# Patient Record
Sex: MALE | Race: WHITE | NOT HISPANIC OR LATINO | Employment: OTHER | URBAN - METROPOLITAN AREA
[De-identification: names, ages, dates, MRNs, and addresses within clinical notes are randomized per-mention and may not be internally consistent; named-entity substitution may affect disease eponyms.]

---

## 2017-01-03 ENCOUNTER — OFFICE VISIT (OUTPATIENT)
Dept: ELECTROPHYSIOLOGY | Facility: CLINIC | Age: 82
End: 2017-01-03
Payer: MEDICARE

## 2017-01-03 ENCOUNTER — HOSPITAL ENCOUNTER (OUTPATIENT)
Dept: CARDIOLOGY | Facility: CLINIC | Age: 82
Discharge: HOME | End: 2017-01-03
Payer: MEDICARE

## 2017-01-03 VITALS
SYSTOLIC BLOOD PRESSURE: 134 MMHG | HEIGHT: 68 IN | BODY MASS INDEX: 27.33 KG/M2 | HEART RATE: 67 BPM | WEIGHT: 180.31 LBS | DIASTOLIC BLOOD PRESSURE: 60 MMHG

## 2017-01-03 DIAGNOSIS — I48.0 PAF (PAROXYSMAL ATRIAL FIBRILLATION): ICD-10-CM

## 2017-01-03 DIAGNOSIS — R00.1 BRADYCARDIA: ICD-10-CM

## 2017-01-03 DIAGNOSIS — I49.5 SSS (SICK SINUS SYNDROME): Primary | ICD-10-CM

## 2017-01-03 PROCEDURE — 99499 UNLISTED E&M SERVICE: CPT | Mod: S$GLB,,, | Performed by: INTERNAL MEDICINE

## 2017-01-03 PROCEDURE — 1160F RVW MEDS BY RX/DR IN RCRD: CPT | Mod: S$GLB,,, | Performed by: INTERNAL MEDICINE

## 2017-01-03 PROCEDURE — 1125F AMNT PAIN NOTED PAIN PRSNT: CPT | Mod: S$GLB,,, | Performed by: INTERNAL MEDICINE

## 2017-01-03 PROCEDURE — 93000 ELECTROCARDIOGRAM COMPLETE: CPT | Mod: S$GLB,,, | Performed by: INTERNAL MEDICINE

## 2017-01-03 PROCEDURE — 99214 OFFICE O/P EST MOD 30 MIN: CPT | Mod: S$GLB,,, | Performed by: INTERNAL MEDICINE

## 2017-01-03 PROCEDURE — 99999 PR PBB SHADOW E&M-EST. PATIENT-LVL III: CPT | Mod: PBBFAC,,, | Performed by: INTERNAL MEDICINE

## 2017-01-03 PROCEDURE — 1159F MED LIST DOCD IN RCRD: CPT | Mod: S$GLB,,, | Performed by: INTERNAL MEDICINE

## 2017-01-03 PROCEDURE — 1157F ADVNC CARE PLAN IN RCRD: CPT | Mod: S$GLB,,, | Performed by: INTERNAL MEDICINE

## 2017-01-03 NOTE — PROGRESS NOTES
Subjective:    Patient ID:  Zeyad Woodard is a 89 y.o. male who presents for evaluation of Atrial Fibrillation      Atrial Fibrillation   Symptoms are negative for chest pain, dizziness, palpitations, shortness of breath, syncope and weakness. Past medical history includes atrial fibrillation.      89 y.o. M  HTN  HFpEF  Permanent AF  Syncope  GI bleed and chronic anemia; therefore not on anticoag    Had syncope 1/13 while seated at [employer]. See [facility] for details, again reviewed today.  ETT was negative. ILR implanted at that time.  ILR showed AF with multiple episodes of profound bradycardia, to 31 bpm even, while awake.    I placed PPM 7/15 and extracted ILR. Since, feeling much better. Ambulation still limited by LE neuropathy, but there's been no LH/presync/sync.  Paced 73% of the time.    My interpretation of today's ECG is AF with demand V pacing      Review of Systems   Constitution: Positive for malaise/fatigue. Negative for weakness.   HENT: Negative.  Negative for ear pain and tinnitus.    Eyes: Negative for blurred vision.   Cardiovascular: Negative.  Negative for chest pain, dyspnea on exertion, near-syncope, palpitations and syncope.   Respiratory: Negative.  Negative for shortness of breath.    Endocrine: Negative.  Negative for polyuria.   Hematologic/Lymphatic: Does not bruise/bleed easily.   Skin: Negative.  Negative for rash.   Musculoskeletal: Negative.  Negative for joint pain and muscle weakness.   Gastrointestinal: Negative.  Negative for abdominal pain and change in bowel habit.   Genitourinary: Negative for frequency.   Neurological: Positive for excessive daytime sleepiness and focal weakness. Negative for dizziness.   Psychiatric/Behavioral: Negative.  Negative for depression. The patient is not nervous/anxious.    Allergic/Immunologic: Negative for environmental allergies.        Objective:    Physical Exam   Constitutional: He is oriented to person, place, and time. He appears  well-developed and well-nourished.   HENT:   Head: Normocephalic and atraumatic.   Eyes: Conjunctivae, EOM and lids are normal. No scleral icterus.   Neck: Normal range of motion. No JVD present. No tracheal deviation present. No thyromegaly present.   Cardiovascular: Normal rate, normal heart sounds and intact distal pulses.  An irregularly irregular rhythm present.  No extrasystoles are present. PMI is not displaced.  Exam reveals no gallop and no friction rub.    No murmur heard.  Pulses:       Radial pulses are 2+ on the right side, and 2+ on the left side.   Pulmonary/Chest: Effort normal and breath sounds normal. No accessory muscle usage. No tachypnea. No respiratory distress. He has no wheezes. He has no rales.   Abdominal: Soft. Bowel sounds are normal. He exhibits no distension. There is no hepatosplenomegaly. There is no tenderness.   Musculoskeletal: Normal range of motion. He exhibits no edema.   Neurological: He is alert and oriented to person, place, and time. He has normal reflexes. He exhibits normal muscle tone.   Skin: Skin is warm and dry. No rash noted.   1+ edema bilat LEs   Psychiatric: He has a normal mood and affect. His behavior is normal.   Nursing note and vitals reviewed.        Assessment:       HFpEF  HTN  Permanent AF     Plan:       No A/C due to hx GI bleed and ongoing anemia. Continue ASA.    Return in 1 year with echo, or earlier prn.

## 2017-01-03 NOTE — MR AVS SNAPSHOT
[facility]  [address]  [facility] [address]  Phone: [phone]  Fax: [phone]                  Zeyad Woodard   1/3/2017 11:40 AM   Office Visit    Description:  Male : 1927   Provider:  Kade Callejas MD   Department:  [facility]           Reason for Visit     Atrial Fibrillation           Diagnoses this Visit        Comments    SSS (sick sinus syndrome)    -  Primary     Bradycardia                To Do List           Future Appointments        Provider Department Dept Phone    2017 3:30 PM Aldo Duran MD [place] - Ophthalmology [phone]    3/2/2017 10:15 AM Blade Emmanuel DPM [place] - Podiatry [phone]    3/13/2017 8:00 AM HOME MONITOR DEVICE CHECK, [facility] [phone]    3/13/2017 10:30 AM Lynn Rosenberg MD [facility]-Physical Med & Rehab [phone]      Goals (5 Years of Data)     None      Follow-Up and Disposition     Return in about 1 year (around 1/3/2018).      [facility] On Call     [facility] On Call Nurse Care Line -  Assistance  Registered nurses in the [facility] On Call Center provide clinical advisement, health education, appointment booking, and other advisory services.  Call for this free service at [phone].             Medications           Message regarding Medications     Verify the changes and/or additions to your medication regime listed below are the same as discussed with your clinician today.  If any of these changes or additions are incorrect, please notify your healthcare provider.             Verify that the below list of medications is an accurate representation of the medications you are currently taking.  If none reported, the list may be blank. If incorrect, please contact your healthcare provider. Carry this list with you in case of emergency.           Current Medications     amlodipine (NORVASC) 10 MG tablet Take 1 tablet (10 mg total) by mouth once daily.     "ASCORBATE CALCIUM (VITAMIN C ORAL) Take 1 tablet by mouth once daily.     aspirin (ECOTRIN) 81 MG EC tablet Take 1 tablet (81 mg total) by mouth once daily.    atorvastatin (LIPITOR) 20 MG tablet Take 1 tablet (20 mg total) by mouth nightly.    clotrimazole-betamethasone (LOTRISONE) lotion Apply topically 2 (two) times daily.    cyanocobalamin (VITAMIN B-12) 1000 MCG tablet Take 100 mcg by mouth once daily.    ferrous sulfate 325 mg (65 mg iron) Tab tablet Take 325 mg by mouth once daily.    furosemide (LASIX) 20 MG tablet Take 1 tablet (20 mg total) by mouth once daily.    gabapentin (NEURONTIN) 300 MG capsule Take 1 capsule (300 mg total) by mouth every evening.    levothyroxine (SYNTHROID) 100 MCG tablet Take 1 tablet (100 mcg total) by mouth once daily.    MULTIVITS-MINERALS/FA/LYCOPENE (ONE-A-DAY MEN'S MULTIVITAMIN ORAL) Take 1 tablet by mouth once daily.    oxycodone-acetaminophen (PERCOCET) 7.5-325 mg per tablet Take 1 tablet by mouth every 6 (six) hours as needed for Pain.    POLYETHYLENE GLYCOL 3350 (MIRALAX ORAL) Take 1 packet by mouth once daily.    senna-docusate 8.6-50 mg (PERICOLACE) 8.6-50 mg per tablet Take 1 tablet by mouth 2 (two) times daily.    tamsulosin (FLOMAX) 0.4 mg Cp24 Take 1 capsule (0.4 mg total) by mouth once daily.    calcium 500 mg Tab Take 1 tablet by mouth Daily.           Clinical Reference Information           Vital Signs - Last Recorded  Most recent update: 1/3/2017 11:14 AM by Marina Lucio MA    BP Pulse Ht Wt BMI    134/60 67 5' 8" (1.727 m) 81.8 kg (180 lb 5.4 oz) 27.42 kg/m2      Blood Pressure          Most Recent Value    BP  134/60      Allergies as of 1/3/2017     Penicillins      Immunizations Administered on Date of Encounter - 1/3/2017     None      Orders Placed During Today's Visit     Future Labs/Procedures Expected by Expires    2D echo with color flow doppler  12/9/2017 1/3/2018      "

## 2017-01-18 ENCOUNTER — PROCEDURE VISIT (OUTPATIENT)
Dept: OPHTHALMOLOGY | Facility: CLINIC | Age: 82
End: 2017-01-18
Payer: MEDICARE

## 2017-01-18 VITALS — SYSTOLIC BLOOD PRESSURE: 153 MMHG | HEART RATE: 72 BPM | DIASTOLIC BLOOD PRESSURE: 70 MMHG

## 2017-01-18 DIAGNOSIS — H26.491 PCO (POSTERIOR CAPSULAR OPACIFICATION), RIGHT: Primary | ICD-10-CM

## 2017-01-18 DIAGNOSIS — H52.7 REFRACTIVE ERROR: ICD-10-CM

## 2017-01-18 DIAGNOSIS — Z96.1 PSEUDOPHAKIA: ICD-10-CM

## 2017-01-18 DIAGNOSIS — I10 ESSENTIAL HYPERTENSION: ICD-10-CM

## 2017-01-18 PROCEDURE — 99499 UNLISTED E&M SERVICE: CPT | Mod: S$GLB,,, | Performed by: OPHTHALMOLOGY

## 2017-01-18 PROCEDURE — 66821 AFTER CATARACT LASER SURGERY: CPT | Mod: RT,S$GLB,, | Performed by: OPHTHALMOLOGY

## 2017-01-18 PROCEDURE — 92014 COMPRE OPH EXAM EST PT 1/>: CPT | Mod: 57,S$GLB,, | Performed by: OPHTHALMOLOGY

## 2017-01-18 RX ORDER — GABAPENTIN 100 MG/1
100 CAPSULE ORAL 2 TIMES DAILY
Status: ON HOLD | COMMUNITY
End: 2017-12-11

## 2017-01-18 NOTE — MR AVS SNAPSHOT
[place] - Ophthalmology   [facility]  [place] [address]  Phone: [phone]  Fax: [phone]                  Zeyad Woodard   2017 3:30 PM   Procedure visit    Description:  Male : 1927   Provider:  Aldo Duran MD   Department:  [place] - Ophthalmology           Reason for Visit     PCO           Diagnoses this Visit        Comments    PCO (posterior capsular opacification), right    -  Primary     Essential hypertension         Refractive error         Pseudophakia                To Do List           Future Appointments        Provider Department Dept Phone    3/2/2017 10:15 AM Blade Emmanuel DPM [place] - Podiatry [phone]    3/13/2017 8:00 AM HOME MONITOR DEVICE CHECK, [facility] - Arrhythmia [phone]    3/13/2017 10:30 AM MD [facility]-Physical Med & Rehab [phone]      Goals (5 Years of Data)     None      Follow-Up and Disposition     Return in about 2 weeks (around 2017) for Post-op Right eye.      [facility] On Call     [facility] On Call Nurse Care Line - 24/ Assistance  Registered nurses in the [facility] On Call Center provide clinical advisement, health education, appointment booking, and other advisory services.  Call for this free service at [phone].             Medications           Message regarding Medications     Verify the changes and/or additions to your medication regime listed below are the same as discussed with your clinician today.  If any of these changes or additions are incorrect, please notify your healthcare provider.        STOP taking these medications     POLYETHYLENE GLYCOL 3350 (MIRALAX ORAL) Take 1 packet by mouth once daily.           Verify that the below list of medications is an accurate representation of the medications you are currently taking.  If none reported, the list may be blank. If incorrect, please contact your healthcare provider. Carry this list with you in case of  emergency.           Current Medications     amlodipine (NORVASC) 10 MG tablet Take 1 tablet (10 mg total) by mouth once daily.    ASCORBATE CALCIUM (VITAMIN C ORAL) Take 1 tablet by mouth once daily.     aspirin (ECOTRIN) 81 MG EC tablet Take 1 tablet (81 mg total) by mouth once daily.    atorvastatin (LIPITOR) 20 MG tablet Take 1 tablet (20 mg total) by mouth nightly.    calcium 500 mg Tab Take 1 tablet by mouth Daily.    cyanocobalamin (VITAMIN B-12) 1000 MCG tablet Take 100 mcg by mouth once daily.    ferrous sulfate 325 mg (65 mg iron) Tab tablet Take 325 mg by mouth once daily.    furosemide (LASIX) 20 MG tablet Take 1 tablet (20 mg total) by mouth once daily.    gabapentin (NEURONTIN) 100 MG capsule Take 100 mg by mouth 2 (two) times daily.    gabapentin (NEURONTIN) 300 MG capsule Take 1 capsule (300 mg total) by mouth every evening.    levothyroxine (SYNTHROID) 100 MCG tablet Take 1 tablet (100 mcg total) by mouth once daily.    MULTIVITS-MINERALS/FA/LYCOPENE (ONE-A-DAY MEN'S MULTIVITAMIN ORAL) Take 1 tablet by mouth once daily.    oxycodone-acetaminophen (PERCOCET) 7.5-325 mg per tablet Take 1 tablet by mouth every 6 (six) hours as needed for Pain.    senna-docusate 8.6-50 mg (PERICOLACE) 8.6-50 mg per tablet Take 1 tablet by mouth 2 (two) times daily.    tamsulosin (FLOMAX) 0.4 mg Cp24 Take 1 capsule (0.4 mg total) by mouth once daily.    clotrimazole-betamethasone (LOTRISONE) lotion Apply topically 2 (two) times daily.           Clinical Reference Information           Vital Signs - Last Recorded  Most recent update: 1/18/2017  4:33 PM by PARESH Banuelos    BP Pulse                (!) 153/70 (BP Location: Right arm, Patient Position: Sitting, BP Method: Automatic) 72          Blood Pressure          Most Recent Value    BP  (!)  153/70      Allergies as of 1/18/2017     Penicillins      Immunizations Administered on Date of Encounter - 1/18/2017     None

## 2017-01-19 ENCOUNTER — TELEPHONE (OUTPATIENT)
Dept: OPHTHALMOLOGY | Facility: CLINIC | Age: 82
End: 2017-01-19

## 2017-01-19 NOTE — TELEPHONE ENCOUNTER
----- Message from Cricket Forde sent at 1/19/2017 10:15 AM CST -----  Contact: Elenita (daughter)  We booked post op appt for 9:30 on 2/1 but daughter states she wont be able to get pt in until 10-10:30 am that day,please call back at [phone],thanks

## 2017-01-19 NOTE — PROGRESS NOTES
Subjective:       Patient ID: Zeyad Woodard is a 89 y.o. male.    Chief Complaint: PCO (Pt here for PCO OD eval)    HPI  Review of Systems    Objective:      Physical Exam    Assessment:       1. PCO (posterior capsular opacification), right    2. Essential hypertension    3. Refractive error    4. Pseudophakia        Plan:       Visually significant  PCO OD- Pt. Wants Laser.     HTN-No retinopathy OD.  RE        YAG CAP OD today. TE=   45.5 mj, Avg. 0.7mj, 65 pulses.  PF taper OD.  Control HTN.  RTC 2 wks.

## 2017-01-26 ENCOUNTER — OFFICE VISIT (OUTPATIENT)
Dept: INTERNAL MEDICINE | Facility: CLINIC | Age: 82
End: 2017-01-26
Payer: MEDICARE

## 2017-01-26 VITALS
TEMPERATURE: 99 F | HEART RATE: 84 BPM | BODY MASS INDEX: 28.57 KG/M2 | SYSTOLIC BLOOD PRESSURE: 152 MMHG | HEIGHT: 68 IN | WEIGHT: 188.5 LBS | DIASTOLIC BLOOD PRESSURE: 68 MMHG

## 2017-01-26 DIAGNOSIS — I50.9 ACUTE ON CHRONIC CONGESTIVE HEART FAILURE, UNSPECIFIED CONGESTIVE HEART FAILURE TYPE: Primary | ICD-10-CM

## 2017-01-26 DIAGNOSIS — I10 ESSENTIAL HYPERTENSION: ICD-10-CM

## 2017-01-26 PROCEDURE — 99214 OFFICE O/P EST MOD 30 MIN: CPT | Mod: S$GLB,,, | Performed by: FAMILY MEDICINE

## 2017-01-26 PROCEDURE — 99499 UNLISTED E&M SERVICE: CPT | Mod: S$GLB,,, | Performed by: FAMILY MEDICINE

## 2017-01-26 PROCEDURE — 1159F MED LIST DOCD IN RCRD: CPT | Mod: S$GLB,,, | Performed by: FAMILY MEDICINE

## 2017-01-26 PROCEDURE — 99999 PR PBB SHADOW E&M-EST. PATIENT-LVL III: CPT | Mod: PBBFAC,,, | Performed by: FAMILY MEDICINE

## 2017-01-26 PROCEDURE — 1160F RVW MEDS BY RX/DR IN RCRD: CPT | Mod: S$GLB,,, | Performed by: FAMILY MEDICINE

## 2017-01-26 PROCEDURE — 1157F ADVNC CARE PLAN IN RCRD: CPT | Mod: S$GLB,,, | Performed by: FAMILY MEDICINE

## 2017-01-26 RX ORDER — FUROSEMIDE 20 MG/1
20 TABLET ORAL 2 TIMES DAILY
Qty: 180 TABLET | Refills: 3
Start: 2017-01-26 | End: 2017-03-27

## 2017-01-26 NOTE — MR AVS SNAPSHOT
[facility] Internal Medicine   [place]  [place] [address]  Phone: [phone]  Fax: [phone]                  Zeyad Woodard   2017 6:00 PM   Office Visit    Description:  Male : 1927   Provider:  Spring Peacock MD   Department:  [facility] Internal Medicine           Reason for Visit     retaining fluid to bt legs           Diagnoses this Visit        Comments    Acute on chronic congestive heart failure, unspecified congestive heart failure type    -  Primary            To Do List           Future Appointments        Provider Department Dept Phone    2017 9:30 AM Aldo Duran MD [place] - Ophthalmology [phone]    3/2/2017 10:15 AM Blade Emmanuel DPM [facility] Podiatry [phone]    3/13/2017 8:00 AM HOME MONITOR DEVICE CHECK, [facility] - Arrhythmia [phone]    3/13/2017 10:30 AM Lynn Rosenberg MD [facility]-Physical Med & Rehab [phone]      Goals (5 Years of Data)     None      Follow-Up and Disposition     Return in about 4 days (around 2017), or if symptoms worsen or fail to improve.       These Medications        Disp Refills Start End    furosemide (LASIX) 20 MG tablet 180 tablet 3 2017     Take 1 tablet (20 mg total) by mouth 2 (two) times daily. - Oral    Pharmacy: [facility] [address] [place] [address] [address] Ph #: [phone]         [facility] On Call     [facility] On Call Nurse Care Line -  Assistance  Registered nurses in the [facility] On Call Center provide clinical advisement, health education, appointment booking, and other advisory services.  Call for this free service at [phone].             Medications           Message regarding Medications     Verify the changes and/or additions to your medication regime listed below are the same as discussed with your clinician today.  If any of these changes or additions are incorrect, please notify your healthcare provider.         CHANGE how you are taking these medications     Start Taking Instead of    furosemide (LASIX) 20 MG tablet furosemide (LASIX) 20 MG tablet    Dosage:  Take 1 tablet (20 mg total) by mouth 2 (two) times daily. Dosage:  Take 1 tablet (20 mg total) by mouth once daily.           Verify that the below list of medications is an accurate representation of the medications you are currently taking.  If none reported, the list may be blank. If incorrect, please contact your healthcare provider. Carry this list with you in case of emergency.           Current Medications     amlodipine (NORVASC) 10 MG tablet Take 1 tablet (10 mg total) by mouth once daily.    ASCORBATE CALCIUM (VITAMIN C ORAL) Take 1 tablet by mouth once daily.     aspirin (ECOTRIN) 81 MG EC tablet Take 1 tablet (81 mg total) by mouth once daily.    atorvastatin (LIPITOR) 20 MG tablet Take 1 tablet (20 mg total) by mouth nightly.    calcium 500 mg Tab Take 1 tablet by mouth Daily.    clotrimazole-betamethasone (LOTRISONE) lotion Apply topically 2 (two) times daily.    cyanocobalamin (VITAMIN B-12) 1000 MCG tablet Take 100 mcg by mouth once daily.    ferrous sulfate 325 mg (65 mg iron) Tab tablet Take 325 mg by mouth once daily.    furosemide (LASIX) 20 MG tablet Take 1 tablet (20 mg total) by mouth 2 (two) times daily.    gabapentin (NEURONTIN) 100 MG capsule Take 100 mg by mouth 2 (two) times daily.    levothyroxine (SYNTHROID) 100 MCG tablet Take 1 tablet (100 mcg total) by mouth once daily.    MULTIVITS-MINERALS/FA/LYCOPENE (ONE-A-DAY MEN'S MULTIVITAMIN ORAL) Take 1 tablet by mouth once daily.    senna-docusate 8.6-50 mg (PERICOLACE) 8.6-50 mg per tablet Take 1 tablet by mouth 2 (two) times daily.    tamsulosin (FLOMAX) 0.4 mg Cp24 Take 1 capsule (0.4 mg total) by mouth once daily.    oxycodone-acetaminophen (PERCOCET) 7.5-325 mg per tablet Take 1 tablet by mouth every 6 (six) hours as needed for Pain.           Clinical Reference Information          "  Vital Signs - Last Recorded  Most recent update: 1/26/2017  6:20 PM by Nadine Nash MA    BP Pulse Temp Ht Wt BMI    (!) 152/68 (BP Location: Right arm, Patient Position: Sitting, BP Method: Manual) 84 98.7 °F (37.1 °C) (Oral) 5' 8" (1.727 m) 85.5 kg (188 lb 7.9 oz) 28.66 kg/m2      Blood Pressure          Most Recent Value    BP  (!)  152/68      Allergies as of 1/26/2017     Penicillins      Immunizations Administered on Date of Encounter - 1/26/2017     None      Orders Placed During Today's Visit     Future Labs/Procedures Expected by Expires    Brain natriuretic peptide  1/26/2017 3/27/2018    CBC auto differential  1/26/2017 1/26/2018    Comprehensive metabolic panel  1/26/2017 1/26/2018    TSH  1/26/2017 1/26/2018      "

## 2017-01-27 ENCOUNTER — LAB VISIT (OUTPATIENT)
Dept: LAB | Facility: HOSPITAL | Age: 82
End: 2017-01-27
Attending: FAMILY MEDICINE
Payer: MEDICARE

## 2017-01-27 DIAGNOSIS — I50.9 ACUTE ON CHRONIC CONGESTIVE HEART FAILURE, UNSPECIFIED CONGESTIVE HEART FAILURE TYPE: ICD-10-CM

## 2017-01-27 LAB
ALBUMIN SERPL BCP-MCNC: 3.8 G/DL
ALP SERPL-CCNC: 113 U/L
ALT SERPL W/O P-5'-P-CCNC: 16 U/L
ANION GAP SERPL CALC-SCNC: 10 MMOL/L
AST SERPL-CCNC: 28 U/L
BASOPHILS # BLD AUTO: 0.02 K/UL
BASOPHILS NFR BLD: 0.2 %
BILIRUB SERPL-MCNC: 0.9 MG/DL
BNP SERPL-MCNC: 177 PG/ML
BUN SERPL-MCNC: 24 MG/DL
CALCIUM SERPL-MCNC: 9.3 MG/DL
CHLORIDE SERPL-SCNC: 102 MMOL/L
CO2 SERPL-SCNC: 25 MMOL/L
CREAT SERPL-MCNC: 1.6 MG/DL
DIFFERENTIAL METHOD: ABNORMAL
EOSINOPHIL # BLD AUTO: 0.2 K/UL
EOSINOPHIL NFR BLD: 2.5 %
ERYTHROCYTE [DISTWIDTH] IN BLOOD BY AUTOMATED COUNT: 14.5 %
EST. GFR  (AFRICAN AMERICAN): 43.5 ML/MIN/1.73 M^2
EST. GFR  (NON AFRICAN AMERICAN): 37.6 ML/MIN/1.73 M^2
GLUCOSE SERPL-MCNC: 130 MG/DL
HCT VFR BLD AUTO: 26.8 %
HGB BLD-MCNC: 8.8 G/DL
LYMPHOCYTES # BLD AUTO: 3.3 K/UL
LYMPHOCYTES NFR BLD: 41.2 %
MCH RBC QN AUTO: 30 PG
MCHC RBC AUTO-ENTMCNC: 32.8 %
MCV RBC AUTO: 92 FL
MONOCYTES # BLD AUTO: 0.9 K/UL
MONOCYTES NFR BLD: 11 %
NEUTROPHILS # BLD AUTO: 3.6 K/UL
NEUTROPHILS NFR BLD: 45 %
PLATELET # BLD AUTO: 112 K/UL
PMV BLD AUTO: 9.6 FL
POTASSIUM SERPL-SCNC: 4.6 MMOL/L
PROT SERPL-MCNC: 7 G/DL
RBC # BLD AUTO: 2.93 M/UL
SODIUM SERPL-SCNC: 137 MMOL/L
TSH SERPL DL<=0.005 MIU/L-ACNC: 1.78 UIU/ML
WBC # BLD AUTO: 8.09 K/UL

## 2017-01-27 PROCEDURE — 85025 COMPLETE CBC W/AUTO DIFF WBC: CPT

## 2017-01-27 PROCEDURE — 84443 ASSAY THYROID STIM HORMONE: CPT

## 2017-01-27 PROCEDURE — 80053 COMPREHEN METABOLIC PANEL: CPT

## 2017-01-27 PROCEDURE — 36415 COLL VENOUS BLD VENIPUNCTURE: CPT | Mod: PO

## 2017-01-27 PROCEDURE — 83880 ASSAY OF NATRIURETIC PEPTIDE: CPT

## 2017-01-27 NOTE — PROGRESS NOTES
"Subjective:      Patient ID: Zeyad Woodard is a 89 y.o. male.    Chief Complaint: retaining fluid to bt legs    HPI Comments: He is here with his daughter. Per his home scale in the last 20 days he has gained around 10 pounds. He has been taking all his medications. He denies any worsening sob or chest pain. He does drink coffee, milk and prune juice in the morning. He does get a small 6 oz amount of mya aid with lunch and dinner. And 4 glasses of water. No changes in the liquid consumption or changes in diet. He did eat a lot around around hermilo time. He denies any sinus pressure, runny nose, or cough. Daughter has noticed some increased sob.     Review of Systems   Constitutional: Negative for fever.   HENT: Negative.    Gastrointestinal: Negative for abdominal distention and abdominal pain.   Genitourinary: Negative for dysuria.     I personally reviewed Past Medical History, Past Surgical history,  Past Social History and Family History    Objective:     Visit Vitals    BP (!) 152/68 (BP Location: Right arm, Patient Position: Sitting, BP Method: Manual)    Pulse 84    Temp 98.7 °F (37.1 °C) (Oral)    Ht 5' 8" (1.727 m)    Wt 85.5 kg (188 lb 7.9 oz)    BMI 28.66 kg/m2       Physical Exam   Constitutional: He is oriented to person, place, and time. He appears well-developed and well-nourished. No distress.   HENT:   Head: Normocephalic and atraumatic.   Right Ear: Hearing, tympanic membrane, external ear and ear canal normal.   Left Ear: Hearing, tympanic membrane, external ear and ear canal normal.   Mouth/Throat: Oropharynx is clear and moist. No oropharyngeal exudate.   Eyes: Conjunctivae and EOM are normal. Pupils are equal, round, and reactive to light.   Neck: Normal range of motion. Neck supple. No thyromegaly present.   Cardiovascular: Normal rate, regular rhythm and intact distal pulses.    Murmur heard.  Pulmonary/Chest: Effort normal and breath sounds normal. No respiratory distress. He " has no wheezes. He has no rales. He exhibits no tenderness.   Abdominal: Soft. Bowel sounds are normal. He exhibits no distension and no mass. There is no tenderness. There is no rebound and no guarding.   Neurological: He is alert and oriented to person, place, and time. No cranial nerve deficit.   Skin: Skin is warm and dry. He is not diaphoretic.   BL LE pitting edema    Vitals reviewed.      Zeyad was seen today for retaining fluid to bt legs.    Diagnoses and all orders for this visit:    Acute on chronic congestive heart failure, unspecified congestive heart failure type  -concern for exacerbation with at least 8 pound weight gain in the last 3 weeks based on the clinic scale and about 10 pounds with patient scale  -last ECHO completed last month   -will increase lasix to 20mg  Bid, patient's daughter not amenable to larger increase of lasix, would prefer to discuss with PCP  -discussed elevated creatinine  -will schedule follow up with pcp in 5 days, discussed ER prompts  -     CBC auto differential; Future  -     Comprehensive metabolic panel; Future  -     TSH; Future  -     Brain natriuretic peptide; Future    Essential hypertension  -uncontrolled, patient to discuss with pcp     Other orders  -     furosemide (LASIX) 20 MG tablet; Take 1 tablet (20 mg total) by mouth 2 (two) times daily.

## 2017-02-01 ENCOUNTER — OFFICE VISIT (OUTPATIENT)
Dept: OPHTHALMOLOGY | Facility: CLINIC | Age: 82
End: 2017-02-01
Payer: MEDICARE

## 2017-02-01 DIAGNOSIS — H52.7 REFRACTIVE ERROR: ICD-10-CM

## 2017-02-01 DIAGNOSIS — Z98.890 POST-OPERATIVE STATE: Primary | ICD-10-CM

## 2017-02-01 DIAGNOSIS — Z96.1 PSEUDOPHAKIA: ICD-10-CM

## 2017-02-01 PROCEDURE — 99999 PR PBB SHADOW E&M-EST. PATIENT-LVL II: CPT | Mod: PBBFAC,,, | Performed by: OPHTHALMOLOGY

## 2017-02-01 PROCEDURE — 99024 POSTOP FOLLOW-UP VISIT: CPT | Mod: S$GLB,,, | Performed by: OPHTHALMOLOGY

## 2017-02-01 NOTE — PROGRESS NOTES
Subjective:       Patient ID: Zeyad Woodard is a 89 y.o. male.    Chief Complaint: Post-op Evaluation (Yag po od)    HPI  Review of Systems    Objective:      Physical Exam    Assessment:       1. Post-operative state    2. Refractive error    3. Pseudophakia        Plan:       S/p YAG CAP OD-Doing well.  RE-Pt does not want MRx.          RTC Dr Srinivasan in 6 mos.

## 2017-02-01 NOTE — MR AVS SNAPSHOT
[place] [facility]   [facility]  [place] [address]  Phone: [phone]  Fax: [phone]                  Zeyad Woodard   2017 9:30 AM   Office Visit    Description:  Male : 1927   Provider:  Aldo Duran MD   Department:  [place] [facility]           Reason for Visit     Post-op Evaluation           Diagnoses this Visit        Comments    Post-operative state    -  Primary     Refractive error         Pseudophakia                To Do List           Future Appointments        Provider Department Dept Phone    2/3/2017 9:20 AM Booker Ricci MD [facility] [phone]    3/2/2017 10:15 AM Blade Emmanuel DPM [facility] Podiatry [phone]    3/13/2017 8:00 AM HOME MONITOR DEVICE CHECK, [facility] - Arrhythmia [phone]    3/13/2017 10:30 AM Lynn Rosenberg MD [facility] Re[facility] [phone]      Goals (5 Years of Data)     None      Follow-Up and Disposition     Return in about 6 months (around 2017) for Dr Srinivasan, F/U S/P YAG CAP OD..      [facility]     [facility] -  Assistance  Registered nurses in the [facility] provide clinical advisement, health education, appointment booking, and other advisory services.  Call for this free service at [phone].             Medications           Message regarding Medications     Verify the changes and/or additions to your medication regime listed below are the same as discussed with your clinician today.  If any of these changes or additions are incorrect, please notify your healthcare provider.             Verify that the below list of medications is an accurate representation of the medications you are currently taking.  If none reported, the list may be blank. If incorrect, please contact your healthcare provider. Carry this list with you in case of emergency.           Current Medications     amlodipine  (NORVASC) 10 MG tablet Take 1 tablet (10 mg total) by mouth once daily.    ASCORBATE CALCIUM (VITAMIN C ORAL) Take 1 tablet by mouth once daily.     aspirin (ECOTRIN) 81 MG EC tablet Take 1 tablet (81 mg total) by mouth once daily.    atorvastatin (LIPITOR) 20 MG tablet Take 1 tablet (20 mg total) by mouth nightly.    calcium 500 mg Tab Take 1 tablet by mouth Daily.    clotrimazole-betamethasone (LOTRISONE) lotion Apply topically 2 (two) times daily.    cyanocobalamin (VITAMIN B-12) 1000 MCG tablet Take 100 mcg by mouth once daily.    ferrous sulfate 325 mg (65 mg iron) Tab tablet Take 325 mg by mouth once daily.    furosemide (LASIX) 20 MG tablet Take 1 tablet (20 mg total) by mouth 2 (two) times daily.    gabapentin (NEURONTIN) 100 MG capsule Take 100 mg by mouth 2 (two) times daily.    levothyroxine (SYNTHROID) 100 MCG tablet Take 1 tablet (100 mcg total) by mouth once daily.    MULTIVITS-MINERALS/FA/LYCOPENE (ONE-A-DAY MEN'S MULTIVITAMIN ORAL) Take 1 tablet by mouth once daily.    senna-docusate 8.6-50 mg (PERICOLACE) 8.6-50 mg per tablet Take 1 tablet by mouth 2 (two) times daily.    tamsulosin (FLOMAX) 0.4 mg Cp24 Take 1 capsule (0.4 mg total) by mouth once daily.    oxycodone-acetaminophen (PERCOCET) 7.5-325 mg per tablet Take 1 tablet by mouth every 6 (six) hours as needed for Pain.           Clinical Reference Information           Allergies as of 2/1/2017     Penicillins      Immunizations Administered on Date of Encounter - 2/1/2017     None

## 2017-02-02 ENCOUNTER — TELEPHONE (OUTPATIENT)
Dept: INTERNAL MEDICINE | Facility: CLINIC | Age: 82
End: 2017-02-02

## 2017-02-03 ENCOUNTER — OFFICE VISIT (OUTPATIENT)
Dept: FAMILY MEDICINE | Facility: CLINIC | Age: 82
End: 2017-02-03
Payer: MEDICARE

## 2017-02-03 VITALS
SYSTOLIC BLOOD PRESSURE: 126 MMHG | HEIGHT: 68 IN | BODY MASS INDEX: 28.13 KG/M2 | DIASTOLIC BLOOD PRESSURE: 64 MMHG | HEART RATE: 79 BPM | WEIGHT: 185.63 LBS | OXYGEN SATURATION: 95 %

## 2017-02-03 DIAGNOSIS — N18.3 CKD (CHRONIC KIDNEY DISEASE), STAGE 3 (MODERATE): ICD-10-CM

## 2017-02-03 DIAGNOSIS — R63.5 WEIGHT GAIN: ICD-10-CM

## 2017-02-03 DIAGNOSIS — I50.22 CHRONIC SYSTOLIC CONGESTIVE HEART FAILURE: ICD-10-CM

## 2017-02-03 DIAGNOSIS — M79.89 LEG SWELLING: ICD-10-CM

## 2017-02-03 DIAGNOSIS — I10 ESSENTIAL HYPERTENSION: Primary | ICD-10-CM

## 2017-02-03 PROCEDURE — 1126F AMNT PAIN NOTED NONE PRSNT: CPT | Mod: S$GLB,,, | Performed by: FAMILY MEDICINE

## 2017-02-03 PROCEDURE — 99214 OFFICE O/P EST MOD 30 MIN: CPT | Mod: S$GLB,,, | Performed by: FAMILY MEDICINE

## 2017-02-03 PROCEDURE — 1157F ADVNC CARE PLAN IN RCRD: CPT | Mod: S$GLB,,, | Performed by: FAMILY MEDICINE

## 2017-02-03 PROCEDURE — 99999 PR PBB SHADOW E&M-EST. PATIENT-LVL III: CPT | Mod: PBBFAC,,, | Performed by: FAMILY MEDICINE

## 2017-02-03 PROCEDURE — 99499 UNLISTED E&M SERVICE: CPT | Mod: S$GLB,,, | Performed by: FAMILY MEDICINE

## 2017-02-03 PROCEDURE — 1159F MED LIST DOCD IN RCRD: CPT | Mod: S$GLB,,, | Performed by: FAMILY MEDICINE

## 2017-02-03 PROCEDURE — 1160F RVW MEDS BY RX/DR IN RCRD: CPT | Mod: S$GLB,,, | Performed by: FAMILY MEDICINE

## 2017-02-03 RX ORDER — METOPROLOL SUCCINATE 25 MG/1
25 TABLET, EXTENDED RELEASE ORAL DAILY
Qty: 90 TABLET | Refills: 3 | Status: SHIPPED | OUTPATIENT
Start: 2017-02-03 | End: 2017-07-27 | Stop reason: SDUPTHER

## 2017-02-03 NOTE — PROGRESS NOTES
Subjective:       Patient ID: Zeyad Woodard is a 89 y.o. male.    Chief Complaint: Leg Swelling    HPI Comments: 89 years old male who came to the clinic with bilateral lower extremity swelling for the last week.  Patient Lasix dose was adjusted treatment with partial improvement of the symptoms.  He is currently taking amlodipine.  Last BNP was elevated.  Patient daughter reports 10 pounds weight gain.  No chest pain palpitations orthopnea or PND.    Review of Systems   Constitutional: Positive for unexpected weight change.   HENT: Negative.    Eyes: Negative.    Respiratory: Positive for shortness of breath. Negative for apnea, cough, choking, chest tightness, wheezing and stridor.    Cardiovascular: Negative.    Gastrointestinal: Negative.    Genitourinary: Negative.    Musculoskeletal: Negative.    Skin: Negative.    Neurological: Negative.    Psychiatric/Behavioral: Negative.        Objective:      Physical Exam   Constitutional: He is oriented to person, place, and time. He appears well-developed and well-nourished. No distress.   HENT:   Head: Normocephalic and atraumatic.   Right Ear: External ear normal.   Left Ear: External ear normal.   Nose: Nose normal.   Mouth/Throat: Oropharynx is clear and moist. No oropharyngeal exudate.   Eyes: Conjunctivae and EOM are normal. Pupils are equal, round, and reactive to light. Right eye exhibits no discharge. Left eye exhibits no discharge. No scleral icterus.   Neck: Normal range of motion. Neck supple. No JVD present. No tracheal deviation present. No thyromegaly present.   Cardiovascular: Normal rate, regular rhythm, normal heart sounds and intact distal pulses.  Exam reveals no gallop and no friction rub.    No murmur heard.  Pulmonary/Chest: Effort normal and breath sounds normal. No stridor. No respiratory distress. He has no wheezes. He has no rales. He exhibits no tenderness.   Abdominal: Soft. Bowel sounds are normal. He exhibits no distension and no  mass. There is no tenderness. There is no rebound and no guarding.   Musculoskeletal: Normal range of motion. He exhibits edema (2/4 lower extremities.). He exhibits no tenderness.   Lymphadenopathy:     He has no cervical adenopathy.   Neurological: He is alert and oriented to person, place, and time. He has normal reflexes. He displays normal reflexes. No cranial nerve deficit. He exhibits normal muscle tone. Coordination and gait abnormal.   Skin: Skin is warm and dry. No rash noted. He is not diaphoretic. No erythema. No pallor.   Psychiatric: He has a normal mood and affect. His behavior is normal. Judgment and thought content normal.   Nursing note and vitals reviewed.      Assessment:       1. Essential hypertension    2. Leg swelling    3. CKD (chronic kidney disease), stage 3 (moderate)    4. Chronic systolic congestive heart failure    5. Weight gain        Plan:     Zeyad was seen today for leg swelling.    Diagnoses and all orders for this visit:    Essential hypertension    Leg swelling    CKD (chronic kidney disease), stage 3 (moderate)    Chronic systolic congestive heart failure  -     metoprolol succinate (TOPROL-XL) 25 MG 24 hr tablet; Take 1 tablet (25 mg total) by mouth once daily.    Weight gain        Continue monitoring blood pressure at home, low sodium diet.

## 2017-02-03 NOTE — PATIENT INSTRUCTIONS
Chronic Kidney Disease (CKD)    The role of the kidneys is to remove waste products and extra water from the blood.  When the kidneys do not work as they should, waste products begin to build up in the blood. This is called chronic kidney disease (CKD). CKD means that you have kidney damage or a decrease in kidney function lasting at least 3 months. CKD allows extra water, waste, and toxins to build up in the body. This can eventually become life-threatening. You might need dialysis or a kidney transplant to stay alive. This most severe form is called end stage renal disease.  Diabetes is the leading causes of chronic renal failure. Other causes include high blood pressure, hardening of the arteries (atherosclerosis), lupus, inflammation of the blood vessels (vasculitis), and past viral or bacterial infections. Certain over-the-counter pain medicines can cause renal failure when taken often over a long period of time. These include aspirin, ibuprofen, and related anti-inflammatory medicines called NSAIDs (nonsteroidal anti-inflammatory drugs).  Home care  The following guidelines will help you care for yourself at home:  · If you have diabetes, talk with your healthcare provider about keeping your blood sugar under control. Ask if you need to make and changes to your diet, lifestyle, or medicines.  · If you have high blood pressure:  ¨ Take prescribed medicine to lower your blood pressure to the recommended goal of less than 130/80.  ¨ Start a regular exercise program that you enjoy. Check with your healthcare provider to be sure your planned exercise program is right for you.  ¨ Eat less salt (sodium). Your healthcare provider can tell you how much salt per day is safe for you.  · If you are overweight, talk with your healthcare provider about a weight loss plan.  · If you smoke, you must quit. Smoking makes kidney disease worse. Talk with your healthcare provider about ways to help you quit.  For more  information, visit the following links:  ¨ www.smokefree.gov/sites/default/files/pdf/clearing-the-air-accessible.pdf  ¨ www.smokefree.gov  ¨ www.cancer.org/healthy/stayawayfromtobacco/guidetoquittingsmoking/  · Most people with CKD need to follow a special diet.  Be sure you understand yours. In general, you will need to limit protein, salt, potassium, and phosphorus. You also need to limit how much fluid you drink.   · CKD is a risk factor for heart disease. Talk with your healthcare provider about any other risk factors you might have and what you can do to lessen them.  · Talk with your healthcare provider about any medicines you are taking to find out if they need to be reduced or stopped.  · Don't use the following over-the-counter medicines, or consult your healthcare provider before using:  ¨ Aspirin and NSAIDs such as ibuprofen or naproxen. Using acetaminophen for fever or pain is OK.  ¨ Laxatives and antacids containing magnesium or aluminum  ¨ Fleet or phospho soda enemas containing phosphorus  ¨ Certain stomach acid-blocking medicine such as cimetidine or ranitidine   ¨ Decongestants containing pseudoephedrine   ¨ Herbal supplements  Follow-up care  Follow up with your healthcare provider, or as advised. Contact one of the following for more information:  · American Association of Kidney Patients 130-764-4791 www.aakp.org  · National Kidney Foundation 371-579-4422 www.kidney.org  · American Kidney Fund 545-342-0537 www.kidneyfund.org  · National Kidney Disease Education Program 866-4KIDNEY www.nkdep.nih.gov  If an X-ray, ECG (cardiogram), or other diagnostic test was taken, you will be told of any new findings that may affect your care.  Call 911  Call 911 if you have any of the following:  · Severe weakness, dizziness, fainting, drowsiness, or confusion  · Chest pain or shortness of breath  · Heart beating fast, slow, or irregularly  When to seek medical advice  Call your healthcare provider right away  if any of these occur:  · Nausea or vomiting  · Fever of 100.4°F (38°C) or higher, or as directed by your healthcare provider  · Unexpected weight gain or swelling in the legs, ankles, or around the eyes  · Decrease or absent urine output  Date Last Reviewed: 9/1/2016  © 9164-4590 [employer]. [address] [place] [address], [place] [address]. All rights reserved. This information is not intended as a substitute for professional medical care. Always follow your healthcare professional's instructions.        Heart Failure: Warning Signs of a Flare-Up  You have a condition called heart failure. Once you have heart failure, flare-ups can happen. Below are signs that can mean your heart failure is getting worse. If you notice any of these warning signs, call your healthcare provider.  Swelling    · Your feet, ankles, or lower legs get puffier.  · You notice skin changes on your lower legs.  · Your shoes feel too tight.  · Your clothes are tighter in the waist.  · You have trouble getting rings on or off your fingers.  Shortness of breath  · You have to breathe harder even when youre doing your normal activities or when youre resting.  · You are short of breath walking up stairs or even short distances.  · You wake up at night short of breath or coughing.  · You need to use more pillows or sit up to sleep.  · You wake up tired or restless.  Other warning signs  · You feel weaker, dizzy, or more tired.  · You have chest pain or changes in your heartbeat.  · You have a cough that wont go away.  · You cant remember things or dont feel like eating.  Tracking your weight  Gaining weight is often the first warning sign that heart failure is getting worse. Gaining even a few pounds can be a sign that your body is retaining excess water and salt. Weighing yourself each day in the morning after you urinate and before you eat, is the best way to know if you're retaining water. Get a scale that is easy to read and make  sure you wear the same clothes and use the same scale every time you weigh. Your healthcare provider will show you how to track your weight. Call your doctor if you gain more than 2 pounds in 1 day, 5 pounds in 1 week, or whatever weight gain you were told to report by your doctor. This is often a sign of worsening heart failure and needs to be evaluated and treated before it compromises your breathing. Your doctor will tell you what to do next.    Date Last Reviewed: 3/15/2016  © 8180-3088 [employer] [address] [place] [address]. All rights reserved. This information is not intended as a substitute for professional medical care. Always follow your healthcare professional's instructions.

## 2017-02-03 NOTE — MR AVS SNAPSHOT
[facility]   [place]  [place] [address]  Phone: [phone]  Fax: [phone]                  Zeyad Woodard   2/3/2017 9:20 AM   Office Visit    Description:  Male : 1927   Provider:  Booker Ricci MD   Department:  [facility]           Reason for Visit     Leg Swelling           Diagnoses this Visit        Comments    Essential hypertension    -  Primary     Leg swelling         CKD (chronic kidney disease), stage 3 (moderate)         Chronic systolic congestive heart failure         Weight gain                To Do List           Future Appointments        Provider Department Dept Phone    3/2/2017 10:15 AM Blade Emmanuel, GE[facility] Podiatry [phone]    3/13/2017 8:00 AM HOME MONITOR DEVICE CHECK, [facility]o - Arrhythmia [phone]    3/13/2017 10:30 AM MD Bruno Gonzalez [facility] [phone]      Goals (5 Years of Data)     None      Follow-Up and Disposition     Return in about 4 months (around 6/3/2017).       These Medications        Disp Refills Start End    metoprolol succinate (TOPROL-XL) 25 MG 24 hr tablet 90 tablet 3 2/3/2017 2/3/2018    Take 1 tablet (25 mg total) by mouth once daily. - Oral    Pharmacy: [id number] - [place] - [address] [place] [address] #: [phone]         [facility] On [facility]     [facility] On Call Nurse Care Line -  Assistance  Registered nurses in the [facility] provide clinical advisement, health education, appointment booking, and other advisory services.  Call for this free service at [phone].             Medications           Message regarding Medications     Verify the changes and/or additions to your medication regime listed below are the same as discussed with your clinician today.  If any of these changes or additions are incorrect, please notify your healthcare provider.        START taking these NEW medications        Refills     metoprolol succinate (TOPROL-XL) 25 MG 24 hr tablet 3    Sig: Take 1 tablet (25 mg total) by mouth once daily.    Class: Normal    Route: Oral      STOP taking these medications     amlodipine (NORVASC) 10 MG tablet Take 1 tablet (10 mg total) by mouth once daily.           Verify that the below list of medications is an accurate representation of the medications you are currently taking.  If none reported, the list may be blank. If incorrect, please contact your healthcare provider. Carry this list with you in case of emergency.           Current Medications     ASCORBATE CALCIUM (VITAMIN C ORAL) Take 1 tablet by mouth once daily.     aspirin (ECOTRIN) 81 MG EC tablet Take 1 tablet (81 mg total) by mouth once daily.    atorvastatin (LIPITOR) 20 MG tablet Take 1 tablet (20 mg total) by mouth nightly.    calcium 500 mg Tab Take 1 tablet by mouth Daily.    clotrimazole-betamethasone (LOTRISONE) lotion Apply topically 2 (two) times daily.    cyanocobalamin (VITAMIN B-12) 1000 MCG tablet Take 100 mcg by mouth once daily.    ferrous sulfate 325 mg (65 mg iron) Tab tablet Take 325 mg by mouth once daily.    furosemide (LASIX) 20 MG tablet Take 1 tablet (20 mg total) by mouth 2 (two) times daily.    gabapentin (NEURONTIN) 100 MG capsule Take 100 mg by mouth 2 (two) times daily.    levothyroxine (SYNTHROID) 100 MCG tablet Take 1 tablet (100 mcg total) by mouth once daily.    MULTIVITS-MINERALS/FA/LYCOPENE (ONE-A-DAY MEN'S MULTIVITAMIN ORAL) Take 1 tablet by mouth once daily.    senna-docusate 8.6-50 mg (PERICOLACE) 8.6-50 mg per tablet Take 1 tablet by mouth 2 (two) times daily.    tamsulosin (FLOMAX) 0.4 mg Cp24 Take 1 capsule (0.4 mg total) by mouth once daily.    metoprolol succinate (TOPROL-XL) 25 MG 24 hr tablet Take 1 tablet (25 mg total) by mouth once daily.    oxycodone-acetaminophen (PERCOCET) 7.5-325 mg per tablet Take 1 tablet by mouth every 6 (six) hours as needed for Pain.           Clinical Reference  "Information           Your Vitals Were     BP Pulse Height Weight SpO2 BMI    126/64 (BP Location: Right arm, Patient Position: Sitting, BP Method: Manual) 79 5' 8" (1.727 m) 84.2 kg (185 lb 10 oz) 95% 28.22 kg/m2      Blood Pressure          Most Recent Value    BP  126/64      Allergies as of 2/3/2017     Penicillins      Immunizations Administered on Date of Encounter - 2/3/2017     None      Instructions      Chronic Kidney Disease (CKD)    The role of the kidneys is to remove waste products and extra water from the blood.  When the kidneys do not work as they should, waste products begin to build up in the blood. This is called chronic kidney disease (CKD). CKD means that you have kidney damage or a decrease in kidney function lasting at least 3 months. CKD allows extra water, waste, and toxins to build up in the body. This can eventually become life-threatening. You might need dialysis or a kidney transplant to stay alive. This most severe form is called end stage renal disease.  Diabetes is the leading causes of chronic renal failure. Other causes include high blood pressure, hardening of the arteries (atherosclerosis), lupus, inflammation of the blood vessels (vasculitis), and past viral or bacterial infections. Certain over-the-counter pain medicines can cause renal failure when taken often over a long period of time. These include aspirin, ibuprofen, and related anti-inflammatory medicines called NSAIDs (nonsteroidal anti-inflammatory drugs).  Home care  The following guidelines will help you care for yourself at home:  · If you have diabetes, talk with your healthcare provider about keeping your blood sugar under control. Ask if you need to make and changes to your diet, lifestyle, or medicines.  · If you have high blood pressure:  ¨ Take prescribed medicine to lower your blood pressure to the recommended goal of less than 130/80.  ¨ Start a regular exercise program that you enjoy. Check with your " healthcare provider to be sure your planned exercise program is right for you.  ¨ Eat less salt (sodium). Your healthcare provider can tell you how much salt per day is safe for you.  · If you are overweight, talk with your healthcare provider about a weight loss plan.  · If you smoke, you must quit. Smoking makes kidney disease worse. Talk with your healthcare provider about ways to help you quit.  For more information, visit the following links:  ¨ www.[place].gov/sites/default/files/pdf/clearing-the-air-accessible.pdf  ¨ www.smokefree.gov  ¨ www.cancer.org/healthy/stayawayfromtobacco/guidetoquittingsmoking/  · Most people with CKD need to follow a special diet.  Be sure you understand yours. In general, you will need to limit protein, salt, potassium, and phosphorus. You also need to limit how much fluid you drink.   · CKD is a risk factor for heart disease. Talk with your healthcare provider about any other risk factors you might have and what you can do to lessen them.  · Talk with your healthcare provider about any medicines you are taking to find out if they need to be reduced or stopped.  · Don't use the following over-the-counter medicines, or consult your healthcare provider before using:  ¨ Aspirin and NSAIDs such as ibuprofen or naproxen. Using acetaminophen for fever or pain is OK.  ¨ Laxatives and antacids containing magnesium or aluminum  ¨ Fleet or phospho soda enemas containing phosphorus  ¨ Certain stomach acid-blocking medicine such as cimetidine or ranitidine   ¨ Decongestants containing pseudoephedrine   ¨ Herbal supplements  Follow-up care  Follow up with your healthcare provider, or as advised. Contact one of the following for more information:  · American Association of Kidney Patients 634-650-7050 www.aakp.org  · National Kidney Foundation 182-827-0065 www.kidney.org  · American Kidney Fund 854-776-1987 www.kidneyfund.org  · National Kidney Disease Education Program 866-4KIDNEY  www.nkdep.nih.gov  If an X-ray, ECG (cardiogram), or other diagnostic test was taken, you will be told of any new findings that may affect your care.  Call 911  Call 911 if you have any of the following:  · Severe weakness, dizziness, fainting, drowsiness, or confusion  · Chest pain or shortness of breath  · Heart beating fast, slow, or irregularly  When to seek medical advice  Call your healthcare provider right away if any of these occur:  · Nausea or vomiting  · Fever of 100.4°F (38°C) or higher, or as directed by your healthcare provider  · Unexpected weight gain or swelling in the legs, ankles, or around the eyes  · Decrease or absent urine output  Date Last Reviewed: 9/1/2016 © 2000-2016 [employer]. [address] [place] [address], [place] [address]. All rights reserved. This information is not intended as a substitute for professional medical care. Always follow your healthcare professional's instructions.        Heart Failure: Warning Signs of a Flare-Up  You have a condition called heart failure. Once you have heart failure, flare-ups can happen. Below are signs that can mean your heart failure is getting worse. If you notice any of these warning signs, call your healthcare provider.  Swelling    · Your feet, ankles, or lower legs get puffier.  · You notice skin changes on your lower legs.  · Your shoes feel too tight.  · Your clothes are tighter in the waist.  · You have trouble getting rings on or off your fingers.  Shortness of breath  · You have to breathe harder even when youre doing your normal activities or when youre resting.  · You are short of breath walking up stairs or even short distances.  · You wake up at night short of breath or coughing.  · You need to use more pillows or sit up to sleep.  · You wake up tired or restless.  Other warning signs  · You feel weaker, dizzy, or more tired.  · You have chest pain or changes in your heartbeat.  · You have a cough that wont go away.  · You  cant remember things or dont feel like eating.  Tracking your weight  Gaining weight is often the first warning sign that heart failure is getting worse. Gaining even a few pounds can be a sign that your body is retaining excess water and salt. Weighing yourself each day in the morning after you urinate and before you eat, is the best way to know if you're retaining water. Get a scale that is easy to read and make sure you wear the same clothes and use the same scale every time you weigh. Your healthcare provider will show you how to track your weight. Call your doctor if you gain more than 2 pounds in 1 day, 5 pounds in 1 week, or whatever weight gain you were told to report by your doctor. This is often a sign of worsening heart failure and needs to be evaluated and treated before it compromises your breathing. Your doctor will tell you what to do next.    Date Last Reviewed: 3/15/2016  © 1265-6564 [employer]. [address] [place] [address], [place] [address]. All rights reserved. This information is not intended as a substitute for professional medical care. Always follow your healthcare professional's instructions.             Language Assistance Services     ATTENTION: Language assistance services are available, free of charge. Please call [phone].      ATENCIÓN: Si habla español, tiene a medrano disposición servicios gratuitos de asistencia lingüística. Llame al [phone].     PHILIPPE Ý: N?u b?n nói Ti?ng Vi?t, có các d?ch v? h? tr? ngôn ng? mi?n phí dành cho b?n. G?i s? [phone].         [facility] complies with applicable Federal civil rights laws and does not discriminate on the basis of race, color, national origin, age, disability, or sex.

## 2017-03-08 ENCOUNTER — LAB VISIT (OUTPATIENT)
Dept: LAB | Facility: HOSPITAL | Age: 82
End: 2017-03-08
Attending: INTERNAL MEDICINE
Payer: MEDICARE

## 2017-03-08 DIAGNOSIS — N18.30 ANEMIA OF CHRONIC RENAL FAILURE, STAGE 3 (MODERATE): ICD-10-CM

## 2017-03-08 DIAGNOSIS — D63.1 ANEMIA OF CHRONIC RENAL FAILURE, STAGE 3 (MODERATE): ICD-10-CM

## 2017-03-08 LAB
ALBUMIN SERPL BCP-MCNC: 3.9 G/DL
ALP SERPL-CCNC: 101 U/L
ALT SERPL W/O P-5'-P-CCNC: 20 U/L
ANION GAP SERPL CALC-SCNC: 10 MMOL/L
AST SERPL-CCNC: 30 U/L
BASOPHILS # BLD AUTO: 0.02 K/UL
BASOPHILS NFR BLD: 0.2 %
BILIRUB SERPL-MCNC: 0.8 MG/DL
BUN SERPL-MCNC: 22 MG/DL
CALCIUM SERPL-MCNC: 9.6 MG/DL
CHLORIDE SERPL-SCNC: 103 MMOL/L
CO2 SERPL-SCNC: 27 MMOL/L
CREAT SERPL-MCNC: 1.5 MG/DL
DIFFERENTIAL METHOD: ABNORMAL
EOSINOPHIL # BLD AUTO: 0.2 K/UL
EOSINOPHIL NFR BLD: 2 %
ERYTHROCYTE [DISTWIDTH] IN BLOOD BY AUTOMATED COUNT: 14.2 %
EST. GFR  (AFRICAN AMERICAN): 47 ML/MIN/1.73 M^2
EST. GFR  (NON AFRICAN AMERICAN): 40.7 ML/MIN/1.73 M^2
FERRITIN SERPL-MCNC: 109 NG/ML
GLUCOSE SERPL-MCNC: 103 MG/DL
HCT VFR BLD AUTO: 32.9 %
HGB BLD-MCNC: 11 G/DL
IRON SERPL-MCNC: 73 UG/DL
LYMPHOCYTES # BLD AUTO: 3.7 K/UL
LYMPHOCYTES NFR BLD: 45.5 %
MCH RBC QN AUTO: 30 PG
MCHC RBC AUTO-ENTMCNC: 33.4 %
MCV RBC AUTO: 90 FL
MONOCYTES # BLD AUTO: 1.1 K/UL
MONOCYTES NFR BLD: 13.8 %
NEUTROPHILS # BLD AUTO: 3.1 K/UL
NEUTROPHILS NFR BLD: 38.5 %
PLATELET # BLD AUTO: 108 K/UL
PMV BLD AUTO: 10.3 FL
POTASSIUM SERPL-SCNC: 4.6 MMOL/L
PROT SERPL-MCNC: 7.1 G/DL
RBC # BLD AUTO: 3.67 M/UL
SATURATED IRON: 24 %
SODIUM SERPL-SCNC: 140 MMOL/L
TOTAL IRON BINDING CAPACITY: 305 UG/DL
TRANSFERRIN SERPL-MCNC: 206 MG/DL
WBC # BLD AUTO: 8.09 K/UL

## 2017-03-08 PROCEDURE — 85025 COMPLETE CBC W/AUTO DIFF WBC: CPT

## 2017-03-08 PROCEDURE — 36415 COLL VENOUS BLD VENIPUNCTURE: CPT | Mod: PO

## 2017-03-08 PROCEDURE — 82728 ASSAY OF FERRITIN: CPT

## 2017-03-08 PROCEDURE — 83540 ASSAY OF IRON: CPT

## 2017-03-08 PROCEDURE — 80053 COMPREHEN METABOLIC PANEL: CPT

## 2017-03-10 ENCOUNTER — OFFICE VISIT (OUTPATIENT)
Dept: HEMATOLOGY/ONCOLOGY | Facility: CLINIC | Age: 82
End: 2017-03-10
Payer: MEDICARE

## 2017-03-10 VITALS
TEMPERATURE: 98 F | BODY MASS INDEX: 25.66 KG/M2 | HEIGHT: 68 IN | HEART RATE: 75 BPM | WEIGHT: 169.31 LBS | SYSTOLIC BLOOD PRESSURE: 142 MMHG | OXYGEN SATURATION: 97 % | DIASTOLIC BLOOD PRESSURE: 70 MMHG

## 2017-03-10 DIAGNOSIS — N18.30 CKD (CHRONIC KIDNEY DISEASE), STAGE III: ICD-10-CM

## 2017-03-10 DIAGNOSIS — I49.5 SSS (SICK SINUS SYNDROME): ICD-10-CM

## 2017-03-10 DIAGNOSIS — D69.6 THROMBOCYTOPENIA: ICD-10-CM

## 2017-03-10 DIAGNOSIS — D50.0 IRON DEFICIENCY ANEMIA DUE TO CHRONIC BLOOD LOSS: Primary | ICD-10-CM

## 2017-03-10 PROCEDURE — 99499 UNLISTED E&M SERVICE: CPT | Mod: S$GLB,,, | Performed by: INTERNAL MEDICINE

## 2017-03-10 PROCEDURE — 1157F ADVNC CARE PLAN IN RCRD: CPT | Mod: S$GLB,,, | Performed by: INTERNAL MEDICINE

## 2017-03-10 PROCEDURE — 99213 OFFICE O/P EST LOW 20 MIN: CPT | Mod: S$GLB,,, | Performed by: INTERNAL MEDICINE

## 2017-03-10 PROCEDURE — 99999 PR PBB SHADOW E&M-EST. PATIENT-LVL III: CPT | Mod: PBBFAC,,, | Performed by: INTERNAL MEDICINE

## 2017-03-10 PROCEDURE — 1160F RVW MEDS BY RX/DR IN RCRD: CPT | Mod: S$GLB,,, | Performed by: INTERNAL MEDICINE

## 2017-03-10 PROCEDURE — 1159F MED LIST DOCD IN RCRD: CPT | Mod: S$GLB,,, | Performed by: INTERNAL MEDICINE

## 2017-03-10 PROCEDURE — 1126F AMNT PAIN NOTED NONE PRSNT: CPT | Mod: S$GLB,,, | Performed by: INTERNAL MEDICINE

## 2017-03-10 NOTE — PROGRESS NOTES
Subjective:       Patient ID: Zeyad Woodard is a 89 y.o. male.    Chief Complaint: Anemia (lab results)    Anemia   There has been no abdominal pain, bruising/bleeding easily or palpitations.      Mr. Woodard is a pleasant elderly 89-year-old male in a wheelchair here for followup of anemia secondary to iron and B12 deficiency and chronic disease.  He was on Coumadin for chronic rate controlled atrial fibrillation, but was hospitalized in the past for active GI bleeding where a medium-sized angioectasia was found in the cecum and was coagulated with a bipolar probe successfully.      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      Etiology of his chronic anemia - secondary to chronic disease and CKD.    He is taking oral iron once daily.    Review of Systems   Constitutional: Positive for fatigue. Negative for diaphoresis and unexpected weight change.   Eyes: Negative for photophobia and pain.   Cardiovascular: Negative for chest pain, palpitations and leg swelling.   Gastrointestinal: Negative for abdominal pain, blood in stool and nausea.   Musculoskeletal: Positive for arthralgias and gait problem.   Hematological: Negative for adenopathy. Does not bruise/bleed easily.         Objective:      Physical Exam   Constitutional: He is oriented to person, place, and time. He appears well-developed and well-nourished. No distress.   Eyes: Conjunctivae and lids are normal. No scleral icterus.   Neck: Normal range of motion. Neck supple. No thyromegaly present.   Cardiovascular: Normal rate, regular rhythm and intact distal pulses.  Exam reveals no gallop.    Pulmonary/Chest: Effort normal and breath sounds normal. No respiratory distress. He has no rales.   Abdominal: Soft. Bowel sounds are normal. He exhibits no distension and no mass. There is no hepatosplenomegaly.   Lymphadenopathy:        Head (right  side): No submental adenopathy present.        Head (left side): No submental adenopathy present.     He has no cervical adenopathy.        Right: No supraclavicular adenopathy present.        Left: No supraclavicular adenopathy present.   Neurological: He is alert and oriented to person, place, and time.   Skin: Skin is warm. He is not diaphoretic. No cyanosis. Nails show no clubbing.       Assessment:       1. Iron deficiency anemia due to chronic blood loss    2. CKD (chronic kidney disease), stage III    3. SSS (sick sinus syndrome)    4. Thrombocytopenia        Plan:   Recent hemoglobin 11 g/dL.  He has chronic thrombocytopenia and will monitor that.    His anemia is secondary to chronic disease and chronic kidney disease.    GI bleeding has now resolved.    Iron levels good. Will decrease oral iron to every other day.    Given his age and comorbidities we decided not to pursue further evaluation with a bone marrow aspiration/biopsy at this time.    Repeat labs and f/u in 6 months.

## 2017-03-13 ENCOUNTER — CLINICAL SUPPORT (OUTPATIENT)
Dept: ELECTROPHYSIOLOGY | Facility: CLINIC | Age: 82
End: 2017-03-13
Payer: MEDICARE

## 2017-03-13 ENCOUNTER — OFFICE VISIT (OUTPATIENT)
Dept: PHYSICAL MEDICINE AND REHAB | Facility: CLINIC | Age: 82
End: 2017-03-13
Payer: MEDICARE

## 2017-03-13 VITALS
HEIGHT: 68 IN | BODY MASS INDEX: 25.61 KG/M2 | SYSTOLIC BLOOD PRESSURE: 150 MMHG | HEART RATE: 66 BPM | WEIGHT: 169 LBS | DIASTOLIC BLOOD PRESSURE: 66 MMHG

## 2017-03-13 DIAGNOSIS — I49.5 SSS (SICK SINUS SYNDROME): ICD-10-CM

## 2017-03-13 DIAGNOSIS — R26.89 BALANCE PROBLEM: ICD-10-CM

## 2017-03-13 DIAGNOSIS — G62.9 POLYNEUROPATHY: ICD-10-CM

## 2017-03-13 DIAGNOSIS — G89.29 CHRONIC LOW BACK PAIN WITH SCIATICA, SCIATICA LATERALITY UNSPECIFIED, UNSPECIFIED BACK PAIN LATERALITY: ICD-10-CM

## 2017-03-13 DIAGNOSIS — M54.16 LUMBAR RADICULOPATHY: Primary | ICD-10-CM

## 2017-03-13 DIAGNOSIS — Z95.0 CARDIAC PACEMAKER IN SITU: ICD-10-CM

## 2017-03-13 DIAGNOSIS — M54.40 CHRONIC LOW BACK PAIN WITH SCIATICA, SCIATICA LATERALITY UNSPECIFIED, UNSPECIFIED BACK PAIN LATERALITY: ICD-10-CM

## 2017-03-13 DIAGNOSIS — M54.16 RIGHT LUMBAR RADICULOPATHY: ICD-10-CM

## 2017-03-13 PROCEDURE — 99214 OFFICE O/P EST MOD 30 MIN: CPT | Mod: S$GLB,,, | Performed by: PHYSICAL MEDICINE & REHABILITATION

## 2017-03-13 PROCEDURE — 1157F ADVNC CARE PLAN IN RCRD: CPT | Mod: S$GLB,,, | Performed by: PHYSICAL MEDICINE & REHABILITATION

## 2017-03-13 PROCEDURE — 93294 REM INTERROG EVL PM/LDLS PM: CPT | Mod: S$GLB,,, | Performed by: INTERNAL MEDICINE

## 2017-03-13 PROCEDURE — 1159F MED LIST DOCD IN RCRD: CPT | Mod: S$GLB,,, | Performed by: PHYSICAL MEDICINE & REHABILITATION

## 2017-03-13 PROCEDURE — 99999 PR PBB SHADOW E&M-EST. PATIENT-LVL III: CPT | Mod: PBBFAC,,, | Performed by: PHYSICAL MEDICINE & REHABILITATION

## 2017-03-13 PROCEDURE — 93296 REM INTERROG EVL PM/IDS: CPT | Mod: S$GLB,,, | Performed by: INTERNAL MEDICINE

## 2017-03-13 PROCEDURE — 99499 UNLISTED E&M SERVICE: CPT | Mod: S$GLB,,, | Performed by: PHYSICAL MEDICINE & REHABILITATION

## 2017-03-13 PROCEDURE — 1125F AMNT PAIN NOTED PAIN PRSNT: CPT | Mod: S$GLB,,, | Performed by: PHYSICAL MEDICINE & REHABILITATION

## 2017-03-13 PROCEDURE — 1160F RVW MEDS BY RX/DR IN RCRD: CPT | Mod: S$GLB,,, | Performed by: PHYSICAL MEDICINE & REHABILITATION

## 2017-03-13 RX ORDER — OXYCODONE AND ACETAMINOPHEN 7.5; 325 MG/1; MG/1
1 TABLET ORAL EVERY 8 HOURS PRN
Qty: 90 TABLET | Refills: 0 | Status: SHIPPED | OUTPATIENT
Start: 2017-04-13 | End: 2017-03-27

## 2017-03-13 RX ORDER — OXYCODONE AND ACETAMINOPHEN 7.5; 325 MG/1; MG/1
1 TABLET ORAL EVERY 8 HOURS PRN
Qty: 90 TABLET | Refills: 0 | Status: SHIPPED | OUTPATIENT
Start: 2017-03-13 | End: 2017-03-27

## 2017-03-13 RX ORDER — GABAPENTIN 300 MG/1
300 CAPSULE ORAL NIGHTLY
Qty: 90 CAPSULE | Refills: 3 | Status: SHIPPED | OUTPATIENT
Start: 2017-03-13 | End: 2017-06-11

## 2017-03-13 RX ORDER — OXYCODONE AND ACETAMINOPHEN 7.5; 325 MG/1; MG/1
1 TABLET ORAL EVERY 8 HOURS PRN
Qty: 90 TABLET | Refills: 0 | Status: SHIPPED | OUTPATIENT
Start: 2017-05-13 | End: 2017-06-13 | Stop reason: SDUPTHER

## 2017-03-13 NOTE — PROGRESS NOTES
"   Subjective:       Patient ID: Zeyad Woodard is a 89 y.o. male.    Chief Complaint: Back Pain    Back Pain   Associated symptoms include leg pain. Pertinent negatives include no abdominal pain, chest pain, fever or headaches. Weakness: complains aboput right arm and leg weakness, states that he has weak pinch, and weaker right hand.   Leg Pain      Hip Pain      Mr. Woodard returns to clinic for chronic back and right leg pain.   He came today  In manual WC pushed by hos daughter,  LCV was 12/16/16.   Today, he complains about  back and Right hip and leg pain.    Current right hip/buttock pain is 0 worst pain is 6.    he states that he has not that much pain sitting, but standing and/or walking pain increases to 5-6.   He has no pain at night, sleeps well, and pain is not awaking him at night.   Pain is dull, ache, soreness, but less intense, , no leg weakness,  Walking makes pain in  back and leg worse, sitting improves pain,   he gets tired easily and has to lean onto something.   Can walk few steps in house, not quite a room length , using RW. No falls, no injury.  He  Currently takes Neurontin 100 mg, one in morning, one in afternoon, and three caps  at evening.  He states that he tolerates well Neurontin, and it does not make him sleepy nor drowsy.  He takes usually Neurontin, 1+1+ 3 tabs per day.  Takes "pain pill ", Oxycodone 7.5/325 mg ,3 x/ day,that helps.   His daughter is dispensing his medications, and supervises him.   He states that he tolerates well medications, and has no side effects. No falling down.  He does not want any LAURA at this time.   Here for follow up, re evaluation and medications re adjustment.    Review of Systems   Constitutional: Negative for activity change, appetite change, chills, diaphoresis, fatigue, fever and unexpected weight change.   HENT: Negative for trouble swallowing and voice change.    Eyes: Negative for pain and visual disturbance.   Respiratory: Negative for " chest tightness, shortness of breath and wheezing.    Cardiovascular: Negative for chest pain, palpitations and leg swelling.   Gastrointestinal: Negative for abdominal pain, constipation and diarrhea.   Genitourinary: Negative for difficulty urinating, frequency and urgency.   Musculoskeletal: Positive for back pain. Negative for arthralgias, joint swelling, myalgias, neck pain (denies neck pain) and neck stiffness. Gait problem: complains about poor balance.   Skin: Negative for rash and wound.   Neurological: Negative for dizziness, facial asymmetry, speech difficulty, light-headedness and headaches. Weakness: complains aboput right arm and leg weakness, states that he has weak pinch, and weaker right hand.   Hematological: Negative for adenopathy.   Psychiatric/Behavioral: Negative for agitation, behavioral problems, confusion, decreased concentration, dysphoric mood and sleep disturbance.           Objective:      Physical Exam    GENERAL: The patient is alert, oriented, pleasant.   MUSCULOSKELETAL:   Gait NT, came in  WC pushed by daughter.    Cervical spine full range of motion in all planes.   Lumbar spine, ROM NT- sitting in WC.   Straight leg raising Negative bilaterally.   Full range of motion in all joints x4 extremities.   Muscle strength 4/5 in right UE/LE, and 5/5 in Left UE/LE,   Right ankle PF 3+, DF2-, left ankle DF/PF 4+/5.  No  joint laxity throughout x4 extremities.   NEUROLOGIC: Cranial nerves II through XII intact.   Deep tendon reflexes is normal, +2 in the upper and decreased  In lower extremities bilaterally.   Muscle tone is normal.   Sensory is intact to light touch and pinprick throughout x4 extremities.     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   Moderate diffuse cerebral volume loss, similar to prior exam.     Xray of Lumbar spine:  Convex right curvature of the lumbar spine.    There is step wise grade 1 retrolisthesis of L2 on L3 through L4 on L5.   There is degenerative change at all levels allowing for degenerative change and spinal curvature lumbar vertebral body heights   and contours are within normal limits without evidence for acute fracture. Facet degenerative change at levels.   Atherosclerotic ectatic aorta. No definite spondylolysis with limitation of the lower lumbar pars secondary to positioning and overlapping   structures. A cluster of small rounded high density foci projects over the right upper abdomen concerning for gallstones within the gallbladder.   There is however interspersed additional small scattered calcifications within the upper abdomen   bilaterally, clinical correlation and further evaluation as warranted.     CT of lumbar spine showed:  T12/L1: Degenerative changes.   L1-2: Diffuse disk bulge asymmetric to the right.  L2-3: Diffuse disk bulge.  L3-4: There is posterior disk osteophyte complex, facet arthropathy, ligamentum flavum hypertrophy with mild central canal stenosis.   There is also moderate left neuroforaminal narrowing and mild right neuroforaminal narrowing.  L4-5: There is posterior disk osteophyte complex, bilateral facet arthropathy, ligamentum flavum hypertrophy with resultant mild central canal stenosis and moderate bilateral neuroforaminal narrowing, right greater than left.  L5-S1: Diffuse disk bulge asymmetric to the left.   There is ligamentum flavum hypertrophy with facet arthropathy and moderate right neuroforaminal narrowing.       Assessment:       1. Lumbar radiculopathy    2. Polyneuropathy    3. Chronic low back pain with sciatica, sciatica laterality unspecified, unspecified back pain laterality        Plan:       Lumbar radiculopathy  -     gabapentin (NEURONTIN) 300 MG capsule; Take 1 capsule (300 mg total) by mouth every evening.  Dispense: 90 capsule; Refill: 3    Polyneuropathy  -     oxycodone-acetaminophen (PERCOCET)  7.5-325 mg per tablet; Take 1 tablet by mouth every 8 (eight) hours as needed for Pain.  Dispense: 90 tablet; Refill: 0  -     oxycodone-acetaminophen (PERCOCET) 7.5-325 mg per tablet; Take 1 tablet by mouth every 8 (eight) hours as needed for Pain.  Dispense: 90 tablet; Refill: 0  -     oxycodone-acetaminophen (PERCOCET) 7.5-325 mg per tablet; Take 1 tablet by mouth every 8 (eight) hours as needed for Pain.  Dispense: 90 tablet; Refill: 0  -     gabapentin (NEURONTIN) 300 MG capsule; Take 1 capsule (300 mg total) by mouth every evening.  Dispense: 90 capsule; Refill: 3    Chronic low back pain with sciatica, sciatica laterality unspecified, unspecified back pain laterality  -     oxycodone-acetaminophen (PERCOCET) 7.5-325 mg per tablet; Take 1 tablet by mouth every 8 (eight) hours as needed for Pain.  Dispense: 90 tablet; Refill: 0  -     oxycodone-acetaminophen (PERCOCET) 7.5-325 mg per tablet; Take 1 tablet by mouth every 8 (eight) hours as needed for Pain.  Dispense: 90 tablet; Refill: 0  -     oxycodone-acetaminophen (PERCOCET) 7.5-325 mg per tablet; Take 1 tablet by mouth every 8 (eight) hours as needed for Pain.  Dispense: 90 tablet; Refill: 0  -     gabapentin (NEURONTIN) 300 MG capsule; Take 1 capsule (300 mg total) by mouth every evening.  Dispense: 90 capsule; Refill: 3    Right lumbar radiculopathy  -     oxycodone-acetaminophen (PERCOCET) 7.5-325 mg per tablet; Take 1 tablet by mouth every 8 (eight) hours as needed for Pain.  Dispense: 90 tablet; Refill: 0  -     oxycodone-acetaminophen (PERCOCET) 7.5-325 mg per tablet; Take 1 tablet by mouth every 8 (eight) hours as needed for Pain.  Dispense: 90 tablet; Refill: 0  -     oxycodone-acetaminophen (PERCOCET) 7.5-325 mg per tablet; Take 1 tablet by mouth every 8 (eight) hours as needed for Pain.  Dispense: 90 tablet; Refill: 0  -     gabapentin (NEURONTIN) 300 MG capsule; Take 1 capsule (300 mg total) by mouth every evening.  Dispense: 90 capsule; Refill:  3    Balance problem  -     oxycodone-acetaminophen (PERCOCET) 7.5-325 mg per tablet; Take 1 tablet by mouth every 8 (eight) hours as needed for Pain.  Dispense: 90 tablet; Refill: 0  -     oxycodone-acetaminophen (PERCOCET) 7.5-325 mg per tablet; Take 1 tablet by mouth every 8 (eight) hours as needed for Pain.  Dispense: 90 tablet; Refill: 0  -     oxycodone-acetaminophen (PERCOCET) 7.5-325 mg per tablet; Take 1 tablet by mouth every 8 (eight) hours as needed for Pain.  Dispense: 90 tablet; Refill: 0  -     gabapentin (NEURONTIN) 300 MG capsule; Take 1 capsule (300 mg total) by mouth every evening.  Dispense: 90 capsule; Refill: 3      Patient with chronic back pain, secondary to lumbar spondylosis, with mild spinal stenosis and possible Right L5 radiculopathy,        1. Chronic pain management:   For neuropathic pain,  will resume taking Neurontin, lower dose  100 mg in am+ 100 mg at pm + 300 mg at  Bedtime  Will increase Percocet to 7.5/ 325 mg , one PO TID.         Follow up in 3 months.     Total time spent face to face with patient was 15 minutes.   More than 50% of that time was spent in counseling on diagnosis , prognosis and treatment options.   I also caunsel patient  on common and most usual side effect of prescribed medications, given his age.  His daughter is dispensing his medications, and supervises him.   Risk and benefits of opiates, possible risk of developing opiate dependence and tolerance, need of strict compliance with prescribed medications.  I reviewed Primary care , and other specialty's notes to better coordinate patient's  care.   All questions were answered, and patient voiced understanding.

## 2017-03-13 NOTE — MR AVS SNAPSHOT
[facility]  [address]  [facility] [address]  Phone: [phone]                  Zeyad Woodard   3/13/2017 10:20 AM   Office Visit    Description:  Male : 1927   Provider:  Lynn Rosenberg MD   Department:  [facility]           Reason for Visit     Back Pain           Diagnoses this Visit        Comments    Lumbar radiculopathy    -  Primary     Polyneuropathy         Chronic low back pain with sciatica, sciatica laterality unspecified, unspecified back pain laterality         Right lumbar radiculopathy         Balance problem                To Do List           Future Appointments        Provider Department Dept Phone    3/16/2017 10:15 AM Blade Emmanuel DPM [facility] [phone]    2017 10:20 AM MD [facility] [phone]      Goals (5 Years of Data)     None      Follow-Up and Disposition     Return in about 3 months (around 2017).       These Medications        Disp Refills Start End    oxycodone-acetaminophen (PERCOCET) 7.5-325 mg per tablet 90 tablet 0 3/13/2017 2017    Take 1 tablet by mouth every 8 (eight) hours as needed for Pain. - Oral    [facility] [place] [facility] [address] Ph #: [phone]       oxycodone-acetaminophen (PERCOCET) 7.5-325 mg per tablet 90 tablet 0 2017    Take 1 tablet by mouth every 8 (eight) hours as needed for Pain. - Oral    [facility] [place] [facility] [address] Ph #: [phone]       oxycodone-acetaminophen (PERCOCET) 7.5-325 mg per tablet 90 tablet 0 2017    Take 1 tablet by mouth every 8 (eight) hours as needed for Pain. - Oral    [facility] [address] Ph #: [phone]       gabapentin (NEURONTIN) 300 MG capsule 90 capsule 3 3/13/2017 2017    Take 1 capsule  (300 mg total) by mouth every evening. - Oral    Pharmacy: [facility] - [place] [address] [address]  #: [phone]         [facility]     [facility] On Call Nurse Care Line - 24/7 Assistance  Registered nurses in the [facility] provide clinical advisement, health education, appointment booking, and other advisory services.  Call for this free service at [phone].             Medications           Message regarding Medications     Verify the changes and/or additions to your medication regime listed below are the same as discussed with your clinician today.  If any of these changes or additions are incorrect, please notify your healthcare provider.        START taking these NEW medications        Refills    oxycodone-acetaminophen (PERCOCET) 7.5-325 mg per tablet 0    Starting on: 4/13/2017    Sig: Take 1 tablet by mouth every 8 (eight) hours as needed for Pain.    Class: Print    Route: Oral    oxycodone-acetaminophen (PERCOCET) 7.5-325 mg per tablet 0    Starting on: 5/13/2017    Sig: Take 1 tablet by mouth every 8 (eight) hours as needed for Pain.    Class: Print    Route: Oral    gabapentin (NEURONTIN) 300 MG capsule 3    Sig: Take 1 capsule (300 mg total) by mouth every evening.    Class: Normal    Route: Oral      CHANGE how you are taking these medications     Start Taking Instead of    oxycodone-acetaminophen (PERCOCET) 7.5-325 mg per tablet oxycodone-acetaminophen (PERCOCET) 7.5-325 mg per tablet    Dosage:  Take 1 tablet by mouth every 8 (eight) hours as needed for Pain. Dosage:  Take 1 tablet by mouth every 6 (six) hours as needed for Pain.    Reason for Change:  Reorder            Verify that the below list of medications is an accurate representation of the medications you are currently taking.  If none reported, the list may be blank. If incorrect, please contact your healthcare provider. Carry this list with you in case of emergency.          "  Current Medications     ASCORBATE CALCIUM (VITAMIN C ORAL) Take 1 tablet by mouth once daily.     aspirin (ECOTRIN) 81 MG EC tablet Take 1 tablet (81 mg total) by mouth once daily.    atorvastatin (LIPITOR) 20 MG tablet Take 1 tablet (20 mg total) by mouth nightly.    calcium 500 mg Tab Take 1 tablet by mouth Daily.    clotrimazole-betamethasone (LOTRISONE) lotion Apply topically 2 (two) times daily.    cyanocobalamin (VITAMIN B-12) 1000 MCG tablet Take 100 mcg by mouth once daily.    ferrous sulfate 325 mg (65 mg iron) Tab tablet Take 325 mg by mouth once daily.    furosemide (LASIX) 20 MG tablet Take 1 tablet (20 mg total) by mouth 2 (two) times daily.    gabapentin (NEURONTIN) 100 MG capsule Take 100 mg by mouth 2 (two) times daily.    gabapentin (NEURONTIN) 300 MG capsule Take 1 capsule (300 mg total) by mouth every evening.    levothyroxine (SYNTHROID) 100 MCG tablet Take 1 tablet (100 mcg total) by mouth once daily.    metoprolol succinate (TOPROL-XL) 25 MG 24 hr tablet Take 1 tablet (25 mg total) by mouth once daily.    MULTIVITS-MINERALS/FA/LYCOPENE (ONE-A-DAY MEN'S MULTIVITAMIN ORAL) Take 1 tablet by mouth once daily.    oxycodone-acetaminophen (PERCOCET) 7.5-325 mg per tablet Take 1 tablet by mouth every 8 (eight) hours as needed for Pain.    oxycodone-acetaminophen (PERCOCET) 7.5-325 mg per tablet Starting on Apr 13, 2017. Take 1 tablet by mouth every 8 (eight) hours as needed for Pain.    oxycodone-acetaminophen (PERCOCET) 7.5-325 mg per tablet Starting on May 13, 2017. Take 1 tablet by mouth every 8 (eight) hours as needed for Pain.    senna-docusate 8.6-50 mg (PERICOLACE) 8.6-50 mg per tablet Take 1 tablet by mouth 2 (two) times daily.    tamsulosin (FLOMAX) 0.4 mg Cp24 Take 1 capsule (0.4 mg total) by mouth once daily.           Clinical Reference Information           Your Vitals Were     BP Pulse Height Weight BMI    150/66 66 5' 8" (1.727 m) 76.6 kg (168 lb 15.7 oz) 25.69 kg/m2      Blood " Pressure          Most Recent Value    BP  (!)  150/66      Allergies as of 3/13/2017     Penicillins      Immunizations Administered on Date of Encounter - 3/13/2017     None      Language Assistance Services     ATTENTION: Language assistance services are available, free of charge. Please call [phone].      ATENCIÓN: Si habla jose g, tiene a medrano disposición servicios gratuitos de asistencia lingüística. Llame al [phone].     CHÚ Ý: N?u b?n nói Ti?ng Vi?t, có các d?ch v? h? tr? ngôn ng? mi?n phí dành cho b?n. G?i s? [phone].         [facility] complies with applicable Federal civil rights laws and does not discriminate on the basis of race, color, national origin, age, disability, or sex.

## 2017-03-16 ENCOUNTER — OFFICE VISIT (OUTPATIENT)
Dept: PODIATRY | Facility: CLINIC | Age: 82
End: 2017-03-16
Payer: MEDICARE

## 2017-03-16 VITALS
SYSTOLIC BLOOD PRESSURE: 123 MMHG | HEART RATE: 72 BPM | HEIGHT: 68 IN | DIASTOLIC BLOOD PRESSURE: 68 MMHG | BODY MASS INDEX: 25.46 KG/M2 | WEIGHT: 168 LBS

## 2017-03-16 DIAGNOSIS — G62.9 PERIPHERAL POLYNEUROPATHY: Primary | ICD-10-CM

## 2017-03-16 DIAGNOSIS — B35.1 ONYCHOMYCOSIS: ICD-10-CM

## 2017-03-16 PROCEDURE — 11721 DEBRIDE NAIL 6 OR MORE: CPT | Mod: Q9,S$GLB,, | Performed by: PODIATRIST

## 2017-03-16 PROCEDURE — 99499 UNLISTED E&M SERVICE: CPT | Mod: S$GLB,,, | Performed by: PODIATRIST

## 2017-03-16 PROCEDURE — 99999 PR PBB SHADOW E&M-EST. PATIENT-LVL III: CPT | Mod: PBBFAC,,, | Performed by: PODIATRIST

## 2017-03-16 NOTE — MR AVS SNAPSHOT
[facility]   [address] [place] [address]  Phone: [phone]                  Zeyad Woodard   3/16/2017 10:15 AM   Office Visit    Description:  Male : 1927   Provider:  Blade Emmanuel DPM   Department:  [facility]           Reason for Visit     Follow-up                To Do List           Future Appointments        Provider Department Dept Phone    2017 10:20 AM Lynn Roesnberg MD [facility] [phone]    2017 10:15 AM Blade Emmanuel DPM [facility] [phone]    2017 8:00 AM HOME MONITOR DEVICE CHECK, [facility] [phone]    2017 8:00 AM HOME MONITOR DEVICE CHECK, [facility] [phone]      Goals (5 Years of Data)     None      Follow-Up and Disposition     Return in about 3 months (around 2017).      [facility]     [facility] On [facility] Nurse Care Line -  Assistance  Registered nurses in the [facility] provide clinical advisement, health education, appointment booking, and other advisory services.  Call for this free service at [phone].             Medications           Message regarding Medications     Verify the changes and/or additions to your medication regime listed below are the same as discussed with your clinician today.  If any of these changes or additions are incorrect, please notify your healthcare provider.             Verify that the below list of medications is an accurate representation of the medications you are currently taking.  If none reported, the list may be blank. If incorrect, please contact your healthcare provider. Carry this list with you in case of emergency.           Current Medications     ASCORBATE CALCIUM (VITAMIN C ORAL) Take 1 tablet by mouth once daily.     atorvastatin (LIPITOR) 20 MG tablet Take 1 tablet (20 mg total) by mouth nightly.    calcium 500 mg Tab Take 1 tablet by mouth Daily.     "clotrimazole-betamethasone (LOTRISONE) lotion Apply topically 2 (two) times daily.    cyanocobalamin (VITAMIN B-12) 1000 MCG tablet Take 100 mcg by mouth once daily.    ferrous sulfate 325 mg (65 mg iron) Tab tablet Take 325 mg by mouth once daily.    furosemide (LASIX) 20 MG tablet Take 1 tablet (20 mg total) by mouth 2 (two) times daily.    gabapentin (NEURONTIN) 100 MG capsule Take 100 mg by mouth 2 (two) times daily.    gabapentin (NEURONTIN) 300 MG capsule Take 1 capsule (300 mg total) by mouth every evening.    levothyroxine (SYNTHROID) 100 MCG tablet Take 1 tablet (100 mcg total) by mouth once daily.    metoprolol succinate (TOPROL-XL) 25 MG 24 hr tablet Take 1 tablet (25 mg total) by mouth once daily.    MULTIVITS-MINERALS/FA/LYCOPENE (ONE-A-DAY MEN'S MULTIVITAMIN ORAL) Take 1 tablet by mouth once daily.    oxycodone-acetaminophen (PERCOCET) 7.5-325 mg per tablet Take 1 tablet by mouth every 8 (eight) hours as needed for Pain.    oxycodone-acetaminophen (PERCOCET) 7.5-325 mg per tablet Starting on Apr 13, 2017. Take 1 tablet by mouth every 8 (eight) hours as needed for Pain.    oxycodone-acetaminophen (PERCOCET) 7.5-325 mg per tablet Starting on May 13, 2017. Take 1 tablet by mouth every 8 (eight) hours as needed for Pain.    senna-docusate 8.6-50 mg (PERICOLACE) 8.6-50 mg per tablet Take 1 tablet by mouth 2 (two) times daily.    tamsulosin (FLOMAX) 0.4 mg Cp24 Take 1 capsule (0.4 mg total) by mouth once daily.    aspirin (ECOTRIN) 81 MG EC tablet Take 1 tablet (81 mg total) by mouth once daily.           Clinical Reference Information           Your Vitals Were     BP Pulse Height Weight BMI    123/68 72 5' 8" (1.727 m) 76.2 kg (168 lb) 25.54 kg/m2      Blood Pressure          Most Recent Value    BP  123/68      Allergies as of 3/16/2017     Penicillins      Immunizations Administered on Date of Encounter - 3/16/2017     None      Language Assistance Services     ATTENTION: Language assistance services are " available, free of charge. Please call [phone].      ATENCIÓN: Si habla lidiaañol, tiene a medrano disposición servicios gratuitos de asistencia lingüística. Llame al [phone].     CHÚ Ý: N?u b?n nói Ti?ng Vi?t, có các d?ch v? h? tr? ngôn ng? mi?n phí dành cho b?n. G?i s? [phone].         [facility] complies with applicable Federal civil rights laws and does not discriminate on the basis of race, color, national origin, age, disability, or sex.

## 2017-03-16 NOTE — PROGRESS NOTES
"Subjective:      Patient ID: Zeyad Woodard is a 89 y.o. male.    Chief Complaint: Follow-up (3 month)    Zeyad is a 89 y.o. male who presents to the clinic for evaluation and treatment of high risk feet. Zeyad has a past medical history of Anemia; Anticoagulant long-term use; Atrial fibrillation; BPH (benign prostatic hypertrophy); Cancer; CHF (congestive heart failure); Coronary artery disease; Encounter for blood transfusion; GI bleed; Hypertension; Hypothyroidism; Polyneuropathy; Posture imbalance; Right posterior capsular opacification (1/18/2017); and SSS (sick sinus syndrome) (2015). The patient's chief complaint is long, thick toenails. This patient has documented high risk feet requiring routine maintenance secondary to peripheral neuropathy. No new complaints.    PCP: Booker Ricci MD    Date Last Seen by PCP: 2/3/17    Current shoe gear:  Casual shoes    Last encounter in this department: Visit date not found    Hemoglobin A1C   Date Value Ref Range Status   12/10/2012 6.0 4.0 - 6.2 % Final   03/02/2011 5.3 4.0 - 6.2 % Final     Vitals:    03/16/17 1032   BP: 123/68   Pulse: 72   Weight: 76.2 kg (168 lb)   Height: 5' 8" (1.727 m)   PainSc: 0-No pain      Past Medical History:   Diagnosis Date    Anemia     Anticoagulant long-term use     Atrial fibrillation     BPH (benign prostatic hypertrophy)     Cancer     CHF (congestive heart failure)     Coronary artery disease     Encounter for blood transfusion     GI bleed     cecum angiectasia s/p cautery    Hypertension     Hypothyroidism     Polyneuropathy     Posture imbalance     due to neuropathy    Right posterior capsular opacification 1/18/2017    SSS (sick sinus syndrome) 2015    s/p pacemaker       Past Surgical History:   Procedure Laterality Date    CARDIAC SURGERY      CATARACT EXTRACTION W/  INTRAOCULAR LENS IMPLANT Right 05/17/2010        CATARACT EXTRACTION W/  INTRAOCULAR LENS IMPLANT Left 02/16/2004    "     cataract surgery      COLONOSCOPY  04    COLONOSCOPY N/A 2/15/2016    Procedure: COLONOSCOPY;  Surgeon: Stanton Kirk MD;  Location: [facility] ([address] [place] [address]);  Service: Endoscopy;  Laterality: N/A;    EYE SURGERY      HERNIA REPAIR      inguinal hernia repair      Open heart surgery for pericardiectomy      for calcific constrictive pericarditis    UMBILICAL HERNIA REPAIR      Yag      Left Eye       Family History   Problem Relation Age of Onset    Stroke Mother          Cancer Father      lung;     Diabetes Sister     Coronary artery disease Sister     Peripheral vascular disease Sister     Amblyopia Neg Hx     Blindness Neg Hx     Cataracts Neg Hx     Glaucoma Neg Hx     Hypertension Neg Hx     Macular degeneration Neg Hx     Retinal detachment Neg Hx     Strabismus Neg Hx     Thyroid disease Neg Hx        Social History     Social History    Marital status:      Spouse name: N/A    Number of children: N/A    Years of education: N/A     Social History Main Topics    Smoking status: Passive Smoke Exposure - Never Smoker    Smokeless tobacco: Never Used    Alcohol use No    Drug use: No    Sexual activity: Not Asked     Other Topics Concern    None     Social History Narrative       Current Outpatient Prescriptions   Medication Sig Dispense Refill    ASCORBATE CALCIUM (VITAMIN C ORAL) Take 1 tablet by mouth once daily.       atorvastatin (LIPITOR) 20 MG tablet Take 1 tablet (20 mg total) by mouth nightly. 90 tablet 3    calcium 500 mg Tab Take 1 tablet by mouth Daily.      clotrimazole-betamethasone (LOTRISONE) lotion Apply topically 2 (two) times daily. (Patient taking differently: Apply topically as needed. ) 30 mL 0    cyanocobalamin (VITAMIN B-12) 1000 MCG tablet Take 100 mcg by mouth once daily.      ferrous sulfate 325 mg (65 mg iron) Tab tablet Take 325 mg by mouth once daily.      furosemide (LASIX) 20 MG tablet Take 1 tablet (20  mg total) by mouth 2 (two) times daily. 180 tablet 3    gabapentin (NEURONTIN) 100 MG capsule Take 100 mg by mouth 2 (two) times daily.      gabapentin (NEURONTIN) 300 MG capsule Take 1 capsule (300 mg total) by mouth every evening. 90 capsule 3    levothyroxine (SYNTHROID) 100 MCG tablet Take 1 tablet (100 mcg total) by mouth once daily. 90 tablet 3    metoprolol succinate (TOPROL-XL) 25 MG 24 hr tablet Take 1 tablet (25 mg total) by mouth once daily. 90 tablet 3    MULTIVITS-MINERALS/FA/LYCOPENE (ONE-A-DAY MEN'S MULTIVITAMIN ORAL) Take 1 tablet by mouth once daily.      oxycodone-acetaminophen (PERCOCET) 7.5-325 mg per tablet Take 1 tablet by mouth every 8 (eight) hours as needed for Pain. 90 tablet 0    [START ON 4/13/2017] oxycodone-acetaminophen (PERCOCET) 7.5-325 mg per tablet Take 1 tablet by mouth every 8 (eight) hours as needed for Pain. 90 tablet 0    [START ON 5/13/2017] oxycodone-acetaminophen (PERCOCET) 7.5-325 mg per tablet Take 1 tablet by mouth every 8 (eight) hours as needed for Pain. 90 tablet 0    senna-docusate 8.6-50 mg (PERICOLACE) 8.6-50 mg per tablet Take 1 tablet by mouth 2 (two) times daily.      tamsulosin (FLOMAX) 0.4 mg Cp24 Take 1 capsule (0.4 mg total) by mouth once daily. 90 capsule 3    aspirin (ECOTRIN) 81 MG EC tablet Take 1 tablet (81 mg total) by mouth once daily.  0     No current facility-administered medications for this visit.        Review of patient's allergies indicates:   Allergen Reactions    Penicillins Hives and Other (See Comments)     Fever         Review of Systems   Constitution: Negative for chills, fever, weakness and malaise/fatigue.   Cardiovascular: Positive for leg swelling. Negative for chest pain and claudication.   Respiratory: Negative for cough and shortness of breath.    Skin: Positive for color change, dry skin and nail changes. Negative for itching and rash.   Musculoskeletal: Positive for back pain and joint pain. Negative for muscle  cramps and muscle weakness.   Gastrointestinal: Negative for nausea and vomiting.   Neurological: Positive for numbness.   Psychiatric/Behavioral: Negative for altered mental status.           Objective:      Physical Exam   Constitutional: He is oriented to person, place, and time. No distress.   Cardiovascular: Intact distal pulses.    Pulses:       Dorsalis pedis pulses are 1+ on the right side, and 1+ on the left side.        Posterior tibial pulses are 0 on the right side, and 0 on the left side.   CFT< 3 secs all toes bilateral foot, skin temp warm bilateral foot, nol hair growth bilateral lower extremity, moderate pitting lower extremity edema bilateral with hemosiderin staining of legs and varicosities.       Musculoskeletal:        Right foot: There is deformity.        Left foot: There is deformity.   Cavus foot structure with hallux valgus and semi-reducible digital contractures toes 2-5 bilateral foot. No pain with ROM or MMT bilateral foot.   Feet:   Right Foot:   Protective Sensation: 10 sites tested. 4 sites sensed.   Skin Integrity: Positive for dry skin. Negative for ulcer, blister, erythema, warmth or callus.   Left Foot:   Protective Sensation: 10 sites tested. 6 sites sensed.   Skin Integrity: Positive for dry skin. Negative for ulcer, blister, skin breakdown, erythema, warmth or callus.   Neurological: He is alert and oriented to person, place, and time. He has normal strength. A sensory deficit is present.   Decreased vibratory left and absent right foot.   Skin: Skin is warm, dry and intact. No ecchymosis, no lesion, no petechiae and no rash noted. He is not diaphoretic. No cyanosis or erythema. No pallor. Nails show no clubbing.   Nails 1-5 bilateral are thickened, dystrophic brittle with yellow discoloration and debris. No open lesions or macerations bilateral lower extremity.    Skin is thin and atrophied bilateral lower extremity.               Assessment:       Encounter Diagnoses   Name  Primary?    Peripheral polyneuropathy Yes    Onychomycosis          Plan:       Zeyad was seen today for follow-up.    Diagnoses and all orders for this visit:    Peripheral polyneuropathy    Onychomycosis    I counseled the patient on his conditions, their implications and medical management.    Shoe inspection. Patient instructed on proper foot hygeine. We discussed wearing proper shoe gear, daily foot inspections, never walking without protective shoe gear, never putting sharp instruments to feet, routine podiatric nail visits every 2-3 months.      With patient's permission, nails were aggressively reduced and debrided x 10 to their soft tissue attachment mechanically and with electric , removing all offending nail and debris. Patient relates relief following the procedure. He will continue to monitor the areas daily, inspect his feet, wear protective shoe gear when ambulatory, moisturizer to maintain skin integrity and follow in this office in approximately 2-3 months, sooner p.r.n.

## 2017-03-20 ENCOUNTER — HOSPITAL ENCOUNTER (EMERGENCY)
Facility: HOSPITAL | Age: 82
Discharge: HOME | End: 2017-03-20
Attending: EMERGENCY MEDICINE
Payer: MEDICARE

## 2017-03-20 ENCOUNTER — TELEPHONE (OUTPATIENT)
Dept: FAMILY MEDICINE | Facility: CLINIC | Age: 82
End: 2017-03-20

## 2017-03-20 VITALS
HEART RATE: 82 BPM | WEIGHT: 169 LBS | SYSTOLIC BLOOD PRESSURE: 195 MMHG | RESPIRATION RATE: 19 BRPM | BODY MASS INDEX: 25.61 KG/M2 | DIASTOLIC BLOOD PRESSURE: 86 MMHG | HEIGHT: 68 IN | TEMPERATURE: 99 F | OXYGEN SATURATION: 97 %

## 2017-03-20 DIAGNOSIS — E86.0 DEHYDRATION: ICD-10-CM

## 2017-03-20 DIAGNOSIS — D64.9 ANEMIA, UNSPECIFIED TYPE: ICD-10-CM

## 2017-03-20 DIAGNOSIS — R53.83 FATIGUE, UNSPECIFIED TYPE: Primary | ICD-10-CM

## 2017-03-20 LAB
ACANTHOCYTES BLD QL SMEAR: PRESENT
ALBUMIN SERPL BCP-MCNC: 4 G/DL
ALP SERPL-CCNC: 102 U/L
ALT SERPL W/O P-5'-P-CCNC: 19 U/L
AMPHET+METHAMPHET UR QL: NEGATIVE
ANION GAP SERPL CALC-SCNC: 10 MMOL/L
ANISOCYTOSIS BLD QL SMEAR: SLIGHT
AST SERPL-CCNC: 31 U/L
BARBITURATES UR QL SCN>200 NG/ML: NEGATIVE
BASOPHILS # BLD AUTO: 0.01 K/UL
BASOPHILS NFR BLD: 0.1 %
BENZODIAZ UR QL SCN>200 NG/ML: NEGATIVE
BILIRUB SERPL-MCNC: 0.7 MG/DL
BILIRUB UR QL STRIP: NEGATIVE
BUN SERPL-MCNC: 28 MG/DL
BURR CELLS BLD QL SMEAR: ABNORMAL
BZE UR QL SCN: NEGATIVE
CALCIUM SERPL-MCNC: 9.2 MG/DL
CANNABINOIDS UR QL SCN: NEGATIVE
CHLORIDE SERPL-SCNC: 103 MMOL/L
CLARITY UR: CLEAR
CO2 SERPL-SCNC: 26 MMOL/L
COLOR UR: ABNORMAL
CREAT SERPL-MCNC: 1.6 MG/DL
CREAT UR-MCNC: 27.8 MG/DL
DACRYOCYTES BLD QL SMEAR: ABNORMAL
DIFFERENTIAL METHOD: ABNORMAL
EOSINOPHIL # BLD AUTO: 0.2 K/UL
EOSINOPHIL NFR BLD: 1.8 %
ERYTHROCYTE [DISTWIDTH] IN BLOOD BY AUTOMATED COUNT: 14 %
EST. GFR  (AFRICAN AMERICAN): 44 ML/MIN/1.73 M^2
EST. GFR  (NON AFRICAN AMERICAN): 38 ML/MIN/1.73 M^2
ETHANOL SERPL-MCNC: <10 MG/DL
GLUCOSE SERPL-MCNC: 117 MG/DL
GLUCOSE UR QL STRIP: NEGATIVE
HCT VFR BLD AUTO: 32.5 %
HGB BLD-MCNC: 10.8 G/DL
HGB UR QL STRIP: ABNORMAL
HYPOCHROMIA BLD QL SMEAR: ABNORMAL
KETONES UR QL STRIP: NEGATIVE
LEUKOCYTE ESTERASE UR QL STRIP: NEGATIVE
LYMPHOCYTES # BLD AUTO: 4.6 K/UL
LYMPHOCYTES NFR BLD: 54.9 %
MAGNESIUM SERPL-MCNC: 2.2 MG/DL
MCH RBC QN AUTO: 29.5 PG
MCHC RBC AUTO-ENTMCNC: 33.2 %
MCV RBC AUTO: 89 FL
METHADONE UR QL SCN>300 NG/ML: NEGATIVE
MONOCYTES # BLD AUTO: 0.7 K/UL
MONOCYTES NFR BLD: 7.9 %
NEUTROPHILS # BLD AUTO: 2.9 K/UL
NEUTROPHILS NFR BLD: 35.3 %
NITRITE UR QL STRIP: NEGATIVE
OPIATES UR QL SCN: NEGATIVE
OVALOCYTES BLD QL SMEAR: ABNORMAL
PCP UR QL SCN>25 NG/ML: NEGATIVE
PH UR STRIP: 6 [PH] (ref 5–8)
PLATELET # BLD AUTO: 102 K/UL
PLATELET BLD QL SMEAR: ABNORMAL
PMV BLD AUTO: 10.1 FL
POCT GLUCOSE: 117 MG/DL (ref 70–110)
POIKILOCYTOSIS BLD QL SMEAR: SLIGHT
POTASSIUM SERPL-SCNC: 5.1 MMOL/L
PROT SERPL-MCNC: 6.9 G/DL
PROT UR QL STRIP: NEGATIVE
RBC # BLD AUTO: 3.66 M/UL
SODIUM SERPL-SCNC: 139 MMOL/L
SP GR UR STRIP: <=1.005 (ref 1–1.03)
TOXICOLOGY INFORMATION: NORMAL
TROPONIN I SERPL DL<=0.01 NG/ML-MCNC: 0.02 NG/ML
URN SPEC COLLECT METH UR: ABNORMAL
UROBILINOGEN UR STRIP-ACNC: NEGATIVE EU/DL
WBC # BLD AUTO: 8.32 K/UL

## 2017-03-20 PROCEDURE — 82962 GLUCOSE BLOOD TEST: CPT

## 2017-03-20 PROCEDURE — 99284 EMERGENCY DEPT VISIT MOD MDM: CPT | Mod: 25

## 2017-03-20 PROCEDURE — 80320 DRUG SCREEN QUANTALCOHOLS: CPT

## 2017-03-20 PROCEDURE — 25000003 PHARM REV CODE 250: Performed by: EMERGENCY MEDICINE

## 2017-03-20 PROCEDURE — 82570 ASSAY OF URINE CREATININE: CPT

## 2017-03-20 PROCEDURE — 80053 COMPREHEN METABOLIC PANEL: CPT

## 2017-03-20 PROCEDURE — 96360 HYDRATION IV INFUSION INIT: CPT

## 2017-03-20 PROCEDURE — 83735 ASSAY OF MAGNESIUM: CPT

## 2017-03-20 PROCEDURE — 93005 ELECTROCARDIOGRAM TRACING: CPT

## 2017-03-20 PROCEDURE — 85025 COMPLETE CBC W/AUTO DIFF WBC: CPT

## 2017-03-20 PROCEDURE — 81003 URINALYSIS AUTO W/O SCOPE: CPT

## 2017-03-20 PROCEDURE — 84484 ASSAY OF TROPONIN QUANT: CPT

## 2017-03-20 RX ADMIN — SODIUM CHLORIDE 500 ML: 0.9 INJECTION, SOLUTION INTRAVENOUS at 02:03

## 2017-03-20 NOTE — ED TRIAGE NOTES
Pt presents to ED today c/o generalized fatigue that is gradually increasing. Family reports pt was hypotensive yesterday and reports unsteady gait.

## 2017-03-20 NOTE — TELEPHONE ENCOUNTER
----- Message from Fernanda Quiles sent at 3/20/2017  9:27 AM CDT -----  Contact: daughter/Elenita/[phone]  His blood pressure is low and he has been very tired; please advise,

## 2017-03-20 NOTE — ED NOTES
APPEARANCE: Alert, oriented and in no acute distress.  CARDIAC: Normal rate and rhythm, no murmur heard. Pacemaker to left upper chest wall  PERIPHERAL VASCULAR: peripheral pulses present. Normal cap refill. No edema. Warm to touch.    RESPIRATORY:Normal rate and effort, breath sounds expiratory wheezing noted bilaterally throughout chest. Respirations are equal and unlabored no obvious signs of distress.  GASTRO: soft, bowel sounds normal, no tenderness, no abdominal distention.  MUSC: Full ROM. No bony tenderness or soft tissue tenderness. No obvious deformity.  SKIN: Skin is cool dry, normal skin turgor, mucous membranes moist. Generalized fatigue   NEURO: 5/5 strength major flexors/extensors bilaterally. Sensory intact to light touch bilaterally. [place] coma scale: eyes open spontaneously-4, oriented & converses-5, obeys commands-6. No neurological abnormalities.   MENTAL STATUS: awake, alert and aware of environment.  EYE: PERRL, both eyes: pupils brisk and reactive to light. Normal size.  ENT: EARS: no obvious drainage. NOSE: no active bleeding.

## 2017-03-20 NOTE — ED PROVIDER NOTES
Encounter Date: 3/20/2017       History     Chief Complaint   Patient presents with    Fatigue      x2 days; family memeber also states when checking bp at home it was low this morning; uses walker at home to assist with ambulation; also c/o right hip pain which patient states he has neuropathy; denies weakness or dizziness; patient is [place]     Review of patient's allergies indicates:   Allergen Reactions    Penicillins Hives and Other (See Comments)     Fever     HPI Comments: Family reports increase of LE swelling, so increased lasix, LE swelling resolved but patient appears to have decreased energy over last several days.  No B/B stool.  O/w no complaints. One low BP reading of 90/60 reported but resolved.      Patient is a 89 y.o. male presenting with the following complaint: general illness. The history is provided by the patient and a relative.   General Illness    The current episode started several days ago. The problem occurs occasionally. Progression since onset: waxing/waning. The symptoms are aggravated by activity. Associated symptoms include diarrhea (recent). Pertinent negatives include no fever, no abdominal pain, no nausea, no vomiting, no headaches, no neck pain, no cough, no shortness of breath, no URI and no rash.     Past Medical History:   Diagnosis Date    Anemia     Anticoagulant long-term use     Atrial fibrillation     BPH (benign prostatic hypertrophy)     Cancer     CHF (congestive heart failure)     Coronary artery disease     Encounter for blood transfusion     GI bleed     cecum angiectasia s/p cautery    Hypertension     Hypothyroidism     Polyneuropathy     Posture imbalance     due to neuropathy    Right posterior capsular opacification 1/18/2017    SSS (sick sinus syndrome) 2015    s/p pacemaker     Past Surgical History:   Procedure Laterality Date    CARDIAC SURGERY      CATARACT EXTRACTION W/  INTRAOCULAR LENS IMPLANT Right 05/17/2010        CATARACT  EXTRACTION W/  INTRAOCULAR LENS IMPLANT Left 2004        cataract surgery      COLONOSCOPY  04    COLONOSCOPY N/A 2/15/2016    Procedure: COLONOSCOPY;  Surgeon: Stanton Kirk MD;  Location: [facility] ([address] [place] [address]);  Service: Endoscopy;  Laterality: N/A;    EYE SURGERY      HERNIA REPAIR      inguinal hernia repair      Open heart surgery for pericardiectomy      for calcific constrictive pericarditis    UMBILICAL HERNIA REPAIR      Yag      Left Eye     Family History   Problem Relation Age of Onset    Stroke Mother          Cancer Father      lung;     Diabetes Sister     Coronary artery disease Sister     Peripheral vascular disease Sister     Amblyopia Neg Hx     Blindness Neg Hx     Cataracts Neg Hx     Glaucoma Neg Hx     Hypertension Neg Hx     Macular degeneration Neg Hx     Retinal detachment Neg Hx     Strabismus Neg Hx     Thyroid disease Neg Hx      Social History   Substance Use Topics    Smoking status: Passive Smoke Exposure - Never Smoker    Smokeless tobacco: Never Used    Alcohol use No     Review of Systems   Constitutional: Positive for fatigue. Negative for fever and unexpected weight change.   Respiratory: Negative for cough and shortness of breath.    Cardiovascular: Negative for chest pain.   Gastrointestinal: Positive for diarrhea (recent). Negative for abdominal pain, blood in stool, nausea and vomiting.   Endocrine: Negative for polydipsia, polyphagia and polyuria.   Genitourinary: Negative for difficulty urinating.   Musculoskeletal: Negative for neck pain.   Skin: Negative for rash.   Neurological: Positive for tremors (chronically) and numbness (BLE chronically). Negative for syncope, facial asymmetry, speech difficulty, weakness and headaches.   Psychiatric/Behavioral: Negative for behavioral problems.   All other systems reviewed and are negative.      Physical Exam   Initial Vitals   BP Pulse Resp Temp SpO2   17 1131  03/20/17 1131 03/20/17 1131 03/20/17 1131 03/20/17 1131   160/77 69 18 98.6 °F (37 °C) 97 %     Physical Exam    Nursing note and vitals reviewed.  Constitutional: He appears well-developed and well-nourished.   HENT:   Head: Normocephalic and atraumatic.   Mouth/Throat: Oropharynx is clear and moist.   Eyes: EOM are normal. Pupils are equal, round, and reactive to light.   Neck: Normal range of motion. Neck supple.   Cardiovascular: Normal rate and intact distal pulses.   No murmur heard.  Pulmonary/Chest: Breath sounds normal. No respiratory distress.   Abdominal: Soft. He exhibits no distension. There is no tenderness.   Musculoskeletal: Normal range of motion. He exhibits no edema or tenderness.   Neurological: He is alert and oriented to person, place, and time. He has normal strength.   Skin: Skin is warm and dry. No rash noted.   Psychiatric: He has a normal mood and affect. Thought content normal.         ED Course   Procedures  Labs Reviewed   CBC W/ AUTO DIFFERENTIAL - Abnormal; Notable for the following:        Result Value    RBC 3.66 (*)     Hemoglobin 10.8 (*)     Hematocrit 32.5 (*)     Platelets 102 (*)     Gran% 35.3 (*)     Lymph% 54.9 (*)     Platelet Estimate Decreased (*)     All other components within normal limits   COMPREHENSIVE METABOLIC PANEL - Abnormal; Notable for the following:     Glucose 117 (*)     BUN, Bld 28 (*)     Creatinine 1.6 (*)     eGFR if  44 (*)     eGFR if non  38 (*)     All other components within normal limits   URINALYSIS - Abnormal; Notable for the following:     Specific Gravity, UA <=1.005 (*)     Occult Blood UA Trace (*)     All other components within normal limits   POCT GLUCOSE - Abnormal; Notable for the following:     POCT Glucose 117 (*)     All other components within normal limits   ALCOHOL,MEDICAL (ETHANOL)   DRUG SCREEN PANEL, URINE EMERGENCY   TROPONIN I   MAGNESIUM   POCT GLUCOSE MONITORING CONTINUOUS     EKG Readings:  (Independently Interpreted)   Heart Rate: 63.       X-Rays:   Independently Interpreted Readings:   Chest X-Ray: No acute process, no acute changes from previous     Medical Decision Making:   Initial Assessment:   No complaint, VSS, no resp distress  Differential Diagnosis:   MI, CHF, valvular dz, CVA, infectious, GIB, dehydration, deconditioning, medication  Clinical Tests:   Lab Tests: Reviewed  Radiological Study: Reviewed  Medical Tests: Reviewed  ED Management:  Extended period in department without hypotension or complaint.  Ambulated with wheelchair without difficulty.  [facility] consulted, will arrange for  follow up for patient.  Family updated.  Hold lasix, increase PO intake discussed with family.                   ED Course     Clinical Impression:   The primary encounter diagnosis was Fatigue, unspecified type. Diagnoses of Anemia, unspecified type and Dehydration were also pertinent to this visit.    Disposition:   Disposition: Discharged  Condition: Stable       Guy J. Lefort, MD  03/20/17 6738

## 2017-03-20 NOTE — DISCHARGE INSTRUCTIONS
Anemia  Anemia is a condition that occurs when your body does not have enough healthy red blood cells (RBCs). Your RBCs are the parts of your blood that carry oxygen throughout your body. A protein called hemoglobin allows your RBCs to absorb and release oxygen. Without enough RBCs or hemoglobin, your body doesn't get enough oxygen. Symptoms of anemia may then occur.    Symptoms of anemia  Some people with anemia have no symptoms. But most people have symptoms that range from mild to severe. These can include:  · Tiredness (fatigue)  · Weakness  · Pale skin  · Shortness of breath  · Dizziness or fainting  · Rapid heartbeat  · Trouble doing normal amounts of activity  · Jaundice (yellowing of your eyes, skin, or mouth; dark urine)  Causes of anemia  Anemia can occur when your body:  · Loses too much blood  · Does not make enough RBCs  · Destroys your RBCs at a faster rate than it can replace them  · Does not make a normal amount of hemoglobin in your RBCs  These problems can occur for many reasons, including:  · A condition that you are born with (congenital or inherited). This includes sickle cell disease or thalassemia.  · Heavy bleeding for any reason, including injury, surgery, childbirth, or even heavy menstrual periods.  · Being low in certain nutrients, such as iron, folate, or vitamin B12. This may be due to poor diet. Also, a condition like celiac disease or Crohn's disease can cause poor absorption of these nutrients  · Certain chronic conditions like diabetes, arthritis, or kidney disease.  · Certain chronic infections like tuberculosis or HIV.  · Exposure to certain medications, such as those used for chemotherapy.  There are different types of anemia. Your doctor can tell you more about the type of anemia you have and what may have caused it.  Diagnosing anemia  To diagnose anemia, your doctor gives you blood tests. These can include:  · Complete blood cell count (CBC). This test measures the amounts  of the different types of blood cells.  · Blood smear. This test checks the size and shape of your blood cells. To perform the test, your doctor views a drop of your blood under a microscope. Your doctor uses a stain to make the blood cells easier to see.  · Iron studies. These tests measure the amount of iron in your blood. Your body needs iron to make hemoglobin in your RBCs.  · Vitamin B12 and folate studies. These tests check for some of the components that help give RBCs a normal size and shape.  · Reticulocyte count. This test measures the amount of new RBCs that your bone marrow makes.  · Hemoglobin electrophoresis. This test checks for problems with your hemoglobin in RBCs.  Treating anemia  Treatment for anemia is based on the type of anemia, its cause, and the severity of your symptoms. Treatments may include:  · Diet changes. This involves increasing the amount of certain nutrients in your diet, such as iron, vitamin B12, or folate. Your doctor may also prescribe nutrient supplements.  · Medications. Certain medications treat the cause of your anemia. Others help build new RBCs or relieve symptoms. If a medication is the cause of your anemia, you may need to stop or change it.  · Blood transfusions. Replacing some of your blood can increase the number of healthy RBCs in your body.  · Surgery. In some cases, your doctor can do surgery to treat the underlying cause of anemia. If you need surgery, your doctor will explain the procedure and outline the risks and benefits for you.  Long-term concerns  If you have a certain type of anemia, you can expect a full recovery after treatment. If you have other types of anemia (especially a type you're born with), you will need to manage it for life. Your doctor can tell you more.  Date Last Reviewed: 4/27/2015  © 0985-4230 [employer]. [address] [place] [address], [place] [address]. All rights reserved. This information is not intended as a substitute for  professional medical care. Always follow your healthcare professional's instructions.          Dehydration    The human body is comprised largely of water. If you lose more fluids than you take in, you can become dehydrated. This means there are not enough fluids in your body for it to function right. Mild dehydration can cause weakness, confusion, or muscle cramps. In extreme cases, it can lead to brain damage and even death. That's why prompt treatment is crucial.  Risk factors  Anyone can become dehydrated. But infants, children, and older adults are at greatest risk. You are most likely to lose fluids with severe vomiting, diarrhea, or a fever. Exercising or working hard--especially in hot weather--can also cause excess fluid loss.  What to do  Drinking liquids is the best way to prevent dehydration. Water is best, but juice or frozen pops can also help. Your doctor may suggest electrolyte solutions for sick infants and young children.  When to go to the emergency room (ER)  Go to an ER right away for these symptoms:  Adults  · Very dark urine and little urine output  · Dizziness, weakness, confusion, fainting  Children  · Sunken eyes  · Little or no urine output (for infants, no wet diaper in 8 hours)  · Very dark urine  · Skin that doesn't bounce back quickly when pinched  · Crying without tears  What to expect in the ER  Your blood pressure, temperature, and heart rate will be checked. You may have blood or urine tests. The main treatment for dehydration is fluids. You may be given these to drink. Or, you may receive them through a vein in your arm. You also may be treated for diarrhea, vomiting, or a high fever.   Date Last Reviewed: 7/18/2015 © 2000-2016 [employer]. [address] [place] [address], [place] [address]. All rights reserved. This information is not intended as a substitute for professional medical care. Always follow your healthcare professional's instructions.

## 2017-03-20 NOTE — ED NOTES
Dr Lefort at bedside, spoke with patient and family regarding test results and follow up. Small amount of lemonade and cake provided. He did consume food without difficulty.

## 2017-03-20 NOTE — ED NOTES
Attempted to ambulate pt with assistance, pt reports weakness upon standing, very unsteady and unable to take step forward.   Dr. Lefort notified.

## 2017-03-20 NOTE — TELEPHONE ENCOUNTER
Spoke to daughter, his bp is running low, he is feeling very tired, daughter informed to bring him to er

## 2017-03-20 NOTE — ED AVS SNAPSHOT
[facility] [address] [place] [address]               Zeyad Woodard   3/20/2017 11:33 AM   ED    Description:  Male : 1927   Department:  [facility]           Your Care was Coordinated By:     Provider Role From To    Guy J. Lefort, MD Attending Provider 17 [id number] --      Reason for Visit     Fatigue           Diagnoses this Visit        Comments    Fatigue, unspecified type    -  Primary     Anemia, unspecified type         Dehydration           ED Disposition     None           To Do List           Follow-up Information     Follow up with Booker Ricci MD. Call in 1 day.    Specialty:  Family Medicine    Why:  If symptoms worsen or any other concerns    Contact information:     [address]  [place] [address]  [phone]        Referral Details     Referred By    Referred To    Power Hendricks DO   [address] [facility] [address]   Phone:  [phone]   Fax:  [phone]    Diagnoses:  Fatigue, unspecified type   Order:  Ambulatory Referral To Outpatient Case Management   Reason:  Specialty Services Required    [facility] Management   [address] [facility] [address]             Power Hendricks DO   [address] [facility] [address]   Phone:  [phone]   Fax:  [phone]    Diagnoses:  Fatigue, unspecified type   Order:  Ambulatory Referral To Home Health   Reason:  Specialty Services Required    [facility]   [address] [place] [address]   Phone:  [phone]   Fax:  [phone]       Comment:  General Outpatient  [facility]            HOME HEALTH ORDERS    FACE TO FACE ENCOUNTER        Patient Name: Zeyad Woodard    YOB: 1927        PCP: Booker Ricci MD     PCP Address:  [facility] [place] [address]    PCP Phone Number: [phone]    PCP Fax: [phone]        Encounter Date: 3/20/17        Admit to Home  Health        Diagnoses:     Fatigue, unspecified type  (primary encounter diagnosis)     SNOMED CT(R): FATIGUE            Allergies:Review of patient's allergies indicates:     -- Penicillins -- Hives and Other (See Comments)      --  Fever        Medications: Review current medications with patient and family and provide education.          No current facility-administered medications for this encounter.     Current Outpatient Prescriptions:    ASCORBATE CALCIUM (VITAMIN C ORAL), Take 1 tablet by mouth once daily. , Disp: , Rfl:     aspirin (ECOTRIN) 81 MG EC tablet, Take 1 tablet (81 mg total) by mouth once daily., Disp: , Rfl: 0    atorvastatin (LIPITOR) 20 MG tablet, Take 1 tablet (20 mg total) by mouth nightly., Disp: 90 tablet, Rfl: 3    calcium 500 mg Tab, Take 1 tablet by mouth Daily., Disp: , Rfl:     clotrimazole-betamethasone (LOTRISONE) lotion, Apply topically 2 (two) times daily. (Patient taking differently: Apply topically as needed. ), Disp: 30 mL, Rfl: 0    cyanocobalamin (VITAMIN B-12) 1000 MCG tablet, Take 100 mcg by mouth once daily., Disp: , Rfl:     ferrous sulfate 325 mg (65 mg iron) Tab tablet, Take 325 mg by mouth once daily., Disp: , Rfl:     furosemide (LASIX) 20 MG tablet, Take 1 tablet (20 mg total) by mouth 2 (two) times daily. (Patient taking differently: Take 20 mg by mouth 2 (two) times daily. States takes one every other day), Disp: 180 tablet, Rfl: 3    gabapentin (NEURONTIN) 100 MG capsule, Take 100 mg by mouth 2 (two) times daily., Disp: , Rfl:     gabapentin (NEURONTIN) 300 MG capsule, Take 1 capsule (300 mg total) by mouth every evening., Disp: 90 capsule, Rfl: 3    levothyroxine (SYNTHROID) 100 MCG tablet, Take 1 tablet (100 mcg total) by mouth once daily., Disp: 90 tablet, Rfl: 3    metoprolol succinate (TOPROL-XL) 25 MG 24 hr tablet, Take 1 tablet (25 mg total) by mouth once daily., Disp: 90 tablet, Rfl: 3    MULTIVITS-MINERALS/FA/LYCOPENE (ONE-A-DAY MEN'S MULTIVITAMIN  ORAL), Take 1 tablet by mouth once daily., Disp: , Rfl:     oxycodone-acetaminophen (PERCOCET) 7.5-325 mg per tablet, Take 1 tablet by mouth every 8 (eight) hours as needed for Pain., Disp: 90 tablet, Rfl: 0    [START ON 4/13/2017] oxycodone-acetaminophen (PERCOCET) 7.5-325 mg per tablet, Take 1 tablet by mouth every 8 (eight) hours as needed for Pain., Disp: 90 tablet, Rfl: 0    [START ON 5/13/2017] oxycodone-acetaminophen (PERCOCET) 7.5-325 mg per tablet, Take 1 tablet by mouth every 8 (eight) hours as needed for Pain., Disp: 90 tablet, Rfl: 0    senna-docusate 8.6-50 mg (PERICOLACE) 8.6-50 mg per tablet, Take 1 tablet by mouth 2 (two) times daily., Disp: , Rfl:     tamsulosin (FLOMAX) 0.4 mg Cp24, Take 1 capsule (0.4 mg total) by mouth once daily., Disp: 90 capsule, Rfl: 3            Current Discharge Medication List        CONTINUE these medications which have NOT CHANGED        ASCORBATE CALCIUM (VITAMIN C ORAL)    Take 1 tablet by mouth once daily.         aspirin (ECOTRIN) 81 MG EC tablet    Take 1 tablet (81 mg total) by mouth once daily.    Refills: 0        atorvastatin (LIPITOR) 20 MG tablet    Take 1 tablet (20 mg total) by mouth nightly.    Qty: 90 tablet Refills: 3        calcium 500 mg Tab    Take 1 tablet by mouth Daily.        clotrimazole-betamethasone (LOTRISONE) lotion    Apply topically 2 (two) times daily.    Qty: 30 mL Refills: 0    Associated Diagnoses:Angular cheilosis        cyanocobalamin (VITAMIN B-12) 1000 MCG tablet    Take 100 mcg by mouth once daily.        ferrous sulfate 325 mg (65 mg iron) Tab tablet    Take 325 mg by mouth once daily.        furosemide (LASIX) 20 MG tablet    Take 1 tablet (20 mg total) by mouth 2 (two) times daily.    Qty: 180 tablet Refills: 3        !! gabapentin (NEURONTIN) 100 MG capsule    Take 100 mg by mouth 2 (two) times daily.        !! gabapentin (NEURONTIN) 300 MG capsule    Take 1 capsule (300 mg total) by mouth every evening.    Qty: 90 capsule  Refills: 3    Associated Diagnoses:Lumbar radiculopathy; Polyneuropathy; Chronic low back pain with sciatica, sciatica laterality unspecified, unspecified back pain laterality; Right lumbar radiculopathy; Balance problem        levothyroxine (SYNTHROID) 100 MCG tablet    Take 1 tablet (100 mcg total) by mouth once daily.    Qty: 90 tablet Refills: 3        metoprolol succinate (TOPROL-XL) 25 MG 24 hr tablet    Take 1 tablet (25 mg total) by mouth once daily.    Qty: 90 tablet Refills: 3    Associated Diagnoses:Chronic systolic congestive heart failure        MULTIVITS-MINERALS/FA/LYCOPENE (ONE-A-DAY MEN'S MULTIVITAMIN ORAL)    Take 1 tablet by mouth once daily.        !! oxycodone-acetaminophen (PERCOCET) 7.5-325 mg per tablet    Take 1 tablet by mouth every 8 (eight) hours as needed for Pain.    Qty: 90 tablet Refills: 0    Associated Diagnoses:Right lumbar radiculopathy; Chronic low back pain with sciatica, sciatica laterality unspecified, unspecified back pain laterality; Balance problem; Polyneuropathy        !! oxycodone-acetaminophen (PERCOCET) 7.5-325 mg per tablet    Take 1 tablet by mouth every 8 (eight) hours as needed for Pain.    Qty: 90 tablet Refills: 0    Associated Diagnoses:Right lumbar radiculopathy; Chronic low back pain with sciatica, sciatica laterality unspecified, unspecified back pain laterality; Balance problem; Polyneuropathy        !! oxycodone-acetaminophen (PERCOCET) 7.5-325 mg per tablet    Take 1 tablet by mouth every 8 (eight) hours as needed for Pain.    Qty: 90 tablet Refills: 0    Associated Diagnoses:Right lumbar radiculopathy; Chronic low back pain with sciatica, sciatica laterality unspecified, unspecified back pain laterality; Balance problem; Polyneuropathy        senna-docusate 8.6-50 mg (PERICOLACE) 8.6-50 mg per tablet    Take 1 tablet by mouth 2 (two) times daily.        tamsulosin (FLOMAX) 0.4 mg Cp24    Take 1 capsule (0.4 mg total) by mouth once daily.    Qty: 90  capsule Refills: 3    Associated Diagnoses:Benign non-nodular prostatic hyperplasia without lower urinary tract symptoms        !! - Potential duplicate medications found. Please discuss with provider.                        Follow Up Appointments:    6/13/2017  10:20 AM   MD VIJAYA Gonzalez    6/16/2017  10:15 AM   Blade Emmanuel DPM    [facility] POD       [place]    6/19/2017  8:00 AM    HOME MONITOR DEVICE CHECK* [facility]A [facility]   [facility]    6/20/2017  8:00 AM    HOME MONITOR DEVICE CHECK* [facility]   [facility]runo Carrromeo            I have seen and examined this patient within the last 30 days. My clinical findings that support the need for the home health skilled services and home bound status are the following:    Weakness/numbness causing balance and gait disturbance due to Weakness/Debility making it taxing to leave home.        Nursing:     SN to complete comprehensive assessment including routine vital signs. Instruct on disease process and s/s of complications to report to MD. Review/verify medication list sent home with the patient at time of discharge  and instruct patient/caregiver as needed. Frequency may be adjusted depending on start of care date. Notify MD if SBP > 160 or < 90; DBP > 90 or < 50; HR > 120 or < 50; Temp > 101; O2 < 88%        Diet:  regular diet        Activities: ambulate in house with assistance        I certify that this patient is confined to his home and needs intermittent skilled nursing care.      [facility]     [facility] On Call Nurse Care Line - 24/7 Assistance  Registered nurses in the [facility] provide clinical advisement, health education, appointment booking, and other advisory services.  Call for this free service at [phone].             Medications           Message regarding Medications     Verify the changes and/or additions to your medication regime listed below are the same as discussed with your clinician today.  If any  of these changes or additions are incorrect, please notify your healthcare provider.        These medications were administered today        Dose Freq    sodium chloride 0.9% bolus 500 mL 500 mL Once    Sig: Inject 500 mLs into the vein once.    Class: Normal    Route: Intravenous           Verify that the below list of medications is an accurate representation of the medications you are currently taking.  If none reported, the list may be blank. If incorrect, please contact your healthcare provider. Carry this list with you in case of emergency.           Current Medications     ASCORBATE CALCIUM (VITAMIN C ORAL) Take 1 tablet by mouth once daily.     aspirin (ECOTRIN) 81 MG EC tablet Take 1 tablet (81 mg total) by mouth once daily.    atorvastatin (LIPITOR) 20 MG tablet Take 1 tablet (20 mg total) by mouth nightly.    calcium 500 mg Tab Take 1 tablet by mouth Daily.    clotrimazole-betamethasone (LOTRISONE) lotion Apply topically 2 (two) times daily.    cyanocobalamin (VITAMIN B-12) 1000 MCG tablet Take 100 mcg by mouth once daily.    ferrous sulfate 325 mg (65 mg iron) Tab tablet Take 325 mg by mouth once daily.    furosemide (LASIX) 20 MG tablet Take 1 tablet (20 mg total) by mouth 2 (two) times daily.    gabapentin (NEURONTIN) 100 MG capsule Take 100 mg by mouth 2 (two) times daily.    gabapentin (NEURONTIN) 300 MG capsule Take 1 capsule (300 mg total) by mouth every evening.    levothyroxine (SYNTHROID) 100 MCG tablet Take 1 tablet (100 mcg total) by mouth once daily.    metoprolol succinate (TOPROL-XL) 25 MG 24 hr tablet Take 1 tablet (25 mg total) by mouth once daily.    MULTIVITS-MINERALS/FA/LYCOPENE (ONE-A-DAY MEN'S MULTIVITAMIN ORAL) Take 1 tablet by mouth once daily.    oxycodone-acetaminophen (PERCOCET) 7.5-325 mg per tablet Take 1 tablet by mouth every 8 (eight) hours as needed for Pain.    oxycodone-acetaminophen (PERCOCET) 7.5-325 mg per tablet Starting on Apr 13, 2017. Take 1 tablet by mouth every 8  "(eight) hours as needed for Pain.    oxycodone-acetaminophen (PERCOCET) 7.5-325 mg per tablet Starting on May 13, 2017. Take 1 tablet by mouth every 8 (eight) hours as needed for Pain.    senna-docusate 8.6-50 mg (PERICOLACE) 8.6-50 mg per tablet Take 1 tablet by mouth 2 (two) times daily.    tamsulosin (FLOMAX) 0.4 mg Cp24 Take 1 capsule (0.4 mg total) by mouth once daily.           Clinical Reference Information           Your Vitals Were     BP Pulse Temp Resp Height Weight    195/86 (BP Location: Left arm, Patient Position: Lying, BP Method: Automatic) 82 98.6 °F (37 °C) (Oral) 19 5' 8" (1.727 m) 76.7 kg (169 lb)    SpO2 BMI             97% 25.7 kg/m2         Allergies as of 3/20/2017        Reactions    Penicillins Hives, Other (See Comments)    Fever      Immunizations Administered on Date of Encounter - 3/20/2017     None      ED Micro, Lab, POCT     Start Ordered       Status Ordering Provider    03/20/17 1420 03/20/17 1420  POCT glucose  Once      Final result     03/20/17 1321 03/20/17 1321  CBC auto differential  STAT      Final result     03/20/17 1321 03/20/17 1321  Comprehensive metabolic panel  STAT      Final result     03/20/17 1321 03/20/17 1321  POCT glucose  Once      Acknowledged     03/20/17 1321 03/20/17 1321  Urinalysis - Clean Catch  STAT      Final result     03/20/17 1321 03/20/17 1321  Ethanol  Once      Final result     03/20/17 1321 03/20/17 1321  Drug screen panel, emergency  STAT      Final result     03/20/17 1321 03/20/17 1321  Troponin I  STAT      Final result     03/20/17 1321 03/20/17 1321  Magnesium  Once      Final result       ED Imaging Orders     Start Ordered       Status Ordering Provider    03/20/17 1723 03/20/17 1722  X-Ray Chest 1 View  1 time imaging      In process         Discharge Instructions         Anemia  Anemia is a condition that occurs when your body does not have enough healthy red blood cells (RBCs). Your RBCs are the parts of your blood that carry oxygen " throughout your body. A protein called hemoglobin allows your RBCs to absorb and release oxygen. Without enough RBCs or hemoglobin, your body doesn't get enough oxygen. Symptoms of anemia may then occur.    Symptoms of anemia  Some people with anemia have no symptoms. But most people have symptoms that range from mild to severe. These can include:  · Tiredness (fatigue)  · Weakness  · Pale skin  · Shortness of breath  · Dizziness or fainting  · Rapid heartbeat  · Trouble doing normal amounts of activity  · Jaundice (yellowing of your eyes, skin, or mouth; dark urine)  Causes of anemia  Anemia can occur when your body:  · Loses too much blood  · Does not make enough RBCs  · Destroys your RBCs at a faster rate than it can replace them  · Does not make a normal amount of hemoglobin in your RBCs  These problems can occur for many reasons, including:  · A condition that you are born with (congenital or inherited). This includes sickle cell disease or thalassemia.  · Heavy bleeding for any reason, including injury, surgery, childbirth, or even heavy menstrual periods.  · Being low in certain nutrients, such as iron, folate, or vitamin B12. This may be due to poor diet. Also, a condition like celiac disease or Crohn's disease can cause poor absorption of these nutrients  · Certain chronic conditions like diabetes, arthritis, or kidney disease.  · Certain chronic infections like tuberculosis or HIV.  · Exposure to certain medications, such as those used for chemotherapy.  There are different types of anemia. Your doctor can tell you more about the type of anemia you have and what may have caused it.  Diagnosing anemia  To diagnose anemia, your doctor gives you blood tests. These can include:  · Complete blood cell count (CBC). This test measures the amounts of the different types of blood cells.  · Blood smear. This test checks the size and shape of your blood cells. To perform the test, your doctor views a drop of your  blood under a microscope. Your doctor uses a stain to make the blood cells easier to see.  · Iron studies. These tests measure the amount of iron in your blood. Your body needs iron to make hemoglobin in your RBCs.  · Vitamin B12 and folate studies. These tests check for some of the components that help give RBCs a normal size and shape.  · Reticulocyte count. This test measures the amount of new RBCs that your bone marrow makes.  · Hemoglobin electrophoresis. This test checks for problems with your hemoglobin in RBCs.  Treating anemia  Treatment for anemia is based on the type of anemia, its cause, and the severity of your symptoms. Treatments may include:  · Diet changes. This involves increasing the amount of certain nutrients in your diet, such as iron, vitamin B12, or folate. Your doctor may also prescribe nutrient supplements.  · Medications. Certain medications treat the cause of your anemia. Others help build new RBCs or relieve symptoms. If a medication is the cause of your anemia, you may need to stop or change it.  · Blood transfusions. Replacing some of your blood can increase the number of healthy RBCs in your body.  · Surgery. In some cases, your doctor can do surgery to treat the underlying cause of anemia. If you need surgery, your doctor will explain the procedure and outline the risks and benefits for you.  Long-term concerns  If you have a certain type of anemia, you can expect a full recovery after treatment. If you have other types of anemia (especially a type you're born with), you will need to manage it for life. Your doctor can tell you more.  Date Last Reviewed: 4/27/2015  © 7092-9669 [employer] [address] [place] [address], [place] [address]. All rights reserved. This information is not intended as a substitute for professional medical care. Always follow your healthcare professional's instructions.          Dehydration    The human body is comprised largely of water. If you lose  more fluids than you take in, you can become dehydrated. This means there are not enough fluids in your body for it to function right. Mild dehydration can cause weakness, confusion, or muscle cramps. In extreme cases, it can lead to brain damage and even death. That's why prompt treatment is crucial.  Risk factors  Anyone can become dehydrated. But infants, children, and older adults are at greatest risk. You are most likely to lose fluids with severe vomiting, diarrhea, or a fever. Exercising or working hard--especially in hot weather--can also cause excess fluid loss.  What to do  Drinking liquids is the best way to prevent dehydration. Water is best, but juice or frozen pops can also help. Your doctor may suggest electrolyte solutions for sick infants and young children.  When to go to the emergency room (ER)  Go to an ER right away for these symptoms:  Adults  · Very dark urine and little urine output  · Dizziness, weakness, confusion, fainting  Children  · Sunken eyes  · Little or no urine output (for infants, no wet diaper in 8 hours)  · Very dark urine  · Skin that doesn't bounce back quickly when pinched  · Crying without tears  What to expect in the ER  Your blood pressure, temperature, and heart rate will be checked. You may have blood or urine tests. The main treatment for dehydration is fluids. You may be given these to drink. Or, you may receive them through a vein in your arm. You also may be treated for diarrhea, vomiting, or a high fever.   Date Last Reviewed: 7/18/2015  © 7158-9576 [employer]. [address] [place] [address], [place] [address]. All rights reserved. This information is not intended as a substitute for professional medical care. Always follow your healthcare professional's instructions.          Discharge References/Attachments     FATIGUE, MANAGING (ENGLISH)    WEAKNESS (UNCERTAIN CAUSE) (ENGLISH)      Your Scheduled Appointments     Jun 13, 2017 10:20 AM CDT   Established  Patient Visit with MD Bruno Gonzalez-Physical Med & Rehab ([place] )    [address]  [facility] [address] [phone]            Jun 16, 2017 10:15 AM CDT   Established Patient Visit with GEORGE Mitchell - Podiatry ([place])    [address]  [place] [address] [phone]            Jun 19, 2017  8:00 AM CDT   Remote Inter[facility] DEVICE [facility]   Bruno Carrromeo - Arrhythmia ([place] )    [address]  [facility] [address] [phone]            Jun 20, 2017  8:00 AM CDT   Remote Interrogation with HOME MONITOR DEVICE CHECK, [facility]   Bruno Carrromeo - Arrhythmia ([place] )    [address]  [facility] [address] [phone]              Smoking Cessation     If you would like to quit smoking:   You may be eligible for free services if you are a [place] resident and started smoking cigarettes before September 1, 1988.  Call the Smoking Cessation Trust (SCT) toll free at (967) 084-9872 or (136) 195-4213.   Call 1-800-QUIT-NOW if you do not meet the above criteria.             [facility] complies with applicable Federal civil rights laws and does not discriminate on the basis of race, color, national origin, age, disability, or sex.        Language Assistance Services     ATTENTION: Language assistance services are available, free of charge. Please call [phone].      ATENCIÓN: Si habla español, tiene a medrano disposición servicios gratuitos de asistencia lingüística. Llame al [phone].     CHÚ Ý: N?u b?n nói Ti?ng Vi?t, có các d?ch v? h? tr? ngôn ng? mi?n phí dành cho b?n. G?i s? [phone].

## 2017-03-21 ENCOUNTER — OUTPATIENT CASE MANAGEMENT (OUTPATIENT)
Dept: ADMINISTRATIVE | Facility: OTHER | Age: 82
End: 2017-03-21

## 2017-03-21 NOTE — PROGRESS NOTES
Talked to daughter Elenita Woodard, who is a co-primary caregiver with sister Asia Woodard, to perform initial assessment for OPCM.  Patient is a 89 y/o male with dx of HTN, Chronic A-Fib, CHF and past med hx of frequent falls.  Patient went to ED on 3/20/2017 for dx of fatigue.  PHQ-2 unable to perform since patient's daughter Elenita did perform initial assessment.  Patient currently owns a cane, walker, transport chair and rollator.  Elenita stated that [facility] will start to visit patient-RN and PT twice a week.  Called Sully with [facility] to verify Home Health orders and Sully stated that she did receive them.  Medication reconciliation performed and is taking all medications as prescribed. I will Elenita educational materials regarding fall prevention, hypertension and heart failure.  I will mail my contact information should any new problems/concerns arise in the future.  I will mail literature about [facility].  Will admit to OPCM.  Will continue to follow.  BEBA Hernandez, OPCM-RN

## 2017-03-21 NOTE — PATIENT INSTRUCTIONS
Taking Your Blood Pressure  Blood pressure is the force of blood against the artery wall as it moves from the heart through the blood vessels. You can take your own blood pressure reading using a digital monitor. Take readings as often as your healthcare provider instructs. Take each reading at the same time of day.  Step 1. Relax    · Take your blood pressure at the same time every day, such as in the morning or evening, or at the time your healthcare provider recommends.  · Wait at least a half-hour after smoking, eating, drinking caffeinated beverages, or exercising.  · Sit comfortably at a table with both feet on the floor. Do not cross your legs or feet. Place the monitor near you.  · Rest for a few minutes before you begin.  Step 2. Wrap the cuff    · Place your arm on the table, palm up. Your arm should be at the level of your heart. Wrap the cuff around your upper arm, just above your elbow. Its best done on bare skin, not over clothing. Most cuffs will indicate where the brachial artery (the blood vessel in the middle of the arm at the inner side of the elbow) should line up with the cuff. Look in your monitor's instruction booklet for an illustration. You can also bring your cuff to your healthcare provider and have them show you how to correctly place the cuff.  · Make sure your cuff fits. If it doesnt wrap around your upper arm, order a larger cuff.  Step 3. Inflate the cuff    · Pump the cuff until the scale reads 160. If you have a self-inflating cuff, push the button that starts the pump.  · The cuff will tighten, then loosen.  · The numbers will change. When they stop changing, your blood pressure reading will appear.  · Take 2 or 3 readings one minute apart.  Step 4. Write down the results of each reading    · Write down your blood pressure numbers for each reading. Note the date and time. Keep your results in one place, such as a notebook. Even if your monitor has a built-in memory, keep a hard  copy of the readings.  · Remove the cuff from your arm. Turn off the machine.  · Share your blood pressure records with your healthcare providers at each visit.  Date Last Reviewed: 4/27/2016 © 2000-2016 [employer]. [address] [place] [address], [place] [address]. All rights reserved. This information is not intended as a substitute for professional medical care. Always follow your healthcare professional's instructions.        What is Heart Failure?  The heart is a muscle that pumps oxygen-rich blood to all parts of the body. When you have heart failure, the heart is not able to pump as well as it should. Blood and fluid may back up into the lungs, and some parts of the body dont get enough oxygen-rich blood to work normally. These problems lead to the symptoms you feel.  When you have heart failure  With heart failure, not enough oxygen-rich blood leaves the heart with each beat. There are 2 types of heart failure. Both affect the ventricles ability to pump blood. You may have 1 or both types.       Systolic heart failure. The heart muscle becomes weak and enlarged. It cant pump enough oxygen-rich blood forward to the rest of the body when the ventricles contract. The measurement of how much blood your heart pushes out when it beats is called ejection fraction. In systolic heart failure, the ejection fraction is lower than normal. This can cause blood to back up into the lungs and cause shortness of breath and eventually ankle swelling (edema).  This is also called heart failure with reduced ejection fraction, or  HFrEF.    Diastolic heart failure. The heart muscle becomes stiff. It doesnt relax normally between contractions, which keeps the ventricles from filling with blood. Ejection fraction is often in the normal range. This can still lead to the backup of blood into the body and affect the organs such as the liver. This is also called heart failure with preserved ejection fraction or  HFpEF.     Recognizing heart failure symptoms  When you have heart failure, you need to pay close attention to your body and how you feel, every single day. That way, if a problem occurs, you can get help before it becomes too severe. You'll need to watch for changes in your symptoms. As long as symptoms stay about the same from one day to the next, your heart failure is stable. But if symptoms start to get worse, it's time to take action.  Signs and symptoms of worsening heart failure include:  · Rapid weight gain  · Shortness of breath  · Swelling (edema)  · Fatigue  How heart failure affects your body  When the heart doesn't pump enough blood, hormones (body chemicals) are sent to increase the amount of work the heart does. Some hormones make the heart grow larger. Others tell the heart to pump faster. As a result, the heart may pump more blood at first, but it can't keep up with the ongoing demands. So, the heart muscle becomes even more weak. Over time, even less blood is pumped through the heart. This leads to problems throughout the body as organs began to feel the effects of a long-term lack of oxygen. Eventually, if untreated, this can cause problems with your lungs, liver, kidneys, and loss of elasticity in the skin, and skin changes in the lower legs. A weak heart itself can eventually cause a severe decline in health and possible death if left untreated.  What is ejection fraction?  Ejection fraction (EF) measures how much blood the heart pumps out (ejects). This is measured to help diagnose heart failure. A healthy heart pumps at least half of the blood from the ventricles with each beat. This means a normal ejection fraction is around 50% to 70%. Your doctor will calculate ejection fraction from an echocardiogram.     My ejection fraction     Date: ______________________  Ejection fraction: _____________  Test used: __________________  Date Last Reviewed: 3/15/2016  © 3246-3021 [employer]. [address] [place] [address]. All rights reserved. This information is not intended as a substitute for professional medical care. Always follow your healthcare professional's instructions.        Heart Failure: Tracking Your Weight  You have a condition called heart failure. When you have heart failure, a sudden weight gain or a steady rise in weight is a warning sign that your body is retaining too much water and salt. This could mean your heart failure is getting worse. If left untreated, it can cause problems for your lungs and result in shortness of breath. Weighing yourself each day is the best way to know if youre retaining water. If your weight goes up quickly, call your doctor. You will be given instructions on how to get rid of the excess water. You will likely need medicines and to avoid salt. This will help your heart work better.  Call your doctor if you gain more than 2 pounds in 1 day, more than 5 pounds in 1 week, or whatever weight gain you were told to report by your doctor. This is often a sign of worsening heart failure and needs to be evaluated and treated. Your doctor will tell you what to do next.   Tips for weighing yourself    · Weigh yourself at the same time each morning, wearing the same clothes. Weigh yourself after urinating and before eating.  · Use the same scale each day. Make sure the numbers are easy to read. Put the scale on a flat, hard surface -- not on a rug or carpet.  · Do not stop weighing yourself. If you forget one day, weigh again the next morning.  How to use your weight chart  · Keep your weight chart near the scale. Write your weight on the chart as soon as you get off the scale.  · Fill in the month and the start date on the chart. Then write down your weight each day. Your chart will look like this:    · If you miss a day, leave the space blank. Weigh yourself the next day and write your weight in the next space.  · Take your weight chart with you  "when you go to see your doctor.  Date Last Reviewed: 3/20/2016  © 0117-8661 [employer]. [address] [place] [address], [place] [address]. All rights reserved. This information is not intended as a substitute for professional medical care. Always follow your healthcare professional's instructions.        Discharge Instructions for High Blood Pressure (Hypertension)  You have been diagnosed with high blood pressure (also called hypertension). This means the force of blood against your artery walls is too strong. It also means your heart is working hard to move blood. High blood pressure usually has no symptoms, but over time, it can damage your heart, blood vessels, eyes, kidneys, and other organs. With help from your doctor, you can manage your blood pressure and protect your health.  Taking medicine  · Learn to take your own blood pressure. Keep a record of your results. Ask your doctor which readings mean that you need medical attention.  · Take your blood pressure medicine exactly as directed. Dont skip doses. Missing doses can cause your blood pressure to get out of control.  · If you do miss a dose (or doses) check with your healthcare provider about what to do.  · Avoid medicine that contain heart stimulants, including over-the-counter drugs. Check for warnings about high blood pressure on the label. Ask the pharmacist before purchasing something you haven't used before  · Check with your doctor or pharmacist before taking a decongestant. Some decongestants can worsen high blood pressure.  Lifestyle changes  · Maintain a healthy weight. Get help to lose any extra pounds.  · Cut back on salt.  ¨ Limit canned, dried, packaged, and fast foods.  ¨ Dont add salt to your food at the table.  ¨ Season foods with herbs instead of salt when you cook.  ¨ Request no added salt when you go to a restaurant.  ¨ The American Heart Associations (AHA) "ideal" sodium intake recommendation is 1,500 milligrams per day. "  However, since American's eat so much salt, the AHA says a positive change can occur by cutting back to even 2,400 milligrams of sodium a day.   · Follow the DASH (Dietary Approaches to Stop Hypertension) eating plan. This plan recommends vegetables, fruits, whole gains, and other heart healthy foods.  · Begin an exercise program. Ask your doctor how to get started. The American Heart Association recommends aerobic exercise 3 to 4 times a week for an average of 40 minutes at a time, with your doctor's approval. Simple activities like walking or gardening can help.  · Break the smoking habit. Enroll in a stop-smoking program to improve your chances of success. Ask your healthcare provider about programs and medicines to help you stop smoking.  · Limit drinks that contain caffeine (coffee, black or green tea, cola) to 2 per day.  · Never take stimulants such as amphetamines or cocaine; these drugs can be deadly for someone with high blood pressure.  · Control your stress. Learn stress-management techniques.  · Limit alcohol to no more than 1 drink a day for women and 2 drinks a day for men.  Follow-up care  Make a follow-up appointment as directed by our staff.     When to seek medical care  Call your doctor immediately or seek emergency care if you have any of the following:  · Chest pain or shortness of breath (call 911)  · Moderate to severe headache  · Weakness in the muscles of your face, arms, or legs  · Trouble speaking  · Extreme drowsiness  · Confusion  · Fainting or dizziness  · Pulsating or rushing sound in your ears  · Unexplained nosebleed  · Weakness, tingling, or numbness of your face, arms, or legs  · Change in vision  · Blood pressure measured at home that is greater than 180/110   Date Last Reviewed: 4/27/2016  © 0924-9178 [employer]. [address] [place] [address], [place] [address]. All rights reserved. This information is not intended as a substitute for professional medical care. Always  follow your healthcare professional's instructions.        Fall Prevention  Falls often occur due to slipping, tripping or losing your balance. Millions of people fall every year and injure themselves. Here are ways to reduce your risk of falling again.  · Think about your fall, was there anything that caused your fall that can be fixed, removed, or replaced?  · Make your home safe by keeping walkways clear of objects you may trip over.  · Use non-slip pads under rugs. Do not use area rugs or small throw rugs.  · Use non-slip mats in bathtubs and showers.  · Install handrails and lights on staircases.  · Do not walk in poorly lit areas.  · Do not stand on chairs or wobbly ladders.  · Use caution when reaching overhead or looking upward. This position can cause a loss of balance.  · Be sure your shoes fit properly, have non-slip bottoms and are in good condition.   · Wear shoes both inside and out. Avoid going barefoot or wearing slippers.  · Be cautious when going up and down stairs, curbs, and when walking on uneven sidewalks.  · If your balance is poor, consider using a cane or walker.  · If your fall was related to alcohol use, stop or limit alcohol intake.   · If your fall was related to use of sleeping medicines, talk to your doctor about this. You may need to reduce your dosage at bedtime if you awaken during the night to go to the bathroom.    · To reduce the need for nighttime bathroom trips:  ¨ Avoid drinking fluids for several hours before going to bed  ¨ Empty your bladder before going to bed  ¨ Men can keep a urinal at the bedside  · Stay as active as you can. Balance, flexibility, strength, and endurance all come from exercise. They all play a role in preventing falls. Ask your healthcare provider which types of activity are right for you.  · Get your vision checked on a regular basis.  · If you have pets, know where they are before you stand up or walk so you don't trip over them.  · Use night  lights.  Date Last Reviewed: 11/5/2015  © 9577-5482 [employer]. [address] [place] [address]. All rights reserved. This information is not intended as a substitute for professional medical care. Always follow your healthcare professional's instructions.        Preventing Falls in the Home  As you get older, falls are more likely for many reasons. One reason is because your reaction time slows. Your muscles and joints may also get stiffer, making them less flexible and therefore more prone to injury. Illness, medicines, and vision changes can also affect your balance. This increases your risk of falling. A fall could leave you unable to live on your own. To make your home safer, follow these tips:   Floors  Make floors safer by doing the following:   · Put nonskid pads under area rugs.  · Remove throw rugs.  · Replace worn floor coverings.  · Tack carpets firmly to each step on carpeted stairs. Put nonskid strips on the edges of uncarpeted stairs.  · Keep floors and stairs free of clutter and cords.  · Arrange furniture so there are clear pathways.  · Clean up any spills right away.  Bathrooms  Make bathrooms safer by doing the following:   ·   Install grab bars in the tub or shower.  · Apply nonskid strips or put a nonskid rubber mat in the tub or shower.  · Sit on a bath chair to bathe.  · Use bathmats with nonskid backing.  Lighting  Tips to light your way:   · Keep a flashlight in each room.  · Put a nightlight along the pathway between the bedroom and the bathroom.  Date Last Reviewed: 8/1/2016  © 2131-1007 [employer]. [address] [place] [address]. All rights reserved. This information is not intended as a substitute for professional medical care. Always follow your healthcare professional's instructions.        Preventing Falls: Make Your Health a Priority  Having a health problem can make you more likely to fall. Taking certain kinds of medicines may also  increase your risk of falls. So, improving your health can help you avoid a fall. Work with your healthcare provider to manage health problems and to review your medicines. If you have your health under control, your risk of falling is lessened.    How chronic conditions increase your fall risk  Health problems like diabetes, high or low blood pressure, and arthritis are called chronic health conditions. They can be managed, but they don't go away. Chronic health problems put you at greater risk of a fall. This is because they can affect many parts of your body. They may cause problems with movement, balance, or vision. And certain medicines you take for them may have side effects, such as dizziness or drowsiness. These side effects also increase your risk.  Work with your healthcare provider  Your healthcare provider can work with you to help prevent a fall. See your healthcare provider for a medical exam each year. Go more often if you have symptoms, such as leg numbness or dizziness, that could raise your risk of falling. If you have a history of falls, let your provider know. Bring a list of your medicines to review with your healthcare provider. Discuss your nutrition and exercise routine. And ask whether you need any tests to assess your risk of falling.    Review your medicines  Medicines (even ones you buy over the counter) can cause side effects that lead to a fall. Common medicines that cause these kinds of side effects are blood pressure, heart, or pain medicines, medicines for sleep, and antidepressants. Store pain medicine in a secure area, such as an upper cabinet in your kitchen or bathroom. Don't mix different pills in the same bottle. Always store and travel with medicines in their original containers with clear labels. This will reduce the chance of taking the wrong medicine or too much of a medicine. Taking too much of a medicine or taking the wrong medicine may cause side effects that can  increase your risk of falling.  Also, the way your body reacts to medicines can change as you age. So, certain medicines that were fine in the past may cause side effects now. Your healthcare provider (such as your doctor or pharmacist) can help review your medicines and make changes if needed.  Get your eyes and ears checked  Problems with vision or hearing can lead to falls:  · Get your eyes checked at least once a year. Take time to adjust to new glasses.  · Get your hearing checked at least every other year.  · Have your doctor check your inner ear for problems that may affect your balance.  Get the right nutrition  If you don't get enough to eat or drink, you can become dizzy and fall:  · Your sense of thirst decreases with age. Drink water throughout the day.  · Eat breakfast. Plan regular meals.  · Ask your healthcare provider whether you need supplements. These can help strengthen your bones and muscles to help prevent falls. They can also help prevent fractures if you do fall.  Stay as active as you can  Staying active is one of the best things you can do to prevent falls. Keep in mind that doing too little can be as risky as doing too much. That's because not being active can make you weaker and more likely to fall. Balance, flexibility, strength, and endurance all come from exercise. They all play a role in preventing falls. Ask your healthcare provider which types of activity are right for you.  When to call your healthcare provider  Be sure to call your healthcare provider if you fall. Also call if you have any of these signs or symptoms (someone else may need to point them out to you):  · Feeling lightheaded or dizzy more than once a day  · Losing your balance often or feeling unsteady on your feet  · Feeling numbness in your legs or feet, or noticing a change in the way you walk  · Having a steady decline in your memory or mental sharpness   Date Last Reviewed: 6/13/2015  © 2460-0680 [employer]. [address] [place] [address], [place] [address]. All rights reserved. This information is not intended as a substitute for professional medical care. Always follow your healthcare professional's instructions.

## 2017-03-21 NOTE — PROGRESS NOTES
Please note the following patient has been assigned to Anabell Hernandez RN,  with [facility] for screening.    Please contact \[facility]\"" at ext [phone] with questions.    Thank you    Hetal Beaulieu, SSC

## 2017-03-27 ENCOUNTER — OFFICE VISIT (OUTPATIENT)
Dept: FAMILY MEDICINE | Facility: CLINIC | Age: 82
End: 2017-03-27
Payer: MEDICARE

## 2017-03-27 VITALS
HEART RATE: 78 BPM | HEIGHT: 68 IN | WEIGHT: 169.75 LBS | DIASTOLIC BLOOD PRESSURE: 78 MMHG | SYSTOLIC BLOOD PRESSURE: 142 MMHG | OXYGEN SATURATION: 96 % | BODY MASS INDEX: 25.73 KG/M2

## 2017-03-27 DIAGNOSIS — D64.9 ANEMIA, UNSPECIFIED TYPE: ICD-10-CM

## 2017-03-27 DIAGNOSIS — N18.3 CKD (CHRONIC KIDNEY DISEASE), STAGE 3 (MODERATE): ICD-10-CM

## 2017-03-27 DIAGNOSIS — I10 ESSENTIAL HYPERTENSION: Primary | ICD-10-CM

## 2017-03-27 DIAGNOSIS — R26.9 ABNORMALITY OF GAIT: ICD-10-CM

## 2017-03-27 PROCEDURE — 99999 PR PBB SHADOW E&M-EST. PATIENT-LVL III: CPT | Mod: PBBFAC,,, | Performed by: FAMILY MEDICINE

## 2017-03-27 PROCEDURE — 1157F ADVNC CARE PLAN IN RCRD: CPT | Mod: S$GLB,,, | Performed by: FAMILY MEDICINE

## 2017-03-27 PROCEDURE — 99214 OFFICE O/P EST MOD 30 MIN: CPT | Mod: S$GLB,,, | Performed by: FAMILY MEDICINE

## 2017-03-27 PROCEDURE — 99499 UNLISTED E&M SERVICE: CPT | Mod: S$GLB,,, | Performed by: FAMILY MEDICINE

## 2017-03-27 PROCEDURE — 1160F RVW MEDS BY RX/DR IN RCRD: CPT | Mod: S$GLB,,, | Performed by: FAMILY MEDICINE

## 2017-03-27 PROCEDURE — 1159F MED LIST DOCD IN RCRD: CPT | Mod: S$GLB,,, | Performed by: FAMILY MEDICINE

## 2017-03-27 PROCEDURE — 1126F AMNT PAIN NOTED NONE PRSNT: CPT | Mod: S$GLB,,, | Performed by: FAMILY MEDICINE

## 2017-03-27 NOTE — PROGRESS NOTES
Subjective:       Patient ID: Zeyad Woodard is a 89 y.o. male.    Chief Complaint: Follow-up (ED); Fatigue; and Hypertension    HPI Comments: 89 years old male who came to the clinic after recent hospitalization secondary to mild dehydration.  Blood pressure at home less than 150 over 90.  No chest pain palpitations orthopnea or PND.  No leg swelling even  without taking the Lasix.  Patient with chronic anemia probably secondary to gastrointestinal bleeding versus chronic kidney disease.  Patient taking iron at least every other day.  Patient with decreased kidney function but stable in comparison with previous reports.    Fatigue   Associated symptoms include fatigue and weakness. Pertinent negatives include no chest pain.   Hypertension   Pertinent negatives include no chest pain or palpitations.     Review of Systems   Constitutional: Positive for fatigue.   HENT: Positive for hearing loss.    Eyes: Negative.    Respiratory: Negative.    Cardiovascular: Negative.  Negative for chest pain, palpitations and leg swelling.   Gastrointestinal: Negative.    Genitourinary: Negative.    Musculoskeletal: Positive for gait problem.   Skin: Negative.    Neurological: Positive for weakness.   Psychiatric/Behavioral: Positive for decreased concentration.       Objective:      Physical Exam   Constitutional: He is oriented to person, place, and time. He appears well-developed and well-nourished. No distress.   HENT:   Head: Normocephalic and atraumatic.   Right Ear: External ear normal.   Left Ear: External ear normal.   Nose: Nose normal.   Mouth/Throat: Oropharynx is clear and moist. No oropharyngeal exudate.   Eyes: Conjunctivae and EOM are normal. Pupils are equal, round, and reactive to light. Right eye exhibits no discharge. Left eye exhibits no discharge. No scleral icterus.   Neck: Normal range of motion. Neck supple. No JVD present. No tracheal deviation present. No thyromegaly present.   Cardiovascular: Normal  rate, regular rhythm, normal heart sounds and intact distal pulses.  Exam reveals no gallop and no friction rub.    No murmur heard.  Pulmonary/Chest: Effort normal and breath sounds normal. No stridor. No respiratory distress. He has no wheezes. He has no rales. He exhibits no tenderness.   Abdominal: Soft. Bowel sounds are normal. He exhibits no distension and no mass. There is no tenderness. There is no rebound and no guarding.   Musculoskeletal: Normal range of motion. He exhibits no edema or tenderness.   Lymphadenopathy:     He has no cervical adenopathy.   Neurological: He is alert and oriented to person, place, and time. He has normal reflexes. He displays normal reflexes. No cranial nerve deficit. He exhibits normal muscle tone. Coordination and gait abnormal.   Skin: Skin is warm and dry. No rash noted. He is not diaphoretic. No erythema. No pallor.   Psychiatric: His behavior is normal. Judgment and thought content normal. His mood appears not anxious. His affect is not angry, not blunt and not labile. He exhibits a depressed mood.   Nursing note and vitals reviewed.      Assessment:       1. Essential hypertension    2. CKD (chronic kidney disease), stage 3 (moderate)    3. Anemia, unspecified type    4. Abnormality of gait        Plan:         Zeyad was seen today for follow-up, fatigue and hypertension.    Diagnoses and all orders for this visit:    Essential hypertension  -     Comprehensive metabolic panel; Future  -     CBC auto differential; Future    CKD (chronic kidney disease), stage 3 (moderate)  -     Comprehensive metabolic panel; Future    Anemia, unspecified type  -     CBC auto differential; Future    Abnormality of gait     Fall precautions.  Continue monitoring weight at home.  Discontinue Lasix for now.  Continue monitoring blood pressure at home, low sodium diet.

## 2017-03-27 NOTE — MR AVS SNAPSHOT
[facility]   [address]  [place] [address]  Phone: [phone]  Fax: [phone]                  Zeyad Woodard   3/27/2017 3:40 PM   Office Visit    Description:  Male : 1927   Provider:  Booker Ricci MD   Department:  [facility]           Reason for Visit     Follow-up     Fatigue     Hypertension           Diagnoses this Visit        Comments    Essential hypertension    -  Primary     CKD (chronic kidney disease), stage 3 (moderate)         Anemia, unspecified type         Abnormality of gait                To Do List           Future Appointments        Provider Department Dept Phone    2017 10:20 AM Lynn Rosenberg MD [facility] [phone]    2017 10:15 AM Blade Emmanuel DPM [facility] Podiat[facility] [phone]    2017 8:00 AM HOME MONITOR DEVICE CHECK, [facility] [phone]    2017 8:00 AM HOME MONITOR DEVICE CHECK, [facility] [phone]    2017 8:00 AM LAB, [facility] [phone]      Goals (5 Years of Data)     Patient/Caregiver will check and record blood pressure daily. If > 140/90 for 3 days, patient/caregiver will know to notify Physician, prior to discharge from OPCM.     Notes - Note created  3/21/2017  1:19 PM by Anabell Hernandez RN    Overall Time to Completion  2 weeks from 2017    Short Term Goals  Patient/caregiver will measure and record the blood pressure 1 times per day for 2 weeks.  Interventions   · Collaborate with Physician as appropriate to meet patient needs.  · Empower patient/caregiver to discuss treatment plan with Physician/care team.  · Recognize and provide educational material (ABDIAZIZ).  · Assess for availability of working blood pressure cuff in home setting.   Status  · Partially met          Patient/caregiver will have Safety Plan in place prior to discharge from OPCM.     Notes - Note created   3/21/2017  1:17 PM by Anabell Hernandez RN    Overall Time to Completion  2 weeks from 03/21/2017    Short Term Goals  Patient/caregiver will verbalize importance of having a safe home environment by keeping medications stored in a safe place, keeping room free of clutter and making sure rooms are well lit within 2 weeks.  Interventions   · Recognize and provide educational material (ABDIAZIZ).  · Complete medication reconciliation.  · Encourage Medication Compliance.  · Provide contact information for [facility] contact information.   Status  · Partially met            Follow-Up and Disposition     Return in about 3 months (around 6/27/2017).      [facility]     [facility] On Call Nurse Care Line - 24/7 Assistance  Registered nurses in the [facility] On Call Center provide clinical advisement, health education, appointment booking, and other advisory services.  Call for this free service at [phone].             Medications           Message regarding Medications     Verify the changes and/or additions to your medication regime listed below are the same as discussed with your clinician today.  If any of these changes or additions are incorrect, please notify your healthcare provider.        STOP taking these medications     furosemide (LASIX) 20 MG tablet Take 1 tablet (20 mg total) by mouth 2 (two) times daily.           Verify that the below list of medications is an accurate representation of the medications you are currently taking.  If none reported, the list may be blank. If incorrect, please contact your healthcare provider. Carry this list with you in case of emergency.           Current Medications     ASCORBATE CALCIUM (VITAMIN C ORAL) Take 1 tablet by mouth once daily.     aspirin (ECOTRIN) 81 MG EC tablet Take 1 tablet (81 mg total) by mouth once daily.    atorvastatin (LIPITOR) 20 MG tablet Take 1 tablet (20 mg total) by mouth nightly.    calcium 500 mg Tab Take 1 tablet by mouth Daily.     "clotrimazole-betamethasone (LOTRISONE) lotion Apply topically 2 (two) times daily.    cyanocobalamin (VITAMIN B-12) 1000 MCG tablet Take 100 mcg by mouth once daily.    ferrous sulfate 325 mg (65 mg iron) Tab tablet Take 325 mg by mouth once daily.    gabapentin (NEURONTIN) 100 MG capsule Take 100 mg by mouth 2 (two) times daily.    gabapentin (NEURONTIN) 300 MG capsule Take 1 capsule (300 mg total) by mouth every evening.    levothyroxine (SYNTHROID) 100 MCG tablet Take 1 tablet (100 mcg total) by mouth once daily.    metoprolol succinate (TOPROL-XL) 25 MG 24 hr tablet Take 1 tablet (25 mg total) by mouth once daily.    MULTIVITS-MINERALS/FA/LYCOPENE (ONE-A-DAY MEN'S MULTIVITAMIN ORAL) Take 1 tablet by mouth once daily.    oxycodone-acetaminophen (PERCOCET) 7.5-325 mg per tablet Starting on May 13, 2017. Take 1 tablet by mouth every 8 (eight) hours as needed for Pain.    senna-docusate 8.6-50 mg (PERICOLACE) 8.6-50 mg per tablet Take 1 tablet by mouth 2 (two) times daily.    tamsulosin (FLOMAX) 0.4 mg Cp24 Take 1 capsule (0.4 mg total) by mouth once daily.           Clinical Reference Information           Your Vitals Were     BP Pulse Height Weight SpO2 BMI    142/78 (BP Location: Left arm, Patient Position: Sitting, BP Method: Manual) 78 5' 8" (1.727 m) 77 kg (169 lb 12.1 oz) 96% 25.81 kg/m2      Blood Pressure          Most Recent Value    BP  (!)  142/78      Allergies as of 3/27/2017     Penicillins      Immunizations Administered on Date of Encounter - 3/27/2017     None      Orders Placed During Today's Visit     Future Labs/Procedures Expected by Expires    CBC auto differential  3/27/2017 9/28/2018    Comprehensive metabolic panel  3/27/2017 9/28/2018      Language Assistance Services     ATTENTION: Language assistance services are available, free of charge. Please call [phone].      ATENCIÓN: Si habla español, tiene a medrano disposición servicios gratuitos de asistencia lingüística. Llame al " [phone].     PHILIPPE Ý: N?u b?n nói Ti?ng Vi?t, có các d?ch v? h? tr? ngôn ng? mi?n phí dành cho b?n. G?i s? [phone].         [facility] complies with applicable Federal civil rights laws and does not discriminate on the basis of race, color, national origin, age, disability, or sex.

## 2017-03-28 ENCOUNTER — OUTPATIENT CASE MANAGEMENT (OUTPATIENT)
Dept: ADMINISTRATIVE | Facility: OTHER | Age: 82
End: 2017-03-28

## 2017-03-28 NOTE — PROGRESS NOTES
Talked to daughter Heather Caldwell to update plan of care for OPCM.  Heather reports that patient did attend his MD appointment with Dr. Bo yesterday.  Heather reports that his B/P and weights are being recorded daily per daily weight log and B/P log.  Heather reports that within the past 3 days patient's weight has ranged between 169-170.8lbs.  Also Heather reports that patient's B/P has ranged within the past 3 days between 122/57mmHg-142/78mmHg within the past 3 days.  Encouraged Heather to call MD if patient gains more than 2 lbs in one day or 5 lbs within a week to call MD and verbalized understanding.  Heather states that patient is adhering to a Low Sodium diet at present.  Heather stated that patient did receive all educational materials and has not had a chance to review at this time.  Heather stated that [facility] is visiting patient-RN three times a week.  Heather reports that patient has not had any falls since last conversation.  Encouraged patient/caregivers to call this RN if having any questions/concerns.  Will continue to follow.   BEBA Hernandez, OPCM-RN

## 2017-03-29 ENCOUNTER — OUTPATIENT CASE MANAGEMENT (OUTPATIENT)
Dept: ADMINISTRATIVE | Facility: OTHER | Age: 82
End: 2017-03-29

## 2017-03-29 NOTE — PROGRESS NOTES
An OPCM welcome Packet and consent form was created and mailed to the patient on 3/29/2017        Thank you,  Bre Lerma, SSC

## 2017-03-29 NOTE — LETTER
March 29, 2017    Zeyad Woodard  [address] [facility] [address]             Outpatient Case Management  [address]  [facility] [address] Dear Zeyad Woodard:    We understand that receiving many services from different doctors and healthcare providers is overwhelming. There are appointments to make, transportation to arrange, dietary instructions to understand, and new medications to obtain.    This is where [facility] can help.     You are eligible to receive Outpatient Case Management services when you have healthcare needs that require the coordination of many providers, treatments, and services. You also qualify if you need assistance with a new treatment plan.     There is no charge for this support. You may have been referred to this program from your doctor(s), hospital staff member(s), or insurance company but you always have a choice to participate or not participate. To participate, you must give us your permission to be enrolled.     When you are enrolled in the [facility] program, the  who is assigned to you is    Anabell Hernandez RN   [phone]    Depending on your needs and wishes, your  may speak with you by phone, visit you at your place of living (for example your home, skilled nursing facility, or rehabilitation facility), or meet you at your doctors office.     Your  will tell you why you have been selected to participate in the program and will complete an assessment of your needs. Then a personalized plan of care will be developed with you and or your caregiver.             Here are examples of the services your [facility]  provides.     Coordinate communication among multiple providers.   Arrange for transportation, doctors visits, durable medical equipment, home care services, and special clinics.    Provide coaching on how to manage your health condition.     Answer questions about your health condition.   Help you understand your doctors treatment plan.    Provide additional instruction about your health condition, treatments, and medications.    Help you obtain information about your insurance coverage.    Advocate for your individual needs.    Request a Licensed Clinical  (LCSW) to visit you if you need their services. LCSWs help with long term planning (discussing placement options, advanced planning directives), financial planning, and assistance (for example rent, utilities, medication funding).     Your  will coordinate their activities with other outpatient services you are receiving. All services provided by [facility]  are coordinated with and communicated to your primary care physician.    Our goal is to help you manage your health condition(s) safely within your living environment, whether that is your home or a medical facility. We want to help you function at the healthiest and highest level possible.     Sincerely,      Mehdi Barrios MD  Medical Director    Enclosures:    Frequently Asked Questions  Patient Rights and Responsibilities   Reporting a Grievance or Complaint  Consent/Release of Information  Stamped Addressed Envelope                  Frequently Asked Questions about [facility]    What is [facility]?  Outpatient Case management is not Home healthcare services. [facility]  do not provide hands-on care. [facility]  will work with your doctor to arrange for home health services, if needed. Home health services have a limited duration and there are some restrictions as to who can get these services. There is no prescribed limit to the amount of time you receive [facility] services. [facility]  are not agents of your insurance company. However, [facility]  can help you obtain information from your insurance company.     Who are the [facility] ?  [facility]  are Registered Nurses and Social Workers. It is important to remember that you and your  are a team that works together with your primary care physician to create your individualized plan of care. The ultimate goal of your care plan is to help you implement your doctors treatment plan and to help you function at the highest level of health possible.     What are my rights as a patient?  It is important for you to know and understand your rights and responsibilities while receiving services from the [facility] program. We have enclosed a complete description of your rights and responsibilities. You can help to make your care more effective when you understand your right and responsibilities.     What is needed to be enrolled in the program?  You are only enrolled in the [facility] Program when you give us your consent to participate. You will find enclosed a consent form. You are receiving this letter because you or your caregivers have given us a verbal consent to enroll you in [facility]s [facility] Management Program. We ask that you sign and return the enclosed written consent in the stamped self-addressed envelope.                           Patient Rights and Responsibilities    We consider you a partner in your care. When you are well informed, participate in treatment decisions and communicate openly with your doctor and other healthcare professionals, you help make your care more effective.     While you are in the Outpatient Case Management Program, your rights include the following:     You have a right to be provided services in a non-discriminatory manner in accordance with the provisions of Title VI of the Civil Rights Act of 1964, Section 504 of the Rehabilitation Act of  1973, the Age Discrimination Act of 1975, the Americans with Disabilities Act as well as any other applicable Federal and State laws and regulations.     You have the right to a reasonable, timely response to your request or need for care, as well as the right to considerate and respectful care including an environment that preserves dignity and contributes to a positive self-image. You are responsible for being considerate and respectful of our staff.     You have a right to information regarding patient rights, advocacy services and complaint mechanisms, and the right to prompt resolution of any complaint. You or a designee has the right to participate in the resolution of ethical issues surrounding your care. You have a right to file a complaint if you feel that your rights have been infringed, without fear or penalty from [facility] or the federal government. You may file a complaint with the Director of Outpatient Case Management by calling [phone]. At any time, you may lodge a grievance with the [facility] and [facility] by calling [phone], or the Joint Commission on Accreditation of Healthcare Organizations at [phone].     You, or someone acting on your behalf, have the right to understandable information on your health status, treatment and progress in order to make decisions. You have the right to know the nature, risks and alternatives to treatment. You have the right to be informed, when appropriate, regarding the outcome of the care that has been provided. You have the right to refuse treatment to the extent permitted by law, and the right to be informed of the alternatives and consequences of refusing treatment.     You, in collaboration with your physician, have the right to make decisions regarding care and the right to participate in the development and implementation of the plan of care and effective pain management. You have the right to know the name and  professional status of those responsible for the delivery of your care and treatment.       You have a right, within legal guidelines, to have a guardian, next-of-kin or legal designee exercises your patient rights when you are unable to do so. You have the right for your wishes regarding end-of-life decisions to be addressed by the healthcare team through advance directives. You have the right to personal privacy and confidentiality and to expect confidentiality of all records and communications pertaining to your care.      You have the right to receive communications about your health information confidentially. You have the right to request restrictions on the uses and disclosures of your health information. You have the right to inspect, copy, request amendments and receive an accounting of to whom we have disclosed your health information.     You have the right to be provided with interpretation services if you do not speak English; to alternative communication techniques if you are hearing or vision impaired; and to have any other resources needed on your behalf to ensure effective communication. These services are provided free of charge.     You have a right to personal safety (free from mental, physical, sexual and verbal abuse, neglect and exploitation). You have the right to access protective and advocacy services.     Advance Directives  A Patient Advocate is available to meet with patients to answer questions regarding advance directives.    Living Will  A document that outlines what medical treatment the patient does or does not want in the event the patient becomes unable to make those decisions at the appropriate time.    Durable Medical Power of   A document by which the patient designates an individual to be responsible for making medical decisions in the event the patient becomes unable to do so.    HIPAA Notice of Privacy Practices  Your medical information is governed by federal  privacy laws. HIPAA protects private medical information and how that information is disclosed. If you have a question regarding the HIPAA Notice of Privacy Practices, or if you believe your privacy rights have been violated, you may call our designated hotline at [phone].            Quality Improvement  Because we consistently strive to improve the care and service provided to our patients, we welcome your feedback. Your comments are an important part of our quality improvement process, as we like to know what we are doing right and which areas are in need of improvement. Our policy is to listen, be responsive and provide you with an appropriate and timely follow-up to your questions or concerns. Our goal is active patient and family involvement in all aspects of the care process.                                                                                  Reporting a Grievance or Complaint    During your time with the [facility] team you may have a grievance or complaint with our services. Your Patients Bill of Rights gives patients, families, and caregivers the right to express concerns and grievances and the right to expect a reasonable and timely response.     Your presentation of your concerns is not viewed negatively. It is an opportunity for us to improve the quality of our care and services we provide to you.     You may report your concerns directly to your , or you can phone in a complaint to:     Director of Outpatient Case Management  [phone]    You may also send a complaint letter to:    Director of Outpatient Case Management Services  [address] [place] [id number]    Tell us the details of your complaint and provide us with a contact phone number so we can contact you to obtain additional information. We will return a call to you within two business days of our receipt of your complaint, and to request additional information as  needed. If you choose to mail a letter, your complaint may take a few days longer to reach us.     All grievances will be addressed as quickly as possible. A grievance or complaint that involves situations or practices which place patients in immediate danger will be addressed as an urgent matter. We will work to resolve all other complaints within seven days of receipt. By that time, you will receive a phone call with either the resolution of your complaint, or a plan for corrective action. A formal written response will be sent to you within 30 days of receipt of your grievance.     If a resolution cannot be completed within 30 days, a letter will be sent to you or your family member with an estimated time for the final response.    Additionally, all patients have the right to file complaints with external agencies, without exception. Complaints/grievances can be addressed to the following agencies:            Patient Safety or Quality of Care Concerns  Office of Quality Monitoring   [facility] [place] [address]  [phone] Toll Free    HIPPA Privacy/Security Concerns  Office for Civil Rights Region IV  [place] Department of Health & Human Services  [address] [place] [address], [address]  [place] [address] [phone] Phone  [phone] TDD  [phone] Toll Free    Medicare/Medicaid Billing Concerns  [place] for Medicare & Medicaid Services  Region [address]  [address] [place] [address], [address]  [place] [address] [phone] Phone  [phone] Toll Free    General Concerns  [place] [facility] ([facility])  ([phone]) [phone] Toll Free Complaint Hotline                                                              Consent Form/Release of Information    By signing--     (1) I agree I have read the Outpatient Case Management information provided to me;     (2) I agree to voluntarily participate in the Outpatient Case Management program;     (3) I understand I  must consent to participation in the Outpatient Case Management program during my first interview with my ;    (4) I consent to the discussion and release of my personal health information to my healthcare team (including my personal physician, my medical home care team, any specialty physician(s), and my [facility] team);     (5) I agree my consent is valid for the length of time I am receiving Outpatient Case Management;    (6) I agree to referrals to community resources which my Case Management team recommends for me. I agree to the release of my personal information and personal health information as necessary to referral sources.    ___________________________________________________________________  Patients Printed Name     ___________________________________________________________________  Patients Signature       Date    If patient is in being cared for, please complete this section:     ___________________________________________________________________  Printed Name of Person Caring For Patient   Relationship To Patient    ___________________________________________________________________   Signature of Person Caring For Patient     Date    PLEASE SIGN AND RETURN IN THE ENCLOSED PRE-ADDRESSED ENVELOPE.

## 2017-04-04 ENCOUNTER — OUTPATIENT CASE MANAGEMENT (OUTPATIENT)
Dept: ADMINISTRATIVE | Facility: OTHER | Age: 82
End: 2017-04-04

## 2017-04-04 NOTE — PROGRESS NOTES
Attempted to update plan of care with domi Kwong; left voice message to return call.  BEBA Hernandez, OPCM-RN

## 2017-04-10 ENCOUNTER — OUTPATIENT CASE MANAGEMENT (OUTPATIENT)
Dept: ADMINISTRATIVE | Facility: OTHER | Age: 82
End: 2017-04-10

## 2017-04-10 NOTE — PROGRESS NOTES
Talked to patient to update plan of care for OPCM.  Patient stated that he is continuing to weight daily and his reported weight is ranging between 165-167lbs within the past week.  Patient stated that he continuing to follow a Low Sodium diet.  Patient denies any falls since last conversation.  Patient denies having any SOB or cough since last conversation.  Patient stated that [facility] continues to visit with RN three times a week.  Encouraged patient to call this RN if having any questions/concerns.  Will continue to monitor.  ERIC Jeffers-RN

## 2017-04-24 ENCOUNTER — OUTPATIENT CASE MANAGEMENT (OUTPATIENT)
Dept: ADMINISTRATIVE | Facility: OTHER | Age: 82
End: 2017-04-24

## 2017-04-24 NOTE — PROGRESS NOTES
Talked to patient to update plan of care for OPCM.  Patient stated that [facility] is visiting patient:  RN twice a week.  Patient stated that he has not fallen since last conversation.  Patient denies any CP, SOB or cough at this time.  Patient stated that he is following a Low Sodium diet.  Patient stated that he is taking all medications as prescribed.  Patient stated that he is continuing to weight daily and his weight has been running between 165-167lbs within the past week.  Patient stated that he is using walker to assist with ambulation.  Encouraged patient to call this RN if having any questions/concerns. Will continue to follow.  BEBA Hernandez, OPCM-RN

## 2017-05-08 ENCOUNTER — OUTPATIENT CASE MANAGEMENT (OUTPATIENT)
Dept: ADMINISTRATIVE | Facility: OTHER | Age: 82
End: 2017-05-08

## 2017-05-08 NOTE — PROGRESS NOTES
Talked to patient to update plan of care for OPCM.  Patient stated that he has not had any falls since last conversation.  Patient denies having any SOB or cough at this time. Patient stated that he is continuing to weight daily and his weight has been fluctuating between 165-166 lbs.  Patient stated that [facility] has stopped coming at this time.  Reminded patient of upcoming appointment and verbalized understanding:  Dr. Izaguirre on 5/11 at 4pm.  Patient stated that he is maintaining a low sodium diet.  Encouraged patient to call this RN if having any questions/concerns.  Will continue to follow.  BEBA Hernandez, OPCM-RN

## 2017-05-11 ENCOUNTER — OFFICE VISIT (OUTPATIENT)
Dept: FAMILY MEDICINE | Facility: CLINIC | Age: 82
End: 2017-05-11
Payer: MEDICARE

## 2017-05-11 VITALS
WEIGHT: 171.94 LBS | DIASTOLIC BLOOD PRESSURE: 74 MMHG | HEART RATE: 64 BPM | OXYGEN SATURATION: 97 % | SYSTOLIC BLOOD PRESSURE: 126 MMHG | HEIGHT: 68 IN | BODY MASS INDEX: 26.06 KG/M2

## 2017-05-11 DIAGNOSIS — D53.9 NUTRITIONAL ANEMIA: ICD-10-CM

## 2017-05-11 DIAGNOSIS — G93.0 ARACHNOID CYST: ICD-10-CM

## 2017-05-11 DIAGNOSIS — R41.3 MEMORY LOSS: ICD-10-CM

## 2017-05-11 DIAGNOSIS — I10 ESSENTIAL HYPERTENSION: ICD-10-CM

## 2017-05-11 DIAGNOSIS — R41.89 COGNITIVE DEFICITS: Primary | ICD-10-CM

## 2017-05-11 DIAGNOSIS — Z95.0 PACEMAKER: ICD-10-CM

## 2017-05-11 PROCEDURE — 1160F RVW MEDS BY RX/DR IN RCRD: CPT | Mod: S$GLB,,, | Performed by: FAMILY MEDICINE

## 2017-05-11 PROCEDURE — 99499 UNLISTED E&M SERVICE: CPT | Mod: S$PBB,,, | Performed by: FAMILY MEDICINE

## 2017-05-11 PROCEDURE — 99999 PR PBB SHADOW E&M-EST. PATIENT-LVL IV: CPT | Mod: PBBFAC,,, | Performed by: FAMILY MEDICINE

## 2017-05-11 PROCEDURE — 1159F MED LIST DOCD IN RCRD: CPT | Mod: S$GLB,,, | Performed by: FAMILY MEDICINE

## 2017-05-11 PROCEDURE — 99214 OFFICE O/P EST MOD 30 MIN: CPT | Mod: S$GLB,,, | Performed by: FAMILY MEDICINE

## 2017-05-11 PROCEDURE — 1125F AMNT PAIN NOTED PAIN PRSNT: CPT | Mod: S$GLB,,, | Performed by: FAMILY MEDICINE

## 2017-05-11 NOTE — MR AVS SNAPSHOT
[facility] [place] [address]  Phone: [phone]  Fax: [phone]                  Zeyad Woodard   2017 4:00 PM   Office Visit    Description:  Male : 1927   Provider:  Booker Ricci MD   Department:  [facility]           Reason for Visit     Memory Loss     Back Pain     Hip Pain           Diagnoses this Visit        Comments    Cognitive deficits    -  Primary     Essential hypertension         Memory loss         Arachnoid cyst         White matter changes         Pacemaker         Nutritional anemia                To Do List           Future Appointments        Provider Department Dept Phone    2017 4:00 PM LAB, [facility] [phone]    2017 8:00 AM [facility] CT1 LIMIT [address] L[address] [facility] [phone]    2017 2:20 PM Chele Juárez MD [facility] - Neurology [phone]    2017 10:20 AM Lynn Rosenberg MD [facility] [phone]    2017 10:15 AM Blade Emmanuel DPM [facility] [phone]      Goals (5 Years of Data)              5/8/17    4/24/17    4/10/17    COMPLETED: Patient/Caregiver will check and record blood pressure daily. If > 140/90 for 3 days, patient/caregiver will know to notify Physician, prior to discharge from [facility].       On track    Notes - Note edited  2017 10:11 AM by Anabell Hernandez RN    Overall Time to Completion  2 weeks from 2017    Short Term Goals  Patient/caregiver will measure and record the blood pressure 1 times per day for 2 weeks.  Interventions   · Collaborate with Physician as appropriate to meet patient needs.  · Empower patient/caregiver to discuss treatment plan with Physician/care team.  · Recognize and provide educational material (ABDIAZIZ).  · Assess for availability of working blood pressure cuff in home setting.   Status  · Met          Patient/caregiver will have Safety Plan in  place prior to discharge from O[facility].   On track  On track  On track    Notes - Note created  3/21/2017  1:17 PM by Anabell Hernandez RN    Overall Time to Completion  2 weeks from 03/21/2017    Short Term Goals  Patient/caregiver will verbalize importance of having a safe home environment by keeping medications stored in a safe place, keeping room free of clutter and making sure rooms are well lit within 2 weeks.  Interventions   · Recognize and provide educational material (ABDIAZIZ).  · Complete medication reconciliation.  · Encourage Medication Compliance.  · Provide contact information for [facility] contact information.   Status  · Partially met            Follow-Up and Disposition     Return in about 6 months (around 11/11/2017), or if symptoms worsen or fail to improve.    Follow-up and Disposition History      [facility]     [facility] On Call Nurse Care Line - 24/7 Assistance  Unless otherwise directed by your provider, please contact [facility], our nurse care line that is available for 24/7 assistance.     Registered nurses in the [facility] On Call Center provide: appointment scheduling, clinical advisement, health education, and other advisory services.  Call: [phone] (toll free)               Medications           Message regarding Medications     Verify the changes and/or additions to your medication regime listed below are the same as discussed with your clinician today.  If any of these changes or additions are incorrect, please notify your healthcare provider.             Verify that the below list of medications is an accurate representation of the medications you are currently taking.  If none reported, the list may be blank. If incorrect, please contact your healthcare provider. Carry this list with you in case of emergency.           Current Medications     ASCORBATE CALCIUM (VITAMIN C ORAL) Take 1 tablet by mouth once daily.     aspirin (ECOTRIN) 81 MG EC tablet Take 1 tablet (81 mg  "total) by mouth once daily.    atorvastatin (LIPITOR) 20 MG tablet Take 1 tablet (20 mg total) by mouth nightly.    calcium 500 mg Tab Take 1 tablet by mouth Daily.    clotrimazole-betamethasone (LOTRISONE) lotion Apply topically 2 (two) times daily.    cyanocobalamin (VITAMIN B-12) 1000 MCG tablet Take 100 mcg by mouth once daily.    ferrous sulfate 325 mg (65 mg iron) Tab tablet Take 325 mg by mouth once daily.    gabapentin (NEURONTIN) 100 MG capsule Take 100 mg by mouth 2 (two) times daily.    gabapentin (NEURONTIN) 300 MG capsule Take 1 capsule (300 mg total) by mouth every evening.    levothyroxine (SYNTHROID) 100 MCG tablet Take 1 tablet (100 mcg total) by mouth once daily.    metoprolol succinate (TOPROL-XL) 25 MG 24 hr tablet Take 1 tablet (25 mg total) by mouth once daily.    MULTIVITS-MINERALS/FA/LYCOPENE (ONE-A-DAY MEN'S MULTIVITAMIN ORAL) Take 1 tablet by mouth once daily.    oxycodone-acetaminophen (PERCOCET) 7.5-325 mg per tablet Starting on May 13, 2017. Take 1 tablet by mouth every 8 (eight) hours as needed for Pain.    senna-docusate 8.6-50 mg (PERICOLACE) 8.6-50 mg per tablet Take 1 tablet by mouth 2 (two) times daily.    tamsulosin (FLOMAX) 0.4 mg Cp24 Take 1 capsule (0.4 mg total) by mouth once daily.           Clinical Reference Information           Your Vitals Were     BP Pulse Height Weight SpO2 BMI    126/74 (BP Location: Left arm, Patient Position: Sitting, BP Method: Manual) 64 5' 8" (1.727 m) 78 kg (171 lb 15.3 oz) 97% 26.15 kg/m2      Blood Pressure          Most Recent Value    BP  126/74      Allergies as of 5/11/2017     Penicillins      Immunizations Administered on Date of Encounter - 5/11/2017     None      Orders Placed During Today's Visit      Normal Orders This Visit    Ambulatory referral to Neurology     Future Labs/Procedures Expected by Expires    CBC auto differential  5/11/2017 5/11/2018    Comprehensive metabolic panel  5/11/2017 8/9/2017    CT Head W Wo Contrast  " 5/11/2017 5/11/2018    Folate  5/11/2017 8/9/2017    RPR  5/11/2017 8/9/2017    TSH  5/11/2017 8/9/2017    Urinalysis  5/11/2017 8/9/2017    Vitamin B12  5/11/2017 8/9/2017      Language Assistance Services     ATTENTION: Language assistance services are available, free of charge. Please call [phone].      ATENCIÓN: Si habla español, tiene a medrano disposición servicios gratuitos de asistencia lingüística. Llame al [phone].     CHÚ Ý: N?u b?n nói Ti?ng Vi?t, có các d?ch v? h? tr? ngôn ng? mi?n phí dành cho b?n. G?i s? [phone].         [facility] complies with applicable Federal civil rights laws and does not discriminate on the basis of race, color, national origin, age, disability, or sex.

## 2017-05-12 ENCOUNTER — LAB VISIT (OUTPATIENT)
Dept: LAB | Facility: HOSPITAL | Age: 82
End: 2017-05-12
Attending: FAMILY MEDICINE
Payer: MEDICARE

## 2017-05-12 DIAGNOSIS — R41.3 MEMORY LOSS: ICD-10-CM

## 2017-05-12 DIAGNOSIS — I10 ESSENTIAL HYPERTENSION: ICD-10-CM

## 2017-05-12 DIAGNOSIS — D53.9 NUTRITIONAL ANEMIA: ICD-10-CM

## 2017-05-12 DIAGNOSIS — R41.89 COGNITIVE DEFICITS: ICD-10-CM

## 2017-05-12 LAB
ALBUMIN SERPL BCP-MCNC: 3.7 G/DL
ALP SERPL-CCNC: 92 U/L
ALT SERPL W/O P-5'-P-CCNC: 21 U/L
ANION GAP SERPL CALC-SCNC: 8 MMOL/L
AST SERPL-CCNC: 34 U/L
BASOPHILS # BLD AUTO: 0.02 K/UL
BASOPHILS NFR BLD: 0.3 %
BILIRUB SERPL-MCNC: 0.7 MG/DL
BUN SERPL-MCNC: 37 MG/DL
CALCIUM SERPL-MCNC: 9.7 MG/DL
CHLORIDE SERPL-SCNC: 101 MMOL/L
CO2 SERPL-SCNC: 28 MMOL/L
CREAT SERPL-MCNC: 1.4 MG/DL
DIFFERENTIAL METHOD: ABNORMAL
EOSINOPHIL # BLD AUTO: 0.2 K/UL
EOSINOPHIL NFR BLD: 2.4 %
ERYTHROCYTE [DISTWIDTH] IN BLOOD BY AUTOMATED COUNT: 14.1 %
EST. GFR  (AFRICAN AMERICAN): 51.1 ML/MIN/1.73 M^2
EST. GFR  (NON AFRICAN AMERICAN): 44.2 ML/MIN/1.73 M^2
FOLATE SERPL-MCNC: 17.7 NG/ML
GLUCOSE SERPL-MCNC: 113 MG/DL
HCT VFR BLD AUTO: 30.3 %
HGB BLD-MCNC: 10.5 G/DL
LYMPHOCYTES # BLD AUTO: 4.3 K/UL
LYMPHOCYTES NFR BLD: 53.7 %
MCH RBC QN AUTO: 30.2 PG
MCHC RBC AUTO-ENTMCNC: 34.7 %
MCV RBC AUTO: 87 FL
MONOCYTES # BLD AUTO: 0.7 K/UL
MONOCYTES NFR BLD: 8.8 %
NEUTROPHILS # BLD AUTO: 2.8 K/UL
NEUTROPHILS NFR BLD: 34.7 %
PLATELET # BLD AUTO: 110 K/UL
PMV BLD AUTO: 10.1 FL
POTASSIUM SERPL-SCNC: 4.5 MMOL/L
PROT SERPL-MCNC: 6.9 G/DL
RBC # BLD AUTO: 3.48 M/UL
SODIUM SERPL-SCNC: 137 MMOL/L
TSH SERPL DL<=0.005 MIU/L-ACNC: 1.22 UIU/ML
VIT B12 SERPL-MCNC: 1340 PG/ML
WBC # BLD AUTO: 7.95 K/UL

## 2017-05-12 PROCEDURE — 82607 VITAMIN B-12: CPT

## 2017-05-12 PROCEDURE — 86592 SYPHILIS TEST NON-TREP QUAL: CPT

## 2017-05-12 PROCEDURE — 82746 ASSAY OF FOLIC ACID SERUM: CPT

## 2017-05-12 PROCEDURE — 85025 COMPLETE CBC W/AUTO DIFF WBC: CPT

## 2017-05-12 PROCEDURE — 36415 COLL VENOUS BLD VENIPUNCTURE: CPT | Mod: PO

## 2017-05-12 PROCEDURE — 84443 ASSAY THYROID STIM HORMONE: CPT

## 2017-05-12 PROCEDURE — 80053 COMPREHEN METABOLIC PANEL: CPT

## 2017-05-12 NOTE — PROGRESS NOTES
Subjective:       Patient ID: Zeyad Woodard is a 89 y.o. male.    Chief Complaint: Memory Loss; Back Pain; and Hip Pain    HPI Comments: 89 years old male who came to the clinic with worsening of his memory for several weeks.  No focal weakness fever or chills.  Patient with anemia but stable in comparison with previous reports.  Patient last MRI 3 years ago with evidence of brain cyst  and white matter changes.    Back Pain   Pertinent negatives include no fever.   Hip Pain        Review of Systems   Constitutional: Negative.  Negative for chills and fever.   HENT: Negative.    Eyes: Negative.    Respiratory: Negative.    Cardiovascular: Negative.    Gastrointestinal: Negative.    Genitourinary: Negative.    Musculoskeletal: Positive for back pain and gait problem.   Skin: Negative.    Psychiatric/Behavioral: Positive for confusion and decreased concentration. The patient is nervous/anxious.        Objective:      Physical Exam   Constitutional: He is oriented to person, place, and time. He has a sickly appearance. No distress.   HENT:   Head: Normocephalic and atraumatic.   Right Ear: External ear normal.   Left Ear: External ear normal.   Nose: Nose normal.   Mouth/Throat: Oropharynx is clear and moist. No oropharyngeal exudate.   Eyes: Conjunctivae and EOM are normal. Pupils are equal, round, and reactive to light. Right eye exhibits no discharge. Left eye exhibits no discharge. No scleral icterus.   Neck: Normal range of motion. Neck supple. No JVD present. No tracheal deviation present. No thyromegaly present.   Cardiovascular: Normal rate, regular rhythm, normal heart sounds and intact distal pulses.  Exam reveals no gallop and no friction rub.    No murmur heard.  Pulmonary/Chest: Effort normal and breath sounds normal. No stridor. No respiratory distress. He has no wheezes. He has no rales. He exhibits no tenderness.   Abdominal: Soft. Bowel sounds are normal. He exhibits no distension and no mass.  There is no tenderness. There is no rebound and no guarding.   Musculoskeletal: Normal range of motion. He exhibits no edema or tenderness.   Lymphadenopathy:     He has no cervical adenopathy.   Neurological: He is alert and oriented to person, place, and time. He has normal reflexes. He displays normal reflexes. No cranial nerve deficit. He exhibits normal muscle tone. Coordination and gait abnormal.   Skin: Skin is warm and dry. No rash noted. He is not diaphoretic. No erythema. No pallor.   Psychiatric: His behavior is normal. Judgment and thought content normal. His mood appears not anxious. His affect is not angry, not blunt, not labile and not inappropriate. Cognition and memory are impaired. He exhibits a depressed mood. He exhibits abnormal recent memory. He exhibits normal remote memory.   Nursing note and vitals reviewed.      Assessment:       1. Cognitive deficits    2. Essential hypertension    3. Memory loss    4. Arachnoid cyst    5. White matter changes    6. Pacemaker    7. Nutritional anemia        Plan:         Zeyad was seen today for memory loss, back pain and hip pain.    Diagnoses and all orders for this visit:    Cognitive deficits  -     CT Head W Wo Contrast; Future  -     Comprehensive metabolic panel; Future  -     TSH; Future  -     CBC auto differential; Future  -     Urinalysis; Future  -     RPR; Future  -     Ambulatory referral to Neurology    Essential hypertension  -     Comprehensive metabolic panel; Future  -     TSH; Future  -     CBC auto differential; Future  -     Urinalysis; Future    Memory loss  -     CT Head W Wo Contrast; Future  -     RPR; Future  -     Ambulatory referral to Neurology    Arachnoid cyst  -     CT Head W Wo Contrast; Future  -     Ambulatory referral to Neurology    White matter changes  -     CT Head W Wo Contrast; Future  -     Ambulatory referral to Neurology    Pacemaker  -     CT Head W Wo Contrast; Future    Nutritional anemia  -     CBC auto  differential; Future  -     Vitamin B12; Future  -     Folate; Future

## 2017-05-13 LAB — RPR SER QL: NORMAL

## 2017-05-23 ENCOUNTER — OUTPATIENT CASE MANAGEMENT (OUTPATIENT)
Dept: ADMINISTRATIVE | Facility: OTHER | Age: 82
End: 2017-05-23

## 2017-05-23 NOTE — PROGRESS NOTES
Attempted to update plan of care for OPCM; left voice message to return call.  BEBA Hernandez, OPCM-RN

## 2017-05-30 ENCOUNTER — OUTPATIENT CASE MANAGEMENT (OUTPATIENT)
Dept: ADMINISTRATIVE | Facility: OTHER | Age: 82
End: 2017-05-30

## 2017-05-30 NOTE — PROGRESS NOTES
Talked to daughter Ghazala to update plan of care.  Ghazala reports that patient has not had any falls, SOB or cough since last conversation.  Reminded Ghazala of upcoming appointments for her father:  6/1 at 0800 CT of Head and on 6/5 at 220p with Dr. Juárez (neuro) and verbalized understanding.  Ghazala stated that patient remains on a low sodium diet.  Ghazala stated that patient continues to weight daily and today weight was 165.0 lbs.  Encouraged Ghazala to call this RN if having any questions/concerns.  Will continue to follow.  BEBA Hernandez OPCM-RN

## 2017-06-01 ENCOUNTER — HOSPITAL ENCOUNTER (OUTPATIENT)
Dept: RADIOLOGY | Facility: HOSPITAL | Age: 82
Discharge: HOME | End: 2017-06-01
Attending: FAMILY MEDICINE
Payer: MEDICARE

## 2017-06-01 DIAGNOSIS — R41.3 MEMORY LOSS: ICD-10-CM

## 2017-06-01 DIAGNOSIS — G93.0 ARACHNOID CYST: ICD-10-CM

## 2017-06-01 DIAGNOSIS — R41.89 COGNITIVE DEFICITS: ICD-10-CM

## 2017-06-01 DIAGNOSIS — Z95.0 PACEMAKER: ICD-10-CM

## 2017-06-01 PROCEDURE — 25500020 PHARM REV CODE 255: Performed by: FAMILY MEDICINE

## 2017-06-01 PROCEDURE — 70470 CT HEAD/BRAIN W/O & W/DYE: CPT | Mod: 26,,, | Performed by: RADIOLOGY

## 2017-06-01 PROCEDURE — 70470 CT HEAD/BRAIN W/O & W/DYE: CPT | Mod: TC

## 2017-06-01 RX ADMIN — IOHEXOL 75 ML: 350 INJECTION, SOLUTION INTRAVENOUS at 08:06

## 2017-06-05 ENCOUNTER — OFFICE VISIT (OUTPATIENT)
Dept: NEUROLOGY | Facility: CLINIC | Age: 82
End: 2017-06-05
Payer: MEDICARE

## 2017-06-05 VITALS
HEIGHT: 68 IN | DIASTOLIC BLOOD PRESSURE: 58 MMHG | WEIGHT: 171.94 LBS | HEART RATE: 61 BPM | SYSTOLIC BLOOD PRESSURE: 148 MMHG | BODY MASS INDEX: 26.06 KG/M2

## 2017-06-05 DIAGNOSIS — F03.90 DEMENTIA WITHOUT BEHAVIORAL DISTURBANCE, UNSPECIFIED DEMENTIA TYPE: ICD-10-CM

## 2017-06-05 PROCEDURE — 1159F MED LIST DOCD IN RCRD: CPT | Mod: S$GLB,,, | Performed by: PSYCHIATRY & NEUROLOGY

## 2017-06-05 PROCEDURE — 1125F AMNT PAIN NOTED PAIN PRSNT: CPT | Mod: S$GLB,,, | Performed by: PSYCHIATRY & NEUROLOGY

## 2017-06-05 PROCEDURE — 99214 OFFICE O/P EST MOD 30 MIN: CPT | Mod: S$GLB,,, | Performed by: PSYCHIATRY & NEUROLOGY

## 2017-06-05 PROCEDURE — 99499 UNLISTED E&M SERVICE: CPT | Mod: S$PBB,,, | Performed by: PSYCHIATRY & NEUROLOGY

## 2017-06-05 PROCEDURE — 99999 PR PBB SHADOW E&M-EST. PATIENT-LVL III: CPT | Mod: PBBFAC,,, | Performed by: PSYCHIATRY & NEUROLOGY

## 2017-06-05 RX ORDER — DONEPEZIL HYDROCHLORIDE 5 MG/1
5 TABLET, FILM COATED ORAL NIGHTLY
Qty: 30 TABLET | Refills: 11 | Status: SHIPPED | OUTPATIENT
Start: 2017-06-05 | End: 2017-06-05 | Stop reason: SDUPTHER

## 2017-06-05 RX ORDER — DONEPEZIL HYDROCHLORIDE 5 MG/1
5 TABLET, FILM COATED ORAL NIGHTLY
Qty: 30 TABLET | Refills: 11 | Status: SHIPPED | OUTPATIENT
Start: 2017-06-05 | End: 2017-11-02 | Stop reason: SDUPTHER

## 2017-06-05 RX ORDER — FUROSEMIDE 20 MG/1
20 TABLET ORAL 2 TIMES DAILY
Status: ON HOLD | COMMUNITY
End: 2017-12-30

## 2017-06-05 NOTE — ASSESSMENT & PLAN NOTE
-- MRI brain personally reviewed, gross diffuse atrophy and chronic ischemic change with stable arachnoid cyst  -- B12, TSH, RPR reviewed and WNL  -- referral to case management  -- start aricept 5 mg nightly

## 2017-06-05 NOTE — PROGRESS NOTES
[facility] NEUROLOGY  [facility] [place]    Date: June 5, 2017   Patient Name: Zeyad Woodard   MRN: [id number]   PCP: Booker Ricci  Referring Provider: Booker Ricci.*    Assessment:   Zeyad Woodard is a 89 y.o. male presenting with a likely multifactorial dementia.  Have personally reviewed dementia screening labs as listed below as well as patient's MRI brain.  We'll initiate the patient on Aricept 5 mg nightly and provide a referral to case management for ongoing assistance of the patient's management at home.  MOCA screening was completed today with the patient scoring 19/15.     Plan:     Problem List Items Addressed This Visit        Neuro    Dementia without behavioral disturbance    Current Assessment & Plan     -- MRI brain personally reviewed, gross diffuse atrophy and chronic ischemic change with stable arachnoid cyst  -- B12, TSH, RPR reviewed and WNL  -- referral to case management  -- start aricept 5 mg nightly             Other Visit Diagnoses    None.         Chele Juárez MD  [facility]   Department of Neurology    Patient note was created using Dragon Dictation.  Any errors in syntax or even information may not have been identified and edited on initial review prior to signing this note.  Subjective:   Patient seen in consultation at the request of Dr. Ricci for the evaluation of memory changes. A copy of this note will be sent to the referring physician.      HPI:   Mr. Zeyad Woodard is a 89 y.o. male who presents with a chief complaint of memory changes. The patient presents with his daughters who contribute to the history. They report that their father has begun to struggle with his short term memory in recent years. He typically remembers remote events without difficulty though struggles to remember new information even minutes after he learns it. Recently, he has begun asking questions regarding the [employer] union, which he has  "not worked for in decades. He is fixated on the mail and is "easy prey" for [employer] mail, often sending large sums of money to charities (many of which are questionable) and supplement orders. He lives a few doors down from his daughter who preps his meals, sets up his pill box, and manages his finances.  He states that he sleeps well and denies any hallucinations or behavioral outburst.  His daughters do admit that they do not believe that he could live independently without the.  She currently does not receive any services and home however as one of his daughters lives in [place] and is not involved in his day-to-day care, his other daughter admits that is increasing care burden is becoming difficult for her to manage.  She states that for example, he often believes unsupervised and she is worried about him falling.  She puts his medication out for him but he does not always take it reliably.  She states that he does not usually cold as she leaves meals for him he can easily heat in a microwave or toaster oven.    PAST MEDICAL HISTORY:  Past Medical History:   Diagnosis Date    Anemia     Anticoagulant long-term use     Atrial fibrillation     BPH (benign prostatic hypertrophy)     Cancer     CHF (congestive heart failure)     Coronary artery disease     Encounter for blood transfusion     GI bleed     cecum angiectasia s/p cautery    Hypertension     Hypothyroidism     Polyneuropathy     Posture imbalance     due to neuropathy    Right posterior capsular opacification 1/18/2017    SSS (sick sinus syndrome) 2015    s/p pacemaker       PAST SURGICAL HISTORY:  Past Surgical History:   Procedure Laterality Date    CARDIAC SURGERY      CATARACT EXTRACTION W/  INTRAOCULAR LENS IMPLANT Right 05/17/2010        CATARACT EXTRACTION W/  INTRAOCULAR LENS IMPLANT Left 02/16/2004        cataract surgery      COLONOSCOPY  6/30/04    COLONOSCOPY N/A 2/15/2016    Procedure: COLONOSCOPY;  Surgeon: " Stanton Kirk MD;  Location: [facility] ([address] [place] [address]);  Service: Endoscopy;  Laterality: N/A;    EYE SURGERY      HERNIA REPAIR      inguinal hernia repair      Open heart surgery for pericardiectomy      for calcific constrictive pericarditis    UMBILICAL HERNIA REPAIR      Yag      Left Eye       CURRENT MEDS:  Current Outpatient Prescriptions   Medication Sig Dispense Refill    ASCORBATE CALCIUM (VITAMIN C ORAL) Take 1 tablet by mouth once daily.       atorvastatin (LIPITOR) 20 MG tablet Take 1 tablet (20 mg total) by mouth nightly. 90 tablet 3    calcium 500 mg Tab Take 1 tablet by mouth Daily.      clotrimazole-betamethasone (LOTRISONE) lotion Apply topically 2 (two) times daily. (Patient taking differently: Apply topically as needed. ) 30 mL 0    cyanocobalamin (VITAMIN B-12) 1000 MCG tablet Take 100 mcg by mouth once daily.      ferrous sulfate 325 mg (65 mg iron) Tab tablet Take 325 mg by mouth once daily.      furosemide (LASIX) 20 MG tablet Take 20 mg by mouth 2 (two) times daily.      gabapentin (NEURONTIN) 100 MG capsule Take 100 mg by mouth 2 (two) times daily.      gabapentin (NEURONTIN) 300 MG capsule Take 1 capsule (300 mg total) by mouth every evening. 90 capsule 3    levothyroxine (SYNTHROID) 100 MCG tablet Take 1 tablet (100 mcg total) by mouth once daily. 90 tablet 3    metoprolol succinate (TOPROL-XL) 25 MG 24 hr tablet Take 1 tablet (25 mg total) by mouth once daily. 90 tablet 3    MULTIVITS-MINERALS/FA/LYCOPENE (ONE-A-DAY MEN'S MULTIVITAMIN ORAL) Take 1 tablet by mouth once daily.      oxycodone-acetaminophen (PERCOCET) 7.5-325 mg per tablet Take 1 tablet by mouth every 8 (eight) hours as needed for Pain. 90 tablet 0    senna-docusate 8.6-50 mg (PERICOLACE) 8.6-50 mg per tablet Take 1 tablet by mouth 2 (two) times daily.      tamsulosin (FLOMAX) 0.4 mg Cp24 Take 1 capsule (0.4 mg total) by mouth once daily. 90 capsule 3    aspirin (ECOTRIN) 81 MG EC tablet Take 1 tablet  "(81 mg total) by mouth once daily.  0     No current facility-administered medications for this visit.        ALLERGIES:  Review of patient's allergies indicates:   Allergen Reactions    Penicillins Hives and Other (See Comments)     Fever       FAMILY HISTORY:  Family History   Problem Relation Age of Onset    Stroke Mother          Cancer Father      lung;     Diabetes Sister     Coronary artery disease Sister     Peripheral vascular disease Sister     Amblyopia Neg Hx     Blindness Neg Hx     Cataracts Neg Hx     Glaucoma Neg Hx     Hypertension Neg Hx     Macular degeneration Neg Hx     Retinal detachment Neg Hx     Strabismus Neg Hx     Thyroid disease Neg Hx      SOCIAL HISTORY:  Social History   Substance Use Topics    Smoking status: Passive Smoke Exposure - Never Smoker    Smokeless tobacco: Never Used    Alcohol use No     Review of Systems:  12 review of systems is negative except for the symptoms mentioned in HPI.      Objective:     Vitals:    17 1431   BP: (!) 148/58   Pulse: 61   Weight: 78 kg (171 lb 15.3 oz)   Height: 5' 8" (1.727 m)     General: NAD, well nourished   Eyes: no tearing, discharge, no erythema   ENT: moist mucous membranes of the oral cavity, nares patent    Neck: Supple, full range of motion  Cardiovascular: Warm and well perfused, pulses equal and symmetrical  Lungs: Normal work of breathing, normal chest wall excursions  Skin: No rash, lesions, or breakdown on exposed skin  Psychiatry: Mood and affect are appropriate   Abdomen: soft, non tender, non distended  Extremeties: No cyanosis, clubbing or edema.    Neurological   MENTAL STATUS: Alert and oriented to person, place, and time. Attention and concentration within normal limits. Speech without dysarthria, able to name and repeat without difficulty. Recent and remote memory fair  CRANIAL NERVES: Visual fields intact. PERRL. EOMI. Facial sensation intact. Face symmetrical. Hearing grossly " intact. Full shoulder shrug bilaterally. Tongue protrudes midline   SENSORY: Sensation is intact to light touch throughout  MOTOR: Normal bulk and tone.  5/5 deltoid, biceps, triceps, interosseous, hand  bilaterally. 4/5 iliopsoas, knee extension/flexion, foot dorsi/plantarflexion bilaterally.    REFLEXES: Symmetric and 1+ throughout.  CEREBELLAR/COORDINATION/GAIT: Patient wheelchair bound at baseline.     MOCA Results 6/5/17:   Visuospatial/Executive: 4/5  Naming: 3/3  Attention: 3/6  Language: 2/3  Abstraction: 2/2  Delayed Recall: 1/5  Orientation: 4/6  Total:  19/30

## 2017-06-05 NOTE — LETTER
June 6, 2017      Booker Ricci MD  [address]  [place] [address]           [facility] Neurology  [address]  [place] [address]  Phone: [phone]  Fax: [phone]          Patient: Zeyad Woodard   MR Number: [id number]   YOB: 1927   Date of Visit: 6/5/2017       Dear Dr. Booker Ricci:    Thank you for referring Zeyad Woodard to me for evaluation. Attached you will find relevant portions of my assessment and plan of care.    If you have questions, please do not hesitate to call me. I look forward to following Zeyad Woodard along with you.    Sincerely,    Chele Juárez MD    Enclosure  CC:  No Recipients    If you would like to receive this communication electronically, please contact [email or web address] or [phone] to request more information on [facility] Link access.    For providers and/or their staff who would like to refer a patient to [facility], please contact us through our one-stop-shop provider referral line, [facility], at [phone].    If you feel you have received this communication in error or would no longer like to receive these types of communications, please e-mail [email or web address]

## 2017-06-06 ENCOUNTER — OUTPATIENT CASE MANAGEMENT (OUTPATIENT)
Dept: ADMINISTRATIVE | Facility: OTHER | Age: 82
End: 2017-06-06

## 2017-06-06 NOTE — PATIENT INSTRUCTIONS
For Caregivers: Future Planning for People with Dementia  The time will come when your loved one can no longer make sound decisions. So its best to plan now for the future. Talk with your loved one about legal and financial matters. You should also discuss the types of care he or she wants. Settling these issues now can help reassure both of you.    Discuss legal and financial issues  Legal and financial planning is always a good idea. But its even more vital when a loved one has dementia. How will finances be handled? Who will pay bills? Talking about these matters can be both emotional and complex. So you may wish to seek advice from professionals. These include financial planners, insurance agents, estate-planning attorneys, and social workers.  Durable power of   This document transfers financial and legal power from your loved one to you or to some other person who can make decisions in your loved ones best interest.  Advance directives and living stern  These documents spell out the kinds of medical treatment your loved one wants -- or doesnt want -- in the future. Keep them with your loved ones medical records.  Long-term care expenses  Medicare, Medicaid, and private insurers all limit the types of care they will cover. Talk with a  about how long-term care expenses can be handled.  Joint accounts  Talk with your loved one about becoming a cosigner on his or her financial accounts. This helps make sure bills are paid and allows you to keep track of spending.  Prepare for role changes  As your loved ones needs change, so will your role as caregiver. At first, you may only need to help with minor tasks. Later, your loved one may require constant supervision. Planning for these changes now can make it easier to cope. Start by talking with family and friends. What tasks can they help with? Who will care for your loved one if you become ill? You should also learn about options for  professional care. That way, its easier to move to the next step when the current situation stops working.  Consider caregiving options  Talk with a , doctor, or local support agency about options for care. These may include one or more of the following:  · Adult  provides supervised care during the day.  · Home health aides can be hired part-time, or as live-in caregivers.  · Assisted living facilities can provide a home and support for people who need moderate amounts of help.  · Nursing homes provide constant care.  Date Last Reviewed: 7/1/2016  © 2772-5324 [employer]. [address] [place] [address]. All rights reserved. This information is not intended as a substitute for professional medical care. Always follow your healthcare professional's instructions.

## 2017-06-06 NOTE — PROGRESS NOTES
"Received re-referral from Dr. Juárez to OPCM/High Risk on 6/5/2017.  Talked to daughter Asia to update plan of care for OPCM.  Asia stated that patient did fall again on 6/5/2017 without injury in bathroom. Asia reported that patient fell on his buttocks when he fell.   According to Asia, patient did attend appointment with Dr. Juárez on 6/5/2017.  Asia stated that tonight patient will start taking Aricept 5mg po q evening.  PHQ-2 performed and score of 0 obtained.  Medication reconciliation performed with Asia and is taking all medications as prescribed.  Reminded Asia of upcoming appointments:  6/13 at 1020 with Dr. Rosenberg and on 6/16 at 1015 with Dr. Emmanuel and verbalized understanding.  Asia stated that patient "has been very tired."  Asia is requesting for patient to have [facility] to visit patient (RN) and for a PT evaluation to increase patient's stamina.  I will in-basket Dr. Juárez about patient's need for Home Health.  Asia stated that patient is currently on the waiting list for [facility] for patient to get community resources that would be available.  Daily weights are performed and Asia stated that within the past week patient's weight ranges between 169-171lbs.  Asia stated that patient does get SOB at times and has no cough.  Encouraged Asia/patient to call this RN if having any questions/concerns.  Will re-admit to OPCM.  Will continue to follow.  BEBA Hernandez, OPCM-RN  "

## 2017-06-06 NOTE — PROGRESS NOTES
For your Information:    Please note the following patient has been assigned to Anabell Hernandez RN with Outpatient Complex Care Mgmt for screening.    Reason:  High Risk    Dementia without behavioral disturbance, unspecified dementia type [F03.90]    Please contact [facility] at ext [phone] with questions.    Thank you    Dasia Lincoln, SSC

## 2017-06-07 ENCOUNTER — OUTPATIENT CASE MANAGEMENT (OUTPATIENT)
Dept: ADMINISTRATIVE | Facility: OTHER | Age: 82
End: 2017-06-07

## 2017-06-07 NOTE — LETTER
June 7, 2017    Zeyad Woodard  [address] [facility] [address]             Outpatient Case Management  [address]  [facility] [address] Dear Zeyad Woodard:    We understand that receiving many services from different doctors and healthcare providers is overwhelming. There are appointments to make, transportation to arrange, dietary instructions to understand, and new medications to obtain.    This is where [facility] can help.     You are eligible to receive Outpatient Case Management services when you have healthcare needs that require the coordination of many providers, treatments, and services. You also qualify if you need assistance with a new treatment plan.     There is no charge for this support. You may have been referred to this program from your doctor(s), hospital staff member(s), or insurance company but you always have a choice to participate or not participate. To participate, you must give us your permission to be enrolled.     When you are enrolled in the [facility] program, the  who is assigned to you is                                                   Anabell Hernandez RN                                                    [phone]      Depending on your needs and wishes, your  may speak with you by phone, visit you at your place of living (for example your home, skilled nursing facility, or rehabilitation facility), or meet you at your doctors office.     Your  will tell you why you have been selected to participate in the program and will complete an assessment of your needs. Then a personalized plan of care will be developed with you and or your caregiver.             Here are examples of the services your [facility]  provides.     Coordinate communication among multiple providers.   Arrange for transportation, doctors visits, durable medical equipment, home care  services, and special clinics.    Provide coaching on how to manage your health condition.    Answer questions about your health condition.   Help you understand your doctors treatment plan.    Provide additional instruction about your health condition, treatments, and medications.    Help you obtain information about your insurance coverage.    Advocate for your individual needs.    Request a Licensed Clinical  (LCSW) to visit you if you need their services. LCSWs help with long term planning (discussing placement options, advanced planning directives), financial planning, and assistance (for example rent, utilities, medication funding).     Your  will coordinate their activities with other outpatient services you are receiving. All services provided by [facility]  are coordinated with and communicated to your primary care physician.    Our goal is to help you manage your health condition(s) safely within your living environment, whether that is your home or a medical facility. We want to help you function at the healthiest and highest level possible.     Sincerely,      Mehdi Barrios MD  Medical Director    Enclosures:    Frequently Asked Questions  Patient Rights and Responsibilities   Reporting a Grievance or Complaint  Consent/Release of Information  Stamped Addressed Envelope                  Frequently Asked Questions about [facility]    What is [facility]?  Outpatient Case management is not Home healthcare services. [facility]  do not provide hands-on care. [facility]  will work with your doctor to arrange for home health services, if needed. Home health services have a limited duration and there are some restrictions as to who can get these services. There is no prescribed limit to the amount of time you receive [facility] services. [facility]  are not agents of your insurance company. However, [facility]  can help you obtain information from your insurance company.     Who are the [facility] ?  [facility]  are Registered Nurses and Social Workers. It is important to remember that you and your  are a team that works together with your primary care physician to create your individualized plan of care. The ultimate goal of your care plan is to help you implement your doctors treatment plan and to help you function at the highest level of health possible.     What are my rights as a patient?  It is important for you to know and understand your rights and responsibilities while receiving services from the [facility] program. We have enclosed a complete description of your rights and responsibilities. You can help to make your care more effective when you understand your right and responsibilities.     What is needed to be enrolled in the program?  You are only enrolled in the [facility] Program when you give us your consent to participate. You will find enclosed a consent form. You are receiving this letter because you or your caregivers have given us a verbal consent to enroll you in [facility] Program. We ask that you sign and return the enclosed written consent in the stamped self-addressed envelope.                           Patient Rights and Responsibilities    We consider you a partner in your care. When you are well informed, participate in treatment decisions and communicate openly with your doctor and other healthcare professionals, you help make your care more effective.     While you are in the Outpatient Case Management Program, your rights include the following:     You have a right to be provided services in a non-discriminatory manner in accordance with the provisions of  Title VI of the Civil Rights Act of 1964, Section 504 of the Rehabilitation Act of 1973, the Age Discrimination Act of 1975, the Americans with Disabilities Act as well as any other applicable Federal and State laws and regulations.     You have the right to a reasonable, timely response to your request or need for care, as well as the right to considerate and respectful care including an environment that preserves dignity and contributes to a positive self-image. You are responsible for being considerate and respectful of our staff.     You have a right to information regarding patient rights, advocacy services and complaint mechanisms, and the right to prompt resolution of any complaint. You or a designee has the right to participate in the resolution of ethical issues surrounding your care. You have a right to file a complaint if you feel that your rights have been infringed, without fear or penalty from [facility] or the federal government. You may file a complaint with the Director of Outpatient Case Management by calling [phone]. At any time, you may lodge a grievance with the [facility] and [facility]als in [place] by calling [phone], or the Joint Commission on Accreditation of Healthcare Organizations at ([phone].     You, or someone acting on your behalf, have the right to understandable information on your health status, treatment and progress in order to make decisions. You have the right to know the nature, risks and alternatives to treatment. You have the right to be informed, when appropriate, regarding the outcome of the care that has been provided. You have the right to refuse treatment to the extent permitted by law, and the right to be informed of the alternatives and consequences of refusing treatment.     You, in collaboration with your physician, have the right to make decisions regarding care and the right to participate in the development and implementation of the plan of  care and effective pain management. You have the right to know the name and professional status of those responsible for the delivery of your care and treatment.       You have a right, within legal guidelines, to have a guardian, next-of-kin or legal designee exercises your patient rights when you are unable to do so. You have the right for your wishes regarding end-of-life decisions to be addressed by the healthcare team through advance directives. You have the right to personal privacy and confidentiality and to expect confidentiality of all records and communications pertaining to your care.      You have the right to receive communications about your health information confidentially. You have the right to request restrictions on the uses and disclosures of your health information. You have the right to inspect, copy, request amendments and receive an accounting of to whom we have disclosed your health information.     You have the right to be provided with interpretation services if you do not speak English; to alternative communication techniques if you are hearing or vision impaired; and to have any other resources needed on your behalf to ensure effective communication. These services are provided free of charge.     You have a right to personal safety (free from mental, physical, sexual and verbal abuse, neglect and exploitation). You have the right to access protective and advocacy services.     Advance Directives  A Patient Advocate is available to meet with patients to answer questions regarding advance directives.    Living Will  A document that outlines what medical treatment the patient does or does not want in the event the patient becomes unable to make those decisions at the appropriate time.    Durable Medical Power of   A document by which the patient designates an individual to be responsible for making medical decisions in the event the patient becomes unable to do so.    HIPAA  Notice of Privacy Practices  Your medical information is governed by federal privacy laws. HIPAA protects private medical information and how that information is disclosed. If you have a question regarding the HIPAA Notice of Privacy Practices, or if you believe your privacy rights have been violated, you may call our designated hotline at [phone].            Quality Improvement  Because we consistently strive to improve the care and service provided to our patients, we welcome your feedback. Your comments are an important part of our quality improvement process, as we like to know what we are doing right and which areas are in need of improvement. Our policy is to listen, be responsive and provide you with an appropriate and timely follow-up to your questions or concerns. Our goal is active patient and family involvement in all aspects of the care process.                                                                                  Reporting a Grievance or Complaint    During your time with the [facility] team you may have a grievance or complaint with our services. Your Patients Bill of Rights gives patients, families, and caregivers the right to express concerns and grievances and the right to expect a reasonable and timely response.     Your presentation of your concerns is not viewed negatively. It is an opportunity for us to improve the quality of our care and services we provide to you.     You may report your concerns directly to your , or you can phone in a complaint to:     Director of Outpatient Case Management  [phone]    You may also send a complaint letter to:    Director of Outpatient Case Management Services  [address] [place] [address]    Tell us the details of your complaint and provide us with a contact phone number so we can contact you to obtain additional information. We will return a call to you within two business days of  our receipt of your complaint, and to request additional information as needed. If you choose to mail a letter, your complaint may take a few days longer to reach us.     All grievances will be addressed as quickly as possible. A grievance or complaint that involves situations or practices which place patients in immediate danger will be addressed as an urgent matter. We will work to resolve all other complaints within seven days of receipt. By that time, you will receive a phone call with either the resolution of your complaint, or a plan for corrective action. A formal written response will be sent to you within 30 days of receipt of your grievance.     If a resolution cannot be completed within 30 days, a letter will be sent to you or your family member with an estimated time for the final response.    Additionally, all patients have the right to file complaints with external agencies, without exception. Complaints/grievances can be addressed to the following agencies:            Patient Safety or Quality of Care Concerns  Office of Quality Monitoring   [place] [address]  [phone] Toll Free    HIPPA Privacy/Security Concerns  Office for Civil Rights Region IV  [place] Department of Health & Human Services  [address], [address]  [place] [address] [phone] Phone  [phone] TDD  [phone] Toll Free    Medicare/Medicaid Billing Concerns  Center for Medicare & Medicaid Services  Region [address]  [address] [place] [address], [address]  [place] [address] [phone] Phone  [phone] Toll Free    General Concerns  [place] [facility] ([facility])  [phone] Toll Free Complaint Hotline                                                                Consent Form/Release of Information    By signing--     (1) I agree I have read the Outpatient Case Management information provided to me;     (2) I agree to voluntarily  participate in the Outpatient Case Management program;     (3) I understand I must consent to participation in the Outpatient Case Management program during my first interview with my ;    (4) I consent to the discussion and release of my personal health information to my healthcare team (including my personal physician, my medical home care team, any specialty physician(s), and my [facility] team);     (5) I agree my consent is valid for the length of time I am receiving Outpatient Case Management;    (6) I agree to referrals to community resources which my Case Management team recommends for me. I agree to the release of my personal information and personal health information as necessary to referral sources.    ___________________________________________________________________  Patients Printed Name     ___________________________________________________________________  Patients Signature       Date    If patient is in being cared for, please complete this section:     ___________________________________________________________________  Printed Name of Person Caring For Patient   Relationship To Patient    ___________________________________________________________________   Signature of Person Caring For Patient     Date    PLEASE SIGN AND RETURN IN THE ENCLOSED PRE-ADDRESSED ENVELOPE.

## 2017-06-07 NOTE — PROGRESS NOTES
An OPCM welcome Packet and consent form was created and mailed to the patient on 6-7-17        Thank you,      Dasia Lincoln, SSC

## 2017-06-13 ENCOUNTER — OFFICE VISIT (OUTPATIENT)
Dept: PHYSICAL MEDICINE AND REHAB | Facility: CLINIC | Age: 82
End: 2017-06-13
Payer: MEDICARE

## 2017-06-13 ENCOUNTER — OUTPATIENT CASE MANAGEMENT (OUTPATIENT)
Dept: ADMINISTRATIVE | Facility: OTHER | Age: 82
End: 2017-06-13

## 2017-06-13 VITALS
BODY MASS INDEX: 25.62 KG/M2 | DIASTOLIC BLOOD PRESSURE: 79 MMHG | HEIGHT: 68 IN | SYSTOLIC BLOOD PRESSURE: 184 MMHG | WEIGHT: 169.06 LBS | HEART RATE: 67 BPM

## 2017-06-13 DIAGNOSIS — M54.16 RIGHT LUMBAR RADICULOPATHY: ICD-10-CM

## 2017-06-13 DIAGNOSIS — G89.29 CHRONIC LOW BACK PAIN WITH SCIATICA, SCIATICA LATERALITY UNSPECIFIED, UNSPECIFIED BACK PAIN LATERALITY: ICD-10-CM

## 2017-06-13 DIAGNOSIS — R26.89 BALANCE PROBLEM: ICD-10-CM

## 2017-06-13 DIAGNOSIS — M54.40 CHRONIC LOW BACK PAIN WITH SCIATICA, SCIATICA LATERALITY UNSPECIFIED, UNSPECIFIED BACK PAIN LATERALITY: ICD-10-CM

## 2017-06-13 DIAGNOSIS — R29.6 FREQUENT FALLS: ICD-10-CM

## 2017-06-13 DIAGNOSIS — G62.9 POLYNEUROPATHY: Primary | ICD-10-CM

## 2017-06-13 PROCEDURE — 1125F AMNT PAIN NOTED PAIN PRSNT: CPT | Mod: S$GLB,,, | Performed by: PHYSICAL MEDICINE & REHABILITATION

## 2017-06-13 PROCEDURE — 99499 UNLISTED E&M SERVICE: CPT | Mod: S$PBB,,, | Performed by: PHYSICAL MEDICINE & REHABILITATION

## 2017-06-13 PROCEDURE — 1159F MED LIST DOCD IN RCRD: CPT | Mod: S$GLB,,, | Performed by: PHYSICAL MEDICINE & REHABILITATION

## 2017-06-13 PROCEDURE — 99214 OFFICE O/P EST MOD 30 MIN: CPT | Mod: S$GLB,,, | Performed by: PHYSICAL MEDICINE & REHABILITATION

## 2017-06-13 PROCEDURE — 99999 PR PBB SHADOW E&M-EST. PATIENT-LVL III: CPT | Mod: PBBFAC,,, | Performed by: PHYSICAL MEDICINE & REHABILITATION

## 2017-06-13 RX ORDER — OXYCODONE AND ACETAMINOPHEN 7.5; 325 MG/1; MG/1
1 TABLET ORAL EVERY 8 HOURS PRN
Qty: 90 TABLET | Refills: 0 | Status: SHIPPED | OUTPATIENT
Start: 2017-06-13 | End: 2017-09-19 | Stop reason: SDUPTHER

## 2017-06-13 RX ORDER — OXYCODONE AND ACETAMINOPHEN 7.5; 325 MG/1; MG/1
1 TABLET ORAL EVERY 8 HOURS PRN
Qty: 90 TABLET | Refills: 0 | Status: SHIPPED | OUTPATIENT
Start: 2017-08-13 | End: 2017-07-12 | Stop reason: SDUPTHER

## 2017-06-13 RX ORDER — OXYCODONE AND ACETAMINOPHEN 7.5; 325 MG/1; MG/1
1 TABLET ORAL EVERY 8 HOURS PRN
Qty: 90 TABLET | Refills: 0 | Status: SHIPPED | OUTPATIENT
Start: 2017-07-13 | End: 2017-07-12 | Stop reason: SDUPTHER

## 2017-06-13 NOTE — PROGRESS NOTES
"   Subjective:       Patient ID: Zeyad Woodard is a 89 y.o. male.    Chief Complaint: Back Pain and Fall    Back Pain   Associated symptoms include leg pain. Pertinent negatives include no abdominal pain, chest pain, fever or headaches.   Leg Pain      Hip Pain      Fall   Pertinent negatives include no abdominal pain, fever or headaches.   Mr. Woodard returns to clinic for chronic back and right leg pain.   He come in manual WC pushed by his daughter ( 2 daughters are coming today with him).   LCV was 03/13/17.   Today, he complains about  back and Right hip and leg pain.    Current right hip/buttock pain is 1/10, worst pain is 6/10.   He states that he has not that much pain sitting, but standing and/or walking pain increases to 5-6/10.   He has no pain at night, sleeps well, and pain is not awaking him at night.   Pain is dull, ache, soreness, but less intense, , no leg weakness,  Walking makes pain in  back and leg worse, sitting improves pain,   he gets tired easily and has to lean onto something.   Can walk few steps in house, not quite a room length , using RW. No falls, no injury.  He  Currently takes Neurontin 100 mg, one in morning, one in afternoon, and three caps at evening.  He states that he tolerates well Neurontin, and it does not make him sleepy nor drowsy.  He takes usually Neurontin, 1+1+ 3 tabs per day.  Takes "pain pill ", Oxycodone 7.5/325 mg ,3 x/ day, that helps.   His daughter is dispensing his medications, and supervises him.   He states that he tolerates well medications, and has no side effects. He fell again, in bathroom, and did not have a major injury.   He is dragging his feet, using RW inside house, daughter moved all small rugs that he could possible tripped..  He does not want any LAURA at this time.   Here for follow up, re evaluation and medications re adjustment.    Past Medical History:   Diagnosis Date    Anemia     Anticoagulant long-term use     Atrial fibrillation  "    BPH (benign prostatic hypertrophy)     Cancer     CHF (congestive heart failure)     Coronary artery disease     Encounter for blood transfusion     GI bleed     cecum angiectasia s/p cautery    Hypertension     Hypothyroidism     Polyneuropathy     Posture imbalance     due to neuropathy    Right posterior capsular opacification 2017    SSS (sick sinus syndrome) 2015    s/p pacemaker       Past Surgical History:   Procedure Laterality Date    CARDIAC SURGERY      CATARACT EXTRACTION W/  INTRAOCULAR LENS IMPLANT Right 2010        CATARACT EXTRACTION W/  INTRAOCULAR LENS IMPLANT Left 2004        cataract surgery      COLONOSCOPY  04    COLONOSCOPY N/A 2/15/2016    Procedure: COLONOSCOPY;  Surgeon: Stanton Kirk MD;  Location: [facility] ([address] [place] [address]);  Service: Endoscopy;  Laterality: N/A;    EYE SURGERY      HERNIA REPAIR      inguinal hernia repair      Open heart surgery for pericardiectomy      for calcific constrictive pericarditis    UMBILICAL HERNIA REPAIR      Yag      Left Eye       Family History   Problem Relation Age of Onset    Stroke Mother          Cancer Father      lung;     Diabetes Sister     Coronary artery disease Sister     Peripheral vascular disease Sister     Amblyopia Neg Hx     Blindness Neg Hx     Cataracts Neg Hx     Glaucoma Neg Hx     Hypertension Neg Hx     Macular degeneration Neg Hx     Retinal detachment Neg Hx     Strabismus Neg Hx     Thyroid disease Neg Hx        Social History     Social History    Marital status:      Spouse name: N/A    Number of children: N/A    Years of education: N/A     Social History Main Topics    Smoking status: Passive Smoke Exposure - Never Smoker    Smokeless tobacco: Never Used    Alcohol use No    Drug use: No    Sexual activity: Not Asked     Other Topics Concern    None     Social History Narrative    None       Current Outpatient  Prescriptions   Medication Sig Dispense Refill    ASCORBATE CALCIUM (VITAMIN C ORAL) Take 1 tablet by mouth once daily.       atorvastatin (LIPITOR) 20 MG tablet Take 1 tablet (20 mg total) by mouth nightly. 90 tablet 3    calcium 500 mg Tab Take 1 tablet by mouth Daily.      clotrimazole-betamethasone (LOTRISONE) lotion Apply topically 2 (two) times daily. (Patient taking differently: Apply topically as needed. ) 30 mL 0    cyanocobalamin (VITAMIN B-12) 1000 MCG tablet Take 100 mcg by mouth once daily.      donepezil (ARICEPT) 5 MG tablet Take 1 tablet (5 mg total) by mouth every evening. 30 tablet 11    ferrous sulfate 325 mg (65 mg iron) Tab tablet Take 325 mg by mouth once daily.      furosemide (LASIX) 20 MG tablet Take 20 mg by mouth 2 (two) times daily.      gabapentin (NEURONTIN) 100 MG capsule Take 100 mg by mouth 2 (two) times daily.      levothyroxine (SYNTHROID) 100 MCG tablet Take 1 tablet (100 mcg total) by mouth once daily. 90 tablet 3    metoprolol succinate (TOPROL-XL) 25 MG 24 hr tablet Take 1 tablet (25 mg total) by mouth once daily. 90 tablet 3    MULTIVITS-MINERALS/FA/LYCOPENE (ONE-A-DAY MEN'S MULTIVITAMIN ORAL) Take 1 tablet by mouth once daily.      senna-docusate 8.6-50 mg (PERICOLACE) 8.6-50 mg per tablet Take 1 tablet by mouth 2 (two) times daily.      tamsulosin (FLOMAX) 0.4 mg Cp24 Take 1 capsule (0.4 mg total) by mouth once daily. 90 capsule 3    aspirin (ECOTRIN) 81 MG EC tablet Take 1 tablet (81 mg total) by mouth once daily.  0    oxycodone-acetaminophen (PERCOCET) 7.5-325 mg per tablet Take 1 tablet by mouth every 8 (eight) hours as needed for Pain. 90 tablet 0    [START ON 7/13/2017] oxycodone-acetaminophen (PERCOCET) 7.5-325 mg per tablet Take 1 tablet by mouth every 8 (eight) hours as needed for Pain. 90 tablet 0    [START ON 8/13/2017] oxycodone-acetaminophen (PERCOCET) 7.5-325 mg per tablet Take 1 tablet by mouth every 8 (eight) hours as needed for Pain. 90  tablet 0     No current facility-administered medications for this visit.        Review of patient's allergies indicates:   Allergen Reactions    Penicillins Hives and Other (See Comments)     Fever         Review of Systems   Constitutional: Negative for activity change, appetite change, chills, diaphoresis, fatigue, fever and unexpected weight change.   HENT: Negative for trouble swallowing and voice change.    Eyes: Negative for pain and visual disturbance.   Respiratory: Negative for chest tightness, shortness of breath and wheezing.    Cardiovascular: Negative for chest pain, palpitations and leg swelling.   Gastrointestinal: Negative for abdominal pain, constipation and diarrhea.   Genitourinary: Negative for difficulty urinating, frequency and urgency.   Musculoskeletal: Positive for back pain. Negative for arthralgias, joint swelling, myalgias, neck pain (denies neck pain) and neck stiffness. Gait problem: complains about poor balance.   Skin: Negative for rash and wound.   Neurological: Negative for dizziness, facial asymmetry, speech difficulty, light-headedness and headaches.   Hematological: Negative for adenopathy.   Psychiatric/Behavioral: Negative for agitation, behavioral problems, confusion, decreased concentration, dysphoric mood and sleep disturbance.       Objective:      Physical Exam    GENERAL: The patient is alert, oriented, pleasant.   MUSCULOSKELETAL:   Gait NT, came in  WC pushed by daughter.    Cervical spine full range of motion in all planes.   Lumbar spine, ROM NT- sitting in WC.   Straight leg raising Negative bilaterally.   Full range of motion in all joints x4 extremities.   Muscle strength 4/5 in right UE/LE, and 5/5 in Left UE/LE,   Right ankle PF 3+, DF2-, left ankle DF/PF 4+/5.  No  joint laxity throughout x4 extremities.   NEUROLOGIC: Cranial nerves II through XII intact.   Deep tendon reflexes is normal, +2 in the upper and decreased  In lower extremities bilaterally.    Muscle tone is normal.   Sensory is intact to light touch and pinprick throughout x4 extremities.     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   Moderate diffuse cerebral volume loss, similar to prior exam.     Xray of Lumbar spine:  Convex right curvature of the lumbar spine.   There is step wise grade 1 retrolisthesis of L2 on L3 through L4 on L5.   There is degenerative change at all levels allowing for degenerative change and spinal curvature lumbar vertebral body heights   and contours are within normal limits without evidence for acute fracture. Facet degenerative change at levels.   Atherosclerotic ectatic aorta. No definite spondylolysis with limitation of the lower lumbar pars secondary to positioning and overlapping   structures. A cluster of small rounded high density foci projects over the right upper abdomen concerning for gallstones within the gallbladder.   There is however interspersed additional small scattered calcifications within the upper abdomen   bilaterally, clinical correlation and further evaluation as warranted.     CT of lumbar spine showed:  T12/L1: Degenerative changes.   L1-2: Diffuse disk bulge asymmetric to the right.  L2-3: Diffuse disk bulge.  L3-4: There is posterior disk osteophyte complex, facet arthropathy, ligamentum flavum hypertrophy with mild central canal stenosis.   There is also moderate left neuroforaminal narrowing and mild right neuroforaminal narrowing.  L4-5: There is posterior disk osteophyte complex, bilateral facet arthropathy, ligamentum flavum hypertrophy with resultant mild central canal stenosis and moderate bilateral neuroforaminal narrowing, right greater than left.  L5-S1: Diffuse disk bulge asymmetric to the left.   There is ligamentum flavum hypertrophy with facet arthropathy and moderate right neuroforaminal narrowing.       Assessment:        1. Polyneuropathy    2. Frequent falls    3. Chronic low back pain with sciatica, sciatica laterality unspecified, unspecified back pain laterality    4. Balance problem    5. Right lumbar radiculopathy        Plan:       Polyneuropathy  -     oxycodone-acetaminophen (PERCOCET) 7.5-325 mg per tablet; Take 1 tablet by mouth every 8 (eight) hours as needed for Pain.  Dispense: 90 tablet; Refill: 0  -     oxycodone-acetaminophen (PERCOCET) 7.5-325 mg per tablet; Take 1 tablet by mouth every 8 (eight) hours as needed for Pain.  Dispense: 90 tablet; Refill: 0  -     oxycodone-acetaminophen (PERCOCET) 7.5-325 mg per tablet; Take 1 tablet by mouth every 8 (eight) hours as needed for Pain.  Dispense: 90 tablet; Refill: 0    Frequent falls  -     oxycodone-acetaminophen (PERCOCET) 7.5-325 mg per tablet; Take 1 tablet by mouth every 8 (eight) hours as needed for Pain.  Dispense: 90 tablet; Refill: 0  -     oxycodone-acetaminophen (PERCOCET) 7.5-325 mg per tablet; Take 1 tablet by mouth every 8 (eight) hours as needed for Pain.  Dispense: 90 tablet; Refill: 0  -     oxycodone-acetaminophen (PERCOCET) 7.5-325 mg per tablet; Take 1 tablet by mouth every 8 (eight) hours as needed for Pain.  Dispense: 90 tablet; Refill: 0    Chronic low back pain with sciatica, sciatica laterality unspecified, unspecified back pain laterality  -     oxycodone-acetaminophen (PERCOCET) 7.5-325 mg per tablet; Take 1 tablet by mouth every 8 (eight) hours as needed for Pain.  Dispense: 90 tablet; Refill: 0  -     oxycodone-acetaminophen (PERCOCET) 7.5-325 mg per tablet; Take 1 tablet by mouth every 8 (eight) hours as needed for Pain.  Dispense: 90 tablet; Refill: 0  -     oxycodone-acetaminophen (PERCOCET) 7.5-325 mg per tablet; Take 1 tablet by mouth every 8 (eight) hours as needed for Pain.  Dispense: 90 tablet; Refill: 0    Balance problem  -     oxycodone-acetaminophen (PERCOCET) 7.5-325 mg per tablet; Take 1 tablet by mouth every 8 (eight) hours  as needed for Pain.  Dispense: 90 tablet; Refill: 0  -     oxycodone-acetaminophen (PERCOCET) 7.5-325 mg per tablet; Take 1 tablet by mouth every 8 (eight) hours as needed for Pain.  Dispense: 90 tablet; Refill: 0  -     oxycodone-acetaminophen (PERCOCET) 7.5-325 mg per tablet; Take 1 tablet by mouth every 8 (eight) hours as needed for Pain.  Dispense: 90 tablet; Refill: 0    Right lumbar radiculopathy  -     oxycodone-acetaminophen (PERCOCET) 7.5-325 mg per tablet; Take 1 tablet by mouth every 8 (eight) hours as needed for Pain.  Dispense: 90 tablet; Refill: 0  -     oxycodone-acetaminophen (PERCOCET) 7.5-325 mg per tablet; Take 1 tablet by mouth every 8 (eight) hours as needed for Pain.  Dispense: 90 tablet; Refill: 0  -     oxycodone-acetaminophen (PERCOCET) 7.5-325 mg per tablet; Take 1 tablet by mouth every 8 (eight) hours as needed for Pain.  Dispense: 90 tablet; Refill: 0      Patient with chronic back pain, secondary to lumbar spondylosis, with mild spinal stenosis and possible Right L5 radiculopathy,      1. Chronic pain management:   For neuropathic pain,  will resume taking Neurontin, lower dose  100 mg in am+ 100 mg at pm + 300 mg at  Bedtime  Will increase Percocet to 7.5/ 325 mg , one PO TID.      Follow up in 3 months.     Total time spent face to face with patient was 25 minutes.   More than 50% of that time was spent in counseling on diagnosis , prognosis and treatment options.   I also caunsel patient  on common and most usual side effect of prescribed medications, given his age.  His daughter is dispensing his medications, and supervises him.   Risk and benefits of opiates, possible risk of developing opiate dependence and tolerance, need of strict compliance with prescribed medications.  I reviewed Primary care , and other specialty's notes to better coordinate patient's  care.   All questions were answered, and patient voiced understanding.

## 2017-06-16 ENCOUNTER — OFFICE VISIT (OUTPATIENT)
Dept: PODIATRY | Facility: CLINIC | Age: 82
End: 2017-06-16
Payer: MEDICARE

## 2017-06-16 VITALS
HEART RATE: 64 BPM | SYSTOLIC BLOOD PRESSURE: 139 MMHG | BODY MASS INDEX: 25.61 KG/M2 | HEIGHT: 68 IN | WEIGHT: 169 LBS | DIASTOLIC BLOOD PRESSURE: 62 MMHG

## 2017-06-16 DIAGNOSIS — G62.9 PERIPHERAL POLYNEUROPATHY: Primary | ICD-10-CM

## 2017-06-16 DIAGNOSIS — I73.9 PVD (PERIPHERAL VASCULAR DISEASE): ICD-10-CM

## 2017-06-16 DIAGNOSIS — B35.1 ONYCHOMYCOSIS: ICD-10-CM

## 2017-06-16 PROCEDURE — 11721 DEBRIDE NAIL 6 OR MORE: CPT | Mod: Q9,S$GLB,, | Performed by: PODIATRIST

## 2017-06-16 PROCEDURE — 99999 PR PBB SHADOW E&M-EST. PATIENT-LVL III: CPT | Mod: PBBFAC,,, | Performed by: PODIATRIST

## 2017-06-16 PROCEDURE — 99499 UNLISTED E&M SERVICE: CPT | Mod: S$GLB,,, | Performed by: PODIATRIST

## 2017-06-16 PROCEDURE — 99499 UNLISTED E&M SERVICE: CPT | Mod: S$PBB,,, | Performed by: PODIATRIST

## 2017-06-19 ENCOUNTER — CLINICAL SUPPORT (OUTPATIENT)
Dept: ELECTROPHYSIOLOGY | Facility: CLINIC | Age: 82
End: 2017-06-19
Payer: MEDICARE

## 2017-06-19 DIAGNOSIS — I49.5 SSS (SICK SINUS SYNDROME): ICD-10-CM

## 2017-06-19 DIAGNOSIS — Z95.0 CARDIAC PACEMAKER IN SITU: ICD-10-CM

## 2017-06-19 PROCEDURE — 93296 REM INTERROG EVL PM/IDS: CPT | Mod: S$GLB,,, | Performed by: INTERNAL MEDICINE

## 2017-06-19 PROCEDURE — 93294 REM INTERROG EVL PM/LDLS PM: CPT | Mod: S$GLB,,, | Performed by: INTERNAL MEDICINE

## 2017-06-19 NOTE — PROGRESS NOTES
"Subjective:      Patient ID: Zeyad Woodard is a 89 y.o. male.    Chief Complaint: Nail Care    Zeyad is a 89 y.o. male who presents to the clinic for evaluation and treatment of high risk feet. Zeyad has a past medical history of Anemia; Anticoagulant long-term use; Atrial fibrillation; BPH (benign prostatic hypertrophy); Cancer; CHF (congestive heart failure); Coronary artery disease; Encounter for blood transfusion; GI bleed; Hypertension; Hypothyroidism; Polyneuropathy; Posture imbalance; Right posterior capsular opacification (1/18/2017); and SSS (sick sinus syndrome) (2015). The patient's chief complaint is long, thick toenails. This patient has documented high risk feet requiring routine maintenance secondary to peripheral neuropathy. No new complaints.    PCP: Booker Ricci MD    Date Last Seen by PCP: 5/11/17    Current shoe gear:  Casual shoes    Last encounter in this department: Visit date not found    Hemoglobin A1C   Date Value Ref Range Status   12/10/2012 6.0 4.0 - 6.2 % Final   03/02/2011 5.3 4.0 - 6.2 % Final     Vitals:    06/16/17 1056   BP: 139/62   Pulse: 64   Weight: 76.7 kg (169 lb)   Height: 5' 8" (1.727 m)   PainSc: 0-No pain      Past Medical History:   Diagnosis Date    Anemia     Anticoagulant long-term use     Atrial fibrillation     BPH (benign prostatic hypertrophy)     Cancer     CHF (congestive heart failure)     Coronary artery disease     Encounter for blood transfusion     GI bleed     cecum angiectasia s/p cautery    Hypertension     Hypothyroidism     Polyneuropathy     Posture imbalance     due to neuropathy    Right posterior capsular opacification 1/18/2017    SSS (sick sinus syndrome) 2015    s/p pacemaker       Past Surgical History:   Procedure Laterality Date    CARDIAC SURGERY      CATARACT EXTRACTION W/  INTRAOCULAR LENS IMPLANT Right 05/17/2010        CATARACT EXTRACTION W/  INTRAOCULAR LENS IMPLANT Left 02/16/2004     "    cataract surgery      COLONOSCOPY  04    COLONOSCOPY N/A 2/15/2016    Procedure: COLONOSCOPY;  Surgeon: Stanton Kirk MD;  Location: [facility] ([address] [place] [address]);  Service: Endoscopy;  Laterality: N/A;    EYE SURGERY      HERNIA REPAIR      inguinal hernia repair      Open heart surgery for pericardiectomy      for calcific constrictive pericarditis    UMBILICAL HERNIA REPAIR      Yag      Left Eye       Family History   Problem Relation Age of Onset    Stroke Mother          Cancer Father      lung;     Diabetes Sister     Coronary artery disease Sister     Peripheral vascular disease Sister     Amblyopia Neg Hx     Blindness Neg Hx     Cataracts Neg Hx     Glaucoma Neg Hx     Hypertension Neg Hx     Macular degeneration Neg Hx     Retinal detachment Neg Hx     Strabismus Neg Hx     Thyroid disease Neg Hx        Social History     Social History    Marital status:      Spouse name: N/A    Number of children: N/A    Years of education: N/A     Social History Main Topics    Smoking status: Passive Smoke Exposure - Never Smoker    Smokeless tobacco: Never Used    Alcohol use No    Drug use: No    Sexual activity: Not Asked     Other Topics Concern    None     Social History Narrative    None       Current Outpatient Prescriptions   Medication Sig Dispense Refill    ASCORBATE CALCIUM (VITAMIN C ORAL) Take 1 tablet by mouth once daily.       atorvastatin (LIPITOR) 20 MG tablet Take 1 tablet (20 mg total) by mouth nightly. 90 tablet 3    calcium 500 mg Tab Take 1 tablet by mouth Daily.      clotrimazole-betamethasone (LOTRISONE) lotion Apply topically 2 (two) times daily. (Patient taking differently: Apply topically as needed. ) 30 mL 0    cyanocobalamin (VITAMIN B-12) 1000 MCG tablet Take 100 mcg by mouth once daily.      donepezil (ARICEPT) 5 MG tablet Take 1 tablet (5 mg total) by mouth every evening. 30 tablet 11    ferrous sulfate 325 mg (65 mg iron)  Tab tablet Take 325 mg by mouth once daily.      furosemide (LASIX) 20 MG tablet Take 20 mg by mouth 2 (two) times daily.      gabapentin (NEURONTIN) 100 MG capsule Take 100 mg by mouth 2 (two) times daily.      levothyroxine (SYNTHROID) 100 MCG tablet Take 1 tablet (100 mcg total) by mouth once daily. 90 tablet 3    metoprolol succinate (TOPROL-XL) 25 MG 24 hr tablet Take 1 tablet (25 mg total) by mouth once daily. 90 tablet 3    MULTIVITS-MINERALS/FA/LYCOPENE (ONE-A-DAY MEN'S MULTIVITAMIN ORAL) Take 1 tablet by mouth once daily.      oxycodone-acetaminophen (PERCOCET) 7.5-325 mg per tablet Take 1 tablet by mouth every 8 (eight) hours as needed for Pain. 90 tablet 0    [START ON 7/13/2017] oxycodone-acetaminophen (PERCOCET) 7.5-325 mg per tablet Take 1 tablet by mouth every 8 (eight) hours as needed for Pain. 90 tablet 0    [START ON 8/13/2017] oxycodone-acetaminophen (PERCOCET) 7.5-325 mg per tablet Take 1 tablet by mouth every 8 (eight) hours as needed for Pain. 90 tablet 0    senna-docusate 8.6-50 mg (PERICOLACE) 8.6-50 mg per tablet Take 1 tablet by mouth 2 (two) times daily.      tamsulosin (FLOMAX) 0.4 mg Cp24 Take 1 capsule (0.4 mg total) by mouth once daily. 90 capsule 3    aspirin (ECOTRIN) 81 MG EC tablet Take 1 tablet (81 mg total) by mouth once daily.  0     No current facility-administered medications for this visit.        Review of patient's allergies indicates:   Allergen Reactions    Penicillins Hives and Other (See Comments)     Fever         Review of Systems   Constitution: Negative for chills, fever, weakness and malaise/fatigue.   Cardiovascular: Positive for leg swelling. Negative for chest pain and claudication.   Respiratory: Negative for cough and shortness of breath.    Skin: Positive for color change, dry skin and nail changes. Negative for itching and rash.   Musculoskeletal: Positive for back pain and joint pain. Negative for muscle cramps and muscle weakness.    Gastrointestinal: Negative for nausea and vomiting.   Neurological: Positive for numbness.   Psychiatric/Behavioral: Negative for altered mental status.           Objective:      Physical Exam   Constitutional: He is oriented to person, place, and time. No distress.   Cardiovascular: Intact distal pulses.    Pulses:       Dorsalis pedis pulses are 1+ on the right side, and 1+ on the left side.        Posterior tibial pulses are 0 on the right side, and 0 on the left side.   CFT< 3 secs all toes bilateral foot, skin temp warm bilateral foot, nol hair growth bilateral lower extremity, moderate pitting lower extremity edema bilateral with hemosiderin staining of legs and varicosities.       Musculoskeletal:        Right foot: There is deformity.        Left foot: There is deformity.   Cavus foot structure with hallux valgus and semi-reducible digital contractures toes 2-5 bilateral foot. No pain with ROM or MMT bilateral foot.   Feet:   Right Foot:   Protective Sensation: 10 sites tested. 4 sites sensed.   Skin Integrity: Positive for dry skin. Negative for ulcer, blister, erythema, warmth or callus.   Left Foot:   Protective Sensation: 10 sites tested. 6 sites sensed.   Skin Integrity: Positive for dry skin. Negative for ulcer, blister, skin breakdown, erythema, warmth or callus.   Neurological: He is alert and oriented to person, place, and time. He has normal strength. A sensory deficit is present.   Decreased vibratory left and absent right foot.   Skin: Skin is warm, dry and intact. No ecchymosis, no lesion, no petechiae and no rash noted. He is not diaphoretic. No cyanosis or erythema. No pallor. Nails show no clubbing.   Nails 1-5 bilateral are thickened, dystrophic brittle with yellow discoloration and debris. No open lesions or macerations bilateral lower extremity.    Skin is thin and atrophied bilateral lower extremity.               Assessment:       Encounter Diagnoses   Name Primary?    Peripheral  polyneuropathy Yes    PVD (peripheral vascular disease)     Onychomycosis          Plan:       Zeyad was seen today for nail care.    Diagnoses and all orders for this visit:    Peripheral polyneuropathy    PVD (peripheral vascular disease)    Onychomycosis      I counseled the patient on his conditions, their implications and medical management.    Shoe inspection. Patient instructed on proper foot hygeine. We discussed wearing proper shoe gear, daily foot inspections, never walking without protective shoe gear, never putting sharp instruments to feet, routine podiatric nail visits every 2-3 months.      With patient's permission, nails were aggressively reduced and debrided x 10 to their soft tissue attachment mechanically and with electric , removing all offending nail and debris. Patient relates relief following the procedure. He will continue to monitor the areas daily, inspect his feet, wear protective shoe gear when ambulatory, moisturizer to maintain skin integrity and follow in this office in approximately 2-3 months, sooner p.r.n.

## 2017-06-21 ENCOUNTER — OUTPATIENT CASE MANAGEMENT (OUTPATIENT)
Dept: ADMINISTRATIVE | Facility: OTHER | Age: 82
End: 2017-06-21

## 2017-06-21 NOTE — PROGRESS NOTES
"Talked to Asia to update plan of care for OPCM.  According to Asia, patient is "very weak and gets tired easily."  Asia stated that her father has started Aricept 5mg po qd on 6/13 and has now decreased gabapentin to 300mg po q pm for approximately one week and patient is still is tired during the day.  Asia stated that "he has decreased stamina."  I will in-basket message Dr. Izaguirre for daughter's request for a home health-PT eval for patient to increase stamina.  According to Asia, patient had interest in the past to perform exercises and now has no interest in exercising.  Asia reports that "all that my dad wants to do is to handle his mail."  According to Asia, patient has not had any falls or cough since last conversation. Asia stated that patient only has SOB on exertion at times.   Patient stated that patient is adhering to a low sodium diet at this time.  Asia stated that [facility] will start to deliver Meals on Wheels starting on 6/26/2017.  Asia asked about sitter information.  Educated Asia on using a Senior Resource Guide and stated that "I do have one with me and will review with my sister."  Asia stated that she is recording patient's B/P daily.  Patient's B/P today was 126/66mmHg.  Asia stated that they are weighing patient daily and today's weight was 169.4 lbs.  Reminded Asia of upcoming patient appointments:  7/24 at 0800 for fasting lab and on 7/24 at 1000 with Dr. Izaguirre and verbalized understanding.  Encouraged Asia/patient to call this RN if having any questions/concerns.  Will continue to follow.  BEBA Hernandez, OPCM-RN  "

## 2017-06-28 ENCOUNTER — OUTPATIENT CASE MANAGEMENT (OUTPATIENT)
Dept: ADMINISTRATIVE | Facility: OTHER | Age: 82
End: 2017-06-28

## 2017-06-28 ENCOUNTER — TELEPHONE (OUTPATIENT)
Dept: FAMILY MEDICINE | Facility: CLINIC | Age: 82
End: 2017-06-28

## 2017-06-28 DIAGNOSIS — F03.90 DEMENTIA WITHOUT BEHAVIORAL DISTURBANCE, UNSPECIFIED DEMENTIA TYPE: ICD-10-CM

## 2017-06-28 DIAGNOSIS — R26.9 ABNORMALITY OF GAIT: Primary | ICD-10-CM

## 2017-06-28 NOTE — TELEPHONE ENCOUNTER
----- Message from Maylin Braun LPN sent at 6/28/2017 11:29 AM CDT -----      ----- Message -----  From: Anabell Hernandez RN  Sent: 6/28/2017  11:00 AM  To: Ke[facility] Staff    Hi. This is Anabell Hernandez RN. I am with the Outpatient case management team with [facility]. I received a referral on the above patient. I spoke with patient's daughter today on the telephone.    Ghazala is requesting for her father to have [facility]-PT home evaluation for patient to increase patient's stamina.  If you are in agreement, please call [facility] at [phone] and ask to speak to Fatoumata.  Thanks.        Please notify patient of your recommendations.     Thank you,  Anabell Hernandez R.N.  Outpatient Complex Case Manager

## 2017-06-28 NOTE — PROGRESS NOTES
"Talked to daughter Ghazala to update plan of care for OPCM.  Ghazala reports that [facility] is not visiting patient at this time.  This RN called [facility] and spoke to Fatoumata Gibson.  Fatoumata stated that [facility] did not receive order from Dr. Izaguirre in regards to patient having HH and a PT evaluation.  I will in-basket message Dr. Izaguirre about a PT home evaluation per daughter's request.  Ghazala stated that family had a surprise 89th birthday party for patient last weekend and that "he has been in a good mood."  Ghazala reports that patient remains on Aricept 5mg po q pm and that Ghazala reports that "he is not as rigid as he use to be" and does find some improvement with her father.  Daily weights are being recorded and Ghazala reports that within the past week, patient's weight has ranged from 167-169 lbs.  Ghazala reports that patient has not had any falls, SOB or cough since last conversation.  Encouraged Ghazala to call this RN if having any questions/concerns.  Will continue to follow.  BEBA Hernandez, OPCM-RN  "

## 2017-07-05 ENCOUNTER — OUTPATIENT CASE MANAGEMENT (OUTPATIENT)
Dept: ADMINISTRATIVE | Facility: OTHER | Age: 82
End: 2017-07-05

## 2017-07-05 NOTE — PROGRESS NOTES
Attempted to update plan of care for OPCM; left voice message to return call.  EBBA Hernandez, OPCM-RN

## 2017-07-12 ENCOUNTER — OFFICE VISIT (OUTPATIENT)
Dept: FAMILY MEDICINE | Facility: CLINIC | Age: 82
End: 2017-07-12
Payer: MEDICARE

## 2017-07-12 VITALS
HEIGHT: 68 IN | DIASTOLIC BLOOD PRESSURE: 68 MMHG | SYSTOLIC BLOOD PRESSURE: 120 MMHG | BODY MASS INDEX: 25.73 KG/M2 | HEART RATE: 64 BPM | WEIGHT: 169.75 LBS

## 2017-07-12 DIAGNOSIS — I10 ESSENTIAL HYPERTENSION: ICD-10-CM

## 2017-07-12 DIAGNOSIS — N39.0 URINARY TRACT INFECTION WITHOUT HEMATURIA, SITE UNSPECIFIED: ICD-10-CM

## 2017-07-12 DIAGNOSIS — R32 URINARY INCONTINENCE, UNSPECIFIED TYPE: ICD-10-CM

## 2017-07-12 DIAGNOSIS — F03.90 DEMENTIA WITHOUT BEHAVIORAL DISTURBANCE, UNSPECIFIED DEMENTIA TYPE: Primary | ICD-10-CM

## 2017-07-12 DIAGNOSIS — R35.0 URINATION FREQUENCY: ICD-10-CM

## 2017-07-12 LAB
BACTERIA #/AREA URNS AUTO: NORMAL /HPF
BILIRUB UR QL STRIP: NEGATIVE
CLARITY UR: CLEAR
COLOR UR: YELLOW
GLUCOSE UR QL STRIP: NEGATIVE
HGB UR QL STRIP: ABNORMAL
HYALINE CASTS UR QL AUTO: 1 /LPF
KETONES UR QL STRIP: NEGATIVE
LEUKOCYTE ESTERASE UR QL STRIP: NEGATIVE
MICROSCOPIC COMMENT: NORMAL
NITRITE UR QL STRIP: NEGATIVE
PH UR STRIP: 6 [PH] (ref 5–8)
PROT UR QL STRIP: ABNORMAL
RBC #/AREA URNS AUTO: 1 /HPF (ref 0–4)
SP GR UR STRIP: 1.01 (ref 1–1.03)
URN SPEC COLLECT METH UR: ABNORMAL
UROBILINOGEN UR STRIP-ACNC: NEGATIVE EU/DL
WBC #/AREA URNS AUTO: 0 /HPF (ref 0–5)

## 2017-07-12 PROCEDURE — 99999 PR PBB SHADOW E&M-EST. PATIENT-LVL III: CPT | Mod: PBBFAC,,, | Performed by: FAMILY MEDICINE

## 2017-07-12 PROCEDURE — 81000 URINALYSIS NONAUTO W/SCOPE: CPT | Mod: PO

## 2017-07-12 PROCEDURE — 99499 UNLISTED E&M SERVICE: CPT | Mod: S$GLB,,, | Performed by: FAMILY MEDICINE

## 2017-07-12 PROCEDURE — 1159F MED LIST DOCD IN RCRD: CPT | Mod: S$GLB,,, | Performed by: FAMILY MEDICINE

## 2017-07-12 PROCEDURE — 1125F AMNT PAIN NOTED PAIN PRSNT: CPT | Mod: S$GLB,,, | Performed by: FAMILY MEDICINE

## 2017-07-12 PROCEDURE — 87086 URINE CULTURE/COLONY COUNT: CPT

## 2017-07-12 PROCEDURE — 99214 OFFICE O/P EST MOD 30 MIN: CPT | Mod: S$GLB,,, | Performed by: FAMILY MEDICINE

## 2017-07-12 RX ORDER — CIPROFLOXACIN 250 MG/1
250 TABLET, FILM COATED ORAL 2 TIMES DAILY
Qty: 20 TABLET | Refills: 0 | Status: SHIPPED | OUTPATIENT
Start: 2017-07-12 | End: 2017-07-22

## 2017-07-12 NOTE — PROGRESS NOTES
Subjective:       Patient ID: Zeyad Woodard is a 89 y.o. male.    Chief Complaint: Urinary Frequency    89 years old male who came to the clinic with urinary incontinence for the last couple of months.  Patient with urinary frequency and urgency.  No fever ,chills blood in the urine or flank pain.  Patient currently on Flomax.  Patient with no recent follow-up with urology.  Patient with supervision of all his activities of daily living by his daughter.  Dementia currently stable.      Urinary Frequency    Associated symptoms include frequency and urgency. Pertinent negatives include no chills or hematuria.     Review of Systems   Constitutional: Negative.  Negative for chills and fever.   HENT: Negative.    Eyes: Negative.    Respiratory: Negative.    Cardiovascular: Negative.    Gastrointestinal: Negative.    Genitourinary: Positive for frequency and urgency. Negative for dysuria, hematuria and testicular pain.   Musculoskeletal: Negative.    Skin: Negative.    Neurological: Negative.    Psychiatric/Behavioral: Negative.        Objective:      Physical Exam   Constitutional: He is oriented to person, place, and time. He appears well-developed and well-nourished. No distress.   HENT:   Head: Normocephalic and atraumatic.   Right Ear: External ear normal.   Left Ear: External ear normal.   Nose: Nose normal.   Mouth/Throat: Oropharynx is clear and moist. No oropharyngeal exudate.   Eyes: Conjunctivae and EOM are normal. Pupils are equal, round, and reactive to light. Right eye exhibits no discharge. Left eye exhibits no discharge. No scleral icterus.   Neck: Normal range of motion. Neck supple. No JVD present. No tracheal deviation present. No thyromegaly present.   Cardiovascular: Normal rate, regular rhythm, normal heart sounds and intact distal pulses.  Exam reveals no gallop and no friction rub.    No murmur heard.  Pulmonary/Chest: Effort normal and breath sounds normal. No stridor. No respiratory distress.  He has no wheezes. He has no rales. He exhibits no tenderness.   Abdominal: Soft. Bowel sounds are normal. He exhibits no distension and no mass. There is no tenderness. There is no rebound and no guarding.   Musculoskeletal: Normal range of motion. He exhibits no edema or tenderness.   Lymphadenopathy:     He has no cervical adenopathy.   Neurological: He is alert and oriented to person, place, and time. He has normal reflexes. He displays normal reflexes. No cranial nerve deficit. He exhibits normal muscle tone. Coordination and gait abnormal.   Skin: Skin is warm and dry. No rash noted. He is not diaphoretic. No erythema. No pallor.   Psychiatric: His behavior is normal. Judgment and thought content normal. His mood appears not anxious. His affect is not angry, not blunt, not labile and not inappropriate. Cognition and memory are impaired. He exhibits a depressed mood. He exhibits abnormal recent memory. He exhibits normal remote memory.   Nursing note and vitals reviewed.      Assessment:       1. Dementia without behavioral disturbance, unspecified dementia type    2. Essential hypertension    3. Urinary incontinence, unspecified type    4. Urinary tract infection without hematuria, site unspecified    5. Urination frequency        Plan:         Zeyad was seen today for urinary frequency.    Diagnoses and all orders for this visit:    Dementia without behavioral disturbance, unspecified dementia type    Essential hypertension  -     Urinalysis    Urinary incontinence, unspecified type  -     Urinalysis  -     Urine culture  -     Ambulatory referral to Urology    Urinary tract infection without hematuria, site unspecified  -     Urinalysis  -     Urine culture    Urination frequency  -     Urinalysis  -     Urine culture  -     Ambulatory referral to Urology    Other orders  -     ciprofloxacin HCl (CIPRO) 250 MG tablet; Take 1 tablet (250 mg total) by mouth 2 (two) times daily.    Continue monitoring blood  pressure at home, low sodium diet.  Patient only use furosemide when necessary.

## 2017-07-13 ENCOUNTER — OFFICE VISIT (OUTPATIENT)
Dept: UROLOGY | Facility: CLINIC | Age: 82
End: 2017-07-13
Payer: MEDICARE

## 2017-07-13 VITALS
TEMPERATURE: 98 F | DIASTOLIC BLOOD PRESSURE: 61 MMHG | SYSTOLIC BLOOD PRESSURE: 111 MMHG | WEIGHT: 169 LBS | HEART RATE: 60 BPM | HEIGHT: 68 IN | BODY MASS INDEX: 25.61 KG/M2

## 2017-07-13 DIAGNOSIS — F01.50 VASCULAR DEMENTIA WITHOUT BEHAVIORAL DISTURBANCE: ICD-10-CM

## 2017-07-13 DIAGNOSIS — N13.8 BPH WITH URINARY OBSTRUCTION: Primary | ICD-10-CM

## 2017-07-13 DIAGNOSIS — N40.1 BPH WITH URINARY OBSTRUCTION: Primary | ICD-10-CM

## 2017-07-13 DIAGNOSIS — N32.81 OAB (OVERACTIVE BLADDER): ICD-10-CM

## 2017-07-13 DIAGNOSIS — G62.9 POLYNEUROPATHY: ICD-10-CM

## 2017-07-13 LAB — BACTERIA UR CULT: NO GROWTH

## 2017-07-13 PROCEDURE — 99215 OFFICE O/P EST HI 40 MIN: CPT | Mod: 25,S$GLB,, | Performed by: UROLOGY

## 2017-07-13 PROCEDURE — 1126F AMNT PAIN NOTED NONE PRSNT: CPT | Mod: S$GLB,,, | Performed by: UROLOGY

## 2017-07-13 PROCEDURE — 99999 PR PBB SHADOW E&M-EST. PATIENT-LVL III: CPT | Mod: PBBFAC,,, | Performed by: UROLOGY

## 2017-07-13 PROCEDURE — 1159F MED LIST DOCD IN RCRD: CPT | Mod: S$GLB,,, | Performed by: UROLOGY

## 2017-07-13 PROCEDURE — 99499 UNLISTED E&M SERVICE: CPT | Mod: S$GLB,,, | Performed by: UROLOGY

## 2017-07-13 PROCEDURE — 51798 US URINE CAPACITY MEASURE: CPT | Mod: S$GLB,,, | Performed by: UROLOGY

## 2017-07-13 RX ORDER — FINASTERIDE 5 MG/1
5 TABLET, FILM COATED ORAL DAILY
Qty: 30 TABLET | Refills: 12 | Status: SHIPPED | OUTPATIENT
Start: 2017-07-13 | End: 2017-07-27 | Stop reason: SDUPTHER

## 2017-07-13 NOTE — LETTER
July 13, 2017      Booker Ricci MD  [address]  [place] [address]           [place] [facility]  [address]  [place] [address]  Phone: [phone]          Patient: Zeyad Woodard   MR Number: [id number]   YOB: 1927   Date of Visit: 7/13/2017       Dear Dr. Booker Ricci:    Thank you for referring Zeyad Woodard to me for evaluation. Attached you will find relevant portions of my assessment and plan of care.    If you have questions, please do not hesitate to call me. I look forward to following Zeyad Woodard along with you.    Sincerely,    Luis Crawford MD    Enclosure  CC:  No Recipients    If you would like to receive this communication electronically, please contact [email or web address] or [phone] to request more information on [employer] access.    For providers and/or their staff who would like to refer a patient to [facility], please contact us through our one-stop-shop provider referral line, [facility], at [phone].    If you feel you have received this communication in error or would no longer like to receive these types of communications, please e-mail [email or web address]

## 2017-07-13 NOTE — PROGRESS NOTES
HPI:  Zeyad Woodard is a 89 y.o. year old male that  presents with No chief complaint on file.  .  Patient referred by his PCP for urinary frequency.  Patient is new to me however is been seen by [facility] urology in August of last year.  That note is reviewed which shows nocturia ×5 with the patient on Flomax for years.  Patient did not want to start Proscar and did not want cystoscopy for further evaluation    The patient now comes in follow-up and continues with nocturia ×4-5 with urgency and urge incontinence.  He has no dysuria fevers or chills.  The patient states that he has a varying 2 strong strength of stream.  The daughters do note that he has what appears to be significant hesitancy.  Patient does have a new diagnosis of dementia however is able to give a history.  History is also obtained from 2 daughters    The patient just voided prior to coming to the office.  Latter scan postvoid residual the office today is okay at 71 cc.    She has never had urological surgery and there is no family history of prostate cancer    Further chart review shows no from his PCP from yesterday showing a negative microscopic urinalysis and urine culture which is pending.  Hypertension is also noted.  Serum PSA in 2010 was 2.5 and GFR was 44 and stable in May of this year.  In September of last year urine culture was negative.  In July of last year abdominal ultrasound showed chronic medical renal disease with no obstruction and his kidneys.  Bladder scan showed diverticuli consistent with chronic obstruction.  This image is reviewed by me and I agree with these findings.    Past Medical History:   Diagnosis Date    Anemia     Anticoagulant long-term use     Atrial fibrillation     BPH (benign prostatic hypertrophy)     Cancer     CHF (congestive heart failure)     Coronary artery disease     Encounter for blood transfusion     GI bleed     cecum angiectasia s/p cautery    Hypertension     Hypothyroidism      Polyneuropathy     Posture imbalance     due to neuropathy    Right posterior capsular opacification 2017    SSS (sick sinus syndrome) 2015    s/p pacemaker     Social History     Social History    Marital status:      Spouse name: N/A    Number of children: N/A    Years of education: N/A     Occupational History    Not on file.     Social History Main Topics    Smoking status: Passive Smoke Exposure - Never Smoker    Smokeless tobacco: Never Used    Alcohol use No    Drug use: No    Sexual activity: No     Other Topics Concern    Not on file     Social History Narrative    No narrative on file     Past Surgical History:   Procedure Laterality Date    CARDIAC SURGERY      CATARACT EXTRACTION W/  INTRAOCULAR LENS IMPLANT Right 2010        CATARACT EXTRACTION W/  INTRAOCULAR LENS IMPLANT Left 2004        cataract surgery      COLONOSCOPY  04    COLONOSCOPY N/A 2/15/2016    Procedure: COLONOSCOPY;  Surgeon: Stanton Kirk MD;  Location: [facility] ([address] [place] [address]);  Service: Endoscopy;  Laterality: N/A;    EYE SURGERY      HERNIA REPAIR      inguinal hernia repair      Open heart surgery for pericardiectomy      for calcific constrictive pericarditis    UMBILICAL HERNIA REPAIR      Yag      Left Eye     Family History   Problem Relation Age of Onset    Stroke Mother          Cancer Father      lung;     Diabetes Sister     Coronary artery disease Sister     Peripheral vascular disease Sister     Amblyopia Neg Hx     Blindness Neg Hx     Cataracts Neg Hx     Glaucoma Neg Hx     Hypertension Neg Hx     Macular degeneration Neg Hx     Retinal detachment Neg Hx     Strabismus Neg Hx     Thyroid disease Neg Hx     Prostate cancer Neg Hx     Kidney disease Neg Hx            Review of Systems  The patient has no chest pains.  The patient has no shortness of breath  Patient wears    no    glasses.  All other review of systems  "are negative.      Physical Exam:  /61   Pulse 60   Temp 98.3 °F (36.8 °C)   Ht 5' 8" (1.727 m)   Wt 76.7 kg (169 lb)   BMI 25.70 kg/m²   General appearance: alert, cooperative, no distress.  Patient is in a wheelchair  Constitutional:Oriented to person, place, and time.appears well-developed and well-nourished.  No apparent signs of dementia on first meeting.    HEENT: Normocephalic, atraumatic, neck symmetrical, no nasal discharge   Eyes: conjunctivae/corneas clear, PERRL, EOM's intact  Lungs: clear to auscultation bilaterally, no dullness to percussion bilaterally  Heart: regular rate and rhythm without rub; no displacement of the PMI   Abdomen: soft, non-tender; bowel sounds normoactive; no organomegaly  :Penis/perineum without lesions, scrotum without rash/cysts, epididymis nontender bilaterally, urethral meatus in normal location normal size, no penile plaques palpated, prostate:   Smooth enlarged and benign feeling                    seminal vesicles not palpated.  No rectal masses, sphincter tone normal.  Testes equal in size without masses  Extremities: extremities symmetric; no clubbing, cyanosis, or edema  Integument: Skin color, texture, turgor normal; no rashes; hair distrubution normal  Neurologic: Alert and oriented X 3, normal strength, normal coordination and gait  Psychiatric: no pressured speech; normal affect; no evidence of impaired cognition     LABS:    Complete Blood Count  Lab Results   Component Value Date    RBC 3.48 (L) 05/12/2017    HGB 10.5 (L) 05/12/2017    HCT 30.3 (L) 05/12/2017    MCV 87 05/12/2017    MCH 30.2 05/12/2017    MCHC 34.7 05/12/2017    RDW 14.1 05/12/2017     (L) 05/12/2017    MPV 10.1 05/12/2017    GRAN 2.8 05/12/2017    GRAN 34.7 (L) 05/12/2017    LYMPH 4.3 05/12/2017    LYMPH 53.7 (H) 05/12/2017    MONO 0.7 05/12/2017    MONO 8.8 05/12/2017    EOS 0.2 05/12/2017    BASO 0.02 05/12/2017    EOSINOPHIL 2.4 05/12/2017    BASOPHIL 0.3 05/12/2017    " DIFFMETHOD Automated 05/12/2017       Comprehensive Metabolic Panel  Lab Results   Component Value Date     (H) 05/12/2017    BUN 37 (H) 05/12/2017    CREATININE 1.4 05/12/2017     05/12/2017    K 4.5 05/12/2017     05/12/2017    PROT 6.9 05/12/2017    ALBUMIN 3.7 05/12/2017    BILITOT 0.7 05/12/2017    AST 34 05/12/2017    ALKPHOS 92 05/12/2017    CO2 28 05/12/2017    ALT 21 05/12/2017    ANIONGAP 8 05/12/2017    EGFRNONAA 44.2 (A) 05/12/2017    ESTGFRAFRICA 51.1 (A) 05/12/2017       PSA  Lab Results   Component Value Date    PSA 2.5 07/08/2010         Assessment:    ICD-10-CM ICD-9-CM    1. BPH with urinary obstruction N40.1 600.01     N13.8 599.69    2. OAB (overactive bladder) N32.81 596.51    3. Vascular dementia without behavioral disturbance F01.50 290.40    4. Polyneuropathy G62.9 356.9      The primary encounter diagnosis was BPH with urinary obstruction. Diagnoses of OAB (overactive bladder), Vascular dementia without behavioral disturbance, and Polyneuropathy were also pertinent to this visit.      Plan:1 and 2.  BPH and overactive bladder.  Plan.  I discussed at length in detail with the patient and his 2 daughters at the patient's current clinical situation can often be difficult to manage in terms of achieving full continence.  I recommend that he continue his tamsulosin.  We will add finasteride but I discussed that can take up to 6 months for full prostate shrinkage.  I further discussed the element of overactive bladder which probably present but that we should avoid anticholinergics due to his symptoms and the failure of urinary retention.  Patient daughters voice understanding.  I further discussed the possibility of proceeding with cystoscopy and possibly some other intervention for his prostate.  Given his advanced age and other medical problems, they do not want to proceed which I think reasonable.  Follow-up 3 months    #3.  Dementia.  Plan.  I discussed that this can  contribute to his voiding symptoms    #4.  Polyneuropathy.  Plan.  Low bladder ultrasound postvoid residual in the office today rules out the presence of a neurogenic bladder due to this or these entities.  No orders of the defined types were placed in this encounter.          Luis Crawford MD    PLEASE NOTE:  Please be advised that portions of this note were dictated using voice recognition software and may contain dictation related errors in spelling/grammar/appropriate pronouns/syntax or other errors that might have not been found and or corrected on text review.

## 2017-07-14 ENCOUNTER — OUTPATIENT CASE MANAGEMENT (OUTPATIENT)
Dept: ADMINISTRATIVE | Facility: OTHER | Age: 82
End: 2017-07-14

## 2017-07-14 NOTE — PROGRESS NOTES
Talked to daughter Asia to update plan of care for OPCM.  According to Asia, patient was seen by Dr. Crawford on 7/13/2017 and was started on Proscar 5mg po qd.  Asia stated that patient is taking all medications as prescribed.  Asia stated that [facility] is visiting patient:  RN once a week and OT once a week.  Asia stated that patient has started to get meals on wheels effective 7/5/2017.  Asia reports that patient has not had any falls, SOB or cough since last conversation.  Asia reports that patient is monitoring his daily weights. Asia reports that patient's weight remains at 169 lbs for the last week.  Encouraged Asia/patient to call this RN if having any questions/concerns.  Will change to episodic.  Will continue to follow.  BEBA Hernandez, OPCM-RN

## 2017-07-24 ENCOUNTER — LAB VISIT (OUTPATIENT)
Dept: LAB | Facility: HOSPITAL | Age: 82
End: 2017-07-24
Attending: FAMILY MEDICINE
Payer: MEDICARE

## 2017-07-24 DIAGNOSIS — I13.10 MALIGNANT HYPERTENSIVE HEART AND RENAL DISEASE, WITH RENAL FAILURE: Primary | ICD-10-CM

## 2017-07-24 LAB
ALBUMIN SERPL BCP-MCNC: 3.9 G/DL
ALP SERPL-CCNC: 89 U/L
ALT SERPL W/O P-5'-P-CCNC: 18 U/L
ANION GAP SERPL CALC-SCNC: 10 MMOL/L
ANISOCYTOSIS BLD QL SMEAR: SLIGHT
AST SERPL-CCNC: 29 U/L
BASOPHILS # BLD AUTO: 0.03 K/UL
BASOPHILS NFR BLD: 0.3 %
BILIRUB SERPL-MCNC: 0.6 MG/DL
BUN SERPL-MCNC: 25 MG/DL
CALCIUM SERPL-MCNC: 9.4 MG/DL
CHLORIDE SERPL-SCNC: 103 MMOL/L
CO2 SERPL-SCNC: 25 MMOL/L
CREAT SERPL-MCNC: 1.6 MG/DL
DIFFERENTIAL METHOD: ABNORMAL
EOSINOPHIL # BLD AUTO: 0.2 K/UL
EOSINOPHIL NFR BLD: 1.7 %
ERYTHROCYTE [DISTWIDTH] IN BLOOD BY AUTOMATED COUNT: 14.1 %
EST. GFR  (AFRICAN AMERICAN): 43.2 ML/MIN/1.73 M^2
EST. GFR  (NON AFRICAN AMERICAN): 37.4 ML/MIN/1.73 M^2
GLUCOSE SERPL-MCNC: 114 MG/DL
HCT VFR BLD AUTO: 32.3 %
HGB BLD-MCNC: 10.5 G/DL
HYPOCHROMIA BLD QL SMEAR: ABNORMAL
LYMPHOCYTES # BLD AUTO: 4.9 K/UL
LYMPHOCYTES NFR BLD: 49.8 %
MCH RBC QN AUTO: 29.8 PG
MCHC RBC AUTO-ENTMCNC: 32.5 G/DL
MCV RBC AUTO: 92 FL
MONOCYTES # BLD AUTO: 1 K/UL
MONOCYTES NFR BLD: 10.5 %
NEUTROPHILS # BLD AUTO: 3.7 K/UL
NEUTROPHILS NFR BLD: 37.7 %
OVALOCYTES BLD QL SMEAR: ABNORMAL
PLATELET # BLD AUTO: 103 K/UL
PLATELET BLD QL SMEAR: ABNORMAL
PMV BLD AUTO: 10 FL
POIKILOCYTOSIS BLD QL SMEAR: SLIGHT
POTASSIUM SERPL-SCNC: 4.7 MMOL/L
PROT SERPL-MCNC: 7 G/DL
RBC # BLD AUTO: 3.52 M/UL
SODIUM SERPL-SCNC: 138 MMOL/L
WBC # BLD AUTO: 9.88 K/UL

## 2017-07-24 PROCEDURE — 85025 COMPLETE CBC W/AUTO DIFF WBC: CPT

## 2017-07-24 PROCEDURE — 80053 COMPREHEN METABOLIC PANEL: CPT

## 2017-07-27 ENCOUNTER — OFFICE VISIT (OUTPATIENT)
Dept: FAMILY MEDICINE | Facility: CLINIC | Age: 82
End: 2017-07-27
Payer: MEDICARE

## 2017-07-27 VITALS
HEART RATE: 60 BPM | BODY MASS INDEX: 25.73 KG/M2 | WEIGHT: 169.75 LBS | HEIGHT: 68 IN | SYSTOLIC BLOOD PRESSURE: 110 MMHG | DIASTOLIC BLOOD PRESSURE: 60 MMHG

## 2017-07-27 DIAGNOSIS — H61.21 IMPACTED CERUMEN OF RIGHT EAR: ICD-10-CM

## 2017-07-27 DIAGNOSIS — F03.90 DEMENTIA WITHOUT BEHAVIORAL DISTURBANCE, UNSPECIFIED DEMENTIA TYPE: ICD-10-CM

## 2017-07-27 DIAGNOSIS — I10 ESSENTIAL HYPERTENSION: Primary | ICD-10-CM

## 2017-07-27 DIAGNOSIS — I50.22 CHRONIC SYSTOLIC CONGESTIVE HEART FAILURE: ICD-10-CM

## 2017-07-27 PROCEDURE — 99214 OFFICE O/P EST MOD 30 MIN: CPT | Mod: S$GLB,,, | Performed by: FAMILY MEDICINE

## 2017-07-27 PROCEDURE — 1125F AMNT PAIN NOTED PAIN PRSNT: CPT | Mod: S$GLB,,, | Performed by: FAMILY MEDICINE

## 2017-07-27 PROCEDURE — 99499 UNLISTED E&M SERVICE: CPT | Mod: S$GLB,,, | Performed by: FAMILY MEDICINE

## 2017-07-27 PROCEDURE — 1159F MED LIST DOCD IN RCRD: CPT | Mod: S$GLB,,, | Performed by: FAMILY MEDICINE

## 2017-07-27 PROCEDURE — 99999 PR PBB SHADOW E&M-EST. PATIENT-LVL III: CPT | Mod: PBBFAC,,, | Performed by: FAMILY MEDICINE

## 2017-07-27 RX ORDER — METOPROLOL SUCCINATE 25 MG/1
25 TABLET, EXTENDED RELEASE ORAL DAILY
Qty: 90 TABLET | Refills: 3 | Status: ON HOLD | OUTPATIENT
Start: 2017-07-27 | End: 2017-11-15 | Stop reason: HOSPADM

## 2017-07-27 RX ORDER — FINASTERIDE 5 MG/1
5 TABLET, FILM COATED ORAL DAILY
Qty: 90 TABLET | Refills: 3 | Status: ON HOLD | OUTPATIENT
Start: 2017-07-27 | End: 2018-04-19 | Stop reason: HOSPADM

## 2017-07-27 NOTE — PROGRESS NOTES
Subjective:       Patient ID: Zeyad Woodard is a 89 y.o. male.    Chief Complaint: Follow-up and Hypertension    HPI  Review of Systems    Objective:      Physical Exam    Assessment:       1. Essential hypertension    2. Dementia without behavioral disturbance, unspecified dementia type    3. Chronic systolic congestive heart failure    4. Impacted cerumen of right ear        Plan:       ***

## 2017-07-27 NOTE — PROGRESS NOTES
Subjective:       Patient ID: Zeyad Woodard is a 89 y.o. male.    Chief Complaint: Follow-up and Hypertension    89 years old male came to the clinic for blood pressure check.  Blood pressure today stable.  No chest pain palpitations orthopnea or PND.  Patient with decreased kidney function but stable in comparison with previous reports.  Patient with dementia but he is able to go his activities of daily living.  Patient daughter is living near his home.      Hypertension   Pertinent negatives include no chest pain or palpitations.     Review of Systems   Constitutional: Negative.    HENT: Negative.    Eyes: Negative.    Respiratory: Negative.    Cardiovascular: Negative.  Negative for chest pain, palpitations and leg swelling.   Gastrointestinal: Negative.    Genitourinary: Negative.    Musculoskeletal: Positive for gait problem.   Skin: Negative.    Psychiatric/Behavioral: Negative.        Objective:      Physical Exam   Constitutional: He is oriented to person, place, and time. He appears well-developed and well-nourished. No distress.   HENT:   Head: Normocephalic and atraumatic.   Right Ear: External ear normal.   Left Ear: External ear normal.   Ears:    Nose: Nose normal.   Mouth/Throat: Oropharynx is clear and moist. No oropharyngeal exudate.   Eyes: Conjunctivae and EOM are normal. Pupils are equal, round, and reactive to light. Right eye exhibits no discharge. Left eye exhibits no discharge. No scleral icterus.   Neck: Normal range of motion. Neck supple. No JVD present. No tracheal deviation present. No thyromegaly present.   Cardiovascular: Normal rate, regular rhythm, normal heart sounds and intact distal pulses.  Exam reveals no gallop and no friction rub.    No murmur heard.  Pulmonary/Chest: Effort normal and breath sounds normal. No stridor. No respiratory distress. He has no wheezes. He has no rales. He exhibits no tenderness.   Abdominal: Soft. Bowel sounds are normal. He exhibits no  distension and no mass. There is no tenderness. There is no rebound and no guarding.   Musculoskeletal: Normal range of motion. He exhibits no edema or tenderness.   Lymphadenopathy:     He has no cervical adenopathy.   Neurological: He is alert and oriented to person, place, and time. He has normal reflexes. He displays normal reflexes. No cranial nerve deficit. He exhibits normal muscle tone. Coordination and gait abnormal.   Skin: Skin is warm and dry. No rash noted. He is not diaphoretic. No erythema. No pallor.   Psychiatric: He has a normal mood and affect. His behavior is normal. Judgment and thought content normal. His mood appears not anxious. His affect is not angry, not blunt, not labile and not inappropriate. Cognition and memory are impaired. He does not exhibit a depressed mood. He exhibits abnormal recent memory. He exhibits normal remote memory.   Nursing note and vitals reviewed.      Assessment:       1. Essential hypertension    2. Dementia without behavioral disturbance, unspecified dementia type    3. Chronic systolic congestive heart failure    4. Impacted cerumen of right ear        Plan:         Zeyad was seen today for follow-up and hypertension.    Diagnoses and all orders for this visit:    Essential hypertension  -     metoprolol succinate (TOPROL-XL) 25 MG 24 hr tablet; Take 1 tablet (25 mg total) by mouth once daily.  -     Comprehensive metabolic panel; Future  -     Lipid panel; Future  -     CBC auto differential; Future    Dementia without behavioral disturbance, unspecified dementia type    Chronic systolic congestive heart failure  -     metoprolol succinate (TOPROL-XL) 25 MG 24 hr tablet; Take 1 tablet (25 mg total) by mouth once daily.    Impacted cerumen of right ear  -     Ear wax removal    Other orders  -     finasteride (PROSCAR) 5 mg tablet; Take 1 tablet (5 mg total) by mouth once daily.    Continue monitoring blood pressure at home, low sodium diet.  Patient was  oriented to wait at least 6 months for Proscar .

## 2017-07-27 NOTE — PATIENT INSTRUCTIONS
For Caregivers: Safety Tips for Dementia Patients  The symptoms of dementia can sometimes lead to unsafe situations. Areas of special concern include driving and wandering away from home. You may also need to make changes in your loved ones living space. These can help protect your loved one even when you arent around.     Prevent driving by keeping your loved ones car keys in a secure place.   Discourage driving  Driving is often not safe for a person with dementia. It may even be illegal. Ask the healthcare provider whether its safe for your loved one to drive. If safety is in question, use these tips to prevent him or her from getting behind the wheel:  · Limit access to the car. Keep the keys with you or lock them away.  · Ask an authority figure, such as a healthcare provider or , to tell your loved one not to drive.  · Ask your healthcare provider about contacting the Department of Motor Vehicles.  Watch for wandering  People with dementia may wander away from the house and get lost. To keep your loved one safer, try these tips:  · Have your loved one wear an ID bracelet at all times. You can also enroll your loved one in the [employer]s Safe Return program.  · Install door chimes so you know when exterior doors are being opened.  · Ask neighbors to call you if they see your loved one out alone.  · Go with your loved one if he or she insists on leaving the house. Dont argue or yell. Instead, use distraction or gentle hints to get him or her to return home.  · People with dementia often have a reversed sleep-wake cycle, meaning that they can be up all night and wander away when you least expect it.    Make living spaces safe  Keep your loved one safe by simplifying his or her living space. This means reducing clutter and removing hazards. You may also want to get advice from an occupational therapist (home safety expert). Keep in mind that some changes may not be needed  right away. Focus on major safety concerns first.  In living spaces  Suggested safety steps include:  · Install smoke alarms and nightlights.  · Display emergency numbers and the home address near all phones.  · Install an answering machine so important messages arent missed.  · Reduce tripping hazards. Move electrical and phone cords out of the way. Place colored tape on the edges of steps.  In the bathroom  Suggested safety steps include:  · Store hair dryers, razors, and curling irons in a secure area.  · Remove poisons, such as  and nail polish remover.  · Keep medicines in a secure area, not in the medicine cabinet.  · Remove inside door locks so your loved one doesnt get locked inside.  In the kitchen  Suggested safety steps include:  · Unplug toasters and other appliances when not in use.  · Limit access to alcoholic beverages. These can make symptoms much worse.  · Remove or cover knobs on stoves and other appliances.  · Check food for spoilage. Your loved one may not know when food has gone bad.  Other areas of the home  Suggested safety steps include:  · Lock up hazardous substances, such as bleach, pesticides, and paint thinners.  · Keep pool or hot tub areas closed off.  · Set the hot water heater below 120°F (48.8°C).  · Keep a spare key outside the house in case your loved one locks you out.  Prevent fraud  People with dementia may be easy prey for dishonest salespeople or money scams. Try placing a No Solicitations sign on your loved ones front door. Add his or her phone number to the [employer]s Do Not Call list ([phone]). You should also limit access to credit cards and cash.  Can my loved one live alone?  Early on, people with dementia can often handle daily tasks with little or no help. At some point, though, it will no longer be safe for them to be on their own. The timing for this is different for each person. But problems such as forgetting to eat, bathe,  or take medicines can all be signs that more supervision is needed. If you have concerns, be sure to talk with your loved ones healthcare provider.      Date Last Reviewed: 10/2/2015  © 2898-6612 [employer]. [address] [place] [address], [place] [address]. All rights reserved. This information is not intended as a substitute for professional medical care. Always follow your healthcare professional's instructions.

## 2017-07-28 ENCOUNTER — OUTPATIENT CASE MANAGEMENT (OUTPATIENT)
Dept: ADMINISTRATIVE | Facility: OTHER | Age: 82
End: 2017-07-28

## 2017-07-28 NOTE — PROGRESS NOTES
Talked to daughter Asia to update plan of care for OPCM.  Asia stated that patient did attend his MD visit with Dr. Izaguirre yesterday.  Asia stated that he is taking all medications as prescribed.  Asia stated that patient has not had any falls, SOB or cough since last conversation.  Asia stated that [employer] is delivering meals to patient everyday.  Asia stated that patient is continuing to weigh daily and his weight within the past week ranged from 169-170 lbs.  Asia reports that patient's mood has been getting better.  Asia stated that he is occassionaly having some urinary accidents and is taking his Flomax and Proscar as directed.  Encouraged Asia to call this RN if having any questions/concerns.  Will continue to follow.  BEBA Hernandez, OPCM-RN

## 2017-08-07 DIAGNOSIS — N40.0 BENIGN NON-NODULAR PROSTATIC HYPERPLASIA WITHOUT LOWER URINARY TRACT SYMPTOMS: ICD-10-CM

## 2017-08-07 RX ORDER — TAMSULOSIN HYDROCHLORIDE 0.4 MG/1
0.4 CAPSULE ORAL DAILY
Qty: 90 CAPSULE | Refills: 3 | Status: ON HOLD | OUTPATIENT
Start: 2017-08-07 | End: 2018-04-19 | Stop reason: HOSPADM

## 2017-08-07 RX ORDER — LEVOTHYROXINE SODIUM 100 UG/1
100 TABLET ORAL DAILY
Qty: 90 TABLET | Refills: 3 | Status: ON HOLD | OUTPATIENT
Start: 2017-08-07 | End: 2018-04-19 | Stop reason: HOSPADM

## 2017-08-11 ENCOUNTER — OUTPATIENT CASE MANAGEMENT (OUTPATIENT)
Dept: ADMINISTRATIVE | Facility: OTHER | Age: 82
End: 2017-08-11

## 2017-08-11 NOTE — PROGRESS NOTES
Talked to patient to update plan of care for OPCM.  Patient stated that he has not had any falls, SOB or cough since last conversation.  Patient stated that he is continuing to receive Meals on Wheels.  Patient stated that he is feeling well at this time.  Patient stated that he is taking all medications as prescribed.  Encouraged patient to call this RN if having any questions/concerns.  Since all needs are met at this time, patient will be dis-enrolled at this time.  Case closed.  BEBA Hernandez, OPCM-RN

## 2017-09-19 ENCOUNTER — OFFICE VISIT (OUTPATIENT)
Dept: PHYSICAL MEDICINE AND REHAB | Facility: CLINIC | Age: 82
End: 2017-09-19
Payer: MEDICARE

## 2017-09-19 VITALS
HEART RATE: 64 BPM | DIASTOLIC BLOOD PRESSURE: 70 MMHG | BODY MASS INDEX: 26.28 KG/M2 | HEIGHT: 68 IN | WEIGHT: 173.38 LBS | SYSTOLIC BLOOD PRESSURE: 187 MMHG

## 2017-09-19 DIAGNOSIS — M54.42 CHRONIC BILATERAL LOW BACK PAIN WITH BILATERAL SCIATICA: Primary | ICD-10-CM

## 2017-09-19 DIAGNOSIS — G62.9 POLYNEUROPATHY: ICD-10-CM

## 2017-09-19 DIAGNOSIS — R29.6 FREQUENT FALLS: ICD-10-CM

## 2017-09-19 DIAGNOSIS — M54.16 RIGHT LUMBAR RADICULOPATHY: ICD-10-CM

## 2017-09-19 DIAGNOSIS — G89.29 CHRONIC LOW BACK PAIN WITH SCIATICA, SCIATICA LATERALITY UNSPECIFIED, UNSPECIFIED BACK PAIN LATERALITY: ICD-10-CM

## 2017-09-19 DIAGNOSIS — M54.41 CHRONIC BILATERAL LOW BACK PAIN WITH BILATERAL SCIATICA: Primary | ICD-10-CM

## 2017-09-19 DIAGNOSIS — G89.29 CHRONIC BILATERAL LOW BACK PAIN WITH BILATERAL SCIATICA: Primary | ICD-10-CM

## 2017-09-19 DIAGNOSIS — M54.40 CHRONIC LOW BACK PAIN WITH SCIATICA, SCIATICA LATERALITY UNSPECIFIED, UNSPECIFIED BACK PAIN LATERALITY: ICD-10-CM

## 2017-09-19 DIAGNOSIS — M54.16 LUMBAR RADICULOPATHY: ICD-10-CM

## 2017-09-19 DIAGNOSIS — Z79.891 USE OF OPIATES FOR THERAPEUTIC PURPOSES: ICD-10-CM

## 2017-09-19 DIAGNOSIS — R26.89 BALANCE PROBLEM: ICD-10-CM

## 2017-09-19 PROCEDURE — 1159F MED LIST DOCD IN RCRD: CPT | Mod: S$GLB,,, | Performed by: PHYSICAL MEDICINE & REHABILITATION

## 2017-09-19 PROCEDURE — 1125F AMNT PAIN NOTED PAIN PRSNT: CPT | Mod: S$GLB,,, | Performed by: PHYSICAL MEDICINE & REHABILITATION

## 2017-09-19 PROCEDURE — 99214 OFFICE O/P EST MOD 30 MIN: CPT | Mod: S$GLB,,, | Performed by: PHYSICAL MEDICINE & REHABILITATION

## 2017-09-19 PROCEDURE — 3008F BODY MASS INDEX DOCD: CPT | Mod: S$GLB,,, | Performed by: PHYSICAL MEDICINE & REHABILITATION

## 2017-09-19 PROCEDURE — 99999 PR PBB SHADOW E&M-EST. PATIENT-LVL III: CPT | Mod: PBBFAC,,, | Performed by: PHYSICAL MEDICINE & REHABILITATION

## 2017-09-19 PROCEDURE — 99499 UNLISTED E&M SERVICE: CPT | Mod: S$GLB,,, | Performed by: PHYSICAL MEDICINE & REHABILITATION

## 2017-09-19 RX ORDER — OXYCODONE AND ACETAMINOPHEN 7.5; 325 MG/1; MG/1
1 TABLET ORAL EVERY 8 HOURS PRN
Qty: 90 TABLET | Refills: 0 | Status: ON HOLD | OUTPATIENT
Start: 2017-10-19 | End: 2017-11-15

## 2017-09-19 RX ORDER — OXYCODONE AND ACETAMINOPHEN 7.5; 325 MG/1; MG/1
1 TABLET ORAL EVERY 8 HOURS PRN
Qty: 90 TABLET | Refills: 0 | Status: SHIPPED | OUTPATIENT
Start: 2017-09-19 | End: 2017-10-19

## 2017-09-19 NOTE — PROGRESS NOTES
"   Subjective:       Patient ID: Zeyad Woodard is a 89 y.o. male.    Chief Complaint: Hip Pain (bilateral)    Back Pain   Associated symptoms include leg pain. Pertinent negatives include no abdominal pain, chest pain, fever or headaches.   Fall   Pertinent negatives include no abdominal pain, fever or headaches.   Leg Pain      Hip Pain      Mr. Woodard returns to clinic for chronic back and right leg pain.   He come in manual WC pushed by his daughter.  LCV was 06/13/17.   Today, he complains about worsening of  back and Right hip and leg pain.    Current right hip/buttock pain is 8/10, worst pain is 8-9/10.   He states that he has not that much pain sitting, but standing and/or walking pain increases to 8-9/10.   He has no pain at night, sleeps well, and pain is not awaking him at night.   Pain is dull, ache, soreness, but less intense, , no leg weakness,  Walking makes pain in  back and leg worse, sitting improves pain,   he gets tired easily and has to lean onto something.   Can walk few steps in house, not quite a room length , using RW.   No new  falls, no injury.  He  Currently takes Neurontin 100-200 mg, only at evening, secondary to increased sleepiness.   Takes "pain pill ", Oxycodone 7.5/325 mg ,3 x/ day, that helps.   His daughter is dispensing his medications, and supervises him.   He states that he tolerates well medications, and has no side effects.   He is dragging his feet, using RW inside house, daughter moved all small rugs that he could possible tripped..  He wants again to get LAURA.  Here for follow up, re evaluation and chronic pain management with opiates.    Past Medical History:   Diagnosis Date    Anemia     Anticoagulant long-term use     Atrial fibrillation     BPH (benign prostatic hypertrophy)     Cancer     CHF (congestive heart failure)     Coronary artery disease     Encounter for blood transfusion     GI bleed     cecum angiectasia s/p cautery    Hypertension     " Hypothyroidism     Polyneuropathy     Posture imbalance     due to neuropathy    Right posterior capsular opacification 2017    SSS (sick sinus syndrome) 2015    s/p pacemaker       Past Surgical History:   Procedure Laterality Date    CARDIAC SURGERY      CATARACT EXTRACTION W/  INTRAOCULAR LENS IMPLANT Right 2010        CATARACT EXTRACTION W/  INTRAOCULAR LENS IMPLANT Left 2004        cataract surgery      COLONOSCOPY  04    COLONOSCOPY N/A 2/15/2016    Procedure: COLONOSCOPY;  Surgeon: Stanton Kirk MD;  Location: [facility] ([address] [place] [address]);  Service: Endoscopy;  Laterality: N/A;    EYE SURGERY      HERNIA REPAIR      inguinal hernia repair      Open heart surgery for pericardiectomy      for calcific constrictive pericarditis    UMBILICAL HERNIA REPAIR      Yag      Left Eye       Family History   Problem Relation Age of Onset    Stroke Mother          Cancer Father      lung;     Diabetes Sister     Coronary artery disease Sister     Peripheral vascular disease Sister     Amblyopia Neg Hx     Blindness Neg Hx     Cataracts Neg Hx     Glaucoma Neg Hx     Hypertension Neg Hx     Macular degeneration Neg Hx     Retinal detachment Neg Hx     Strabismus Neg Hx     Thyroid disease Neg Hx     Prostate cancer Neg Hx     Kidney disease Neg Hx        Social History     Social History    Marital status:      Spouse name: N/A    Number of children: N/A    Years of education: N/A     Social History Main Topics    Smoking status: Passive Smoke Exposure - Never Smoker    Smokeless tobacco: Never Used    Alcohol use No    Drug use: No    Sexual activity: No     Other Topics Concern    None     Social History Narrative    None       Current Outpatient Prescriptions   Medication Sig Dispense Refill    ASCORBATE CALCIUM (VITAMIN C ORAL) Take 1 tablet by mouth once daily.       aspirin (ECOTRIN) 81 MG EC tablet Take 1 tablet (81  mg total) by mouth once daily.  0    atorvastatin (LIPITOR) 20 MG tablet Take 1 tablet (20 mg total) by mouth nightly. 90 tablet 3    calcium 500 mg Tab Take 1 tablet by mouth Daily.      clotrimazole-betamethasone (LOTRISONE) lotion Apply topically 2 (two) times daily. (Patient taking differently: Apply topically as needed. ) 30 mL 0    cyanocobalamin (VITAMIN B-12) 1000 MCG tablet Take 100 mcg by mouth once daily.      donepezil (ARICEPT) 5 MG tablet Take 1 tablet (5 mg total) by mouth every evening. 30 tablet 11    ferrous sulfate 325 mg (65 mg iron) Tab tablet Take 325 mg by mouth once daily.      finasteride (PROSCAR) 5 mg tablet Take 1 tablet (5 mg total) by mouth once daily. 90 tablet 3    furosemide (LASIX) 20 MG tablet Take 20 mg by mouth 2 (two) times daily.      gabapentin (NEURONTIN) 100 MG capsule Take 100 mg by mouth 2 (two) times daily.      levothyroxine (SYNTHROID) 100 MCG tablet Take 1 tablet (100 mcg total) by mouth once daily. 90 tablet 3    metoprolol succinate (TOPROL-XL) 25 MG 24 hr tablet Take 1 tablet (25 mg total) by mouth once daily. 90 tablet 3    MULTIVITS-MINERALS/FA/LYCOPENE (ONE-A-DAY MEN'S MULTIVITAMIN ORAL) Take 1 tablet by mouth once daily.      oxycodone-acetaminophen (PERCOCET) 7.5-325 mg per tablet Take 1 tablet by mouth every 8 (eight) hours as needed for Pain. 90 tablet 0    [START ON 10/19/2017] oxycodone-acetaminophen (PERCOCET) 7.5-325 mg per tablet Take 1 tablet by mouth every 8 (eight) hours as needed for Pain. 90 tablet 0    senna-docusate 8.6-50 mg (PERICOLACE) 8.6-50 mg per tablet Take 1 tablet by mouth 2 (two) times daily.      tamsulosin (FLOMAX) 0.4 mg Cp24 Take 1 capsule (0.4 mg total) by mouth once daily. 90 capsule 3     No current facility-administered medications for this visit.        Review of patient's allergies indicates:   Allergen Reactions    Penicillins Hives and Other (See Comments)     Fever         Review of Systems   Constitutional:  Negative for activity change, appetite change, chills, diaphoresis, fatigue, fever and unexpected weight change.   HENT: Negative for trouble swallowing and voice change.    Eyes: Negative for pain and visual disturbance.   Respiratory: Negative for chest tightness, shortness of breath and wheezing.    Cardiovascular: Negative for chest pain, palpitations and leg swelling.   Gastrointestinal: Negative for abdominal pain, constipation and diarrhea.   Genitourinary: Negative for difficulty urinating, frequency and urgency.   Musculoskeletal: Positive for back pain. Negative for arthralgias, joint swelling, myalgias, neck pain (denies neck pain) and neck stiffness. Gait problem: complains about poor balance.   Skin: Negative for rash and wound.   Neurological: Negative for dizziness, facial asymmetry, speech difficulty, light-headedness and headaches.   Hematological: Negative for adenopathy.   Psychiatric/Behavioral: Negative for agitation, behavioral problems, confusion, decreased concentration, dysphoric mood and sleep disturbance.       Objective:      Physical Exam    GENERAL: The patient is alert, oriented, pleasant.   MUSCULOSKELETAL:   Gait NT, came in  WC pushed by daughter.    Cervical spine full range of motion in all planes.   Lumbar spine, ROM NT- sitting in WC.   Straight leg raising Negative bilaterally.   Full range of motion in all joints x4 extremities.   Muscle strength 4/5 in right UE/LE, and 5/5 in Left UE/LE,   Right ankle PF 3+, DF2-, left ankle DF/PF 4+/5.  No  joint laxity throughout x4 extremities.   NEUROLOGIC: Cranial nerves II through XII intact.   Deep tendon reflexes is normal, +2 in the upper and decreased  In lower extremities bilaterally.   Muscle tone is normal.   Sensory is intact to light touch and pinprick throughout x4 extremities.     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   Moderate diffuse cerebral volume loss, similar to prior exam.     Xray of Lumbar spine:  Convex right curvature of the lumbar spine.   There is step wise grade 1 retrolisthesis of L2 on L3 through L4 on L5.   There is degenerative change at all levels allowing for degenerative change and spinal curvature lumbar vertebral body heights   and contours are within normal limits without evidence for acute fracture. Facet degenerative change at levels.   Atherosclerotic ectatic aorta. No definite spondylolysis with limitation of the lower lumbar pars secondary to positioning and overlapping   structures. A cluster of small rounded high density foci projects over the right upper abdomen concerning for gallstones within the gallbladder.   There is however interspersed additional small scattered calcifications within the upper abdomen   bilaterally, clinical correlation and further evaluation as warranted.     CT of lumbar spine showed:  T12/L1: Degenerative changes.   L1-2: Diffuse disk bulge asymmetric to the right.  L2-3: Diffuse disk bulge.  L3-4: There is posterior disk osteophyte complex, facet arthropathy, ligamentum flavum hypertrophy with mild central canal stenosis.   There is also moderate left neuroforaminal narrowing and mild right neuroforaminal narrowing.  L4-5: There is posterior disk osteophyte complex, bilateral facet arthropathy, ligamentum flavum hypertrophy with resultant mild central canal stenosis and moderate bilateral neuroforaminal narrowing, right greater than left.  L5-S1: Diffuse disk bulge asymmetric to the left.   There is ligamentum flavum hypertrophy with facet arthropathy and moderate right neuroforaminal narrowing.       Assessment:       1. Chronic bilateral low back pain with bilateral sciatica    2. Lumbar radiculopathy    3. Frequent falls    4. Use of opiates for therapeutic purposes    5. Right lumbar radiculopathy    6. Chronic low back pain with sciatica, sciatica  laterality unspecified, unspecified back pain laterality    7. Balance problem    8. Polyneuropathy        Plan:       Chronic bilateral low back pain with bilateral sciatica  -     oxycodone-acetaminophen (PERCOCET) 7.5-325 mg per tablet; Take 1 tablet by mouth every 8 (eight) hours as needed for Pain.  Dispense: 90 tablet; Refill: 0  -     oxycodone-acetaminophen (PERCOCET) 7.5-325 mg per tablet; Take 1 tablet by mouth every 8 (eight) hours as needed for Pain.  Dispense: 90 tablet; Refill: 0  -     Procedure Order to [facility]; Future    Lumbar radiculopathy  -     oxycodone-acetaminophen (PERCOCET) 7.5-325 mg per tablet; Take 1 tablet by mouth every 8 (eight) hours as needed for Pain.  Dispense: 90 tablet; Refill: 0  -     oxycodone-acetaminophen (PERCOCET) 7.5-325 mg per tablet; Take 1 tablet by mouth every 8 (eight) hours as needed for Pain.  Dispense: 90 tablet; Refill: 0  -     Procedure Order to [facility]; Future    Frequent falls  -     oxycodone-acetaminophen (PERCOCET) 7.5-325 mg per tablet; Take 1 tablet by mouth every 8 (eight) hours as needed for Pain.  Dispense: 90 tablet; Refill: 0  -     oxycodone-acetaminophen (PERCOCET) 7.5-325 mg per tablet; Take 1 tablet by mouth every 8 (eight) hours as needed for Pain.  Dispense: 90 tablet; Refill: 0  -     Procedure Order to [facility]; Future    Use of opiates for therapeutic purposes  -     oxycodone-acetaminophen (PERCOCET) 7.5-325 mg per tablet; Take 1 tablet by mouth every 8 (eight) hours as needed for Pain.  Dispense: 90 tablet; Refill: 0  -     oxycodone-acetaminophen (PERCOCET) 7.5-325 mg per tablet; Take 1 tablet by mouth every 8 (eight) hours as needed for Pain.  Dispense: 90 tablet; Refill: 0  -     Procedure Order to [facility]; Future    Right lumbar radiculopathy  -     oxycodone-acetaminophen (PERCOCET) 7.5-325 mg per tablet; Take 1 tablet by mouth every 8 (eight) hours as needed for Pain.   Dispense: 90 tablet; Refill: 0  -     oxycodone-acetaminophen (PERCOCET) 7.5-325 mg per tablet; Take 1 tablet by mouth every 8 (eight) hours as needed for Pain.  Dispense: 90 tablet; Refill: 0  -     Procedure Order to [facility]; Future    Chronic low back pain with sciatica, sciatica laterality unspecified, unspecified back pain laterality  -     oxycodone-acetaminophen (PERCOCET) 7.5-325 mg per tablet; Take 1 tablet by mouth every 8 (eight) hours as needed for Pain.  Dispense: 90 tablet; Refill: 0  -     oxycodone-acetaminophen (PERCOCET) 7.5-325 mg per tablet; Take 1 tablet by mouth every 8 (eight) hours as needed for Pain.  Dispense: 90 tablet; Refill: 0  -     Procedure Order to [facility]; Future    Balance problem  -     oxycodone-acetaminophen (PERCOCET) 7.5-325 mg per tablet; Take 1 tablet by mouth every 8 (eight) hours as needed for Pain.  Dispense: 90 tablet; Refill: 0  -     oxycodone-acetaminophen (PERCOCET) 7.5-325 mg per tablet; Take 1 tablet by mouth every 8 (eight) hours as needed for Pain.  Dispense: 90 tablet; Refill: 0  -     Procedure Order to [facility]; Future    Polyneuropathy  -     oxycodone-acetaminophen (PERCOCET) 7.5-325 mg per tablet; Take 1 tablet by mouth every 8 (eight) hours as needed for Pain.  Dispense: 90 tablet; Refill: 0  -     oxycodone-acetaminophen (PERCOCET) 7.5-325 mg per tablet; Take 1 tablet by mouth every 8 (eight) hours as needed for Pain.  Dispense: 90 tablet; Refill: 0  -     Procedure Order to [facility]; Future      Patient with chronic back pain, secondary to lumbar spondylosis, with mild spinal stenosis and possible Right L5 radiculopathy,      1. Chronic pain management:   For neuropathic pain,  will resume taking Neurontin, lower dose  100 mg in am+ 100 mg at pm + 300 mg at  Bedtime  Will increase Percocet to 7.5/ 325 mg , one PO TID.      2. Refer to [facility], for Rt L5-S1 TF LAURA.     Follow up in 2  months.     Total time spent face to face with patient was 25 minutes.   More than 50% of that time was spent in counseling on diagnosis , prognosis and treatment options.   I also caunsel patient  on common and most usual side effect of prescribed medications, given his age.  His daughter is dispensing his medications, and supervises him.   Risk and benefits of opiates, possible risk of developing opiate dependence and tolerance, need of strict compliance with prescribed medications.  I reviewed Primary care , and other specialty's notes to better coordinate patient's  care.   All questions were answered, and patient voiced understanding.

## 2017-09-26 ENCOUNTER — TELEPHONE (OUTPATIENT)
Dept: PAIN MEDICINE | Facility: CLINIC | Age: 82
End: 2017-09-26

## 2017-09-26 DIAGNOSIS — M54.16 LUMBAR RADICULOPATHY: Primary | ICD-10-CM

## 2017-09-26 NOTE — TELEPHONE ENCOUNTER
Spoke with patient's daughter (Elenita) regarding scheduling procedure. Procedure instructions for a  Rt L5-S1 TF LAURA on 10/3/17 given. Patient daughter was informed that she will get a call the day before the procedure with a time of arrival. Patient's daughter verbalized understanding.

## 2017-09-29 ENCOUNTER — TELEPHONE (OUTPATIENT)
Dept: PAIN MEDICINE | Facility: HOSPITAL | Age: 82
End: 2017-09-29

## 2017-10-02 NOTE — DISCHARGE INSTRUCTIONS
Recovery After Procedural Sedation (Adult)  You have been given medicine by vein to make you sleep during your surgery. This may have included both a pain medicine and sleeping medicine. Most of the effects have worn off. But you may still have some drowsiness for the next 6 to 8 hours.  Home care  Follow these guidelines when you get home:  · For the next 8 hours, you should be watched by a responsible adult. This person should make sure your condition is not getting worse.  · Don't drink any alcohol for the next 24 hours.  · Don't drive, operate dangerous machinery, or make important business or personal decisions during the next 24 hours.  Note: Your healthcare provider may tell you not to take any medicine by mouth for pain or sleep in the next 4 hours. These medicines may react with the medicines you were given in the hospital. This could cause a much stronger response than usual.  Follow-up care  Follow up with your healthcare provider if you are not alert and back to your usual level of activity within 12 hours.  When to seek medical advice  Call your healthcare provider right away if any of these occur:  · Drowsiness gets worse  · Weakness or dizziness gets worse  · Repeated vomiting  · You can't be awakened   Date Last Reviewed: 10/18/2016  © 4575-6561 [employer]. [address] [place] [address], [place] [address]. All rights reserved. This information is not intended as a substitute for professional medical care. Always follow your healthcare professional's instructions.      Home Care Instructions Pain Management:    1.  DIET:    You may resume your normal diet today.    2.  BATHING:    You may shower with luke warm water.    3.  DRESSING:    You may remove your bandage today.    4.  ACTIVITY LEVEL:      You may resume your normal activities 24 hours after your procedure.    5.  MEDICATIONS:    You may resume your normal medications today.    6.  SPECIAL INSTRUCTIONS:    No heat to the injection  site for 24 hours including bath or shower, heating pad, moist heat or hot tubs.    Use an ice pack to the injection site for any pain or discomfort.  Apply ice packs for 20 minute intervals as needed.    If you have received any sedatives by mouth today, you can not drive for 12 hours.    If you have received sedation through an IV, you can not drive for 24 hours.    PLEASE CALL YOUR DOCTOR FOR THE FOLLOWIN.  Redness or swelling around the injection site.  2.  Fever of 101 degrees.  3.  Drainage (pus) from the injection site.  4.  For any continuous bleeding (some dried blood over the incision is normal.)    FOR EMERGENCIES:    If any unusual problems or difficulties occur during clinic hours, call [phone] or dial [phone].    Follow up with with your physician in 2-3 weeks.

## 2017-10-03 ENCOUNTER — LAB VISIT (OUTPATIENT)
Dept: LAB | Facility: HOSPITAL | Age: 82
End: 2017-10-03
Attending: INTERNAL MEDICINE
Payer: MEDICARE

## 2017-10-03 ENCOUNTER — HOSPITAL ENCOUNTER (OUTPATIENT)
Facility: HOSPITAL | Age: 82
Discharge: HOME | End: 2017-10-03
Attending: ANESTHESIOLOGY | Admitting: ANESTHESIOLOGY
Payer: MEDICARE

## 2017-10-03 ENCOUNTER — SURGERY (OUTPATIENT)
Age: 82
End: 2017-10-03

## 2017-10-03 ENCOUNTER — TELEPHONE (OUTPATIENT)
Dept: PAIN MEDICINE | Facility: CLINIC | Age: 82
End: 2017-10-03

## 2017-10-03 VITALS
SYSTOLIC BLOOD PRESSURE: 170 MMHG | DIASTOLIC BLOOD PRESSURE: 70 MMHG | HEIGHT: 68 IN | WEIGHT: 174 LBS | BODY MASS INDEX: 26.37 KG/M2 | TEMPERATURE: 99 F | OXYGEN SATURATION: 97 % | RESPIRATION RATE: 15 BRPM | HEART RATE: 61 BPM

## 2017-10-03 DIAGNOSIS — M54.17 LUMBOSACRAL RADICULOPATHY: Primary | ICD-10-CM

## 2017-10-03 DIAGNOSIS — M51.36 DDD (DEGENERATIVE DISC DISEASE), LUMBAR: ICD-10-CM

## 2017-10-03 DIAGNOSIS — G89.29 CHRONIC PAIN: ICD-10-CM

## 2017-10-03 DIAGNOSIS — D50.0 IRON DEFICIENCY ANEMIA DUE TO CHRONIC BLOOD LOSS: ICD-10-CM

## 2017-10-03 LAB
ALBUMIN SERPL BCP-MCNC: 3.8 G/DL
ALP SERPL-CCNC: 92 U/L
ALT SERPL W/O P-5'-P-CCNC: 19 U/L
ANION GAP SERPL CALC-SCNC: 11 MMOL/L
ANISOCYTOSIS BLD QL SMEAR: SLIGHT
AST SERPL-CCNC: 29 U/L
BASOPHILS # BLD AUTO: ABNORMAL K/UL
BASOPHILS NFR BLD: 0 %
BILIRUB SERPL-MCNC: 0.8 MG/DL
BUN SERPL-MCNC: 23 MG/DL
BURR CELLS BLD QL SMEAR: ABNORMAL
CALCIUM SERPL-MCNC: 9.2 MG/DL
CHLORIDE SERPL-SCNC: 102 MMOL/L
CO2 SERPL-SCNC: 26 MMOL/L
CREAT SERPL-MCNC: 1.5 MG/DL
DACRYOCYTES BLD QL SMEAR: ABNORMAL
DIFFERENTIAL METHOD: ABNORMAL
EOSINOPHIL # BLD AUTO: ABNORMAL K/UL
EOSINOPHIL NFR BLD: 2 %
ERYTHROCYTE [DISTWIDTH] IN BLOOD BY AUTOMATED COUNT: 14.2 %
EST. GFR  (AFRICAN AMERICAN): 46.7 ML/MIN/1.73 M^2
EST. GFR  (NON AFRICAN AMERICAN): 40.4 ML/MIN/1.73 M^2
FERRITIN SERPL-MCNC: 144 NG/ML
GLUCOSE SERPL-MCNC: 99 MG/DL
HCT VFR BLD AUTO: 31.4 %
HGB BLD-MCNC: 10.5 G/DL
IRON SERPL-MCNC: 55 UG/DL
LYMPHOCYTES # BLD AUTO: ABNORMAL K/UL
LYMPHOCYTES NFR BLD: 48 %
MCH RBC QN AUTO: 30.8 PG
MCHC RBC AUTO-ENTMCNC: 33.4 G/DL
MCV RBC AUTO: 92 FL
MONOCYTES # BLD AUTO: ABNORMAL K/UL
MONOCYTES NFR BLD: 5 %
NEUTROPHILS NFR BLD: 43 %
NEUTS BAND NFR BLD MANUAL: 2 %
OVALOCYTES BLD QL SMEAR: ABNORMAL
PLATELET # BLD AUTO: 113 K/UL
PMV BLD AUTO: 9.8 FL
POIKILOCYTOSIS BLD QL SMEAR: SLIGHT
POLYCHROMASIA BLD QL SMEAR: ABNORMAL
POTASSIUM SERPL-SCNC: 4.5 MMOL/L
PROT SERPL-MCNC: 7 G/DL
RBC # BLD AUTO: 3.41 M/UL
SATURATED IRON: 17 %
SODIUM SERPL-SCNC: 139 MMOL/L
TOTAL IRON BINDING CAPACITY: 321 UG/DL
TRANSFERRIN SERPL-MCNC: 217 MG/DL
WBC # BLD AUTO: 10.45 K/UL

## 2017-10-03 PROCEDURE — 64483 NJX AA&/STRD TFRM EPI L/S 1: CPT | Mod: RT,,, | Performed by: ANESTHESIOLOGY

## 2017-10-03 PROCEDURE — 63600175 PHARM REV CODE 636 W HCPCS: Performed by: ANESTHESIOLOGY

## 2017-10-03 PROCEDURE — 64483 NJX AA&/STRD TFRM EPI L/S 1: CPT | Performed by: ANESTHESIOLOGY

## 2017-10-03 PROCEDURE — 85027 COMPLETE CBC AUTOMATED: CPT

## 2017-10-03 PROCEDURE — 82728 ASSAY OF FERRITIN: CPT

## 2017-10-03 PROCEDURE — 25000003 PHARM REV CODE 250: Performed by: ANESTHESIOLOGY

## 2017-10-03 PROCEDURE — 85060 BLOOD SMEAR INTERPRETATION: CPT | Mod: ,,, | Performed by: PATHOLOGY

## 2017-10-03 PROCEDURE — 80053 COMPREHEN METABOLIC PANEL: CPT

## 2017-10-03 PROCEDURE — 25500020 PHARM REV CODE 255: Performed by: ANESTHESIOLOGY

## 2017-10-03 PROCEDURE — 99152 MOD SED SAME PHYS/QHP 5/>YRS: CPT | Mod: ,,, | Performed by: ANESTHESIOLOGY

## 2017-10-03 PROCEDURE — 36415 COLL VENOUS BLD VENIPUNCTURE: CPT | Mod: PO

## 2017-10-03 PROCEDURE — 83540 ASSAY OF IRON: CPT

## 2017-10-03 PROCEDURE — 85007 BL SMEAR W/DIFF WBC COUNT: CPT

## 2017-10-03 RX ORDER — FENTANYL CITRATE 50 UG/ML
INJECTION, SOLUTION INTRAMUSCULAR; INTRAVENOUS
Status: DISCONTINUED | OUTPATIENT
Start: 2017-10-03 | End: 2017-10-03 | Stop reason: HOSPADM

## 2017-10-03 RX ORDER — LIDOCAINE HYDROCHLORIDE 5 MG/ML
INJECTION, SOLUTION INFILTRATION; PERINEURAL
Status: DISCONTINUED | OUTPATIENT
Start: 2017-10-03 | End: 2017-10-03 | Stop reason: HOSPADM

## 2017-10-03 RX ORDER — LIDOCAINE HYDROCHLORIDE 10 MG/ML
INJECTION INFILTRATION; PERINEURAL
Status: DISCONTINUED | OUTPATIENT
Start: 2017-10-03 | End: 2017-10-03 | Stop reason: HOSPADM

## 2017-10-03 RX ORDER — TRIAMCINOLONE ACETONIDE 40 MG/ML
INJECTION, SUSPENSION INTRA-ARTICULAR; INTRAMUSCULAR
Status: DISCONTINUED | OUTPATIENT
Start: 2017-10-03 | End: 2017-10-03 | Stop reason: HOSPADM

## 2017-10-03 RX ORDER — MIDAZOLAM HYDROCHLORIDE 1 MG/ML
INJECTION, SOLUTION INTRAMUSCULAR; INTRAVENOUS
Status: DISCONTINUED | OUTPATIENT
Start: 2017-10-03 | End: 2017-10-03 | Stop reason: HOSPADM

## 2017-10-03 RX ORDER — SODIUM CHLORIDE 9 MG/ML
500 INJECTION, SOLUTION INTRAVENOUS CONTINUOUS
Status: DISCONTINUED | OUTPATIENT
Start: 2017-10-03 | End: 2017-10-03 | Stop reason: HOSPADM

## 2017-10-03 RX ADMIN — TRIAMCINOLONE ACETONIDE 40 MG: 40 INJECTION, SUSPENSION INTRA-ARTICULAR; INTRAMUSCULAR at 12:10

## 2017-10-03 RX ADMIN — IOHEXOL 5 ML: 300 INJECTION, SOLUTION INTRAVENOUS at 12:10

## 2017-10-03 RX ADMIN — MIDAZOLAM 1 MG: 1 INJECTION INTRAMUSCULAR; INTRAVENOUS at 12:10

## 2017-10-03 RX ADMIN — LIDOCAINE HYDROCHLORIDE 5 ML: 10 INJECTION, SOLUTION INFILTRATION; PERINEURAL at 12:10

## 2017-10-03 RX ADMIN — LIDOCAINE HYDROCHLORIDE 5 ML: 5 INJECTION, SOLUTION INFILTRATION; PERINEURAL at 12:10

## 2017-10-03 RX ADMIN — FENTANYL CITRATE 25 MCG: 50 INJECTION, SOLUTION INTRAMUSCULAR; INTRAVENOUS at 12:10

## 2017-10-03 RX ADMIN — SODIUM CHLORIDE 500 ML: 900 INJECTION, SOLUTION INTRAVENOUS at 12:10

## 2017-10-03 NOTE — H&P (VIEW-ONLY)
"   Subjective:       Patient ID: Zeyad oWodard is a 89 y.o. male.    Chief Complaint: Hip Pain (bilateral)    Back Pain   Associated symptoms include leg pain. Pertinent negatives include no abdominal pain, chest pain, fever or headaches.   Fall   Pertinent negatives include no abdominal pain, fever or headaches.   Leg Pain      Hip Pain      Mr. Woodard returns to clinic for chronic back and right leg pain.   He come in manual WC pushed by his daughter.  LCV was 06/13/17.   Today, he complains about worsening of  back and Right hip and leg pain.    Current right hip/buttock pain is 8/10, worst pain is 8-9/10.   He states that he has not that much pain sitting, but standing and/or walking pain increases to 8-9/10.   He has no pain at night, sleeps well, and pain is not awaking him at night.   Pain is dull, ache, soreness, but less intense, , no leg weakness,  Walking makes pain in  back and leg worse, sitting improves pain,   he gets tired easily and has to lean onto something.   Can walk few steps in house, not quite a room length , using RW.   No new  falls, no injury.  He  Currently takes Neurontin 100-200 mg, only at evening, secondary to increased sleepiness.   Takes "pain pill ", Oxycodone 7.5/325 mg ,3 x/ day, that helps.   His daughter is dispensing his medications, and supervises him.   He states that he tolerates well medications, and has no side effects.   He is dragging his feet, using RW inside house, daughter moved all small rugs that he could possible tripped..  He wants again to get LAURA.  Here for follow up, re evaluation and chronic pain management with opiates.    Past Medical History:   Diagnosis Date    Anemia     Anticoagulant long-term use     Atrial fibrillation     BPH (benign prostatic hypertrophy)     Cancer     CHF (congestive heart failure)     Coronary artery disease     Encounter for blood transfusion     GI bleed     cecum angiectasia s/p cautery    Hypertension     " Hypothyroidism     Polyneuropathy     Posture imbalance     due to neuropathy    Right posterior capsular opacification 2017    SSS (sick sinus syndrome) 2015    s/p pacemaker       Past Surgical History:   Procedure Laterality Date    CARDIAC SURGERY      CATARACT EXTRACTION W/  INTRAOCULAR LENS IMPLANT Right 2010        CATARACT EXTRACTION W/  INTRAOCULAR LENS IMPLANT Left 2004        cataract surgery      COLONOSCOPY  04    COLONOSCOPY N/A 2/15/2016    Procedure: COLONOSCOPY;  Surgeon: Stanton Kirk MD;  Location: [facility] ([address] [place] [address]);  Service: Endoscopy;  Laterality: N/A;    EYE SURGERY      HERNIA REPAIR      inguinal hernia repair      Open heart surgery for pericardiectomy      for calcific constrictive pericarditis    UMBILICAL HERNIA REPAIR      Yag      Left Eye       Family History   Problem Relation Age of Onset    Stroke Mother          Cancer Father      lung;     Diabetes Sister     Coronary artery disease Sister     Peripheral vascular disease Sister     Amblyopia Neg Hx     Blindness Neg Hx     Cataracts Neg Hx     Glaucoma Neg Hx     Hypertension Neg Hx     Macular degeneration Neg Hx     Retinal detachment Neg Hx     Strabismus Neg Hx     Thyroid disease Neg Hx     Prostate cancer Neg Hx     Kidney disease Neg Hx        Social History     Social History    Marital status:      Spouse name: N/A    Number of children: N/A    Years of education: N/A     Social History Main Topics    Smoking status: Passive Smoke Exposure - Never Smoker    Smokeless tobacco: Never Used    Alcohol use No    Drug use: No    Sexual activity: No     Other Topics Concern    None     Social History Narrative    None       Current Outpatient Prescriptions   Medication Sig Dispense Refill    ASCORBATE CALCIUM (VITAMIN C ORAL) Take 1 tablet by mouth once daily.       aspirin (ECOTRIN) 81 MG EC tablet Take 1 tablet (81  mg total) by mouth once daily.  0    atorvastatin (LIPITOR) 20 MG tablet Take 1 tablet (20 mg total) by mouth nightly. 90 tablet 3    calcium 500 mg Tab Take 1 tablet by mouth Daily.      clotrimazole-betamethasone (LOTRISONE) lotion Apply topically 2 (two) times daily. (Patient taking differently: Apply topically as needed. ) 30 mL 0    cyanocobalamin (VITAMIN B-12) 1000 MCG tablet Take 100 mcg by mouth once daily.      donepezil (ARICEPT) 5 MG tablet Take 1 tablet (5 mg total) by mouth every evening. 30 tablet 11    ferrous sulfate 325 mg (65 mg iron) Tab tablet Take 325 mg by mouth once daily.      finasteride (PROSCAR) 5 mg tablet Take 1 tablet (5 mg total) by mouth once daily. 90 tablet 3    furosemide (LASIX) 20 MG tablet Take 20 mg by mouth 2 (two) times daily.      gabapentin (NEURONTIN) 100 MG capsule Take 100 mg by mouth 2 (two) times daily.      levothyroxine (SYNTHROID) 100 MCG tablet Take 1 tablet (100 mcg total) by mouth once daily. 90 tablet 3    metoprolol succinate (TOPROL-XL) 25 MG 24 hr tablet Take 1 tablet (25 mg total) by mouth once daily. 90 tablet 3    MULTIVITS-MINERALS/FA/LYCOPENE (ONE-A-DAY MEN'S MULTIVITAMIN ORAL) Take 1 tablet by mouth once daily.      oxycodone-acetaminophen (PERCOCET) 7.5-325 mg per tablet Take 1 tablet by mouth every 8 (eight) hours as needed for Pain. 90 tablet 0    [START ON 10/19/2017] oxycodone-acetaminophen (PERCOCET) 7.5-325 mg per tablet Take 1 tablet by mouth every 8 (eight) hours as needed for Pain. 90 tablet 0    senna-docusate 8.6-50 mg (PERICOLACE) 8.6-50 mg per tablet Take 1 tablet by mouth 2 (two) times daily.      tamsulosin (FLOMAX) 0.4 mg Cp24 Take 1 capsule (0.4 mg total) by mouth once daily. 90 capsule 3     No current facility-administered medications for this visit.        Review of patient's allergies indicates:   Allergen Reactions    Penicillins Hives and Other (See Comments)     Fever         Review of Systems   Constitutional:  Negative for activity change, appetite change, chills, diaphoresis, fatigue, fever and unexpected weight change.   HENT: Negative for trouble swallowing and voice change.    Eyes: Negative for pain and visual disturbance.   Respiratory: Negative for chest tightness, shortness of breath and wheezing.    Cardiovascular: Negative for chest pain, palpitations and leg swelling.   Gastrointestinal: Negative for abdominal pain, constipation and diarrhea.   Genitourinary: Negative for difficulty urinating, frequency and urgency.   Musculoskeletal: Positive for back pain. Negative for arthralgias, joint swelling, myalgias, neck pain (denies neck pain) and neck stiffness. Gait problem: complains about poor balance.   Skin: Negative for rash and wound.   Neurological: Negative for dizziness, facial asymmetry, speech difficulty, light-headedness and headaches.   Hematological: Negative for adenopathy.   Psychiatric/Behavioral: Negative for agitation, behavioral problems, confusion, decreased concentration, dysphoric mood and sleep disturbance.       Objective:      Physical Exam    GENERAL: The patient is alert, oriented, pleasant.   MUSCULOSKELETAL:   Gait NT, came in  WC pushed by daughter.    Cervical spine full range of motion in all planes.   Lumbar spine, ROM NT- sitting in WC.   Straight leg raising Negative bilaterally.   Full range of motion in all joints x4 extremities.   Muscle strength 4/5 in right UE/LE, and 5/5 in Left UE/LE,   Right ankle PF 3+, DF2-, left ankle DF/PF 4+/5.  No  joint laxity throughout x4 extremities.   NEUROLOGIC: Cranial nerves II through XII intact.   Deep tendon reflexes is normal, +2 in the upper and decreased  In lower extremities bilaterally.   Muscle tone is normal.   Sensory is intact to light touch and pinprick throughout x4 extremities.     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   Moderate diffuse cerebral volume loss, similar to prior exam.     Xray of Lumbar spine:  Convex right curvature of the lumbar spine.   There is step wise grade 1 retrolisthesis of L2 on L3 through L4 on L5.   There is degenerative change at all levels allowing for degenerative change and spinal curvature lumbar vertebral body heights   and contours are within normal limits without evidence for acute fracture. Facet degenerative change at levels.   Atherosclerotic ectatic aorta. No definite spondylolysis with limitation of the lower lumbar pars secondary to positioning and overlapping   structures. A cluster of small rounded high density foci projects over the right upper abdomen concerning for gallstones within the gallbladder.   There is however interspersed additional small scattered calcifications within the upper abdomen   bilaterally, clinical correlation and further evaluation as warranted.     CT of lumbar spine showed:  T12/L1: Degenerative changes.   L1-2: Diffuse disk bulge asymmetric to the right.  L2-3: Diffuse disk bulge.  L3-4: There is posterior disk osteophyte complex, facet arthropathy, ligamentum flavum hypertrophy with mild central canal stenosis.   There is also moderate left neuroforaminal narrowing and mild right neuroforaminal narrowing.  L4-5: There is posterior disk osteophyte complex, bilateral facet arthropathy, ligamentum flavum hypertrophy with resultant mild central canal stenosis and moderate bilateral neuroforaminal narrowing, right greater than left.  L5-S1: Diffuse disk bulge asymmetric to the left.   There is ligamentum flavum hypertrophy with facet arthropathy and moderate right neuroforaminal narrowing.       Assessment:       1. Chronic bilateral low back pain with bilateral sciatica    2. Lumbar radiculopathy    3. Frequent falls    4. Use of opiates for therapeutic purposes    5. Right lumbar radiculopathy    6. Chronic low back pain with sciatica, sciatica  laterality unspecified, unspecified back pain laterality    7. Balance problem    8. Polyneuropathy        Plan:       Chronic bilateral low back pain with bilateral sciatica  -     oxycodone-acetaminophen (PERCOCET) 7.5-325 mg per tablet; Take 1 tablet by mouth every 8 (eight) hours as needed for Pain.  Dispense: 90 tablet; Refill: 0  -     oxycodone-acetaminophen (PERCOCET) 7.5-325 mg per tablet; Take 1 tablet by mouth every 8 (eight) hours as needed for Pain.  Dispense: 90 tablet; Refill: 0  -     Procedure Order to [facility]; Future    Lumbar radiculopathy  -     oxycodone-acetaminophen (PERCOCET) 7.5-325 mg per tablet; Take 1 tablet by mouth every 8 (eight) hours as needed for Pain.  Dispense: 90 tablet; Refill: 0  -     oxycodone-acetaminophen (PERCOCET) 7.5-325 mg per tablet; Take 1 tablet by mouth every 8 (eight) hours as needed for Pain.  Dispense: 90 tablet; Refill: 0  -     Procedure Order to [facility]; Future    Frequent falls  -     oxycodone-acetaminophen (PERCOCET) 7.5-325 mg per tablet; Take 1 tablet by mouth every 8 (eight) hours as needed for Pain.  Dispense: 90 tablet; Refill: 0  -     oxycodone-acetaminophen (PERCOCET) 7.5-325 mg per tablet; Take 1 tablet by mouth every 8 (eight) hours as needed for Pain.  Dispense: 90 tablet; Refill: 0  -     Procedure Order to [facility]; Future    Use of opiates for therapeutic purposes  -     oxycodone-acetaminophen (PERCOCET) 7.5-325 mg per tablet; Take 1 tablet by mouth every 8 (eight) hours as needed for Pain.  Dispense: 90 tablet; Refill: 0  -     oxycodone-acetaminophen (PERCOCET) 7.5-325 mg per tablet; Take 1 tablet by mouth every 8 (eight) hours as needed for Pain.  Dispense: 90 tablet; Refill: 0  -     Procedure Order to [facility]; Future    Right lumbar radiculopathy  -     oxycodone-acetaminophen (PERCOCET) 7.5-325 mg per tablet; Take 1 tablet by mouth every 8 (eight) hours as needed for Pain.   Dispense: 90 tablet; Refill: 0  -     oxycodone-acetaminophen (PERCOCET) 7.5-325 mg per tablet; Take 1 tablet by mouth every 8 (eight) hours as needed for Pain.  Dispense: 90 tablet; Refill: 0  -     Procedure Order to [facility]; Future    Chronic low back pain with sciatica, sciatica laterality unspecified, unspecified back pain laterality  -     oxycodone-acetaminophen (PERCOCET) 7.5-325 mg per tablet; Take 1 tablet by mouth every 8 (eight) hours as needed for Pain.  Dispense: 90 tablet; Refill: 0  -     oxycodone-acetaminophen (PERCOCET) 7.5-325 mg per tablet; Take 1 tablet by mouth every 8 (eight) hours as needed for Pain.  Dispense: 90 tablet; Refill: 0  -     Procedure Order to [facility]; Future    Balance problem  -     oxycodone-acetaminophen (PERCOCET) 7.5-325 mg per tablet; Take 1 tablet by mouth every 8 (eight) hours as needed for Pain.  Dispense: 90 tablet; Refill: 0  -     oxycodone-acetaminophen (PERCOCET) 7.5-325 mg per tablet; Take 1 tablet by mouth every 8 (eight) hours as needed for Pain.  Dispense: 90 tablet; Refill: 0  -     Procedure Order to [facility]; Future    Polyneuropathy  -     oxycodone-acetaminophen (PERCOCET) 7.5-325 mg per tablet; Take 1 tablet by mouth every 8 (eight) hours as needed for Pain.  Dispense: 90 tablet; Refill: 0  -     oxycodone-acetaminophen (PERCOCET) 7.5-325 mg per tablet; Take 1 tablet by mouth every 8 (eight) hours as needed for Pain.  Dispense: 90 tablet; Refill: 0  -     Procedure Order to [facility]; Future      Patient with chronic back pain, secondary to lumbar spondylosis, with mild spinal stenosis and possible Right L5 radiculopathy,      1. Chronic pain management:   For neuropathic pain,  will resume taking Neurontin, lower dose  100 mg in am+ 100 mg at pm + 300 mg at  Bedtime  Will increase Percocet to 7.5/ 325 mg , one PO TID.      2. Refer to [facility], for Rt L5-S1 TF LAURA.     Follow up in 2  months.     Total time spent face to face with patient was 25 minutes.   More than 50% of that time was spent in counseling on diagnosis , prognosis and treatment options.   I also caunsel patient  on common and most usual side effect of prescribed medications, given his age.  His daughter is dispensing his medications, and supervises him.   Risk and benefits of opiates, possible risk of developing opiate dependence and tolerance, need of strict compliance with prescribed medications.  I reviewed Primary care , and other specialty's notes to better coordinate patient's  care.   All questions were answered, and patient voiced understanding.

## 2017-10-03 NOTE — TELEPHONE ENCOUNTER
Spoke with patient's daughter regarding f/u appt. Patient's daughter verbalized and confirmed f/u appt date on 10/24/17 at 945 AM.

## 2017-10-03 NOTE — OP NOTE
Patient Name: Zeyad Woodard  MRN: [id number]    INFORMED CONSENT: The procedure, risks, benefits and options were discussed with patient. There are no contraindications to the procedure. The patient expressed understanding and agreed to proceed. The personnel performing the procedure was discussed. I verify that I personally obtained Zeyad's consent prior to the start of the procedure and the signed consent can be found on the patient's chart.    Procedure Date: 10/03/2017    Anesthesia: Topical    Pre Procedure diagnosis: Lumbar radiculopathy [M54.16]  1. Lumbosacral radiculopathy    2. DDD (degenerative disc disease), lumbar    3. Chronic pain      Post-Procedure diagnosis: SAME    Sedation: Yes - Fentanyl 25 mcg and Midazolam 1 mg    PROCEDURE:Right L5-S1   TRANSFORAMINAL EPIDURAL STEROID INJECTION        DESCRIPTION OF PROCEDURE: The patient was brought to the procedure room. After performing time out  IV access was obtained prior to the procedure. The patient was positioned prone on the fluoroscopy table. Continuous hemodynamic monitoring was initiated including blood pressure, EKG, and pulse oximetry. . The skin was prepped with chlorhexidine three times and draped in a sterile fashion. Skin anesthesia was achieved using 3 mL of lidocaine 1% over the respective injection site.     An oblique fluoroscopic view was obtained, with the superior articular process of the inferior vertebral body aligned with the pedicle. The tip of a 22-gauge 3.5-inch Quincke-type spinal needle was advanced toward the 6 oclock position of the pedicle under intermittent fluoroscopic guidance. Confirmation of proper needle position was made with AP, oblique, and lateral fluoroscopic views. Negative aspiration for blood or CSF was confirmed. 2 mL of Omnipaque 300 was injected. Live fluoroscopic imaging revealed a clear outline of the spinal nerve with proximal spread of agent through the neural foramen into the anterior epidural  space. A total combination of 3 mL of Lidocaine 0.5% and 40 mg kenalog was injected at each level. Contrast spread was noted from L4 to L5 level. There was no pain on injection. The needle was removed and bleeding was nil.  A sterile dressing was applied. Zeyad was taken back to the recovery room for further observation.     Blood Loss: Nill  Specimen: None        Discharge Diagnosis: Lumbar radiculopathy [M54.16]  Condition on Discharge: Stable.  Diet on Discharge: Same as before.  Activity: as per instruction sheet.  Discharge to: Home with a responsible adult.  Follow up: as per Discharge instructions        Ruslan Pierre MD

## 2017-10-04 LAB — PATH REV BLD -IMP: NORMAL

## 2017-10-05 ENCOUNTER — OFFICE VISIT (OUTPATIENT)
Dept: HEMATOLOGY/ONCOLOGY | Facility: CLINIC | Age: 82
End: 2017-10-05
Payer: MEDICARE

## 2017-10-05 ENCOUNTER — LAB VISIT (OUTPATIENT)
Dept: LAB | Facility: HOSPITAL | Age: 82
End: 2017-10-05
Attending: INTERNAL MEDICINE
Payer: MEDICARE

## 2017-10-05 VITALS
OXYGEN SATURATION: 98 % | HEART RATE: 73 BPM | WEIGHT: 163 LBS | SYSTOLIC BLOOD PRESSURE: 130 MMHG | HEIGHT: 68 IN | BODY MASS INDEX: 24.71 KG/M2 | DIASTOLIC BLOOD PRESSURE: 72 MMHG

## 2017-10-05 DIAGNOSIS — G31.84 MCI (MILD COGNITIVE IMPAIRMENT): ICD-10-CM

## 2017-10-05 DIAGNOSIS — D69.6 THROMBOCYTOPENIA: ICD-10-CM

## 2017-10-05 DIAGNOSIS — R79.9 ABNORMAL BLOOD SMEAR: ICD-10-CM

## 2017-10-05 DIAGNOSIS — D50.0 IRON DEFICIENCY ANEMIA DUE TO CHRONIC BLOOD LOSS: Primary | ICD-10-CM

## 2017-10-05 DIAGNOSIS — I48.20 CHRONIC ATRIAL FIBRILLATION: ICD-10-CM

## 2017-10-05 PROCEDURE — 88189 FLOWCYTOMETRY/READ 16 & >: CPT | Mod: ,,, | Performed by: PATHOLOGY

## 2017-10-05 PROCEDURE — 99499 UNLISTED E&M SERVICE: CPT | Mod: S$GLB,,, | Performed by: INTERNAL MEDICINE

## 2017-10-05 PROCEDURE — 99999 PR PBB SHADOW E&M-EST. PATIENT-LVL III: CPT | Mod: PBBFAC,,, | Performed by: INTERNAL MEDICINE

## 2017-10-05 PROCEDURE — 88184 FLOWCYTOMETRY/ TC 1 MARKER: CPT | Performed by: PATHOLOGY

## 2017-10-05 PROCEDURE — 88185 FLOWCYTOMETRY/TC ADD-ON: CPT | Mod: 59 | Performed by: PATHOLOGY

## 2017-10-05 PROCEDURE — 99214 OFFICE O/P EST MOD 30 MIN: CPT | Mod: S$GLB,,, | Performed by: INTERNAL MEDICINE

## 2017-10-05 NOTE — PROGRESS NOTES
Subjective:       Patient ID: Zeyad Woodard is a 89 y.o. male.    Chief Complaint: Anemia    Anemia   There has been no abdominal pain, bruising/bleeding easily or palpitations.      Mr. Woodard is a pleasant elderly 89-year-old male in a wheelchair here for followup of anemia secondary to iron and B12 deficiency and chronic disease.  He was on Coumadin for chronic rate controlled atrial fibrillation, but was hospitalized in the past for active GI bleeding where a medium-sized angioectasia was found in the cecum and was coagulated with a bipolar probe successfully.      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      Etiology of his chronic anemia - secondary to chronic disease and CKD.    He is taking oral iron QOD.    Here for follow-up.  He is in a wheelchair.  Accompanied by daughter.    Review of Systems   Constitutional: Positive for fatigue. Negative for diaphoresis and unexpected weight change.   Eyes: Negative for photophobia and pain.   Cardiovascular: Negative for chest pain, palpitations and leg swelling.   Gastrointestinal: Negative for abdominal pain, blood in stool and nausea.   Musculoskeletal: Positive for arthralgias and gait problem.   Hematological: Negative for adenopathy. Does not bruise/bleed easily.         Objective:      Physical Exam   Constitutional: He is oriented to person, place, and time. He appears well-developed and well-nourished. No distress.   Eyes: Conjunctivae and lids are normal. No scleral icterus.   Neck: Normal range of motion. Neck supple. No thyromegaly present.   Cardiovascular: Normal rate, regular rhythm and intact distal pulses.  Exam reveals no gallop.    Pulmonary/Chest: Effort normal and breath sounds normal. No respiratory distress. He has no rales.   Abdominal: Soft. Bowel sounds are normal. He exhibits no distension and no mass. There is no  hepatosplenomegaly.   Lymphadenopathy:        Head (right side): No submental adenopathy present.        Head (left side): No submental adenopathy present.     He has no cervical adenopathy.        Right: No supraclavicular adenopathy present.        Left: No supraclavicular adenopathy present.   Neurological: He is alert and oriented to person, place, and time.   Skin: Skin is warm. He is not diaphoretic. No cyanosis. Nails show no clubbing.       Assessment:       1. Iron deficiency anemia due to chronic blood loss    2. Thrombocytopenia    3. Chronic atrial fibrillation    4. MCI (mild cognitive impairment)    5. Abnormal blood smear        Plan:   Hemoglobin and thrombocytopenia are stable.    Iron levels stable.  I asked him to continue oral iron every other day.    Abnormal white blood cells were noted on the peripheral smear and there is a concern for a low-grade leukemic process.  Discussed this aspect with the patient and her daughter.  They're interested in pursuing a leukemia/lymphoma screen blood testing for further evaluation.  Will order the test today.    Otherwise will see him back for follow-up in 6 months.    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   CD10 and CD5 are negative, as is .  The cells appear small in size by forward scatter characteristics. The differential diagnosis includes:   marginal zone lymphoma, and lymphoplasmacytic lymphoma versus other. Correlation with morphologic findings and with any available genetic or molecular data.    Reviewed results with patient's daughter over the phone.  No further workup needed.  Will monitor.

## 2017-10-06 LAB
FLOW CYTOMETRY ANTIBODIES ANALYZED - BLOOD: NORMAL
FLOW CYTOMETRY COMMENT - BLOOD: NORMAL
FLOW CYTOMETRY INTERPRETATION - BLOOD: NORMAL

## 2017-10-10 ENCOUNTER — TELEPHONE (OUTPATIENT)
Dept: HEMATOLOGY/ONCOLOGY | Facility: CLINIC | Age: 82
End: 2017-10-10

## 2017-10-23 NOTE — PROGRESS NOTES
Chronic patient Established Note (Follow up visit)      SUBJECTIVE:    Zeyad Woodard presents to the clinic for a follow-up appointment for back pain. He is S/P Right L5-S1 TRANSFORAMINAL EPIDURAL STEROID INJECTION on 10/3/17 with 40-60% % relief. Pt present with daughter, both state injection helped, but only for a couple of weeks, pt is also c/o radicular pain on the left. Discussed that we will s/f Bilateral L5-S1 TFESI and progress with home exercise plan.  Previous imaging was reviewed and discussed with the patient today.  Since the last visit, Zeyad Woodard states the pain has been persistant. Current pain intensity is 7/10.    Pain Disability Index Review:  Last 3 PDI Scores 10/24/2017   Pain Disability Index (PDI) 42       Pain Medications:    - Opioids: Percocet (Oxycodone/Acetaminophen)  - Adjuvant Medications: Neurontin (Gabapentin)  - Anti-Coagulants: Aspirin  - Others: see medication list     Opioid Contract: no     report:  Reviewed and consistent with medication use as prescribed.    Pain Procedures: 10/3/17 Right L5-S1 TRANSFORAMINAL EPIDURAL STEROID INJECTION    Physical Therapy/Home Exercise: no    Imagin16 X-Ray Lumbar Spine Ap And Lateral       Narrative     Exam: [id number]  20:18:23 IMG66 ([facility]) : XR LUMBAR SPINE AP AND LATERAL    Technique:    Frontal and lateral radiographs of the lumbar spine.    Comparison:    14    Findings:      There is levoconvex scoliosis of the thoracolumbar spine.  The degree of alignment is unchanged compared to the previous examination.  There are 5 lumbar-type vertebral bodies.  There is diffuse osteopenia making evaluation difficult.  Extensive multilevel degenerative changes evidenced by joint space narrowing at the L4-L5 and L5-S1 levels.  No definitive fracture is identified.  The sacroiliac joints are unremarkable.  There are extensive vascular calcifications.   Impression        Stable multilevel degenerative disease of the  lumbar spine.    No acute fracture.              Electronically signed by: SONIA DYE MD  Date: 09/30/16  Time: 21:28     Encounter     View Encounter          Signed by     Signed Credentials Date/Time  Phone Pager   SONIA DYE MD 9/30/2016 21:28 [phone]    Exam Details     Performed Procedure Technologist Supporting Staff Performing Physician   X-Ray Lumbar Spine Ap And Lateral Higinio Valverde, RT       Appointment Date/Status Modality Department    9/30/2016     Completed [facility] PORTXR1 [facility] XRAY IP       Begin Exam End Exam  End Exam Questionnaires   9/30/2016  8:18 PM 9/30/2016  8:47 PM  IMAGING END ALL         Allergies:   Review of patient's allergies indicates:   Allergen Reactions    Penicillins Hives and Other (See Comments)     Fever       Current Medications:   Current Outpatient Prescriptions   Medication Sig Dispense Refill    ASCORBATE CALCIUM (VITAMIN C ORAL) Take 1 tablet by mouth once daily.       calcium 500 mg Tab Take 1 tablet by mouth Daily.      clotrimazole-betamethasone (LOTRISONE) lotion Apply topically 2 (two) times daily. (Patient taking differently: Apply topically as needed. ) 30 mL 0    cyanocobalamin (VITAMIN B-12) 1000 MCG tablet Take 100 mcg by mouth once daily.      donepezil (ARICEPT) 5 MG tablet Take 1 tablet (5 mg total) by mouth every evening. 30 tablet 11    ferrous sulfate 325 mg (65 mg iron) Tab tablet Take 325 mg by mouth once daily.      finasteride (PROSCAR) 5 mg tablet Take 1 tablet (5 mg total) by mouth once daily. 90 tablet 3    furosemide (LASIX) 20 MG tablet Take 20 mg by mouth 2 (two) times daily.      gabapentin (NEURONTIN) 100 MG capsule Take 100 mg by mouth 2 (two) times daily.      levothyroxine (SYNTHROID) 100 MCG tablet Take 1 tablet (100 mcg total) by mouth once daily. 90 tablet 3    metoprolol succinate (TOPROL-XL) 25 MG 24 hr tablet Take 1 tablet (25 mg total) by mouth once daily. 90 tablet 3     MULTIVITS-MINERALS/FA/LYCOPENE (ONE-A-DAY MEN'S MULTIVITAMIN ORAL) Take 1 tablet by mouth once daily.      oxycodone-acetaminophen (PERCOCET) 7.5-325 mg per tablet Take 1 tablet by mouth every 8 (eight) hours as needed for Pain. 90 tablet 0    senna-docusate 8.6-50 mg (PERICOLACE) 8.6-50 mg per tablet Take 1 tablet by mouth 2 (two) times daily.      tamsulosin (FLOMAX) 0.4 mg Cp24 Take 1 capsule (0.4 mg total) by mouth once daily. 90 capsule 3    aspirin (ECOTRIN) 81 MG EC tablet Take 1 tablet (81 mg total) by mouth once daily.  0    atorvastatin (LIPITOR) 20 MG tablet Take 1 tablet (20 mg total) by mouth nightly. 90 tablet 3     No current facility-administered medications for this visit.        REVIEW OF SYSTEMS:    GENERAL:  No weight loss, malaise or fevers.  HEENT:  Negative for frequent or significant headaches.  NECK:  Negative for lumps, goiter, pain and significant neck swelling.  RESPIRATORY:  Negative for cough, wheezing or shortness of breath.  CARDIOVASCULAR:  Negative for chest pain, leg swelling or palpitations.  GI:  Negative for abdominal discomfort, blood in stools or black stools or change in bowel habits.  MUSCULOSKELETAL:  See HPI.  SKIN:  Negative for lesions, rash, and itching.  PSYCH:  + for sleep disturbance, mood disorder and recent psychosocial stressors.  HEMATOLOGY/LYMPHOLOGY:  Negative for prolonged bleeding, bruising easily or swollen nodes.  NEURO:   No history of headaches, syncope, paralysis, seizures or tremors.  All other reviewed and negative other than HPI.    Past Medical History:  Past Medical History:   Diagnosis Date    Anemia     Anticoagulant long-term use     Atrial fibrillation     BPH (benign prostatic hypertrophy)     Cancer     CHF (congestive heart failure)     Coronary artery disease     Encounter for blood transfusion     GI bleed     cecum angiectasia s/p cautery    Hypertension     Hypothyroidism     Polyneuropathy     Posture imbalance     due  to neuropathy    Right posterior capsular opacification 2017    SSS (sick sinus syndrome) 2015    s/p pacemaker       Past Surgical History:  Past Surgical History:   Procedure Laterality Date    CARDIAC SURGERY      CATARACT EXTRACTION W/  INTRAOCULAR LENS IMPLANT Right 2010        CATARACT EXTRACTION W/  INTRAOCULAR LENS IMPLANT Left 2004        cataract surgery      COLONOSCOPY  04    COLONOSCOPY N/A 2/15/2016    Procedure: COLONOSCOPY;  Surgeon: Stanton Kirk MD;  Location: [facility] ([address] [place] [address]);  Service: Endoscopy;  Laterality: N/A;    EYE SURGERY      HERNIA REPAIR      inguinal hernia repair      Open heart surgery for pericardiectomy      for calcific constrictive pericarditis    UMBILICAL HERNIA REPAIR      Yag      Left Eye       Family History:  Family History   Problem Relation Age of Onset    Stroke Mother          Cancer Father      lung;     Diabetes Sister     Coronary artery disease Sister     Peripheral vascular disease Sister     Amblyopia Neg Hx     Blindness Neg Hx     Cataracts Neg Hx     Glaucoma Neg Hx     Hypertension Neg Hx     Macular degeneration Neg Hx     Retinal detachment Neg Hx     Strabismus Neg Hx     Thyroid disease Neg Hx     Prostate cancer Neg Hx     Kidney disease Neg Hx        Social History:  Social History     Social History    Marital status:      Spouse name: N/A    Number of children: N/A    Years of education: N/A     Social History Main Topics    Smoking status: Passive Smoke Exposure - Never Smoker    Smokeless tobacco: Never Used    Alcohol use No    Drug use: No    Sexual activity: No     Other Topics Concern    None     Social History Narrative    None       OBJECTIVE:    BP (!) 165/70   Pulse 73   Wt 78.1 kg (172 lb 2.9 oz)   BMI 26.18 kg/m²     PHYSICAL EXAMINATION:    General appearance: Well appearing, in no acute distress, alert and oriented x3.  Psych:   Mood and affect appropriate.  Skin: Skin color, texture, turgor normal, no rashes or lesions, in both upper and lower body.  Head/face:  Atraumatic, normocephalic. No palpable lymph nodes  Neck: No pain to palpation over the cervical paraspinous muscles. Spurling Negative. No pain with neck flexion, extension, or lateral flexion. .  Cor: RRR  Pulm: CTA  GI: Abdomen soft and non-tender.  Back: Straight leg raising in the sitting and supine positions is negative to radicular pain. + pain to palpation over the spine or costovertebral angles. Positive FABERE, Yeoman's and Galensen test on the both side. + Facet Loading Bilaterally.  Normal range of motion without pain reproduction.  Extremities: Peripheral joint ROM is full and pain free without obvious instability or laxity in all four extremities. No deformities, edema, or skin discoloration. Good capillary refill.  Musculoskeletal: Shoulder, hip, sacroiliac and knee provocative maneuvers are negative. Bilateral upper and lower extremity strength is normal and symmetric.  No atrophy or tone abnormalities are noted.  Neuro: Bilateral upper and lower extremity coordination and muscle stretch reflexes are physiologic and symmetric.  Plantar response are downgoing. No loss of sensation is noted.  Gait: Normal.    ASSESSMENT: 89 y.o. year old male with low back and bilateral leg  pain, consistent with       He is S/P Right L5-S1 TRANSFORAMINAL EPIDURAL STEROID INJECTION on 10/3/17 with 40-60% % relief. Pt present with daughter, both state injection helped, but only for a couple of weeks, pt is also c/o radicular pain on the left.      Diagnosis:    1. Lumbosacral radiculopathy     2. DDD (degenerative disc disease), lumbar     3. Chronic pain syndrome           PLAN:     - I have stressed the importance of physical activity and a home exercise plan to help with pain and improve health.  - Patient can continue with medications for now since they are providing benefits,  using them appropriately, and without side effects.  - Previous imaging was reviewed and discussed with the patient today.   - Schedule for a Bilateral  Transforaminal epidural steroid injection at L5-S1 to help with his pain and progress with a home exercise plan.  - I have explained the risks, benefits, and alternatives of the procedure in detail. The patient voices understanding and all questions have been answered. The patient agrees to proceed as planned. Written Consent obtained.   - Counseled patient regarding the importance of activity modification, constant sleeping habits and physical therapy.  -RTC 2-3 weeks following procedure.  -The above plan and management options were discussed at length with patient. Patient is in agreement with the above and verbalized understanding. Dr. Pierre   was consulted on this patient  and agrees with this plan.     REAGAN Hackett    10/24/2017

## 2017-10-24 ENCOUNTER — OFFICE VISIT (OUTPATIENT)
Dept: PAIN MEDICINE | Facility: CLINIC | Age: 82
End: 2017-10-24
Payer: MEDICARE

## 2017-10-24 VITALS
SYSTOLIC BLOOD PRESSURE: 165 MMHG | WEIGHT: 172.19 LBS | HEART RATE: 73 BPM | DIASTOLIC BLOOD PRESSURE: 70 MMHG | BODY MASS INDEX: 26.18 KG/M2

## 2017-10-24 DIAGNOSIS — G89.4 CHRONIC PAIN SYNDROME: ICD-10-CM

## 2017-10-24 DIAGNOSIS — M51.36 DDD (DEGENERATIVE DISC DISEASE), LUMBAR: ICD-10-CM

## 2017-10-24 DIAGNOSIS — M54.17 LUMBOSACRAL RADICULOPATHY: Primary | ICD-10-CM

## 2017-10-24 PROCEDURE — 99214 OFFICE O/P EST MOD 30 MIN: CPT | Mod: S$GLB,,, | Performed by: NURSE PRACTITIONER

## 2017-10-24 PROCEDURE — 99999 PR PBB SHADOW E&M-EST. PATIENT-LVL III: CPT | Mod: PBBFAC,,, | Performed by: NURSE PRACTITIONER

## 2017-10-24 PROCEDURE — 99499 UNLISTED E&M SERVICE: CPT | Mod: S$GLB,,, | Performed by: NURSE PRACTITIONER

## 2017-10-31 ENCOUNTER — OFFICE VISIT (OUTPATIENT)
Dept: PODIATRY | Facility: CLINIC | Age: 82
End: 2017-10-31
Payer: MEDICARE

## 2017-10-31 VITALS
HEIGHT: 68 IN | SYSTOLIC BLOOD PRESSURE: 139 MMHG | DIASTOLIC BLOOD PRESSURE: 62 MMHG | HEART RATE: 78 BPM | WEIGHT: 172 LBS | BODY MASS INDEX: 26.07 KG/M2

## 2017-10-31 DIAGNOSIS — I73.9 PVD (PERIPHERAL VASCULAR DISEASE): ICD-10-CM

## 2017-10-31 DIAGNOSIS — B35.1 ONYCHOMYCOSIS: ICD-10-CM

## 2017-10-31 DIAGNOSIS — G62.9 PERIPHERAL POLYNEUROPATHY: Primary | ICD-10-CM

## 2017-10-31 PROCEDURE — 11721 DEBRIDE NAIL 6 OR MORE: CPT | Mod: Q9,S$GLB,, | Performed by: PODIATRIST

## 2017-10-31 PROCEDURE — 99499 UNLISTED E&M SERVICE: CPT | Mod: S$GLB,,, | Performed by: PODIATRIST

## 2017-10-31 PROCEDURE — 99999 PR PBB SHADOW E&M-EST. PATIENT-LVL III: CPT | Mod: PBBFAC,,, | Performed by: PODIATRIST

## 2017-10-31 NOTE — PROCEDURES
Routine Foot Care  Date/Time: 10/31/2017 10:45 AM  Performed by: PREETHI PAN  Authorized by: PREETHI PAN     Consent Done?:  Yes (Verbal)  Hyperkeratotic Skin Lesions?: No      Nail Care Type:  Debride  Location(s): All  (Left 1st Toe, Left 3rd Toe, Left 2nd Toe, Left 4th Toe, Left 5th Toe, Right 1st Toe, Right 2nd Toe, Right 3rd Toe, Right 4th Toe and Right 5th Toe)  Patient tolerance:  Patient tolerated the procedure well with no immediate complications

## 2017-10-31 NOTE — PROGRESS NOTES
"Subjective:      Patient ID: Zeyad Woodard is a 89 y.o. male.    Chief Complaint: Nail Care    Zeyad is a 89 y.o. male who presents to the clinic for evaluation and treatment of high risk feet. Zeyad has a past medical history of Anemia; Anticoagulant long-term use; Atrial fibrillation; BPH (benign prostatic hypertrophy); Cancer; CHF (congestive heart failure); Coronary artery disease; Encounter for blood transfusion; GI bleed; Hypertension; Hypothyroidism; Polyneuropathy; Posture imbalance; Right posterior capsular opacification (1/18/2017); and SSS (sick sinus syndrome) (2015). The patient's chief complaint is long, thick toenails. This patient has documented high risk feet requiring routine maintenance secondary to peripheral neuropathy. No new complaints.    PCP: Booker Ricci MD    Date Last Seen by PCP: 7/27/17    Current shoe gear:  Casual shoes    Last encounter in this department: Visit date not found    Hemoglobin A1C   Date Value Ref Range Status   12/10/2012 6.0 4.0 - 6.2 % Final   03/02/2011 5.3 4.0 - 6.2 % Final     Vitals:    10/31/17 1026   BP: 139/62   Pulse: 78   Weight: 78 kg (172 lb)   Height: 5' 8" (1.727 m)   PainSc: 0-No pain      Past Medical History:   Diagnosis Date    Anemia     Anticoagulant long-term use     Atrial fibrillation     BPH (benign prostatic hypertrophy)     Cancer     CHF (congestive heart failure)     Coronary artery disease     Encounter for blood transfusion     GI bleed     cecum angiectasia s/p cautery    Hypertension     Hypothyroidism     Polyneuropathy     Posture imbalance     due to neuropathy    Right posterior capsular opacification 1/18/2017    SSS (sick sinus syndrome) 2015    s/p pacemaker       Past Surgical History:   Procedure Laterality Date    CARDIAC SURGERY      CATARACT EXTRACTION W/  INTRAOCULAR LENS IMPLANT Right 05/17/2010        CATARACT EXTRACTION W/  INTRAOCULAR LENS IMPLANT Left 02/16/2004        " cataract surgery      COLONOSCOPY  04    COLONOSCOPY N/A 2/15/2016    Procedure: COLONOSCOPY;  Surgeon: Stanton Kirk MD;  Location: [facility] ([address] [place] [address]);  Service: Endoscopy;  Laterality: N/A;    EYE SURGERY      HERNIA REPAIR      inguinal hernia repair      Open heart surgery for pericardiectomy      for calcific constrictive pericarditis    UMBILICAL HERNIA REPAIR      Yag      Left Eye       Family History   Problem Relation Age of Onset    Stroke Mother          Cancer Father      lung;     Diabetes Sister     Coronary artery disease Sister     Peripheral vascular disease Sister     Amblyopia Neg Hx     Blindness Neg Hx     Cataracts Neg Hx     Glaucoma Neg Hx     Hypertension Neg Hx     Macular degeneration Neg Hx     Retinal detachment Neg Hx     Strabismus Neg Hx     Thyroid disease Neg Hx     Prostate cancer Neg Hx     Kidney disease Neg Hx        Social History     Social History    Marital status:      Spouse name: N/A    Number of children: N/A    Years of education: N/A     Social History Main Topics    Smoking status: Passive Smoke Exposure - Never Smoker    Smokeless tobacco: Never Used    Alcohol use No    Drug use: No    Sexual activity: No     Other Topics Concern    None     Social History Narrative    None       Current Outpatient Prescriptions   Medication Sig Dispense Refill    ASCORBATE CALCIUM (VITAMIN C ORAL) Take 1 tablet by mouth once daily.       calcium 500 mg Tab Take 1 tablet by mouth Daily.      clotrimazole-betamethasone (LOTRISONE) lotion Apply topically 2 (two) times daily. (Patient taking differently: Apply topically as needed. ) 30 mL 0    cyanocobalamin (VITAMIN B-12) 1000 MCG tablet Take 100 mcg by mouth once daily.      donepezil (ARICEPT) 5 MG tablet Take 1 tablet (5 mg total) by mouth every evening. 30 tablet 11    ferrous sulfate 325 mg (65 mg iron) Tab tablet Take 325 mg by mouth once daily.       finasteride (PROSCAR) 5 mg tablet Take 1 tablet (5 mg total) by mouth once daily. 90 tablet 3    furosemide (LASIX) 20 MG tablet Take 20 mg by mouth 2 (two) times daily.      gabapentin (NEURONTIN) 100 MG capsule Take 100 mg by mouth 2 (two) times daily.      levothyroxine (SYNTHROID) 100 MCG tablet Take 1 tablet (100 mcg total) by mouth once daily. 90 tablet 3    metoprolol succinate (TOPROL-XL) 25 MG 24 hr tablet Take 1 tablet (25 mg total) by mouth once daily. 90 tablet 3    MULTIVITS-MINERALS/FA/LYCOPENE (ONE-A-DAY MEN'S MULTIVITAMIN ORAL) Take 1 tablet by mouth once daily.      oxycodone-acetaminophen (PERCOCET) 7.5-325 mg per tablet Take 1 tablet by mouth every 8 (eight) hours as needed for Pain. 90 tablet 0    senna-docusate 8.6-50 mg (PERICOLACE) 8.6-50 mg per tablet Take 1 tablet by mouth 2 (two) times daily.      tamsulosin (FLOMAX) 0.4 mg Cp24 Take 1 capsule (0.4 mg total) by mouth once daily. 90 capsule 3    aspirin (ECOTRIN) 81 MG EC tablet Take 1 tablet (81 mg total) by mouth once daily.  0    atorvastatin (LIPITOR) 20 MG tablet Take 1 tablet (20 mg total) by mouth nightly. 90 tablet 3     No current facility-administered medications for this visit.        Review of patient's allergies indicates:   Allergen Reactions    Penicillins Hives and Other (See Comments)     Fever         Review of Systems   Constitution: Negative for chills, fever, weakness and malaise/fatigue.   Cardiovascular: Positive for leg swelling. Negative for chest pain and claudication.   Respiratory: Negative for cough and shortness of breath.    Skin: Positive for color change, dry skin and nail changes. Negative for itching and rash.   Musculoskeletal: Positive for back pain and joint pain. Negative for muscle cramps and muscle weakness.   Gastrointestinal: Negative for nausea and vomiting.   Neurological: Positive for numbness.   Psychiatric/Behavioral: Negative for altered mental status.           Objective:       Physical Exam   Constitutional: He is oriented to person, place, and time. No distress.   Cardiovascular: Intact distal pulses.    Pulses:       Dorsalis pedis pulses are 1+ on the right side, and 1+ on the left side.        Posterior tibial pulses are 0 on the right side, and 0 on the left side.   CFT< 3 secs all toes bilateral foot, skin temp warm bilateral foot, nol hair growth bilateral lower extremity, moderate pitting lower extremity edema bilateral with hemosiderin staining of legs and varicosities.       Musculoskeletal:        Right foot: There is deformity.        Left foot: There is deformity.   Cavus foot structure with hallux valgus and semi-reducible digital contractures toes 2-5 bilateral foot. No pain with ROM or MMT bilateral foot.   Feet:   Right Foot:   Protective Sensation: 10 sites tested. 4 sites sensed.   Skin Integrity: Positive for dry skin. Negative for ulcer, blister, erythema, warmth or callus.   Left Foot:   Protective Sensation: 10 sites tested. 6 sites sensed.   Skin Integrity: Positive for dry skin. Negative for ulcer, blister, skin breakdown, erythema, warmth or callus.   Neurological: He is alert and oriented to person, place, and time. He has normal strength. A sensory deficit is present.   Decreased vibratory left and absent right foot.   Skin: Skin is warm, dry and intact. Capillary refill takes 2 to 3 seconds. No ecchymosis, no lesion, no petechiae and no rash noted. He is not diaphoretic. No cyanosis or erythema. No pallor. Nails show no clubbing.   Nails 1-5 bilateral are elongated 3-4 mm, thickened 2-3 mm, dystrophic brittle with yellow discoloration and debris. No open lesions or macerations bilateral lower extremity.    Skin is thin and atrophied bilateral lower extremity.               Assessment:       Encounter Diagnoses   Name Primary?    Peripheral polyneuropathy Yes    PVD (peripheral vascular disease)     Onychomycosis          Plan:       Zeyad was seen today  for nail care.    Diagnoses and all orders for this visit:    Peripheral polyneuropathy  -     Foot Care    PVD (peripheral vascular disease)  -     Foot Care    Onychomycosis  -     Foot Care      I counseled the patient on his conditions, their implications and medical management.    Shoe inspection. Patient instructed on proper foot hygeine. We discussed wearing proper shoe gear, daily foot inspections, never walking without protective shoe gear, never putting sharp instruments to feet, routine podiatric nail visits every 2-3 months.      Routine foot care per attached note.

## 2017-11-02 ENCOUNTER — OFFICE VISIT (OUTPATIENT)
Dept: NEUROLOGY | Facility: CLINIC | Age: 82
End: 2017-11-02
Payer: MEDICARE

## 2017-11-02 DIAGNOSIS — R29.6 FREQUENT FALLS: ICD-10-CM

## 2017-11-02 DIAGNOSIS — G62.9 POLYNEUROPATHY: ICD-10-CM

## 2017-11-02 DIAGNOSIS — N18.30 CKD (CHRONIC KIDNEY DISEASE), STAGE III: ICD-10-CM

## 2017-11-02 DIAGNOSIS — F03.90 DEMENTIA WITHOUT BEHAVIORAL DISTURBANCE, UNSPECIFIED DEMENTIA TYPE: ICD-10-CM

## 2017-11-02 PROCEDURE — 99499 UNLISTED E&M SERVICE: CPT | Mod: S$GLB,,, | Performed by: PSYCHIATRY & NEUROLOGY

## 2017-11-02 PROCEDURE — 99999 PR PBB SHADOW E&M-EST. PATIENT-LVL II: CPT | Mod: PBBFAC,,, | Performed by: PSYCHIATRY & NEUROLOGY

## 2017-11-02 PROCEDURE — 99214 OFFICE O/P EST MOD 30 MIN: CPT | Mod: S$GLB,,, | Performed by: PSYCHIATRY & NEUROLOGY

## 2017-11-02 RX ORDER — DONEPEZIL HYDROCHLORIDE 10 MG/1
10 TABLET, FILM COATED ORAL NIGHTLY
Qty: 90 TABLET | Refills: 3 | Status: ON HOLD | OUTPATIENT
Start: 2017-11-02 | End: 2018-04-19 | Stop reason: HOSPADM

## 2017-11-02 NOTE — PATIENT INSTRUCTIONS
Fall Prevention  Falls often occur due to slipping, tripping or losing your balance. Millions of people fall every year and injure themselves. Here are ways to reduce your risk of falling again.  · Think about your fall, was there anything that caused your fall that can be fixed, removed, or replaced?  · Make your home safe by keeping walkways clear of objects you may trip over.  · Use non-slip pads under rugs. Do not use area rugs or small throw rugs.  · Use non-slip mats in bathtubs and showers.  · Install handrails and lights on staircases.  · Do not walk in poorly lit areas.  · Do not stand on chairs or wobbly ladders.  · Use caution when reaching overhead or looking upward. This position can cause a loss of balance.  · Be sure your shoes fit properly, have non-slip bottoms and are in good condition.   · Wear shoes both inside and out. Avoid going barefoot or wearing slippers.  · Be cautious when going up and down stairs, curbs, and when walking on uneven sidewalks.  · If your balance is poor, consider using a cane or walker.  · If your fall was related to alcohol use, stop or limit alcohol intake.   · If your fall was related to use of sleeping medicines, talk to your doctor about this. You may need to reduce your dosage at bedtime if you awaken during the night to go to the bathroom.    · To reduce the need for nighttime bathroom trips:  ¨ Avoid drinking fluids for several hours before going to bed  ¨ Empty your bladder before going to bed  ¨ Men can keep a urinal at the bedside  · Stay as active as you can. Balance, flexibility, strength, and endurance all come from exercise. They all play a role in preventing falls. Ask your healthcare provider which types of activity are right for you.  · Get your vision checked on a regular basis.  · If you have pets, know where they are before you stand up or walk so you don't trip over them.  · Use night lights.  Date Last Reviewed: 11/5/2015  © 5717-1929 [employer]. [address] [place] [address], [place] [address]. All rights reserved. This information is not intended as a substitute for professional medical care. Always follow your healthcare professional's instructions.

## 2017-11-03 ENCOUNTER — TELEPHONE (OUTPATIENT)
Dept: PAIN MEDICINE | Facility: CLINIC | Age: 82
End: 2017-11-03

## 2017-11-07 ENCOUNTER — OFFICE VISIT (OUTPATIENT)
Dept: FAMILY MEDICINE | Facility: CLINIC | Age: 82
End: 2017-11-07
Payer: MEDICARE

## 2017-11-07 ENCOUNTER — SURGERY (OUTPATIENT)
Age: 82
End: 2017-11-07

## 2017-11-07 VITALS
SYSTOLIC BLOOD PRESSURE: 126 MMHG | DIASTOLIC BLOOD PRESSURE: 80 MMHG | BODY MASS INDEX: 26.36 KG/M2 | WEIGHT: 173.94 LBS | OXYGEN SATURATION: 96 % | HEIGHT: 68 IN | HEART RATE: 76 BPM

## 2017-11-07 DIAGNOSIS — L03.317 CELLULITIS AND ABSCESS OF BUTTOCK: ICD-10-CM

## 2017-11-07 DIAGNOSIS — L02.31 CELLULITIS AND ABSCESS OF BUTTOCK: ICD-10-CM

## 2017-11-07 DIAGNOSIS — L02.91 ABSCESS: Primary | ICD-10-CM

## 2017-11-07 PROBLEM — E78.5 HLD (HYPERLIPIDEMIA): Status: ACTIVE | Noted: 2017-11-07

## 2017-11-07 PROCEDURE — 99499 UNLISTED E&M SERVICE: CPT | Mod: S$GLB,,, | Performed by: FAMILY MEDICINE

## 2017-11-07 PROCEDURE — 99999 PR PBB SHADOW E&M-EST. PATIENT-LVL III: CPT | Mod: PBBFAC,,, | Performed by: FAMILY MEDICINE

## 2017-11-07 PROCEDURE — 99213 OFFICE O/P EST LOW 20 MIN: CPT | Mod: S$GLB,,, | Performed by: FAMILY MEDICINE

## 2017-11-07 NOTE — PROGRESS NOTES
Subjective:       Patient ID: Zeyad Woodard is a 89 y.o. male.    Chief Complaint: Fever and Rash (right buttock)    Patient is a 89 year old male who was supposed to have a pain management injection performed today when he was noted to have a fever of 100.7. He was sent to physicians office for an urgent care appointment. In the office, patient complains of buttock pain. + fevers. Declines fevers. Hospitalized in march.       Fever    This is a new problem. The current episode started yesterday. The problem occurs daily. The maximum temperature noted was 100 to 100.9 F. Associated symptoms include a rash. Pertinent negatives include no chest pain, congestion, coughing, diarrhea, nausea or vomiting. He has tried nothing for the symptoms. The treatment provided no relief.   Rash   Associated symptoms include a fever. Pertinent negatives include no congestion, cough, diarrhea or vomiting.     Review of Systems   Constitutional: Positive for fever.   HENT: Negative for congestion.    Respiratory: Negative for cough.    Cardiovascular: Negative for chest pain.   Gastrointestinal: Negative for diarrhea, nausea and vomiting.   Skin: Positive for rash.       Objective:     Vitals:    11/07/17 1036   BP: 126/80   Pulse: 76        Physical Exam   Constitutional:   Frail appearing. Patient is in a wheelchair   HENT:   Head: Normocephalic and atraumatic.   Cardiovascular: Normal rate and regular rhythm.    Pulmonary/Chest: Effort normal and breath sounds normal.   Musculoskeletal: He exhibits edema.   3+ pitting edema in left leg   Neurological: He is alert.   Skin: Rash noted. There is erythema.   Draining abscess noted with surrounding erythema on right buttock.    Psychiatric: He has a normal mood and affect. His behavior is normal.       Assessment:       1. Abscess    2. Cellulitis and abscess of buttock        Plan:       Cellulitis and abscess of buttock  Patient noted to have draining abscess and surrounding  cellulitis  Patient has multiple co-morbidities and issues with bladder incontinence  Patient sent to ED for likely need of IV abx vs I and D vs other  Unable to examine rectum  Expressed plan to patient and daughter.     25 minutes spent with patient, of which >50% was spent on counseling and coordination of care.

## 2017-11-08 PROBLEM — R53.1 WEAKNESS: Status: ACTIVE | Noted: 2017-11-08

## 2017-11-10 ENCOUNTER — TELEPHONE (OUTPATIENT)
Dept: ADMINISTRATIVE | Facility: CLINIC | Age: 82
End: 2017-11-10

## 2017-11-10 ENCOUNTER — HOSPITAL ENCOUNTER (INPATIENT)
Facility: HOSPITAL | Age: 82
LOS: 5 days | Discharge: NURSING, REHAB OR LONG-TERM CARE | DRG: 571 | End: 2017-11-15
Attending: EMERGENCY MEDICINE | Admitting: INTERNAL MEDICINE
Payer: MEDICARE

## 2017-11-10 DIAGNOSIS — F03.90 DEMENTIA WITHOUT BEHAVIORAL DISTURBANCE, UNSPECIFIED DEMENTIA TYPE: ICD-10-CM

## 2017-11-10 DIAGNOSIS — M25.551 RIGHT HIP PAIN: ICD-10-CM

## 2017-11-10 DIAGNOSIS — L02.31 CELLULITIS AND ABSCESS OF BUTTOCK: ICD-10-CM

## 2017-11-10 DIAGNOSIS — N40.0 BENIGN PROSTATIC HYPERPLASIA WITHOUT LOWER URINARY TRACT SYMPTOMS: ICD-10-CM

## 2017-11-10 DIAGNOSIS — R78.81 MRSA BACTEREMIA: ICD-10-CM

## 2017-11-10 DIAGNOSIS — I48.20 CHRONIC ATRIAL FIBRILLATION: ICD-10-CM

## 2017-11-10 DIAGNOSIS — L03.317 CELLULITIS AND ABSCESS OF BUTTOCK: ICD-10-CM

## 2017-11-10 DIAGNOSIS — I15.0 RENOVASCULAR HYPERTENSION: ICD-10-CM

## 2017-11-10 DIAGNOSIS — L03.317 CELLULITIS OF BUTTOCK: ICD-10-CM

## 2017-11-10 DIAGNOSIS — B95.62 MRSA BACTEREMIA: ICD-10-CM

## 2017-11-10 DIAGNOSIS — I10 ESSENTIAL HYPERTENSION: ICD-10-CM

## 2017-11-10 DIAGNOSIS — I50.32 CHRONIC DIASTOLIC CONGESTIVE HEART FAILURE: ICD-10-CM

## 2017-11-10 DIAGNOSIS — R78.81 BACTEREMIA: Primary | ICD-10-CM

## 2017-11-10 DIAGNOSIS — G89.29 CHRONIC LOW BACK PAIN WITH SCIATICA, SCIATICA LATERALITY UNSPECIFIED, UNSPECIFIED BACK PAIN LATERALITY: ICD-10-CM

## 2017-11-10 DIAGNOSIS — E03.9 HYPOTHYROIDISM, UNSPECIFIED TYPE: ICD-10-CM

## 2017-11-10 DIAGNOSIS — M54.40 CHRONIC LOW BACK PAIN WITH SCIATICA, SCIATICA LATERALITY UNSPECIFIED, UNSPECIFIED BACK PAIN LATERALITY: ICD-10-CM

## 2017-11-10 LAB
ALBUMIN SERPL BCP-MCNC: 3.7 G/DL
ALP SERPL-CCNC: 135 U/L
ALT SERPL W/O P-5'-P-CCNC: 54 U/L
ANION GAP SERPL CALC-SCNC: 7 MMOL/L
ANISOCYTOSIS BLD QL SMEAR: SLIGHT
AST SERPL-CCNC: 59 U/L
BASOPHILS # BLD AUTO: 0.02 K/UL
BASOPHILS NFR BLD: 0.1 %
BILIRUB SERPL-MCNC: 0.8 MG/DL
BUN SERPL-MCNC: 23 MG/DL
CALCIUM SERPL-MCNC: 9.3 MG/DL
CHLORIDE SERPL-SCNC: 98 MMOL/L
CO2 SERPL-SCNC: 28 MMOL/L
CREAT SERPL-MCNC: 1.3 MG/DL
DIFFERENTIAL METHOD: ABNORMAL
EOSINOPHIL # BLD AUTO: 0.2 K/UL
EOSINOPHIL NFR BLD: 1.2 %
ERYTHROCYTE [DISTWIDTH] IN BLOOD BY AUTOMATED COUNT: 13.7 %
EST. GFR  (AFRICAN AMERICAN): 56 ML/MIN/1.73 M^2
EST. GFR  (NON AFRICAN AMERICAN): 48 ML/MIN/1.73 M^2
ESTIMATED AVG GLUCOSE: 114 MG/DL
GLUCOSE SERPL-MCNC: 110 MG/DL
HBA1C MFR BLD HPLC: 5.6 %
HCT VFR BLD AUTO: 30.8 %
HGB BLD-MCNC: 10.4 G/DL
HYPOCHROMIA BLD QL SMEAR: ABNORMAL
LYMPHOCYTES # BLD AUTO: 6.5 K/UL
LYMPHOCYTES NFR BLD: 47.9 %
MCH RBC QN AUTO: 30.8 PG
MCHC RBC AUTO-ENTMCNC: 33.8 G/DL
MCV RBC AUTO: 91 FL
MONOCYTES # BLD AUTO: 0.7 K/UL
MONOCYTES NFR BLD: 5.2 %
NEUTROPHILS # BLD AUTO: 6.2 K/UL
NEUTROPHILS NFR BLD: 45.6 %
OVALOCYTES BLD QL SMEAR: ABNORMAL
PLATELET # BLD AUTO: 136 K/UL
PLATELET BLD QL SMEAR: ABNORMAL
PMV BLD AUTO: 9.1 FL
POIKILOCYTOSIS BLD QL SMEAR: SLIGHT
POTASSIUM SERPL-SCNC: 4.9 MMOL/L
PROT SERPL-MCNC: 7.4 G/DL
RBC # BLD AUTO: 3.38 M/UL
SODIUM SERPL-SCNC: 133 MMOL/L
WBC # BLD AUTO: 13.66 K/UL

## 2017-11-10 PROCEDURE — 25000003 PHARM REV CODE 250: Performed by: EMERGENCY MEDICINE

## 2017-11-10 PROCEDURE — 63600175 PHARM REV CODE 636 W HCPCS: Performed by: EMERGENCY MEDICINE

## 2017-11-10 PROCEDURE — 83036 HEMOGLOBIN GLYCOSYLATED A1C: CPT

## 2017-11-10 PROCEDURE — 80053 COMPREHEN METABOLIC PANEL: CPT

## 2017-11-10 PROCEDURE — 85025 COMPLETE CBC W/AUTO DIFF WBC: CPT

## 2017-11-10 PROCEDURE — 96365 THER/PROPH/DIAG IV INF INIT: CPT

## 2017-11-10 PROCEDURE — 96366 THER/PROPH/DIAG IV INF ADDON: CPT

## 2017-11-10 PROCEDURE — 11000001 HC ACUTE MED/SURG PRIVATE ROOM

## 2017-11-10 PROCEDURE — 63600175 PHARM REV CODE 636 W HCPCS: Performed by: STUDENT IN AN ORGANIZED HEALTH CARE EDUCATION/TRAINING PROGRAM

## 2017-11-10 PROCEDURE — 99284 EMERGENCY DEPT VISIT MOD MDM: CPT

## 2017-11-10 PROCEDURE — 36415 COLL VENOUS BLD VENIPUNCTURE: CPT

## 2017-11-10 PROCEDURE — 25000003 PHARM REV CODE 250: Performed by: STUDENT IN AN ORGANIZED HEALTH CARE EDUCATION/TRAINING PROGRAM

## 2017-11-10 PROCEDURE — 87040 BLOOD CULTURE FOR BACTERIA: CPT

## 2017-11-10 RX ORDER — FINASTERIDE 5 MG/1
5 TABLET, FILM COATED ORAL DAILY
Status: DISCONTINUED | OUTPATIENT
Start: 2017-11-11 | End: 2017-11-15 | Stop reason: HOSPADM

## 2017-11-10 RX ORDER — OXYCODONE AND ACETAMINOPHEN 7.5; 325 MG/1; MG/1
1 TABLET ORAL EVERY 8 HOURS PRN
Status: DISCONTINUED | OUTPATIENT
Start: 2017-11-10 | End: 2017-11-15 | Stop reason: HOSPADM

## 2017-11-10 RX ORDER — GLUCAGON 1 MG
1 KIT INJECTION
Status: DISCONTINUED | OUTPATIENT
Start: 2017-11-10 | End: 2017-11-15 | Stop reason: HOSPADM

## 2017-11-10 RX ORDER — TAMSULOSIN HYDROCHLORIDE 0.4 MG/1
0.4 CAPSULE ORAL DAILY
Status: DISCONTINUED | OUTPATIENT
Start: 2017-11-11 | End: 2017-11-15 | Stop reason: HOSPADM

## 2017-11-10 RX ORDER — L. ACIDOPHILUS/L.BULGARICUS 100MM CELL
1 GRANULES IN PACKET (EA) ORAL 2 TIMES DAILY
Status: DISCONTINUED | OUTPATIENT
Start: 2017-11-10 | End: 2017-11-15 | Stop reason: HOSPADM

## 2017-11-10 RX ORDER — IBUPROFEN 200 MG
16 TABLET ORAL
Status: DISCONTINUED | OUTPATIENT
Start: 2017-11-10 | End: 2017-11-15 | Stop reason: HOSPADM

## 2017-11-10 RX ORDER — FERROUS SULFATE 325(65) MG
325 TABLET, DELAYED RELEASE (ENTERIC COATED) ORAL EVERY OTHER DAY
Status: DISCONTINUED | OUTPATIENT
Start: 2017-11-11 | End: 2017-11-15 | Stop reason: HOSPADM

## 2017-11-10 RX ORDER — METOPROLOL SUCCINATE 25 MG/1
25 TABLET, EXTENDED RELEASE ORAL DAILY
Status: DISCONTINUED | OUTPATIENT
Start: 2017-11-11 | End: 2017-11-12

## 2017-11-10 RX ORDER — GABAPENTIN 300 MG/1
300 CAPSULE ORAL NIGHTLY
Status: DISCONTINUED | OUTPATIENT
Start: 2017-11-10 | End: 2017-11-15 | Stop reason: HOSPADM

## 2017-11-10 RX ORDER — HYDRALAZINE HYDROCHLORIDE 20 MG/ML
20 INJECTION INTRAMUSCULAR; INTRAVENOUS ONCE
Status: COMPLETED | OUTPATIENT
Start: 2017-11-11 | End: 2017-11-11

## 2017-11-10 RX ORDER — DOCUSATE SODIUM 100 MG/1
100 CAPSULE, LIQUID FILLED ORAL 2 TIMES DAILY
Status: DISCONTINUED | OUTPATIENT
Start: 2017-11-10 | End: 2017-11-15 | Stop reason: HOSPADM

## 2017-11-10 RX ORDER — IBUPROFEN 200 MG
24 TABLET ORAL
Status: DISCONTINUED | OUTPATIENT
Start: 2017-11-10 | End: 2017-11-15 | Stop reason: HOSPADM

## 2017-11-10 RX ORDER — PNV NO.95/FERROUS FUM/FOLIC AC 28MG-0.8MG
100 TABLET ORAL DAILY
Status: DISCONTINUED | OUTPATIENT
Start: 2017-11-11 | End: 2017-11-15 | Stop reason: HOSPADM

## 2017-11-10 RX ORDER — DONEPEZIL HYDROCHLORIDE 5 MG/1
10 TABLET, FILM COATED ORAL NIGHTLY
Status: DISCONTINUED | OUTPATIENT
Start: 2017-11-10 | End: 2017-11-15 | Stop reason: HOSPADM

## 2017-11-10 RX ORDER — ASPIRIN 81 MG/1
81 TABLET ORAL DAILY
Status: DISCONTINUED | OUTPATIENT
Start: 2017-11-11 | End: 2017-11-15 | Stop reason: HOSPADM

## 2017-11-10 RX ORDER — SODIUM CHLORIDE 0.9 % (FLUSH) 0.9 %
5 SYRINGE (ML) INJECTION
Status: DISCONTINUED | OUTPATIENT
Start: 2017-11-10 | End: 2017-11-15 | Stop reason: HOSPADM

## 2017-11-10 RX ORDER — HEPARIN SODIUM 5000 [USP'U]/ML
5000 INJECTION, SOLUTION INTRAVENOUS; SUBCUTANEOUS EVERY 8 HOURS
Status: DISCONTINUED | OUTPATIENT
Start: 2017-11-10 | End: 2017-11-15 | Stop reason: HOSPADM

## 2017-11-10 RX ORDER — HYDRALAZINE HYDROCHLORIDE 10 MG/1
10 TABLET, FILM COATED ORAL EVERY 8 HOURS
Status: COMPLETED | OUTPATIENT
Start: 2017-11-10 | End: 2017-11-11

## 2017-11-10 RX ORDER — GABAPENTIN 100 MG/1
100 CAPSULE ORAL DAILY
Status: DISCONTINUED | OUTPATIENT
Start: 2017-11-11 | End: 2017-11-15 | Stop reason: HOSPADM

## 2017-11-10 RX ORDER — ATORVASTATIN CALCIUM 20 MG/1
20 TABLET, FILM COATED ORAL NIGHTLY
Status: DISCONTINUED | OUTPATIENT
Start: 2017-11-10 | End: 2017-11-15 | Stop reason: HOSPADM

## 2017-11-10 RX ORDER — ONDANSETRON 2 MG/ML
4 INJECTION INTRAMUSCULAR; INTRAVENOUS EVERY 12 HOURS PRN
Status: DISCONTINUED | OUTPATIENT
Start: 2017-11-10 | End: 2017-11-15 | Stop reason: HOSPADM

## 2017-11-10 RX ORDER — SENNOSIDES 8.6 MG/1
8.6 TABLET ORAL DAILY PRN
Status: DISCONTINUED | OUTPATIENT
Start: 2017-11-10 | End: 2017-11-11

## 2017-11-10 RX ADMIN — ATORVASTATIN CALCIUM 20 MG: 20 TABLET, FILM COATED ORAL at 10:11

## 2017-11-10 RX ADMIN — DOCUSATE SODIUM 100 MG: 100 CAPSULE, LIQUID FILLED ORAL at 11:11

## 2017-11-10 RX ADMIN — HEPARIN SODIUM 5000 UNITS: 5000 INJECTION, SOLUTION INTRAVENOUS; SUBCUTANEOUS at 10:11

## 2017-11-10 RX ADMIN — GABAPENTIN 300 MG: 300 CAPSULE ORAL at 10:11

## 2017-11-10 RX ADMIN — DONEPEZIL HYDROCHLORIDE 10 MG: 5 TABLET, FILM COATED ORAL at 10:11

## 2017-11-10 RX ADMIN — HYDRALAZINE HYDROCHLORIDE 10 MG: 10 TABLET ORAL at 10:11

## 2017-11-10 RX ADMIN — LACTOBACILLUS ACIDOPHILUS / LACTOBACILLUS BULGARICUS 1 EACH: 100 MILLION CFU STRENGTH GRANULES at 10:11

## 2017-11-10 RX ADMIN — VANCOMYCIN HYDROCHLORIDE 1500 MG: 100 INJECTION, POWDER, LYOPHILIZED, FOR SOLUTION INTRAVENOUS at 05:11

## 2017-11-10 NOTE — ED PROVIDER NOTES
Encounter Date: 11/10/2017       History     Chief Complaint   Patient presents with    Abnormal Lab     pt here for abnormal labs, called by faraz Girard grade temp this am, not feeling well. pt has cellulitis to sacral area.    Weakness     89-year-old gentleman with multiple medical comorbidities presents for readmission due to positive blood cultures.  Patient was discharged 2 days ago for having hospital admission for sacral decub it is ulcer and cellulitis.  Patient reports compliance with antibiotic therapy at home denies any sick symptoms or other concerns.          Review of patient's allergies indicates:   Allergen Reactions    Penicillins Hives and Other (See Comments)     Fever     Past Medical History:   Diagnosis Date    Anemia     Anticoagulant long-term use     Atrial fibrillation     BPH (benign prostatic hypertrophy)     Cancer     CHF (congestive heart failure)     Coronary artery disease     Encounter for blood transfusion     GI bleed     cecum angiectasia s/p cautery    Hypertension     Hypothyroidism     Polyneuropathy     Posture imbalance     due to neuropathy    Right posterior capsular opacification 1/18/2017    SSS (sick sinus syndrome) 2015    s/p pacemaker     Past Surgical History:   Procedure Laterality Date    CARDIAC SURGERY      CATARACT EXTRACTION W/  INTRAOCULAR LENS IMPLANT Right 05/17/2010        CATARACT EXTRACTION W/  INTRAOCULAR LENS IMPLANT Left 02/16/2004        cataract surgery      COLONOSCOPY  6/30/04    COLONOSCOPY N/A 2/15/2016    Procedure: COLONOSCOPY;  Surgeon: Stanton Kirk MD;  Location: [facility] ([address] [place] [address]);  Service: Endoscopy;  Laterality: N/A;    EYE SURGERY      HERNIA REPAIR      inguinal hernia repair      Open heart surgery for pericardiectomy      for calcific constrictive pericarditis    UMBILICAL HERNIA REPAIR      Yag      Left Eye     Family History   Problem Relation Age of Onset    Stroke Mother           Cancer Father      lung;     Diabetes Sister     Coronary artery disease Sister     Peripheral vascular disease Sister     Amblyopia Neg Hx     Blindness Neg Hx     Cataracts Neg Hx     Glaucoma Neg Hx     Hypertension Neg Hx     Macular degeneration Neg Hx     Retinal detachment Neg Hx     Strabismus Neg Hx     Thyroid disease Neg Hx     Prostate cancer Neg Hx     Kidney disease Neg Hx      Social History   Substance Use Topics    Smoking status: Passive Smoke Exposure - Never Smoker    Smokeless tobacco: Never Used    Alcohol use No     Review of Systems   Constitutional: Negative for fever.   HENT: Negative for sore throat.    Respiratory: Negative for shortness of breath.    Cardiovascular: Negative for chest pain.   Gastrointestinal: Negative for nausea.   Genitourinary: Negative for dysuria.   Musculoskeletal: Negative for back pain.   Skin: Positive for color change and wound. Negative for rash.   Neurological: Negative for weakness.   Hematological: Does not bruise/bleed easily.   All other systems reviewed and are negative.      Physical Exam     Initial Vitals [11/10/17 1535]   BP Pulse Resp Temp SpO2   (!) 179/76 61 20 98 °F (36.7 °C) 98 %      MAP       110.33         Physical Exam    Vitals reviewed.  Constitutional: Vital signs are normal. He appears well-developed and well-nourished.   HENT:   Head: Normocephalic and atraumatic.   Mouth/Throat: Oropharynx is clear and moist.   Eyes: Conjunctivae and EOM are normal. Pupils are equal, round, and reactive to light.   Neck: Trachea normal and normal range of motion. Neck supple.   Cardiovascular: Normal rate, normal heart sounds and normal pulses. An irregularly irregular rhythm present.   Pulmonary/Chest: Breath sounds normal. No respiratory distress.   Abdominal: Soft. Normal appearance and bowel sounds are normal. There is no tenderness.   Musculoskeletal: Normal range of motion.   Neurological: He is alert and  oriented to person, place, and time.   Skin: Skin is warm and dry.         ED Course   Procedures  Labs Reviewed - No data to display          Medical Decision Making:   History:   I obtained history from: someone other than patient.  Old Medical Records: I decided to obtain old medical records.  Initial Assessment:   Patient is well-appearing without hemodynamic instability.  We'll repeat labs, cultures, start IV antibiotics and readmitted to the hospital  Clinical Tests:   Lab Tests: Ordered                   ED Course      Clinical Impression:   The primary encounter diagnosis was MRSA bacteremia. Diagnoses of Bacteremia and Cellulitis of buttock were also pertinent to this visit.    Disposition:   Disposition: Admitted  Condition: Johnathon Shah MD  11/10/17 6553

## 2017-11-10 NOTE — ED NOTES
Pt sitting up in his wheelchair, [address] [address] [address], family at bedside. Pts family stated that he was sent here for further eval and treat of positive blood cultures from his previous hospital stay. Family reports that pt was discharged on Wed following treatment of an infection requiring IV antibiotics for a sacral wound. Pt stated that Dr. Green called to report that he was to be admitted for treatment of positive cultures but was unsure of the type of infection.

## 2017-11-11 LAB
ALBUMIN SERPL BCP-MCNC: 3.2 G/DL
ALP SERPL-CCNC: 115 U/L
ALT SERPL W/O P-5'-P-CCNC: 43 U/L
ANION GAP SERPL CALC-SCNC: 5 MMOL/L
AORTIC VALVE REGURGITATION: NORMAL
AORTIC VALVE STENOSIS: NORMAL
AST SERPL-CCNC: 44 U/L
BASOPHILS # BLD AUTO: 0.03 K/UL
BASOPHILS NFR BLD: 0.3 %
BILIRUB SERPL-MCNC: 0.8 MG/DL
BUN SERPL-MCNC: 20 MG/DL
CALCIUM SERPL-MCNC: 9 MG/DL
CHLORIDE SERPL-SCNC: 102 MMOL/L
CO2 SERPL-SCNC: 29 MMOL/L
CREAT SERPL-MCNC: 1.1 MG/DL
DIASTOLIC DYSFUNCTION: NO
DIFFERENTIAL METHOD: ABNORMAL
EOSINOPHIL # BLD AUTO: 0.2 K/UL
EOSINOPHIL NFR BLD: 1.9 %
ERYTHROCYTE [DISTWIDTH] IN BLOOD BY AUTOMATED COUNT: 13.8 %
EST. GFR  (AFRICAN AMERICAN): >60 ML/MIN/1.73 M^2
EST. GFR  (NON AFRICAN AMERICAN): 59 ML/MIN/1.73 M^2
ESTIMATED PA SYSTOLIC PRESSURE: 25.09
GLUCOSE SERPL-MCNC: 103 MG/DL
HCT VFR BLD AUTO: 29.3 %
HGB BLD-MCNC: 10 G/DL
LACTATE SERPL-SCNC: 1.2 MMOL/L
LYMPHOCYTES # BLD AUTO: 6.9 K/UL
LYMPHOCYTES NFR BLD: 57.4 %
MCH RBC QN AUTO: 30.8 PG
MCHC RBC AUTO-ENTMCNC: 34.1 G/DL
MCV RBC AUTO: 90 FL
MONOCYTES # BLD AUTO: 0.8 K/UL
MONOCYTES NFR BLD: 6.5 %
NEUTROPHILS # BLD AUTO: 4 K/UL
NEUTROPHILS NFR BLD: 33.9 %
PLATELET # BLD AUTO: 131 K/UL
PMV BLD AUTO: 9.7 FL
POTASSIUM SERPL-SCNC: 4.5 MMOL/L
PROT SERPL-MCNC: 6.3 G/DL
RBC # BLD AUTO: 3.25 M/UL
RETIRED EF AND QEF - SEE NOTES: 65 (ref 55–65)
SODIUM SERPL-SCNC: 136 MMOL/L
WBC # BLD AUTO: 11.93 K/UL

## 2017-11-11 PROCEDURE — 11000001 HC ACUTE MED/SURG PRIVATE ROOM

## 2017-11-11 PROCEDURE — 63600175 PHARM REV CODE 636 W HCPCS: Performed by: FAMILY MEDICINE

## 2017-11-11 PROCEDURE — 25000003 PHARM REV CODE 250: Performed by: STUDENT IN AN ORGANIZED HEALTH CARE EDUCATION/TRAINING PROGRAM

## 2017-11-11 PROCEDURE — 97116 GAIT TRAINING THERAPY: CPT

## 2017-11-11 PROCEDURE — 97161 PT EVAL LOW COMPLEX 20 MIN: CPT

## 2017-11-11 PROCEDURE — 80053 COMPREHEN METABOLIC PANEL: CPT

## 2017-11-11 PROCEDURE — 63600175 PHARM REV CODE 636 W HCPCS: Performed by: INTERNAL MEDICINE

## 2017-11-11 PROCEDURE — 94761 N-INVAS EAR/PLS OXIMETRY MLT: CPT

## 2017-11-11 PROCEDURE — 63600175 PHARM REV CODE 636 W HCPCS: Performed by: STUDENT IN AN ORGANIZED HEALTH CARE EDUCATION/TRAINING PROGRAM

## 2017-11-11 PROCEDURE — 93306 TTE W/DOPPLER COMPLETE: CPT

## 2017-11-11 PROCEDURE — 83605 ASSAY OF LACTIC ACID: CPT

## 2017-11-11 PROCEDURE — G8978 MOBILITY CURRENT STATUS: HCPCS | Mod: CK

## 2017-11-11 PROCEDURE — 36415 COLL VENOUS BLD VENIPUNCTURE: CPT

## 2017-11-11 PROCEDURE — 85025 COMPLETE CBC W/AUTO DIFF WBC: CPT

## 2017-11-11 PROCEDURE — 25000003 PHARM REV CODE 250: Performed by: INTERNAL MEDICINE

## 2017-11-11 PROCEDURE — G8979 MOBILITY GOAL STATUS: HCPCS | Mod: CJ

## 2017-11-11 PROCEDURE — 86580 TB INTRADERMAL TEST: CPT | Performed by: INTERNAL MEDICINE

## 2017-11-11 RX ORDER — HYDRALAZINE HYDROCHLORIDE 20 MG/ML
10 INJECTION INTRAMUSCULAR; INTRAVENOUS EVERY 6 HOURS PRN
Status: DISCONTINUED | OUTPATIENT
Start: 2017-11-11 | End: 2017-11-15 | Stop reason: HOSPADM

## 2017-11-11 RX ORDER — LEVOTHYROXINE SODIUM 100 UG/1
100 TABLET ORAL
Status: DISCONTINUED | OUTPATIENT
Start: 2017-11-12 | End: 2017-11-15 | Stop reason: HOSPADM

## 2017-11-11 RX ORDER — OXYCODONE AND ACETAMINOPHEN 7.5; 325 MG/1; MG/1
1 TABLET ORAL EVERY 8 HOURS PRN
Status: DISCONTINUED | OUTPATIENT
Start: 2017-11-11 | End: 2017-11-15 | Stop reason: HOSPADM

## 2017-11-11 RX ORDER — SENNOSIDES 8.6 MG/1
8.6 TABLET ORAL DAILY PRN
Status: DISCONTINUED | OUTPATIENT
Start: 2017-11-11 | End: 2017-11-15 | Stop reason: HOSPADM

## 2017-11-11 RX ADMIN — OXYCODONE HYDROCHLORIDE AND ACETAMINOPHEN 1 TABLET: 7.5; 325 TABLET ORAL at 09:11

## 2017-11-11 RX ADMIN — VITAM B12 100 MCG: 100 TAB at 08:11

## 2017-11-11 RX ADMIN — DONEPEZIL HYDROCHLORIDE 10 MG: 5 TABLET, FILM COATED ORAL at 09:11

## 2017-11-11 RX ADMIN — LACTOBACILLUS ACIDOPHILUS / LACTOBACILLUS BULGARICUS 1 EACH: 100 MILLION CFU STRENGTH GRANULES at 09:11

## 2017-11-11 RX ADMIN — DOCUSATE SODIUM 100 MG: 100 CAPSULE, LIQUID FILLED ORAL at 08:11

## 2017-11-11 RX ADMIN — VANCOMYCIN HYDROCHLORIDE 1250 MG: 5 INJECTION, POWDER, LYOPHILIZED, FOR SOLUTION INTRAVENOUS at 05:11

## 2017-11-11 RX ADMIN — FERROUS SULFATE TAB EC 325 MG (65 MG FE EQUIVALENT) 325 MG: 325 (65 FE) TABLET DELAYED RESPONSE at 07:11

## 2017-11-11 RX ADMIN — GABAPENTIN 100 MG: 100 CAPSULE ORAL at 08:11

## 2017-11-11 RX ADMIN — LACTOBACILLUS ACIDOPHILUS / LACTOBACILLUS BULGARICUS 1 EACH: 100 MILLION CFU STRENGTH GRANULES at 08:11

## 2017-11-11 RX ADMIN — HEPARIN SODIUM 5000 UNITS: 5000 INJECTION, SOLUTION INTRAVENOUS; SUBCUTANEOUS at 06:11

## 2017-11-11 RX ADMIN — GABAPENTIN 300 MG: 300 CAPSULE ORAL at 09:11

## 2017-11-11 RX ADMIN — FINASTERIDE 5 MG: 5 TABLET, FILM COATED ORAL at 08:11

## 2017-11-11 RX ADMIN — HEPARIN SODIUM 5000 UNITS: 5000 INJECTION, SOLUTION INTRAVENOUS; SUBCUTANEOUS at 03:11

## 2017-11-11 RX ADMIN — ATORVASTATIN CALCIUM 20 MG: 20 TABLET, FILM COATED ORAL at 09:11

## 2017-11-11 RX ADMIN — HYDRALAZINE HYDROCHLORIDE 10 MG: 10 TABLET ORAL at 06:11

## 2017-11-11 RX ADMIN — HEPARIN SODIUM 5000 UNITS: 5000 INJECTION, SOLUTION INTRAVENOUS; SUBCUTANEOUS at 09:11

## 2017-11-11 RX ADMIN — METOPROLOL SUCCINATE 25 MG: 25 TABLET, EXTENDED RELEASE ORAL at 08:11

## 2017-11-11 RX ADMIN — HYDRALAZINE HYDROCHLORIDE 20 MG: 20 INJECTION INTRAMUSCULAR; INTRAVENOUS at 12:11

## 2017-11-11 RX ADMIN — DOCUSATE SODIUM 100 MG: 100 CAPSULE, LIQUID FILLED ORAL at 09:11

## 2017-11-11 RX ADMIN — TAMSULOSIN HYDROCHLORIDE 0.4 MG: 0.4 CAPSULE ORAL at 09:11

## 2017-11-11 RX ADMIN — TUBERCULIN PURIFIED PROTEIN DERIVATIVE 5 UNITS: 5 INJECTION INTRADERMAL at 09:11

## 2017-11-11 RX ADMIN — ASPIRIN 81 MG: 81 TABLET, COATED ORAL at 08:11

## 2017-11-11 NOTE — PLAN OF CARE
Recked BP= 200/88 no acute distress noted. N c/o pain. Dr. Gabriel notified. Hydralazine 20 mg IV once ordered. Will administer and continue to monitor

## 2017-11-11 NOTE — CONSULTS
[facility]  Infectious Disease  Consult Note    Patient Name: Zeyad Woodard  MRN: [id number]  Admission Date: 11/10/2017  Hospital Length of Stay: 1 days  Attending Physician: Jared Xiong MD  Primary Care Provider: Booker Ricci MD     Isolation Status: No active isolations    Patient information was obtained from patient, past medical records and ER records.      Inpatient consult to Infectious Diseases  Consult performed by: ALVINO LEY  Consult ordered by: DIONICIO CRUMP  Reason for consult: MRSA Bacteremia        Assessment/Plan:     MRSA bacteremia    - pt with recent admission for cellulitis, discharged on a course of clindamycin  - blood cultures from that admission are now 4/4 positive for MRSA  - on exam area appears indurated with small amount of drainage  - TTE pending  - slight leucocytosis on admission, WBC has since trended down to normal limits  - repeat cultures NGTD so far  - recommend ultrasound of buttock wound, pt will likely need further I&D for better drainage  - continue vancomycin and please obtain trough 30 minutes prior to 4th dose. Goal trough is 15-20.   - will follow up TTE, however given pt's cardiac hx would push for JEVON if this is negative.  - with pt's cardiac history and placement of a pacemaker concern is for implant infection. Will monitor pt but pacemaker and leads may need to be removed in the future.    - will continue to follow.             Thank you for your consult. I will follow-up with patient. Please contact us if you have any additional questions.    Alvino Ley MD, PhD  Infectious Disease, PGY-2  [facility]    Subjective:     Principal Problem: <principal problem not specified>    HPI: Pt is a 89 y/o male with a pmhx of A fib, Sick Sinus Syndrome with implanted pacemaker, pericardiectomy for calcific constrictive pericarditis, CHF, BPH, HTN, Hypothyroidism, GI bleed, and dementia admitted for MRSA bactermia. Pt  recently admitted on 11/07/2017 for cellulitis on the right buttock. Most history is obtained from chart review secondary to pt's dementia. According to H&P from 11/07/2017 pt ahd what appeared to be boil on on his buttock. Pt was also having low grade temperatures so pt was sent for further work up which also revealed a lucocytosis. Blood cultures were drawn and pt was started on clindamycin IV. Pt did well and transition to PO clindamycin and ppt was discharged back to home on 11/08/2017 with course of unknown duration. Shortly after discharge pt's blood cultures became positive for MRSA in 4/4 bottles.Pt's family was contacted and pt was brought back to the ED for escalation of antibiotic therapy and further evaluation.     Pt unsure of when his symptoms started but complains of pain located in his right buttock. Denies any fever or chills, chest pain or SOB, nausea or vomiting. Denies urinary complaints, does endorse wartery stools but denies that they are malodorous, states one stool daily at this time. Pt lives at home alone but is visited daily by his daughters who help him manage his medications. Denies any foreign travel, no recent sick contacts. No contact with animals.      Positve Culture Data    11/07/2017 BCx, LAC             MRSA  11/07/2017 BCx, LW  MRSA    Cultures This Admission  11/10/2017 BCx, LAC             NGTD  11/10/2017 BCx, LW  NGTD    Antibiotics this admission    Vancomycin 1.5 gr once    Past Medical History:   Diagnosis Date    Anemia     Atrial fibrillation     BPH (benign prostatic hypertrophy)     Cancer     CHF (congestive heart failure)     Coronary artery disease     Encounter for blood transfusion     GI bleed     cecum angiectasia s/p cautery    Hypertension     Hypothyroidism     Polyneuropathy     Posture imbalance     due to neuropathy    Right posterior capsular opacification 1/18/2017    SSS (sick sinus syndrome) 2015    s/p pacemaker       Past Surgical  History:   Procedure Laterality Date    CARDIAC SURGERY      CATARACT EXTRACTION W/  INTRAOCULAR LENS IMPLANT Right 05/17/2010        CATARACT EXTRACTION W/  INTRAOCULAR LENS IMPLANT Left 02/16/2004        cataract surgery      COLONOSCOPY  6/30/04    COLONOSCOPY N/A 2/15/2016    Procedure: COLONOSCOPY;  Surgeon: Stanton Kirk MD;  Location: [facility] ([address] [place] [address]);  Service: Endoscopy;  Laterality: N/A;    EYE SURGERY      HERNIA REPAIR      inguinal hernia repair      Open heart surgery for pericardiectomy      for calcific constrictive pericarditis    UMBILICAL HERNIA REPAIR      Yag      Left Eye       Review of patient's allergies indicates:   Allergen Reactions    Penicillins Hives and Other (See Comments)     Fever       Medications:  Prescriptions Prior to Admission   Medication Sig    ASCORBATE CALCIUM (VITAMIN C ORAL) Take 1 tablet by mouth once daily.     aspirin (ECOTRIN) 81 MG EC tablet Take 1 tablet (81 mg total) by mouth once daily.    atorvastatin (LIPITOR) 20 MG tablet Take 1 tablet (20 mg total) by mouth nightly.    calcium 500 mg Tab Take 1 tablet by mouth Daily.    clindamycin (CLEOCIN) 300 MG capsule Take 1 capsule (300 mg total) by mouth every 6 (six) hours.    clotrimazole-betamethasone (LOTRISONE) lotion Apply topically 2 (two) times daily. (Patient taking differently: Apply topically as needed. )    cyanocobalamin (VITAMIN B-12) 1000 MCG tablet Take 100 mcg by mouth once daily.    donepezil (ARICEPT) 10 MG tablet Take 1 tablet (10 mg total) by mouth every evening.    ferrous sulfate 325 mg (65 mg iron) Tab tablet Take 325 mg by mouth once daily.    finasteride (PROSCAR) 5 mg tablet Take 1 tablet (5 mg total) by mouth once daily.    furosemide (LASIX) 20 MG tablet Take 20 mg by mouth 2 (two) times daily.    gabapentin (NEURONTIN) 100 MG capsule Take 100 mg by mouth 2 (two) times daily.    lactobacillus acidophilus & bulgar (LACTINEX) 100 million cell  packet Take 1 packet (1 each total) by mouth 2 (two) times daily.    levothyroxine (SYNTHROID) 100 MCG tablet Take 1 tablet (100 mcg total) by mouth once daily.    metoprolol succinate (TOPROL-XL) 25 MG 24 hr tablet Take 1 tablet (25 mg total) by mouth once daily.    MULTIVITS-MINERALS/FA/LYCOPENE (ONE-A-DAY MEN'S MULTIVITAMIN ORAL) Take 1 tablet by mouth once daily.    oxycodone-acetaminophen (PERCOCET) 7.5-325 mg per tablet Take 1 tablet by mouth every 8 (eight) hours as needed for Pain.    senna-docusate 8.6-50 mg (PERICOLACE) 8.6-50 mg per tablet Take 1 tablet by mouth 2 (two) times daily.    tamsulosin (FLOMAX) 0.4 mg Cp24 Take 1 capsule (0.4 mg total) by mouth once daily.     Antibiotics     None        Antifungals     None        Antivirals     None           Immunization History   Administered Date(s) Administered    Influenza - High Dose 10/03/2012, 09/26/2013, 10/28/2014, 10/27/2015, 09/30/2016    Pneumococcal Conjugate - 13 Valent 09/30/2016    Pneumococcal Polysaccharide - 23 Valent 09/19/2014       Family History     Problem Relation (Age of Onset)    Cancer Father    Coronary artery disease Sister    Diabetes Sister    Peripheral vascular disease Sister    Stroke Mother        Social History     Social History    Marital status:      Spouse name: N/A    Number of children: N/A    Years of education: N/A     Social History Main Topics    Smoking status: Passive Smoke Exposure - Never Smoker    Smokeless tobacco: Never Used    Alcohol use No    Drug use: No    Sexual activity: No     Other Topics Concern    None     Social History Narrative    None     Review of Systems   All other systems reviewed and are negative.    Objective:     Vital Signs (Most Recent):  Temp: 98.9 °F (37.2 °C) (11/11/17 0737)  Pulse: 71 (11/11/17 0737)  Resp: 18 (11/11/17 0737)  BP: (!) 155/73 (11/11/17 0737)  SpO2: 98 % (11/11/17 0816) Vital Signs (24h Range):  Temp:  [98 °F (36.7 °C)-98.9 °F (37.2 °C)]  98.9 °F (37.2 °C)  Pulse:  [60-73] 71  Resp:  [17-20] 18  SpO2:  [97 %-98 %] 98 %  BP: (149-209)/() 155/73     Weight: 78 kg (171 lb 15.3 oz)  Body mass index is 26.15 kg/m².    Estimated Creatinine Clearance: 43.2 mL/min (based on SCr of 1.1 mg/dL).    Physical Exam   Constitutional: He appears well-developed and well-nourished. No distress.   HENT:   Head: Normocephalic.   Mouth/Throat: Oropharynx is clear and moist.   Edentulous, with upper dentures. Dark colored lesion noted to right lateral tongue.    Eyes: EOM are normal. Pupils are equal, round, and reactive to light. No scleral icterus.   Neck: Normal range of motion. No JVD present.   Cardiovascular: Normal rate and regular rhythm.    Murmur heard.  III/VI systolic ejection murmur best heard at LUSB   Pulmonary/Chest: Effort normal. No respiratory distress. He has rales.   Faint crackles heard at this bases   Abdominal: Soft. There is tenderness.   Pt reports diffuse mild tenderness in all quadrants   Musculoskeletal: Normal range of motion. He exhibits no edema.   Lymphadenopathy:     He has no cervical adenopathy.   Neurological: He is alert.   Pt confused to year and president, but is otherwise alert and oriented to situation.    Skin: Skin is warm and dry. Capillary refill takes less than 2 seconds.   4cm area of redness and induration in gluteal cleft on the right buttock. 10 mm open wound present with drainage. Site appears fluctuant.    Psychiatric: He has a normal mood and affect. His behavior is normal.       Significant Labs:   CBC:   Recent Labs  Lab 11/10/17  1625 11/11/17  0543   WBC 13.66* 11.93   HGB 10.4* 10.0*   HCT 30.8* 29.3*   * 131*     CMP:   Recent Labs  Lab 11/10/17  1625 11/11/17  0543   * 136   K 4.9 4.5   CL 98 102   CO2 28 29    103   BUN 23 20   CREATININE 1.3 1.1   CALCIUM 9.3 9.0   PROT 7.4 6.3   ALBUMIN 3.7 3.2*   BILITOT 0.8 0.8   ALKPHOS 135 115   AST 59* 44*   ALT 54* 43   ANIONGAP 7* 5*    EGFRNONAA 48* 59*     Microbiology Results (last 7 days)     Procedure Component Value Units Date/Time    Blood culture #1 **CANNOT BE ORDERED STAT** [[id number]] Collected:  11/10/17 1610    Order Status:  Completed Specimen:  Blood from Peripheral, Wrist, Left Updated:  11/11/17 0315     Blood Culture, Routine No Growth to date    Blood culture #2 **CANNOT BE ORDERED STAT** [[id number]] Collected:  11/10/17 1625    Order Status:  Completed Specimen:  Blood from Peripheral, Antecubital, Left Updated:  11/11/17 0315     Blood Culture, Routine No Growth to date          Significant Imaging: I have reviewed all pertinent imaging results/findings within the past 24 hours.

## 2017-11-11 NOTE — MEDICAL/APP STUDENT
Subjective:  The patient reports some pain on his backside at the area of the cellulitis. He is able to tolerate a regular cardiac diet with no N/V and reports no changes in appetite. He denies any bladder or bowel habit changes, and denies diarrhea. He currently only ambulates to the bathroom with assistance. He denies fever, chills, SOB, chest pain, dizziness and lightheadedness.    Objective:  Vitals:   Temp: 97.5 Max: 98.9  Pulse: 60-73  RR: 17-20  Bp: 144-209/  SpO2: 97-98% on RA    Physical Exam:   General: A&O x3, NAD, sitting comfortably in bed  Neuro: 4/5 strength in LE bilaterlly, 5/5 strength in UE bilaterally, no focal deficits, sensation equal and symmetric  Head: atraumatic, normocephalic  Eyes: pupils equal and reactive, EOMI  Throat: nonerythematous, no edema  Neck: supple, trachea midline, no LAD  Cardiac: RRR, no murmurs, clicks, rubs, or gallops  Lung: CTAB, no wheezes or crackles  Abd: soft, nondistended, nontender to palpation, normoactive bowel sounds in 4 quadrants  Skin: some erythema surrounding immediate area around sacral wound with central necrosis and purulent drainage on backside, wound open and draining;  Elsewhere: warm, dry, intact, no rashes, no lesions  Ext: No C/C/E, warm, well-perfused  Pulses: 2+ radial and dorsalis pedis pulses bilaterally    Medications:  Aspirin 81mg PO QD  Atorvastatin 20mg PO QPM  B12 100mcg PO QD  Docusate 100mg PO BID  Donepezil 10mg PO QPM  Iron 325mg PO every other day  Finasteride 5mg PO QD  Gabapetin 100mg PO QD  Gabapentin 300mg PO QPM  Probiotic 1 packet PO BID  Metoprolol 35mg PO QD  Tamsulosin 0.4mg PO QD  Vancomycin 1.25g IV QD    Labs:   WBC: 11.93  RBC: 3.25  Hgb: 10.0  Hct: 29.3  MCV: 90  MCH: 30.8  MCHC: 34.1  RDW: 13.8  Plt: 131  MPV: 9.7  Gran%: 33.9  Gran#: 4.0  Lymph%: 57.4  Lymph#: 6.9  Mono%: 6.5  Mono#: 0.8  Eos%: 1.9  Eos#: 1.9  Baso%: 0.3  Baso#: 0.03    Na: 136  K: 4.5  Cl: 102  Co@: 29  Anion Gap: 5  BUN: 20  Crt: 1.1  GFR:  59  Glucose: 103  Ca: 9.0  Alk Phos: 115  Total Protein: 6.3  Albumin: 3.2  Total Bili: 0.8  AST: 44  ALT: 43     Lactate, vein: 1.2    Imagin/11/ ECHO:    Normal left ventricular systolic function (EF 60-65%).    No wall motion abnormalities.    Biatrial enlargement.      Trivial to mild aortic stenosis, BELKIS = 1.95 cm2, peak velocity = 2.08 m/s, mean gradient = 9 mmHg.      Normal right ventricular systolic function .      Mild aortic regurgitation.    No evidence of endocarditis.    Atrial fibrillation.     Micro:    Blood Cx x4 bottles:   MRSA    Clinda, tetracycline, Bactrim, Vanc sensitive    Oxacillin, Penicillin, erythromycin resistant  11/10 Blood Cx L antecubital:   No growth to date--pending  11/10 Blood Cx L wrist:   No growth to date--pending    Assessment:  Mr. Zeyad Woodard is a 89 yo male with a history of HTN, BPH, chronic Afib, chronic diastolic HF, anemia, SSS s/p PPM,CKD III, hypothyroidism, dementia, neuropathy, and R buttock cellulitis, who presented back at [facility] following discharge on  due to MRSA bacteremia found on culture.    Plan:  1. Staph bacteremia 2/2 to cellulitis   2/2 to cellulitis--recently discharged on clinda, positive blood cx with MRSA, pt called to return to the hospital for further evaluation   Erythema regressed from previous stay, area of central necrosis, purulent drainage noted   WBC 11.93 today, down from 13.66 yesterday    positive blood cx x4 bottles show MRSA, 11/10 blood cx no growth to date, still pending   Vancomycin 1.5g IV x1 in ED, will continue to treat with Vancomycin 1.25g IV QD   Echo (JEVON) scheduled to evaluate for endocarditis, evaluation of pacemaker   U/S of sacral area cellulitis   ID & wound care consulted  2. HTN   Hypertensive overnight to 209/98   Given hydralazine, repeat vitals with lower BP--SBP 140s-150s, DBP 60s-70s   Will continue metoprolol   Will continue to monitor  3. Chronic diastolic HF    euvolemic on exam, minimal  pedal edema   ECHO 12/2016 with LVEF 45%   Will evaluate function on JEVON   Will continue to monitor  4. BPH   No acute issues   Home meds finasteride and tamsulosin continued    Will continue to monitor  5. CKD   No acute issues   Crt 1.1, BUN 20, GFR 59   Will monitor renally dosed vanc levels with troughs   Will continue to monitor  6. Hypothyroidism   No acute issues   TSH 0.600 during recent admission   Will continue home dose of synthroid  7. Neuropathy   Mild distal extremity pain noted by pt   Home dose gabapentin continued  8. Iron deficiency anemia   Stable   H/H 10.0/29.3 today   Continued iron 325mg PO QD  9. Dementia   Stable, able to converse with staff   Continued home aricept QPM  10. HLD   No acute issues   Continued home atrovastatin and daily aspirin

## 2017-11-11 NOTE — H&P
"[facility] Internal Medicine History and Physical - Resident Note    Admitting Team: [facility] Internal Medicine Team A  Attending Physician: Dr. Osborne  Resident: Dr. Smith  Interns: Dr. Smith and Dr Nancy Isaacs    Date of Admit: 11/10/2017    Chief Complaint   + MRSA bacteremia on blood cultures from recent admission.  Subjective:      History of Present Illness:  Zeyad Woodard is a 89 y.o. male who  has a past medical history of Anemia; Anticoagulant long-term use; Atrial fibrillation; BPH (benign prostatic hypertrophy); Cancer; CHF (congestive heart failure); Coronary artery disease; Encounter for blood transfusion; GI bleed; Hypertension; Hypothyroidism; Polyneuropathy; Posture imbalance; Right posterior capsular opacification (1/18/2017); and SSS (sick sinus syndrome) (2015).. The patient presented to [facility] on 11/10/2017 with a primary complaint of Abnormal Lab (pt here for abnormal labs, called by Dr Green, low grade temp this am, not feeling well. pt has cellulitis to sacral area.) and Weakness       Patient discharge recently for cellulitis on clindamycin, however patient's blood cultures returned MRSA so called and had patient return to hospital for IV antibiotics and evaluation by infectious disease. The patient states he feels about the same as when he was discharged, reports compliance with antibiotics and regular medications. Denies fevers, chest pain, SOB, N/V/D/C. States had home health change his wound dressing yesterday at home.     Discussed with patient (two daughters) and family that if his heart were to stop or he were to cease breathing that they do not wish to have chest compressions or intubation and state that they have signed  "DNR" papers in the past. Patient and family in agreement on this matter.     Past Medical History:  Past Medical History:   Diagnosis Date    Anemia     Anticoagulant long-term use     Atrial fibrillation     BPH (benign prostatic hypertrophy)  "    Cancer     CHF (congestive heart failure)     Coronary artery disease     Encounter for blood transfusion     GI bleed     cecum angiectasia s/p cautery    Hypertension     Hypothyroidism     Polyneuropathy     Posture imbalance     due to neuropathy    Right posterior capsular opacification 1/18/2017    SSS (sick sinus syndrome) 2015    s/p pacemaker       Past Surgical History:  Past Surgical History:   Procedure Laterality Date    CARDIAC SURGERY      CATARACT EXTRACTION W/  INTRAOCULAR LENS IMPLANT Right 05/17/2010        CATARACT EXTRACTION W/  INTRAOCULAR LENS IMPLANT Left 02/16/2004        cataract surgery      COLONOSCOPY  6/30/04    COLONOSCOPY N/A 2/15/2016    Procedure: COLONOSCOPY;  Surgeon: Stanton Kirk MD;  Location: [facility] ([address] [place] [address]);  Service: Endoscopy;  Laterality: N/A;    EYE SURGERY      HERNIA REPAIR      inguinal hernia repair      Open heart surgery for pericardiectomy      for calcific constrictive pericarditis    UMBILICAL HERNIA REPAIR      Yag      Left Eye       Allergies:  Review of patient's allergies indicates:   Allergen Reactions    Penicillins Hives and Other (See Comments)     Fever       Home Medications:  Prior to Admission medications    Medication Sig Start Date End Date Taking? Authorizing Provider   ASCORBATE CALCIUM (VITAMIN C ORAL) Take 1 tablet by mouth once daily.  7/3/12   Historical Provider, MD   aspirin (ECOTRIN) 81 MG EC tablet Take 1 tablet (81 mg total) by mouth once daily. 2/15/16 11/7/17  Evelina Castillo MD   atorvastatin (LIPITOR) 20 MG tablet Take 1 tablet (20 mg total) by mouth nightly. 10/2/16 11/7/17  Fay Rubi DO   calcium 500 mg Tab Take 1 tablet by mouth Daily. 7/3/12   Historical Provider, MD   clindamycin (CLEOCIN) 300 MG capsule Take 1 capsule (300 mg total) by mouth every 6 (six) hours. 11/8/17 11/15/17  Melissa Gonzales MD   clotrimazole-betamethasone (LOTRISONE) lotion Apply topically 2 (two) times  daily.  Patient taking differently: Apply topically as needed.  5/28/15   Booker Ricci MD   cyanocobalamin (VITAMIN B-12) 1000 MCG tablet Take 100 mcg by mouth once daily.    Historical Provider, MD   donepezil (ARICEPT) 10 MG tablet Take 1 tablet (10 mg total) by mouth every evening. 17  Chele Juárez MD   ferrous sulfate 325 mg (65 mg iron) Tab tablet Take 325 mg by mouth once daily.    Historical Provider, MD   finasteride (PROSCAR) 5 mg tablet Take 1 tablet (5 mg total) by mouth once daily. 17  Booker Ricci MD   furosemide (LASIX) 20 MG tablet Take 20 mg by mouth 2 (two) times daily.    Historical Provider, MD   gabapentin (NEURONTIN) 100 MG capsule Take 100 mg by mouth 2 (two) times daily.    Historical Provider, MD   lactobacillus acidophilus & bulgar (LACTINEX) 100 million cell packet Take 1 packet (1 each total) by mouth 2 (two) times daily. 11/8/17 11/15/17  Melissa Gonzales MD   levothyroxine (SYNTHROID) 100 MCG tablet Take 1 tablet (100 mcg total) by mouth once daily. 17   Booker Ricci MD   metoprolol succinate (TOPROL-XL) 25 MG 24 hr tablet Take 1 tablet (25 mg total) by mouth once daily. 17  Booker Ricci MD   MULTIVITS-MINERALS/FA/LYCOPENE (ONE-A-DAY MEN'S MULTIVITAMIN ORAL) Take 1 tablet by mouth once daily.    Historical Provider, MD   oxycodone-acetaminophen (PERCOCET) 7.5-325 mg per tablet Take 1 tablet by mouth every 8 (eight) hours as needed for Pain. 10/19/17 11/18/17  Lynn Rosenberg MD   senna-docusate 8.6-50 mg (PERICOLACE) 8.6-50 mg per tablet Take 1 tablet by mouth 2 (two) times daily. 16   Radha Luna, CINDY   tamsulosin (FLOMAX) 0.4 mg Cp24 Take 1 capsule (0.4 mg total) by mouth once daily. 17   Booker Ricci MD       Family History:  Family History   Problem Relation Age of Onset    Stroke Mother          Cancer Father      lung;     Diabetes Sister      "Coronary artery disease Sister     Peripheral vascular disease Sister     Amblyopia Neg Hx     Blindness Neg Hx     Cataracts Neg Hx     Glaucoma Neg Hx     Hypertension Neg Hx     Macular degeneration Neg Hx     Retinal detachment Neg Hx     Strabismus Neg Hx     Thyroid disease Neg Hx     Prostate cancer Neg Hx     Kidney disease Neg Hx        Social History:  Social History   Substance Use Topics    Smoking status: Passive Smoke Exposure - Never Smoker    Smokeless tobacco: Never Used    Alcohol use No       Review of Systems:  Pertinent positives and negatives listed in HPI. All other systems are reviewed and are negative.    Health Maintaince :   Primary Care Physician: Booker Ricci  Immunizations:   TDap is not up to date.  Influenza is not up to date.  Pneumovax is up to date.  Cancer Screening:  Colonoscopy: is up to date. 2/15/16     Objective:   Last 24 Hour Vital Signs:  BP  Min: 149/66  Max: 179/76  Temp  Av °F (36.7 °C)  Min: 98 °F (36.7 °C)  Max: 98 °F (36.7 °C)  Pulse  Av.5  Min: 60  Max: 61  Resp  Av  Min: 18  Max: 20  SpO2  Av %  Min: 98 %  Max: 98 %  Height  Av' 8" (172.7 cm)  Min: 5' 8" (172.7 cm)  Max: 5' 8" (172.7 cm)  Weight  Av.5 kg (173 lb)  Min: 78.5 kg (173 lb)  Max: 78.5 kg (173 lb)  Body mass index is 26.3 kg/m².  No intake/output data recorded.    Physical Examination:  General: Alert and awake in no apparent distress  Head:  Normocephalic and atraumatic  Eyes:  PERRL; EOMi with anicteric sclera and clear conjunctivae  Mouth:  Oropharynx clear and without exudate; moist mucous membranes  Cardio:  Regular rate and rhythm with normal S1 and S2; no murmurs or rubs  Resp:  CTAB; respirations unlabored; no wheezes, crackles or rhonchi  Abdom: Soft, NTND with normoactive bowel sounds  Extrem: Warm and well-perfused with no clubbing, cyanosis or edema  Skin:  Sacral wound with central necrosis blanching erythema, surrounding erythema is " within and regressed from previous delineation with surgical marker. Small amount of purulent drainage noted   Pulses: 2+ and symmetric distally  Neuro:  AAOx3; cooperative and pleasant with no focal deficits    Laboratory:  Most Recent Data:  CBC: Lab Results   Component Value Date    WBC 13.66 (H) 11/10/2017    HGB 10.4 (L) 11/10/2017    HCT 30.8 (L) 11/10/2017     (L) 11/10/2017    MCV 91 11/10/2017    RDW 13.7 11/10/2017     BMP: Lab Results   Component Value Date     (L) 11/10/2017    K 4.9 11/10/2017    CL 98 11/10/2017    CO2 28 11/10/2017    BUN 23 11/10/2017    CREATININE 1.3 11/10/2017     11/10/2017    CALCIUM 9.3 11/10/2017    MG 2.0 11/07/2017    PHOS 3.1 11/07/2017     LFTs: Lab Results   Component Value Date    PROT 7.4 11/10/2017    ALBUMIN 3.7 11/10/2017    BILITOT 0.8 11/10/2017    AST 59 (H) 11/10/2017    ALKPHOS 135 11/10/2017    ALT 54 (H) 11/10/2017     Coags:   Lab Results   Component Value Date    INR 1.1 11/07/2017     FLP: Lab Results   Component Value Date    CHOL 118 (L) 08/18/2015    HDL 38 (L) 08/18/2015    LDLCALC 67.2 08/18/2015    TRIG 64 08/18/2015    CHOLHDL 32.2 08/18/2015     DM: Lab Results   Component Value Date    HGBA1C 5.7 (H) 11/07/2017    HGBA1C 6.0 12/10/2012    HGBA1C 5.3 03/02/2011    LDLCALC 67.2 08/18/2015    CREATININE 1.3 11/10/2017     Thyroid: Lab Results   Component Value Date    TSH 0.600 11/07/2017    FREET4 1.10 07/03/2016    G1HPSLO 6.6 12/10/2012     Anemia: Lab Results   Component Value Date    IRON 55 10/03/2017    TIBC 321 10/03/2017    FERRITIN 144 10/03/2017    WTNDANZK18 1340 (H) 05/12/2017    FOLATE 17.7 05/12/2017     Cardiac: Lab Results   Component Value Date    TROPONINI 0.030 (H) 11/07/2017     (H) 11/07/2017     Urinalysis: Lab Results   Component Value Date    LABURIN No growth 07/12/2017    COLORU LT. RED 11/07/2017    SPECGRAV 1.010 11/07/2017    NITRITE Negative 11/07/2017    KETONESU Negative 11/07/2017     UROBILINOGEN Negative 11/07/2017    WBCUA 0 11/07/2017       Trended Cardiac Data:    Recent Labs  Lab 11/07/17  1245   TROPONINI 0.030*   *       Microbiology Data:  MRSA + blood culture    Other Results:  EKG (my interpretation): none    Radiology:  Imaging Results    None       Assessment:     Zeyad Woodard is a 89 y.o. male with:  Patient Active Problem List    Diagnosis Date Noted    Bacteremia 11/10/2017    MRSA bacteremia 11/10/2017    Weakness 11/08/2017    Cellulitis of buttock 11/07/2017    HLD (hyperlipidemia) 11/07/2017    Abnormal blood smear 10/05/2017    Chronic pain 10/03/2017    Chronic bilateral low back pain with bilateral sciatica 09/19/2017    Use of opiates for therapeutic purposes 09/19/2017    Dementia without behavioral disturbance 06/05/2017    Post-operative state 02/01/2017    Pseudophakia 01/18/2017    Essential hypertension 01/18/2017    Right posterior capsular opacification 01/18/2017    Refractive error 01/18/2017    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    Hypothyroidism due to acquired atrophy of thyroid 10/02/2016    Right hip pain 10/01/2016    Contusion of right hip 10/01/2016    Elevated troponin 09/30/2016    Anemia of chronic renal failure, stage 3 (moderate) 09/01/2016    Chronic constipation 07/06/2016    Thrombocytopenia 07/03/2016    Venous insufficiency of both lower extremities 10/26/2015    Frequent falls 08/28/2015    Opioid dependence with intoxication with complication 08/28/2015    SSS (sick sinus syndrome) 06/12/2015    Bradycardia 06/12/2015    Chronic back pain 09/12/2014    Lumbar radiculopathy 09/12/2014    CKD (chronic kidney disease), stage III 10/16/2013    Iron deficiency anemia due to chronic blood loss 04/12/2013    Hypothyroidism     Polyneuropathy 11/12/2012    Chronic diastolic congestive heart failure 08/23/2012    Renovascular hypertension     Chronic atrial fibrillation     Benign prostatic hyperplasia          Plan:     MRSA bacteremia 2/2 cellulitis  - Patient discharge recently for cellulitis on clindamycin, however patient's blood cultures returned MRSA so called and had patient return to hospital for further evaluation.   - Sacral wound with central necrosis blanching erythema, surrounding erythema is within and regressed from previous delineation with surgical marker. Small amount of purulent drainage noted.   - WBC 13.6, essentially unchanged from discharge, slight decrease from previous admission.   - repeat blood cultures, TTE, consult Id and wound care, lactate   - Vancomycin     Chronic heart failure with combined systolic/diastolic dysfunction and mitral regurgitation  - EF 45%, last echo Dec 2016, Euvolemic on exam with mild pedal edema   - lasix 20 mg BID, unsure if patient is on ACE/ARB or not, will defer for now.     Iron deficiency anemia due to blood loss  - Stable, Continue iron 325 mg daily, B12 100 mcg daily, probiotic daily    Dementia  - stable, conversant and interactive; continue home aricept 10 mg nightly     HTN  - stable, continue home toprol XR 25 mg daily     HLD  - Continue home atorvastatin 20mg QHS  - Daily ASA 81 mg     Neuropathy   - Continue home gabapentin 100 mg BID    BPH  - Asymptomatic, continue finasteride 5mg daily, flomax 0.4 mg daily    Hypothyroidism   - recent TSH wnl, continue synthroid 100mcg daily     HCM  - ordered flu and tdap for administration prior to discharge  Code Status: DNR   Diet: Cardiac  Allergies: Penicillin  DVT ppx: SQH   Dispo: IV ABX and infectious disease workup  Est ella of stay: 2 days     Evonne Smith  [facility] Internal Medicine HO-I  [facility] Internal Medicine Service    [facility] Medicine Hospitalist Pager numbers:   [facility] Hospitalist Medicine Team A (Kinsey/Yumiko): [phone]  [facility] Hospitalist Medicine Team B (Tyrese/Bibi):  [phone]

## 2017-11-11 NOTE — PLAN OF CARE
Problem: Physical Therapy Goal  Goal: Physical Therapy Goal  Goals to be met by: 17     Patient will increase functional independence with mobility by performin. Sit to stand transfer with Stand-by Assistance  2. Bed to chair transfer with Stand-by Assistance using Rolling Walker  3. Gait  x 50 feet with Stand-by Assistance using Rolling Walker.   4. Stand for 5 minutes with Stand-by Assistance using Rolling Walker  5. Lower extremity exercise program x 15 reps per handout, with supervision    Outcome: Ongoing (interventions implemented as appropriate)  PT evaluation completed and pt will benefit from skilled PT services to address pt's functional limitations. Note to follow.

## 2017-11-11 NOTE — PLAN OF CARE
Patient arrived in room 462 via stretcher and accompanied by daughters. Patient is AAO x 2. disoriented to time and situation. Information given by daughters. No acute distress noted. Tele monitor placed. Fall and contact precautions initiated and maintained. Room near nursing station. Bed alarm set, bed in low and locked position, side rails up x 2, call light within reach. Continue to monitor

## 2017-11-11 NOTE — PROGRESS NOTES
"[facility] Internal Medicine Resident HO-1 Progress Note    Subjective:      Zeyad Woodard is a 89 y.o. male who is being followed by the [facility] Internal Medicine service at [facility] for MRSA bacteremia 2/2 cellulitis .     This morning reports pain on his backside. Denies feeling feverish, no chills, no chest pain, shortness of breath, abdominal pain, nausea, vomiting. No diarrhea. Urinating normally. Appetite is fine.        Objective:   Last 24 Hour Vital Signs:  BP  Min: 149/66  Max: 209/98  Temp  Av.4 °F (36.9 °C)  Min: 98 °F (36.7 °C)  Max: 98.6 °F (37 °C)  Pulse  Av.5  Min: 60  Max: 73  Resp  Av.6  Min: 17  Max: 20  SpO2  Av.8 %  Min: 97 %  Max: 98 %  Height  Av' 8" (172.7 cm)  Min: 5' 8" (172.7 cm)  Max: 5' 8" (172.7 cm)  Weight  Av.2 kg (172 lb 7.7 oz)  Min: 78 kg (171 lb 15.3 oz)  Max: 78.5 kg (173 lb)  I/O last 3 completed shifts:  In: -   Out: 250 [Urine:250]    Physical Examination:  General:          Alert and awake in no apparent distress  Head:               Normocephalic and atraumatic  Eyes:               PERRL; EOMi with anicteric sclera and clear conjunctivae  Mouth:             Oropharynx clear and without exudate; moist mucous membranes  Cardio:             Regular rate and rhythm with normal S1 and S2; no murmurs or rubs  Resp:               CTAB; respirations unlabored; no wheezes, crackles or rhonchi  Abdom:            Soft, NTND with normoactive bowel sounds  Extrem:            Warm and well-perfused with no clubbing, cyanosis or edema  Skin:                Sacral wound with central necrosis blanching erythema, surrounding erythema is within and regressed from previous delineation with surgical marker. Small amount of purulent drainage noted   Pulses:            2+ and symmetric distally  Neuro:             AAOx3; cooperative and pleasant with no focal deficits      Laboratory:  Laboratory Data Reviewed: yes  Pertinent Findings:  Trended Lab " Data:    Recent Labs  Lab 11/08/17  0714 11/10/17  1625 11/11/17  0543   WBC 12.36 13.66* 11.93   HGB 9.6* 10.4* 10.0*   HCT 28.9* 30.8* 29.3*   * 136* 131*   MCV 91 91 90   RDW 13.8 13.7 13.8    133* 136   K 4.5 4.9 4.5    98 102   CO2 28 28 29   BUN 24* 23 20    110 103   CALCIUM 8.8 9.3 9.0   PROT 6.2 7.4 6.3   ALBUMIN 3.0* 3.7 3.2*   BILITOT 1.2* 0.8 0.8   AST 47* 59* 44*   ALKPHOS 103 135 115   ALT 36 54* 43         Microbiology Data Reviewed: yes  Pertinent Findings:  Repeat Blood Cx pending    Other Results:  EKG (my interpretation): No new EKG.    Radiology Data Reviewed: yes  Pertinent Findings:  No new studies    Current Medications:     Infusions:        Scheduled:   aspirin  81 mg Oral Daily    atorvastatin  20 mg Oral Nightly    cyanocobalamin  100 mcg Oral Daily    docusate sodium  100 mg Oral BID    donepezil  10 mg Oral QHS    ferrous sulfate  325 mg Oral Daily    finasteride  5 mg Oral Daily    gabapentin  100 mg Oral Daily    gabapentin  300 mg Oral QHS    heparin (porcine)  5,000 Units Subcutaneous Q8H    lactobacillus acidophilus & bulgar  1 packet Oral BID    metoprolol succinate  25 mg Oral Daily    tamsulosin  0.4 mg Oral Daily        PRN:  dextrose 50%, dextrose 50%, glucagon (human recombinant), glucose, glucose, influenza, ondansetron, oxyCODONE-acetaminophen, senna, sodium chloride 0.9%, DIPH,PERTUS (ADACEL),TETANUS PF VAC (ADULT)    Antibiotics and Day Number of Therapy:  S/p vancomycin 1500mg x1 in ED    Lines and Day Number of Therapy:  PIV L forearm    Assessment:     Zeyad Woodard is a 89 y.o.male with  Patient Active Problem List    Diagnosis Date Noted    Bacteremia 11/10/2017    MRSA bacteremia 11/10/2017    Weakness 11/08/2017    Cellulitis of buttock 11/07/2017    HLD (hyperlipidemia) 11/07/2017    Abnormal blood smear 10/05/2017    Chronic pain 10/03/2017    Chronic bilateral low back pain with bilateral sciatica 09/19/2017     Use of opiates for therapeutic purposes 09/19/2017    Dementia without behavioral disturbance 06/05/2017    Post-operative state 02/01/2017    Pseudophakia 01/18/2017    Essential hypertension 01/18/2017    Right posterior capsular opacification 01/18/2017    Refractive error 01/18/2017    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    Hypothyroidism due to acquired atrophy of thyroid 10/02/2016    Right hip pain 10/01/2016    Contusion of right hip 10/01/2016    Elevated troponin 09/30/2016    Anemia of chronic renal failure, stage 3 (moderate) 09/01/2016    Chronic constipation 07/06/2016    Thrombocytopenia 07/03/2016    Venous insufficiency of both lower extremities 10/26/2015    Frequent falls 08/28/2015    Opioid dependence with intoxication with complication 08/28/2015    SSS (sick sinus syndrome) 06/12/2015    Bradycardia 06/12/2015    Chronic back pain 09/12/2014    Lumbar radiculopathy 09/12/2014    CKD (chronic kidney disease), stage III 10/16/2013    Iron deficiency anemia due to chronic blood loss 04/12/2013    Hypothyroidism     Polyneuropathy 11/12/2012    Chronic diastolic congestive heart failure 08/23/2012    Renovascular hypertension     Chronic atrial fibrillation     Benign prostatic hyperplasia         Plan:     MRSA bacteremia 2/2 cellulitis  - Patient discharge recently for cellulitis on clindamycin, however patient's blood cultures returned MRSA so called and had patient return to hospital for further evaluation.   - Sacral wound with central necrosis blanching erythema, surrounding erythema is within and regressed from previous delineation with surgical marker. Small amount of purulent drainage noted.   - WBC 13.6 on admission, 11.9 today, slight decrease from previous admission.   - repeat blood cultures, TTE, consult ID and wound care  - Vancomycin 1500mg x1 in Ed, now 1250mg IV daily     Chronic heart failure with combined systolic/diastolic dysfunction and mitral  regurgitation  - EF 45%, last echo Dec 2016, Euvolemic on exam with mild pedal edema   - lasix 20 mg BID, unsure if patient is on ACE/ARB or not, will defer for now.   -follow up TTE     Iron deficiency anemia due to blood loss  - Stable, Continue iron 325 mg daily, B12 100 mcg daily, probiotic daily     Dementia  - stable, conversant and interactive; continue home aricept 10 mg nightly      HTN  - stable, continue home toprol XR 25 mg daily      HLD  - Continue home atorvastatin 20mg QHS  - Daily ASA 81 mg      Neuropathy   - Continue home gabapentin 100 mg BID     BPH  - Asymptomatic, continue finasteride 5mg daily, flomax 0.4 mg daily     Hypothyroidism   - recent TSH wnl, continue synthroid 100mcg daily      HCM  - ordered flu and tdap for administration prior to discharge  Code Status: DNR   Diet: Cardiac  Allergies: Penicillin  DVT ppx: SQH   Dispo: IV ABX and infectious disease workup  Est ella of stay: 2 days       Theresa Herndon  [facility] Internal Medicine HO-1  [facility] Service Team B    [facility] Hospitalist Pager numbers:   [facility] Hospitalist Medicine Team A (Kinsey/Yumiko): [phone]  [facility] Hospitalist Medicine Team B (Tyrese/Bibi):  [phone]

## 2017-11-11 NOTE — HPI
Pt is a 89 y/o male with a pmhx of A fib, Sick Sinus Syndrome with implanted pacemaker, pericardiectomy for calcific constrictive pericarditis, CHF, BPH, HTN, Hypothyroidism, GI bleed, and dementia admitted for MRSA bactermia. Pt recently admitted on 11/07/2017 for cellulitis on the right buttock. Most history is obtained from chart review secondary to pt's dementia. According to H&P from 11/07/2017 pt ahd what appeared to be boil on on his buttock. Pt was also having low grade temperatures so pt was sent for further work up which also revealed a lucocytosis. Blood cultures were drawn and pt was started on clindamycin IV. Pt did well and transition to PO clindamycin and ppt was discharged back to home on 11/08/2017 with course of unknown duration. Shortly after discharge pt's blood cultures became positive for MRSA in 4/4 bottles.Pt's family was contacted and pt was brought back to the ED for escalation of antibiotic therapy and further evaluation.     Pt unsure of when his symptoms started but complains of pain located in his right buttock. Denies any fever or chills, chest pain or SOB, nausea or vomiting. Denies urinary complaints, does endorse wartery stools but denies that they are malodorous, states one stool daily at this time. Pt lives at home alone but is visited daily by his daughters who help him manage his medications. Denies any foreign travel, no recent sick contacts. No contact with animals.      Positve Culture Data    11/07/2017 BCx, LAC             MRSA  11/07/2017 BCx, LW  MRSA    Cultures This Admission  11/10/2017 BCx, LAC             NGTD  11/10/2017 BCx, LW  NGTD

## 2017-11-11 NOTE — SUBJECTIVE & OBJECTIVE
Past Medical History:   Diagnosis Date    Anemia     Atrial fibrillation     BPH (benign prostatic hypertrophy)     Cancer     CHF (congestive heart failure)     Coronary artery disease     Encounter for blood transfusion     GI bleed     cecum angiectasia s/p cautery    Hypertension     Hypothyroidism     Polyneuropathy     Posture imbalance     due to neuropathy    Right posterior capsular opacification 1/18/2017    SSS (sick sinus syndrome) 2015    s/p pacemaker       Past Surgical History:   Procedure Laterality Date    CARDIAC SURGERY      CATARACT EXTRACTION W/  INTRAOCULAR LENS IMPLANT Right 05/17/2010        CATARACT EXTRACTION W/  INTRAOCULAR LENS IMPLANT Left 02/16/2004        cataract surgery      COLONOSCOPY  6/30/04    COLONOSCOPY N/A 2/15/2016    Procedure: COLONOSCOPY;  Surgeon: Stanton Kirk MD;  Location: [address]);  Service: Endoscopy;  Laterality: N/A;    EYE SURGERY      HERNIA REPAIR      inguinal hernia repair      Open heart surgery for pericardiectomy      for calcific constrictive pericarditis    UMBILICAL HERNIA REPAIR      Yag      Left Eye       Review of patient's allergies indicates:   Allergen Reactions    Penicillins Hives and Other (See Comments)     Fever       Medications:  Prescriptions Prior to Admission   Medication Sig    ASCORBATE CALCIUM (VITAMIN C ORAL) Take 1 tablet by mouth once daily.     aspirin (ECOTRIN) 81 MG EC tablet Take 1 tablet (81 mg total) by mouth once daily.    atorvastatin (LIPITOR) 20 MG tablet Take 1 tablet (20 mg total) by mouth nightly.    calcium 500 mg Tab Take 1 tablet by mouth Daily.    clindamycin (CLEOCIN) 300 MG capsule Take 1 capsule (300 mg total) by mouth every 6 (six) hours.    clotrimazole-betamethasone (LOTRISONE) lotion Apply topically 2 (two) times daily. (Patient taking differently: Apply topically as needed. )    cyanocobalamin (VITAMIN B-12) 1000 MCG tablet Take 100 mcg by  mouth once daily.    donepezil (ARICEPT) 10 MG tablet Take 1 tablet (10 mg total) by mouth every evening.    ferrous sulfate 325 mg (65 mg iron) Tab tablet Take 325 mg by mouth once daily.    finasteride (PROSCAR) 5 mg tablet Take 1 tablet (5 mg total) by mouth once daily.    furosemide (LASIX) 20 MG tablet Take 20 mg by mouth 2 (two) times daily.    gabapentin (NEURONTIN) 100 MG capsule Take 100 mg by mouth 2 (two) times daily.    lactobacillus acidophilus & bulgar (LACTINEX) 100 million cell packet Take 1 packet (1 each total) by mouth 2 (two) times daily.    levothyroxine (SYNTHROID) 100 MCG tablet Take 1 tablet (100 mcg total) by mouth once daily.    metoprolol succinate (TOPROL-XL) 25 MG 24 hr tablet Take 1 tablet (25 mg total) by mouth once daily.    MULTIVITS-MINERALS/FA/LYCOPENE (ONE-A-DAY MEN'S MULTIVITAMIN ORAL) Take 1 tablet by mouth once daily.    oxycodone-acetaminophen (PERCOCET) 7.5-325 mg per tablet Take 1 tablet by mouth every 8 (eight) hours as needed for Pain.    senna-docusate 8.6-50 mg (PERICOLACE) 8.6-50 mg per tablet Take 1 tablet by mouth 2 (two) times daily.    tamsulosin (FLOMAX) 0.4 mg Cp24 Take 1 capsule (0.4 mg total) by mouth once daily.     Antibiotics     None        Antifungals     None        Antivirals     None           Immunization History   Administered Date(s) Administered    Influenza - High Dose 10/03/2012, 09/26/2013, 10/28/2014, 10/27/2015, 09/30/2016    Pneumococcal Conjugate - 13 Valent 09/30/2016    Pneumococcal Polysaccharide - 23 Valent 09/19/2014       Family History     Problem Relation (Age of Onset)    Cancer Father    Coronary artery disease Sister    Diabetes Sister    Peripheral vascular disease Sister    Stroke Mother        Social History     Social History    Marital status:      Spouse name: N/A    Number of children: N/A    Years of education: N/A     Social History Main Topics    Smoking status: Passive Smoke Exposure - Never  Smoker    Smokeless tobacco: Never Used    Alcohol use No    Drug use: No    Sexual activity: No     Other Topics Concern    None     Social History Narrative    None     Review of Systems   All other systems reviewed and are negative.    Objective:     Vital Signs (Most Recent):  Temp: 98.9 °F (37.2 °C) (11/11/17 0737)  Pulse: 71 (11/11/17 0737)  Resp: 18 (11/11/17 0737)  BP: (!) 155/73 (11/11/17 0737)  SpO2: 98 % (11/11/17 0816) Vital Signs (24h Range):  Temp:  [98 °F (36.7 °C)-98.9 °F (37.2 °C)] 98.9 °F (37.2 °C)  Pulse:  [60-73] 71  Resp:  [17-20] 18  SpO2:  [97 %-98 %] 98 %  BP: (149-209)/() 155/73     Weight: 78 kg (171 lb 15.3 oz)  Body mass index is 26.15 kg/m².    Estimated Creatinine Clearance: 43.2 mL/min (based on SCr of 1.1 mg/dL).    Physical Exam   Constitutional: He appears well-developed and well-nourished. No distress.   HENT:   Head: Normocephalic.   Mouth/Throat: Oropharynx is clear and moist.   Edentulous, with upper dentures. Dark colored lesion noted to right lateral tongue.    Eyes: EOM are normal. Pupils are equal, round, and reactive to light. No scleral icterus.   Neck: Normal range of motion. No JVD present.   Cardiovascular: Normal rate and regular rhythm.    Murmur heard.  III/VI systolic ejection murmur best heard at LUSB   Pulmonary/Chest: Effort normal. No respiratory distress. He has rales.   Faint crackles heard at this bases   Abdominal: Soft. There is tenderness.   Pt reports diffuse mild tenderness in all quadrants   Musculoskeletal: Normal range of motion. He exhibits no edema.   Lymphadenopathy:     He has no cervical adenopathy.   Neurological: He is alert.   Pt confused to year and president, but is otherwise alert and oriented to situation.    Skin: Skin is warm and dry. Capillary refill takes less than 2 seconds.   4cm area of redness and induration in gluteal cleft on the right buttock. 10 mm open wound present with drainage. Site appears fluctuant.     Psychiatric: He has a normal mood and affect. His behavior is normal.       Significant Labs:   CBC:   Recent Labs  Lab 11/10/17  1625 11/11/17  0543   WBC 13.66* 11.93   HGB 10.4* 10.0*   HCT 30.8* 29.3*   * 131*     CMP:   Recent Labs  Lab 11/10/17  1625 11/11/17  0543   * 136   K 4.9 4.5   CL 98 102   CO2 28 29    103   BUN 23 20   CREATININE 1.3 1.1   CALCIUM 9.3 9.0   PROT 7.4 6.3   ALBUMIN 3.7 3.2*   BILITOT 0.8 0.8   ALKPHOS 135 115   AST 59* 44*   ALT 54* 43   ANIONGAP 7* 5*   EGFRNONAA 48* 59*     Microbiology Results (last 7 days)     Procedure Component Value Units Date/Time    Blood culture #1 **CANNOT BE ORDERED STAT** [[id number]] Collected:  11/10/17 1610    Order Status:  Completed Specimen:  Blood from Peripheral, Wrist, Left Updated:  11/11/17 0315     Blood Culture, Routine No Growth to date    Blood culture #2 **CANNOT BE ORDERED STAT** [[id number]] Collected:  11/10/17 1625    Order Status:  Completed Specimen:  Blood from Peripheral, Antecubital, Left Updated:  11/11/17 0315     Blood Culture, Routine No Growth to date          Significant Imaging: I have reviewed all pertinent imaging results/findings within the past 24 hours.

## 2017-11-11 NOTE — PLAN OF CARE
On contact isolation . Family at bedside. Patient and family contact isolation teaching done .Verbalize understanding for f use of PPE

## 2017-11-11 NOTE — ASSESSMENT & PLAN NOTE
- pt with recent admission for cellulitis, discharged on a course of clindamycin  - blood cultures from that admission are now 4/4 positive for MRSA  - on exam area appears indurated with small amount of drainage  - TTE pending  - slight leucocytosis on admission, WBC has since trended down to normal limits  - repeat cultures NGTD so far  - recommend ultrasound of buttock wound, pt will likely need further I&D for better drainage  - continue vancomycin and please obtain trough 30 minutes prior to 4th dose. Goal trough is 15-20.   - will follow up TTE, however given pt's cardiac hx would push for JEVON if this is negative.  - with pt's cardiac history and placement of a pacemaker concern is for implant infection. Will monitor pt but pacemaker and leads may need to be removed in the future.    - will continue to follow.

## 2017-11-11 NOTE — PLAN OF CARE
Continued on telemetry and fall risk monitoring. No true red alarm ectopy. Bed alarm on . Contact precautions maintained. Incontinent of B&B . Right buttock dressing changed after returned from US.  Marked Reddened area remains same. Small skin abrasion in middle of red area present.No drainage or odor. Mepilex applied.Able to turn when directed.

## 2017-11-11 NOTE — PLAN OF CARE
Seen by Dr Xiong, aware of hypertension.Consult for dr Gonzalez called and left message on  His voice [email or web address].

## 2017-11-11 NOTE — PLAN OF CARE
BP= 198/102 manual. No acute distress noted. Denied chest pain, headache, N/V. Dr. Smith notified. Awaiting for new orders. Continue to monitor

## 2017-11-11 NOTE — PLAN OF CARE
Problem: Patient Care Overview  Goal: Plan of Care Review  Outcome: Ongoing (interventions implemented as appropriate)  Patient is oriented to self and place. NAD. Sinus rhythm and paced rhythm on tele. No true red alarms noted. Nurse reviewed the plan of care and educated patient and family contact precautions. Family verbalized understanding the plan of care. Continue to monitor

## 2017-11-11 NOTE — PT/OT/SLP EVAL
Physical Therapy  Evaluation and Treatment    Zeyad Woodard   MRN: [id number]   Admitting Diagnosis: The primary encounter diagnosis was MRSA bacteremia. Diagnoses of Bacteremia, Cellulitis of buttock, and Chronic diastolic congestive heart failure were also pertinent to this visit.    PT Received On: 11/11/17  PT Start Time: 0958   (also from [id number] for subjective information)  PT Stop Time: 1021    PT Total Time (min): 30 min       Billable Minutes:  Evaluation 20 and Gait Training 10    Diagnosis:   The primary encounter diagnosis was MRSA bacteremia. Diagnoses of Bacteremia, Cellulitis of buttock, and Chronic diastolic congestive heart failure were also pertinent to this visit.    Past Medical History:   Diagnosis Date    Anemia     Atrial fibrillation     BPH (benign prostatic hypertrophy)     Cancer     CHF (congestive heart failure)     Coronary artery disease     Encounter for blood transfusion     GI bleed     cecum angiectasia s/p cautery    Hypertension     Hypothyroidism     Polyneuropathy     Posture imbalance     due to neuropathy    Right posterior capsular opacification 1/18/2017    SSS (sick sinus syndrome) 2015    s/p pacemaker      Past Surgical History:   Procedure Laterality Date    CARDIAC SURGERY      CATARACT EXTRACTION W/  INTRAOCULAR LENS IMPLANT Right 05/17/2010        CATARACT EXTRACTION W/  INTRAOCULAR LENS IMPLANT Left 02/16/2004        cataract surgery      COLONOSCOPY  6/30/04    COLONOSCOPY N/A 2/15/2016    Procedure: COLONOSCOPY;  Surgeon: Stanton Kirk MD;  Location: [address]);  Service: Endoscopy;  Laterality: N/A;    EYE SURGERY      HERNIA REPAIR      inguinal hernia repair      Open heart surgery for pericardiectomy      for calcific constrictive pericarditis    UMBILICAL HERNIA REPAIR      Yag      Left Eye       Referring physician: Dr. Smith  Date referred to PT: 11/10/17    General Precautions: Standard, fall,  contact  Orthopedic Precautions: N/A   Braces: N/A       Do you have any cultural, spiritual, Sikhism conflicts, given your current situation?: none reported to PT    Patient History:  Living Environment Comment: Pt reports living alone in [facility] with threshold entrance and WIS with shower shair. Pt has raised toilet seat with grab bar. Pt ambulates with RW within the home and with rollator or transport chair outside the home. Has lift chair at home. Pt's daughter lives down the street and check on him daily after work, sets up and cooks meals. Pt requires caregiver assist for bathing. Has B AFOs he wears when he goes outside the house.  Equipment Currently Used at Home: grab bar, orthotic device, raised toilet, rollator, shower chair, walker, rolling (transport chair)  DME owned (not currently used): single point cane and quad cane    Previous Level of Function:  Ambulation Skills: needs device  Transfer Skills: needs device  ADL Skills: needs device    Subjective:  Communicated with RN Opal prior to session.  Pt is pleasant and agreeable to participate in therapy session. 1st session limited by pt wanting to have his breakfast.  Chief Complaint: pain in sacrum  Patient goals: to remain independent    Pain/Comfort  Pain Rating 1: 6/10  Location 1: sacral spine  Pain Addressed 1: Reposition, Distraction, Nurse notified  Pain Rating Post-Intervention 1: 5/10      Objective:   Patient found with: bed alarm     Cognitive Exam:  Oriented to: Person, Place, Time and Situation    Follows Commands/attention: Follows two-step commands  Communication: clear/fluent  Safety awareness/insight to disability: impaired    Physical Exam:  Postural examination/scapula alignment: Rounded shoulder, Head forward and Posterior pelvic tilt    Skin integrity: cellulitis of sacrum and BUE bruising  Edema: Mild     Sensation:   Neuropathy in B feet    Lower Extremity Range of Motion:  Right Lower Extremity: no active ankle DF, PROM to  neutral  Left Lower Extremity: no active ankle DF, PROM to neutral    Lower Extremity Strength:  Right Lower Extremity: ankle DF 0/5, knee and hip grossly 4-/5  Left Lower Extremity: ankle DF 0/5, knee and hip grossly 4-/5     Fine motor coordination:  Intact    Gross motor coordination: impaired gait mechanics    Functional Mobility:  Bed Mobility:  Rolling/Turning to Left: Stand by assistance, With side rail  Scooting/Bridging: Stand by Assistance  Supine to Sit: Stand by Assistance, With side rail (HOB elevated)  Sit to Supine: Stand by Assistance    Transfers:  Sit <> Stand Assistance: Minimum Assistance  Sit <> Stand Assistive Device: Rolling Walker    Gait:   Gait Distance: ~30 feet, 5 feet forward/backward, and 5 steps left with RW and CGA/min A. Pt initially required min A to prevent posterior leaning and to prevent posterior LOB during stepping backwards. Pt with B foot drop and slides feet forward to take step, not fully clearing feet off the floor.  Assistance 1: Contact Guard Assistance, Minimum assistance  Gait Assistive Device: Rolling walker  Gait Deviation(s): decreased roberto, increased time in double stance, decreased velocity of limb motion, decreased step length, decreased toe-to-floor clearance    Balance:   Static Sit: SBA with min cues to ensure he does not lean posteriorly  Dynamic Sit: FAIR+: Maintains balance through MINIMAL excursions of active trunk motion  Static Stand: POOR+: Needs MINIMAL assist to maintain  Dynamic stand: POOR: N/A    Therapeutic Activities and Exercises:  Pt instructed in 3 bouts of gait. Pt required min A to stand and upon standing 2/2 pt leaning back, max cues to maintain center of gravity over base of support. Pt able to improve upright posture without posterior leaning during 3rd bout of standing/gait. Pt required CGA with forward ambulation and min A when taking backwards steps.   Pt instructed in BLE seated exercises x 10 reps: LAQs and hip flexion.    AM-PAC  6 CLICK MOBILITY  How much help from another person does this patient currently need?   1 = Unable, Total/Dependent Assistance  2 = A lot, Maximum/Moderate Assistance  3 = A little, Minimum/Contact Guard/Supervision  4 = None, Modified [place]/Independent    Turning over in bed (including adjusting bedclothes, sheets and blankets)?: 3  Sitting down on and standing up from a chair with arms (e.g., wheelchair, bedside commode, etc.): 3  Moving from lying on back to sitting on the side of the bed?: 3  Moving to and from a bed to a chair (including a wheelchair)?: 3  Need to walk in hospital room?: 3  Climbing 3-5 steps with a railing?: 1  Total Score: 16     AM-PAC Raw Score CMS G-Code Modifier Level of Impairment Assistance   6 % Total / Unable   7 - 9 CM 80 - 100% Maximal Assist   10 - 14 CL 60 - 80% Moderate Assist   15 - 19 CK 40 - 60% Moderate Assist   20 - 22 CJ 20 - 40% Minimal Assist   23 CI 1-20% SBA / CGA   24 CH 0% Independent/ Mod I     Patient left HOB elevated with call button in reach, bed alarm on, RN notified and pt's daughter present.    Assessment:   Zeyad Woodard is a 89 y.o. male with a medical diagnosis of MRSA bacteremia and presents with impaired balance with posterior leaning in sitting and standing, impaired gait mechanics, and weakness with B foot drop. Pt lives alone and pt's daughter lives very close, checks on pt daily, but works. Recommending 24/7 care for pt for safety 2/2 pt with increased risk of falling. Recommending SNF placement.     Rehab identified problem list/impairments: Rehab identified problem list/impairments: weakness, impaired endurance, impaired self care skills, impaired functional mobilty, gait instability, impaired balance, decreased coordination, decreased lower extremity function, decreased ROM, impaired skin    Rehab potential is good.    Activity tolerance: Fair    Discharge recommendations: Discharge Facility/Level Of Care Needs: nursing facility,  skilled (recommending  care for pt's safety)     Barriers to discharge: Barriers to Discharge: Decreased caregiver support    Equipment recommendations: Equipment Needed After Discharge: none     GOALS:    Physical Therapy Goals        Problem: Physical Therapy Goal    Goal Priority Disciplines Outcome Goal Variances Interventions   Physical Therapy Goal     PT/OT, PT Ongoing (interventions implemented as appropriate)     Description:  Goals to be met by: 17     Patient will increase functional independence with mobility by performin. Sit to stand transfer with Stand-by Assistance  2. Bed to chair transfer with Stand-by Assistance using Rolling Walker  3. Gait  x 50 feet with Stand-by Assistance using Rolling Walker.   4. Stand for 5 minutes with Stand-by Assistance using Rolling Walker  5. Lower extremity exercise program x 15 reps per handout, with supervision                      PLAN:    Patient to be seen 6 x/week to address the above listed problems via gait training, therapeutic activities, therapeutic exercises  Plan of Care expires: 17  Plan of Care reviewed with: patient, daughter    Functional Assessment Tool Used: AMPAC  Score: 16  Functional Limitation: Mobility: Walking and moving around  Mobility: Walking and Moving Around Current Status (): CK  Mobility: Walking and Moving Around Goal Status (): YESSICA Nash, PT  2017

## 2017-11-12 ENCOUNTER — ANESTHESIA EVENT (OUTPATIENT)
Dept: SURGERY | Facility: HOSPITAL | Age: 82
DRG: 571 | End: 2017-11-12
Payer: MEDICARE

## 2017-11-12 PROBLEM — I34.0 MITRAL REGURGITATION: Status: ACTIVE | Noted: 2017-11-12

## 2017-11-12 PROBLEM — I31.8 PERICARDIAL CALCIFICATION: Status: ACTIVE | Noted: 2017-11-12

## 2017-11-12 LAB
ALBUMIN SERPL BCP-MCNC: 3.2 G/DL
ALP SERPL-CCNC: 119 U/L
ALT SERPL W/O P-5'-P-CCNC: 42 U/L
ANION GAP SERPL CALC-SCNC: 7 MMOL/L
AST SERPL-CCNC: 44 U/L
BASOPHILS # BLD AUTO: ABNORMAL K/UL
BASOPHILS NFR BLD: 0 %
BILIRUB SERPL-MCNC: 0.8 MG/DL
BUN SERPL-MCNC: 23 MG/DL
CALCIUM SERPL-MCNC: 9 MG/DL
CHLORIDE SERPL-SCNC: 101 MMOL/L
CO2 SERPL-SCNC: 27 MMOL/L
CREAT SERPL-MCNC: 1.2 MG/DL
DIFFERENTIAL METHOD: ABNORMAL
EOSINOPHIL # BLD AUTO: ABNORMAL K/UL
EOSINOPHIL NFR BLD: 1 %
ERYTHROCYTE [DISTWIDTH] IN BLOOD BY AUTOMATED COUNT: 13.9 %
EST. GFR  (AFRICAN AMERICAN): >60 ML/MIN/1.73 M^2
EST. GFR  (NON AFRICAN AMERICAN): 53 ML/MIN/1.73 M^2
GLUCOSE SERPL-MCNC: 103 MG/DL
HCT VFR BLD AUTO: 32 %
HGB BLD-MCNC: 10.6 G/DL
LYMPHOCYTES # BLD AUTO: ABNORMAL K/UL
LYMPHOCYTES NFR BLD: 63 %
MCH RBC QN AUTO: 30 PG
MCHC RBC AUTO-ENTMCNC: 33.1 G/DL
MCV RBC AUTO: 91 FL
MONOCYTES # BLD AUTO: ABNORMAL K/UL
MONOCYTES NFR BLD: 3 %
NEUTROPHILS NFR BLD: 32 %
NEUTS BAND NFR BLD MANUAL: 1 %
PLATELET # BLD AUTO: 141 K/UL
PMV BLD AUTO: 9.7 FL
POTASSIUM SERPL-SCNC: 4.2 MMOL/L
PROT SERPL-MCNC: 6.6 G/DL
RBC # BLD AUTO: 3.53 M/UL
SODIUM SERPL-SCNC: 135 MMOL/L
WBC # BLD AUTO: 13.44 K/UL

## 2017-11-12 PROCEDURE — 25000003 PHARM REV CODE 250: Performed by: STUDENT IN AN ORGANIZED HEALTH CARE EDUCATION/TRAINING PROGRAM

## 2017-11-12 PROCEDURE — 97535 SELF CARE MNGMENT TRAINING: CPT

## 2017-11-12 PROCEDURE — 11000001 HC ACUTE MED/SURG PRIVATE ROOM

## 2017-11-12 PROCEDURE — 80053 COMPREHEN METABOLIC PANEL: CPT

## 2017-11-12 PROCEDURE — 85027 COMPLETE CBC AUTOMATED: CPT

## 2017-11-12 PROCEDURE — 63600175 PHARM REV CODE 636 W HCPCS: Performed by: FAMILY MEDICINE

## 2017-11-12 PROCEDURE — 63600175 PHARM REV CODE 636 W HCPCS: Performed by: INTERNAL MEDICINE

## 2017-11-12 PROCEDURE — 87040 BLOOD CULTURE FOR BACTERIA: CPT | Mod: 59

## 2017-11-12 PROCEDURE — 97165 OT EVAL LOW COMPLEX 30 MIN: CPT

## 2017-11-12 PROCEDURE — 94761 N-INVAS EAR/PLS OXIMETRY MLT: CPT

## 2017-11-12 PROCEDURE — 85007 BL SMEAR W/DIFF WBC COUNT: CPT

## 2017-11-12 PROCEDURE — 93005 ELECTROCARDIOGRAM TRACING: CPT

## 2017-11-12 PROCEDURE — 36415 COLL VENOUS BLD VENIPUNCTURE: CPT

## 2017-11-12 PROCEDURE — 63600175 PHARM REV CODE 636 W HCPCS: Performed by: STUDENT IN AN ORGANIZED HEALTH CARE EDUCATION/TRAINING PROGRAM

## 2017-11-12 PROCEDURE — 93010 ELECTROCARDIOGRAM REPORT: CPT | Mod: ,,, | Performed by: INTERNAL MEDICINE

## 2017-11-12 PROCEDURE — 25000003 PHARM REV CODE 250: Performed by: INTERNAL MEDICINE

## 2017-11-12 RX ORDER — MUPIROCIN 20 MG/G
OINTMENT TOPICAL
Status: DISCONTINUED | OUTPATIENT
Start: 2017-11-12 | End: 2017-11-13

## 2017-11-12 RX ORDER — CLINDAMYCIN PHOSPHATE 900 MG/50ML
900 INJECTION, SOLUTION INTRAVENOUS
Status: COMPLETED | OUTPATIENT
Start: 2017-11-12 | End: 2017-11-13

## 2017-11-12 RX ORDER — CARVEDILOL 6.25 MG/1
6.25 TABLET ORAL 2 TIMES DAILY
Status: DISCONTINUED | OUTPATIENT
Start: 2017-11-12 | End: 2017-11-15 | Stop reason: HOSPADM

## 2017-11-12 RX ADMIN — HEPARIN SODIUM 5000 UNITS: 5000 INJECTION, SOLUTION INTRAVENOUS; SUBCUTANEOUS at 06:11

## 2017-11-12 RX ADMIN — GABAPENTIN 100 MG: 100 CAPSULE ORAL at 09:11

## 2017-11-12 RX ADMIN — HYDRALAZINE HYDROCHLORIDE 10 MG: 20 INJECTION INTRAMUSCULAR; INTRAVENOUS at 12:11

## 2017-11-12 RX ADMIN — OXYCODONE HYDROCHLORIDE AND ACETAMINOPHEN 1 TABLET: 7.5; 325 TABLET ORAL at 06:11

## 2017-11-12 RX ADMIN — FINASTERIDE 5 MG: 5 TABLET, FILM COATED ORAL at 09:11

## 2017-11-12 RX ADMIN — LACTOBACILLUS ACIDOPHILUS / LACTOBACILLUS BULGARICUS 1 EACH: 100 MILLION CFU STRENGTH GRANULES at 10:11

## 2017-11-12 RX ADMIN — VANCOMYCIN HYDROCHLORIDE 1250 MG: 5 INJECTION, POWDER, LYOPHILIZED, FOR SOLUTION INTRAVENOUS at 05:11

## 2017-11-12 RX ADMIN — DOCUSATE SODIUM 100 MG: 100 CAPSULE, LIQUID FILLED ORAL at 10:11

## 2017-11-12 RX ADMIN — LACTOBACILLUS ACIDOPHILUS / LACTOBACILLUS BULGARICUS 1 EACH: 100 MILLION CFU STRENGTH GRANULES at 09:11

## 2017-11-12 RX ADMIN — CARVEDILOL 6.25 MG: 6.25 TABLET, FILM COATED ORAL at 09:11

## 2017-11-12 RX ADMIN — HEPARIN SODIUM 5000 UNITS: 5000 INJECTION, SOLUTION INTRAVENOUS; SUBCUTANEOUS at 10:11

## 2017-11-12 RX ADMIN — TAMSULOSIN HYDROCHLORIDE 0.4 MG: 0.4 CAPSULE ORAL at 09:11

## 2017-11-12 RX ADMIN — HEPARIN SODIUM 5000 UNITS: 5000 INJECTION, SOLUTION INTRAVENOUS; SUBCUTANEOUS at 01:11

## 2017-11-12 RX ADMIN — ASPIRIN 81 MG: 81 TABLET, COATED ORAL at 09:11

## 2017-11-12 RX ADMIN — CARVEDILOL 6.25 MG: 6.25 TABLET, FILM COATED ORAL at 10:11

## 2017-11-12 RX ADMIN — LEVOTHYROXINE SODIUM 100 MCG: 100 TABLET ORAL at 06:11

## 2017-11-12 RX ADMIN — DONEPEZIL HYDROCHLORIDE 10 MG: 5 TABLET, FILM COATED ORAL at 10:11

## 2017-11-12 RX ADMIN — ATORVASTATIN CALCIUM 20 MG: 20 TABLET, FILM COATED ORAL at 10:11

## 2017-11-12 RX ADMIN — GABAPENTIN 300 MG: 300 CAPSULE ORAL at 10:11

## 2017-11-12 RX ADMIN — OXYCODONE HYDROCHLORIDE AND ACETAMINOPHEN 1 TABLET: 7.5; 325 TABLET ORAL at 10:11

## 2017-11-12 RX ADMIN — DOCUSATE SODIUM 100 MG: 100 CAPSULE, LIQUID FILLED ORAL at 09:11

## 2017-11-12 RX ADMIN — VITAM B12 100 MCG: 100 TAB at 09:11

## 2017-11-12 NOTE — MEDICAL/APP STUDENT
Subjective:  Mr. Woodard reports moderate pain rated 6/10 in the sacral wound area overnight. He denies any other pain or discomfort. He is tolerating a regular cardiac diet with no N/V. He denies any changes in his bladder and bowel habits, and denies diarrhea. He denies fever, chills, SOB, chest pain, dizziness, and lightheadedness.     Objective:  Vitals:   Temp: 96.5      Max: 98.8  Pulse: 60-73  RR: 16-19  Bp: 144-198/66-86  SpO2: 95-98% on RA     Physical Exam:   General: A&O x3, NAD, lying comfortably in bed  Neuro: 4/5 strength in LE bilaterally (baseline), 5/5 strength in UE bilaterally, no focal deficits, sensation equal and symmetric  Head: atraumatic, normocephalic  Eyes: pupils equal and reactive, EOMI  Throat: nonerythematous, no edema  Neck: supple, trachea midline, no LAD  Cardiac: RRR, no murmurs, clicks, rubs, or gallops; no chest pain on palpation, no pain over PPM pouch  Lung: CTAB, no wheezes or crackles  Abd: soft, nondistended, nontender to palpation, normoactive bowel sounds in 4 quadrants  Skin: minimal erythema in immediate area around sacral wound with central necrosis and purulent drainage on backside--bandage in place;  Elsewhere: warm, dry, intact, no rashes, no lesions  Ext: No C/C/E, warm, well-perfused  Pulses: 2+ radial and dorsalis pedis pulses bilaterally     Medications:  Aspirin 81mg PO QD  Atorvastatin 20mg PO QPM  Coreg 6.25mg PO BID   B12 100mcg PO QD  Docusate 100mg PO BID  Donepezil 10mg PO QPM  Iron 325mg PO every other day  Finasteride 5mg PO QD  Gabapentin 100mg PO QD  Gabapentin 300mg PO QPM  Probiotic 1 packet PO BID  Synthroid 100mcg PO QD  Tamsulosin 0.4mg PO QD  Vancomycin 1.25g IV QD     Labs:   WBC: 13.44  RBC: 3.53  Hgb: 10.6  Hct: 32.0  MCV: 91  MCH: 30.0  MCHC: 33.1  RDW: 13.9  Plt: 141  MPV: 9.7  Gran%: 32.0  Lymph%: 63.0  Mono%: 3.0  Eos%: 1.0  Baso%: 0.0  Bands 1.0       Na: 135  K: 4.2  Cl: 101  Co@: 27  Anion Gap: 7  BUN: 23  Crt: 1.2  GFR: 53  Glucose:  103  Ca: 9.0  Alk Phos: 119  Total Protein: 6.6  Albumin: 3.2  Total Bili: 0.8  AST: 44  ALT: 42    Micro:   11/7 Blood Cx x4 bottles:              MRSA                          Clinda, tetracycline, Bactrim, Vanc sensitive                          Oxacillin, Penicillin, erythromycin resistant  11/10 Blood Cx L antecubital:              No growth to date--pending  11/10 Blood Cx L wrist:              No growth to date--pending     Assessment:  Mr. Zeyad Woodard is a 89 yo male with a history of HTN, BPH, chronic Afib, chronic diastolic HF, anemia, SSS s/p PPM,CKD III, hypothyroidism, dementia, neuropathy, and R buttock cellulitis, who presented back at [facility] on 11/10 following recent discharge on 11/8 due to MRSA bacteremia found on culture.     Plan:  1. Staph bacteremia 2/2 to cellulitis              2/2 to cellulitis--recently discharged on clinda, positive blood cx with MRSA, pt called to return to the hospital for further evaluation              Erythema regressed from previous stay, area of central necrosis, purulent drainage noted              WBC 13.44              11/7 positive blood cx x4 bottles show MRSA, 11/10 blood cx no growth to date, still pending               U/S of sacral area cellulitis- 1.8x1.8.0.4cm hypoechoic collection in subQ soft tissue near midline posterior to sacrum    ID & wound care consulted   Echo (JEVON) scheduled to evaluate for endocarditis, evaluation of pacemaker for bacterial colonization    Will continue to treat with Vancomycin 1.25g IV QD  2. HTN              Hypertensive overnight to 200/88                          Switched metoprolol to coreg              Will continue to monitor  3. Chronic diastolic HF               euvolemic on exam, minimal pedal edema              ECHO 11/2017 with LVEF 60-65%, mild trivial aortic valve regurg, trivial aortic valve stenosis, no diastolic dysfunction              Will evaluate function on JEVON              Will continue to monitor  4.  BPH              No acute issues, able to urinate without difficulty into urinal              Home meds finasteride and tamsulosin continued               Will continue to monitor  5. CKD              No acute issues              Crt 1.2, BUN 23, GFR 53              Will monitor renally dosed vanc levels with troughs              Will continue to monitor  6. Hypothyroidism              No acute issues              TSH 0.600 during recent admission              Will continue home dose of synthroid 100mcg  7. Neuropathy              Mild distal extremity pain noted by pt              Home dose gabapentin continued  8. Iron deficiency anemia              Stable              H/H 10.6/32.0 today              Continued iron 325mg PO QD  9. Dementia              Stable, able to converse during exam               Continued home aricept QPM  10. HLD              No acute issues              Continued home atrovastatin and daily aspirin

## 2017-11-12 NOTE — ASSESSMENT & PLAN NOTE
- pt with recent admission for cellulitis, discharged on a course of clindamycin  - blood cultures from that admission are now 4/4 positive for MRSA  - on exam area appears indurated with small amount of drainage  - TTE is pending  - slight leucocytosis on admission, WBC had since trended down but is up to 13.44 this morning.   - repeat cultures NGTD so far  -  ultrasound of buttock wound - Ultrasound of the soft tissues of the sacral region  Findings: There is a 1.8 x 1.8 x 0.4 cm hypoechoic collection noted within the subcutaneous soft tissues near the midline posterior to the sacrum.   -pt will likely need further I&D for better drainage. Surgery consult ordered.   - continue vancomycin and please obtain trough 30 minutes prior to 4th dose. Goal trough is 15-20.   -TTE shows no evidence of endocarditis  - Will follow up JEVON  - with pt's cardiac history and placement of a pacemaker concern is for implant infection. Will monitor pt but pacemaker and leads may need to be removed in the future.    -Recommend U/S of Pacemaker to assess for fluid collections.   - will continue to follow.

## 2017-11-12 NOTE — CONSULTS
[facility] Initial Consultation Note    Reason for Consultation:  Performance of JEVON as recommended by ID service int he setting of MRSA bacteremia.    HPI:  89 y.o. man admitted with sacral wound and MRSA bacteremia.  He has a history of sternotomy for pericardiectomy when he was in his 60s about 25 years ago for idiopathic constrictive pericarditis.  Never had a CABG.  He is s/p single lead pacemaker placement in July 2015 due to sick sinus syndrome discovered by implantable loop recorder.  He also has chronic atrial fibrillation.  Echo on 11/11/17 showed good LV systolic function and no obvious endocarditis along with minimal aortic stenosis.  We are asked to perform JEVON to assist in treatment of sacral cellulitis and bacteremia by ruling out endocarditis.    Past Medical History:       Past Medical History:   Diagnosis Date    Anemia      Anticoagulant long-term use      Atrial fibrillation      BPH (benign prostatic hypertrophy)      Cancer      CHF (congestive heart failure)      Coronary artery disease      Encounter for blood transfusion      GI bleed       cecum angiectasia s/p cautery    Hypertension      Hypothyroidism      Polyneuropathy      Posture imbalance       due to neuropathy    Right posterior capsular opacification 1/18/2017    SSS (sick sinus syndrome) 2015     s/p pacemaker         Past Surgical History:        Past Surgical History:   Procedure Laterality Date    CARDIAC SURGERY        CATARACT EXTRACTION W/  INTRAOCULAR LENS IMPLANT Right 05/17/2010         CATARACT EXTRACTION W/  INTRAOCULAR LENS IMPLANT Left 02/16/2004         cataract surgery        COLONOSCOPY   6/30/04    COLONOSCOPY N/A 2/15/2016     Procedure: COLONOSCOPY;  Surgeon: Stanton Kirk MD;  Location: [facility] ([address] [place] [address]);  Service: Endoscopy;  Laterality: N/A;    EYE SURGERY        HERNIA REPAIR        inguinal hernia repair        Open heart surgery for pericardiectomy          for calcific constrictive pericarditis    UMBILICAL HERNIA REPAIR        Yag         Left Eye         Allergies:        Review of patient's allergies indicates:   Allergen Reactions    Penicillins Hives and Other (See Comments)       Fever     Family History:         Family History   Problem Relation Age of Onset    Stroke Mother             Cancer Father         lung;     Diabetes Sister      Coronary artery disease Sister      Peripheral vascular disease Sister      Amblyopia Neg Hx      Blindness Neg Hx      Cataracts Neg Hx      Glaucoma Neg Hx      Hypertension Neg Hx      Macular degeneration Neg Hx      Retinal detachment Neg Hx      Strabismus Neg Hx      Thyroid disease Neg Hx      Prostate cancer Neg Hx      Kidney disease Neg Hx           Social History:       Social History   Substance Use Topics    Smoking status: Passive Smoke Exposure - Never Smoker    Smokeless tobacco: Never Used    Alcohol use No         Review of Systems:  Pertinent positives and negatives listed in HPI. All other systems are reviewed and are negative.      Current Hospital Medications  Prior to Admission medications    Medication Sig Start Date End Date Taking? Authorizing Provider   ASCORBATE CALCIUM (VITAMIN C ORAL) Take 1 tablet by mouth once daily.  7/3/12   Historical Provider, MD   aspirin (ECOTRIN) 81 MG EC tablet Take 1 tablet (81 mg total) by mouth once daily. 2/15/16 11/7/17  Evelina Castillo MD   atorvastatin (LIPITOR) 20 MG tablet Take 1 tablet (20 mg total) by mouth nightly. 10/2/16 11/7/17  Fay Rubi DO   calcium 500 mg Tab Take 1 tablet by mouth Daily. 7/3/12   Historical Provider, MD   clindamycin (CLEOCIN) 300 MG capsule Take 1 capsule (300 mg total) by mouth every 6 (six) hours. 11/8/17 11/15/17  Melissa Gonzales MD   clotrimazole-betamethasone (LOTRISONE) lotion Apply topically 2 (two) times daily.  Patient taking differently: Apply topically as needed.  5/28/15    Booker Ricci MD   cyanocobalamin (VITAMIN B-12) 1000 MCG tablet Take 100 mcg by mouth once daily.    Historical Provider, MD   donepezil (ARICEPT) 10 MG tablet Take 1 tablet (10 mg total) by mouth every evening. 11/2/17 11/2/18  Chele Juárez MD   ferrous sulfate 325 mg (65 mg iron) Tab tablet Take 325 mg by mouth once daily.    Historical Provider, MD   finasteride (PROSCAR) 5 mg tablet Take 1 tablet (5 mg total) by mouth once daily. 7/27/17 7/27/18  Booker Ricci MD   furosemide (LASIX) 20 MG tablet Take 20 mg by mouth 2 (two) times daily.    Historical Provider, MD   gabapentin (NEURONTIN) 100 MG capsule Take 100 mg by mouth 2 (two) times daily.    Historical Provider, MD   lactobacillus acidophilus & bulgar (LACTINEX) 100 million cell packet Take 1 packet (1 each total) by mouth 2 (two) times daily. 11/8/17 11/15/17  Melissa Gonzales MD   levothyroxine (SYNTHROID) 100 MCG tablet Take 1 tablet (100 mcg total) by mouth once daily. 8/7/17   Booker Ricci MD   metoprolol succinate (TOPROL-XL) 25 MG 24 hr tablet Take 1 tablet (25 mg total) by mouth once daily. 7/27/17 7/27/18  Booker Ricci MD   MULTIVITS-MINERALS/FA/LYCOPENE (ONE-A-DAY MEN'S MULTIVITAMIN ORAL) Take 1 tablet by mouth once daily.    Historical Provider, MD   oxycodone-acetaminophen (PERCOCET) 7.5-325 mg per tablet Take 1 tablet by mouth every 8 (eight) hours as needed for Pain. 10/19/17 11/18/17  Lynn Rosenberg MD   senna-docusate 8.6-50 mg (PERICOLACE) 8.6-50 mg per tablet Take 1 tablet by mouth 2 (two) times daily. 7/26/16   Radha Luna, CINDY   tamsulosin (FLOMAX) 0.4 mg Cp24 Take 1 capsule (0.4 mg total) by mouth once daily. 8/7/17   Booker Ricci MD       Scheduled Meds:   aspirin  81 mg Oral Daily    atorvastatin  20 mg Oral Nightly    carvedilol  6.25 mg Oral BID    cyanocobalamin  100 mcg Oral Daily    docusate sodium  100 mg Oral BID    donepezil  10 mg Oral QHS    ferrous  "sulfate  325 mg Oral Daily    finasteride  5 mg Oral Daily    gabapentin  100 mg Oral Daily    gabapentin  300 mg Oral QHS    heparin (porcine)  5,000 Units Subcutaneous Q8H    lactobacillus acidophilus & bulgar  1 packet Oral BID    levothyroxine  100 mcg Oral Before breakfast    tamsulosin  0.4 mg Oral Daily    vancomycin (VANCOCIN) IVPB  1,250 mg Intravenous Q24H     Continuous Infusions:   PRN: dextrose 50%, dextrose 50%, glucagon (human recombinant), glucose, glucose, hydrALAZINE, influenza, ondansetron, oxyCODONE-acetaminophen, oxyCODONE-acetaminophen, senna, sodium chloride 0.9%, DIPH,PERTUS (ADACEL),TETANUS PF VAC (ADULT)    VITAL SIGNS  BP (!) 179/79 (BP Location: Right arm, Patient Position: Lying)   Pulse 62   Temp 96.5 °F (35.8 °C) (Oral)   Resp 19   Ht 5' 8" (1.727 m)   Wt 74.7 kg (164 lb 10.9 oz)   SpO2 95%   BMI 25.04 kg/m²     Intake/Output Summary (Last 24 hours) at 11/12/17 0933  Last data filed at 11/12/17 0800   Gross per 24 hour   Intake              740 ml   Output             1480 ml   Net             -740 ml       OBJECTIVE    Physical Exam  Temp:  [96.5 °F (35.8 °C)-98.8 °F (37.1 °C)] 96.5 °F (35.8 °C)  Pulse:  [60-73] 62  Resp:  [16-19] 19  SpO2:  [95 %-98 %] 95 %  BP: (144-198)/(66-86) 179/79    Gen: Patient awake and alert, in NAD  Eyes: Pupils equal and round.  Sclerae anicteric, noninjected conjunctivae.  HENT: NC/AT, nasal septum midline, MMM, OP clear and without exudates.  CV: IRRegular rhythm.  Normal S1, S2.  Soft systolic murmur at 2nd ICS.   Chest:  Symmetric chest wall expansion.  Good air movement.  No wheezes or crackles.  Abd:  Soft, Non-tender.  Non distended.  Normoactive bowel sounds, no rebound  Ext: +2 radial pulses, no C/C.  Trace LE edema.  Warm to touch      Lab Results    Recent Labs  Lab 11/12/17  0523   *   K 4.2      CO2 27   BUN 23   CREATININE 1.2     No results for input(s): CPK, CPKMB, TROPONINI, MB in the last 24 hours.    Recent " Labs  Lab 11/12/17  0523   WBC 13.44*   RBC 3.53*   HGB 10.6*   HCT 32.0*   *   MCV 91   MCH 30.0   MCHC 33.1     No results for input(s): INR, APTT in the last 24 hours.    Invalid input(s): PT    CXR 9/13/12:  Pericardial calcification (documented since at least 2005).        EKG 11/7/17 :  Demand ventricular pacemaker;  T wave inversion compatible with 'memory' T waves.       ASSESSMENT/PLAN    1.  MRSA Bacteremia:  Will schedule for JEVON attempt tomorrow.  Please keep NPO after midnight.    2.  Atrial fibrillation:  Chronic, no longer on anticoagulation due to history of GI bleeding.     3.  S/p pacemaker:  Single chamber device placed in July 2015 with appropriate function based upon 11/7/17 EKG.  T waves on EKG may reflect 'memory T wave' phenomenon.    4.  H/o constrictive pericarditis:    Patient with idiopathic constrictive pericarditis about 30 years ago, s/p partial pericardiectomy  by Dr. Alton Ochsner about 25 years ago.  Never had CABG.  Chest x-rays show pericardial calcifications which are residual from partial pericardiectomy.  Echo does not show evidence for hemodynamic pericardial constriction but there is excessive calcification of the aortic valve, aortic annulus, interventricular septum and parts of the mitral apparatus.       Saman Boston MD  [facility] Attending

## 2017-11-12 NOTE — CONSULTS
Today`s Date: 11/12/2017     Admit Date: 11/10/2017    Admitting Physician: Jared Xiong MD    Patient`s Name: Zeyad Woodard , 89 y.o. male    Reason for consultation  Buttock Abscess  Patient Active Problem List:     Renovascular hypertension     Chronic atrial fibrillation     Benign prostatic hyperplasia     Chronic diastolic congestive heart failure     Polyneuropathy     Hypothyroidism     Iron deficiency anemia due to chronic blood loss     CKD (chronic kidney disease), stage III     Chronic back pain     Lumbar radiculopathy     SSS (sick sinus syndrome)     Bradycardia     Frequent falls     Opioid dependence with intoxication with complication     Venous insufficiency of both lower extremities     Thrombocytopenia     Chronic constipation     Anemia of chronic renal failure, stage 3 (moderate)     Elevated troponin     Right hip pain     Contusion of right hip     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     Hypothyroidism due to acquired atrophy of thyroid     Pseudophakia     Essential hypertension     Right posterior capsular opacification     Refractive error     Post-operative state     Dementia without behavioral disturbance     Chronic bilateral low back pain with bilateral sciatica     Use of opiates for therapeutic purposes     Chronic pain     Abnormal blood smear     Cellulitis of buttock     HLD (hyperlipidemia)     Weakness     Bacteremia     MRSA bacteremia     Cardiac pacemaker in situ     Pericardial calcification     Mitral regurgitation      Past Medical History:  No date: Anemia  No date: Atrial fibrillation  No date: BPH (benign prostatic hypertrophy)  No date: Cancer  7/2/2015: Cardiac pacemaker in situ  No date: CHF (congestive heart failure)  No date: Coronary artery disease  No date: Encounter for blood transfusion  No date: GI bleed      Comment: cecum angiectasia s/p cautery  No date: Hypertension  No date: Hypothyroidism  No date: Polyneuropathy  No date: Posture imbalance      Comment: due  to neuropathy  1/18/2017: Right posterior capsular opacification  2015: SSS (sick sinus syndrome)      Comment: s/p pacemaker    Past Surgical History:  No date: CARDIAC SURGERY  05/17/2010: CATARACT EXTRACTION W/  INTRAOCULAR LENS IMPLA* Right      Comment:   02/16/2004: CATARACT EXTRACTION W/  INTRAOCULAR LENS IMPLA* Left      Comment:   No date: cataract surgery  6/30/04: COLONOSCOPY  2/15/2016: COLONOSCOPY N/A      Comment: Procedure: COLONOSCOPY;  Surgeon: Stanton Kirk MD;  Location: [facility] ([address] [place] [address]);                 Service: Endoscopy;  Laterality: N/A;  No date: EYE SURGERY  No date: HERNIA REPAIR  No date: inguinal hernia repair  No date: Open heart surgery for pericardiectomy      Comment: for calcific constrictive pericarditis  No date: UMBILICAL HERNIA REPAIR  No date: Yag      Comment: Left Eye    Prior to Admission medications :  Medication ASCORBATE CALCIUM (VITAMIN C ORAL), Sig Take 1 tablet by mouth once daily. , Start Date 7/3/12, End Date , Taking? , Authorizing Provider Historical Provider, MD    Medication aspirin (ECOTRIN) 81 MG EC tablet, Sig Take 1 tablet (81 mg total) by mouth once daily., Start Date 2/15/16, End Date 11/7/17, Taking? , Authorizing Provider Evelina Castillo MD    Medication atorvastatin (LIPITOR) 20 MG tablet, Sig Take 1 tablet (20 mg total) by mouth nightly., Start Date 10/2/16, End Date 11/7/17, Taking? , Authorizing Provider Fay Rubi DO    Medication calcium 500 mg Tab, Sig Take 1 tablet by mouth Daily., Start Date 7/3/12, End Date , Taking? , Authorizing Provider Historical Provider, MD    Medication clindamycin (CLEOCIN) 300 MG capsule, Sig Take 1 capsule (300 mg total) by mouth every 6 (six) hours., Start Date 11/8/17, End Date 11/15/17, Taking? , Authorizing Provider Melissa Gonzales MD    Medication clotrimazole-betamethasone (LOTRISONE) lotion, Sig Apply topically 2 (two) times daily.Patient taking differently: Apply topically  as needed. , Start Date 5/28/15, End Date , Taking? , Authorizing Provider Booker Ricci MD    Medication cyanocobalamin (VITAMIN B-12) 1000 MCG tablet, Sig Take 100 mcg by mouth once daily., Start Date , End Date , Taking? , Authorizing Provider Historical MD [place]    Medication donepezil (ARICEPT) 10 MG tablet, Sig Take 1 tablet (10 mg total) by mouth every evening., Start Date 11/2/17, End Date 11/2/18, Taking? , Authorizing Provider Chele Juárez MD    Medication ferrous sulfate 325 mg (65 mg iron) Tab tablet, Sig Take 325 mg by mouth once daily., Start Date , End Date , Taking? , Authorizing Provider Historical Provider, MD    Medication finasteride (PROSCAR) 5 mg tablet, Sig Take 1 tablet (5 mg total) by mouth once daily., Start Date 7/27/17, End Date 7/27/18, Taking? , Authorizing Provider Booker Ricci MD    Medication furosemide (LASIX) 20 MG tablet, Sig Take 20 mg by mouth 2 (two) times daily., Start Date , End Date , Taking? , Authorizing Provider Historical Provider, MD    Medication gabapentin (NEURONTIN) 100 MG capsule, Sig Take 100 mg by mouth 2 (two) times daily., Start Date , End Date , Taking? , Authorizing Provider Historical Provider, MD    Medication lactobacillus acidophilus & bulgar (LACTINEX) 100 million cell packet, Sig Take 1 packet (1 each total) by mouth 2 (two) times daily., Start Date 11/8/17, End Date 11/15/17, Taking? , Authorizing Provider Melissa Gonzales MD    Medication levothyroxine (SYNTHROID) 100 MCG tablet, Sig Take 1 tablet (100 mcg total) by mouth once daily., Start Date 8/7/17, End Date , Taking? , Authorizing Provider Booker Ricci MD    Medication metoprolol succinate (TOPROL-XL) 25 MG 24 hr tablet, Sig Take 1 tablet (25 mg total) by mouth once daily., Start Date 7/27/17, End Date 7/27/18, Taking? , Authorizing Provider Booker Ricci MD    Medication MULTIVITS-MINERALS/FA/LYCOPENE (ONE-A-DAY MEN'S MULTIVITAMIN ORAL), Sig Take 1  tablet by mouth once daily., Start Date , End Date , Taking? , Authorizing Provider Historical Provider, MD    Medication oxycodone-acetaminophen (PERCOCET) 7.5-325 mg per tablet, Sig Take 1 tablet by mouth every 8 (eight) hours as needed for Pain., Start Date 10/19/17, End Date 11/18/17, Taking? , Authorizing Provider Lynn Rosenberg MD    Medication senna-docusate 8.6-50 mg (PERICOLACE) 8.6-50 mg per tablet, Sig Take 1 tablet by mouth 2 (two) times daily., Start Date 7/26/16, End Date , Taking? , Authorizing Provider CINDY Reeves    Medication tamsulosin (FLOMAX) 0.4 mg Cp24, Sig Take 1 capsule (0.4 mg total) by mouth once daily., Start Date 8/7/17, End Date , Taking? , Authorizing Provider Booker Ricci MD      No current facility-administered medications on file prior to encounter.   Current Outpatient Prescriptions on File Prior to Encounter:  ASCORBATE CALCIUM (VITAMIN C ORAL), Take 1 tablet by mouth once daily. , Disp: , Rfl:   aspirin (ECOTRIN) 81 MG EC tablet, Take 1 tablet (81 mg total) by mouth once daily., Disp: , Rfl: 0  atorvastatin (LIPITOR) 20 MG tablet, Take 1 tablet (20 mg total) by mouth nightly., Disp: 90 tablet, Rfl: 3  calcium 500 mg Tab, Take 1 tablet by mouth Daily., Disp: , Rfl:   clindamycin (CLEOCIN) 300 MG capsule, Take 1 capsule (300 mg total) by mouth every 6 (six) hours., Disp: 28 capsule, Rfl: 0  clotrimazole-betamethasone (LOTRISONE) lotion, Apply topically 2 (two) times daily. (Patient taking differently: Apply topically as needed. ), Disp: 30 mL, Rfl: 0  cyanocobalamin (VITAMIN B-12) 1000 MCG tablet, Take 100 mcg by mouth once daily., Disp: , Rfl:   donepezil (ARICEPT) 10 MG tablet, Take 1 tablet (10 mg total) by mouth every evening., Disp: 90 tablet, Rfl: 3  ferrous sulfate 325 mg (65 mg iron) Tab tablet, Take 325 mg by mouth once daily., Disp: , Rfl:   finasteride (PROSCAR) 5 mg tablet, Take 1 tablet (5 mg total) by mouth once daily., Disp: 90 tablet,  Rfl: 3  furosemide (LASIX) 20 MG tablet, Take 20 mg by mouth 2 (two) times daily., Disp: , Rfl:   gabapentin (NEURONTIN) 100 MG capsule, Take 100 mg by mouth 2 (two) times daily., Disp: , Rfl:   lactobacillus acidophilus & bulgar (LACTINEX) 100 million cell packet, Take 1 packet (1 each total) by mouth 2 (two) times daily., Disp: 14 packet, Rfl: 0  levothyroxine (SYNTHROID) 100 MCG tablet, Take 1 tablet (100 mcg total) by mouth once daily., Disp: 90 tablet, Rfl: 3  metoprolol succinate (TOPROL-XL) 25 MG 24 hr tablet, Take 1 tablet (25 mg total) by mouth once daily., Disp: 90 tablet, Rfl: 3  MULTIVITS-MINERALS/FA/LYCOPENE (ONE-A-DAY MEN'S MULTIVITAMIN ORAL), Take 1 tablet by mouth once daily., Disp: , Rfl:   oxycodone-acetaminophen (PERCOCET) 7.5-325 mg per tablet, Take 1 tablet by mouth every 8 (eight) hours as needed for Pain., Disp: 90 tablet, Rfl: 0  senna-docusate 8.6-50 mg (PERICOLACE) 8.6-50 mg per tablet, Take 1 tablet by mouth 2 (two) times daily., Disp: , Rfl:   tamsulosin (FLOMAX) 0.4 mg Cp24, Take 1 capsule (0.4 mg total) by mouth once daily., Disp: 90 capsule, Rfl: 3         Review of patient's allergies indicates:   -- Penicillins -- Hives and Other (See Comments)    --  Fever    Social History:   reports that he is a non-smoker but has been exposed to tobacco smoke. He has never used smokeless tobacco. He reports that he does not drink alcohol or use drugs.     Review of patient's family history indicates:    Stroke                         Mother                      Comment:     Cancer                         Father                      Comment: lung;     Diabetes                       Sister                    Coronary artery disease        Sister                    Peripheral vascular disease    Sister                    Amblyopia                      Neg Hx                    Blindness                      Neg Hx                    Cataracts                      Neg Hx                     Glaucoma                       Neg Hx                    Hypertension                   Neg Hx                    Macular degeneration           Neg Hx                    Retinal detachment             Neg Hx                    Strabismus                     Neg Hx                    Thyroid disease                Neg Hx                    Prostate cancer                Neg Hx                    Kidney disease                 Neg Hx                      PHYSICAL EXAMINATION  Temp:  [96.5 °F (35.8 °C)-99.3 °F (37.4 °C)] 99.3 °F (37.4 °C)  Pulse:  [60-74] 74  Resp:  [16-19] 16  SpO2:  [95 %-98 %] 95 %  BP: (133-198)/(61-86) 133/61    General Condition:   alert x 3 , c/o pain right buttock     Head & Neck  Anemia: None  Jaundice: None  Neck vein: Not distended  Carotid Bruits: none  Lymph nodes: none palpable  Thyroid: normal    Chest: normal    Heart: normal    Rt. Breast: not examined  Lt. Breast: not examined  Axillary lymph nodes: none    Abdomen: Soft,  None tender with no palpable mass or organ  Hernia: none    Rectal: Defered    Extremities: normal    Vascular: normal    Specific focus Examination    Imp: infected right buttock Abscess    Plan: I&D in am

## 2017-11-12 NOTE — PROGRESS NOTES
[facility] Resident HO-1 Progress Note    Subjective:      Zeyad Woodard is a 89 y.o. male who is being followed by the [facility] service at [facility] for MRSA bacteremia with buttock cellulitis and abscess.     Blood pressures remained elevated overnight to 198/86. Denies headache, shortness of breath, chest pain, n/v/abd pain. Reports mild pain on his buttocks and his eyes are watering, concerned that he has a fever.      Objective:   Last 24 Hour Vital Signs:  BP  Min: 144/66  Max: 198/86  Temp  Av.9 °F (36.6 °C)  Min: 96.5 °F (35.8 °C)  Max: 98.8 °F (37.1 °C)  Pulse  Av.1  Min: 60  Max: 73  Resp  Av.2  Min: 16  Max: 19  SpO2  Av.3 %  Min: 95 %  Max: 98 %  Weight  Av.7 kg (164 lb 10.9 oz)  Min: 74.7 kg (164 lb 10.9 oz)  Max: 74.7 kg (164 lb 10.9 oz)  I/O last 3 completed shifts:  In: 920 [P.O.:670; IV Piggyback:250]  Out: 1530 [Urine:1529; Emesis/NG output:1]    Physical Examination:  General:          Alert and awake in no apparent distress  Head:               Normocephalic and atraumatic  Eyes:               PERRL; EOMi with anicteric sclera and mild injection of L conjunctiva, small amount of clear, watery discharge from bilateral eyes  Mouth:             Oropharynx clear and without exudate; moist mucous membranes  Cardio:             Regular rate and rhythm with normal S1 and S2; no murmurs or rubs  Resp:               CTAB; respirations unlabored; no wheezes, crackles or rhonchi  Abdom:            Soft, NTND with normoactive bowel sounds  Extrem:            Warm and well-perfused with no clubbing, cyanosis or edema  Skin:                Sacral wound with central necrosis blanching erythema, surrounding erythema is within and regressed from previous delineation with surgical marker. Small amount of purulent drainage noted on bandage   Pulses:            2+ and symmetric distally  Neuro:             AAOx3; cooperative and pleasant with  no focal deficits        Laboratory:  Laboratory Data Reviewed: yes  Pertinent Findings:  Trended Lab Data:    Recent Labs  Lab 11/10/17  1625 11/11/17  0543 11/12/17  0523   WBC 13.66* 11.93 13.44*   HGB 10.4* 10.0* 10.6*   HCT 30.8* 29.3* 32.0*   * 131* 141*   MCV 91 90 91   RDW 13.7 13.8 13.9   * 136 135*   K 4.9 4.5 4.2   CL 98 102 101   CO2 28 29 27   BUN 23 20 23    103 103   CALCIUM 9.3 9.0 9.0   PROT 7.4 6.3 6.6   ALBUMIN 3.7 3.2* 3.2*   BILITOT 0.8 0.8 0.8   AST 59* 44* 44*   ALKPHOS 135 115 119   ALT 54* 43 42         Microbiology Data Reviewed: yes  Pertinent Findings:  11/7 Blood Cx MRSA  11/10 Blood Cx NGTD  11/12 Blood Cx pending    Other Results:  EKG (my interpretation): No new EKG    Radiology Data Reviewed: yes  Pertinent Findings:  11/11 TTE:    CONCLUSIONS     1 - Normal left ventricular systolic function (EF 60-65%).     2 - No wall motion abnormalities.     3 - Biatrial enlargement.     4 - Trivial to mild aortic stenosis, BELKIS = 1.95 cm2, peak velocity = 2.08 m/s, mean gradient = 9 mmHg.     5 - Normal right ventricular systolic function .     6 - Mild aortic regurgitation.     7 - No evidence of endocarditis.     8 - Atrial fibrillation.     11/11 US soft tissue sacrum:  Ultrasound of the soft tissues of the sacral region    Findings: There is a 1.8 x 1.8 x 0.4 cm hypoechoic collection noted within the subcutaneous soft tissues near the midline posterior to the sacrum.    Current Medications:     Infusions:        Scheduled:   aspirin  81 mg Oral Daily    atorvastatin  20 mg Oral Nightly    carvedilol  6.25 mg Oral BID    cyanocobalamin  100 mcg Oral Daily    docusate sodium  100 mg Oral BID    donepezil  10 mg Oral QHS    ferrous sulfate  325 mg Oral Daily    finasteride  5 mg Oral Daily    gabapentin  100 mg Oral Daily    gabapentin  300 mg Oral QHS    heparin (porcine)  5,000 Units Subcutaneous Q8H    lactobacillus acidophilus & bulgar  1 packet Oral BID     levothyroxine  100 mcg Oral Before breakfast    tamsulosin  0.4 mg Oral Daily    vancomycin (VANCOCIN) IVPB  1,250 mg Intravenous Q24H        PRN:  dextrose 50%, dextrose 50%, glucagon (human recombinant), glucose, glucose, hydrALAZINE, influenza, ondansetron, oxyCODONE-acetaminophen, oxyCODONE-acetaminophen, senna, sodium chloride 0.9%, DIPH,PERTUS (ADACEL),TETANUS PF VAC (ADULT)    Antibiotics and Day Number of Therapy:  Vancomycin, today is day 3    Lines and Day Number of Therapy:  PIV    Assessment:     Zeyad Woodard is a 89 y.o.male with  Patient Active Problem List    Diagnosis Date Noted    Bacteremia 11/10/2017    MRSA bacteremia 11/10/2017    Weakness 11/08/2017    Cellulitis of buttock 11/07/2017    HLD (hyperlipidemia) 11/07/2017    Abnormal blood smear 10/05/2017    Chronic pain 10/03/2017    Chronic bilateral low back pain with bilateral sciatica 09/19/2017    Use of opiates for therapeutic purposes 09/19/2017    Dementia without behavioral disturbance 06/05/2017    Post-operative state 02/01/2017    Pseudophakia 01/18/2017    Essential hypertension 01/18/2017    Right posterior capsular opacification 01/18/2017    Refractive error 01/18/2017    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    Hypothyroidism due to acquired atrophy of thyroid 10/02/2016    Right hip pain 10/01/2016    Contusion of right hip 10/01/2016    Elevated troponin 09/30/2016    Anemia of chronic renal failure, stage 3 (moderate) 09/01/2016    Chronic constipation 07/06/2016    Thrombocytopenia 07/03/2016    Venous insufficiency of both lower extremities 10/26/2015    Frequent falls 08/28/2015    Opioid dependence with intoxication with complication 08/28/2015    SSS (sick sinus syndrome) 06/12/2015    Bradycardia 06/12/2015    Chronic back pain 09/12/2014    Lumbar radiculopathy 09/12/2014    CKD (chronic kidney disease), stage III 10/16/2013    Iron deficiency anemia due to chronic blood loss  04/12/2013    Hypothyroidism     Polyneuropathy 11/12/2012    Chronic diastolic congestive heart failure 08/23/2012    Renovascular hypertension     Chronic atrial fibrillation     Benign prostatic hyperplasia         Plan:     MRSA bacteremia 2/2 cellulitis  - Patient discharge recently for cellulitis on clindamycin, however patient's blood cultures returned MRSA so called and had patient return to hospital for further evaluation.   - Sacral wound with central necrosis blanching erythema, surrounding erythema is within and regressed from previous delineation with surgical marker. Small amount of purulent drainage noted.   - WBC 13.6 on admission, slight decrease from previous admission.   - Blood Cx Q other day, repeat so far negative  - TTE with no evidence of endocarditis       - Vancomycin 1500mg x1 in Ed, now 1250mg IV daily  - ID consult: Vanc and JEVON, may need pacemaker lead removal in future; appreciate recs       Chronic heart failure with combined systolic/diastolic dysfunction and mitral regurgitation  - EF 45%, last echo Dec 2016, Euvolemic on exam with mild pedal edema   - TTE 11/11 with normal LVEF 60-65%, atrial fibrillation, no signs of endocarditis   - lasix 20 mg BID   - follow up JEVON once completed    HTN  - BP high since admission. Switch torpol to coreg, 6.25mg BID  - Continue to monitor     Iron deficiency anemia due to blood loss  - Stable, Continue iron 325 mg daily, B12 100 mcg daily, probiotic daily     Dementia  - stable, conversant and interactive; continue home aricept 10 mg nightly      HLD  - Continue home atorvastatin 20mg QHS  - Daily ASA 81 mg      Neuropathy   - Continue home gabapentin 100 mg BID     BPH  - Asymptomatic, continue finasteride 5mg daily, flomax 0.4 mg daily     Hypothyroidism   - recent TSH wnl, continue synthroid 100mcg daily      HCM  - ordered flu and tdap for administration prior to discharge  Code Status: DNR   Diet: Cardiac  Allergies: Penicillin  DVT  ppx: SQH   Dispo: IV ABX and infectious disease workup  Est ella of stay: 2 days       Theresa Herndon  [facility] HO-1  [facility] Service Team B    [facility]ist Pager numbers:   [facility] Team A (Kinsey/Yumiko): [phone]  [facility] Team B (Tyrese/Bibi):  [phone]

## 2017-11-12 NOTE — PLAN OF CARE
Problem: Patient Care Overview  Goal: Plan of Care Review  Outcome: Ongoing (interventions implemented as appropriate)  No acute distress. No fever during the shift. No true red alarms on tele. PPD skin test placed on left forearm @ 2137 11/11/2017. Contact precautions maintained. Patient is free from falls. Room near nursing station, bed alarm set, SCD, nonskid socks applied. Dressing changed, side rails up x2, call light within reach. Continue to monitor

## 2017-11-12 NOTE — SUBJECTIVE & OBJECTIVE
Interval History: No acute events overnight. Patient reporting some pain in his buttocks around his abscess but otherwise denies having any complaints. Denies Fever, Chills, HA, vision changes, CP, SOB, Abdominal pain, Nausea, vomiting, changes in bowel habits, dysuria, or other musculoskeletal pain.     Review of Systems   As as above.   Objective:     Vital Signs (Most Recent):  Temp: 99.3 °F (37.4 °C) (11/12/17 1206)  Pulse: 74 (11/12/17 1206)  Resp: 16 (11/12/17 1206)  BP: 133/61 (11/12/17 1206)  SpO2: 95 % (11/12/17 1236) Vital Signs (24h Range):  Temp:  [96.5 °F (35.8 °C)-99.3 °F (37.4 °C)] 99.3 °F (37.4 °C)  Pulse:  [60-74] 74  Resp:  [16-19] 16  SpO2:  [95 %-98 %] 95 %  BP: (133-198)/(61-86) 133/61     Weight: 74.7 kg (164 lb 10.9 oz)  Body mass index is 25.04 kg/m².    Estimated Creatinine Clearance: 39.6 mL/min (based on SCr of 1.2 mg/dL).    Physical Exam   Constitutional: He is oriented to person, place, and time. He appears well-developed and well-nourished. No distress.   Sitting up, eating breakfast.   HENT:   Head: Normocephalic and atraumatic.   Eyes: Conjunctivae and EOM are normal. Pupils are equal, round, and reactive to light. Right eye exhibits no discharge. Left eye exhibits no discharge.   Neck: No tracheal deviation present.   Cardiovascular:   Systolic murmur appreciated in right upper sternal border. Otherwise RRR.    Pulmonary/Chest: Effort normal and breath sounds normal. No stridor.   Abdominal: Soft. Bowel sounds are normal. He exhibits no distension. There is no tenderness. There is no guarding.   Musculoskeletal:   Sacral wound bandage in place. Surrounding are erythematous, Abscess incision with small amount of dried purulence at opening. Unable to express additional purulence with palpation/ pressure. Mildly painful to palpation.    Neurological: He is alert and oriented to person, place, and time.   Skin: He is not diaphoretic.       Significant Labs:   Blood Culture:   Recent  Labs  Lab 11/07/17  1237 11/07/17  1243 11/10/17  1610 11/10/17  1625   LABBLOO Gram stain kristie bottle: Gram positive cocci in clusters resembling Staph   Results called to and read back by:Trav Hauser RN 11/08/2017  15:34  METHICILLIN RESISTANT STAPHYLOCOCCUS AUREUSID consult required at [facility] and [facility] locations. Gram stain kristie bottle: Gram positive cocci in clusters resembling Staph   Results called to and read back by:Trav Garcia RN 11/08/2017  08:20  STAPHYLOCOCCUS AUREUSID consult required at [facility] and [facility].For susceptibility see order # [id number] No Growth to date  No Growth to date No Growth to date  No Growth to date     BMP:   Recent Labs  Lab 11/12/17 0523      *   K 4.2      CO2 27   BUN 23   CREATININE 1.2   CALCIUM 9.0     CBC:   Recent Labs  Lab 11/10/17  1625 11/11/17  0543 11/12/17 0523   WBC 13.66* 11.93 13.44*   HGB 10.4* 10.0* 10.6*   HCT 30.8* 29.3* 32.0*   * 131* 141*     CMP:   Recent Labs  Lab 11/10/17  1625 11/11/17  0543 11/12/17 0523   * 136 135*   K 4.9 4.5 4.2   CL 98 102 101   CO2 28 29 27    103 103   BUN 23 20 23   CREATININE 1.3 1.1 1.2   CALCIUM 9.3 9.0 9.0   PROT 7.4 6.3 6.6   ALBUMIN 3.7 3.2* 3.2*   BILITOT 0.8 0.8 0.8   ALKPHOS 135 115 119   AST 59* 44* 44*   ALT 54* 43 42   ANIONGAP 7* 5* 7*   EGFRNONAA 48* 59* 53*     Microbiology Results (last 7 days)     Procedure Component Value Units Date/Time    Blood culture [[id number]] Collected:  11/12/17 0523    Order Status:  Sent Specimen:  Blood Updated:  11/12/17 0937    Blood culture [[id number]] Collected:  11/12/17 0523    Order Status:  Sent Specimen:  Blood Updated:  11/12/17 0937    Blood culture #1 **CANNOT BE ORDERED STAT** [[id number]] Collected:  11/10/17 1610    Order Status:  Completed Specimen:  Blood from Peripheral, Wrist, Left Updated:  11/11/17 2022     Blood Culture, Routine No Growth to date     Blood Culture, Routine No  Growth to date    Blood culture #2 **CANNOT BE ORDERED STAT** [[id number]] Collected:  11/10/17 1625    Order Status:  Completed Specimen:  Blood from Peripheral, Antecubital, Left Updated:  11/11/17 2022     Blood Culture, Routine No Growth to date     Blood Culture, Routine No Growth to date        Urine Culture:   Recent Labs  Lab 07/12/17  1634   LABURIN No growth     Urine Studies:   Recent Labs  Lab 11/07/17  1245   COLORU LT. RED   APPEARANCEUA Clear   PHUR 7.0   SPECGRAV 1.010   PROTEINUA 2+*   GLUCUA Negative   KETONESU Negative   BILIRUBINUA Negative   OCCULTUA Trace*   NITRITE Negative   UROBILINOGEN Negative   LEUKOCYTESUR Negative   RBCUA 2   WBCUA 0   BACTERIA None   HYALINECASTS 0     Wound Culture: No results for input(s): LABAERO in the last 4320 hours.  Recent Lab Results       11/12/17  0523      Albumin 3.2(L)     Alkaline Phosphatase 119     ALT 42     Anion Gap 7(L)     AST 44(H)     BANDS 1.0     Baso # CANCELED  Comment:  Result canceled by the ancillary     Basophil% 0.0     Total Bilirubin 0.8  Comment:  For infants and newborns, interpretation of results should be based  on gestational age, weight and in agreement with clinical  observations.  Premature Infant recommended reference ranges:  Up to 24 hours.............<8.0 mg/dL  Up to 48 hours............<12.0 mg/dL  3-5 days..................<15.0 mg/dL  6-29 days.................<15.0 mg/dL       BUN, Bld 23     Calcium 9.0     Chloride 101     CO2 27     Creatinine 1.2     Differential Method Manual     eGFR if  >60     eGFR if non  53  Comment:  Calculation used to obtain the estimated glomerular filtration  rate (eGFR) is the CKD-EPI equation.   (A)     Eos # CANCELED  Comment:  Result canceled by the ancillary     Eosinophil% 1.0     Glucose 103     Gran% 32.0(L)     Hematocrit 32.0(L)     Hemoglobin 10.6(L)     Lymph # CANCELED  Comment:  Result canceled by the ancillary     Lymph% 63.0(H)     MCH 30.0      MCHC 33.1     MCV 91     Mono # CANCELED  Comment:  Result canceled by the ancillary     Mono% 3.0(L)     MPV 9.7     Platelets 141(L)     Potassium 4.2     Total Protein 6.6     RBC 3.53(L)     RDW 13.9     Sodium 135(L)     WBC 13.44(H)           Significant Imaging: U/S of sacrum: Ultrasound of the soft tissues of the sacral region     Findings: There is a 1.8 x 1.8 x 0.4 cm hypoechoic collection noted within the subcutaneous soft tissues near the midline posterior to the sacrum.

## 2017-11-12 NOTE — PLAN OF CARE
Continued on contact isolation .Family and patient isolation precautions and use of PPE teaching done. Verbalizes understanding .Right buttock mepilex in place dry and intact . Marked red area smaller in size.

## 2017-11-12 NOTE — PLAN OF CARE
Seen by Dr Rahman ,surgical consent signed. Up in chair . Contact isolation maintained.Continued on telemetry and fall risk monitoring No true red alarm ectopy.

## 2017-11-12 NOTE — PROGRESS NOTES
[facility]  Infectious Disease  Progress Note    Patient Name: Zeyad Woodard  MRN: [id number]  Admission Date: 11/10/2017  Length of Stay: 2 days  Attending Physician: Jared Xiong MD  Primary Care Provider: Booker Ricci MD    Isolation Status: No active isolations  Assessment/Plan:      * MRSA bacteremia    - pt with recent admission for cellulitis, discharged on a course of clindamycin  - blood cultures from that admission are now 4/4 positive for MRSA  - on exam area appears indurated with small amount of drainage  - TTE is pending  - slight leucocytosis on admission, WBC had since trended down but is up to 13.44 this morning.   - repeat cultures NGTD so far  -  ultrasound of buttock wound - Ultrasound of the soft tissues of the sacral region  Findings: There is a 1.8 x 1.8 x 0.4 cm hypoechoic collection noted within the subcutaneous soft tissues near the midline posterior to the sacrum.   -pt will likely need further I&D for better drainage. Surgery consult ordered.   - continue vancomycin and please obtain trough 30 minutes prior to 4th dose. Goal trough is 15-20.   -TTE shows no evidence of endocarditis  - Will follow up JEVON  - with pt's cardiac history and placement of a pacemaker concern is for implant infection. Will monitor pt but pacemaker and leads may need to be removed in the future.    -Recommend U/S of Pacemaker to assess for fluid collections.   - will continue to follow.             Anticipated Disposition: as per primary team    Thank you for your consult. I will follow-up with patient. Please contact us if you have any additional questions.    Lj Barnes MD  Infectious Disease  [facility]    Subjective:     Principal Problem:MRSA bacteremia    HPI: Pt is a 89 y/o male with a pmhx of A fib, Sick Sinus Syndrome with implanted pacemaker, pericardiectomy for calcific constrictive pericarditis, CHF, BPH, HTN, Hypothyroidism, GI bleed,  and dementia admitted for MRSA bactermia. Pt recently admitted on 11/07/2017 for cellulitis on the right buttock. Most history is obtained from chart review secondary to pt's dementia. According to H&P from 11/07/2017 pt ahd what appeared to be boil on on his buttock. Pt was also having low grade temperatures so pt was sent for further work up which also revealed a lucocytosis. Blood cultures were drawn and pt was started on clindamycin IV. Pt did well and transition to PO clindamycin and ppt was discharged back to home on 11/08/2017 with course of unknown duration. Shortly after discharge pt's blood cultures became positive for MRSA in 4/4 bottles.Pt's family was contacted and pt was brought back to the ED for escalation of antibiotic therapy and further evaluation.     Pt unsure of when his symptoms started but complains of pain located in his right buttock. Denies any fever or chills, chest pain or SOB, nausea or vomiting. Denies urinary complaints, does endorse wartery stools but denies that they are malodorous, states one stool daily at this time. Pt lives at home alone but is visited daily by his daughters who help him manage his medications. Denies any foreign travel, no recent sick contacts. No contact with animals.      Positve Culture Data    11/07/2017 BCx, LAC             MRSA  11/07/2017 BCx, LW  MRSA    Cultures This Admission  11/10/2017 BCx, LAC             NGTD  11/10/2017 BCx, LW  NGTD    Antibiotics this admission    Vancomycin 1.5 gr once  Interval History: No acute events overnight. Patient reporting some pain in his buttocks around his abscess but otherwise denies having any complaints. Denies Fever, Chills, HA, vision changes, CP, SOB, Abdominal pain, Nausea, vomiting, changes in bowel habits, dysuria, or other musculoskeletal pain.     Review of Systems   As as above.   Objective:     Vital Signs (Most Recent):  Temp: 99.3 °F (37.4 °C) (11/12/17 1206)  Pulse: 74 (11/12/17 1206)  Resp: 16  (11/12/17 1206)  BP: 133/61 (11/12/17 1206)  SpO2: 95 % (11/12/17 1236) Vital Signs (24h Range):  Temp:  [96.5 °F (35.8 °C)-99.3 °F (37.4 °C)] 99.3 °F (37.4 °C)  Pulse:  [60-74] 74  Resp:  [16-19] 16  SpO2:  [95 %-98 %] 95 %  BP: (133-198)/(61-86) 133/61     Weight: 74.7 kg (164 lb 10.9 oz)  Body mass index is 25.04 kg/m².    Estimated Creatinine Clearance: 39.6 mL/min (based on SCr of 1.2 mg/dL).    Physical Exam   Constitutional: He is oriented to person, place, and time. He appears well-developed and well-nourished. No distress.   Sitting up, eating breakfast.   HENT:   Head: Normocephalic and atraumatic.   Eyes: Conjunctivae and EOM are normal. Pupils are equal, round, and reactive to light. Right eye exhibits no discharge. Left eye exhibits no discharge.   Neck: No tracheal deviation present.   Cardiovascular:   Systolic murmur appreciated in right upper sternal border. Otherwise RRR.    Pulmonary/Chest: Effort normal and breath sounds normal. No stridor.   Abdominal: Soft. Bowel sounds are normal. He exhibits no distension. There is no tenderness. There is no guarding.   Musculoskeletal:   Sacral wound bandage in place. Surrounding are erythematous, Abscess incision with small amount of dried purulence at opening. Unable to express additional purulence with palpation/ pressure. Mildly painful to palpation.    Neurological: He is alert and oriented to person, place, and time.   Skin: He is not diaphoretic.       Significant Labs:   Blood Culture:   Recent Labs  Lab 11/07/17  1237 11/07/17  1243 11/10/17  1610 11/10/17  1625   LABBLOO Gram stain kristie bottle: Gram positive cocci in clusters resembling Staph   Results called to and read back by:Trav Hauser RN 11/08/2017  15:34  METHICILLIN RESISTANT STAPHYLOCOCCUS AUREUSID consult required at [facility] and [facility]. Gram stain kristie bottle: Gram positive cocci in clusters resembling Staph   Results called to and read back by:Trav Garcia RN  11/08/2017  08:20  STAPHYLOCOCCUS AUREUSID consult required at [facility] and [facility] locations.For susceptibility see order # [id number] No Growth to date  No Growth to date No Growth to date  No Growth to date     BMP:   Recent Labs  Lab 11/12/17 0523      *   K 4.2      CO2 27   BUN 23   CREATININE 1.2   CALCIUM 9.0     CBC:   Recent Labs  Lab 11/10/17  1625 11/11/17  0543 11/12/17  0523   WBC 13.66* 11.93 13.44*   HGB 10.4* 10.0* 10.6*   HCT 30.8* 29.3* 32.0*   * 131* 141*     CMP:   Recent Labs  Lab 11/10/17  1625 11/11/17  0543 11/12/17  0523   * 136 135*   K 4.9 4.5 4.2   CL 98 102 101   CO2 28 29 27    103 103   BUN 23 20 23   CREATININE 1.3 1.1 1.2   CALCIUM 9.3 9.0 9.0   PROT 7.4 6.3 6.6   ALBUMIN 3.7 3.2* 3.2*   BILITOT 0.8 0.8 0.8   ALKPHOS 135 115 119   AST 59* 44* 44*   ALT 54* 43 42   ANIONGAP 7* 5* 7*   EGFRNONAA 48* 59* 53*     Microbiology Results (last 7 days)     Procedure Component Value Units Date/Time    Blood culture [[id number]] Collected:  11/12/17 0523    Order Status:  Sent Specimen:  Blood Updated:  11/12/17 0937    Blood culture [[id number]] Collected:  11/12/17 0523    Order Status:  Sent Specimen:  Blood Updated:  11/12/17 0937    Blood culture #1 **CANNOT BE ORDERED STAT** [[id number]] Collected:  11/10/17 1610    Order Status:  Completed Specimen:  Blood from Peripheral, Wrist, Left Updated:  11/11/17 2022     Blood Culture, Routine No Growth to date     Blood Culture, Routine No Growth to date    Blood culture #2 **CANNOT BE ORDERED STAT** [[id number]] Collected:  11/10/17 1625    Order Status:  Completed Specimen:  Blood from Peripheral, Antecubital, Left Updated:  11/11/17 2022     Blood Culture, Routine No Growth to date     Blood Culture, Routine No Growth to date        Urine Culture:   Recent Labs  Lab 07/12/17  1634   LABURIN No growth     Urine Studies:   Recent Labs  Lab 11/07/17  1245   COLORU LT. RED   APPEARANCEUA Clear    PHUR 7.0   SPECGRAV 1.010   PROTEINUA 2+*   GLUCUA Negative   KETONESU Negative   BILIRUBINUA Negative   OCCULTUA Trace*   NITRITE Negative   UROBILINOGEN Negative   LEUKOCYTESUR Negative   RBCUA 2   WBCUA 0   BACTERIA None   HYALINECASTS 0     Wound Culture: No results for input(s): LABAERO in the last 4320 hours.  Recent Lab Results       11/12/17  0523      Albumin 3.2(L)     Alkaline Phosphatase 119     ALT 42     Anion Gap 7(L)     AST 44(H)     BANDS 1.0     Baso # CANCELED  Comment:  Result canceled by the ancillary     Basophil% 0.0     Total Bilirubin 0.8  Comment:  For infants and newborns, interpretation of results should be based  on gestational age, weight and in agreement with clinical  observations.  Premature Infant recommended reference ranges:  Up to 24 hours.............<8.0 mg/dL  Up to 48 hours............<12.0 mg/dL  3-5 days..................<15.0 mg/dL  6-29 days.................<15.0 mg/dL       BUN, Bld 23     Calcium 9.0     Chloride 101     CO2 27     Creatinine 1.2     Differential Method Manual     eGFR if  >60     eGFR if non  53  Comment:  Calculation used to obtain the estimated glomerular filtration  rate (eGFR) is the CKD-EPI equation.   (A)     Eos # CANCELED  Comment:  Result canceled by the ancillary     Eosinophil% 1.0     Glucose 103     Gran% 32.0(L)     Hematocrit 32.0(L)     Hemoglobin 10.6(L)     Lymph # CANCELED  Comment:  Result canceled by the ancillary     Lymph% 63.0(H)     MCH 30.0     MCHC 33.1     MCV 91     Mono # CANCELED  Comment:  Result canceled by the ancillary     Mono% 3.0(L)     MPV 9.7     Platelets 141(L)     Potassium 4.2     Total Protein 6.6     RBC 3.53(L)     RDW 13.9     Sodium 135(L)     WBC 13.44(H)           Significant Imaging: U/S of sacrum: Ultrasound of the soft tissues of the sacral region     Findings: There is a 1.8 x 1.8 x 0.4 cm hypoechoic collection noted within the subcutaneous soft tissues near the  midline posterior to the sacrum.

## 2017-11-12 NOTE — PLAN OF CARE
Problem: Occupational Therapy Goal  Goal: Occupational Therapy Goal  Goals to be met by: 12/11     Patient will increase functional independence with ADLs by performing:    UE Dressing with Modified [address].  LE Dressing with Modified [address].  Grooming while standing with Modified [address].  Toileting from toilet with Modified [address] for hygiene and clothing management.   Toilet transfer to toilet with Modified [address].    Outcome: Ongoing (interventions implemented as appropriate)  OT eval performed, report to follow    Pt will benefit from SNF at discharge

## 2017-11-12 NOTE — PT/OT/SLP EVAL
Occupational Therapy  Evaluation    Zeyad Woodard   MRN: [id number]   Admitting Diagnosis: MRSA bacteremia    OT Date of Treatment: 11/12/17   OT Start Time: 1122  OT Stop Time: 1153  OT Total Time (min): 31 min    Billable Minutes:  Evaluation 116  Self Care/Home Management 15    Diagnosis: MRSA bacteremia     Past Medical History:   Diagnosis Date    Anemia     Atrial fibrillation     BPH (benign prostatic hypertrophy)     Cancer     Cardiac pacemaker in situ 7/2/2015    CHF (congestive heart failure)     Coronary artery disease     Encounter for blood transfusion     GI bleed     cecum angiectasia s/p cautery    Hypertension     Hypothyroidism     Polyneuropathy     Posture imbalance     due to neuropathy    Right posterior capsular opacification 1/18/2017    SSS (sick sinus syndrome) 2015    s/p pacemaker      Past Surgical History:   Procedure Laterality Date    CARDIAC SURGERY      CATARACT EXTRACTION W/  INTRAOCULAR LENS IMPLANT Right 05/17/2010        CATARACT EXTRACTION W/  INTRAOCULAR LENS IMPLANT Left 02/16/2004        cataract surgery      COLONOSCOPY  6/30/04    COLONOSCOPY N/A 2/15/2016    Procedure: COLONOSCOPY;  Surgeon: Stanton Kirk MD;  Location: [address];  Service: Endoscopy;  Laterality: N/A;    EYE SURGERY      HERNIA REPAIR      inguinal hernia repair      Open heart surgery for pericardiectomy      for calcific constrictive pericarditis    UMBILICAL HERNIA REPAIR      Yag      Left Eye         General Precautions: Standard, fall, contact  Orthopedic Precautions:    Braces:            Patient History:  Living Environment  Living Environment Comment: Lives home alone SSH, THE, WIS w/chair.  Has raised toielt and grab bar. Ambulates w/RW in house, rollator/transport chair community, Lift chair at home. Sage AFOs for community.  Dtr lives down the street, checks on daily, cooks, assists w/bath.  Equipment Currently Used at Home: grab bar,  raised toilet, walker, rolling, wheelchair, shower chair, rollator    Prior level of function:            Dominant hand: right    Subjective:  Communicated with nurse prior to session.  I'd like to sit up in the chair  Chief Complaint: hip pain  Patient/Family stated goals: get stronger    Pain/Comfort  Pain Rating 1: 6/10  Location - Side 1: Right  Location - Orientation 1: generalized  Location 1: hip  Pain Addressed 1: Reposition, Distraction, Nurse notified  Pain Rating Post-Intervention 1: 4/10    Objective:       Cognitive Exam:  Oriented to: AO4  Follows Commands/attention: Follows two-step commands  Communication: clear/fluent  Memory:  No Deficits noted  Safety awareness/insight to disability: intact  Coping skills/emotional control: Appropriate to situation    Visual/perceptual:  Intact    Physical Exam:  Postural examination/scapula alignment: Rounded shoulder and Head forward  Skin integrity: Bruising of extremities, Thin and R sacral wound  Edema: None noted     Sensation:   Impaired Sage fet    Upper Extremity Range of Motion:  Right Upper Extremity: WFL  Left Upper Extremity: WFL    Upper Extremity Strength:  Right Upper Extremity: WFL  Left Upper Extremity: WFL   Strength: WFL    Fine motor coordination:   Intact    Gross motor coordination: impaired 2/2 bilateral foot drop    Functional Mobility:  Bed Mobility:  Rolling/Turning to Left: Supervision  Scooting/Bridging: Supervision  Supine to Sit: Stand by Assistance    Transfers:  Sit <> Stand Assistance: Contact Guard Assistance  Sit <> Stand Assistive Device: Rolling Walker  Bed <> Chair Technique: Stand Pivot  Bed <> Chair Transfer Assistance: Contact Guard Assistance, Stand By Assistance  Bed <> Chair Assistive Device: Rolling Walker  Toilet Transfer Technique: Stand Pivot  Toilet Transfer Assistance: Contact Guard Assistance, Minimum Assistance  Toilet Transfer Assistive Device: Rolling Walker    Functional Ambulation: CGA in room  "w/RW    Activities of Daily Living:  Feeding Level of Assistance: Set-up Assistance  Feeding adaptive equipment:      UE adaptive equipment:      LE adaptive equipment:         Toileting Where Assessed: Toilet  Toileting Level of Assistance: Minimum assistance        Bathing adaptive equipment:     Balance:   Static Sit: GOOD+: Takes MAXIMAL challenges from all directions.    Dynamic Sit: GOOD+: Maintains balance through MAXIMAL excursions of active trunk motion  Static Stand: FAIR: Maintains without assist but unable to take challenges  Dynamic stand: FAIR: Needs CONTACT GUARD during gait    Therapeutic Activities and Exercises:  Educated on role of OT/POC, general safety. Educated on and performed BUE/LE AROM ex    AM-PAC 6 CLICK ADL  How much help from another person does this patient currently need?  1 = Unable, Total/Dependent Assistance  2 = A lot, Maximum/Moderate Assistance  3 = A little, Minimum/Contact Guard/Supervision  4 = None, Modified [place]/Independent    Putting on and taking off regular lower body clothing? : 2  Bathing (including washing, rinsing, drying)?: 3  Toileting, which includes using toilet, bedpan, or urinal? : 3  Putting on and taking off regular upper body clothing?: 3  Taking care of personal grooming such as brushing teeth?: 3  Eating meals?: 3  Total Score: 17    AM-PAC Raw Score CMS "G-Code Modifier Level of Impairment Assistance   6 % Total / Unable   7 - 9 CM 80 - 100% Maximal Assist   10-14 CL 60 - 80% Moderate Assist   15 - 19 CK 40 - 60% Moderate Assist   20 - 22 CJ 20 - 40% Minimal Assist   23 CI 1-20% SBA / CGA   24 CH 0% Independent/ Mod I       Patient left up in chair with all lines intact, call button in reach and nurse notified    Assessment:  Zeyad Woodard is a 89 y.o. male with a medical diagnosis of MRSA bacteremia and presents with decreased strength, balance, sensation AROM limiting indep all needs.  Pt will benefit from skilled OT " services.    Rehab identified problem list/impairments: Rehab identified problem list/impairments: weakness, impaired endurance, gait instability, impaired functional mobilty, pain, impaired sensation, impaired self care skills, impaired balance, decreased lower extremity function, impaired skin, decreased ROM    Rehab potential is good.    Activity tolerance: Good    Discharge recommendations: Discharge Facility/Level Of Care Needs: nursing facility, skilled     Barriers to discharge: Barriers to Discharge: Decreased caregiver support    Equipment recommendations: none     GOALS:    Occupational Therapy Goals        Problem: Occupational Therapy Goal    Goal Priority Disciplines Outcome Interventions   Occupational Therapy Goal     OT, PT/OT Ongoing (interventions implemented as appropriate)    Description:  Goals to be met by: 12/11     Patient will increase functional independence with ADLs by performing:    UE Dressing with Modified [place].  LE Dressing with Modified [place].  Grooming while standing with Modified [place].  Toileting from toilet with Modified [place] for hygiene and clothing management.   Toilet transfer to toilet with Modified [place].                      PLAN:  Patient to be seen 5 x/week to address the above listed problems via self-care/home management, therapeutic activities, therapeutic exercises  Plan of Care expires: 12/12/17  Plan of Care reviewed with: patient         Evaristo Russell OT  11/12/2017

## 2017-11-13 ENCOUNTER — ANESTHESIA (OUTPATIENT)
Dept: SURGERY | Facility: HOSPITAL | Age: 82
DRG: 571 | End: 2017-11-13
Payer: MEDICARE

## 2017-11-13 LAB
ALBUMIN SERPL BCP-MCNC: 3 G/DL
ALP SERPL-CCNC: 103 U/L
ALT SERPL W/O P-5'-P-CCNC: 37 U/L
ANION GAP SERPL CALC-SCNC: 5 MMOL/L
ANISOCYTOSIS BLD QL SMEAR: SLIGHT
AST SERPL-CCNC: 39 U/L
BASOPHILS # BLD AUTO: 0.04 K/UL
BASOPHILS NFR BLD: 0.3 %
BILIRUB SERPL-MCNC: 0.6 MG/DL
BUN SERPL-MCNC: 28 MG/DL
CALCIUM SERPL-MCNC: 8.5 MG/DL
CHLORIDE SERPL-SCNC: 99 MMOL/L
CO2 SERPL-SCNC: 28 MMOL/L
CREAT SERPL-MCNC: 1.4 MG/DL
DIFFERENTIAL METHOD: ABNORMAL
EOSINOPHIL # BLD AUTO: 0.3 K/UL
EOSINOPHIL NFR BLD: 2.1 %
ERYTHROCYTE [DISTWIDTH] IN BLOOD BY AUTOMATED COUNT: 14.1 %
EST. GFR  (AFRICAN AMERICAN): 51 ML/MIN/1.73 M^2
EST. GFR  (NON AFRICAN AMERICAN): 44 ML/MIN/1.73 M^2
GLUCOSE SERPL-MCNC: 101 MG/DL
GRAM STN SPEC: NORMAL
GRAM STN SPEC: NORMAL
HCT VFR BLD AUTO: 28.6 %
HGB BLD-MCNC: 9.5 G/DL
HYPOCHROMIA BLD QL SMEAR: ABNORMAL
LYMPHOCYTES # BLD AUTO: 8.6 K/UL
LYMPHOCYTES NFR BLD: 63.4 %
MCH RBC QN AUTO: 30.4 PG
MCHC RBC AUTO-ENTMCNC: 33.2 G/DL
MCV RBC AUTO: 92 FL
MONOCYTES # BLD AUTO: 0.9 K/UL
MONOCYTES NFR BLD: 6.9 %
NEUTROPHILS # BLD AUTO: 3.7 K/UL
NEUTROPHILS NFR BLD: 27.3 %
OVALOCYTES BLD QL SMEAR: ABNORMAL
PLATELET # BLD AUTO: 142 K/UL
PLATELET BLD QL SMEAR: ABNORMAL
PMV BLD AUTO: 9.5 FL
POIKILOCYTOSIS BLD QL SMEAR: SLIGHT
POTASSIUM SERPL-SCNC: 4.4 MMOL/L
PROT SERPL-MCNC: 6 G/DL
RBC # BLD AUTO: 3.12 M/UL
SODIUM SERPL-SCNC: 132 MMOL/L
VANCOMYCIN TROUGH SERPL-MCNC: 15.6 UG/ML
WBC # BLD AUTO: 13.5 K/UL

## 2017-11-13 PROCEDURE — 80202 ASSAY OF VANCOMYCIN: CPT

## 2017-11-13 PROCEDURE — 25000003 PHARM REV CODE 250: Performed by: STUDENT IN AN ORGANIZED HEALTH CARE EDUCATION/TRAINING PROGRAM

## 2017-11-13 PROCEDURE — 37000008 HC ANESTHESIA 1ST 15 MINUTES: Performed by: SURGERY

## 2017-11-13 PROCEDURE — 37000009 HC ANESTHESIA EA ADD 15 MINS: Performed by: SURGERY

## 2017-11-13 PROCEDURE — 85027 COMPLETE CBC AUTOMATED: CPT

## 2017-11-13 PROCEDURE — 63600175 PHARM REV CODE 636 W HCPCS: Performed by: NURSE ANESTHETIST, CERTIFIED REGISTERED

## 2017-11-13 PROCEDURE — 97530 THERAPEUTIC ACTIVITIES: CPT

## 2017-11-13 PROCEDURE — 88305 TISSUE EXAM BY PATHOLOGIST: CPT | Performed by: PATHOLOGY

## 2017-11-13 PROCEDURE — 87077 CULTURE AEROBIC IDENTIFY: CPT

## 2017-11-13 PROCEDURE — 63600175 PHARM REV CODE 636 W HCPCS: Performed by: FAMILY MEDICINE

## 2017-11-13 PROCEDURE — 87075 CULTR BACTERIA EXCEPT BLOOD: CPT

## 2017-11-13 PROCEDURE — 0JB90ZZ EXCISION OF BUTTOCK SUBCUTANEOUS TISSUE AND FASCIA, OPEN APPROACH: ICD-10-PCS | Performed by: SURGERY

## 2017-11-13 PROCEDURE — 63600175 PHARM REV CODE 636 W HCPCS: Performed by: STUDENT IN AN ORGANIZED HEALTH CARE EDUCATION/TRAINING PROGRAM

## 2017-11-13 PROCEDURE — 97110 THERAPEUTIC EXERCISES: CPT

## 2017-11-13 PROCEDURE — 25000003 PHARM REV CODE 250: Performed by: SURGERY

## 2017-11-13 PROCEDURE — 36000704 HC OR TIME LEV I 1ST 15 MIN: Performed by: SURGERY

## 2017-11-13 PROCEDURE — 88305 TISSUE EXAM BY PATHOLOGIST: CPT | Mod: 26,,, | Performed by: PATHOLOGY

## 2017-11-13 PROCEDURE — 85007 BL SMEAR W/DIFF WBC COUNT: CPT

## 2017-11-13 PROCEDURE — 80053 COMPREHEN METABOLIC PANEL: CPT

## 2017-11-13 PROCEDURE — 36000705 HC OR TIME LEV I EA ADD 15 MIN: Performed by: SURGERY

## 2017-11-13 PROCEDURE — 25000003 PHARM REV CODE 250: Performed by: NURSE ANESTHETIST, CERTIFIED REGISTERED

## 2017-11-13 PROCEDURE — 63600175 PHARM REV CODE 636 W HCPCS: Performed by: INTERNAL MEDICINE

## 2017-11-13 PROCEDURE — S0077 INJECTION, CLINDAMYCIN PHOSP: HCPCS | Performed by: SURGERY

## 2017-11-13 PROCEDURE — 25000003 PHARM REV CODE 250: Performed by: INTERNAL MEDICINE

## 2017-11-13 PROCEDURE — 94761 N-INVAS EAR/PLS OXIMETRY MLT: CPT

## 2017-11-13 PROCEDURE — 36415 COLL VENOUS BLD VENIPUNCTURE: CPT

## 2017-11-13 PROCEDURE — 87070 CULTURE OTHR SPECIMN AEROBIC: CPT

## 2017-11-13 PROCEDURE — 87186 SC STD MICRODIL/AGAR DIL: CPT

## 2017-11-13 PROCEDURE — 11000001 HC ACUTE MED/SURG PRIVATE ROOM

## 2017-11-13 PROCEDURE — 87205 SMEAR GRAM STAIN: CPT

## 2017-11-13 PROCEDURE — 71000033 HC RECOVERY, INTIAL HOUR: Performed by: SURGERY

## 2017-11-13 PROCEDURE — 97116 GAIT TRAINING THERAPY: CPT

## 2017-11-13 RX ORDER — PROPOFOL 10 MG/ML
VIAL (ML) INTRAVENOUS CONTINUOUS PRN
Status: DISCONTINUED | OUTPATIENT
Start: 2017-11-13 | End: 2017-11-13

## 2017-11-13 RX ORDER — SODIUM CHLORIDE 9 MG/ML
INJECTION, SOLUTION INTRAVENOUS CONTINUOUS PRN
Status: DISCONTINUED | OUTPATIENT
Start: 2017-11-13 | End: 2017-11-13

## 2017-11-13 RX ORDER — PROPOFOL 10 MG/ML
VIAL (ML) INTRAVENOUS
Status: DISCONTINUED | OUTPATIENT
Start: 2017-11-13 | End: 2017-11-13

## 2017-11-13 RX ORDER — LIDOCAINE HYDROCHLORIDE 10 MG/ML
INJECTION, SOLUTION EPIDURAL; INFILTRATION; INTRACAUDAL; PERINEURAL
Status: DISCONTINUED | OUTPATIENT
Start: 2017-11-13 | End: 2017-11-13 | Stop reason: HOSPADM

## 2017-11-13 RX ORDER — PHENYLEPHRINE HYDROCHLORIDE 10 MG/ML
INJECTION INTRAVENOUS
Status: DISCONTINUED | OUTPATIENT
Start: 2017-11-13 | End: 2017-11-13

## 2017-11-13 RX ORDER — BACITRACIN 50000 [IU]/1
INJECTION, POWDER, FOR SOLUTION INTRAMUSCULAR
Status: DISCONTINUED | OUTPATIENT
Start: 2017-11-13 | End: 2017-11-13 | Stop reason: HOSPADM

## 2017-11-13 RX ORDER — CAPSAICIN 0.75 MG/G
CREAM TOPICAL 3 TIMES DAILY PRN
Status: DISCONTINUED | OUTPATIENT
Start: 2017-11-13 | End: 2017-11-13

## 2017-11-13 RX ADMIN — GABAPENTIN 100 MG: 100 CAPSULE ORAL at 10:11

## 2017-11-13 RX ADMIN — DOCUSATE SODIUM 100 MG: 100 CAPSULE, LIQUID FILLED ORAL at 10:11

## 2017-11-13 RX ADMIN — DONEPEZIL HYDROCHLORIDE 10 MG: 5 TABLET, FILM COATED ORAL at 09:11

## 2017-11-13 RX ADMIN — CARVEDILOL 6.25 MG: 6.25 TABLET, FILM COATED ORAL at 10:11

## 2017-11-13 RX ADMIN — HEPARIN SODIUM 5000 UNITS: 5000 INJECTION, SOLUTION INTRAVENOUS; SUBCUTANEOUS at 09:11

## 2017-11-13 RX ADMIN — EPHEDRINE SULFATE 20 MG: 50 INJECTION, SOLUTION INTRAMUSCULAR; INTRAVENOUS; SUBCUTANEOUS at 09:11

## 2017-11-13 RX ADMIN — EPHEDRINE SULFATE 10 MG: 50 INJECTION, SOLUTION INTRAMUSCULAR; INTRAVENOUS; SUBCUTANEOUS at 08:11

## 2017-11-13 RX ADMIN — DOCUSATE SODIUM 100 MG: 100 CAPSULE, LIQUID FILLED ORAL at 09:11

## 2017-11-13 RX ADMIN — FERROUS SULFATE TAB EC 325 MG (65 MG FE EQUIVALENT) 325 MG: 325 (65 FE) TABLET DELAYED RESPONSE at 10:11

## 2017-11-13 RX ADMIN — PHENYLEPHRINE HYDROCHLORIDE 100 MCG: 10 INJECTION INTRAVENOUS at 09:11

## 2017-11-13 RX ADMIN — ASPIRIN 81 MG: 81 TABLET, COATED ORAL at 10:11

## 2017-11-13 RX ADMIN — SODIUM CHLORIDE: 0.9 INJECTION, SOLUTION INTRAVENOUS at 08:11

## 2017-11-13 RX ADMIN — ATORVASTATIN CALCIUM 20 MG: 20 TABLET, FILM COATED ORAL at 09:11

## 2017-11-13 RX ADMIN — TAMSULOSIN HYDROCHLORIDE 0.4 MG: 0.4 CAPSULE ORAL at 10:11

## 2017-11-13 RX ADMIN — LEVOTHYROXINE SODIUM 100 MCG: 100 TABLET ORAL at 06:11

## 2017-11-13 RX ADMIN — LACTOBACILLUS ACIDOPHILUS / LACTOBACILLUS BULGARICUS 1 EACH: 100 MILLION CFU STRENGTH GRANULES at 09:11

## 2017-11-13 RX ADMIN — VITAM B12 100 MCG: 100 TAB at 10:11

## 2017-11-13 RX ADMIN — OXYCODONE HYDROCHLORIDE AND ACETAMINOPHEN 1 TABLET: 7.5; 325 TABLET ORAL at 01:11

## 2017-11-13 RX ADMIN — HEPARIN SODIUM 5000 UNITS: 5000 INJECTION, SOLUTION INTRAVENOUS; SUBCUTANEOUS at 01:11

## 2017-11-13 RX ADMIN — PROPOFOL 75 MCG/KG/MIN: 10 INJECTION, EMULSION INTRAVENOUS at 08:11

## 2017-11-13 RX ADMIN — GABAPENTIN 300 MG: 300 CAPSULE ORAL at 09:11

## 2017-11-13 RX ADMIN — PHENYLEPHRINE HYDROCHLORIDE 100 MCG: 10 INJECTION INTRAVENOUS at 08:11

## 2017-11-13 RX ADMIN — HYDRALAZINE HYDROCHLORIDE 10 MG: 20 INJECTION INTRAMUSCULAR; INTRAVENOUS at 06:11

## 2017-11-13 RX ADMIN — FINASTERIDE 5 MG: 5 TABLET, FILM COATED ORAL at 10:11

## 2017-11-13 RX ADMIN — OXYCODONE HYDROCHLORIDE AND ACETAMINOPHEN 1 TABLET: 7.5; 325 TABLET ORAL at 10:11

## 2017-11-13 RX ADMIN — PROPOFOL 10 MG: 10 INJECTION, EMULSION INTRAVENOUS at 08:11

## 2017-11-13 RX ADMIN — HEPARIN SODIUM 5000 UNITS: 5000 INJECTION, SOLUTION INTRAVENOUS; SUBCUTANEOUS at 06:11

## 2017-11-13 RX ADMIN — CARVEDILOL 6.25 MG: 6.25 TABLET, FILM COATED ORAL at 09:11

## 2017-11-13 RX ADMIN — LACTOBACILLUS ACIDOPHILUS / LACTOBACILLUS BULGARICUS 1 EACH: 100 MILLION CFU STRENGTH GRANULES at 10:11

## 2017-11-13 RX ADMIN — VANCOMYCIN HYDROCHLORIDE 1250 MG: 5 INJECTION, POWDER, LYOPHILIZED, FOR SOLUTION INTRAVENOUS at 05:11

## 2017-11-13 RX ADMIN — CLINDAMYCIN PHOSPHATE 900 MG: 18 INJECTION, SOLUTION INTRAVENOUS at 08:11

## 2017-11-13 RX ADMIN — EPHEDRINE SULFATE 10 MG: 50 INJECTION, SOLUTION INTRAMUSCULAR; INTRAVENOUS; SUBCUTANEOUS at 09:11

## 2017-11-13 NOTE — MEDICAL/APP STUDENT
Subjective:  Mr. Woodard reports increased pain to 8/10 this morning near the wound. He is ambulating minimal with assistance. He is urinating into a urinal with no complaints of retention, hematuria or dysuria. He is tolerating a cardiac diet with no N/V or diarrhea. He is NPO this morning in preparation for a JEVON. He denies chest pain, SOB, dizziness, lightheadedness, numbness, tingling, and palpitations.     Objective:  Vitals:   Temp: 96.5      Max: 99.3  Pulse: 60-74  RR: 16-19  Bp: 125-196/58-79  SpO2: 95-97% on RA     Physical Exam:   General: A&O x3, NAD, lying comfortably in bed  Neuro: 4/5 strength in LE bilaterally (baseline), 5/5 strength in UE bilaterally, no focal deficits, sensation equal and symmetric  Head: atraumatic, normocephalic  Eyes: pupils equal and reactive, EOMI  Throat: nonerythematous, no edema  Neck: supple, trachea midline, no LAD  Cardiac: RRR, no murmurs, clicks, rubs, or gallops; no chest pain on palpation, minimal discomfort with palpation over PPM pouch  Lung: CTAB, no wheezes or crackles  Abd: soft, nondistended, nontender to palpation, normoactive bowel sounds in 4 quadrants  Skin: minimal erythema in a 2.5cm ring around sacral wound with induration, open superficially, no drainage noted;  Elsewhere: warm, dry, intact, no rashes, no lesions  Ext: No C/C/E, warm, well-perfused  Pulses: 2+ radial and dorsalis pedis pulses bilaterally     Medications:  Aspirin 81mg PO QD  Atorvastatin 20mg PO QPM  Coreg 6.25mg PO BID   B12 100mcg PO QD  Docusate 100mg PO BID  Donepezil 10mg PO QPM  Iron 325mg PO every other day  Finasteride 5mg PO QD  Gabapentin 100mg PO QD  Gabapentin 300mg PO QPM  Heparin 5000Units SubQ Q8HS  Probiotic 1 packet PO BID  Synthroid 100mcg PO QD  Tamsulosin 0.4mg PO QD  Vancomycin 1.25g IV QD     Labs:   WBC: 13.50  RBC: 3.12  Hgb: 9.5  Hct: 28.6  MCV: 92  MCH: 30.4  MCHC: 33.2  RDW: 14.1  Plt: 142  MPV: 9.5  Gran%: 27.3  Gran#: 3.7  Lymph%: 63.4  Lymph#: 8.6  Mono%:  6.9  Mono#: 0.9  Eos%: 2.1  Eos#: 0.3  Baso%: 0.3  Baso#: 0.3      Na: 132  K: 4.4  Cl: 99  Co2: 28  Anion Gap: 5  BUN: 28  Crt: 1.4  GFR: 44  Glucose: 101  Ca: 8.5  Alk Phos: 103  Total Protein: 6.0  Albumin: 3.0  Total Bili: 0.6  AST: 39  ALT: 37     Micro:   11/7 Blood Cx x4 bottles:              MRSA                          [place], tetracycline, Bactrim, Vanc sensitive                          Oxacillin, Penicillin, erythromycin resistant  11/10 Blood Cx L antecubital:              No growth to date--final  11/10 Blood Cx L wrist:              No growth to date--final  11/14 Blood Cx x4 bottle:   No growth to date--pending       Assessment:  Mr. Zeyad Woodard is a 89 yo male with a history of HTN, BPH, chronic Afib, chronic diastolic HF, anemia, SSS s/p PPM,CKD III, hypothyroidism, dementia, neuropathy, and R buttock cellulitis, who presented back at [facility] on 11/10 following recent discharge on 11/8 due to MRSA bacteremia found on culture.     Plan:  1. Staph bacteremia 2/2 to cellulitis              2/2 to cellulitis              Wound with erythema and induration 2.5x2.5 in sacral area               WBC 13.50              11/7 positive blood cx x4 bottles show MRSA, 11/10 blood cx no growth to date-final, 11/12 pending              ID & wound care consulted--ID recommends continue Vanc, JEVON, incision & drainage of abscess              Echo (JEVON) scheduled to evaluate for endocarditis, evaluation of pacemaker for bacterial colonization               Will continue to treat with Vancomycin 1.25g IV QD  2. HTN              Hypertensive overnight to 196/77                          Will continue Coreg 6.25mg BID              Will continue to monitor  3. Chronic diastolic HF               euvolemic on exam, no pedal edema              ECHO 11/2017 with LVEF 60-65%, mild trivial aortic valve regurg, trivial aortic valve stenosis, no diastolic dysfunction              Will evaluate function on JEVON              Will  continue to monitor  4. BPH              No acute issues, able to urinate without difficulty into urinal              Home meds finasteride and tamsulosin continued               Will continue to monitor  5. CKD              No acute issues              Crt 1.4, BUN 28, GFR 44              Will monitor renally dosed vanc levels with troughs              Will continue to monitor  6. Hypothyroidism              No acute issues              TSH 0.600 during recent admission              Will continue home dose of synthroid 100mcg  7. Neuropathy              No acute issues              Home dose gabapentin continued  8. Iron deficiency anemia              Stable              H/H 9.5/28.6 today              Continued iron 325mg PO QD   Will continue to monitor  9. Dementia              Stable, able to converse during exam               Continued home aricept QPM  10. HLD              No acute issues              Continued home atrovastatin and daily aspirin

## 2017-11-13 NOTE — PLAN OF CARE
Problem: Patient Care Overview  Goal: Plan of Care Review  Outcome: Ongoing (interventions implemented as appropriate)   11/13/17 8856   Coping/Psychosocial   Plan Of Care Reviewed With patient;daughter   Patient awake, alert and oriented. Pt went for and I&D to the right buttocks today, tolerated well, covered with a bandage, unable to visualize. Patient worked with Pt and Ot today. Patient on IV antibiotics for cellulitis, swallows fine. Pt able to turn self when asked to do so. Patient has nonskid socks on and a fall risk bans as well as a allergy band.

## 2017-11-13 NOTE — ANESTHESIA PREPROCEDURE EVALUATION
Zeyad Woodard is a 89 y.o. male w/ PMhx of HTN, Afib (on warfarin), s/p pacemaker placement (2/2 sick sinus syndrome), CHF (DD), trivial AS, CKD III, CAD, Hypothyroidism who presents for I&D.     Patient Active Problem List   Diagnosis    Renovascular hypertension    Chronic atrial fibrillation    Benign prostatic hyperplasia    Chronic diastolic congestive heart failure    Polyneuropathy    Hypothyroidism    Iron deficiency anemia due to chronic blood loss    CKD (chronic kidney disease), stage III    Chronic back pain    Lumbar radiculopathy    SSS (sick sinus syndrome)    Bradycardia    Frequent falls    Opioid dependence with intoxication with complication    Venous insufficiency of both lower extremities    Thrombocytopenia    Chronic constipation    Anemia of chronic renal failure, stage 3 (moderate)    Elevated troponin    Right hip pain    Contusion of right hip    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    Hypothyroidism due to acquired atrophy of thyroid    Pseudophakia    Essential hypertension    Right posterior capsular opacification    Refractive error    Post-operative state    Dementia without behavioral disturbance    Chronic bilateral low back pain with bilateral sciatica    Use of opiates for therapeutic purposes    Chronic pain    Abnormal blood smear    Cellulitis of buttock    HLD (hyperlipidemia)    Weakness    Bacteremia    MRSA bacteremia    Cardiac pacemaker in situ    Pericardial calcification    Mitral regurgitation       Allergies   Allergen Reactions    Penicillins Hives and Other (See Comments)     Fever        Current Facility-Administered Medications on File Prior to Visit   Medication Dose Route Frequency Provider Last Rate Last Dose    aspirin EC tablet 81 mg  81 mg Oral Daily Alejandro Smith MD   81 mg at 11/12/17 0957    atorvastatin tablet 20 mg  20 mg Oral Nightly Alejandro Smith MD   20 mg at 11/12/17 2200    carvedilol tablet  6.25 mg  6.25 mg Oral BID Melissa Gonzales MD   6.25 mg at 11/12/17 2200    clindamycin 900 MG/50 ML D5W 900 mg/50 mL IVPB 900 mg  900 mg Intravenous On Call Procedure Elsa Rahman MD        cyanocobalamin tablet 100 mcg  100 mcg Oral Daily Alejandro Smith MD   100 mcg at 11/12/17 0957    dextrose 50% injection 12.5 g  12.5 g Intravenous PRN Alejandro Smith MD        dextrose 50% injection 25 g  25 g Intravenous PRN Alejandro Smith MD        diphenhydrAMINE-zinc acetate 2-0.1% cream   Topical TID PRN Jared Xiong MD        docusate sodium capsule 100 mg  100 mg Oral BID Alejandro Smith MD   100 mg at 11/12/17 2200    donepezil tablet 10 mg  10 mg Oral QHS Alejandro Smith MD   10 mg at 11/12/17 2200    ferrous sulfate EC tablet 325 mg  325 mg Oral Daily Alejandro Smith MD   325 mg at 11/11/17 0746    finasteride tablet 5 mg  5 mg Oral Daily Alejandro Smith MD   5 mg at 11/12/17 0957    gabapentin capsule 100 mg  100 mg Oral Daily Alejandro Smith MD   100 mg at 11/12/17 0957    gabapentin capsule 300 mg  300 mg Oral QHS Alejandro Smith MD   300 mg at 11/12/17 2200    glucagon (human recombinant) injection 1 mg  1 mg Intramuscular PRN Alejandro Smith MD        glucose chewable tablet 16 g  16 g Oral PRN Alejandro Smith MD        glucose chewable tablet 24 g  24 g Oral PRN Alejandro Smith MD        heparin (porcine) injection 5,000 Units  5,000 Units Subcutaneous Q8H Alejandro Smith MD   5,000 Units at 11/13/17 0601    hydrALAZINE injection 10 mg  10 mg Intravenous Q6H PRN Rodrigo Gabriel MD   10 mg at 11/13/17 0600    influenza (FLUZONE HIGH-DOSE) vaccine 0.5 mL  0.5 mL Intramuscular Prior to discharge Evonne Smith MD        lactobacillus acidophilus & bulgar 100 million cell packet 1 each  1 packet Oral BID Alejandro Smith MD   1 each at 11/12/17 2200    levothyroxine  tablet 100 mcg  100 mcg Oral Before breakfast Melissa Gonzales MD   100 mcg at 11/13/17 0600    mupirocin 2 % ointment   Nasal On Call Procedure Elsa Rahman MD        ondansetron injection 4 mg  4 mg Intravenous Q12H PRN Alejandro Smith MD        oxyCODONE-acetaminophen 7.5-325 mg per tablet 1 tablet  1 tablet Oral Q8H PRN Alejandro Smith MD   1 tablet at 11/12/17 2223    oxyCODONE-acetaminophen 7.5-325 mg per tablet 1 tablet  1 tablet Oral Q8H PRN Melissa Gonzales MD        senna tablet 8.6 mg  8.6 mg Oral Daily PRN Melissa Gonzales MD        sodium chloride 0.9% flush 5 mL  5 mL Intravenous PRN Alejandro Smith MD        tamsulosin 24 hr capsule 0.4 mg  0.4 mg Oral Daily Alejandro Smith MD   0.4 mg at 11/12/17 0957    Tdap vaccine injection 0.5 mL  0.5 mL Intramuscular Prior to discharge Evonne Smith MD        vancomycin (VANCOCIN) 1,250 mg in dextrose 5 % 250 mL IVPB  1,250 mg Intravenous Q24H Melissa Gonzales .7 mL/hr at 11/12/17 1701 1,250 mg at 11/12/17 1701     Current Outpatient Prescriptions on File Prior to Visit   Medication Sig Dispense Refill    ASCORBATE CALCIUM (VITAMIN C ORAL) Take 1 tablet by mouth once daily.       aspirin (ECOTRIN) 81 MG EC tablet Take 1 tablet (81 mg total) by mouth once daily.  0    atorvastatin (LIPITOR) 20 MG tablet Take 1 tablet (20 mg total) by mouth nightly. 90 tablet 3    calcium 500 mg Tab Take 1 tablet by mouth Daily.      clindamycin (CLEOCIN) 300 MG capsule Take 1 capsule (300 mg total) by mouth every 6 (six) hours. 28 capsule 0    clotrimazole-betamethasone (LOTRISONE) lotion Apply topically 2 (two) times daily. (Patient taking differently: Apply topically as needed. ) 30 mL 0    cyanocobalamin (VITAMIN B-12) 1000 MCG tablet Take 100 mcg by mouth once daily.      donepezil (ARICEPT) 10 MG tablet Take 1 tablet (10 mg total) by mouth every evening. 90 tablet 3    ferrous sulfate 325 mg (65 mg iron) Tab tablet  Take 325 mg by mouth once daily.      finasteride (PROSCAR) 5 mg tablet Take 1 tablet (5 mg total) by mouth once daily. 90 tablet 3    furosemide (LASIX) 20 MG tablet Take 20 mg by mouth 2 (two) times daily.      gabapentin (NEURONTIN) 100 MG capsule Take 100 mg by mouth 2 (two) times daily.      lactobacillus acidophilus & bulgar (LACTINEX) 100 million cell packet Take 1 packet (1 each total) by mouth 2 (two) times daily. 14 packet 0    levothyroxine (SYNTHROID) 100 MCG tablet Take 1 tablet (100 mcg total) by mouth once daily. 90 tablet 3    metoprolol succinate (TOPROL-XL) 25 MG 24 hr tablet Take 1 tablet (25 mg total) by mouth once daily. 90 tablet 3    MULTIVITS-MINERALS/FA/LYCOPENE (ONE-A-DAY MEN'S MULTIVITAMIN ORAL) Take 1 tablet by mouth once daily.      oxycodone-acetaminophen (PERCOCET) 7.5-325 mg per tablet Take 1 tablet by mouth every 8 (eight) hours as needed for Pain. 90 tablet 0    senna-docusate 8.6-50 mg (PERICOLACE) 8.6-50 mg per tablet Take 1 tablet by mouth 2 (two) times daily.      tamsulosin (FLOMAX) 0.4 mg Cp24 Take 1 capsule (0.4 mg total) by mouth once daily. 90 capsule 3       Past Surgical History:   Procedure Laterality Date    CARDIAC SURGERY      CATARACT EXTRACTION W/  INTRAOCULAR LENS IMPLANT Right 05/17/2010        CATARACT EXTRACTION W/  INTRAOCULAR LENS IMPLANT Left 02/16/2004        cataract surgery      COLONOSCOPY  6/30/04    COLONOSCOPY N/A 2/15/2016    Procedure: COLONOSCOPY;  Surgeon: Stanton Kirk MD;  Location: [address]);  Service: Endoscopy;  Laterality: N/A;    EYE SURGERY      HERNIA REPAIR      inguinal hernia repair      Open heart surgery for pericardiectomy      for calcific constrictive pericarditis    UMBILICAL HERNIA REPAIR      Yag      Left Eye       Pacemaker programming  PRE-TEST DATA   DEVICES:  PACEMAKER  [employer] ADSR01 PACE ADAPTA ADSR01 S/N:[id number]  Kade Callejas  7/2/2015 - current      LEAD  [employer] 5076-58 LEAD CAPSURE FIX NOVUS MRI 58CM S/N:[id number]  Andrea Callejas  7/2/2015 - current     The following physician is ANDREA Melendrez  [facility] Home monitoring  TEST DESCRIPTION   Follow-Up Analysis:  Chamber type: Single  Mode: VVIR  Lower limit rate is 60 bpm  Max sensor rate is 120 bpm  Battery voltage: 2.78 V  Estimated longevity: 10 years  Cell impedance: 136 Ohms    Nonsustained VT: No  LEADS:  RV Lead  R-wave: 5.6 - 8 mV  Impedance: 463 Ohms  Paced: 73%    THRESHOLDS:  RV Lead 0.5 V at 0.4 ms     The patient had 0 treated episodes.    Wound Comments:  Chronic incision.  Reprogramming Comments:  No changes.  General Comments:  Patient in for routine device check. Device and lead funtions WNL. F/U in 3 months    Interrogation performed by Gabriella Gilbert RN.   This document has been electronically   SIGNED BY: Andrea Callejas MD On: 12/07/2015 14:20    Anesthesia Evaluation    I have reviewed the Patient Summary Reports.    I have reviewed the Nursing Notes.   I have reviewed the Medications.     Review of Systems  Anesthesia Hx:  No problems with previous Anesthesia Daughter denies any h/o anesthesia complications History of prior surgery of interest to airway management or planning: Denies Family Hx of Anesthesia complications.   Denies Personal Hx of Anesthesia complications.   Social:  Smoker, No Alcohol Use    Hematology/Oncology:     Oncology Normal    -- Anemia: Hematology Comments: +thrombocytopenia    EENT/Dental:EENT/Dental Normal   Cardiovascular:   Pacemaker Hypertension Valvular problems/Murmurs (mild AS per echo), AS, AI CAD  Dysrhythmias atrial fibrillation CHF   History of contrictive pericarditis    TTE 11/11/17  CONCLUSIONS     1 - Normal left ventricular systolic function (EF 60-65%).     2 - No wall motion abnormalities.     3 - Biatrial enlargement.     4 - Trivial to mild aortic stenosis, BELKIS = 1.95 cm2, peak velocity = 2.08 m/s, mean gradient = 9 mmHg.     5 -  Normal right ventricular systolic function .     6 - Mild aortic regurgitation.     7 - No evidence of endocarditis.     8 - Atrial fibrillation.     ECG 11/7/17  Demand pacemaker;  interpretation is based on intrinsic rhythm  Atrial fibrillation with premature ventricular or aberrantly conducted  Complexes Right bundle branch block  T wave abnormality, consider inferolateral ischemia Abnormal ECG When compared with ECG of 20-MAR-2017 11:42, Atrial fibrillation has replaced Electronic ventricular pacemaker   Pulmonary:  Pulmonary Normal    Renal/:   Chronic Renal Disease, CRI    Hepatic/GI:  Hepatic/GI Normal    Musculoskeletal:  Musculoskeletal Normal    Neurological:   Neuromuscular Disease,    Endocrine:   Hypothyroidism    Dermatological:   mrsa bacteremia/cellulitis   Psych:   Psychiatric History (dementia)          Physical Exam  General:  Well nourished    Airway/Jaw/Neck:  Airway Findings: Mouth Opening: Normal Tongue: Normal  General Airway Assessment: Adult  Mallampati: III  Improves to II with phonation.  TM Distance: Normal, at least 6 cm  Jaw/Neck Findings:  Neck ROM: Normal ROM  Neck Findings:     Eyes/Ears/Nose:  Eyes/Ears/Nose Findings:    Dental:  Dental Findings: Edentulous, Upper Dentures   Chest/Lungs:  Chest/Lungs Findings: Clear to auscultation, Normal Respiratory Rate     Heart/Vascular:  Heart Findings: Rate: Normal  Rhythm: Regular Rhythm  Sounds: Normal  Heart Murmur  Systolic  Systolic Heart Murmur Description: R Upper Sternal Border, Ejection Type  Systolic Heart Murmur Grade: Grade II  +JVD      Mental Status:  Mental Status Findings: (mild dementia)  Cooperative, Alert and Oriented       Lab Results   Component Value Date    WBC 13.50 (H) 11/13/2017    HGB 9.5 (L) 11/13/2017    HCT 28.6 (L) 11/13/2017     (L) 11/13/2017    CHOL 118 (L) 08/18/2015    TRIG 64 08/18/2015    HDL 38 (L) 08/18/2015    ALT 42 11/12/2017    AST 44 (H) 11/12/2017     (L) 11/12/2017    K 4.2  11/12/2017     11/12/2017    CREATININE 1.2 11/12/2017    BUN 23 11/12/2017    CO2 27 11/12/2017    TSH 0.600 11/07/2017    PSA 2.5 07/08/2010    INR 1.1 11/07/2017    HGBA1C 5.6 11/10/2017     Wt Readings from Last 3 Encounters:   11/12/17 74.7 kg (164 lb 10.9 oz)   11/08/17 78.4 kg (172 lb 14.2 oz)   11/07/17 78.9 kg (173 lb 15.1 oz)     Temp Readings from Last 3 Encounters:   11/13/17 36.4 °C (97.6 °F) (Oral)   11/08/17 37.3 °C (99.2 °F) (Oral)   11/07/17 (!) 38.2 °C (100.7 °F) (Oral)     BP Readings from Last 3 Encounters:   11/13/17 (!) 184/70   11/08/17 (!) 174/73   11/07/17 126/80     Pulse Readings from Last 3 Encounters:   11/13/17 62   11/08/17 71   11/07/17 76         Anesthesia Plan  Type of Anesthesia, risks & benefits discussed:  Anesthesia Type:  general, MAC  Patient's Preference:   Intra-op Monitoring Plan: standard ASA monitors  Intra-op Monitoring Plan Comments:   Post Op Pain Control Plan: multimodal analgesia  Post Op Pain Control Plan Comments:   Induction:   IV  Beta Blocker:  Patient is on a Beta-Blocker and has received one dose within the past 24 hours (No further documentation required).       Informed Consent: Patient representative understands risks and agrees with Anesthesia plan.  Questions answered. Anesthesia consent signed with patient representative.  ASA Score: 4     Day of Surgery Review of History & Physical: I have interviewed and examined the patient. I have reviewed the patient's H&P dated:  There are no significant changes.  H&P update referred to the provider.     Anesthesia Plan Notes: Phone consent obtained from daughter Jaciel [phone] for anesthesia for both I&D and JEVON        Ready For Surgery From Anesthesia Perspective.

## 2017-11-13 NOTE — ANESTHESIA POSTPROCEDURE EVALUATION
"Anesthesia Post Evaluation    Patient: Zeyad Woodard    Procedure(s) Performed: Procedure(s) (LRB):  INCISION AND DRAINAGE (I&D) (Right)    Final Anesthesia Type: MAC  Patient location during evaluation: PACU  Patient participation: Yes- Able to Participate  Level of consciousness: awake and alert, oriented and awake  Post-procedure vital signs: reviewed and stable  Pain management: adequate  Airway patency: patent  PONV status at discharge: No PONV  Anesthetic complications: no      Cardiovascular status: blood pressure returned to baseline  Respiratory status: unassisted and room air  Hydration status: euvolemic  Follow-up not needed.        Visit Vitals  BP (!) 128/59   Pulse 72   Temp 36.5 °C (97.7 °F) (Skin)   Resp 14   Ht 5' 8" (1.727 m)   Wt 74.7 kg (164 lb 10.9 oz)   SpO2 96%   BMI 25.04 kg/m²       Pain/Jayden Score: Pain Assessment Performed: Yes (11/13/2017  9:25 AM)  Presence of Pain: denies (11/13/2017  9:25 AM)  Pain Rating Prior to Med Admin: 6 (11/12/2017 10:23 PM)  Jayden Score: 9 (11/13/2017  9:25 AM)      "

## 2017-11-13 NOTE — OP NOTE
Operative Note       Surgery Date: 11/13/2017     Surgeon(s) and Role:     * Elsa Rahman MD - Primary    Pre-op Diagnosis:  Bacteremia [R78.81]  Cellulitis of buttock [L03.317]  Renovascular hypertension [I15.0]  Chronic atrial fibrillation [I48.2]  Chronic diastolic congestive heart failure [I50.32]  MRSA bacteremia [R78.81]    Post-op Diagnosis: Post-Op Diagnosis Codes:     * Bacteremia [R78.81]     * Cellulitis of buttock [L03.317]     * Renovascular hypertension [I15.0]     * Chronic atrial fibrillation [I48.2]     * Chronic diastolic congestive heart failure [I50.32]     * MRSA bacteremia [R78.81]    Procedure(s) (LRB):  INCISION AND DRAINAGE (I&D) (Right)  wttth excision of cyst 3 x 4 cm  Anesthesia: Local MAC    Procedure in Detail/Findings:  dictated    Estimated Blood Loss:30 cc        Specimens     Start     Ordered    11/13/17 0905  Specimen to Pathology - Surgery  Once     Infected cyst   11/13/17 0906        Implants: * No implants in log *           Disposition: PACU - hemodynamically stable.           Condition: Good    Attestation:  I performed the procedure.           Discharge Note    Admit Date: 11/10/2017    Attending Physician: Jared Xiong MD     Discharge Physician: Jared Xiong MD    Final Diagnosis: Post-Op Diagnosis Codes:     * Bacteremia [R78.81]     * Cellulitis of buttock [L03.317]     * Renovascular hypertension [I15.0]     * Chronic atrial fibrillation [I48.2]     * Chronic diastolic congestive heart failure [I50.32]     * MRSA bacteremia [R78.81]    Disposition: Still a Patient    Patient Instructions:   Current Discharge Medication List      CONTINUE these medications which have NOT CHANGED    Details   ASCORBATE CALCIUM (VITAMIN C ORAL) Take 1 tablet by mouth once daily.       aspirin (ECOTRIN) 81 MG EC tablet Take 1 tablet (81 mg total) by mouth once daily.  Refills: 0      atorvastatin (LIPITOR) 20 MG tablet Take 1 tablet (20 mg total) by mouth nightly.  Qty: 90  tablet, Refills: 3      calcium 500 mg Tab Take 1 tablet by mouth Daily.      clindamycin (CLEOCIN) 300 MG capsule Take 1 capsule (300 mg total) by mouth every 6 (six) hours.  Qty: 28 capsule, Refills: 0      clotrimazole-betamethasone (LOTRISONE) lotion Apply topically 2 (two) times daily.  Qty: 30 mL, Refills: 0    Associated Diagnoses: Angular cheilosis      cyanocobalamin (VITAMIN B-12) 1000 MCG tablet Take 100 mcg by mouth once daily.      donepezil (ARICEPT) 10 MG tablet Take 1 tablet (10 mg total) by mouth every evening.  Qty: 90 tablet, Refills: 3      ferrous sulfate 325 mg (65 mg iron) Tab tablet Take 325 mg by mouth once daily.      finasteride (PROSCAR) 5 mg tablet Take 1 tablet (5 mg total) by mouth once daily.  Qty: 90 tablet, Refills: 3      furosemide (LASIX) 20 MG tablet Take 20 mg by mouth 2 (two) times daily.      gabapentin (NEURONTIN) 100 MG capsule Take 100 mg by mouth 2 (two) times daily.      lactobacillus acidophilus & bulgar (LACTINEX) 100 million cell packet Take 1 packet (1 each total) by mouth 2 (two) times daily.  Qty: 14 packet, Refills: 0      levothyroxine (SYNTHROID) 100 MCG tablet Take 1 tablet (100 mcg total) by mouth once daily.  Qty: 90 tablet, Refills: 3      metoprolol succinate (TOPROL-XL) 25 MG 24 hr tablet Take 1 tablet (25 mg total) by mouth once daily.  Qty: 90 tablet, Refills: 3    Associated Diagnoses: Chronic systolic congestive heart failure; Essential hypertension      MULTIVITS-MINERALS/FA/LYCOPENE (ONE-A-DAY MEN'S MULTIVITAMIN ORAL) Take 1 tablet by mouth once daily.      oxycodone-acetaminophen (PERCOCET) 7.5-325 mg per tablet Take 1 tablet by mouth every 8 (eight) hours as needed for Pain.  Qty: 90 tablet, Refills: 0    Associated Diagnoses: Right lumbar radiculopathy; Chronic low back pain with sciatica, sciatica laterality unspecified, unspecified back pain laterality; Balance problem; Polyneuropathy; Frequent falls; Lumbar radiculopathy; Chronic bilateral low  back pain with bilateral sciatica; Use of opiates for therapeutic purposes      senna-docusate 8.6-50 mg (PERICOLACE) 8.6-50 mg per tablet Take 1 tablet by mouth 2 (two) times daily.      tamsulosin (FLOMAX) 0.4 mg Cp24 Take 1 capsule (0.4 mg total) by mouth once daily.  Qty: 90 capsule, Refills: 3    Associated Diagnoses: Benign non-nodular prostatic hyperplasia without lower urinary tract symptoms             Patient tolerated procedure well and was sent to recovery room in stable condition.

## 2017-11-13 NOTE — PROGRESS NOTES
[facility] Resident HO-1 Progress Note    Subjective:      Zeyad Woodard is a 89 y.o. male who is being followed by the [facility] service at [facility] for MRSA bacteremia.     Patient has been NPO since midnight for possibel JEVON today and I&D today. He reports some pain on his backside this morning. No shortness of breath, chest pain, nausea, vomiting, diarrhea, constipation. Voiding and stooling normally. No new complaints.     Objective:   Last 24 Hour Vital Signs:  BP  Min: 125/58  Max: 196/77  Temp  Av.8 °F (36.6 °C)  Min: 96.5 °F (35.8 °C)  Max: 99.3 °F (37.4 °C)  Pulse  Av.4  Min: 60  Max: 74  Resp  Av.3  Min: 16  Max: 19  SpO2  Av.2 %  Min: 95 %  Max: 97 %  I/O last 3 completed shifts:  In: 995 [P.O.:745; IV Piggyback:250]  Out: 1782 [Urine:1781; Emesis/NG output:1]    Physical Examination:  General:          Alert and awake in no apparent distress  Head:               Normocephalic and atraumatic  Eyes:               PERRL; EOMi with anicteric sclera and mild injection of conjunctiva, small amount of clear, watery discharge from bilateral eyes  Mouth:             Oropharynx clear and without exudate; moist mucous membranes  Cardio:             Regular rate and rhythm with normal S1 and S2; no murmurs or rubs  Resp:               CTAB; respirations unlabored; no wheezes, crackles or rhonchi  Abdom:            Soft, NTND with normoactive bowel sounds  Extrem:            Warm and well-perfused with no clubbing, cyanosis or edema  Skin:                Sacral wound with central necrosis blanching erythema, surrounding erythema is within and regressed from previous delineation with surgical marker. Small amount of purulent drainage noted on bandage   Pulses:            2+ and symmetric distally  Neuro:             AAOx3; cooperative and pleasant with no focal deficits       Laboratory:  Laboratory Data Reviewed: yes  Pertinent Findings:  Trended  Lab Data:    Recent Labs  Lab 11/11/17  0543 11/12/17  0523 11/13/17  0442   WBC 11.93 13.44* 13.50*   HGB 10.0* 10.6* 9.5*   HCT 29.3* 32.0* 28.6*   * 141* 142*   MCV 90 91 92   RDW 13.8 13.9 14.1    135* 132*   K 4.5 4.2 4.4    101 99   CO2 29 27 28   BUN 20 23 28*    103 101   CALCIUM 9.0 9.0 8.5*   PROT 6.3 6.6 6.0   ALBUMIN 3.2* 3.2* 3.0*   BILITOT 0.8 0.8 0.6   AST 44* 44* 39   ALKPHOS 115 119 103   ALT 43 42 37         Microbiology Data Reviewed: yes  Pertinent Findings:  Blood Cx NGTD    Other Results:  EKG (my interpretation): No new EKG.    Radiology Data Reviewed: yes  Pertinent Findings:  No new studies    Current Medications:     Infusions:        Scheduled:   aspirin  81 mg Oral Daily    atorvastatin  20 mg Oral Nightly    carvedilol  6.25 mg Oral BID    cyanocobalamin  100 mcg Oral Daily    docusate sodium  100 mg Oral BID    donepezil  10 mg Oral QHS    ferrous sulfate  325 mg Oral Daily    finasteride  5 mg Oral Daily    gabapentin  100 mg Oral Daily    gabapentin  300 mg Oral QHS    heparin (porcine)  5,000 Units Subcutaneous Q8H    lactobacillus acidophilus & bulgar  1 packet Oral BID    levothyroxine  100 mcg Oral Before breakfast    tamsulosin  0.4 mg Oral Daily    vancomycin (VANCOCIN) IVPB  1,250 mg Intravenous Q24H        PRN:  clindamycin (CLEOCIN) IVPB, dextrose 50%, dextrose 50%, diphenhydrAMINE-zinc acetate 2-0.1%, glucagon (human recombinant), glucose, glucose, hydrALAZINE, influenza, mupirocin, ondansetron, oxyCODONE-acetaminophen, oxyCODONE-acetaminophen, senna, sodium chloride 0.9%, DIPH,PERTUS (ADACEL),TETANUS PF VAC (ADULT)    Antibiotics and Day Number of Therapy:  Vancomycin, Day 4    Lines and Day Number of Therapy:  PIV    Assessment:     Zeyad Woodard is a 89 y.o.male with  Patient Active Problem List    Diagnosis Date Noted    Pericardial calcification 11/12/2017    Mitral regurgitation 11/12/2017    Bacteremia 11/10/2017     MRSA bacteremia 11/10/2017    Weakness 11/08/2017    Cellulitis of buttock 11/07/2017    HLD (hyperlipidemia) 11/07/2017    Abnormal blood smear 10/05/2017    Chronic pain 10/03/2017    Chronic bilateral low back pain with bilateral sciatica 09/19/2017    Use of opiates for therapeutic purposes 09/19/2017    Dementia without behavioral disturbance 06/05/2017    Post-operative state 02/01/2017    Pseudophakia 01/18/2017    Essential hypertension 01/18/2017    Right posterior capsular opacification 01/18/2017    Refractive error 01/18/2017    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    Hypothyroidism due to acquired atrophy of thyroid 10/02/2016    Right hip pain 10/01/2016    Contusion of right hip 10/01/2016    Elevated troponin 09/30/2016    Anemia of chronic renal failure, stage 3 (moderate) 09/01/2016    Chronic constipation 07/06/2016    Thrombocytopenia 07/03/2016    Venous insufficiency of both lower extremities 10/26/2015    Frequent falls 08/28/2015    Opioid dependence with intoxication with complication 08/28/2015    Cardiac pacemaker in situ 07/02/2015    SSS (sick sinus syndrome) 06/12/2015    Bradycardia 06/12/2015    Chronic back pain 09/12/2014    Lumbar radiculopathy 09/12/2014    CKD (chronic kidney disease), stage III 10/16/2013    Iron deficiency anemia due to chronic blood loss 04/12/2013    Hypothyroidism     Polyneuropathy 11/12/2012    Chronic diastolic congestive heart failure 08/23/2012    Renovascular hypertension     Chronic atrial fibrillation     Benign prostatic hyperplasia         Plan:     MRSA bacteremia 2/2 cellulitis  - Patient discharge recently for cellulitis on clindamycin, however patient's blood cultures returned MRSA so called and had patient return to hospital for further evaluation.   - Sacral wound with central necrosis blanching erythema, surrounding erythema is within and regressed from previous delineation with surgical marker. Small amount  of purulent drainage noted.   - WBC 13.6 on admission, slight decrease from previous admission.   - Blood Cx Q other day, repeat so far negative  - TTE with no evidence of endocarditis       - Vancomycin 1500mg x1 in Ed, now 1250mg IV daily  - ID consult, appreciate recs: Vanc and JEVON, may need pacemaker lead removal in future;   - Cardiology consult, appreciate recs: JEVON will be for tomorrow  - Surgery consult, appreciate recs: I&D for today        Chronic heart failure with combined systolic/diastolic dysfunction and mitral regurgitation  - EF 45%, last echo Dec 2016, Euvolemic on exam with mild pedal edema   - TTE 11/11 with normal LVEF 60-65%, atrial fibrillation, no signs of endocarditis   - lasix 20 mg BID   - follow up JEVON once completed     HTN  - BP high since admission. Switch toprol to coreg, 6.25mg BID  - Continue to monitor     Iron deficiency anemia due to blood loss  - Stable, Continue iron 325 mg daily, B12 100 mcg daily, probiotic daily     Dementia  - stable, conversant and interactive; continue home aricept 10 mg nightly      HLD  - Continue home atorvastatin 20mg QHS  - Daily ASA 81 mg      Neuropathy   - Continue home gabapentin 100 mg BID     BPH  - Asymptomatic, continue finasteride 5mg daily, flomax 0.4 mg daily     Hypothyroidism   - recent TSH wnl, continue synthroid 100mcg daily      HCM  - ordered flu and tdap for administration prior to discharge  Code Status: DNR   Diet: Cardiac  Allergies: Penicillin  DVT ppx: SQH   Dispo: IV ABX and infectious disease workup      Theresa Herndon  [facility] HO-1  [facility] Service Team B    [facility] Medicine Hospitalist Pager numbers:   [facility] Hospitalist Medicine Team A (Kinsey/Yumiko): [phone]  [facility] Hospitalist Medicine Team B (Tyrese/Bibi):  [phone]

## 2017-11-13 NOTE — PLAN OF CARE
Problem: Occupational Therapy Goal  Goal: Occupational Therapy Goal  Goals to be met by: 12/11     Patient will increase functional independence with ADLs by performing:    UE Dressing with Modified [place].  LE Dressing with Modified [place].  Grooming while standing with Modified [place].  Toileting from toilet with Modified [place] for hygiene and clothing management.   Toilet transfer to toilet with Modified [place].     Outcome: Ongoing (interventions implemented as appropriate)  Patient with increased fatigue and pain s/p I&D of R buttock abscess. Will benefit from skilled OT to address functional deficits.

## 2017-11-13 NOTE — PROGRESS NOTES
[facility]  Infectious Disease  Progress Note    Patient Name: Zeyad Woodard  MRN: [id number]  Admission Date: 11/10/2017  Length of Stay: 3 days  Attending Physician: Jared Xiong MD  Primary Care Provider: Booker Ricci MD    Isolation Status: No active isolations  Assessment/Plan:      * MRSA bacteremia    - pt with recent admission for cellulitis, discharged on a course of clindamycin  - blood cultures from that admission are now 4/4 positive for MRSA  - on exam area appears indurated with small amount of drainage  - TTE is pending  - slight leucocytosis on admission, WBC had since trended down but is up to 13.44 this morning.   - repeat cultures NGTD so far   -pt s/p I&D, will follow up surgical cultures   - continue vancomycin and please obtain trough 30 minutes prior to 4th dose. Goal trough is 15-20.   - should have first trough drawn before his dose of vancomycin today  -TTE shows no evidence of endocarditis  - JEVON planned for tomorrow  - with pt's cardiac history and placement of a pacemaker concern is for implant infection. Will monitor pt but pacemaker and leads may need to be removed in the future.    -Recommend U/S of Pacemaker to assess for fluid collections.   - will continue to follow.             Anticipated Disposition: As per primary    Thank you for your consult. I will follow-up with patient. Please contact us if you have any additional questions.    Alvino Mejía MD, PhD  Infectious Disease, PGY-4  [facility]    Subjective:     Principal Problem:MRSA bacteremia    HPI: Pt is a 89 y/o male with a pmhx of A fib, Sick Sinus Syndrome with implanted pacemaker, pericardiectomy for calcific constrictive pericarditis, CHF, BPH, HTN, Hypothyroidism, GI bleed, and dementia admitted for MRSA bactermia. Pt recently admitted on 11/07/2017 for cellulitis on the right buttock. Most history is obtained from chart review secondary to pt's dementia. According to  H&P from 11/07/2017 pt ahd what appeared to be boil on on his buttock. Pt was also having low grade temperatures so pt was sent for further work up which also revealed a lucocytosis. Blood cultures were drawn and pt was started on clindamycin IV. Pt did well and transition to PO clindamycin and ppt was discharged back to home on 11/08/2017 with course of unknown duration. Shortly after discharge pt's blood cultures became positive for MRSA in 4/4 bottles.Pt's family was contacted and pt was brought back to the ED for escalation of antibiotic therapy and further evaluation.     Pt unsure of when his symptoms started but complains of pain located in his right buttock. Denies any fever or chills, chest pain or SOB, nausea or vomiting. Denies urinary complaints, does endorse wartery stools but denies that they are malodorous, states one stool daily at this time. Pt lives at home alone but is visited daily by his daughters who help him manage his medications. Denies any foreign travel, no recent sick contacts. No contact with animals.      Positve Culture Data    11/07/2017 BCx, LAC             MRSA  11/07/2017 BCx, LW  MRSA    Cultures This Admission  11/10/2017 BCx, LAC             NGTD  11/10/2017 BCx, LW  NGTD    Antibiotics this admission    Vancomycin 1.5 gr once  Interval History: Pt just s/p I&D of abscess, pt complaining of pain to the area, denies fever or chills, nausea or vomiting, chest pain or SOB    Review of Systems   Constitutional: Negative for chills and fever.   Respiratory: Negative for chest tightness and shortness of breath.    Cardiovascular: Negative for chest pain and palpitations.   Gastrointestinal: Negative for constipation and diarrhea.     Objective:     Vital Signs (Most Recent):  Temp: 97 °F (36.1 °C) (11/13/17 1217)  Pulse: 74 (11/13/17 1217)  Resp: 19 (11/13/17 1217)  BP: 137/67 (11/13/17 1217)  SpO2: 96 % (11/13/17 1208) Vital Signs (24h Range):  Temp:  [97 °F (36.1 °C)-98.7 °F (37.1  °C)] 97 °F (36.1 °C)  Pulse:  [60-74] 74  Resp:  [10-19] 19  SpO2:  [96 %-97 %] 96 %  BP: (122-196)/(55-77) 137/67     Weight: 74.7 kg (164 lb 10.9 oz)  Body mass index is 25.04 kg/m².    Estimated Creatinine Clearance: 33.9 mL/min (based on SCr of 1.4 mg/dL).    Physical Exam   Constitutional: He appears well-developed and well-nourished. No distress.   HENT:   Head: Normocephalic.   Mouth/Throat: Oropharynx is clear and moist.   Edentulous, with upper dentures. Dark colored lesion noted to right lateral tongue.    Eyes: EOM are normal. Pupils are equal, round, and reactive to light. No scleral icterus.   Neck: Normal range of motion. No JVD present.   Cardiovascular: Normal rate and regular rhythm.    Murmur heard.  III/VI systolic ejection murmur best heard at LUSB   Pulmonary/Chest: Effort normal. No respiratory distress. He has rales.   Faint crackles heard at this bases   Abdominal: Soft.   Musculoskeletal: Normal range of motion. He exhibits no edema.   Lymphadenopathy:     He has no cervical adenopathy.   Neurological: He is alert.   Pt confused to year and president, but is otherwise alert and oriented to situation.    Skin: Skin is warm and dry. Capillary refill takes less than 2 seconds.   Surgical dressing in place on right buttock.    Psychiatric: He has a normal mood and affect. His behavior is normal.       Significant Labs:   CBC:   Recent Labs  Lab 11/12/17 0523 11/13/17 0442   WBC 13.44* 13.50*   HGB 10.6* 9.5*   HCT 32.0* 28.6*   * 142*     CMP:   Recent Labs  Lab 11/12/17 0523 11/13/17  0442   * 132*   K 4.2 4.4    99   CO2 27 28    101   BUN 23 28*   CREATININE 1.2 1.4   CALCIUM 9.0 8.5*   PROT 6.6 6.0   ALBUMIN 3.2* 3.0*   BILITOT 0.8 0.6   ALKPHOS 119 103   AST 44* 39   ALT 42 37   ANIONGAP 7* 5*   EGFRNONAA 53* 44*     Microbiology Results (last 7 days)     Procedure Component Value Units Date/Time    Blood culture [[id number]] Collected:  11/12/17 0523    Order  Status:  Completed Specimen:  Blood Updated:  11/13/17 1022     Blood Culture, Routine No Growth to date     Blood Culture, Routine No Growth to date    Blood culture [[id number]] Collected:  11/12/17 0523    Order Status:  Completed Specimen:  Blood Updated:  11/13/17 1022     Blood Culture, Routine No Growth to date     Blood Culture, Routine No Growth to date    Culture, Anaerobe [[id number]] Collected:  11/13/17 0906    Order Status:  Sent Specimen:  Abscess from Buttocks, Right Updated:  11/13/17 0906    Gram stain [[id number]] Collected:  11/13/17 0906    Order Status:  Sent Specimen:  Abscess from Buttocks, Right Updated:  11/13/17 0906    Aerobic culture [[id number]] Collected:  11/13/17 0906    Order Status:  Sent Specimen:  Abscess from Buttocks, Right Updated:  11/13/17 0906    Blood culture #1 **CANNOT BE ORDERED STAT** [[id number]] Collected:  11/10/17 1610    Order Status:  Completed Specimen:  Blood from Peripheral, Wrist, Left Updated:  11/12/17 2022     Blood Culture, Routine No Growth to date     Blood Culture, Routine No Growth to date     Blood Culture, Routine No Growth to date    Blood culture #2 **CANNOT BE ORDERED STAT** [[id number]] Collected:  11/10/17 1625    Order Status:  Completed Specimen:  Blood from Peripheral, Antecubital, Left Updated:  11/12/17 2022     Blood Culture, Routine No Growth to date     Blood Culture, Routine No Growth to date     Blood Culture, Routine No Growth to date          Significant Imaging: I have reviewed all pertinent imaging results/findings within the past 24 hours.

## 2017-11-13 NOTE — TRANSFER OF CARE
"Anesthesia Transfer of Care Note    Patient: Zeyad Woodard    Procedure(s) Performed: Procedure(s) (LRB):  INCISION AND DRAINAGE (I&D) (Right)    Anesthesia Type: MAC    Transport from OR: Transported from OR on 6-10 L/min O2 by face mask with adequate spontaneous ventilation    Post pain: adequate analgesia    Post assessment: no apparent anesthetic complications and tolerated procedure well    Post vital signs: stable    Level of consciousness: awake    Nausea/Vomiting: no nausea/vomiting    Complications: none    Transfer of care protocol was followed      Last vitals:   Visit Vitals  BP (!) 184/70 (BP Location: Left arm, Patient Position: Lying)   Pulse 62   Temp 36.4 °C (97.6 °F) (Oral)   Resp 17   Ht 5' 8" (1.727 m)   Wt 74.7 kg (164 lb 10.9 oz)   SpO2 96%   BMI 25.04 kg/m²     "

## 2017-11-13 NOTE — PLAN OF CARE
Discussed SNF preferences and gave list to daughter Asia [phone].  Patient went to OR this am for I&D, going tomorrow for JEVON.  Per ID will need 4-6 weeks IV abx.  PT/OT recommending SNF.    Daughter looking over SNF list and will give me preferences. She stated patient has been to [facility] before and would like [facility] as first choice. She will call me tomorrow with additional choices.    Will sent to [facility].     11/13/17 1527   Discharge Reassessment   Assessment Type Discharge Planning Reassessment   Do you have any problems affording any of your prescribed medications? No   Discharge Plan A Skilled Nursing Facility        11/13/17 1527   Discharge Reassessment   Assessment Type Discharge Planning Reassessment   Do you have any problems affording any of your prescribed medications? No   Discharge Plan A Skilled Nursing Facility     Felicia Prince, RN, CCM, CMSRN  RN Transition Navigator  [phone]

## 2017-11-13 NOTE — PT/OT/SLP PROGRESS
Occupational Therapy  Visit Attempt    Zeyad Woodard  MRN: [id number]    Patient not seen today secondary to SAMMY for I & D and JEVON. Will follow-up later, as time permits.    MADDY Mendoza  11/13/2017

## 2017-11-13 NOTE — PLAN OF CARE
Problem: Patient Care Overview  Goal: Plan of Care Review  Outcome: Ongoing (interventions implemented as appropriate)  Plan of care reviewed with patient. Patient verbalized  understanding. Fall precautions maintained. Bed in lowest position, locked, call light within reach and bed alarm is on. Side rails up x's 2 with slip resistant socks on. Nurse instructed patient to notify staff for any assistance and the patient verbalized complete understanding. Patient on telemetry throughout shift with no ectopy noted. Will continue to monitor.

## 2017-11-13 NOTE — OP NOTE
DATE OF PROCEDURE:  11/13/2017.  PREOPERATIVE DIAGNOSIS:   Infected cyst, left buttock with abscess.    POSTOPERATIVE DIAGNOSIS:  Infected cyst, left buttock with abscess.    OPERATION:  Excision and debridement and I and D of right buttock abscess, size   3 x 4 cm.    SURGEON:  Elsa Rahman M.D.    ANESTHESIA:  Xylocaine 1% with IV sedation.    PROCEDURE:  After satisfactory IV sedation, the patient in semi-prone position,   the area was prepped and draped in normal sterile manner using ChloraPrep.  The   area was then infiltrated using 1% Xylocaine solution.  An elliptical incision   was made completely excising the infected cyst in the deep subcutaneous tissues,   subcuticular clamped and bovied, further dissected down.  The wound was   cauterized using electrocautery.  It was thoroughly irrigated with antibiotic   solution.  Wound was then approximated using interrupted 3-0 Vicryl for   subcutaneous tissue.  The skin was closed using interrupted 4-0 nylon suture.    Iodoform packing was then placed in the lateral part of the incision.  Sterile   gauze dressing was applied.  The instrument count, sponge count, and needle   count was correct.  The patient tolerated it well.  Estimated blood loss was 30   mL.  Specimen removed was infected cyst.  He was sent to Recovery Room in stable   condition.      MS/IN  dd: 11/13/2017 09:34:39 (CST)  td: 11/13/2017 11:19:45 (CST)  Doc ID   #[id number]  Job ID #[id number]    CC:

## 2017-11-13 NOTE — SUBJECTIVE & OBJECTIVE
Interval History: Pt just s/p I&D of abscess, pt complaining of pain to the area, denies fever or chills, nausea or vomiting, chest pain or SOB    Review of Systems   Constitutional: Negative for chills and fever.   Respiratory: Negative for chest tightness and shortness of breath.    Cardiovascular: Negative for chest pain and palpitations.   Gastrointestinal: Negative for constipation and diarrhea.     Objective:     Vital Signs (Most Recent):  Temp: 97 °F (36.1 °C) (11/13/17 1217)  Pulse: 74 (11/13/17 1217)  Resp: 19 (11/13/17 1217)  BP: 137/67 (11/13/17 1217)  SpO2: 96 % (11/13/17 1208) Vital Signs (24h Range):  Temp:  [97 °F (36.1 °C)-98.7 °F (37.1 °C)] 97 °F (36.1 °C)  Pulse:  [60-74] 74  Resp:  [10-19] 19  SpO2:  [96 %-97 %] 96 %  BP: (122-196)/(55-77) 137/67     Weight: 74.7 kg (164 lb 10.9 oz)  Body mass index is 25.04 kg/m².    Estimated Creatinine Clearance: 33.9 mL/min (based on SCr of 1.4 mg/dL).    Physical Exam   Constitutional: He appears well-developed and well-nourished. No distress.   HENT:   Head: Normocephalic.   Mouth/Throat: Oropharynx is clear and moist.   Edentulous, with upper dentures. Dark colored lesion noted to right lateral tongue.    Eyes: EOM are normal. Pupils are equal, round, and reactive to light. No scleral icterus.   Neck: Normal range of motion. No JVD present.   Cardiovascular: Normal rate and regular rhythm.    Murmur heard.  III/VI systolic ejection murmur best heard at LUSB   Pulmonary/Chest: Effort normal. No respiratory distress. He has rales.   Faint crackles heard at this bases   Abdominal: Soft.   Musculoskeletal: Normal range of motion. He exhibits no edema.   Lymphadenopathy:     He has no cervical adenopathy.   Neurological: He is alert.   Pt confused to year and president, but is otherwise alert and oriented to situation.    Skin: Skin is warm and dry. Capillary refill takes less than 2 seconds.   Surgical dressing in place on right buttock.    Psychiatric: He has  a normal mood and affect. His behavior is normal.       Significant Labs:   CBC:   Recent Labs  Lab 11/12/17 0523 11/13/17 0442   WBC 13.44* 13.50*   HGB 10.6* 9.5*   HCT 32.0* 28.6*   * 142*     CMP:   Recent Labs  Lab 11/12/17 0523 11/13/17 0442   * 132*   K 4.2 4.4    99   CO2 27 28    101   BUN 23 28*   CREATININE 1.2 1.4   CALCIUM 9.0 8.5*   PROT 6.6 6.0   ALBUMIN 3.2* 3.0*   BILITOT 0.8 0.6   ALKPHOS 119 103   AST 44* 39   ALT 42 37   ANIONGAP 7* 5*   EGFRNONAA 53* 44*     Microbiology Results (last 7 days)     Procedure Component Value Units Date/Time    Blood culture [[id number]] Collected:  11/12/17 0523    Order Status:  Completed Specimen:  Blood Updated:  11/13/17 1022     Blood Culture, Routine No Growth to date     Blood Culture, Routine No Growth to date    Blood culture [[id number]] Collected:  11/12/17 0523    Order Status:  Completed Specimen:  Blood Updated:  11/13/17 1022     Blood Culture, Routine No Growth to date     Blood Culture, Routine No Growth to date    Culture, Anaerobe [[id number]] Collected:  11/13/17 0906    Order Status:  Sent Specimen:  Abscess from Buttocks, Right Updated:  11/13/17 0906    Gram stain [[id number]] Collected:  11/13/17 0906    Order Status:  Sent Specimen:  Abscess from Buttocks, Right Updated:  11/13/17 0906    Aerobic culture [[id number]] Collected:  11/13/17 0906    Order Status:  Sent Specimen:  Abscess from Buttocks, Right Updated:  11/13/17 0906    Blood culture #1 **CANNOT BE ORDERED STAT** [[id number]] Collected:  11/10/17 1610    Order Status:  Completed Specimen:  Blood from Peripheral, Wrist, Left Updated:  11/12/17 2022     Blood Culture, Routine No Growth to date     Blood Culture, Routine No Growth to date     Blood Culture, Routine No Growth to date    Blood culture #2 **CANNOT BE ORDERED STAT** [[id number]] Collected:  11/10/17 1625    Order Status:  Completed Specimen:  Blood from Peripheral, Antecubital, Left Updated:   11/12/17 2022     Blood Culture, Routine No Growth to date     Blood Culture, Routine No Growth to date     Blood Culture, Routine No Growth to date          Significant Imaging: I have reviewed all pertinent imaging results/findings within the past 24 hours.

## 2017-11-13 NOTE — PLAN OF CARE
Problem: Physical Therapy Goal  Goal: Physical Therapy Goal  Goals to be met by: 17     Patient will increase functional independence with mobility by performin. Sit to stand transfer with Stand-by Assistance  2. Bed to chair transfer with Stand-by Assistance using Rolling Walker  3. Gait  x 50 feet with Stand-by Assistance using Rolling Walker.   4. Stand for 5 minutes with Stand-by Assistance using Rolling Walker  5. Lower extremity exercise program x 15 reps per handout, with supervision     Outcome: Ongoing (interventions implemented as appropriate)  Goals ongoing

## 2017-11-13 NOTE — ASSESSMENT & PLAN NOTE
- pt with recent admission for cellulitis, discharged on a course of clindamycin  - blood cultures from that admission are now 4/4 positive for MRSA  - on exam area appears indurated with small amount of drainage  - TTE is pending  - slight leucocytosis on admission, WBC had since trended down but is up to 13.44 this morning.   - repeat cultures NGTD so far   -pt s/p I&D, will follow up surgical cultures   - continue vancomycin and please obtain trough 30 minutes prior to 4th dose. Goal trough is 15-20.   - should have first trough drawn before his dose of vancomycin today  -TTE shows no evidence of endocarditis  - JEVON planned for tomorrow  - with pt's cardiac history and placement of a pacemaker concern is for implant infection. Will monitor pt but pacemaker and leads may need to be removed in the future.    -Recommend U/S of Pacemaker to assess for fluid collections.   - will continue to follow.

## 2017-11-13 NOTE — PLAN OF CARE
Pt was last admitted to [facility] 11/7-11/8/17 for cellulitis to R-buttock. Pt called by Dr. Green to return to ER due to abnormal labs, admitted with cellulitis to sacral area    Pt is current with [facility]:  for wound care/PT/OT. Pt lives alone, supportive daughter lives across street and checks on him daily. Pt has rollator, shower chair, WC, Grab bars, raised toilet, RW, lift chair       11/13/17 1000   Discharge Assessment   Assessment Type Discharge Planning Assessment   Confirmed/corrected address and phone number on facesheet? No   Assessment information obtained from? Medical Record   Expected Length of Stay (days) 3   Communicated expected length of stay with patient/caregiver yes   Prior to hospitilization cognitive status: Alert/Oriented   Prior to hospitalization functional status: Assistive Equipment   Current cognitive status: Alert/Oriented   Current Functional Status: Assistive Equipment;Needs Assistance   Facility Arrived From: (home)   Lives With alone   Able to Return to Prior Arrangements unable to determine at this time (comments)   Is patient able to care for self after discharge? Unable to determine at this time (comments)   Who are your caregiver(s) and their phone number(s)? Asia monteiro [phone]   Patient's perception of discharge disposition home health   Readmission Within The Last 30 Days previous discharge plan unsuccessful   If yes, most recent facility name: [facility]   Patient currently being followed by outpatient case management? Unable to determine (comments)   Patient currently receives any other outside agency services? No   Equipment Currently Used at Home grab bar;raised toilet;walker, rolling;wheelchair;shower chair;rollator  (lift chair)   Do you have any problems affording any of your prescribed medications? No   Is the patient taking medications as prescribed? yes   Does the patient have transportation home? Yes   Dialysis Name and Scheduled days  N/A   Does the patient receive services at the [facility]? No   Discharge Plan A Home   Discharge Plan B Home with family   Patient/Family In Agreement With Plan yes     Micheline Soler RN Transitional Navigator  [phone]

## 2017-11-13 NOTE — PT/OT/SLP PROGRESS
"Occupational Therapy  Treatment    Zeyad Woodard   MRN: [id number]   Admitting Diagnosis: MRSA bacteremia    OT Date of Treatment: 11/13/17   OT Start Time: 1450  OT Stop Time: 1509  OT Total Time (min): 19 min    Billable Minutes:  Therapeutic Activity 15    General Precautions: Standard, contact, fall  Orthopedic Precautions: N/A  Braces: N/A    Subjective:  Communicated with nurseGeni prior to session.    Pain/Comfort  Pain Rating 1: 8/10  Location - Side 1: Right  Location - Orientation 1: midline  Location 1:  (buttock)  Pain Addressed 1: Reposition, Cessation of Activity, Nurse notified  Pain Rating Post-Intervention 1: 6/10    Objective:  Patient found with: bed alarm, SCD, peripheral IV     Functional Mobility:  Bed Mobility:  Rolling/Turning to Left: Stand by assistance, With side rail    Transfers: N/A     Functional Ambulation: N/A    Activities of Daily Living:  N/A    Balance:   N/A    Therapeutic Activities and Exercises:  Patient completed BUE AROM therex, 1 x 10 reps: bicep curls, sh flexion, punches  Reported increased pain in buttocks s/p I & D this AM  Patient placed in L sidelying with pillow wedge behind back for support, and pillow between knees for hip alignment. Reported relief in this position.    AM-PAC 6 CLICK ADL   How much help from another person does this patient currently need?   1 = Unable, Total/Dependent Assistance  2 = A lot, Maximum/Moderate Assistance  3 = A little, Minimum/Contact Guard/Supervision  4 = None, Modified [place]/Independent    Putting on and taking off regular lower body clothing? : 2  Bathing (including washing, rinsing, drying)?: 3  Toileting, which includes using toilet, bedpan, or urinal? : 3  Putting on and taking off regular upper body clothing?: 3  Taking care of personal grooming such as brushing teeth?: 3  Eating meals?: 4  Total Score: 18     AM-PAC Raw Score CMS "G-Code Modifier Level of Impairment Assistance   6 % Total / Unable   7 - " 8 CM 80 - 100% Maximal Assist   9-13 CL 60 - 80% Moderate Assist   14 - 19 CK 40 - 60% Moderate Assist   20 - 22 CJ 20 - 40% Minimal Assist   23 CI 1-20% SBA / CGA   24 CH 0% Independent/ Mod I       Patient left left sidelying with all lines intact, call button in reach, bed alarm on, RN notified and daughter present    ASSESSMENT: Patient with increased fatigue and pain s/p I&D of R buttock abscess. Will benefit from skilled OT to address functional deficits.   Zeyad Woodard is a 89 y.o. male with a medical diagnosis of MRSA bacteremia    Rehab identified problem list/impairments: Rehab identified problem list/impairments: weakness, impaired endurance, impaired self care skills, impaired functional mobilty, gait instability, impaired balance, pain, impaired skin, decreased ROM, impaired coordination    Rehab potential is good.    Activity tolerance: Fair    Discharge recommendations: Discharge Facility/Level Of Care Needs: nursing facility, skilled     Barriers to discharge: Barriers to Discharge: Decreased caregiver support    Equipment recommendations:  (Defer to SNF)     GOALS:    Occupational Therapy Goals        Problem: Occupational Therapy Goal    Goal Priority Disciplines Outcome Interventions   Occupational Therapy Goal     OT, PT/OT Ongoing (interventions implemented as appropriate)    Description:  Goals to be met by: 12/11     Patient will increase functional independence with ADLs by performing:    UE Dressing with Modified [place].  LE Dressing with Modified [place].  Grooming while standing with Modified [place].  Toileting from toilet with Modified [place] for hygiene and clothing management.   Toilet transfer to toilet with Modified [place].                      Plan:  Patient to be seen 5 x/week to address the above listed problems via self-care/home management, therapeutic activities, therapeutic exercises  Plan of Care expires: 12/12/17  Plan of Care reviewed with:  patient, daughter         Oralia Sotomayor, CANALES/L  11/13/2017

## 2017-11-13 NOTE — PT/OT/SLP PROGRESS
Physical Therapy  Treatment    Zeyad Woodard   MRN: [id number]   Admitting Diagnosis: MRSA bacteremia    PT Received On: 11/13/17  PT Start Time: 1356     PT Stop Time: 1421    PT Total Time (min): 25 min       Billable Minutes:  Gait [id number] and Therapeutic Exercise 10    Treatment Type: Treatment  PT/PTA: PTA     PTA Visit Number: 1       General Precautions: Standard, contact, fall  Orthopedic Precautions: N/A   Braces: N/A    Do you have any cultural, spiritual, Anglican conflicts, given your current situation?: none reported to PT    Subjective:  Communicated with nsg prior to session.      Pain/Comfort  Pain Rating 1:  (no c/o's)    Objective:   Patient found with: bed alarm, SCD    Functional Mobility:  Bed Mobility:   Supine to Sit: Contact Guard Assistance, Minimum Assistance  Sit to Supine: Contact Guard Assistance    Transfers:  Sit <> Stand Assistance: Minimum Assistance (X 2 trials)  Sit <> Stand Assistive Device: Rolling Walker    Gait:   Gait Distance: ~25' X 2 and 1 seated rest between trials,some shuffling due to B foot drop  Assistance 1: Contact Guard Assistance, Minimum assistance  Gait Assistive Device: Rolling walker  Gait Deviation(s): decreased roberto, decreased velocity of limb motion, decreased step length, decreased stride length, decreased toe-to-floor clearance    Stairs:      Balance:   Static Sit: FAIR+: Able to take MINIMAL challenges from all directions  Dynamic Sit: FAIR+: Maintains balance through MINIMAL excursions of active trunk motion  Static Stand: POOR+: Needs MINIMAL assist to maintain  Dynamic stand: FAIR: Needs CONTACT GUARD during gait     Therapeutic Activities and Exercises: le seated ex's X 10-12 reps inc: ap.laq,hip flex,abd/add,placed clean pads on pt's bed       AM-PAC 6 CLICK MOBILITY  How much help from another person does this patient currently need?   1 = Unable, Total/Dependent Assistance  2 = A lot, Maximum/Moderate Assistance  3 = A little,  Minimum/Contact Guard/Supervision  4 = None, Modified [place]/Independent    Turning over in bed (including adjusting bedclothes, sheets and blankets)?: 3  Sitting down on and standing up from a chair with arms (e.g., wheelchair, bedside commode, etc.): 3  Moving from lying on back to sitting on the side of the bed?: 3  Moving to and from a bed to a chair (including a wheelchair)?: 3  Need to walk in hospital room?: 3  Climbing 3-5 steps with a railing?: 1  Total Score: 16    AM-PAC Raw Score CMS G-Code Modifier Level of Impairment Assistance   6 % Total / Unable   7 - 9 CM 80 - 100% Maximal Assist   10 - 14 CL 60 - 80% Moderate Assist   15 - 19 CK 40 - 60% Moderate Assist   20 - 22 CJ 20 - 40% Minimal Assist   23 CI 1-20% SBA / CGA   24 CH 0% Independent/ Mod I     Patient left supine with all lines intact, call button in reach and bed alarm on.    Assessment: good participation,pt requires assistance for safety with gait,will benefit from SNF to increase functional independence  Zeyad Woodard is a 89 y.o. male with a medical diagnosis of MRSA bacteremia and presents with .    Rehab identified problem list/impairments: Rehab identified problem list/impairments: weakness, impaired endurance, impaired functional mobilty, gait instability, impaired balance, decreased lower extremity function, decreased ROM, impaired coordination, impaired skin    Rehab potential is good.    Activity tolerance: Fair    Discharge recommendations: Discharge Facility/Level Of Care Needs: nursing facility, skilled     Barriers to discharge: Barriers to Discharge: Decreased caregiver support    Equipment recommendations: Equipment Needed After Discharge:  (defer to SNF)     GOALS: see general POC   Physical Therapy Goals        Problem: Physical Therapy Goal    Goal Priority Disciplines Outcome Goal Variances Interventions   Physical Therapy Goal     PT/OT, PT Ongoing (interventions implemented as appropriate)      Description:  Goals to be met by: 17     Patient will increase functional independence with mobility by performin. Sit to stand transfer with Stand-by Assistance  2. Bed to chair transfer with Stand-by Assistance using Rolling Walker  3. Gait  x 50 feet with Stand-by Assistance using Rolling Walker.   4. Stand for 5 minutes with Stand-by Assistance using Rolling Walker  5. Lower extremity exercise program x 15 reps per handout, with supervision                      PLAN:    Patient to be seen 6 x/week  to address the above listed problems via gait training, therapeutic activities, therapeutic exercises  Plan of Care expires: 17  Plan of Care reviewed with: patient         Tashi Small, PTA  2017

## 2017-11-14 LAB
ALBUMIN SERPL BCP-MCNC: 2.9 G/DL
ALP SERPL-CCNC: 99 U/L
ALT SERPL W/O P-5'-P-CCNC: 36 U/L
ANION GAP SERPL CALC-SCNC: 5 MMOL/L
ANISOCYTOSIS BLD QL SMEAR: SLIGHT
AORTIC VALVE REGURGITATION: NORMAL
AST SERPL-CCNC: 36 U/L
BASOPHILS # BLD AUTO: 0.02 K/UL
BASOPHILS # BLD AUTO: 0.03 K/UL
BASOPHILS NFR BLD: 0.2 %
BASOPHILS NFR BLD: 0.3 %
BILIRUB SERPL-MCNC: 0.6 MG/DL
BUN SERPL-MCNC: 25 MG/DL
CALCIUM SERPL-MCNC: 8.6 MG/DL
CHLORIDE SERPL-SCNC: 100 MMOL/L
CO2 SERPL-SCNC: 27 MMOL/L
CREAT SERPL-MCNC: 1.3 MG/DL
DIFFERENTIAL METHOD: ABNORMAL
DIFFERENTIAL METHOD: ABNORMAL
EOSINOPHIL # BLD AUTO: 0.3 K/UL
EOSINOPHIL # BLD AUTO: 0.3 K/UL
EOSINOPHIL NFR BLD: 2.2 %
EOSINOPHIL NFR BLD: 2.3 %
ERYTHROCYTE [DISTWIDTH] IN BLOOD BY AUTOMATED COUNT: 13.9 %
ERYTHROCYTE [DISTWIDTH] IN BLOOD BY AUTOMATED COUNT: 14 %
EST. GFR  (AFRICAN AMERICAN): 56 ML/MIN/1.73 M^2
EST. GFR  (NON AFRICAN AMERICAN): 48 ML/MIN/1.73 M^2
GLUCOSE SERPL-MCNC: 107 MG/DL
HCT VFR BLD AUTO: 28 %
HCT VFR BLD AUTO: 28.6 %
HGB BLD-MCNC: 9.4 G/DL
HGB BLD-MCNC: 9.7 G/DL
LYMPHOCYTES # BLD AUTO: 6.5 K/UL
LYMPHOCYTES # BLD AUTO: 6.7 K/UL
LYMPHOCYTES NFR BLD: 55.3 %
LYMPHOCYTES NFR BLD: 55.9 %
MCH RBC QN AUTO: 30.5 PG
MCH RBC QN AUTO: 30.8 PG
MCHC RBC AUTO-ENTMCNC: 33.6 G/DL
MCHC RBC AUTO-ENTMCNC: 33.9 G/DL
MCV RBC AUTO: 91 FL
MCV RBC AUTO: 91 FL
MITRAL VALVE REGURGITATION: NORMAL
MONOCYTES # BLD AUTO: 0.6 K/UL
MONOCYTES # BLD AUTO: 0.9 K/UL
MONOCYTES NFR BLD: 5.3 %
MONOCYTES NFR BLD: 7.4 %
NEUTROPHILS # BLD AUTO: 4.1 K/UL
NEUTROPHILS # BLD AUTO: 4.3 K/UL
NEUTROPHILS NFR BLD: 34.8 %
NEUTROPHILS NFR BLD: 36.1 %
PLATELET # BLD AUTO: 129 K/UL
PLATELET # BLD AUTO: 137 K/UL
PLATELET BLD QL SMEAR: ABNORMAL
PMV BLD AUTO: 8.8 FL
PMV BLD AUTO: 9.4 FL
POIKILOCYTOSIS BLD QL SMEAR: SLIGHT
POTASSIUM SERPL-SCNC: 4.4 MMOL/L
PROT SERPL-MCNC: 5.9 G/DL
RBC # BLD AUTO: 3.08 M/UL
RBC # BLD AUTO: 3.15 M/UL
SODIUM SERPL-SCNC: 132 MMOL/L
TRICUSPID VALVE REGURGITATION: NORMAL
WBC # BLD AUTO: 11.69 K/UL
WBC # BLD AUTO: 11.94 K/UL

## 2017-11-14 PROCEDURE — 97535 SELF CARE MNGMENT TRAINING: CPT

## 2017-11-14 PROCEDURE — 93312 ECHO TRANSESOPHAGEAL: CPT

## 2017-11-14 PROCEDURE — 63600175 PHARM REV CODE 636 W HCPCS: Performed by: INTERNAL MEDICINE

## 2017-11-14 PROCEDURE — 63600175 PHARM REV CODE 636 W HCPCS: Performed by: STUDENT IN AN ORGANIZED HEALTH CARE EDUCATION/TRAINING PROGRAM

## 2017-11-14 PROCEDURE — 80053 COMPREHEN METABOLIC PANEL: CPT

## 2017-11-14 PROCEDURE — A4216 STERILE WATER/SALINE, 10 ML: HCPCS | Performed by: INTERNAL MEDICINE

## 2017-11-14 PROCEDURE — 02HV33Z INSERTION OF INFUSION DEVICE INTO SUPERIOR VENA CAVA, PERCUTANEOUS APPROACH: ICD-10-PCS | Performed by: INTERNAL MEDICINE

## 2017-11-14 PROCEDURE — 97530 THERAPEUTIC ACTIVITIES: CPT

## 2017-11-14 PROCEDURE — 93325 DOPPLER ECHO COLOR FLOW MAPG: CPT

## 2017-11-14 PROCEDURE — 71000039 HC RECOVERY, EACH ADD'L HOUR: Performed by: INTERNAL MEDICINE

## 2017-11-14 PROCEDURE — A4216 STERILE WATER/SALINE, 10 ML: HCPCS | Performed by: ANESTHESIOLOGY

## 2017-11-14 PROCEDURE — 94761 N-INVAS EAR/PLS OXIMETRY MLT: CPT

## 2017-11-14 PROCEDURE — 11000001 HC ACUTE MED/SURG PRIVATE ROOM

## 2017-11-14 PROCEDURE — 36415 COLL VENOUS BLD VENIPUNCTURE: CPT

## 2017-11-14 PROCEDURE — 71000033 HC RECOVERY, INTIAL HOUR: Performed by: INTERNAL MEDICINE

## 2017-11-14 PROCEDURE — 25000003 PHARM REV CODE 250: Performed by: INTERNAL MEDICINE

## 2017-11-14 PROCEDURE — 25000003 PHARM REV CODE 250: Performed by: ANESTHESIOLOGY

## 2017-11-14 PROCEDURE — 85025 COMPLETE CBC W/AUTO DIFF WBC: CPT

## 2017-11-14 PROCEDURE — 97110 THERAPEUTIC EXERCISES: CPT

## 2017-11-14 PROCEDURE — 25000003 PHARM REV CODE 250: Performed by: STUDENT IN AN ORGANIZED HEALTH CARE EDUCATION/TRAINING PROGRAM

## 2017-11-14 RX ORDER — ONDANSETRON 2 MG/ML
4 INJECTION INTRAMUSCULAR; INTRAVENOUS ONCE AS NEEDED
Status: DISCONTINUED | OUTPATIENT
Start: 2017-11-14 | End: 2017-11-14 | Stop reason: SDUPTHER

## 2017-11-14 RX ORDER — HYDROMORPHONE HYDROCHLORIDE 2 MG/ML
0.5 INJECTION, SOLUTION INTRAMUSCULAR; INTRAVENOUS; SUBCUTANEOUS EVERY 5 MIN PRN
Status: DISCONTINUED | OUTPATIENT
Start: 2017-11-14 | End: 2017-11-14 | Stop reason: SDUPTHER

## 2017-11-14 RX ORDER — SODIUM CHLORIDE 0.9 % (FLUSH) 0.9 %
3 SYRINGE (ML) INJECTION
Status: DISCONTINUED | OUTPATIENT
Start: 2017-11-14 | End: 2017-11-15 | Stop reason: HOSPADM

## 2017-11-14 RX ORDER — FENTANYL CITRATE 50 UG/ML
50 INJECTION, SOLUTION INTRAMUSCULAR; INTRAVENOUS ONCE
Status: COMPLETED | OUTPATIENT
Start: 2017-11-14 | End: 2017-11-14

## 2017-11-14 RX ORDER — MIDAZOLAM HYDROCHLORIDE 1 MG/ML
2 INJECTION INTRAMUSCULAR; INTRAVENOUS ONCE
Status: COMPLETED | OUTPATIENT
Start: 2017-11-14 | End: 2017-11-14

## 2017-11-14 RX ORDER — SODIUM CHLORIDE 0.9 % (FLUSH) 0.9 %
3 SYRINGE (ML) INJECTION EVERY 8 HOURS
Status: DISCONTINUED | OUTPATIENT
Start: 2017-11-14 | End: 2017-11-15

## 2017-11-14 RX ORDER — SODIUM CHLORIDE 0.9 % (FLUSH) 0.9 %
10 SYRINGE (ML) INJECTION
Status: DISCONTINUED | OUTPATIENT
Start: 2017-11-14 | End: 2017-11-15 | Stop reason: HOSPADM

## 2017-11-14 RX ORDER — SODIUM CHLORIDE 0.9 % (FLUSH) 0.9 %
10 SYRINGE (ML) INJECTION EVERY 6 HOURS
Status: DISCONTINUED | OUTPATIENT
Start: 2017-11-14 | End: 2017-11-15 | Stop reason: HOSPADM

## 2017-11-14 RX ADMIN — DOCUSATE SODIUM 100 MG: 100 CAPSULE, LIQUID FILLED ORAL at 10:11

## 2017-11-14 RX ADMIN — DONEPEZIL HYDROCHLORIDE 10 MG: 5 TABLET, FILM COATED ORAL at 09:11

## 2017-11-14 RX ADMIN — FINASTERIDE 5 MG: 5 TABLET, FILM COATED ORAL at 10:11

## 2017-11-14 RX ADMIN — TAMSULOSIN HYDROCHLORIDE 0.4 MG: 0.4 CAPSULE ORAL at 10:11

## 2017-11-14 RX ADMIN — MIDAZOLAM HYDROCHLORIDE 1 MG: 1 INJECTION, SOLUTION INTRAMUSCULAR; INTRAVENOUS at 09:11

## 2017-11-14 RX ADMIN — LACTOBACILLUS ACIDOPHILUS / LACTOBACILLUS BULGARICUS 1 EACH: 100 MILLION CFU STRENGTH GRANULES at 09:11

## 2017-11-14 RX ADMIN — OXYCODONE HYDROCHLORIDE AND ACETAMINOPHEN 1 TABLET: 7.5; 325 TABLET ORAL at 09:11

## 2017-11-14 RX ADMIN — DOCUSATE SODIUM 100 MG: 100 CAPSULE, LIQUID FILLED ORAL at 09:11

## 2017-11-14 RX ADMIN — HEPARIN SODIUM 5000 UNITS: 5000 INJECTION, SOLUTION INTRAVENOUS; SUBCUTANEOUS at 01:11

## 2017-11-14 RX ADMIN — GABAPENTIN 300 MG: 300 CAPSULE ORAL at 09:11

## 2017-11-14 RX ADMIN — CARVEDILOL 6.25 MG: 6.25 TABLET, FILM COATED ORAL at 10:11

## 2017-11-14 RX ADMIN — CARVEDILOL 6.25 MG: 6.25 TABLET, FILM COATED ORAL at 09:11

## 2017-11-14 RX ADMIN — HEPARIN SODIUM 5000 UNITS: 5000 INJECTION, SOLUTION INTRAVENOUS; SUBCUTANEOUS at 06:11

## 2017-11-14 RX ADMIN — LACTOBACILLUS ACIDOPHILUS / LACTOBACILLUS BULGARICUS 1 EACH: 100 MILLION CFU STRENGTH GRANULES at 10:11

## 2017-11-14 RX ADMIN — GABAPENTIN 100 MG: 100 CAPSULE ORAL at 10:11

## 2017-11-14 RX ADMIN — ATORVASTATIN CALCIUM 20 MG: 20 TABLET, FILM COATED ORAL at 09:11

## 2017-11-14 RX ADMIN — ASPIRIN 81 MG: 81 TABLET, COATED ORAL at 10:11

## 2017-11-14 RX ADMIN — OXYCODONE HYDROCHLORIDE AND ACETAMINOPHEN 1 TABLET: 7.5; 325 TABLET ORAL at 07:11

## 2017-11-14 RX ADMIN — HEPARIN SODIUM 5000 UNITS: 5000 INJECTION, SOLUTION INTRAVENOUS; SUBCUTANEOUS at 11:11

## 2017-11-14 RX ADMIN — FENTANYL CITRATE 25 MCG: 50 INJECTION INTRAMUSCULAR; INTRAVENOUS at 09:11

## 2017-11-14 RX ADMIN — SODIUM CHLORIDE, PRESERVATIVE FREE 10 ML: 5 INJECTION INTRAVENOUS at 06:11

## 2017-11-14 RX ADMIN — LEVOTHYROXINE SODIUM 100 MCG: 100 TABLET ORAL at 06:11

## 2017-11-14 RX ADMIN — VANCOMYCIN HYDROCHLORIDE 1250 MG: 5 INJECTION, POWDER, LYOPHILIZED, FOR SOLUTION INTRAVENOUS at 05:11

## 2017-11-14 RX ADMIN — VITAM B12 100 MCG: 100 TAB at 10:11

## 2017-11-14 RX ADMIN — SODIUM CHLORIDE, PRESERVATIVE FREE 3 ML: 5 INJECTION INTRAVENOUS at 01:11

## 2017-11-14 NOTE — BRIEF OP NOTE
Procedure: JEVON    Indication: MRSA bacteremia    Pre-op diag: MRSA bacteremia  Post-op diag: MRSA bacteremia     Findings: Calcified aortic valve, mitral valve and mitral subvalvular apparatus. Mild TR, AI and MR. Nml RV/LV fxn. No PFO by color doppler. PW doppler of RUPV shows nml diastology with nml LAP. No thrombus seen w/in the LA appendage w/ nml LAYNE velocities by PW doopler. Mild plaquing of the thoracic aorta and arch. RV lead visiualized in RA/RV. No vegetations seen on valves and lead.    Sedation: Procedural  EBL: none  Complications: None  Condition: stable    Transfer to floor from PACU once fully arouseable and vitals stable

## 2017-11-14 NOTE — PLAN OF CARE
Problem: Occupational Therapy Goal  Goal: Occupational Therapy Goal  Goals to be met by: 12/11     Patient will increase functional independence with ADLs by performing:    UE Dressing with Modified [place].  LE Dressing with Modified [place].  Grooming while standing with Modified [place].  Toileting from toilet with Modified [place] for hygiene and clothing management.   Toilet transfer to toilet with Modified [place].     Outcome: Ongoing (interventions implemented as appropriate)  Patient with good activity tolerance this date. Patient will benefit from continued skilled OT to address functional deficits.

## 2017-11-14 NOTE — PT/OT/SLP PROGRESS
Occupational Therapy  Treatment    Zeyad Woodard   MRN: [id number]   Admitting Diagnosis: MRSA bacteremia    OT Date of Treatment: 11/14/17   OT Start Time: 1445  OT Stop Time: 1525  OT Total Time (min): 40 min    Billable Minutes:  Self Care/Home Management 15 and Therapeutic Activity 25    General Precautions: Standard, contact, fall  Orthopedic Precautions: N/A  Braces: N/A    Subjective:  Communicated with nurseLatasha prior to session.    Pain/Comfort  Pain Rating 1: 5/10 (R buttock)  Pain Rating Post-Intervention 1: 0/10    Objective:  Patient found with: bed alarm, telemetry     Functional Mobility:  Bed Mobility:  Scooting/Bridging: Stand by Assistance  Supine to Sit: Stand by Assistance, WIth side rail  Sit to Supine: Stand by Assistance    Transfers:   Sit <> Stand Assistance: Minimum Assistance  Sit <> Stand Assistive Device: Rolling Walker    Functional Ambulation: N/A    Activities of Daily Living:  UE Dressing Level of Assistance: Minimum assistance (to rhonda clean gown)  Grooming Position: Seated, EOB  Grooming Level of Assistance: Minimum assistance (Min (A) for static sit 2* posterior leaning)    Balance:   Static Sit: FAIR-: Maintains without assist but inconsistent   Dynamic Sit: FAIR+: Maintains balance through MINIMAL excursions of active trunk motion  Static Stand: POOR+: Needs MINIMAL assist to maintain  Dynamic stand: POOR: N/A    Therapeutic Activities and Exercises:  Patient with bed mob as noted above. Gown change as above. Patient sat EOB to complete denture/oral care with SBA for task, but CGA-Min (A) for static sitting balance 2* posterior lean. Patient completed functional transitions from EOB using RW with Min (A) and VCs. Patient able to take steps forwards/backwards and laterally with CGA-Min (A) and VCs. Seated rest breaks with pursed lip breathing between standing trials.     AM-PAC 6 CLICK ADL   How much help from another person does this patient currently need?   1 = Unable,  "Total/Dependent Assistance  2 = A lot, Maximum/Moderate Assistance  3 = A little, Minimum/Contact Guard/Supervision  4 = None, Modified [place]/Independent    Putting on and taking off regular lower body clothing? : 2  Bathing (including washing, rinsing, drying)?: 3  Toileting, which includes using toilet, bedpan, or urinal? : 3  Putting on and taking off regular upper body clothing?: 3  Taking care of personal grooming such as brushing teeth?: 3  Eating meals?: 3  Total Score: 17     AM-PAC Raw Score CMS "G-Code Modifier Level of Impairment Assistance   6 % Total / Unable   7 - 8 CM 80 - 100% Maximal Assist   9-13 CL 60 - 80% Moderate Assist   14 - 19 CK 40 - 60% Moderate Assist   20 - 22 CJ 20 - 40% Minimal Assist   23 CI 1-20% SBA / CGA   24 CH 0% Independent/ Mod I       Patient left HOB elevated with all lines intact, call button in reach, bed alarm on, RN notified and daughter present    ASSESSMENT: Patient with good activity tolerance this date. Patient will benefit from continued skilled OT to address functional deficits.   Zeyad Woodard is a 89 y.o. male with a medical diagnosis of MRSA bacteremia     Rehab identified problem list/impairments: Rehab identified problem list/impairments: weakness, impaired endurance, impaired self care skills, impaired functional mobilty, gait instability, impaired balance, pain, impaired skin, decreased upper extremity function, decreased lower extremity function, decreased ROM, decreased coordination    Rehab potential is good.    Activity tolerance: Good    Discharge recommendations: Discharge Facility/Level Of Care Needs: nursing facility, skilled     Barriers to discharge: Barriers to Discharge: Decreased caregiver support    Equipment recommendations:  (Defer to SNF)     GOALS:    Occupational Therapy Goals        Problem: Occupational Therapy Goal    Goal Priority Disciplines Outcome Interventions   Occupational Therapy Goal     OT, PT/OT Ongoing " (interventions implemented as appropriate)    Description:  Goals to be met by: 12/11     Patient will increase functional independence with ADLs by performing:    UE Dressing with Modified [place].  LE Dressing with Modified [place].  Grooming while standing with Modified [place].  Toileting from toilet with Modified [place] for hygiene and clothing management.   Toilet transfer to toilet with Modified [place].                      Plan:  Patient to be seen 5 x/week to address the above listed problems via self-care/home management, therapeutic activities, therapeutic exercises  Plan of Care expires: 12/12/17  Plan of Care reviewed with: patient, daughter         Oralia MADDY Carballo  11/14/2017

## 2017-11-14 NOTE — PT/OT/SLP PROGRESS
Physical Therapy  Treatment    Zeyad Woodard   MRN: [id number]   Admitting Diagnosis: MRSA bacteremia    PT Received On: 11/14/17  PT Start Time: 0800     PT Stop Time: 0823    PT Total Time (min): 23 min       Billable Minutes:  Therapeutic Activity 13 and Therapeutic Exercise 10    Treatment Type: Treatment  PT/PTA: PTA     PTA Visit Number: 2       General Precautions: Standard, contact, fall  Orthopedic Precautions: N/A   Braces: N/A    Do you have any cultural, spiritual, Latter-day conflicts, given your current situation?: none reported to PT    Subjective:  Communicated with nsg prior to session.      Pain/Comfort  Pain Rating 1: 4/10  Location - Side 1: Right  Location - Orientation 1: midline  Location 1:  (buttock)  Pain Addressed 1: Reposition, Distraction  Pain Rating Post-Intervention 1: 4/10    Objective:   Patient found with: SCD, bed alarm    Functional Mobility:  Bed Mobility:   Supine to Sit: Minimum Assistance, With side rail  Sit to Supine: Contact Guard Assistance    Transfers:  Sit <> Stand Assistance: Minimum Assistance  Sit <> Stand Assistive Device: Rolling Walker    Gait:        Stairs:      Balance:   Static Sit: FAIR+: Able to take MINIMAL challenges from all directions  Dynamic Sit: FAIR+: Maintains balance through MINIMAL excursions of active trunk motion  Static Stand: POOR+: Needs MINIMAL assist to maintain  Dynamic stand: FAIR: Needs CONTACT GUARD during gait     Therapeutic Activities and Exercises: pt stood inside RW ~ 5 mins with SBA/CGA to use urinal,le supine ex's X 10-12 reps inc: ap,qs,hs,abd/add,slr,transport present to bring pt to testing       AM-PAC 6 CLICK MOBILITY  How much help from another person does this patient currently need?   1 = Unable, Total/Dependent Assistance  2 = A lot, Maximum/Moderate Assistance  3 = A little, Minimum/Contact Guard/Supervision  4 = None, Modified [place]/Independent    Turning over in bed (including adjusting bedclothes, sheets and  blankets)?: 3  Sitting down on and standing up from a chair with arms (e.g., wheelchair, bedside commode, etc.): 3  Moving from lying on back to sitting on the side of the bed?: 3  Moving to and from a bed to a chair (including a wheelchair)?: 3  Need to walk in hospital room?: 3  Climbing 3-5 steps with a railing?: 1  Total Score: 16    AM-PAC Raw Score CMS G-Code Modifier Level of Impairment Assistance   6 % Total / Unable   7 - 9 CM 80 - 100% Maximal Assist   10 - 14 CL 60 - 80% Moderate Assist   15 - 19 CK 40 - 60% Moderate Assist   20 - 22 CJ 20 - 40% Minimal Assist   23 CI 1-20% SBA / CGA   24 CH 0% Independent/ Mod I     Patient left supine with all lines intact, call button in reach, nsg notified and transport present.    Assessment: pt tolerated fair and will benefit from SNF to increase functional independence  Zeyad Woodard is a 89 y.o. male with a medical diagnosis of MRSA bacteremia and presents with .    Rehab identified problem list/impairments: Rehab identified problem list/impairments: weakness, impaired endurance, impaired functional mobilty, gait instability, decreased ROM, impaired coordination    Rehab potential is good.    Activity tolerance: Fair    Discharge recommendations: Discharge Facility/Level Of Care Needs: nursing facility, skilled     Barriers to discharge: Barriers to Discharge: Decreased caregiver support    Equipment recommendations: Equipment Needed After Discharge:  (defer to SNF)     GOALS: see general POC   Physical Therapy Goals        Problem: Physical Therapy Goal    Goal Priority Disciplines Outcome Goal Variances Interventions   Physical Therapy Goal     PT/OT, PT Ongoing (interventions implemented as appropriate)     Description:  Goals to be met by: 17     Patient will increase functional independence with mobility by performin. Sit to stand transfer with Stand-by Assistance  2. Bed to chair transfer with Stand-by Assistance using Rolling  Walker  3. Gait  x 50 feet with Stand-by Assistance using Rolling Walker.   4. Stand for 5 minutes with Stand-by Assistance using Rolling Walker MET 11/14/17  5. Lower extremity exercise program x 15 reps per handout, with supervision                       PLAN:    Patient to be seen 6 x/week  to address the above listed problems via gait training, therapeutic activities, therapeutic exercises  Plan of Care expires: 12/11/17  Plan of Care reviewed with: patient         Tashi HUGGINS Geovanny, PTA  11/14/2017

## 2017-11-14 NOTE — PROGRESS NOTES
The Sw just spoke to the team and they state the pt has his picc and ask if he can d/c today to [facility]. The Sw called [facility] and left Yamilex a message notifying her of this info. The pt will not be able to discharge until Lorelei gives the snf auth to [facility].

## 2017-11-14 NOTE — ASSESSMENT & PLAN NOTE
- pt with recent admission for cellulitis, discharged on a course of clindamycin  - blood cultures from that admission are now 4/4 positive for MRSA  - on exam area appears indurated with small amount of drainage  - TTE is pending  - slight leucocytosis on admission, WBC had since trended down but is up to 13.44 this morning.   - repeat cultures NGTD so far   -pt s/p I&D, will follow up surgical cultures   - continue vancomycin and please obtain trough 30 minutes prior to 4th dose. Goal trough is 15-20.   - should have first trough drawn before his dose of vancomycin today  -TTE shows no evidence of endocarditis  - JEVON negative as well, no signs of pacemaker lead involvement or valvular vegetation  - with pt's cardiac history and placement of a pacemaker concern is for implant infection. Imaging is negative at this time. Discussed treatment options with Cardiology, pt and family, will treat for bacteremia for at least 4 weeks and then re-assess prior to discontinuing antibiotics. Hope is to treat through this infection and not have to exchange pt's pacemaker and lead, which would be very risky for this patient given age and co-morbidities.    - will continue to follow.

## 2017-11-14 NOTE — PLAN OF CARE
Problem: Patient Care Overview  Goal: Plan of Care Review  Outcome: Ongoing (interventions implemented as appropriate)  Plan of care reviewed with patient. Call light within reach, fall precautions maintained, bed in lowest position, wheels locked, bed alarm set. Patient aware. Nurse instructed patient to call if needs assistance. Patient verbalized complete understanding. Telemetry monitor NSR throughout shift; no ectopy noted. Contact isolation maintained. PICC line dressing CDI. Pain controlled with PRN pain meds. Will continue to monitor and continue plan of care.

## 2017-11-14 NOTE — PLAN OF CARE
Problem: Physical Therapy Goal  Goal: Physical Therapy Goal  Goals to be met by: 17     Patient will increase functional independence with mobility by performin. Sit to stand transfer with Stand-by Assistance  2. Bed to chair transfer with Stand-by Assistance using Rolling Walker  3. Gait  x 50 feet with Stand-by Assistance using Rolling Walker.   4. Stand for 5 minutes with Stand-by Assistance using Rolling Walker MET 17  5. Lower extremity exercise program x 15 reps per handout, with supervision     Outcome: Ongoing (interventions implemented as appropriate)  Goal 4 met

## 2017-11-14 NOTE — PROCEDURES
"Zeyad Woodard is a 89 y.o. male patient.    Temp: 97.8 °F (36.6 °C) (11/14/17 1056)  Pulse: 68 (11/14/17 1056)  Resp: 17 (11/14/17 1056)  BP: (!) 147/67 (11/14/17 1056)  SpO2: 96 % (11/14/17 1052)  Weight: 76.3 kg (168 lb 3.4 oz) (11/14/17 0600)  Height: 5' 8" (172.7 cm) (11/10/17 2018)    PICC  Date/Time: 11/14/2017 1:30 PM  Consent Done: Yes  Time out: Immediately prior to procedure a time out was called to verify the correct patient, procedure, equipment, support staff and site/side marked as required  Indications: vascular access and med administration  Anesthesia: local infiltration  Anesthetic Total (mL): 2  Preparation: skin prepped with ChloraPrep  Skin prep agent dried: skin prep agent completely dried prior to procedure  Sterile barriers: all five maximum sterile barriers used - cap, mask, sterile gown, sterile gloves, and large sterile sheet  Hand hygiene: hand hygiene performed prior to central venous catheter insertion  Location details: right basilic  Catheter size: 5 Fr  Catheter Length: 38cm    Ultrasound guidance: yes  Vessel Caliber: medium and patentNeedle advanced into vessel with real time Ultrasound guidance.  Guidewire confirmed in vessel.  Sterile sheath used.  Number of attempts: 1  Post-procedure: blood return through all ports, chlorhexidine patch and sterile dressing applied  Specimens: No  Implants: No  Assessment: placement verified by x-ray  Complications: none        Jake Gardner  11/14/2017  "

## 2017-11-14 NOTE — PROGRESS NOTES
The Sw faxed the pt's info to [facility] and [facility] via [facility].

## 2017-11-14 NOTE — PLAN OF CARE
Patient had JEVON and PICC placed today.    Spoke to daughter Asia regarding SNF preferences.    1st choice- [facility]  2nd and 3rd- [facility]. Will notify SW    Future Appointments  Date Time Provider Department Center   11/22/2017 11:00 AM Booker Ricci MD [facility] MED [place]   11/22/2017 2:00 PM Tawana Trujillo MD [facility] IM RONNY [place] [facility]   12/5/2017 9:15 AM CINDY Wilson [facility] PAINMGT [place] [facility]   12/20/2017 9:40 AM Booker Ricci MD [facility] MED [place]        11/14/17 1607   Discharge Reassessment   Assessment Type Discharge Planning Reassessment   Provided patient/caregiver education on the expected discharge date and the discharge plan Yes   Discharge Plan A Skilled Nursing Facility        11/14/17 1607   Discharge Reassessment   Assessment Type Discharge Planning Reassessment   Provided patient/caregiver education on the expected discharge date and the discharge plan Yes   Discharge Plan A Skilled Nursing Facility     Felicia Prince, RN, CCM, CMSRN  RN Transition Navigator  [phone]

## 2017-11-14 NOTE — PROGRESS NOTES
[facility]  Infectious Disease  Progress Note    Patient Name: Zeyad Woodard  MRN: [id number]  Admission Date: 11/10/2017  Length of Stay: 4 days  Attending Physician: Jared Xiong MD  Primary Care Provider: Booker Ricci MD    Isolation Status: Contact  Assessment/Plan:      * MRSA bacteremia    - pt with recent admission for cellulitis, discharged on a course of clindamycin  - blood cultures from that admission are now 4/4 positive for MRSA  - on exam area appears indurated with small amount of drainage  - TTE is pending  - slight leucocytosis on admission, WBC had since trended down but is up to 13.44 this morning.   - repeat cultures NGTD so far   -pt s/p I&D, will follow up surgical cultures   - continue vancomycin and please obtain trough 30 minutes prior to 4th dose. Goal trough is 15-20.   - should have first trough drawn before his dose of vancomycin today  -TTE shows no evidence of endocarditis  - JEVON negative as well, no signs of pacemaker lead involvement or valvular vegetation  - with pt's cardiac history and placement of a pacemaker concern is for implant infection. Imaging is negative at this time. Discussed treatment options with Cardiology, pt and family, will treat for bacteremia for at least 4 weeks and then re-assess prior to discontinuing antibiotics. Hope is to treat through this infection and not have to exchange pt's pacemaker and lead, which would be very risky for this patient given age and co-morbidities.    - will continue to follow.             Anticipated Disposition: As per primary    Thank you for your consult. I will follow-up with patient. Please contact us if you have any additional questions.    Alvino Mejía MD, PhD  Infectious Disease, PGY-4  [facility]    Subjective:     Principal Problem:MRSA bacteremia    HPI: Pt is a 89 y/o male with a pmhx of A fib, Sick Sinus Syndrome with implanted pacemaker, pericardiectomy for calcific  constrictive pericarditis, CHF, BPH, HTN, Hypothyroidism, GI bleed, and dementia admitted for MRSA bactermia. Pt recently admitted on 11/07/2017 for cellulitis on the right buttock. Most history is obtained from chart review secondary to pt's dementia. According to H&P from 11/07/2017 pt ahd what appeared to be boil on on his buttock. Pt was also having low grade temperatures so pt was sent for further work up which also revealed a lucocytosis. Blood cultures were drawn and pt was started on clindamycin IV. Pt did well and transition to PO clindamycin and ppt was discharged back to home on 11/08/2017 with course of unknown duration. Shortly after discharge pt's blood cultures became positive for MRSA in 4/4 bottles.Pt's family was contacted and pt was brought back to the ED for escalation of antibiotic therapy and further evaluation.     Pt unsure of when his symptoms started but complains of pain located in his right buttock. Denies any fever or chills, chest pain or SOB, nausea or vomiting. Denies urinary complaints, does endorse wartery stools but denies that they are malodorous, states one stool daily at this time. Pt lives at home alone but is visited daily by his daughters who help him manage his medications. Denies any foreign travel, no recent sick contacts. No contact with animals.      Positve Culture Data    11/07/2017 BCx, LAC             MRSA  11/07/2017 BCx, LW  MRSA    Cultures This Admission  11/10/2017 BCx, LAC             NGTD  11/10/2017 BCx, LW  NGTD    Antibiotics this admission    Vancomycin 1.5 gr once  Interval History: Pt s/p JEVON, without complaint this AM, denies fever or chills, nausea or vomiting, chest pain or SOB    Review of Systems   Constitutional: Negative for chills and fever.   Respiratory: Negative for chest tightness and shortness of breath.    Cardiovascular: Negative for chest pain and palpitations.   Gastrointestinal: Negative for constipation and diarrhea.     Objective:      Vital Signs (Most Recent):  Temp: 96.9 °F (36.1 °C) (11/14/17 1546)  Pulse: 68 (11/14/17 1056)  Resp: 18 (11/14/17 1546)  BP: (!) 102/54 (11/14/17 1546)  SpO2: 96 % (11/14/17 1052) Vital Signs (24h Range):  Temp:  [96.9 °F (36.1 °C)-98.8 °F (37.1 °C)] 96.9 °F (36.1 °C)  Pulse:  [60-74] 68  Resp:  [16-20] 18  SpO2:  [93 %-99 %] 96 %  BP: (102-179)/(51-81) 102/54     Weight: 76.3 kg (168 lb 3.4 oz)  Body mass index is 25.58 kg/m².    Estimated Creatinine Clearance: 36.5 mL/min (based on SCr of 1.3 mg/dL).    Physical Exam   Constitutional: He appears well-developed and well-nourished. No distress.   HENT:   Head: Normocephalic.   Mouth/Throat: Oropharynx is clear and moist.   Edentulous, with upper dentures. Dark colored lesion noted to right lateral tongue.    Eyes: EOM are normal. Pupils are equal, round, and reactive to light. No scleral icterus.   Neck: Normal range of motion. No JVD present.   Cardiovascular: Normal rate and regular rhythm.    Murmur heard.  III/VI systolic ejection murmur best heard at LUSB   Pulmonary/Chest: Effort normal. No respiratory distress. He has rales.   Faint crackles heard at this bases   Abdominal: Soft.   Musculoskeletal: Normal range of motion. He exhibits no edema.   Lymphadenopathy:     He has no cervical adenopathy.   Neurological: He is alert.   Pt confused to year and president, but is otherwise alert and oriented to situation.    Skin: Skin is warm and dry. Capillary refill takes less than 2 seconds.   Surgical dressing in place on right buttock.    Psychiatric: He has a normal mood and affect. His behavior is normal.       Significant Labs:   CBC:   Recent Labs  Lab 11/13/17  0442 11/14/17  0643 11/14/17  1226   WBC 13.50* 11.69 11.94   HGB 9.5* 9.7* 9.4*   HCT 28.6* 28.6* 28.0*   * 137* 129*     CMP:   Recent Labs  Lab 11/13/17  0442 11/14/17  0643   * 132*   K 4.4 4.4   CL 99 100   CO2 28 27    107   BUN 28* 25*   CREATININE 1.4 1.3   CALCIUM 8.5*  8.6*   PROT 6.0 5.9*   ALBUMIN 3.0* 2.9*   BILITOT 0.6 0.6   ALKPHOS 103 99   AST 39 36   ALT 37 36   ANIONGAP 5* 5*   EGFRNONAA 44* 48*     Microbiology Results (last 7 days)     Procedure Component Value Units Date/Time    Aerobic culture [[id number]] Collected:  11/13/17 0906    Order Status:  Completed Specimen:  Abscess from Buttocks, Right Updated:  11/14/17 1440     Aerobic Bacterial Culture Insufficient incubation, culture in progress    Blood culture [[id number]] Collected:  11/12/17 0523    Order Status:  Completed Specimen:  Blood Updated:  11/14/17 1022     Blood Culture, Routine No Growth to date     Blood Culture, Routine No Growth to date     Blood Culture, Routine No Growth to date    Blood culture [[id number]] Collected:  11/12/17 0523    Order Status:  Completed Specimen:  Blood Updated:  11/14/17 1022     Blood Culture, Routine No Growth to date     Blood Culture, Routine No Growth to date     Blood Culture, Routine No Growth to date    Culture, Anaerobe [[id number]] Collected:  11/13/17 0906    Order Status:  Completed Specimen:  Abscess from Buttocks, Right Updated:  11/14/17 0756     Anaerobic Culture Culture in progress    Gram stain [[id number]] Collected:  11/13/17 0906    Order Status:  Completed Specimen:  Abscess from Buttocks, Right Updated:  11/13/17 2024     Gram Stain Result No WBC's      No organisms seen    Blood culture #1 **CANNOT BE ORDERED STAT** [[id number]] Collected:  11/10/17 1610    Order Status:  Completed Specimen:  Blood from Peripheral, Wrist, Left Updated:  11/13/17 2022     Blood Culture, Routine No Growth to date     Blood Culture, Routine No Growth to date     Blood Culture, Routine No Growth to date     Blood Culture, Routine No Growth to date    Blood culture #2 **CANNOT BE ORDERED STAT** [[id number]] Collected:  11/10/17 1625    Order Status:  Completed Specimen:  Blood from Peripheral, Antecubital, Left Updated:  11/13/17 2022     Blood Culture, Routine No Growth to  date     Blood Culture, Routine No Growth to date     Blood Culture, Routine No Growth to date     Blood Culture, Routine No Growth to date          Significant Imaging: I have reviewed all pertinent imaging results/findings within the past 24 hours.

## 2017-11-14 NOTE — MEDICAL/APP STUDENT
Subjective:  Mr. Woodard reports moderate pain this morning in the area of his sacral wound. He is currently NPO for a procedure later this morning but denies N/V. He had one bowel movement this morning, and denies diarrhea. He is able to ambulate only with assistance. He denies headaches, fever, chills, changes in vision, palpitations, SOB and chest pain.     Objective:  Vitals:   Temp: 97.0      Max: 98.8  Pulse: 60-74  RR: 10-19  Bp: 122-179/55-74  SpO2: 93-97% on RA     Physical Exam:   General: A&O x3, NAD, lying in bed, admits to a recent BM that he needs assistance cleaning  Neuro: 4/5 strength in LE bilaterally (baseline), 5/5 strength in UE bilaterally, no focal deficits, sensation equal and symmetric  Head: atraumatic, normocephalic  Eyes: pupils equal and reactive, EOMI  Throat: nonerythematous, no edema  Neck: supple, trachea midline, no LAD  Cardiac: RRR, no murmurs, clicks, rubs, or gallops; no chest pain on palpation  Lung: CTAB, no wheezes or crackles  Abd: soft, nondistended, nontender to palpation, normoactive bowel sounds in 4 quadrants  Skin: minimal erythema in a 3cm ring around sacral wound with packing in place from I & D yesterday, cleaned and bandage left in place over wound;  Elsewhere: warm, dry, intact, no rashes, no lesions  Ext: No C/C/E, warm, well-perfused  Pulses: 2+ radial and dorsalis pedis pulses bilaterally     Medications:  Aspirin 81mg PO QD  Atorvastatin 20mg PO QPM  Coreg 6.25mg PO BID   B12 100mcg PO QD  Docusate 100mg PO BID  Donepezil 10mg PO QPM  Iron 325mg PO every other day  Finasteride 5mg PO QD  Gabapentin 100mg PO QD  Gabapentin 300mg PO QPM  Heparin 5000Units SubQ Q8HS  Probiotic 1 packet PO BID  Synthroid 100mcg PO QD  Tamsulosin 0.4mg PO QD  Vancomycin 1.25g IV QD     Labs:   WBC: 11.69  RBC: 3.15  Hgb: 9.7  Hct: 28.6  MCV: 91  MCH: 30.8  MCHC: 33.9  RDW: 14.0  Plt: 137  MPV: 9.4     Na: 132  K: 4.4  Cl: 100  Co2: 27  Anion Gap: 5  BUN: 25  Crt: 1.3  GFR:  48  Glucose: 107  Ca: 8.6  Alk Phos: 99  Total Protein: 5.9  Albumin: 2.9  Total Bili: 0.6  AST: 36  ALT: 36     Micro:   11/7 Blood Cx x4 bottles:              MRSA                          Clinda, tetracycline, Bactrim, Vanc sensitive                          Oxacillin, Penicillin, erythromycin resistant  11/10 Blood Cx L antecubital:              No growth to date--final  11/10 Blood Cx L wrist:              No growth to date--final  11/12 Blood Cx x4 bottles:              No growth to date--pending  11/13 Gram stain R buttocks:              No WBC's, No organisms seen  11/13 Cx Right buttocks aerobic, anaerobic:              In progress--pending    Assessment:  Mr. Zeyad Woodard is a 89 yo male with a history of HTN, BPH, chronic Afib, chronic diastolic HF, anemia, SSS s/p PPM,CKD III, hypothyroidism, dementia, neuropathy, and R buttock cellulitis, who presented back at [facility] on 11/10 following recent discharge on 11/8 due to MRSA bacteremia found on culture.    Plan:  1. Staph bacteremia 2/2 to cellulitis              2/2 to cellulitis              Wound with erythema in 5bto6cb sacral area,  to palpation in the area              WBC 11.69, afebrile since admit              11/7 positive blood cx x4 bottles show MRSA, 11/10 blood cx no growth to date-final, 11/12 & 11/13 cultures pending              I & D yesterday by surgery, packing in place, will keep covered with bandage-will continue to monitor              Will go for JEVON this AM              Will continue to treat with Vancomycin 1.25g IV QD  2. HTN              Hypertensive overnight to 161/72--improved from previous night                          Will continue Coreg 6.25mg BID              Will continue to monitor  3. Chronic diastolic HF               euvolemic on exam, no pedal edema              ECHO 11/2017 with LVEF 60-65%, mild trivial aortic valve regurg, trivial aortic valve stenosis, no diastolic dysfunction              Will  continue to monitor  4. BPH              No acute issues, able to urinate without difficulty into urinal              Home meds finasteride and tamsulosin continued               Will continue to monitor  5. CKD              No acute issues              Crt 1.3, BUN 25, GFR 48              Will monitor renally dosed vanc levels with troughs--11/13 trough 15.6              Will continue to monitor  6. Hypothyroidism              No acute issues              TSH 0.600 during recent admission              Will continue home dose of synthroid 100mcg  7. Neuropathy              No acute issues              Home dose gabapentin continued  8. Iron deficiency anemia              Stable              H/H 9.7/28.6 today              Continued iron 325mg PO QD              Will continue to monitor  9. Dementia              Stable, able to converse during exam               Continued home aricept QPM  10. HLD              No acute issues              Continued home atrovastatin and daily aspirin

## 2017-11-14 NOTE — PLAN OF CARE
Problem: Patient Care Overview  Goal: Plan of Care Review  Outcome: Ongoing (interventions implemented as appropriate)  Pt on RA with sats of 98. Will continue to monitor.

## 2017-11-14 NOTE — NURSING
PICC line inserted by PICC line nurse. Timeout done; see flowsheet. Sterile technique maintained. Pt tolerated well.

## 2017-11-14 NOTE — SUBJECTIVE & OBJECTIVE
Interval History: Pt s/p JEVON, without complaint this AM, denies fever or chills, nausea or vomiting, chest pain or SOB    Review of Systems   Constitutional: Negative for chills and fever.   Respiratory: Negative for chest tightness and shortness of breath.    Cardiovascular: Negative for chest pain and palpitations.   Gastrointestinal: Negative for constipation and diarrhea.     Objective:     Vital Signs (Most Recent):  Temp: 96.9 °F (36.1 °C) (11/14/17 1546)  Pulse: 68 (11/14/17 1056)  Resp: 18 (11/14/17 1546)  BP: (!) 102/54 (11/14/17 1546)  SpO2: 96 % (11/14/17 1052) Vital Signs (24h Range):  Temp:  [96.9 °F (36.1 °C)-98.8 °F (37.1 °C)] 96.9 °F (36.1 °C)  Pulse:  [60-74] 68  Resp:  [16-20] 18  SpO2:  [93 %-99 %] 96 %  BP: (102-179)/(51-81) 102/54     Weight: 76.3 kg (168 lb 3.4 oz)  Body mass index is 25.58 kg/m².    Estimated Creatinine Clearance: 36.5 mL/min (based on SCr of 1.3 mg/dL).    Physical Exam   Constitutional: He appears well-developed and well-nourished. No distress.   HENT:   Head: Normocephalic.   Mouth/Throat: Oropharynx is clear and moist.   Edentulous, with upper dentures. Dark colored lesion noted to right lateral tongue.    Eyes: EOM are normal. Pupils are equal, round, and reactive to light. No scleral icterus.   Neck: Normal range of motion. No JVD present.   Cardiovascular: Normal rate and regular rhythm.    Murmur heard.  III/VI systolic ejection murmur best heard at LUSB   Pulmonary/Chest: Effort normal. No respiratory distress. He has rales.   Faint crackles heard at this bases   Abdominal: Soft.   Musculoskeletal: Normal range of motion. He exhibits no edema.   Lymphadenopathy:     He has no cervical adenopathy.   Neurological: He is alert.   Pt confused to year and president, but is otherwise alert and oriented to situation.    Skin: Skin is warm and dry. Capillary refill takes less than 2 seconds.   Surgical dressing in place on right buttock.    Psychiatric: He has a normal mood  and affect. His behavior is normal.       Significant Labs:   CBC:   Recent Labs  Lab 11/13/17  0442 11/14/17  0643 11/14/17  1226   WBC 13.50* 11.69 11.94   HGB 9.5* 9.7* 9.4*   HCT 28.6* 28.6* 28.0*   * 137* 129*     CMP:   Recent Labs  Lab 11/13/17  0442 11/14/17  0643   * 132*   K 4.4 4.4   CL 99 100   CO2 28 27    107   BUN 28* 25*   CREATININE 1.4 1.3   CALCIUM 8.5* 8.6*   PROT 6.0 5.9*   ALBUMIN 3.0* 2.9*   BILITOT 0.6 0.6   ALKPHOS 103 99   AST 39 36   ALT 37 36   ANIONGAP 5* 5*   EGFRNONAA 44* 48*     Microbiology Results (last 7 days)     Procedure Component Value Units Date/Time    Aerobic culture [[id number]] Collected:  11/13/17 0906    Order Status:  Completed Specimen:  Abscess from Buttocks, Right Updated:  11/14/17 1440     Aerobic Bacterial Culture Insufficient incubation, culture in progress    Blood culture [[id number]] Collected:  11/12/17 0523    Order Status:  Completed Specimen:  Blood Updated:  11/14/17 1022     Blood Culture, Routine No Growth to date     Blood Culture, Routine No Growth to date     Blood Culture, Routine No Growth to date    Blood culture [[id number]] Collected:  11/12/17 0523    Order Status:  Completed Specimen:  Blood Updated:  11/14/17 1022     Blood Culture, Routine No Growth to date     Blood Culture, Routine No Growth to date     Blood Culture, Routine No Growth to date    Culture, Anaerobe [[id number]] Collected:  11/13/17 0906    Order Status:  Completed Specimen:  Abscess from Buttocks, Right Updated:  11/14/17 0756     Anaerobic Culture Culture in progress    Gram stain [[id number]] Collected:  11/13/17 0906    Order Status:  Completed Specimen:  Abscess from Buttocks, Right Updated:  11/13/17 2024     Gram Stain Result No WBC's      No organisms seen    Blood culture #1 **CANNOT BE ORDERED STAT** [[id number]] Collected:  11/10/17 1610    Order Status:  Completed Specimen:  Blood from Peripheral, Wrist, Left Updated:  11/13/17 2022     Blood  Culture, Routine No Growth to date     Blood Culture, Routine No Growth to date     Blood Culture, Routine No Growth to date     Blood Culture, Routine No Growth to date    Blood culture #2 **CANNOT BE ORDERED STAT** [[id number]] Collected:  11/10/17 1625    Order Status:  Completed Specimen:  Blood from Peripheral, Antecubital, Left Updated:  11/13/17 2022     Blood Culture, Routine No Growth to date     Blood Culture, Routine No Growth to date     Blood Culture, Routine No Growth to date     Blood Culture, Routine No Growth to date          Significant Imaging: I have reviewed all pertinent imaging results/findings within the past 24 hours.

## 2017-11-14 NOTE — PROGRESS NOTES
U Internal Medicine Resident HO-1 Progress Note    Subjective:      Zeyad Woodard is a 89 y.o. male who is being followed by the [facility] service at [facility] for MRSA bacteremia.     Patient had I&D placement yesterday. He is scheduled for JEVON today.  Per cardiology, they will take him for JEVON, however if pacemaker lead is found to have evidence of infection recommendations would be to consult CT surgery for removal and reinsertion of other pacemaker.     Plan to discuss options with family this morning to see if pursuing CT evaluation would be something they would consider in best interest of patient.     Patient out of room for JEVON for interview this Am.      Objective:   Last 24 Hour Vital Signs:  BP  Min: 122/55  Max: 179/74  Temp  Av.1 °F (36.7 °C)  Min: 97 °F (36.1 °C)  Max: 98.8 °F (37.1 °C)  Pulse  Av.8  Min: 60  Max: 74  Resp  Av  Min: 10  Max: 19  SpO2  Av.8 %  Min: 93 %  Max: 97 %  Weight  Av.3 kg (168 lb 3.4 oz)  Min: 76.3 kg (168 lb 3.4 oz)  Max: 76.3 kg (168 lb 3.4 oz)  I/O last 3 completed shifts:  In: 325 [P.O.:125; I.V.:200]  Out: 1483 [Urine:1483]    Physical Examination:  Physical exam unable to be obtained as patient gone for procedure    Laboratory:  Laboratory Data Reviewed: yes  Pertinent Findings:  Trended Lab Data:    Recent Labs  Lab 17  0523 17  0442 17  0643   WBC 13.44* 13.50* 11.69   HGB 10.6* 9.5* 9.7*   HCT 32.0* 28.6* 28.6*   * 142* 137*   MCV 91 92 91   RDW 13.9 14.1 14.0   * 132* 132*   K 4.2 4.4 4.4    99 100   CO2 27 28 27   BUN 23 28* 25*    101 107   CALCIUM 9.0 8.5* 8.6*   PROT 6.6 6.0 5.9*   ALBUMIN 3.2* 3.0* 2.9*   BILITOT 0.8 0.6 0.6   AST 44* 39 36   ALKPHOS 119 103 99   ALT 42 37 36         Microbiology Data Reviewed: yes  Pertinent Findings:  Blood Cx NGTD,  Wound gram stain negative from I &d culture pending    Other Results:  EKG (my interpretation): No new  EKG    Radiology Data Reviewed: yes  Pertinent Findings:  No new studies    Current Medications:     Infusions:        Scheduled:   aspirin  81 mg Oral Daily    atorvastatin  20 mg Oral Nightly    carvedilol  6.25 mg Oral BID    cyanocobalamin  100 mcg Oral Daily    docusate sodium  100 mg Oral BID    donepezil  10 mg Oral QHS    ferrous sulfate  325 mg Oral Daily    finasteride  5 mg Oral Daily    gabapentin  100 mg Oral Daily    gabapentin  300 mg Oral QHS    heparin (porcine)  5,000 Units Subcutaneous Q8H    lactobacillus acidophilus & bulgar  1 packet Oral BID    levothyroxine  100 mcg Oral Before breakfast    tamsulosin  0.4 mg Oral Daily    vancomycin (VANCOCIN) IVPB  1,250 mg Intravenous Q24H        PRN:  dextrose 50%, dextrose 50%, diphenhydrAMINE-zinc acetate 2-0.1%, glucagon (human recombinant), glucose, glucose, hydrALAZINE, influenza, ondansetron, oxyCODONE-acetaminophen, oxyCODONE-acetaminophen, senna, sodium chloride 0.9%, DIPH,PERTUS (ADACEL),TETANUS PF VAC (ADULT)    Antibiotics and Day Number of Therapy:  Vancomycin, today is day 5    Lines and Day Number of Therapy:  PIV    Assessment:     Zeyad Woodard is a 89 y.o.male with  Patient Active Problem List    Diagnosis Date Noted    Pericardial calcification 11/12/2017    Mitral regurgitation 11/12/2017    Bacteremia 11/10/2017    MRSA bacteremia 11/10/2017    Weakness 11/08/2017    Cellulitis of buttock 11/07/2017    HLD (hyperlipidemia) 11/07/2017    Abnormal blood smear 10/05/2017    Chronic pain 10/03/2017    Chronic bilateral low back pain with bilateral sciatica 09/19/2017    Use of opiates for therapeutic purposes 09/19/2017    Dementia without behavioral disturbance 06/05/2017    Post-operative state 02/01/2017    Pseudophakia 01/18/2017    Essential hypertension 01/18/2017    Right posterior capsular opacification 01/18/2017    Refractive error 01/18/2017    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     Hypothyroidism due to acquired atrophy of thyroid 10/02/2016    Right hip pain 10/01/2016    Contusion of right hip 10/01/2016    Elevated troponin 09/30/2016    Anemia of chronic renal failure, stage 3 (moderate) 09/01/2016    Chronic constipation 07/06/2016    Thrombocytopenia 07/03/2016    Venous insufficiency of both lower extremities 10/26/2015    Frequent falls 08/28/2015    Opioid dependence with intoxication with complication 08/28/2015    Cardiac pacemaker in situ 07/02/2015    SSS (sick sinus syndrome) 06/12/2015    Bradycardia 06/12/2015    Chronic back pain 09/12/2014    Lumbar radiculopathy 09/12/2014    CKD (chronic kidney disease), stage III 10/16/2013    Iron deficiency anemia due to chronic blood loss 04/12/2013    Hypothyroidism     Polyneuropathy 11/12/2012    Chronic diastolic congestive heart failure 08/23/2012    Renovascular hypertension     Chronic atrial fibrillation     Benign prostatic hyperplasia         Plan:   MRSA bacteremia 2/2 cellulitis  - Patient discharge recently for cellulitis on clindamycin, however patient's blood cultures returned MRSA so called and had patient return to hospital for further evaluation.   - Sacral wound s/p I&D yesterday  - WBC 13.6 on admission, slight decrease from previous admission.   - Blood Cx Q other day, repeat so far negative  - TTE with no evidence of endocarditis       - Vancomycin 1500mg x1 in Ed, now 1250mg IV daily   -Trough drawn before 4th dose, 15.6. Will speak with pharmacy today       as this may have been drawn early  - ID consult, appreciate recs: Vanc and JEVON, may need pacemaker lead removal in future;   - Cardiology consult, appreciate recs: JEVON for today  - Surgery consult, appreciate recs: I&D yesterday     Chronic heart failure with combined systolic/diastolic dysfunction and mitral regurgitation  - EF 45%, last echo Dec 2016, Euvolemic on exam with mild pedal edema   - TTE 11/11 with normal LVEF 60-65%, atrial  fibrillation, no signs of endocarditis   - lasix 20 mg BID   - follow up JEVON once completed     HTN  - BP high since admission. Switch toprol to coreg, 6.25mg BID  - Continue to monitor     Iron deficiency anemia due to blood loss  - Stable, Continue iron 325 mg daily, B12 100 mcg daily, probiotic daily     Dementia  - stable, conversant and interactive; continue home aricept 10 mg nightly      HLD  - Continue home atorvastatin 20mg QHS  - Daily ASA 81 mg      Neuropathy   - Continue home gabapentin 100 mg BID     BPH  - Asymptomatic, continue finasteride 5mg daily, flomax 0.4 mg daily     Hypothyroidism   - recent TSH wnl, continue synthroid 100mcg daily      HCM  - ordered flu and tdap for administration prior to discharge    Theresa Herndon  [facility] HO-1  [facility] Internal [facility] Service Team B    [facility] Medicine Hospitalist Pager numbers:   [facility] Hospitalist Medicine Team A (Kinsey/Yumiko): [phone]  [facility] Hospitalist Medicine Team B (Tyrese/Bibi):  [phone]

## 2017-11-14 NOTE — PROGRESS NOTES
"Vancomycin Dosing and Monitoring Pharmacy Protocol    Zeyad Woodard is a 89 y.o. male    Height: 5' 8" (1.727 m)   Wt Readings from Last 1 Encounters:   11/14/17 76.3 kg (168 lb 3.4 oz)     Ideal body weight: 68.4 kg (150 lb 12.7 oz)  Adjusted ideal body weight: 71.6 kg (157 lb 12.2 oz)    Temp Readings from Last 3 Encounters:   11/14/17 98.3 °F (36.8 °C)   11/08/17 99.2 °F (37.3 °C) (Oral)   11/07/17 (!) 100.7 °F (38.2 °C) (Oral)      Lab Results   Component Value Date/Time    WBC 11.69 11/14/2017 06:43 AM    WBC 13.50 (H) 11/13/2017 04:42 AM    WBC 13.44 (H) 11/12/2017 05:23 AM      Lab Results   Component Value Date/Time    CREATININE 1.3 11/14/2017 06:43 AM    CREATININE 1.4 11/13/2017 04:42 AM    CREATININE 1.2 11/12/2017 05:23 AM        Serum creatinine: 1.3 mg/dL 11/14/17 0643  Estimated creatinine clearance: 36.5 mL/min    Antibiotics       Start     Stop Route Frequency Ordered    11/11/17 1700  vancomycin (VANCOCIN) 1,250 mg in dextrose 5 % 250 mL IVPB  (Vancomycin IVPB with levels panel)      -- IV Every 24 hours (non-standard times) 11/11/17 0937          Antifungals       None            Microbiology Results (last 7 days)       Procedure Component Value Units Date/Time    Culture, Anaerobe [[id number]] Collected:  11/13/17 0906    Order Status:  Completed Specimen:  Abscess from Buttocks, Right Updated:  11/14/17 0756     Anaerobic Culture Culture in progress    Gram stain [[id number]] Collected:  11/13/17 0906    Order Status:  Completed Specimen:  Abscess from Buttocks, Right Updated:  11/13/17 2024     Gram Stain Result No WBC's      No organisms seen    Blood culture #1 **CANNOT BE ORDERED STAT** [[id number]] Collected:  11/10/17 1610    Order Status:  Completed Specimen:  Blood from Peripheral, Wrist, Left Updated:  11/13/17 2022     Blood Culture, Routine No Growth to date     Blood Culture, Routine No Growth to date     Blood Culture, Routine No Growth to date     Blood Culture, Routine No " Growth to date    Blood culture #2 **CANNOT BE ORDERED STAT** [[id number]] Collected:  11/10/17 1625    Order Status:  Completed Specimen:  Blood from Peripheral, Antecubital, Left Updated:  17     Blood Culture, Routine No Growth to date     Blood Culture, Routine No Growth to date     Blood Culture, Routine No Growth to date     Blood Culture, Routine No Growth to date    Aerobic culture [[id number]] Collected:  17 09    Order Status:  Sent Specimen:  Abscess from Buttocks, Right Updated:  17 1427    Blood culture [[id number]] Collected:  17    Order Status:  Completed Specimen:  Blood Updated:  17 102     Blood Culture, Routine No Growth to date     Blood Culture, Routine No Growth to date    Blood culture [[id number]] Collected:  17    Order Status:  Completed Specimen:  Blood Updated:  17 1022     Blood Culture, Routine No Growth to date     Blood Culture, Routine No Growth to date            Indication:   Bacteremia    Target Level: 15-20 mcg/ml    Hemodialysis:   No on N/A    Dosing Weight:   ABW--actual body weight  If ABW is greater than or equal to 30% over Ideal Body Weight, AdjBW will be used to calculate vancomycin dose.    Last Vancomycin dose: 1250 mg   Date/Time given:  170          Vancomycin level:  Recent Labs   Lab Result Units  17   1629   Vancomycin-Trough ug/mL  15.6     Recent Labs   Lab Result Units  17   1629   Vancomycin-Trough ug/mL  15.6       Per Protocol Initial/Adjustments Dosin. Initial/Adjustment Dose: NO CHANGE, continue the same Vancomycin dose of 1250mg q24hr  2. Vancomycin Trough Level will be drawn on  1600date/time    Pharmacy will continue to follow.    Please contact if you have any further questions. Thank you.    Saroj Shell, PharmD  [phone]

## 2017-11-14 NOTE — PROGRESS NOTES
"Surgery follow up  BP (!) 147/67 (Patient Position: Lying)   Pulse 68   Temp 97.8 °F (36.6 °C) (Oral)   Resp 17   Ht 5' 8" (1.727 m)   Wt 76.3 kg (168 lb 3.4 oz)   SpO2 96%   BMI 25.58 kg/m²   I/O last 3 completed shifts:  In: 325 [P.O.:125; I.V.:200]  Out: 1483 [Urine:1483]  I/O this shift:  In: -   Out: 150 [Urine:150]  dressing changed mild redness and erythema right buttock    Plan: continue daily dressing changes with xeroform and mepalex border.  "

## 2017-11-14 NOTE — PROGRESS NOTES
The Sw spoke to Yamilex at [facility] and she states she has been reviewing the pt and he looks appropriate but states the pt will require a picc at her facility. She will continue to monitor the pt as he nears d/c and states if he continues on the path he's on she will be able to admit the pt at d/c to [facility].

## 2017-11-14 NOTE — PROGRESS NOTES
[facility] Cardiology Service Resident Progress Note:  - plan for JEVON today in setting of MRSA Bacteremia.  No gross findings on TTE  - Pacemaker placed for chronic a-fib with slow ventricular response seen on loop recorder in 2015.  Single lead PM placed July 2015.  - possible nidus is superinfected buttock cyst with overlying cellulitis. WCx GS neg with no WBC  - Pt to be treated for 4-6 weeks irrespective of JEVON findings per ID   - If JEVON positive, guidelines suggest HWR; however, may consider conservative mgmt  in the setting of demostrated clearance and clinical improvement wtih given age and comorbidities  - may benefit from goals of care discussion with family regarding removal and/or replacement    Thank you for the consult.  Contact us with questions or concerns regarding the care of this patient.    Lavell Qureshi MD  [facility] PGY-3  8:58 AM 11/14/2017

## 2017-11-15 ENCOUNTER — HOSPITAL ENCOUNTER (INPATIENT)
Facility: HOSPITAL | Age: 82
LOS: 27 days | Discharge: HOME HEALTH | DRG: 949 | End: 2017-12-12
Attending: INTERNAL MEDICINE | Admitting: INTERNAL MEDICINE
Payer: MEDICARE

## 2017-11-15 VITALS
RESPIRATION RATE: 18 BRPM | WEIGHT: 168.19 LBS | BODY MASS INDEX: 25.49 KG/M2 | TEMPERATURE: 98 F | SYSTOLIC BLOOD PRESSURE: 155 MMHG | HEIGHT: 68 IN | HEART RATE: 61 BPM | DIASTOLIC BLOOD PRESSURE: 65 MMHG | OXYGEN SATURATION: 95 %

## 2017-11-15 DIAGNOSIS — M54.40 CHRONIC LOW BACK PAIN WITH SCIATICA, SCIATICA LATERALITY UNSPECIFIED, UNSPECIFIED BACK PAIN LATERALITY: ICD-10-CM

## 2017-11-15 DIAGNOSIS — I15.0 RENOVASCULAR HYPERTENSION: Primary | ICD-10-CM

## 2017-11-15 DIAGNOSIS — D50.0 IRON DEFICIENCY ANEMIA DUE TO CHRONIC BLOOD LOSS: ICD-10-CM

## 2017-11-15 DIAGNOSIS — R53.81 DEBILITY: ICD-10-CM

## 2017-11-15 DIAGNOSIS — R78.81 MRSA BACTEREMIA: ICD-10-CM

## 2017-11-15 DIAGNOSIS — G89.29 CHRONIC PAIN: ICD-10-CM

## 2017-11-15 DIAGNOSIS — I48.20 CHRONIC ATRIAL FIBRILLATION: ICD-10-CM

## 2017-11-15 DIAGNOSIS — B95.62 MRSA BACTEREMIA: ICD-10-CM

## 2017-11-15 DIAGNOSIS — G62.9 POLYNEUROPATHY: ICD-10-CM

## 2017-11-15 DIAGNOSIS — L03.317 CELLULITIS OF BUTTOCK: ICD-10-CM

## 2017-11-15 DIAGNOSIS — N18.30 CKD (CHRONIC KIDNEY DISEASE), STAGE III: ICD-10-CM

## 2017-11-15 DIAGNOSIS — L03.317 CELLULITIS AND ABSCESS OF BUTTOCK: ICD-10-CM

## 2017-11-15 DIAGNOSIS — E03.4 HYPOTHYROIDISM DUE TO ACQUIRED ATROPHY OF THYROID: ICD-10-CM

## 2017-11-15 DIAGNOSIS — M51.36 DDD (DEGENERATIVE DISC DISEASE), LUMBAR: ICD-10-CM

## 2017-11-15 DIAGNOSIS — L02.31 CELLULITIS AND ABSCESS OF BUTTOCK: ICD-10-CM

## 2017-11-15 DIAGNOSIS — N40.0 BENIGN PROSTATIC HYPERPLASIA WITHOUT LOWER URINARY TRACT SYMPTOMS: ICD-10-CM

## 2017-11-15 DIAGNOSIS — G89.29 CHRONIC LOW BACK PAIN WITH SCIATICA, SCIATICA LATERALITY UNSPECIFIED, UNSPECIFIED BACK PAIN LATERALITY: ICD-10-CM

## 2017-11-15 DIAGNOSIS — M54.17 LUMBOSACRAL RADICULOPATHY: ICD-10-CM

## 2017-11-15 DIAGNOSIS — R78.81 BACTEREMIA: ICD-10-CM

## 2017-11-15 DIAGNOSIS — I50.32 CHRONIC DIASTOLIC CONGESTIVE HEART FAILURE: ICD-10-CM

## 2017-11-15 LAB
ALBUMIN SERPL BCP-MCNC: 3 G/DL
ALP SERPL-CCNC: 102 U/L
ALT SERPL W/O P-5'-P-CCNC: 36 U/L
ANION GAP SERPL CALC-SCNC: 4 MMOL/L
ANISOCYTOSIS BLD QL SMEAR: SLIGHT
AST SERPL-CCNC: 38 U/L
BACTERIA BLD CULT: NORMAL
BACTERIA BLD CULT: NORMAL
BASOPHILS # BLD AUTO: ABNORMAL K/UL
BASOPHILS NFR BLD: 0 %
BILIRUB SERPL-MCNC: 0.7 MG/DL
BUN SERPL-MCNC: 27 MG/DL
CALCIUM SERPL-MCNC: 8.7 MG/DL
CHLORIDE SERPL-SCNC: 99 MMOL/L
CO2 SERPL-SCNC: 28 MMOL/L
CREAT SERPL-MCNC: 1.3 MG/DL
DIFFERENTIAL METHOD: ABNORMAL
EOSINOPHIL # BLD AUTO: ABNORMAL K/UL
EOSINOPHIL NFR BLD: 5 %
ERYTHROCYTE [DISTWIDTH] IN BLOOD BY AUTOMATED COUNT: 13.9 %
EST. GFR  (AFRICAN AMERICAN): 56 ML/MIN/1.73 M^2
EST. GFR  (NON AFRICAN AMERICAN): 48 ML/MIN/1.73 M^2
GLUCOSE SERPL-MCNC: 102 MG/DL
HCT VFR BLD AUTO: 29.1 %
HGB BLD-MCNC: 9.4 G/DL
HYPOCHROMIA BLD QL SMEAR: ABNORMAL
LYMPHOCYTES # BLD AUTO: ABNORMAL K/UL
LYMPHOCYTES NFR BLD: 59 %
MCH RBC QN AUTO: 30.2 PG
MCHC RBC AUTO-ENTMCNC: 32.3 G/DL
MCV RBC AUTO: 94 FL
MONOCYTES # BLD AUTO: ABNORMAL K/UL
MONOCYTES NFR BLD: 3 %
NEUTROPHILS NFR BLD: 33 %
PLATELET # BLD AUTO: 142 K/UL
PLATELET BLD QL SMEAR: ABNORMAL
PMV BLD AUTO: 10.3 FL
POCT GLUCOSE: 134 MG/DL (ref 70–110)
POIKILOCYTOSIS BLD QL SMEAR: SLIGHT
POTASSIUM SERPL-SCNC: 4.5 MMOL/L
PROT SERPL-MCNC: 6.1 G/DL
RBC # BLD AUTO: 3.11 M/UL
SODIUM SERPL-SCNC: 131 MMOL/L
WBC # BLD AUTO: 12.19 K/UL

## 2017-11-15 PROCEDURE — 85027 COMPLETE CBC AUTOMATED: CPT

## 2017-11-15 PROCEDURE — 85007 BL SMEAR W/DIFF WBC COUNT: CPT

## 2017-11-15 PROCEDURE — 97530 THERAPEUTIC ACTIVITIES: CPT

## 2017-11-15 PROCEDURE — 63600175 PHARM REV CODE 636 W HCPCS: Performed by: STUDENT IN AN ORGANIZED HEALTH CARE EDUCATION/TRAINING PROGRAM

## 2017-11-15 PROCEDURE — 94761 N-INVAS EAR/PLS OXIMETRY MLT: CPT

## 2017-11-15 PROCEDURE — 97116 GAIT TRAINING THERAPY: CPT

## 2017-11-15 PROCEDURE — 97110 THERAPEUTIC EXERCISES: CPT

## 2017-11-15 PROCEDURE — 25000003 PHARM REV CODE 250: Performed by: STUDENT IN AN ORGANIZED HEALTH CARE EDUCATION/TRAINING PROGRAM

## 2017-11-15 PROCEDURE — 63600175 PHARM REV CODE 636 W HCPCS: Performed by: INTERNAL MEDICINE

## 2017-11-15 PROCEDURE — 25000003 PHARM REV CODE 250: Performed by: INTERNAL MEDICINE

## 2017-11-15 PROCEDURE — 80053 COMPREHEN METABOLIC PANEL: CPT

## 2017-11-15 PROCEDURE — A4216 STERILE WATER/SALINE, 10 ML: HCPCS | Performed by: INTERNAL MEDICINE

## 2017-11-15 PROCEDURE — 11000004 HC SNF PRIVATE

## 2017-11-15 RX ORDER — HEPARIN SODIUM 5000 [USP'U]/ML
5000 INJECTION, SOLUTION INTRAVENOUS; SUBCUTANEOUS EVERY 8 HOURS
Status: CANCELLED | OUTPATIENT
Start: 2017-11-15

## 2017-11-15 RX ORDER — LEVOTHYROXINE SODIUM 100 UG/1
100 TABLET ORAL
Status: DISCONTINUED | OUTPATIENT
Start: 2017-11-16 | End: 2017-12-12 | Stop reason: HOSPADM

## 2017-11-15 RX ORDER — ATORVASTATIN CALCIUM 20 MG/1
20 TABLET, FILM COATED ORAL NIGHTLY
Status: DISCONTINUED | OUTPATIENT
Start: 2017-11-15 | End: 2017-11-15 | Stop reason: SDUPTHER

## 2017-11-15 RX ORDER — FINASTERIDE 5 MG/1
5 TABLET, FILM COATED ORAL DAILY
Status: DISCONTINUED | OUTPATIENT
Start: 2017-11-16 | End: 2017-11-15

## 2017-11-15 RX ORDER — ACETAMINOPHEN 325 MG/1
650 TABLET ORAL EVERY 6 HOURS
Status: DISCONTINUED | OUTPATIENT
Start: 2017-11-16 | End: 2017-11-16

## 2017-11-15 RX ORDER — CALCIUM CARBONATE 500(1250)
500 TABLET ORAL DAILY
Status: DISCONTINUED | OUTPATIENT
Start: 2017-11-16 | End: 2017-12-12 | Stop reason: HOSPADM

## 2017-11-15 RX ORDER — CARVEDILOL 6.25 MG/1
6.25 TABLET ORAL 2 TIMES DAILY
Qty: 60 TABLET | Refills: 11 | Status: SHIPPED | OUTPATIENT
Start: 2017-11-15 | End: 2017-12-13

## 2017-11-15 RX ORDER — FERROUS SULFATE 325(65) MG
325 TABLET, DELAYED RELEASE (ENTERIC COATED) ORAL EVERY OTHER DAY
Status: CANCELLED | OUTPATIENT
Start: 2017-11-17

## 2017-11-15 RX ORDER — ATORVASTATIN CALCIUM 20 MG/1
20 TABLET, FILM COATED ORAL NIGHTLY
Status: CANCELLED | OUTPATIENT
Start: 2017-11-15

## 2017-11-15 RX ORDER — ASPIRIN 81 MG/1
81 TABLET ORAL DAILY
Status: DISCONTINUED | OUTPATIENT
Start: 2017-11-16 | End: 2017-11-15 | Stop reason: SDUPTHER

## 2017-11-15 RX ORDER — SODIUM CHLORIDE 0.9 % (FLUSH) 0.9 %
5 SYRINGE (ML) INJECTION
Status: DISCONTINUED | OUTPATIENT
Start: 2017-11-15 | End: 2017-11-15

## 2017-11-15 RX ORDER — HEPARIN SODIUM 5000 [USP'U]/ML
5000 INJECTION, SOLUTION INTRAVENOUS; SUBCUTANEOUS EVERY 8 HOURS
Status: DISCONTINUED | OUTPATIENT
Start: 2017-11-15 | End: 2017-11-27

## 2017-11-15 RX ORDER — DOCUSATE SODIUM 100 MG/1
100 CAPSULE, LIQUID FILLED ORAL 2 TIMES DAILY
Status: CANCELLED | OUTPATIENT
Start: 2017-11-15

## 2017-11-15 RX ORDER — SODIUM CHLORIDE 0.9 % (FLUSH) 0.9 %
10 SYRINGE (ML) INJECTION
Status: DISCONTINUED | OUTPATIENT
Start: 2017-11-15 | End: 2017-12-12 | Stop reason: HOSPADM

## 2017-11-15 RX ORDER — GABAPENTIN 100 MG/1
100 CAPSULE ORAL DAILY
Status: CANCELLED | OUTPATIENT
Start: 2017-11-16

## 2017-11-15 RX ORDER — GABAPENTIN 300 MG/1
300 CAPSULE ORAL NIGHTLY
Status: CANCELLED | OUTPATIENT
Start: 2017-11-15

## 2017-11-15 RX ORDER — PNV NO.95/FERROUS FUM/FOLIC AC 28MG-0.8MG
100 TABLET ORAL DAILY
Status: CANCELLED | OUTPATIENT
Start: 2017-11-16

## 2017-11-15 RX ORDER — RAMELTEON 8 MG/1
8 TABLET ORAL NIGHTLY PRN
Status: CANCELLED | OUTPATIENT
Start: 2017-11-15

## 2017-11-15 RX ORDER — ONDANSETRON 4 MG/1
4 TABLET, ORALLY DISINTEGRATING ORAL EVERY 6 HOURS PRN
Status: DISCONTINUED | OUTPATIENT
Start: 2017-11-15 | End: 2017-12-12 | Stop reason: HOSPADM

## 2017-11-15 RX ORDER — ACETAMINOPHEN 325 MG/1
650 TABLET ORAL EVERY 6 HOURS
Status: DISCONTINUED | OUTPATIENT
Start: 2017-11-15 | End: 2017-11-15 | Stop reason: HOSPADM

## 2017-11-15 RX ORDER — TAMSULOSIN HYDROCHLORIDE 0.4 MG/1
0.4 CAPSULE ORAL DAILY
Status: CANCELLED | OUTPATIENT
Start: 2017-11-16

## 2017-11-15 RX ORDER — FERROUS SULFATE 325(65) MG
325 TABLET, DELAYED RELEASE (ENTERIC COATED) ORAL EVERY OTHER DAY
Status: DISCONTINUED | OUTPATIENT
Start: 2017-11-17 | End: 2017-11-15

## 2017-11-15 RX ORDER — SODIUM CHLORIDE 0.9 % (FLUSH) 0.9 %
10 SYRINGE (ML) INJECTION EVERY 6 HOURS
Status: CANCELLED | OUTPATIENT
Start: 2017-11-15

## 2017-11-15 RX ORDER — GABAPENTIN 300 MG/1
300 CAPSULE ORAL NIGHTLY
Status: DISCONTINUED | OUTPATIENT
Start: 2017-11-15 | End: 2017-12-12 | Stop reason: HOSPADM

## 2017-11-15 RX ORDER — SENNOSIDES 8.6 MG/1
8.6 TABLET ORAL DAILY PRN
Status: CANCELLED | OUTPATIENT
Start: 2017-11-15

## 2017-11-15 RX ORDER — GABAPENTIN 100 MG/1
100 CAPSULE ORAL 2 TIMES DAILY
Status: DISCONTINUED | OUTPATIENT
Start: 2017-11-15 | End: 2017-11-15

## 2017-11-15 RX ORDER — FINASTERIDE 5 MG/1
5 TABLET, FILM COATED ORAL DAILY
Status: DISCONTINUED | OUTPATIENT
Start: 2017-11-16 | End: 2017-12-12 | Stop reason: HOSPADM

## 2017-11-15 RX ORDER — ASPIRIN 81 MG/1
81 TABLET ORAL DAILY
Status: DISCONTINUED | OUTPATIENT
Start: 2017-11-16 | End: 2017-12-12 | Stop reason: HOSPADM

## 2017-11-15 RX ORDER — CALCIUM CARBONATE 200(500)MG
500 TABLET,CHEWABLE ORAL 2 TIMES DAILY PRN
Status: CANCELLED | OUTPATIENT
Start: 2017-11-15

## 2017-11-15 RX ORDER — DONEPEZIL HYDROCHLORIDE 5 MG/1
10 TABLET, FILM COATED ORAL NIGHTLY
Status: CANCELLED | OUTPATIENT
Start: 2017-11-15

## 2017-11-15 RX ORDER — SODIUM CHLORIDE 0.9 % (FLUSH) 0.9 %
10 SYRINGE (ML) INJECTION EVERY 6 HOURS
Status: DISCONTINUED | OUTPATIENT
Start: 2017-11-16 | End: 2017-12-12 | Stop reason: HOSPADM

## 2017-11-15 RX ORDER — RAMELTEON 8 MG/1
8 TABLET ORAL NIGHTLY PRN
Status: DISCONTINUED | OUTPATIENT
Start: 2017-11-15 | End: 2017-12-12 | Stop reason: HOSPADM

## 2017-11-15 RX ORDER — FINASTERIDE 5 MG/1
5 TABLET, FILM COATED ORAL DAILY
Status: CANCELLED | OUTPATIENT
Start: 2017-11-16

## 2017-11-15 RX ORDER — ACETAMINOPHEN 325 MG/1
650 TABLET ORAL EVERY 6 HOURS PRN
Status: DISCONTINUED | OUTPATIENT
Start: 2017-11-15 | End: 2017-12-12 | Stop reason: HOSPADM

## 2017-11-15 RX ORDER — SODIUM CHLORIDE 9 MG/ML
500 INJECTION, SOLUTION INTRAVENOUS CONTINUOUS
Status: DISCONTINUED | OUTPATIENT
Start: 2017-11-15 | End: 2017-11-15

## 2017-11-15 RX ORDER — CARVEDILOL 6.25 MG/1
6.25 TABLET ORAL 2 TIMES DAILY
Status: CANCELLED | OUTPATIENT
Start: 2017-11-15

## 2017-11-15 RX ORDER — AMOXICILLIN 250 MG
1 CAPSULE ORAL 2 TIMES DAILY
Status: DISCONTINUED | OUTPATIENT
Start: 2017-11-15 | End: 2017-11-15 | Stop reason: SDUPTHER

## 2017-11-15 RX ORDER — CARVEDILOL 6.25 MG/1
6.25 TABLET ORAL 2 TIMES DAILY
Status: DISCONTINUED | OUTPATIENT
Start: 2017-11-15 | End: 2017-12-12 | Stop reason: HOSPADM

## 2017-11-15 RX ORDER — AMOXICILLIN 250 MG
1 CAPSULE ORAL 2 TIMES DAILY
Status: DISCONTINUED | OUTPATIENT
Start: 2017-11-15 | End: 2017-11-16

## 2017-11-15 RX ORDER — ASCORBIC ACID 500 MG
500 TABLET ORAL DAILY
Status: DISCONTINUED | OUTPATIENT
Start: 2017-11-16 | End: 2017-12-12 | Stop reason: HOSPADM

## 2017-11-15 RX ORDER — CALCIUM CARBONATE 200(500)MG
500 TABLET,CHEWABLE ORAL 2 TIMES DAILY PRN
Status: DISCONTINUED | OUTPATIENT
Start: 2017-11-15 | End: 2017-12-12 | Stop reason: HOSPADM

## 2017-11-15 RX ORDER — ATORVASTATIN CALCIUM 20 MG/1
20 TABLET, FILM COATED ORAL NIGHTLY
Status: DISCONTINUED | OUTPATIENT
Start: 2017-11-15 | End: 2017-12-12 | Stop reason: HOSPADM

## 2017-11-15 RX ORDER — LANOLIN ALCOHOL/MO/W.PET/CERES
1000 CREAM (GRAM) TOPICAL DAILY
Status: DISCONTINUED | OUTPATIENT
Start: 2017-11-16 | End: 2017-12-12 | Stop reason: HOSPADM

## 2017-11-15 RX ORDER — SODIUM CHLORIDE 0.9 % (FLUSH) 0.9 %
3 SYRINGE (ML) INJECTION
Status: DISCONTINUED | OUTPATIENT
Start: 2017-11-15 | End: 2017-12-12 | Stop reason: HOSPADM

## 2017-11-15 RX ORDER — ASCORBIC ACID 500 MG
500 TABLET ORAL DAILY
Status: CANCELLED | OUTPATIENT
Start: 2017-11-15

## 2017-11-15 RX ORDER — ONDANSETRON 2 MG/ML
4 INJECTION INTRAMUSCULAR; INTRAVENOUS EVERY 12 HOURS PRN
Status: DISCONTINUED | OUTPATIENT
Start: 2017-11-15 | End: 2017-11-15

## 2017-11-15 RX ORDER — ACETAMINOPHEN 325 MG/1
650 TABLET ORAL EVERY 6 HOURS PRN
Status: CANCELLED | OUTPATIENT
Start: 2017-11-15

## 2017-11-15 RX ORDER — ASPIRIN 81 MG/1
81 TABLET ORAL DAILY
Status: CANCELLED | OUTPATIENT
Start: 2017-11-16

## 2017-11-15 RX ORDER — DONEPEZIL HYDROCHLORIDE 10 MG/1
10 TABLET, FILM COATED ORAL NIGHTLY
Status: DISCONTINUED | OUTPATIENT
Start: 2017-11-15 | End: 2017-11-15 | Stop reason: SDUPTHER

## 2017-11-15 RX ORDER — DONEPEZIL HYDROCHLORIDE 10 MG/1
10 TABLET, FILM COATED ORAL NIGHTLY
Status: DISCONTINUED | OUTPATIENT
Start: 2017-11-15 | End: 2017-12-12 | Stop reason: HOSPADM

## 2017-11-15 RX ORDER — ASPIRIN 81 MG/1
81 TABLET ORAL DAILY
Status: CANCELLED | OUTPATIENT
Start: 2017-11-15

## 2017-11-15 RX ORDER — GABAPENTIN 100 MG/1
100 CAPSULE ORAL DAILY
Status: DISCONTINUED | OUTPATIENT
Start: 2017-11-16 | End: 2017-12-12 | Stop reason: HOSPADM

## 2017-11-15 RX ORDER — LEVOTHYROXINE SODIUM 100 UG/1
100 TABLET ORAL DAILY
Status: CANCELLED | OUTPATIENT
Start: 2017-11-15

## 2017-11-15 RX ORDER — GABAPENTIN 100 MG/1
100 CAPSULE ORAL 2 TIMES DAILY
Status: CANCELLED | OUTPATIENT
Start: 2017-11-15

## 2017-11-15 RX ORDER — SODIUM CHLORIDE 0.9 % (FLUSH) 0.9 %
10 SYRINGE (ML) INJECTION
Status: CANCELLED | OUTPATIENT
Start: 2017-11-15

## 2017-11-15 RX ORDER — PNV NO.95/FERROUS FUM/FOLIC AC 28MG-0.8MG
100 TABLET ORAL DAILY
Status: CANCELLED | OUTPATIENT
Start: 2017-11-15

## 2017-11-15 RX ORDER — AMOXICILLIN 250 MG
1 CAPSULE ORAL 2 TIMES DAILY
Status: CANCELLED | OUTPATIENT
Start: 2017-11-15

## 2017-11-15 RX ORDER — TAMSULOSIN HYDROCHLORIDE 0.4 MG/1
0.4 CAPSULE ORAL DAILY
Status: DISCONTINUED | OUTPATIENT
Start: 2017-11-16 | End: 2017-11-15 | Stop reason: SDUPTHER

## 2017-11-15 RX ORDER — FERROUS SULFATE 325(65) MG
325 TABLET ORAL DAILY
Status: CANCELLED | OUTPATIENT
Start: 2017-11-15

## 2017-11-15 RX ORDER — TAMSULOSIN HYDROCHLORIDE 0.4 MG/1
0.4 CAPSULE ORAL DAILY
Status: DISCONTINUED | OUTPATIENT
Start: 2017-11-16 | End: 2017-12-12 | Stop reason: HOSPADM

## 2017-11-15 RX ORDER — FERROUS SULFATE 325(65) MG
325 TABLET, DELAYED RELEASE (ENTERIC COATED) ORAL DAILY
Status: DISCONTINUED | OUTPATIENT
Start: 2017-11-16 | End: 2017-12-12 | Stop reason: HOSPADM

## 2017-11-15 RX ORDER — SODIUM CHLORIDE 0.9 % (FLUSH) 0.9 %
5 SYRINGE (ML) INJECTION
Status: CANCELLED | OUTPATIENT
Start: 2017-11-15

## 2017-11-15 RX ORDER — ACETAMINOPHEN 325 MG/1
650 TABLET ORAL ONCE
Status: COMPLETED | OUTPATIENT
Start: 2017-11-15 | End: 2017-11-15

## 2017-11-15 RX ORDER — DOCUSATE SODIUM 100 MG/1
100 CAPSULE, LIQUID FILLED ORAL 2 TIMES DAILY
Status: DISCONTINUED | OUTPATIENT
Start: 2017-11-15 | End: 2017-12-12 | Stop reason: HOSPADM

## 2017-11-15 RX ORDER — SENNOSIDES 8.6 MG/1
8.6 TABLET ORAL DAILY PRN
Status: DISCONTINUED | OUTPATIENT
Start: 2017-11-15 | End: 2017-12-12 | Stop reason: HOSPADM

## 2017-11-15 RX ORDER — FUROSEMIDE 20 MG/1
20 TABLET ORAL 2 TIMES DAILY
Status: DISCONTINUED | OUTPATIENT
Start: 2017-11-15 | End: 2017-11-16

## 2017-11-15 RX ORDER — LEVOTHYROXINE SODIUM 100 UG/1
100 TABLET ORAL
Status: CANCELLED | OUTPATIENT
Start: 2017-11-16

## 2017-11-15 RX ORDER — LANOLIN ALCOHOL/MO/W.PET/CERES
100 CREAM (GRAM) TOPICAL DAILY
Status: DISCONTINUED | OUTPATIENT
Start: 2017-11-16 | End: 2017-11-15 | Stop reason: SDUPTHER

## 2017-11-15 RX ORDER — FINASTERIDE 5 MG/1
5 TABLET, FILM COATED ORAL DAILY
Status: CANCELLED | OUTPATIENT
Start: 2017-11-15

## 2017-11-15 RX ORDER — SODIUM CHLORIDE 0.9 % (FLUSH) 0.9 %
3 SYRINGE (ML) INJECTION
Status: CANCELLED | OUTPATIENT
Start: 2017-11-15

## 2017-11-15 RX ORDER — ONDANSETRON 2 MG/ML
4 INJECTION INTRAMUSCULAR; INTRAVENOUS EVERY 12 HOURS PRN
Status: CANCELLED | OUTPATIENT
Start: 2017-11-15

## 2017-11-15 RX ORDER — ACETAMINOPHEN 325 MG/1
650 TABLET ORAL EVERY 6 HOURS
Status: CANCELLED | OUTPATIENT
Start: 2017-11-15

## 2017-11-15 RX ORDER — LEVOTHYROXINE SODIUM 100 UG/1
100 TABLET ORAL DAILY
Status: DISCONTINUED | OUTPATIENT
Start: 2017-11-16 | End: 2017-11-15

## 2017-11-15 RX ORDER — FUROSEMIDE 20 MG/1
20 TABLET ORAL 2 TIMES DAILY
Status: CANCELLED | OUTPATIENT
Start: 2017-11-15

## 2017-11-15 RX ORDER — TAMSULOSIN HYDROCHLORIDE 0.4 MG/1
0.4 CAPSULE ORAL DAILY
Status: CANCELLED | OUTPATIENT
Start: 2017-11-15

## 2017-11-15 RX ADMIN — DONEPEZIL HYDROCHLORIDE 10 MG: 10 TABLET, FILM COATED ORAL at 09:11

## 2017-11-15 RX ADMIN — FINASTERIDE 5 MG: 5 TABLET, FILM COATED ORAL at 09:11

## 2017-11-15 RX ADMIN — SODIUM CHLORIDE, PRESERVATIVE FREE 10 ML: 5 INJECTION INTRAVENOUS at 12:11

## 2017-11-15 RX ADMIN — FUROSEMIDE 20 MG: 20 TABLET ORAL at 09:11

## 2017-11-15 RX ADMIN — VANCOMYCIN HYDROCHLORIDE 1250 MG: 5 INJECTION, POWDER, LYOPHILIZED, FOR SOLUTION INTRAVENOUS at 04:11

## 2017-11-15 RX ADMIN — LACTOBACILLUS ACIDOPHILUS / LACTOBACILLUS BULGARICUS 1 EACH: 100 MILLION CFU STRENGTH GRANULES at 08:11

## 2017-11-15 RX ADMIN — ACETAMINOPHEN 650 MG: 325 TABLET ORAL at 09:11

## 2017-11-15 RX ADMIN — CARVEDILOL 6.25 MG: 6.25 TABLET, FILM COATED ORAL at 09:11

## 2017-11-15 RX ADMIN — RAMELTEON 8 MG: 8 TABLET, FILM COATED ORAL at 09:11

## 2017-11-15 RX ADMIN — STANDARDIZED SENNA CONCENTRATE AND DOCUSATE SODIUM 1 TABLET: 8.6; 5 TABLET, FILM COATED ORAL at 09:11

## 2017-11-15 RX ADMIN — GABAPENTIN 300 MG: 100 CAPSULE ORAL at 09:11

## 2017-11-15 RX ADMIN — DOCUSATE SODIUM 100 MG: 100 CAPSULE, LIQUID FILLED ORAL at 09:11

## 2017-11-15 RX ADMIN — HEPARIN SODIUM 5000 UNITS: 5000 INJECTION, SOLUTION INTRAVENOUS; SUBCUTANEOUS at 05:11

## 2017-11-15 RX ADMIN — LEVOTHYROXINE SODIUM 100 MCG: 100 TABLET ORAL at 05:11

## 2017-11-15 RX ADMIN — ASPIRIN 81 MG: 81 TABLET, COATED ORAL at 09:11

## 2017-11-15 RX ADMIN — HEPARIN SODIUM 5000 UNITS: 5000 INJECTION, SOLUTION INTRAVENOUS; SUBCUTANEOUS at 01:11

## 2017-11-15 RX ADMIN — ACETAMINOPHEN 650 MG: 325 TABLET ORAL at 04:11

## 2017-11-15 RX ADMIN — SODIUM CHLORIDE, PRESERVATIVE FREE 10 ML: 5 INJECTION INTRAVENOUS at 11:11

## 2017-11-15 RX ADMIN — HEPARIN SODIUM 5000 UNITS: 5000 INJECTION, SOLUTION INTRAVENOUS; SUBCUTANEOUS at 09:11

## 2017-11-15 RX ADMIN — FERROUS SULFATE TAB EC 325 MG (65 MG FE EQUIVALENT) 325 MG: 325 (65 FE) TABLET DELAYED RESPONSE at 09:11

## 2017-11-15 RX ADMIN — VITAM B12 100 MCG: 100 TAB at 09:11

## 2017-11-15 RX ADMIN — GABAPENTIN 100 MG: 100 CAPSULE ORAL at 09:11

## 2017-11-15 RX ADMIN — ATORVASTATIN CALCIUM 20 MG: 20 TABLET, FILM COATED ORAL at 09:11

## 2017-11-15 RX ADMIN — TAMSULOSIN HYDROCHLORIDE 0.4 MG: 0.4 CAPSULE ORAL at 09:11

## 2017-11-15 NOTE — PLAN OF CARE
Problem: Patient Care Overview  Goal: Plan of Care Review  Pt on RA. Pt with no apparent distress noted. Will continue to monitor.

## 2017-11-15 NOTE — NURSING
Pt transferring to [facility]. Report called in to nurse Dilma. Pt remains on contact precautions for MRSA to R buttock. Allowed time for questions. All questions answered. Family and pt aware of transfer.     PICC line in place and dressing is CDI. Telemetry monitor discontinued.

## 2017-11-15 NOTE — PLAN OF CARE
Problem: Patient Care Overview  Goal: Plan of Care Review  Outcome: Ongoing (interventions implemented as appropriate)  Plan of care reviewed with patient. Patient verbalized complete understanding. Fall precautions maintained. Bed in lowest position, locked, call light within reach and bed alarm is on. Side rails up x's 2 with slip resistant socks on. Nurse instructed patient to notify staff for any assistance and the patient verbalized complete understanding. Patient on telemetry throughout shift with no ectopy noted. Will continue to monitor.       REASON FOR VISIT:    Maria Elena Arriola is a 37year old female who presents for an [address]     Pt is here today for a physical  She is up to date on cervical cancer screening  She is due for mammogram    Patient Active Problem List:     David Smith female age 47-67, do you take aspirin?: No    Have you had any immunizations at another office such as Influenza, Hepatitis B, Tetanus, or Pneumococcal?: No    Domestic Abuse: No     CAGE:     Cut : No    Annoyed : No    Guilty : No    Eye Opener : No    S For all adults age 22-65, older adults at increased risk No results found for: HIV    Syphilis Screening Screen if pregnant or high risk No results found for: RPR    Hepatitis C Screening Screen those at high risk plus screen one time for adults born [birth year]- History   Problem Relation Age of Onset   • Heart Attack Maternal Grandfather      AMI   • Diabetes Maternal Grandmother    • Diabetes Sister    • Heart Disease Father    • Hypertension Father    • Heart Disorder Father      MI x3, 1st at age 49 y/o   • He clubbing or edema  NEURO: Oriented times three, cranial nerves are intact, motor and sensory are grossly intact    ASSESSMENT AND OTHER RELEVANT CHRONIC CONDITIONS:   Alejandrina Nicole is a 37year old female who presents for an [address].

## 2017-11-15 NOTE — PROGRESS NOTES
The Sw spoke to Yamilex at [facility] and she states the pt has been medically accepted to their facility. She has submitted to [employer] and is currently waiting for the snf auth. She will have to receive the snf auth before the pt can arrive. Felicia([place])will notify the team to write the d/c orders. Felicia will arrange the f/u for the pt with ID at [facility]. The Sw informed the pt's [phone])of this info and she's in agreement with the d/c plan. The Sw informed her it's a possibly the pt could d/c today if the auth is received in enough time and she was in agreement. The Sw informed her if not today, the pt will d/c tomorrow pending the [employer] auth.

## 2017-11-15 NOTE — PT/OT/SLP PROGRESS
Occupational Therapy  Multiple Visit Attempts    Zeyad Woodard  MRN: [id number]    9:45 - Patient receiving bed bath. Will follow-up.    15:55 - Patient is set to D/C to SNF and declined therapy at this time.    MADDY Mendoza  11/15/2017

## 2017-11-15 NOTE — PROGRESS NOTES
The Sw met with the pt and his dtr Asia and she signed the pt choice form and the Sw placed it in the pt's chart. They are both in agreement with the d/c plan for [facility] today.

## 2017-11-15 NOTE — PLAN OF CARE
Problem: Physical Therapy Goal  Goal: Physical Therapy Goal  Goals to be met by: 17     Patient will increase functional independence with mobility by performin. Sit to stand transfer with Stand-by Assistance  2. Bed to chair transfer with Stand-by Assistance using Rolling Walker  3. Gait  x 50 feet with Stand-by Assistance using Rolling Walker.   4. Stand for 5 minutes with Stand-by Assistance using Rolling Walker MET 17  5. Lower extremity exercise program x 15 reps per handout, with supervision      Outcome: Ongoing (interventions implemented as appropriate)  Goals ongoing

## 2017-11-15 NOTE — PROGRESS NOTES
The Sw spoke to Yamilex at [facility] and she states the pt's clear to arrive. The pt's going to room 320. The Sw spoke to the pt's nurse and gave her the contact info to call report for the pt. The Sw called [facility] rose/maddi shaikh([phone])spoke to Daniel who states she will dispatch a  to transport the pt within an hour and a half.

## 2017-11-15 NOTE — PROGRESS NOTES
The Sw spoke to Yamilex at [facility] who states she has to speak with Dr. Fontaine in reference to this pt and she will call the Sw back with a determination. The Sw informed her the pt's ready for d/c.

## 2017-11-15 NOTE — PLAN OF CARE
Future Appointments  Date Time Provider Department Center   11/22/2017 11:00 AM Booker Ricci MD [facility] MED [place]   11/22/2017 2:00 PM Tawana Trujillo MD [facility] IM [facility]   11/30/2017 1:50 PM Elsa Rahman MD [facility] O[facility]   12/5/2017 9:15 AM ROSSANA WilsonP [facility] PAINMGT [place] [facility]   12/6/2017 1:00 PM INFECTIOUS DISEASE, [facility] INFECT [place] [facility]   12/20/2017 9:40 AM Booker Ricci MD [facility] working on SNF arrangements to [facility].       11/15/17 1432   Final Note   Assessment Type Final Discharge Note   Discharge Disposition SNF   Hospital Follow Up  Appt(s) scheduled? Yes   Right Care Referral Info   Post Acute Recommendation SNF / Sub-Acute Rehab   Referral Type SNF   Facility Name [facility]     Felicia Prince, RN, CCM, CMSRN  RN Transition Navigator  [phone]

## 2017-11-15 NOTE — PLAN OF CARE
11/15/17 1606   Final Note   Assessment Type Final Discharge Note   Discharge Disposition SNF   Right Care Referral Info   Post Acute Recommendation SNF / Sub-Acute Rehab   Referral Type snf   Facility Name [facility]

## 2017-11-15 NOTE — PLAN OF CARE
SW working on dc arrangements.    Future Appointments  Date Time Provider Department Center   11/22/2017 11:00 AM Booker Ricci MD [facility] MED [place]   11/30/2017 1:50 PM Elsa Rahman MD [facility] OCC   12/5/2017 9:15 AM CINDY Wilson [facility]   12/6/2017 1:00 PM INFECTIOUS DISEASE, [facility] INFECT [facility]   12/20/2017 9:40 AM Booker Ricci MD [facility] MED [place]     Spoke to daughter Asia and informed her of followup appts. Daughter verbalized understanding and in agreement with discharge plan.       11/15/17 1547   Final Note   Assessment Type Final Discharge Note   Discharge Disposition SNF   Hospital Follow Up  Appt(s) scheduled? Yes   Right Care Referral Info   Post Acute Recommendation SNF / Sub-Acute Rehab   Referral Type SNF   Facility Name [facility]     Felicia Prince RN, CCM, CMSRN  RN Transition Navigator  [phone]

## 2017-11-15 NOTE — MEDICAL/APP STUDENT
Subjective:  Mr. Woodard reports pain that he rates a 7/10 overnight. He states that it is still very uncomfortable to lay on his backside. He is tolerating a cardiac diabetic diet with no N/V. He reports some slowed urination initiation, but states that he feels that he completely empties his bladder when he is able to urinate. He denies diarrhea. He is able to ambulate only with assistance. He denies headache, vision changes, palpitations, SOB, chest pain, dizziness, and lightheadedness.    Objective:  Vitals:   Temp: 96.9      Max: 98.3  Pulse: 60-74  RR: 16-20  Bp: 102-178/51-81  SpO2: 93-99% on RA     Physical Exam:   General: A&O x3, NAD, lying comfortably in bed  Neuro: 4/5 strength in LE bilaterally (baseline), 5/5 strength in UE bilaterally, no focal deficits, sensation equal and symmetric  Head: atraumatic, normocephalic  Eyes: pupils equal and reactive, EOMI  Throat: nonerythematous, no edema  Neck: supple, trachea midline, no LAD  Cardiac: RRR, no murmurs, clicks, rubs, or gallops; no chest pain on palpation, no pain on palpation of pacemaker pouch  Lung: CTAB, no wheezes or crackles  Abd: soft, nondistended, nontender to palpation, normoactive bowel sounds in 4 quadrants  Skin: minimal erythema in a 2.5cm ring around sacral wound with packing in place, mepalex bandage over wound;  Elsewhere: warm, dry, intact, no rashes, no lesions  Ext: No C/C/E, warm, well-perfused  Pulses: 2+ radial and dorsalis pedis pulses bilaterally     Medications:  Aspirin 81mg PO QD  Atorvastatin 20mg PO QPM  Coreg 6.25mg PO BID   B12 100mcg PO QD  Docusate 100mg PO BID  Donepezil 10mg PO QPM  Ferrous sulfate 325mg PO every other day  Finasteride 5mg PO QD  Gabapentin 100mg PO QD  Gabapentin 300mg PO QPM  Heparin 5000Units SubQ Q8HS  Probiotic 1 packet PO BID  Synthroid 100mcg PO QD  Tamsulosin 0.4mg PO QD  Vancomycin 1.25g IV QD     Labs:   WBC: 12.19  RBC: 3.11  Hgb: 9.4  Hct: 29.1  MCV: 94  MCH: 30.2  MCHC: 32.3  RDW:  13.9  Plt: 142  MPV: 10.3     Na: 131  K: 4.5  Cl: 99  Co2: 28  Anion Gap: 4  BUN: 27  Crt: 1.3  GFR: 48  Glucose: 102  Ca: 8.7  Alk Phos: 102  Total Protein: 6.1  Albumin: 3.0  Total Bili: 0.7  AST: 38  ALT: 36    Imagin/14 Transesophageal Echo:   Trivial to mild Mitral valve regurgitation, mitral valve thickened   Trivial to mild aortic valve regurgitation, mildly sclerotic aortic valve   Mild tricuspid valve regurgitation, normal tricuspid valve structure   Pacemaker lead clearly seen with no evidence of masses or vegetations noted    Micro:    Blood Cx x4 bottles:              MRSA                          Clinda, tetracycline, Bactrim, Vanc sensitive                          Oxacillin, Penicillin, erythromycin resistant  11/10 Blood Cx L antecubital:              No growth to date--final  11/10 Blood Cx L wrist:              No growth to date--final   Blood Cx x4 bottles:              No growth to date--final   Gram stain R buttocks:              No WBC's, No organisms seen   Cx Right buttocks aerobic:               Staph aureus---susceptibility pending    Cx Right buttocks anaerobic:   Culture in progress--pending  Assessment:  Mr. Zeyad Woodard is a 89 yo male with a history of HTN, BPH, chronic Afib, chronic diastolic HF, anemia, SSS s/p PPM placement,CKD III, hypothyroidism, dementia, neuropathy, and R buttock cellulitis, who presented back at [facility] on 11/10 following recent discharge on  due to MRSA bacteremia found on culture.     Plan:  1. Staph bacteremia 2/2 to cellulitis              2/2 to cellulitis              Wound with erythema in 2.5cmx2.5cm sacral area,  to palpation in the area, packing and dressing in place              WBC 12.19, afebrile since admit               positive blood cx x4 bottles show MRSA, subsequent blood cultures negative              ID consulted--recommend 4 week course of Vancomycin, with re-evaluation at the completion                Will continue to treat with Vancomycin 1.25g IV Qd, PICC line placed for outpatient antibiotic administration   Pt advised to ask for PRN pain medication for management of pain  2. HTN              Hypertensive overnight to 178/74--likely 2/2 to pain                          Will continue Coreg 6.25mg BID              Will continue to monitor  3. Chronic diastolic HF               euvolemic on exam, no pedal edema              ECHO 11/2017 with LVEF 60-65%, mild trivial aortic valve regurg, trivial aortic valve stenosis, no diastolic dysfunction   JEVON 11/2017 with mild tricuspid valve regurgitation, trivial to mild aortic valve and mitral valve regurgitation              Will continue to monitor  4. BPH              Pt reports some difficulty with initiating urine stream, states he is able to completely empty his bladder once he has initiation stream              Home meds finasteride and tamsulosin continued               Will continue to monitor  5. CKD              No acute issues              Crt 1.3, BUN 27, GFR 48              Will monitor renally dosed vanc levels with troughs--next trough 11/16   Pharm is monitoring              Will continue to monitor  6. Hypothyroidism              No acute issues              TSH 0.600 during recent admission              Will continue home dose of synthroid 100mcg  7. Neuropathy              No acute issues              Home dose gabapentin continued  8. Iron deficiency anemia              Stable during hospital stay              H/H 9.4/29.1 today              Continued iron 325mg PO QD              Will continue to monitor  9. Dementia              Stable, able to converse during exam               Continued home aricept QPM  10. HLD              No acute issues              Continued home atrovastatin and daily aspirin

## 2017-11-15 NOTE — PROGRESS NOTES
[facility] Resident HO-1 Progress Note    Subjective:      Zeyad Woodard is a 89 y.o. male who is being followed by the [facility] service at [facility] for MRSA bacteremia.     Patient had JEVON yesterday, pacemaker lead with no evidence of masses or vegetations.   Patient had PICC placed yesterday as well in preparation for discharge to SNF with prolonged antibiotic course.     This AM reports pain on his bottom. No chest pain, shortness of breath, abdominal pain. Stooling and urinating appropriately.        Objective:   Last 24 Hour Vital Signs:  BP  Min: 102/54  Max: 178/74  Temp  Av.9 °F (36.6 °C)  Min: 96.9 °F (36.1 °C)  Max: 98.3 °F (36.8 °C)  Pulse  Av.1  Min: 60  Max: 74  Resp  Av.7  Min: 16  Max: 20  SpO2  Av.5 %  Min: 93 %  Max: 99 %  I/O last 3 completed shifts:  In: -   Out:  [Urine:]    Physical Examination:  General:          Alert and awake in no apparent distress  Head:               Normocephalic and atraumatic  Eyes:               PERRL; EOMi with anicteric sclera and mild injection of conjunctiva, small amount of clear, watery discharge from bilateral eyes  Mouth:             Oropharynx clear and without exudate; moist mucous membranes  Cardio:             Regular rate and rhythm with normal S1 and S2; no murmurs or rubs  Resp:               CTAB; respirations unlabored; no wheezes, crackles or rhonchi  Abdom:            Soft, NTND with normoactive bowel sounds  Extrem:            Warm and well-perfused with no clubbing, cyanosis or edema  Skin:                Sacral wound with central necrosis blanching erythema, surrounding erythema is within and regressed from previous delineation with surgical marker. Small amount of purulent drainage noted on bandage   Pulses:            2+ and symmetric distally  Neuro:             AAOx3; cooperative and pleasant with no focal deficits       Laboratory:  Laboratory Data Reviewed:  yes  Pertinent Findings:  Trended Lab Data:    Recent Labs  Lab 11/13/17  0442 11/14/17  0643 11/14/17  1226 11/15/17  0533 11/15/17  0534   WBC 13.50* 11.69 11.94  --  12.19   HGB 9.5* 9.7* 9.4*  --  9.4*   HCT 28.6* 28.6* 28.0*  --  29.1*   * 137* 129*  --  142*   MCV 92 91 91  --  94   RDW 14.1 14.0 13.9  --  13.9   * 132*  --  131*  --    K 4.4 4.4  --  4.5  --    CL 99 100  --  99  --    CO2 28 27  --  28  --    BUN 28* 25*  --  27*  --     107  --  102  --    CALCIUM 8.5* 8.6*  --  8.7  --    PROT 6.0 5.9*  --  6.1  --    ALBUMIN 3.0* 2.9*  --  3.0*  --    BILITOT 0.6 0.6  --  0.7  --    AST 39 36  --  38  --    ALKPHOS 103 99  --  102  --    ALT 37 36  --  36  --          Microbiology Data Reviewed: yes  Pertinent Findings:  No new data    Other Results:  EKG (my interpretation): no new EKG.    Radiology Data Reviewed: yes  Pertinent Findings:      JEVON:  artoic valve moderately sclerotic, trivial-mild AR. Thickened mitral valve, mild Mr. Normal tricuspid valve, mild Tr. No vegetation visualized. No pericardial effusion. Pacemaker lead with no evidence of masses or vegetations.     Current Medications:     Infusions:        Scheduled:   aspirin  81 mg Oral Daily    atorvastatin  20 mg Oral Nightly    carvedilol  6.25 mg Oral BID    cyanocobalamin  100 mcg Oral Daily    docusate sodium  100 mg Oral BID    donepezil  10 mg Oral QHS    ferrous sulfate  325 mg Oral Daily    finasteride  5 mg Oral Daily    gabapentin  100 mg Oral Daily    gabapentin  300 mg Oral QHS    heparin (porcine)  5,000 Units Subcutaneous Q8H    lactobacillus acidophilus & bulgar  1 packet Oral BID    levothyroxine  100 mcg Oral Before breakfast    sodium chloride 0.9%  10 mL Intravenous Q6H    sodium chloride 0.9%  3 mL Intravenous Q8H    tamsulosin  0.4 mg Oral Daily    vancomycin (VANCOCIN) IVPB  1,250 mg Intravenous Q24H        PRN:  dextrose 50%, dextrose 50%, diphenhydrAMINE-zinc acetate 2-0.1%,  glucagon (human recombinant), glucose, glucose, hydrALAZINE, influenza, ondansetron, oxyCODONE-acetaminophen, oxyCODONE-acetaminophen, promethazine (PHENERGAN) IVPB, senna, Flushing PICC Protocol **AND** sodium chloride 0.9% **AND** sodium chloride 0.9%, sodium chloride 0.9%, sodium chloride 0.9%, DIPH,PERTUS (ADACEL),TETANUS PF VAC (ADULT)    Antibiotics and Day Number of Therapy:  Vancomycin, Day 6    Lines and Day Number of Therapy:  PICC    Assessment:     Zeyad Woodard is a 89 y.o.male with  Patient Active Problem List    Diagnosis Date Noted    Pericardial calcification 11/12/2017    Mitral regurgitation 11/12/2017    Bacteremia 11/10/2017    MRSA bacteremia 11/10/2017    Weakness 11/08/2017    Cellulitis of buttock 11/07/2017    HLD (hyperlipidemia) 11/07/2017    Abnormal blood smear 10/05/2017    Chronic pain 10/03/2017    Chronic bilateral low back pain with bilateral sciatica 09/19/2017    Use of opiates for therapeutic purposes 09/19/2017    Dementia without behavioral disturbance 06/05/2017    Post-operative state 02/01/2017    Pseudophakia 01/18/2017    Essential hypertension 01/18/2017    Right posterior capsular opacification 01/18/2017    Refractive error 01/18/2017    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    Hypothyroidism due to acquired atrophy of thyroid 10/02/2016    Right hip pain 10/01/2016    Contusion of right hip 10/01/2016    Elevated troponin 09/30/2016    Anemia of chronic renal failure, stage 3 (moderate) 09/01/2016    Chronic constipation 07/06/2016    Thrombocytopenia 07/03/2016    Venous insufficiency of both lower extremities 10/26/2015    Frequent falls 08/28/2015    Opioid dependence with intoxication with complication 08/28/2015    Cardiac pacemaker in situ 07/02/2015    SSS (sick sinus syndrome) 06/12/2015    Bradycardia 06/12/2015    Chronic back pain 09/12/2014    Lumbar radiculopathy 09/12/2014    CKD (chronic kidney disease), stage III  10/16/2013    Iron deficiency anemia due to chronic blood loss 04/12/2013    Hypothyroidism     Polyneuropathy 11/12/2012    Chronic diastolic congestive heart failure 08/23/2012    Renovascular hypertension     Chronic atrial fibrillation     Benign prostatic hyperplasia         Plan:     MRSA bacteremia 2/2 cellulitis  - Patient discharge recently for cellulitis on clindamycin, however patient's blood cultures returned MRSA so called and had patient return to hospital for further evaluation.   - Sacral wound s/p I&D, surgery following, appreciate recs, daily dressing change with xeroform and mepalex bandage  - Tylenol 650mg po q6h scheduled for pain  - WBC 13.6 on admission, slight decrease from previous admission.   - Blood Cx Q other day, repeat so far negative  - TTE with no evidence of endocarditis  - JEVON with no evidence of infected pacemaker leads, no vegetations       - Vancomycin 1500mg x1 in Ed, now 1250mg IV daily            -Trough drawn before 4th dose, 15.6  - ID consult, appreciate recs: continue IV Abx for 4 weeks then reevaluate  - Cardiology consult, appreciate recs. JEVON as above     Chronic heart failure with combined systolic/diastolic dysfunction and mitral regurgitation  - EF 45%, last echo Dec 2016, Euvolemic on exam with mild pedal edema   - TTE 11/11 with normal LVEF 60-65%, atrial fibrillation, no signs of endocarditis   - lasix 20 mg BID   - JEVON with mild MR mild trival-mild Ar, mild TR     HTN  - BP high since admission. Switch toprol to coreg, 6.25mg BID  - Continue to monitor     Iron deficiency anemia due to blood loss  - Stable, Continue iron 325 mg daily, B12 100 mcg daily, probiotic daily     Dementia  - stable, conversant and interactive; continue home aricept 10 mg nightly      HLD  - Continue home atorvastatin 20mg QHS  - Daily ASA 81 mg      Neuropathy   - Continue home gabapentin 100mg qday, 300mg qhs     BPH  - Asymptomatic, continue finasteride 5mg daily, flomax 0.4  mg daily     Hypothyroidism   - recent TSH wnl, continue synthroid 100mcg daily      HCM  - ordered flu and tdap for administration prior to discharge    Theresa Herndon  [facility] Internal Medicine HO-1  [facility] Internal Medicine Service Team B    [facility] Medicine Hospitalist Pager numbers:   [facility] Hospitalist Medicine Team A (Kinsey/Yumiko): [phone]  [facility] Hospitalist Medicine Team B (Tyrese/Bibi):  [phone]

## 2017-11-15 NOTE — PROGRESS NOTES
[facility]  Infectious Disease  Progress Note    Patient Name: Zeyad Woodard  MRN: [id number]  Admission Date: 11/10/2017  Length of Stay: 5 days  Attending Physician: Jared Xiong MD  Primary Care Provider: Booker Ricci MD    Isolation Status: Contact  Assessment/Plan:      * MRSA bacteremia    - pt with recent admission for cellulitis, discharged on a course of clindamycin  - blood cultures from that admission are now 4/4 positive for MRSA  - on exam area appears indurated with small amount of drainage  - TTE is pending  - slight leucocytosis on admission, WBC had since trended down but is up to 13.44 this morning.   - repeat cultures NGTD so far   -pt s/p I&D, surgical cultures now growing S.aureus   - continue vancomycin and please obtain trough 30 minutes prior to 4th dose. Goal trough is 15-20.   -TTE shows no evidence of endocarditis  - JEVON negative as well, no signs of pacemaker lead involvement or valvular vegetation  - with pt's cardiac history and placement of a pacemaker concern is for implant infection. Imaging is negative at this time. Discussed treatment options with Cardiology, pt and family, will treat for bacteremia for at least 4 weeks and then re-assess prior to discontinuing antibiotics. Hope is to treat through this infection and not have to exchange pt's pacemaker and lead, which would be very risky for this patient given age and co-morbidities.    - continue vancomycin, pt will need weekly CBC, CMP, ESR, CRP and twice weekly vancomycin levels to be followed at facility receiving patient.   - will need re-evaluation by Infectious Diseases prior to discontinuing antibiotics.              Anticipated Disposition: Pt to be transferred to SNF later today    Thank you for your consult. I will sign off. Please contact us if you have any additional questions.    Alvino Mejía MD, PhD  Infectious Disease, PGY-4  [facility]    Subjective:     Principal  Problem:MRSA bacteremia    HPI: Pt is a 89 y/o male with a pmhx of A fib, Sick Sinus Syndrome with implanted pacemaker, pericardiectomy for calcific constrictive pericarditis, CHF, BPH, HTN, Hypothyroidism, GI bleed, and dementia admitted for MRSA bactermia. Pt recently admitted on 11/07/2017 for cellulitis on the right buttock. Most history is obtained from chart review secondary to pt's dementia. According to H&P from 11/07/2017 pt ahd what appeared to be boil on on his buttock. Pt was also having low grade temperatures so pt was sent for further work up which also revealed a lucocytosis. Blood cultures were drawn and pt was started on clindamycin IV. Pt did well and transition to PO clindamycin and ppt was discharged back to home on 11/08/2017 with course of unknown duration. Shortly after discharge pt's blood cultures became positive for MRSA in 4/4 bottles.Pt's family was contacted and pt was brought back to the ED for escalation of antibiotic therapy and further evaluation.     Pt unsure of when his symptoms started but complains of pain located in his right buttock. Denies any fever or chills, chest pain or SOB, nausea or vomiting. Denies urinary complaints, does endorse wartery stools but denies that they are malodorous, states one stool daily at this time. Pt lives at home alone but is visited daily by his daughters who help him manage his medications. Denies any foreign travel, no recent sick contacts. No contact with animals.      Positve Culture Data    11/07/2017 BCx, LAC             MRSA  11/07/2017 BCx, LW  MRSA    Cultures This Admission  11/10/2017 BCx, LAC             NGTD  11/10/2017 BCx, LW  NGTD    Interval History: Pt continues to complain of pain to his buttocks, denies fever or chills, nausea or vomiting, chest pain or SOB.     Review of Systems   Constitutional: Negative for chills and fever.   Respiratory: Negative for chest tightness and shortness of breath.    Cardiovascular: Negative for  chest pain and palpitations.   Gastrointestinal: Negative for constipation and diarrhea.     Objective:     Vital Signs (Most Recent):  Temp: 97.5 °F (36.4 °C) (11/15/17 1100)  Pulse: 61 (11/15/17 1100)  Resp: 18 (11/15/17 1100)  BP: (!) 155/65 (11/15/17 1100)  SpO2: 95 % (11/15/17 0715) Vital Signs (24h Range):  Temp:  [97.1 °F (36.2 °C)-98 °F (36.7 °C)] 97.5 °F (36.4 °C)  Pulse:  [60-74] 61  Resp:  [18-20] 18  SpO2:  [95 %] 95 %  BP: (149-178)/(65-74) 155/65     Weight: 76.3 kg (168 lb 3.4 oz)  Body mass index is 25.58 kg/m².    Estimated Creatinine Clearance: 36.5 mL/min (based on SCr of 1.3 mg/dL).    Physical Exam   Constitutional: He appears well-developed and well-nourished. No distress.   HENT:   Head: Normocephalic.   Mouth/Throat: Oropharynx is clear and moist.   Edentulous, with upper dentures. Dark colored lesion noted to right lateral tongue.    Eyes: EOM are normal. Pupils are equal, round, and reactive to light. No scleral icterus.   Neck: Normal range of motion. No JVD present.   Cardiovascular: Normal rate and regular rhythm.    Murmur heard.  III/VI systolic ejection murmur best heard at LUSB   Pulmonary/Chest: Effort normal. No respiratory distress. He has rales.   Faint crackles heard at this bases   Abdominal: Soft.   Musculoskeletal: Normal range of motion. He exhibits no edema.   Lymphadenopathy:     He has no cervical adenopathy.   Neurological: He is alert.   Pt confused to year and president, but is otherwise alert and oriented to situation.    Skin: Skin is warm and dry. Capillary refill takes less than 2 seconds.   Surgical dressing in place on right buttock.    Psychiatric: He has a normal mood and affect. His behavior is normal.       Significant Labs:   CBC:   Recent Labs  Lab 11/14/17  0643 11/14/17  1226 11/15/17  0534   WBC 11.69 11.94 12.19   HGB 9.7* 9.4* 9.4*   HCT 28.6* 28.0* 29.1*   * 129* 142*     CMP:   Recent Labs  Lab 11/14/17  0643 11/15/17  0533   * 131*   K  4.4 4.5    99   CO2 27 28    102   BUN 25* 27*   CREATININE 1.3 1.3   CALCIUM 8.6* 8.7   PROT 5.9* 6.1   ALBUMIN 2.9* 3.0*   BILITOT 0.6 0.7   ALKPHOS 99 102   AST 36 38   ALT 36 36   ANIONGAP 5* 4*   EGFRNONAA 48* 48*     Microbiology Results (last 7 days)     Procedure Component Value Units Date/Time    Culture, Anaerobe [[id number]] Collected:  11/13/17 0906    Order Status:  Completed Specimen:  Abscess from Buttocks, Right Updated:  11/15/17 1106     Anaerobic Culture Culture in progress    Blood culture [[id number]] Collected:  11/12/17 0523    Order Status:  Completed Specimen:  Blood Updated:  11/15/17 1022     Blood Culture, Routine No Growth to date     Blood Culture, Routine No Growth to date     Blood Culture, Routine No Growth to date     Blood Culture, Routine No Growth to date    Blood culture [[id number]] Collected:  11/12/17 0523    Order Status:  Completed Specimen:  Blood Updated:  11/15/17 1022     Blood Culture, Routine No Growth to date     Blood Culture, Routine No Growth to date     Blood Culture, Routine No Growth to date     Blood Culture, Routine No Growth to date    Aerobic culture [[id number]] Collected:  11/13/17 0906    Order Status:  Completed Specimen:  Abscess from Buttocks, Right Updated:  11/15/17 0741     Aerobic Bacterial Culture --     STAPHYLOCOCCUS AUREUS  Few  Susceptibility pending  Skin catrachita also present      Blood culture #1 **CANNOT BE ORDERED STAT** [[id number]] Collected:  11/10/17 1610    Order Status:  Completed Specimen:  Blood from Peripheral, Wrist, Left Updated:  11/14/17 2022     Blood Culture, Routine No Growth to date     Blood Culture, Routine No Growth to date     Blood Culture, Routine No Growth to date     Blood Culture, Routine No Growth to date     Blood Culture, Routine No Growth to date    Blood culture #2 **CANNOT BE ORDERED STAT** [[id number]] Collected:  11/10/17 1625    Order Status:  Completed Specimen:  Blood from Peripheral, Antecubital,  Left Updated:  11/14/17 2022     Blood Culture, Routine No Growth to date     Blood Culture, Routine No Growth to date     Blood Culture, Routine No Growth to date     Blood Culture, Routine No Growth to date     Blood Culture, Routine No Growth to date    Gram stain [[id number]] Collected:  11/13/17 0906    Order Status:  Completed Specimen:  Abscess from Buttocks, Right Updated:  11/13/17 2024     Gram Stain Result No WBC's      No organisms seen          Significant Imaging: I have reviewed all pertinent imaging results/findings within the past 24 hours.

## 2017-11-15 NOTE — NURSING
Dressing changed to R buttock as per orders. Xeroform applied and dressed with mepilex border. Pt tolerated well. Will continue to monitor.

## 2017-11-15 NOTE — ASSESSMENT & PLAN NOTE
- pt with recent admission for cellulitis, discharged on a course of clindamycin  - blood cultures from that admission are now 4/4 positive for MRSA  - on exam area appears indurated with small amount of drainage  - TTE is pending  - slight leucocytosis on admission, WBC had since trended down but is up to 13.44 this morning.   - repeat cultures NGTD so far   -pt s/p I&D, surgical cultures now growing S.aureus   - continue vancomycin and please obtain trough 30 minutes prior to 4th dose. Goal trough is 15-20.   -TTE shows no evidence of endocarditis  - JEVON negative as well, no signs of pacemaker lead involvement or valvular vegetation  - with pt's cardiac history and placement of a pacemaker concern is for implant infection. Imaging is negative at this time. Discussed treatment options with Cardiology, pt and family, will treat for bacteremia for at least 4 weeks and then re-assess prior to discontinuing antibiotics. Hope is to treat through this infection and not have to exchange pt's pacemaker and lead, which would be very risky for this patient given age and co-morbidities.    - continue vancomycin, pt will need weekly CBC, CMP, ESR, CRP and twice weekly vancomycin levels to be followed at facility receiving patient.   - will need re-evaluation by Infectious Diseases prior to discontinuing antibiotics.

## 2017-11-15 NOTE — SUBJECTIVE & OBJECTIVE
Interval History: Pt continues to complain of pain to his buttocks, denies fever or chills, nausea or vomiting, chest pain or SOB.     Review of Systems   Constitutional: Negative for chills and fever.   Respiratory: Negative for chest tightness and shortness of breath.    Cardiovascular: Negative for chest pain and palpitations.   Gastrointestinal: Negative for constipation and diarrhea.     Objective:     Vital Signs (Most Recent):  Temp: 97.5 °F (36.4 °C) (11/15/17 1100)  Pulse: 61 (11/15/17 1100)  Resp: 18 (11/15/17 1100)  BP: (!) 155/65 (11/15/17 1100)  SpO2: 95 % (11/15/17 0715) Vital Signs (24h Range):  Temp:  [97.1 °F (36.2 °C)-98 °F (36.7 °C)] 97.5 °F (36.4 °C)  Pulse:  [60-74] 61  Resp:  [18-20] 18  SpO2:  [95 %] 95 %  BP: (149-178)/(65-74) 155/65     Weight: 76.3 kg (168 lb 3.4 oz)  Body mass index is 25.58 kg/m².    Estimated Creatinine Clearance: 36.5 mL/min (based on SCr of 1.3 mg/dL).    Physical Exam   Constitutional: He appears well-developed and well-nourished. No distress.   HENT:   Head: Normocephalic.   Mouth/Throat: Oropharynx is clear and moist.   Edentulous, with upper dentures. Dark colored lesion noted to right lateral tongue.    Eyes: EOM are normal. Pupils are equal, round, and reactive to light. No scleral icterus.   Neck: Normal range of motion. No JVD present.   Cardiovascular: Normal rate and regular rhythm.    Murmur heard.  III/VI systolic ejection murmur best heard at LUSB   Pulmonary/Chest: Effort normal. No respiratory distress. He has rales.   Faint crackles heard at this bases   Abdominal: Soft.   Musculoskeletal: Normal range of motion. He exhibits no edema.   Lymphadenopathy:     He has no cervical adenopathy.   Neurological: He is alert.   Pt confused to year and president, but is otherwise alert and oriented to situation.    Skin: Skin is warm and dry. Capillary refill takes less than 2 seconds.   Surgical dressing in place on right buttock.    Psychiatric: He has a normal  mood and affect. His behavior is normal.       Significant Labs:   CBC:   Recent Labs  Lab 11/14/17  0643 11/14/17  1226 11/15/17  0534   WBC 11.69 11.94 12.19   HGB 9.7* 9.4* 9.4*   HCT 28.6* 28.0* 29.1*   * 129* 142*     CMP:   Recent Labs  Lab 11/14/17  0643 11/15/17  0533   * 131*   K 4.4 4.5    99   CO2 27 28    102   BUN 25* 27*   CREATININE 1.3 1.3   CALCIUM 8.6* 8.7   PROT 5.9* 6.1   ALBUMIN 2.9* 3.0*   BILITOT 0.6 0.7   ALKPHOS 99 102   AST 36 38   ALT 36 36   ANIONGAP 5* 4*   EGFRNONAA 48* 48*     Microbiology Results (last 7 days)     Procedure Component Value Units Date/Time    Culture, Anaerobe [[id number]] Collected:  11/13/17 0906    Order Status:  Completed Specimen:  Abscess from Buttocks, Right Updated:  11/15/17 1106     Anaerobic Culture Culture in progress    Blood culture [[id number]] Collected:  11/12/17 0523    Order Status:  Completed Specimen:  Blood Updated:  11/15/17 1022     Blood Culture, Routine No Growth to date     Blood Culture, Routine No Growth to date     Blood Culture, Routine No Growth to date     Blood Culture, Routine No Growth to date    Blood culture [[id number]] Collected:  11/12/17 0523    Order Status:  Completed Specimen:  Blood Updated:  11/15/17 1022     Blood Culture, Routine No Growth to date     Blood Culture, Routine No Growth to date     Blood Culture, Routine No Growth to date     Blood Culture, Routine No Growth to date    Aerobic culture [[id number]] Collected:  11/13/17 0906    Order Status:  Completed Specimen:  Abscess from Buttocks, Right Updated:  11/15/17 0741     Aerobic Bacterial Culture --     STAPHYLOCOCCUS AUREUS  Few  Susceptibility pending  Skin catrachita also present      Blood culture #1 **CANNOT BE ORDERED STAT** [[id number]] Collected:  11/10/17 1610    Order Status:  Completed Specimen:  Blood from Peripheral, Wrist, Left Updated:  11/14/17 2022     Blood Culture, Routine No Growth to date     Blood Culture, Routine No Growth  to date     Blood Culture, Routine No Growth to date     Blood Culture, Routine No Growth to date     Blood Culture, Routine No Growth to date    Blood culture #2 **CANNOT BE ORDERED STAT** [[id number]] Collected:  11/10/17 1625    Order Status:  Completed Specimen:  Blood from Peripheral, Antecubital, Left Updated:  11/14/17 2022     Blood Culture, Routine No Growth to date     Blood Culture, Routine No Growth to date     Blood Culture, Routine No Growth to date     Blood Culture, Routine No Growth to date     Blood Culture, Routine No Growth to date    Gram stain [[id number]] Collected:  11/13/17 0906    Order Status:  Completed Specimen:  Abscess from Buttocks, Right Updated:  11/13/17 2024     Gram Stain Result No WBC's      No organisms seen          Significant Imaging: I have reviewed all pertinent imaging results/findings within the past 24 hours.

## 2017-11-16 ENCOUNTER — PATIENT OUTREACH (OUTPATIENT)
Dept: ADMINISTRATIVE | Facility: CLINIC | Age: 82
End: 2017-11-16

## 2017-11-16 PROBLEM — Z78.9 TAKES DIETARY SUPPLEMENTS: Status: ACTIVE | Noted: 2017-11-16

## 2017-11-16 PROBLEM — R53.81 DEBILITY: Status: ACTIVE | Noted: 2017-11-08

## 2017-11-16 LAB
ALBUMIN SERPL BCP-MCNC: 2.9 G/DL
ALP SERPL-CCNC: 98 U/L
ALT SERPL W/O P-5'-P-CCNC: 33 U/L
ANION GAP SERPL CALC-SCNC: 7 MMOL/L
AST SERPL-CCNC: 36 U/L
BACTERIA SPEC AEROBE CULT: NORMAL
BASOPHILS # BLD AUTO: 0.02 K/UL
BASOPHILS NFR BLD: 0.2 %
BILIRUB SERPL-MCNC: 0.6 MG/DL
BUN SERPL-MCNC: 27 MG/DL
CALCIUM SERPL-MCNC: 8.5 MG/DL
CHLORIDE SERPL-SCNC: 99 MMOL/L
CO2 SERPL-SCNC: 27 MMOL/L
CREAT SERPL-MCNC: 1.4 MG/DL
DIFFERENTIAL METHOD: ABNORMAL
EOSINOPHIL # BLD AUTO: 0.3 K/UL
EOSINOPHIL NFR BLD: 2.4 %
ERYTHROCYTE [DISTWIDTH] IN BLOOD BY AUTOMATED COUNT: 14 %
EST. GFR  (AFRICAN AMERICAN): 50.8 ML/MIN/1.73 M^2
EST. GFR  (NON AFRICAN AMERICAN): 43.9 ML/MIN/1.73 M^2
GLUCOSE SERPL-MCNC: 109 MG/DL
HCT VFR BLD AUTO: 27.9 %
HGB BLD-MCNC: 9 G/DL
LYMPHOCYTES # BLD AUTO: 6.1 K/UL
LYMPHOCYTES NFR BLD: 54.5 %
MCH RBC QN AUTO: 30.4 PG
MCHC RBC AUTO-ENTMCNC: 32.3 G/DL
MCV RBC AUTO: 94 FL
MONOCYTES # BLD AUTO: 2 K/UL
MONOCYTES NFR BLD: 17.7 %
NEUTROPHILS # BLD AUTO: 2.8 K/UL
NEUTROPHILS NFR BLD: 25.2 %
PLATELET # BLD AUTO: 145 K/UL
PMV BLD AUTO: 9.8 FL
POTASSIUM SERPL-SCNC: 4.2 MMOL/L
PROT SERPL-MCNC: 5.9 G/DL
RBC # BLD AUTO: 2.96 M/UL
SODIUM SERPL-SCNC: 133 MMOL/L
VANCOMYCIN TROUGH SERPL-MCNC: 20.3 UG/ML
WBC # BLD AUTO: 11.16 K/UL

## 2017-11-16 PROCEDURE — 36415 COLL VENOUS BLD VENIPUNCTURE: CPT

## 2017-11-16 PROCEDURE — 25000003 PHARM REV CODE 250: Performed by: STUDENT IN AN ORGANIZED HEALTH CARE EDUCATION/TRAINING PROGRAM

## 2017-11-16 PROCEDURE — 11000004 HC SNF PRIVATE

## 2017-11-16 PROCEDURE — 25000003 PHARM REV CODE 250: Performed by: INTERNAL MEDICINE

## 2017-11-16 PROCEDURE — 97802 MEDICAL NUTRITION INDIV IN: CPT

## 2017-11-16 PROCEDURE — A4216 STERILE WATER/SALINE, 10 ML: HCPCS | Performed by: STUDENT IN AN ORGANIZED HEALTH CARE EDUCATION/TRAINING PROGRAM

## 2017-11-16 PROCEDURE — 99306 1ST NF CARE HIGH MDM 50: CPT | Mod: AI,,, | Performed by: INTERNAL MEDICINE

## 2017-11-16 PROCEDURE — 97165 OT EVAL LOW COMPLEX 30 MIN: CPT

## 2017-11-16 PROCEDURE — 97110 THERAPEUTIC EXERCISES: CPT

## 2017-11-16 PROCEDURE — 85025 COMPLETE CBC W/AUTO DIFF WBC: CPT

## 2017-11-16 PROCEDURE — 97161 PT EVAL LOW COMPLEX 20 MIN: CPT

## 2017-11-16 PROCEDURE — 80202 ASSAY OF VANCOMYCIN: CPT

## 2017-11-16 PROCEDURE — 97530 THERAPEUTIC ACTIVITIES: CPT

## 2017-11-16 PROCEDURE — 97116 GAIT TRAINING THERAPY: CPT

## 2017-11-16 PROCEDURE — 63600175 PHARM REV CODE 636 W HCPCS: Performed by: STUDENT IN AN ORGANIZED HEALTH CARE EDUCATION/TRAINING PROGRAM

## 2017-11-16 PROCEDURE — 80053 COMPREHEN METABOLIC PANEL: CPT

## 2017-11-16 PROCEDURE — 63600175 PHARM REV CODE 636 W HCPCS: Performed by: INTERNAL MEDICINE

## 2017-11-16 PROCEDURE — 97535 SELF CARE MNGMENT TRAINING: CPT

## 2017-11-16 RX ORDER — FUROSEMIDE 20 MG/1
20 TABLET ORAL 2 TIMES DAILY PRN
Status: DISCONTINUED | OUTPATIENT
Start: 2017-11-17 | End: 2017-11-30

## 2017-11-16 RX ORDER — AMOXICILLIN 250 MG
1 CAPSULE ORAL 2 TIMES DAILY PRN
Status: DISCONTINUED | OUTPATIENT
Start: 2017-11-17 | End: 2017-12-12 | Stop reason: HOSPADM

## 2017-11-16 RX ORDER — ACETAMINOPHEN 500 MG
500 TABLET ORAL EVERY 8 HOURS
Status: DISCONTINUED | OUTPATIENT
Start: 2017-11-16 | End: 2017-12-12 | Stop reason: HOSPADM

## 2017-11-16 RX ORDER — OXYCODONE AND ACETAMINOPHEN 7.5; 325 MG/1; MG/1
1 TABLET ORAL EVERY 8 HOURS PRN
Status: DISCONTINUED | OUTPATIENT
Start: 2017-11-16 | End: 2017-11-20

## 2017-11-16 RX ADMIN — HEPARIN SODIUM 5000 UNITS: 5000 INJECTION, SOLUTION INTRAVENOUS; SUBCUTANEOUS at 06:11

## 2017-11-16 RX ADMIN — ATORVASTATIN CALCIUM 20 MG: 20 TABLET, FILM COATED ORAL at 08:11

## 2017-11-16 RX ADMIN — DOCUSATE SODIUM 100 MG: 100 CAPSULE, LIQUID FILLED ORAL at 09:11

## 2017-11-16 RX ADMIN — RAMELTEON 8 MG: 8 TABLET, FILM COATED ORAL at 08:11

## 2017-11-16 RX ADMIN — HEPARIN SODIUM 5000 UNITS: 5000 INJECTION, SOLUTION INTRAVENOUS; SUBCUTANEOUS at 01:11

## 2017-11-16 RX ADMIN — FUROSEMIDE 20 MG: 20 TABLET ORAL at 08:11

## 2017-11-16 RX ADMIN — ACETAMINOPHEN 500 MG: 500 TABLET ORAL at 09:11

## 2017-11-16 RX ADMIN — Medication 10 ML: at 11:11

## 2017-11-16 RX ADMIN — Medication 10 ML: at 05:11

## 2017-11-16 RX ADMIN — OXYCODONE HYDROCHLORIDE AND ACETAMINOPHEN 1 TABLET: 7.5; 325 TABLET ORAL at 05:11

## 2017-11-16 RX ADMIN — LEVOTHYROXINE SODIUM 100 MCG: 100 TABLET ORAL at 06:11

## 2017-11-16 RX ADMIN — DONEPEZIL HYDROCHLORIDE 10 MG: 10 TABLET, FILM COATED ORAL at 08:11

## 2017-11-16 RX ADMIN — CALCIUM 500 MG: 500 TABLET ORAL at 09:11

## 2017-11-16 RX ADMIN — TAMSULOSIN HYDROCHLORIDE 0.4 MG: 0.4 CAPSULE ORAL at 09:11

## 2017-11-16 RX ADMIN — ASPIRIN 81 MG: 81 TABLET, COATED ORAL at 09:11

## 2017-11-16 RX ADMIN — THERA TABS 1 TABLET: TAB at 09:11

## 2017-11-16 RX ADMIN — FERROUS SULFATE TAB EC 325 MG (65 MG FE EQUIVALENT) 325 MG: 325 (65 FE) TABLET DELAYED RESPONSE at 09:11

## 2017-11-16 RX ADMIN — FUROSEMIDE 20 MG: 20 TABLET ORAL at 09:11

## 2017-11-16 RX ADMIN — ACETAMINOPHEN 650 MG: 325 TABLET ORAL at 11:11

## 2017-11-16 RX ADMIN — STANDARDIZED SENNA CONCENTRATE AND DOCUSATE SODIUM 1 TABLET: 8.6; 5 TABLET, FILM COATED ORAL at 08:11

## 2017-11-16 RX ADMIN — OXYCODONE HYDROCHLORIDE AND ACETAMINOPHEN 500 MG: 500 TABLET ORAL at 09:11

## 2017-11-16 RX ADMIN — HEPARIN SODIUM 5000 UNITS: 5000 INJECTION, SOLUTION INTRAVENOUS; SUBCUTANEOUS at 09:11

## 2017-11-16 RX ADMIN — DOCUSATE SODIUM 100 MG: 100 CAPSULE, LIQUID FILLED ORAL at 08:11

## 2017-11-16 RX ADMIN — CARVEDILOL 6.25 MG: 6.25 TABLET, FILM COATED ORAL at 09:11

## 2017-11-16 RX ADMIN — ACETAMINOPHEN 650 MG: 325 TABLET ORAL at 06:11

## 2017-11-16 RX ADMIN — GABAPENTIN 100 MG: 100 CAPSULE ORAL at 09:11

## 2017-11-16 RX ADMIN — GABAPENTIN 300 MG: 100 CAPSULE ORAL at 08:11

## 2017-11-16 RX ADMIN — Medication 10 ML: at 06:11

## 2017-11-16 RX ADMIN — CARVEDILOL 6.25 MG: 6.25 TABLET, FILM COATED ORAL at 08:11

## 2017-11-16 RX ADMIN — CYANOCOBALAMIN TAB 1000 MCG 1000 MCG: 1000 TAB at 09:11

## 2017-11-16 RX ADMIN — VANCOMYCIN HYDROCHLORIDE 1250 MG: 1 INJECTION, POWDER, LYOPHILIZED, FOR SOLUTION INTRAVENOUS at 08:11

## 2017-11-16 RX ADMIN — STANDARDIZED SENNA CONCENTRATE AND DOCUSATE SODIUM 1 TABLET: 8.6; 5 TABLET, FILM COATED ORAL at 09:11

## 2017-11-16 RX ADMIN — FINASTERIDE 5 MG: 5 TABLET, FILM COATED ORAL at 09:11

## 2017-11-16 NOTE — PT/OT/SLP DISCHARGE
Physical Therapy Discharge Summary    Zeyad Woodard  MRN: [id number]   MRSA bacteremia   Patient Discharged from acute Physical Therapy on 11/15/2017.  Please refer to prior PT noted date on 11/15/2017 for functional status.     Assessment:   Patient appropriate for care in another setting.  GOALS:    Physical Therapy Goals        Problem: Physical Therapy Goal    Goal Priority Disciplines Outcome Goal Variances Interventions   Physical Therapy Goal     PT/OT, PT Ongoing (interventions implemented as appropriate)     Description:  Goals to be met by: 17     Patient will increase functional independence with mobility by performin. Sit to stand transfer with Stand-by Assistance  2. Bed to chair transfer with Stand-by Assistance using Rolling Walker  3. Gait  x 50 feet with Stand-by Assistance using Rolling Walker.   4. Stand for 5 minutes with Stand-by Assistance using Rolling Walker MET 17  5. Lower extremity exercise program x 15 reps per handout, with supervision                     Reasons for Discontinuation of Therapy Services  Transfer to alternate level of care.      Plan:  Patient Discharged to: Skilled Nursing Facility.

## 2017-11-16 NOTE — CONSULTS
Wound care consulted for right buttocks, sacrum  The right buttock/sacral area is pink/red with 2 sutures noted to right mid buttock- slight induration noted at 3:00- no drainage.  Redness decreased from marking on buttocks.  Recommend foam dressing changed 3 x week to prevent shearing/friction, wear briefs/use urinal, no diapers, barrier cream BID.Nursing to cleanse buttocks  With cleansing cloths and apply Critic-Aid Clear moisture barrier (purple top) BID and prn cleaning.Plan of care discussed with patient who verbalized understanding.     11/16/17 1025       Incision/Site 11/13/17 0818 Right gluteal   Date First Assessed/Time First Assessed: 11/13/17 0818   Side: Right  Location: gluteal   Incision WDL ex   Dressing Appearance intact  (saturated with urine)   Appearance reddened;clean;moist   Periwound Area redness   Drainage Amount none   Wound Length (cm) 0.1   Wound Width (cm) 2   Wound Edges intact   Cleansed W/ soap and water   Dressing Dressing changed;foam   Dressing Change Due 11/17/17

## 2017-11-16 NOTE — DISCHARGE SUMMARY
[facility] Discharge Summary    Primary Team: [facility] Hospital Team B  Attending Physician: No att. providers found  Resident: Melissa Gonzales  Intern: Theresa Herndon    Date of Admit: 11/10/2017  Date of Discharge: 11/15/2017    Discharge to: [facility]  Condition: Good    Discharge Diagnoses     Patient Active Problem List   Diagnosis    Renovascular hypertension    Chronic atrial fibrillation    Benign prostatic hyperplasia    Chronic diastolic congestive heart failure    Polyneuropathy    Hypothyroidism    Iron deficiency anemia due to chronic blood loss    CKD (chronic kidney disease), stage III    Chronic back pain    Lumbar radiculopathy    SSS (sick sinus syndrome)    Bradycardia    Frequent falls    Opioid dependence with intoxication with complication    Venous insufficiency of both lower extremities    Thrombocytopenia    Chronic constipation    Anemia of chronic renal failure, stage 3 (moderate)    Elevated troponin    Right hip pain    Contusion of right hip    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    Hypothyroidism due to acquired atrophy of thyroid    Pseudophakia    Essential hypertension    Right posterior capsular opacification    Refractive error    Post-operative state    Dementia without behavioral disturbance    Chronic bilateral low back pain with bilateral sciatica    Use of opiates for therapeutic purposes    Chronic pain    Abnormal blood smear    Cellulitis of buttock    HLD (hyperlipidemia)    Debility    Bacteremia    MRSA bacteremia    Cardiac pacemaker in situ    Pericardial calcification    Mitral regurgitation    Cellulitis and abscess of buttock    Takes dietary supplements       Consultants and Procedures     Consultants:  Infectious Disease, Wound Care, PT/OT, Cardiology, Surgery    Procedures:   TTE, JEVON, I&D of buttock abscess    Brief History of Present Illness      Zeyad Woodard is a 89 y.o. male who   has a history of Anemia; Atrial fibrillation; BPH (benign prostatic hypertrophy); Cancer; Cardiac pacemaker in situ (7/2/2015); CHF (congestive heart failure); Coronary artery disease; Encounter for blood transfusion; GI bleed; Hypertension; Hypothyroidism; Polyneuropathy; Posture imbalance; Right posterior capsular opacification (1/18/2017); and SSS (sick sinus syndrome) (2015).  The patient presented to [facility] on 11/10/2017 with a primary complaint of Abnormal Lab. Patient seen for cellulitis as inpatient, after discharge blood cultures grew MRSA. Patient contacted and asked to return to hospital for further treatment.    Patient discharged recently for cellulitis on clindamycin, however patient's blood cultures returned MRSA so called and had patient return to hospital for IV antibiotics and evaluation by infectious disease.    Discussed with patient (two daughters) DNR status on admission.     For the full HPI please refer to the History & Physical from this admission.    Hospital Course By Problem with Pertinent Findings     MRSA bacteremia 2/2 cellulitis  - Patient discharged recently for cellulitis on clindamycin, however patient's blood cultures returned MRSA so called and had patient return to hospital for further evaluation.   - Sacral wound s/p I&D, surgery following, appreciate recs, daily dressing change with xeroform and mepalex bandage  - Tylenol 650mg po q6h scheduled for pain  - WBC 13.6 on admission, slight decrease from previous admission.   - Blood Cx Q other day, repeats negative during stay  - TTE with no evidence of endocarditis  - JEVON with no evidence of infected pacemaker leads, no vegetations       - Vancomycin 1500mg x1 in Ed, 1250mg IV daily            -Trough drawn before 4th dose, 15.6  - ID consult, appreciate recs: continue IV Abx for 4 weeks then reevaluate  - Cardiology consult, appreciate recs. JEVON as above     Chronic heart failure with combined  systolic/diastolic dysfunction and mitral regurgitation  - EF 45%, last echo Dec 2016, Euvolemic on exam with mild pedal edema   - TTE 11/11 with normal LVEF 60-65%, atrial fibrillation, no signs of endocarditis   - lasix 20 mg BID   - JEVON with mild MR mild trival-mild Ar, mild TR     HTN  - BP high since admission. Switch toprol to coreg, 6.25mg BID  - Continue on discharge     Iron deficiency anemia due to blood loss  - Stable, Continued iron 325 mg daily, B12 100 mcg daily, probiotic daily     Dementia  - stable, conversant and interactive; continued home aricept 10 mg nightly      HLD  - Continued home atorvastatin 20mg QHS  - Daily ASA 81 mg      Neuropathy   - Continued home gabapentin 100mg qday, 300mg qhs     BPH  - Asymptomatic, continued finasteride 5mg daily, flomax 0.4 mg daily     Hypothyroidism   - recent TSH wnl, continued synthroid 100mcg daily      HCM  - ordered flu and tdap for administration prior to discharge    Discharge Medications        Medication List      START taking these medications    carvedilol 6.25 MG tablet  Commonly known as:  COREG  Take 1 tablet (6.25 mg total) by mouth 2 (two) times daily.        CHANGE how you take these medications    clotrimazole-betamethasone lotion  Commonly known as:  LOTRISONE  Apply topically 2 (two) times daily.  What changed:  · when to take this  · reasons to take this        CONTINUE taking these medications    aspirin 81 MG EC tablet  Commonly known as:  ECOTRIN  Take 1 tablet (81 mg total) by mouth once daily.     atorvastatin 20 MG tablet  Commonly known as:  LIPITOR  Take 1 tablet (20 mg total) by mouth nightly.     calcium 500 mg Tab     donepezil 10 MG tablet  Commonly known as:  ARICEPT  Take 1 tablet (10 mg total) by mouth every evening.     ferrous sulfate 325 mg (65 mg iron) Tab tablet     finasteride 5 mg tablet  Commonly known as:  PROSCAR  Take 1 tablet (5 mg total) by mouth once daily.     furosemide 20 MG tablet  Commonly known as:   LASIX     gabapentin 100 MG capsule  Commonly known as:  NEURONTIN     levothyroxine 100 MCG tablet  Commonly known as:  SYNTHROID  Take 1 tablet (100 mcg total) by mouth once daily.     ONE-A-DAY MEN'S MULTIVITAMIN ORAL     senna-docusate 8.6-50 mg 8.6-50 mg per tablet  Commonly known as:  PERICOLACE  Take 1 tablet by mouth 2 (two) times daily.     tamsulosin 0.4 mg Cp24  Commonly known as:  FLOMAX  Take 1 capsule (0.4 mg total) by mouth once daily.     VITAMIN B-12 1000 MCG tablet  Generic drug:  cyanocobalamin     VITAMIN C ORAL        STOP taking these medications    clindamycin 300 MG capsule  Commonly known as:  CLEOCIN     lactobacillus acidophilus & bulgar 100 million cell packet  Commonly known as:  LACTINEX     metoprolol succinate 25 MG 24 hr tablet  Commonly known as:  TOPROL-XL     oxyCODONE-acetaminophen 7.5-325 mg per tablet  Commonly known as:  PERCOCET           Where to Get Your Medications      These medications were sent to [facility] [place] - [address] [place] [address]  [address] [facility] [address]    Phone:  [phone]   · carvedilol 6.25 MG tablet         Discharge Information:   Diet:  cardiac    Physical Activity:  As tolerated    Instructions:  1. Take all medications as prescribed  2. Keep all follow-up appointments  3. Return to the hospital or call your primary care physicians if any worsening symptoms such as temperature >100.4, increased pain, altered mental status, decreased PO intake, chest pain or difficulty breathing occur.      Follow-Up Appointments:  Follow-up With  Details  Why  Contact Info   Elsa Rahman MD  On 11/30/2017  1:45pm  [address]  [address]  [place] [id number]  [phone]   Daniel Ramirez MD  On 12/6/2017  1pm  [address]  [address]  [place] [id number]  [phone]         [facility] Internal Medicine, -1

## 2017-11-16 NOTE — PT/OT/SLP EVAL
Occupational Therapy  Evaluation    Zeyad Woodard   MRN: [id number]   Admitting Diagnosis: MRSA bacteremia     OT Date of Treatment: 11/16/17   OT Start Time: 1020  OT Stop Time: 1115  OT Total Time (min): 55 min    Billable Minutes:  Evaluation 15  Self Care/Home Management 25  Therapeutic Activity 15    Diagnosis: MRSA bacteremia    Past Medical History:   Diagnosis Date    Anemia     Atrial fibrillation     BPH (benign prostatic hypertrophy)     Cancer     Cardiac pacemaker in situ 7/2/2015    CHF (congestive heart failure)     Coronary artery disease     Encounter for blood transfusion     GI bleed     cecum angiectasia s/p cautery    Hypertension     Hypothyroidism     Polyneuropathy     Posture imbalance     due to neuropathy    Right posterior capsular opacification 1/18/2017    SSS (sick sinus syndrome) 2015    s/p pacemaker      Past Surgical History:   Procedure Laterality Date    CARDIAC SURGERY      CATARACT EXTRACTION W/  INTRAOCULAR LENS IMPLANT Right 05/17/2010        CATARACT EXTRACTION W/  INTRAOCULAR LENS IMPLANT Left 02/16/2004        cataract surgery      COLONOSCOPY  6/30/04    COLONOSCOPY N/A 2/15/2016    Procedure: COLONOSCOPY;  Surgeon: Stanton Kirk MD;  Location: [address];  Service: Endoscopy;  Laterality: N/A;    EYE SURGERY      HERNIA REPAIR      inguinal hernia repair      Open heart surgery for pericardiectomy      for calcific constrictive pericarditis    UMBILICAL HERNIA REPAIR      Yag      Left Eye         General Precautions: Standard, contact, fall  Orthopedic Precautions: N/A  Braces: N/A          Patient History:  Lives With: alone  Living Arrangements: house  Home Layout:  (H - walk in shower)  Transportation Available: family or friend will provide  Living Environment Comment:  (Pt lives alone in [facility] with daughter near by who checks on hm daily, assisting with finances, cooking and shopping, as well as, supervision  "while bathing - PLOF was mod I with all ADLs using AD)  Equipment Currently Used at Home: grab bar, raised toilet, shower chair, walker, rolling (lift chair - pt unsure if he has a rollator or transport w/c)    Prior level of function:   Bed Mobility/Transfers: independent  Grooming: independent  Bathing: needs device  Upper Body Dressing: independent  Lower Body Dressing: independent  Toileting: needs device  Home Management Skills: needs assist  Homemaking Responsibilities:  (daughter assists with cooking, shopping and finances)  Driving License: No  Mode of Transportation: Family  Occupation: Retired  Type of Occupation:  ([employer])  Leisure and Hobbies:  (used to enjoy woodworking - now watches tv and exercises at )     Dominant hand: right    Subjective:  Communicated with nurse prior to session.  "I'm sorry.  I need to use the urinal."    Chief Complaint: pain in buttocks  Patient/Family stated goals: return to PLOF    Pain/Comfort  Pain Rating 1: 3/10  Location - Side 1: Right  Location 1:  (buttocks)  Pain Addressed 1: Reposition, Distraction, Nurse notified  Pain Rating Post-Intervention 1: 3/10    Objective:   Patient found with:  (supine in bed - no lines- PICC line in place but not )    Cognitive Exam:  Oriented to: Person, Place, Time and Situation  Follows Commands/attention: Follows two-step commands  Communication: clear/fluent  Memory:  No Deficits noted  Safety awareness/insight to disability: impaired  Coping skills/emotional control: Appropriate to situation    Visual/perceptual:  Intact    Physical Exam:  Postural examination/scapula alignment: Rounded shoulder and Head forward  Skin integrity: wound on buttocks  Edema: None noted     Sensation:   Intact    Upper Extremity Range of Motion:  Right Upper Extremity: WFL  Left Upper Extremity: WFL    Upper Extremity Strength:  Right Upper Extremity: WFL  Left Upper Extremity: WFL   Strength: good    Fine motor coordination: "   Intact    Gross motor coordination: WFL    Functional Status:  MDS G  Bed Mobility Functional Status: CGA-Min (A)  Bed Mobility Level of (A):  (S for rolling R/L using bed rail; Min A for supine <-> sit EOB using bedrail)    Transfer Functional Status: mod(A)-Max(A)  Transfer Level of (A):  (Mod A for sit to stand without AD)    Dressing Functional Status: 3:mod(A)-Max(A)  Dressing Level of (A) :  (UE = set up hospital gown LE = max A - assist to rhonda/doff socks but able to rhonda pants)    Eating Functional Status: S-SBA  Eating Level of (A) :  (set up A)    Toilet Use Functional Status: mod(A)-Max(A)  Toilet Use Level of (A) :  (Mod A using urinal - needed A with clothing mgmt before toileting)    Personal Hygiene Functional Status: S-SBA  Personal Hygiene Level of (A) :  (set up A to wash face with wash cloth)    Bathing Functional Status: mod(A)-Max(A)  Bathing Level of (A) :  (Mod A - pt needed assist to wash B feet and buttocks - sponge bath)    Moving from seated to standing position: Not steady, only able to stabilize with staff assistance  Walking (with assistive device if used): Not steady, only able to stabilize with staff assistance  Turning around and facing the opposite direction while walking: Not steady, only able to stabilize with staff assistance  Moving on and off the toilet: Not steady, only able to stabilize with staff assistance  Surface-to-surface transfer (transfer between bed and chair or wheelchair): Not steady, only able to stabilize with staff assistance    Additional Treatment:  · Pt completed ADLs and func mobility activities for tx session as noted above  · OT educated pt on SNF - discussed discharge planning goals  · Pt educated on role of OT and POC      Patient left HOB elevated with call button in reach and nurse present    Assessment:  Zeyad Woodard is a 89 y.o. male with a medical diagnosis of MRSA bacteremia.  Pt was agreeable to OT evaluation.  Goals established to assist  pt with returning to PLOF regarding ADLs and func mobility.  Pt noted with decreased endurance during ADLs and difficulty with LB dressing/bathing as barriers to independence.  Pt will benefit from skilled OT services in order to increase his level of safety and independence with ADLs and mobility.  At this time HHOT is recommended for pt's post SNF therapy needs with a w/c recommended for DME to address long distance mobility if he does not have one.     Pt evaluation falls under low complexity for evaluation coding due to performance deficits noted in 1-3 areas as stated above and 0 co-morbities affecting current functional status. Data obtained from problem focused assessments. No modifications or assistance was required for completion of evaluation. Only brief occupational profile and history review completed.       Rehab identified problem list/impairments: weakness, impaired endurance, impaired self care skills, impaired functional mobilty, gait instability, impaired balance, decreased upper extremity function, decreased lower extremity function, decreased safety awareness, pain, impaired coordination, impaired skin    Rehab potential is good    Activity tolerance: Fair    Discharge recommendations: home health OT     Barriers to discharge: Decreased caregiver support     Equipment recommendations: wheelchair     GOALS:    Occupational Therapy Goals        Problem: Occupational Therapy Goal    Goal Priority Disciplines Outcome Interventions   Occupational Therapy Goal     OT, PT/OT Ongoing (interventions implemented as appropriate)    Description:  Goals to be met by: 14 days     Patient will increase functional independence with ADLs by performing:    Feeding with Modified [place].  UE Dressing with Modified [place].  LE Dressing with Modified [place] using AD.  Grooming while standing with Modified [place].  Toileting from bedside commode with Modified [place] for hygiene and  clothing management.   Bathing from  shower chair/bench with Supervision.  Toilet transfer to bedside commode with Supervision.  Perform functional activity in standing with S for 10' in preparation for safety during standing ADls.                       PLAN: Patient to be seen  (5-6x/week) to address the above listed problems via self-care/home management, therapeutic activities, therapeutic exercises  Plan of Care expires: 12/16/17  Plan of Care reviewed with: patient    Opal Garcia OT  11/16/2017

## 2017-11-16 NOTE — PROGRESS NOTES
Pt. Admitted to skilled nursing. AAOx3. No c/o pain at this time. Resp even and unlabored. Bsx4. Has foam  dressing to buttocks area. Buttocks wound  has sutures, no drainage noted. Has bruising to left flank area. Bilateral heels intact. Rt. picc line patent and intact. Vanc trough ordered for 11/16/2017@ 1600.  Daily weight. Able to verbalize needs. Wheels locked and bed in low position.  Call light in reach.

## 2017-11-16 NOTE — PLAN OF CARE
Problem: Occupational Therapy Goal  Goal: Occupational Therapy Goal  Goals to be met by: 14 days     Patient will increase functional independence with ADLs by performing:    Feeding with Modified [place].  UE Dressing with Modified [place].  LE Dressing with Modified [place] using AD.  Grooming while standing with Modified [place].  Toileting from bedside commode with Modified [place] for hygiene and clothing management.   Bathing from  shower chair/bench with Supervision.  Toilet transfer to bedside commode with Supervision.  Perform functional activity in standing with S for 10' in preparation for safety during standing ADls.     Outcome: Ongoing (interventions implemented as appropriate)    Pt was agreeable to OT evaluation.  Goals established to assist pt with returning to PLOF regarding ADLs and func mobility.  Pt will benefit from skilled OT services in order to increase his level of safety and independence with ADLs and mobility.      Opal Garcia, OT  11/16/2017

## 2017-11-16 NOTE — PLAN OF CARE
Problem: Physical Therapy Goal  Goal: Physical Therapy Goal  Goals to be met by: 14 DAYS     Patient will increase functional independence with mobility by performin. Supine to sit with Supervision   2. Sit to supine with Supervision  3. Sit to stand transfer with Stand-by Assistance with RW  4. Bed to chair transfer with Stand-by Assistance using Rolling Walker  5. Gait  x 150 feet with Stand-by Assistance using Rolling Walker.   6. Wheelchair propulsion x100 feet with Stand-by Assistance using bilateral upper extremities  7. Ascend/Descend 4 inch curb step with Contact Guard Assistance using Rolling Walker.  8. Sitting at edge of bed x8 minutes with Stand-by Assistance and without UE support, and performing exercises or activity  9. Stand for 5 minutes with Contact Guard Assistance using Rolling Walker and perform an activity  10. Lower extremity exercise program x20 reps per handout, with assistance as needed and gym therex    PT eval completed. More Sanchez, PT 2017

## 2017-11-17 LAB
BACTERIA BLD CULT: NORMAL
BACTERIA BLD CULT: NORMAL
BACTERIA SPEC ANAEROBE CULT: NORMAL

## 2017-11-17 PROCEDURE — 25000003 PHARM REV CODE 250: Performed by: INTERNAL MEDICINE

## 2017-11-17 PROCEDURE — 11000004 HC SNF PRIVATE

## 2017-11-17 PROCEDURE — 63600175 PHARM REV CODE 636 W HCPCS: Performed by: STUDENT IN AN ORGANIZED HEALTH CARE EDUCATION/TRAINING PROGRAM

## 2017-11-17 PROCEDURE — 25000003 PHARM REV CODE 250: Performed by: STUDENT IN AN ORGANIZED HEALTH CARE EDUCATION/TRAINING PROGRAM

## 2017-11-17 PROCEDURE — 63600175 PHARM REV CODE 636 W HCPCS: Performed by: INTERNAL MEDICINE

## 2017-11-17 PROCEDURE — 99309 SBSQ NF CARE MODERATE MDM 30: CPT | Mod: ,,, | Performed by: NURSE PRACTITIONER

## 2017-11-17 PROCEDURE — 97116 GAIT TRAINING THERAPY: CPT

## 2017-11-17 PROCEDURE — 80202 ASSAY OF VANCOMYCIN: CPT

## 2017-11-17 PROCEDURE — A4216 STERILE WATER/SALINE, 10 ML: HCPCS | Performed by: STUDENT IN AN ORGANIZED HEALTH CARE EDUCATION/TRAINING PROGRAM

## 2017-11-17 PROCEDURE — 97110 THERAPEUTIC EXERCISES: CPT

## 2017-11-17 PROCEDURE — 97530 THERAPEUTIC ACTIVITIES: CPT

## 2017-11-17 PROCEDURE — 97535 SELF CARE MNGMENT TRAINING: CPT

## 2017-11-17 RX ADMIN — LEVOTHYROXINE SODIUM 100 MCG: 100 TABLET ORAL at 05:11

## 2017-11-17 RX ADMIN — OXYCODONE HYDROCHLORIDE AND ACETAMINOPHEN 1 TABLET: 7.5; 325 TABLET ORAL at 08:11

## 2017-11-17 RX ADMIN — OXYCODONE HYDROCHLORIDE AND ACETAMINOPHEN 500 MG: 500 TABLET ORAL at 09:11

## 2017-11-17 RX ADMIN — THERA TABS 1 TABLET: TAB at 09:11

## 2017-11-17 RX ADMIN — CARVEDILOL 6.25 MG: 6.25 TABLET, FILM COATED ORAL at 08:11

## 2017-11-17 RX ADMIN — Medication 10 ML: at 05:11

## 2017-11-17 RX ADMIN — DOCUSATE SODIUM 100 MG: 100 CAPSULE, LIQUID FILLED ORAL at 08:11

## 2017-11-17 RX ADMIN — GABAPENTIN 300 MG: 100 CAPSULE ORAL at 09:11

## 2017-11-17 RX ADMIN — Medication 10 ML: at 06:11

## 2017-11-17 RX ADMIN — RAMELTEON 8 MG: 8 TABLET, FILM COATED ORAL at 10:11

## 2017-11-17 RX ADMIN — Medication 10 ML: at 12:11

## 2017-11-17 RX ADMIN — ACETAMINOPHEN 500 MG: 500 TABLET ORAL at 05:11

## 2017-11-17 RX ADMIN — CALCIUM 500 MG: 500 TABLET ORAL at 09:11

## 2017-11-17 RX ADMIN — ASPIRIN 81 MG: 81 TABLET, COATED ORAL at 09:11

## 2017-11-17 RX ADMIN — VANCOMYCIN HYDROCHLORIDE 1250 MG: 1 INJECTION, POWDER, LYOPHILIZED, FOR SOLUTION INTRAVENOUS at 10:11

## 2017-11-17 RX ADMIN — CYANOCOBALAMIN TAB 1000 MCG 1000 MCG: 1000 TAB at 09:11

## 2017-11-17 RX ADMIN — FINASTERIDE 5 MG: 5 TABLET, FILM COATED ORAL at 09:11

## 2017-11-17 RX ADMIN — ACETAMINOPHEN 500 MG: 500 TABLET ORAL at 10:11

## 2017-11-17 RX ADMIN — ACETAMINOPHEN 500 MG: 500 TABLET ORAL at 03:11

## 2017-11-17 RX ADMIN — OXYCODONE HYDROCHLORIDE AND ACETAMINOPHEN 1 TABLET: 7.5; 325 TABLET ORAL at 09:11

## 2017-11-17 RX ADMIN — DONEPEZIL HYDROCHLORIDE 10 MG: 10 TABLET, FILM COATED ORAL at 08:11

## 2017-11-17 RX ADMIN — DOCUSATE SODIUM 100 MG: 100 CAPSULE, LIQUID FILLED ORAL at 09:11

## 2017-11-17 RX ADMIN — FERROUS SULFATE TAB EC 325 MG (65 MG FE EQUIVALENT) 325 MG: 325 (65 FE) TABLET DELAYED RESPONSE at 09:11

## 2017-11-17 RX ADMIN — HEPARIN SODIUM 5000 UNITS: 5000 INJECTION, SOLUTION INTRAVENOUS; SUBCUTANEOUS at 10:11

## 2017-11-17 RX ADMIN — CARVEDILOL 6.25 MG: 6.25 TABLET, FILM COATED ORAL at 09:11

## 2017-11-17 RX ADMIN — TAMSULOSIN HYDROCHLORIDE 0.4 MG: 0.4 CAPSULE ORAL at 09:11

## 2017-11-17 RX ADMIN — HEPARIN SODIUM 5000 UNITS: 5000 INJECTION, SOLUTION INTRAVENOUS; SUBCUTANEOUS at 05:11

## 2017-11-17 RX ADMIN — GABAPENTIN 100 MG: 100 CAPSULE ORAL at 09:11

## 2017-11-17 RX ADMIN — HEPARIN SODIUM 5000 UNITS: 5000 INJECTION, SOLUTION INTRAVENOUS; SUBCUTANEOUS at 03:11

## 2017-11-17 RX ADMIN — ATORVASTATIN CALCIUM 20 MG: 20 TABLET, FILM COATED ORAL at 08:11

## 2017-11-17 NOTE — ASSESSMENT & PLAN NOTE
Chronic with improved control  Continue therapy with coreg  -will continue to monitor and adjust regimen as necessary

## 2017-11-17 NOTE — ASSESSMENT & PLAN NOTE
Cr is at baseline of 1.1-1.4  Renally dose medications and avoid nephrotoxins  Continue to monitor  Regular Vanc level monitoring

## 2017-11-17 NOTE — SUBJECTIVE & OBJECTIVE
Hospital Course:  The patient was admitted at [facility] from 11/10 to 11/15/2017.  11/15: Patient admitted to SNF for ongoing OT/PT following a hospitalization for MRSA bacteremia.  11/17: Patient seen at bedside, report pain to buttock, slept well    Interval History: Patient seen at beside, slept well, c/o pain to buttocks, no acute events overnight.    Review of Systems   Constitutional: Negative for appetite change, chills, fatigue and fever.   HENT: Negative for trouble swallowing.    Respiratory: Negative for cough, chest tightness, shortness of breath and wheezing.    Cardiovascular: Negative for chest pain, palpitations and leg swelling.   Gastrointestinal: Negative for abdominal pain, constipation, diarrhea and nausea.   Genitourinary: Negative for difficulty urinating, frequency and urgency.   Musculoskeletal: Negative for arthralgias and myalgias.   Skin: Negative for rash.   Neurological: Negative for dizziness, weakness, light-headedness and headaches.   Psychiatric/Behavioral: Negative for sleep disturbance.     Scheduled Meds:   acetaminophen  500 mg Oral Q8H    ascorbic acid (vitamin C)  500 mg Oral Daily    aspirin  81 mg Oral Daily    atorvastatin  20 mg Oral Nightly    calcium carbonate  500 mg Oral Daily    carvedilol  6.25 mg Oral BID    cyanocobalamin  1,000 mcg Oral Daily    docusate sodium  100 mg Oral BID    donepezil  10 mg Oral QHS    ferrous sulfate  325 mg Oral Daily    finasteride  5 mg Oral Daily    gabapentin  100 mg Oral Daily    gabapentin  300 mg Oral QHS    heparin (porcine)  5,000 Units Subcutaneous Q8H    levothyroxine  100 mcg Oral Before breakfast    multivitamin  1 tablet Oral Daily    sodium chloride 0.9%  10 mL Intravenous Q6H    tamsulosin  0.4 mg Oral Daily    vancomycin (VANCOCIN) IVPB  1,250 mg Intravenous Q24H     Continuous Infusions:   PRN Meds:.acetaminophen, acetaminophen, calcium carbonate, diphenhydrAMINE-zinc acetate 1-0.1%, furosemide,  ondansetron, oxyCODONE-acetaminophen, ramelteon, senna, senna-docusate 8.6-50 mg, Flushing PICC Protocol **AND** sodium chloride 0.9% **AND** sodium chloride 0.9%, sodium chloride 0.9%    Objective:     Vital Signs (Most Recent):  Temp: 98.1 °F (36.7 °C) (11/17/17 0800)  Pulse: 64 (11/17/17 0800)  Resp: 18 (11/17/17 0800)  BP: (!) 153/67 (11/17/17 0800)  SpO2: 97 % (11/17/17 0800) Vital Signs (24h Range):  Temp:  [96.9 °F (36.1 °C)-98.1 °F (36.7 °C)] 98.1 °F (36.7 °C)  Pulse:  [60-64] 64  Resp:  [18-20] 18  SpO2:  [96 %-97 %] 97 %  BP: (124-153)/(58-67) 153/67     Weight: 75.5 kg (166 lb 7.2 oz)  Body mass index is 24.58 kg/m².    Intake/Output Summary (Last 24 hours) at 11/17/17 1145  Last data filed at 11/17/17 0600   Gross per 24 hour   Intake                0 ml   Output             1150 ml   Net            -1150 ml      Physical Exam   Constitutional: He is oriented to person, place, and time. He appears well-developed and well-nourished. No distress.   Cardiovascular: Normal rate, regular rhythm and normal heart sounds.  Exam reveals no gallop and no friction rub.    No murmur heard.  Pulmonary/Chest: Effort normal and breath sounds normal. No respiratory distress. He has no wheezes. He has no rales.   Abdominal: Soft. Bowel sounds are normal. He exhibits no distension. There is no tenderness.   Musculoskeletal: Normal range of motion. He exhibits no edema or tenderness.   Neurological: He is alert and oriented to person, place, and time.   Skin: Skin is warm and dry. No rash noted. He is not diaphoretic. No cyanosis. Nails show no clubbing.   erythema at intergluteal cleft with open area with serosanguineous drainage, right buttock with sutures intact   Psychiatric: He has a normal mood and affect. His behavior is normal.     Significant Labs:    Recent Labs  Lab 11/14/17  1226 11/15/17  0534 11/16/17  0609   WBC 11.94 12.19 11.16   HGB 9.4* 9.4* 9.0*   HCT 28.0* 29.1* 27.9*   * 142* 145*       Recent  Labs  Lab 11/14/17  0643 11/15/17  0533 11/16/17  0609   * 131* 133*   K 4.4 4.5 4.2    99 99   CO2 27 28 27   BUN 25* 27* 27*   CREATININE 1.3 1.3 1.4   CALCIUM 8.6* 8.7 8.5*   PROT 5.9* 6.1 5.9*   BILITOT 0.6 0.7 0.6   ALKPHOS 99 102 98   ALT 36 36 33   AST 36 38 36     Lab Results   Component Value Date    LABPROT 11.5 11/07/2017    ALBUMIN 2.9 (L) 11/16/2017     Lab Results   Component Value Date    CALCIUM 8.5 (L) 11/16/2017    PHOS 3.1 11/07/2017       Significant Imaging: n/a

## 2017-11-17 NOTE — PLAN OF CARE
Problem: Fall Risk (Adult)  Goal: Absence of Falls  Patient will demonstrate the desired outcomes by discharge/transition of care.   Outcome: Ongoing (interventions implemented as appropriate)   11/16/17 2000   Fall Risk (Adult)   Absence of Falls making progress toward outcome   Pt remain free from falls/injury/trauma. Call light w/i reach. Will monitor.

## 2017-11-17 NOTE — CLINICAL REVIEW
Clinical Pharmacy Chart Review Note      Admit Date: 11/15/2017   LOS: 2 days       Zeyad Woodard is a 89 y.o. male admitted to SNF for PT/OT after hospitalization for MRSA bacteremia.    Active Hospital Problems    Diagnosis  POA    *MRSA bacteremia [R78.81]  Yes    Takes dietary supplements [Z78.9]  Yes    Cellulitis and abscess of buttock [L02.31, L03.317]  Yes    Debility [R53.81]  Yes    HLD (hyperlipidemia) [E78.5]  Yes    Chronic pain [G89.29]  Yes    Dementia without behavioral disturbance [F03.90]  Yes    SSS (sick sinus syndrome) [I49.5]  Yes    CKD (chronic kidney disease), stage III [N18.3]  Yes    Iron deficiency anemia due to chronic blood loss [D50.0]  Yes    Hypothyroidism [E03.9]  Yes    Polyneuropathy [G62.9]  Yes     POLYNEUROPATHY WITH AXONAL AND DEMYELINATING FEATURES, 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         Chronic diastolic congestive heart failure [I50.32]  Yes    Benign prostatic hyperplasia [N40.0]  Yes    Renovascular hypertension [I15.0]  Yes    Chronic atrial fibrillation [I48.2]  Yes      Resolved Hospital Problems    Diagnosis Date Resolved POA   No resolved problems to display.     Review of patient's allergies indicates:   Allergen Reactions    Penicillins Hives and Other (See Comments)     Fever     Patient Active Problem List    Diagnosis Date Noted    Takes dietary supplements 11/16/2017    Cellulitis and abscess of buttock     Pericardial calcification 11/12/2017    Mitral regurgitation 11/12/2017    Bacteremia 11/10/2017    MRSA bacteremia 11/10/2017    Debility 11/08/2017    Cellulitis of buttock 11/07/2017    HLD (hyperlipidemia) 11/07/2017    Abnormal blood smear 10/05/2017    Chronic pain 10/03/2017    Chronic bilateral low back pain with bilateral sciatica 09/19/2017    Use of opiates for therapeutic purposes 09/19/2017    Dementia without behavioral disturbance 06/05/2017    Post-operative state 02/01/2017    Pseudophakia 01/18/2017    Essential  hypertension 01/18/2017    Right posterior capsular opacification 01/18/2017    Refractive error 01/18/2017    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    Hypothyroidism due to acquired atrophy of thyroid 10/02/2016    Right hip pain 10/01/2016    Contusion of right hip 10/01/2016    Elevated troponin 09/30/2016    Anemia of chronic renal failure, stage 3 (moderate) 09/01/2016    Chronic constipation 07/06/2016    Thrombocytopenia 07/03/2016    Venous insufficiency of both lower extremities 10/26/2015    Frequent falls 08/28/2015    Opioid dependence with intoxication with complication 08/28/2015    Cardiac pacemaker in situ 07/02/2015    SSS (sick sinus syndrome) 06/12/2015    Bradycardia 06/12/2015    Chronic back pain 09/12/2014    Lumbar radiculopathy 09/12/2014    CKD (chronic kidney disease), stage III 10/16/2013    Iron deficiency anemia due to chronic blood loss 04/12/2013    Hypothyroidism     Polyneuropathy 11/12/2012    Chronic diastolic congestive heart failure 08/23/2012    Renovascular hypertension     Chronic atrial fibrillation     Benign prostatic hyperplasia        Scheduled Meds:    acetaminophen  500 mg Oral Q8H    ascorbic acid (vitamin C)  500 mg Oral Daily    aspirin  81 mg Oral Daily    atorvastatin  20 mg Oral Nightly    calcium carbonate  500 mg Oral Daily    carvedilol  6.25 mg Oral BID    cyanocobalamin  1,000 mcg Oral Daily    docusate sodium  100 mg Oral BID    donepezil  10 mg Oral QHS    ferrous sulfate  325 mg Oral Daily    finasteride  5 mg Oral Daily    gabapentin  100 mg Oral Daily    gabapentin  300 mg Oral QHS    heparin (porcine)  5,000 Units Subcutaneous Q8H    levothyroxine  100 mcg Oral Before breakfast    multivitamin  1 tablet Oral Daily    sodium chloride 0.9%  10 mL Intravenous Q6H    tamsulosin  0.4 mg Oral Daily    vancomycin (VANCOCIN) IVPB  1,250 mg Intravenous Once    vancomycin (VANCOCIN) IVPB  1,250 mg Intravenous Q24H      Continuous Infusions:    PRN Meds: acetaminophen, acetaminophen, calcium carbonate, diphenhydrAMINE-zinc acetate 1-0.1%, furosemide, ondansetron, oxyCODONE-acetaminophen, ramelteon, senna, senna-docusate 8.6-50 mg, Flushing PICC Protocol **AND** sodium chloride 0.9% **AND** sodium chloride 0.9%, sodium chloride 0.9%    OBJECTIVE:     Vital Signs (Last 24H)  Temp:  [96.9 °F (36.1 °C)-97.6 °F (36.4 °C)]   Pulse:  [60]   Resp:  [18-20]   BP: (124-170)/(58-60)   SpO2:  [96 %-98 %]     Laboratory:  CBC:   Recent Labs  Lab 11/14/17  1226 11/15/17  0534 11/16/17  0609   WBC 11.94 12.19 11.16   RBC 3.08* 3.11* 2.96*   HGB 9.4* 9.4* 9.0*   HCT 28.0* 29.1* 27.9*   * 142* 145*   MCV 91 94 94   MCH 30.5 30.2 30.4   MCHC 33.6 32.3 32.3     BMP:   Recent Labs  Lab 11/14/17  0643 11/15/17  0533 11/16/17  0609    102 109   * 131* 133*   K 4.4 4.5 4.2    99 99   CO2 27 28 27   BUN 25* 27* 27*   CREATININE 1.3 1.3 1.4   CALCIUM 8.6* 8.7 8.5*     CMP:   Recent Labs  Lab 11/14/17  0643 11/15/17  0533 11/16/17  0609    102 109   CALCIUM 8.6* 8.7 8.5*   ALBUMIN 2.9* 3.0* 2.9*   PROT 5.9* 6.1 5.9*   * 131* 133*   K 4.4 4.5 4.2   CO2 27 28 27    99 99   BUN 25* 27* 27*   CREATININE 1.3 1.3 1.4   ALKPHOS 99 102 98   ALT 36 36 33   AST 36 38 36   BILITOT 0.6 0.7 0.6     LFTs:   Recent Labs  Lab 11/14/17  0643 11/15/17  0533 11/16/17  0609   ALT 36 36 33   AST 36 38 36   ALKPHOS 99 102 98   BILITOT 0.6 0.7 0.6   PROT 5.9* 6.1 5.9*   ALBUMIN 2.9* 3.0* 2.9*     Coagulation: No results for input(s): INR, APTT in the last 168 hours.    Invalid input(s): PT  Cardiac markers: No results for input(s): CKMB, TROPONINT, MYOGLOBIN in the last 168 hours.  ABGs: No results for input(s): PH, PCO2, PO2, HCO3, POCSATURATED, BE in the last 168 hours.  Microbiology Results (last 7 days)     ** No results found for the last 168 hours. **        Specimen (12h ago through future)    None        No results for  input(s): COLORU, CLARITYU, SPECGRAV, PHUR, PROTEINUA, GLUCOSEU, BILIRUBINCON, BLOODU, WBCU, RBCU, BACTERIA, MUCUS, NITRITE, LEUKOCYTESUR, UROBILINOGEN, HYALINECASTS in the last 168 hours.  Others: No results for input(s): [id number], TSH, T4FREE, LABLIPI in the last 168 hours.    Invalid input(s): A1C      ASSESSMENT/PLAN:     Active Hospital Problems    Diagnosis    *MRSA bacteremia   --Vancomycin 1250 mg IV every 24 hour      Cellulitis and abscess of buttock   --ABX therapy as above    Debility   --PT/OT: APAP 500 mg q8h; APAP 650 mg q6h prn mild pain; Oxycodone/APAP 7.5/325 mg q8h prn severe pain.  --bowel regimen for constipation; hold for loose or frequent stools: Docusate sodium 100 mg twice daily; Senna-docusate twice daily prn constipation  --DVT prophylaxis: Heparin 5000u q8h      HLD (hyperlipidemia)   --Atorvastatin 20 mg daily  Lab Results   Component Value Date    CHOL 118 (L) 08/18/2015    CHOL 106 (L) 03/06/2014    CHOL 112 (L) 11/05/2013     Lab Results   Component Value Date    HDL 38 (L) 08/18/2015    HDL 30 (L) 03/06/2014    HDL 31 (L) 11/05/2013     Lab Results   Component Value Date    LDLCALC 67.2 08/18/2015    LDLCALC 54.8 (L) 03/06/2014    LDLCALC 60.6 (L) 11/05/2013     Lab Results   Component Value Date    TRIG 64 08/18/2015    TRIG 106 03/06/2014    TRIG 102 11/05/2013     Lab Results   Component Value Date    CHOLHDL 32.2 08/18/2015    CHOLHDL 28.3 03/06/2014    CHOLHDL 27.7 11/05/2013     Lab Results   Component Value Date    ALT 33 11/16/2017    AST 36 11/16/2017    ALKPHOS 98 11/16/2017    BILITOT 0.6 11/16/2017         Chronic pain/polyneuropathy  --Gabapentin 100 mg daily, 300 mg nightly    Dementia without behavioral disturbance   --Donepezil 10 mg daily  **Delerium precautions    SSS (sick sinus syndrome)   --Pacemaker in place    CKD (chronic kidney disease), stage III  **Monitor renal function and adjust medications accordingly  11/17/2017 Estimated Creatinine  Clearance: 35.1 mL/min (based on SCr of 1.4 mg/dL). Meds reviewed, no adj needed.      Iron deficiency anemia due to chronic blood loss  **Monitor CBC and iron studies  --Ferrous sulfate 325 mg daily  Lab Results   Component Value Date    WBC 11.16 11/16/2017    HGB 9.0 (L) 11/16/2017    HCT 27.9 (L) 11/16/2017    MCV 94 11/16/2017     (L) 11/16/2017     Lab Results   Component Value Date    IRON 55 10/03/2017    TIBC 321 10/03/2017    FERRITIN 144 10/03/2017         Hypothyroidism   --Levothyroxine 100 mcg daily  Lab Results   Component Value Date    TSH 0.600 11/07/2017         Takes dietary supplements  --MVI daily  --Ascorbic acid 500 mg daily   --Calcium carbonate 500 mg daily  --Cyanocobalamin 1000 mcg daily    Chronic diastolic congestive heart failure   **Monitor weight  --Furosemide 20 mg twice daily prn edema, wt gain  Wt Readings from Last 1 Encounters:   11/17/17 0500 75.5 kg (166 lb 7.2 oz)   11/16/17 1812 76.3 kg (168 lb 3.4 oz)   11/15/17 1925 76.3 kg (168 lb 3.4 oz)   11/15/17 1924 76.3 kg (168 lb 3.4 oz)         Benign prostatic hyperplasia   --Finasteride 5 mg daily  --Tamsulosin 0.4 mg daily    Renovascular hypertension   **Monitor BP and pulse  --Carvedilol 6.25 mg twice daily    Chronic atrial fibrillation   **Monitor HR  --ASA 81 mg daily  --Carvedilol as above  Pulse Readings from Last 3 Encounters:   11/16/17 60   11/15/17 61   11/08/17 71               Jessica Lee, Pharm. D.  Clinical Pharmacist  [facility]

## 2017-11-17 NOTE — PLAN OF CARE
Problem: Occupational Therapy Goal  Goal: Occupational Therapy Goal  Goals to be met by: 14 days     Patient will increase functional independence with ADLs by performing:    Feeding with Modified [place].  UE Dressing with Modified [place].  LE Dressing with Modified [place] using AD.  Grooming while standing with Modified [place].  Toileting from bedside commode with Modified [place] for hygiene and clothing management.   Bathing from  shower chair/bench with Supervision.  Toilet transfer to bedside commode with Supervision.  Perform functional activity in standing with S for 10' in preparation for safety during standing ADls.      Pt. tolerated session well.

## 2017-11-17 NOTE — ASSESSMENT & PLAN NOTE
Evaluated on 11/17/17  -Chronic and stable  -Continue medical therapy with levothyroxine 100mcg daily  -F/U with PCP for ongoing monitoring

## 2017-11-17 NOTE — SUBJECTIVE & OBJECTIVE
Past Medical History:   Diagnosis Date    Anemia     Atrial fibrillation     BPH (benign prostatic hypertrophy)     Cancer     Cardiac pacemaker in situ 7/2/2015    CHF (congestive heart failure)     Coronary artery disease     Encounter for blood transfusion     GI bleed     cecum angiectasia s/p cautery    Hypertension     Hypothyroidism     Polyneuropathy     Posture imbalance     due to neuropathy    Right posterior capsular opacification 1/18/2017    SSS (sick sinus syndrome) 2015    s/p pacemaker       Past Surgical History:   Procedure Laterality Date    CARDIAC SURGERY      CATARACT EXTRACTION W/  INTRAOCULAR LENS IMPLANT Right 05/17/2010        CATARACT EXTRACTION W/  INTRAOCULAR LENS IMPLANT Left 02/16/2004        cataract surgery      COLONOSCOPY  6/30/04    COLONOSCOPY N/A 2/15/2016    Procedure: COLONOSCOPY;  Surgeon: Stanton Kirk MD;  Location: [facility] ([address] [place] [address]);  Service: Endoscopy;  Laterality: N/A;    EYE SURGERY      HERNIA REPAIR      inguinal hernia repair      Open heart surgery for pericardiectomy      for calcific constrictive pericarditis    UMBILICAL HERNIA REPAIR      Yag      Left Eye       Review of patient's allergies indicates:   Allergen Reactions    Penicillins Hives and Other (See Comments)     Fever       No current facility-administered medications on file prior to encounter.      Current Outpatient Prescriptions on File Prior to Encounter   Medication Sig    ASCORBATE CALCIUM (VITAMIN C ORAL) Take 1 tablet by mouth once daily.     aspirin (ECOTRIN) 81 MG EC tablet Take 1 tablet (81 mg total) by mouth once daily.    atorvastatin (LIPITOR) 20 MG tablet Take 1 tablet (20 mg total) by mouth nightly.    calcium 500 mg Tab Take 1 tablet by mouth Daily.    carvedilol (COREG) 6.25 MG tablet Take 1 tablet (6.25 mg total) by mouth 2 (two) times daily.    clotrimazole-betamethasone (LOTRISONE) lotion Apply topically 2 (two) times  daily. (Patient taking differently: Apply topically as needed. )    cyanocobalamin (VITAMIN B-12) 1000 MCG tablet Take 100 mcg by mouth once daily.    donepezil (ARICEPT) 10 MG tablet Take 1 tablet (10 mg total) by mouth every evening.    ferrous sulfate 325 mg (65 mg iron) Tab tablet Take 325 mg by mouth once daily.    finasteride (PROSCAR) 5 mg tablet Take 1 tablet (5 mg total) by mouth once daily.    furosemide (LASIX) 20 MG tablet Take 20 mg by mouth 2 (two) times daily.    gabapentin (NEURONTIN) 100 MG capsule Take 100 mg by mouth 2 (two) times daily.    levothyroxine (SYNTHROID) 100 MCG tablet Take 1 tablet (100 mcg total) by mouth once daily.    MULTIVITS-MINERALS/FA/LYCOPENE (ONE-A-DAY MEN'S MULTIVITAMIN ORAL) Take 1 tablet by mouth once daily.    senna-docusate 8.6-50 mg (PERICOLACE) 8.6-50 mg per tablet Take 1 tablet by mouth 2 (two) times daily.    tamsulosin (FLOMAX) 0.4 mg Cp24 Take 1 capsule (0.4 mg total) by mouth once daily.     Family History     Problem Relation (Age of Onset)    Cancer Father    Coronary artery disease Sister    Diabetes Sister    Peripheral vascular disease Sister    Stroke Mother        Social History Main Topics    Smoking status: Passive Smoke Exposure - Never Smoker    Smokeless tobacco: Never Used    Alcohol use No    Drug use: No    Sexual activity: No     Review of Systems   Constitutional: no fever or chills  Eyes: no visual changes  ENT: no nasal congestion or sore throat  Respiratory: no cough or shortness of breath  Cardiovascular: no chest pain or palpitations  Gastrointestinal: no nausea or vomiting, no abdominal pain; last BM: 11/16  Genitourinary: no hematuria or dysuria  Integument/Breast: no rash or pruritis  Hematologic/Lymphatic: no easy bruising or lymphadenopathy  Allergy/Immunology: no postnasal drip  Musculoskeletal: no arthralgias or myalgias  Neurological: no seizures or tremors  Behavioral/Psych: no auditory or visual  hallucinations  Endocrine: no heat or cold intolerance    Objective:     Vital Signs (Most Recent):  Temp: 96.9 °F (36.1 °C) (11/16/17 2118)  Pulse: 60 (11/16/17 2118)  Resp: 20 (11/16/17 2118)  BP: (!) 124/58 (11/16/17 2118)  SpO2: 96 % (11/16/17 2118) Vital Signs (24h Range):  Temp:  [96.9 °F (36.1 °C)-97.6 °F (36.4 °C)] 96.9 °F (36.1 °C)  Pulse:  [60] 60  Resp:  [18-20] 20  SpO2:  [96 %-98 %] 96 %  BP: (124-170)/(58-60) 124/58     Weight: 76.3 kg (168 lb 3.4 oz)  Body mass index is 24.84 kg/m².    Physical Exam  General: well developed, well nourished, no distress  HENT: Head:normocephalic, atraumatic. Ears:bilateral external ear canals normal. Nose: Nares normal. Septum midline. Mucosa normal. Throat: lips, mucosa, and tongue normal and no throat erythema.  Eyes: conjunctivae/corneas clear.  EOM normal  Neck: supple, symmetrical, trachea midline, no JVD and thyroid not enlarged.   Lungs:  clear to auscultation bilaterally and normal respiratory effort  Cardiovascular: Heart: regular rate and rhythm, S1, S2 normal, no murmur, click, rub or gallop. Chest Wall: no tenderness. Extremities: no cyanosis, no edema, no clubbing. Pulses: 2+ and symmetric.  Abdomen/Rectal: Abdomen: soft, non-tender non-distended; bowel sounds normal; no masses,  no organomegaly.   Skin: receding erythema at intergluteal cleft with open area with serosanguineous drainage  Musculoskeletal:no clubbing, cyanosis  Lymph Nodes: No cervical or supraclavicular adenopathy  Neurologic: Normal strength and tone. No focal numbness or weakness  Psych/Behavioral:  Alert and oriented, appropriate affect.  PICC line to RUE with insertion site C/D with no erythema    Significant Labs:   Recent Results (from the past 24 hour(s))   CBC auto differential    Collection Time: 11/16/17  6:09 AM   Result Value Ref Range    WBC 11.16 3.90 - 12.70 K/uL    RBC 2.96 (L) 4.60 - 6.20 M/uL    Hemoglobin 9.0 (L) 14.0 - 18.0 g/dL    Hematocrit 27.9 (L) 40.0 - 54.0 %     MCV 94 82 - 98 fL    MCH 30.4 27.0 - 31.0 pg    MCHC 32.3 32.0 - 36.0 g/dL    RDW 14.0 11.5 - 14.5 %    Platelets 145 (L) 150 - 350 K/uL    MPV 9.8 9.2 - 12.9 fL    Gran # 2.8 1.8 - 7.7 K/uL    Lymph # 6.1 (H) 1.0 - 4.8 K/uL    Mono # 2.0 (H) 0.3 - 1.0 K/uL    Eos # 0.3 0.0 - 0.5 K/uL    Baso # 0.02 0.00 - 0.20 K/uL    Gran% 25.2 (L) 38.0 - 73.0 %    Lymph% 54.5 (H) 18.0 - 48.0 %    Mono% 17.7 (H) 4.0 - 15.0 %    Eosinophil% 2.4 0.0 - 8.0 %    Basophil% 0.2 0.0 - 1.9 %    Differential Method Automated    Comprehensive metabolic panel    Collection Time: 11/16/17  6:09 AM   Result Value Ref Range    Sodium 133 (L) 136 - 145 mmol/L    Potassium 4.2 3.5 - 5.1 mmol/L    Chloride 99 95 - 110 mmol/L    CO2 27 23 - 29 mmol/L    Glucose 109 70 - 110 mg/dL    BUN, Bld 27 (H) 8 - 23 mg/dL    Creatinine 1.4 0.5 - 1.4 mg/dL    Calcium 8.5 (L) 8.7 - 10.5 mg/dL    Total Protein 5.9 (L) 6.0 - 8.4 g/dL    Albumin 2.9 (L) 3.5 - 5.2 g/dL    Total Bilirubin 0.6 0.1 - 1.0 mg/dL    Alkaline Phosphatase 98 55 - 135 U/L    AST 36 10 - 40 U/L    ALT 33 10 - 44 U/L    Anion Gap 7 (L) 8 - 16 mmol/L    eGFR if African American 50.8 (A) >60 mL/min/1.73 m^2    eGFR if non  43.9 (A) >60 mL/min/1.73 m^2   VANCOMYCIN, TROUGH before next dose    Collection Time: 11/16/17  4:35 PM   Result Value Ref Range    Vancomycin-Trough 20.3 10.0 - 22.0 ug/mL       Recent Labs  Lab 11/14/17  1226 11/15/17  0534 11/16/17  0609   WBC 11.94 12.19 11.16   HGB 9.4* 9.4* 9.0*   HCT 28.0* 29.1* 27.9*   * 142* 145*         Recent Labs  Lab 11/14/17  0643 11/15/17  0533 11/16/17  0609   * 131* 133*   K 4.4 4.5 4.2    99 99   CO2 27 28 27   BUN 25* 27* 27*   CREATININE 1.3 1.3 1.4   CALCIUM 8.6* 8.7 8.5*   PROT 5.9* 6.1 5.9*   BILITOT 0.6 0.7 0.6   ALKPHOS 99 102 98   ALT 36 36 33   AST 36 38 36

## 2017-11-17 NOTE — PLAN OF CARE
Problem: Wound, Traumatic, Nonburn (Adult)  Intervention: Promote Effective Wound Healing  Cellulitis and abscess of buttock     Increased nutrient needs(protein) Related to (etiology):   Wound healing and infection     Signs and Symptoms (as evidenced by):   Non-healing wound, sp lumbar surgery     Interventions/Recommendations (treatment strategy):  Boost glucose control bid  Diabetic diet 2000 cardiac diet, RD to follow     Nutrition Diagnosis Status:   New     Recommendation/Intervention: Continue diabetic cardiac diet, Recommend boost glucose control bid, chocolate, RD to follow  Goals: PO intake to meet 85% of EEN  Nutrition Goal Status: new

## 2017-11-17 NOTE — ASSESSMENT & PLAN NOTE
Evaluated on 11/17/17  -With source likely buttock abscess  -Continue vanc IV with end date of 12/8 which is 4 weeks from 1 negative culture on 11/10  -Picc line care with saline flushes and dressing changes

## 2017-11-17 NOTE — PT/OT/SLP PROGRESS
Physical Therapy  Treatment    Zeyad Woodard   MRN: [id number]   Admitting Diagnosis: MRSA bacteremia    PT Received On: 11/17/17  Total Time (min): 45       Billable Minutes:  Gait [id number], Therapeutic Activity 15 and Therapeutic Exercise 15    Treatment Type: Treatment  PT/PTA: PT     PTA Visit Number: 0       General Precautions: Standard, contact, fall (MRSA)  Orthopedic Precautions:     Braces:           Subjective:  Communicated with pt prior to session.  Pt agreeable to PT  Nurse, jules, reports that pt is properly bandaged and able to leave the room    Pain/Comfort  Pain Rating 1: 5/10  Location 1: sacral spine  Pain Addressed 1: Reposition  Pain Rating Post-Intervention 1: 5/10    Objective:  Patient found sitting in wheelchair at bedside with Patient found with: PICC line       Functional Status:  MDS G  Bed Mobility Functional Status: S-SBA  Transfer Functional Status: CGA-Min (A)  Locomotion on Unit Functional Status: CGA-Min (A)  Moving from seated to standing position: Not steady, only able to stabilize with staff assistance  Walking (with assistive device if used): Not steady, only able to stabilize with staff assistance  Turning around and facing the opposite direction while walking: Not steady, only able to stabilize with staff assistance  Surface-to-surface transfer (transfer between bed and chair or wheelchair): Not steady, only able to stabilize with staff assistance          Bed Mobility:  Sit>Supine:SBA on mat and bed  Supine>Sit: SBA on mat     Transfers:  Sit<>Stand: CGA from mat and wheelchair using RW  Stand Pivot Transfer: CGA wheelchair <> mat and wheelchair to bed using RW  Skilled verbal and tactile instruction for proper hand placement and technique    Gait:  Amb 150 feet using RW with CGA and verbal instruction to improve trunk extension  Performed with B AFO donned     Wheelchair Mobility:  Patient propels w/c 80 feet with SBA and verbal instruction for steering/turning      Therex:  Seated therex including: performed sitting EOM with BUE support  LAQ 2x10  Hip abduction and adduction 2x10  Glute sets 2x10  Hip flexion 2x10    Supine therex including:  Heel slides 2x10  Hip abduction and adduction 2x10    Balance:  Pt requires BUE support for stability during standing with inability to accept challenges at this time    Additional Treatment:  Pt was able to don/doff AFO with min/mod assist for proper fit    Patient left supine with call button in reach.    Assessment:  Zeyad Woodard is a 89 y.o. male with a medical diagnosis of MRSA bacteremia.  Pt was able to propel self in wheelchair for short distance and ambulate using RW for further distance. Pt also tolerated therex and overall activity well but fatigued toward end of session. Pt will benefit from continued PT treatment to address remaining impairments and improve functional mobility and independence.       Rehab identified problem list/impairments: weakness, impaired endurance, impaired self care skills, impaired functional mobilty, gait instability, impaired balance, decreased lower extremity function, pain, impaired skin, other (comment) (contact prec/MRSA)    Rehab potential is good.    Activity tolerance: Good    Discharge recommendations: home with home health (and likely family/caregiver assist)     Barriers to discharge: Decreased caregiver support, Inaccessible home environment    Equipment recommendations: wheelchair     GOALS:    Physical Therapy Goals        Problem: Physical Therapy Goal    Goal Priority Disciplines Outcome Goal Variances Interventions   Physical Therapy Goal     PT/OT, PT Ongoing (interventions implemented as appropriate)     Description:  Goals to be met by: 14 DAYS     Patient will increase functional independence with mobility by performin. Supine to sit with Supervision   2. Sit to supine with Supervision  3. Sit to stand transfer with Stand-by Assistance with RW  4. Bed to chair  transfer with Stand-by Assistance using Rolling Walker  5. Gait  x 150 feet with Stand-by Assistance using Rolling Walker.   6. Wheelchair propulsion x100 feet with Stand-by Assistance using bilateral upper extremities  7. Ascend/Descend 4 inch curb step with Contact Guard Assistance using Rolling Walker.  8. Sitting at edge of bed x8 minutes with Stand-by Assistance and without UE support, and performing exercises or activity  9. Stand for 5 minutes with Contact Guard Assistance using Rolling Walker and perform an activity  10. Lower extremity exercise program x20 reps per handout, with assistance as needed and gym therex                      PLAN:    Patient to be seen 5 x/week  to address the above listed problems via gait training, therapeutic activities, therapeutic exercises, wheelchair management/training  Plan of Care expires: 12/16/17  Plan of Care reviewed with: patient    Fay Borja, PT  11/17/2017

## 2017-11-17 NOTE — ASSESSMENT & PLAN NOTE
Evaluated on 11/17/17  -Continue therapy with Vanc, next trough with 2100 dose tonight  -Change mepilex dressing TIW and as needed for drainage  -F/U with Surgery as scheduled  -Continue scheduled tylenol for pain control with percocet as needed for breakthrough pain  -Contact isolation but wound able to be contained so may exit room to attend PT/OT utilizing [facility] contact isolation protocol

## 2017-11-17 NOTE — ASSESSMENT & PLAN NOTE
Increased nutrient needs(protein) Related to (etiology):   Wound healing and infection    Signs and Symptoms (as evidenced by):   Non-healing wound, sp lumbar surgery    Interventions/Recommendations (treatment strategy):  Boost glucose control bid  Diabetic diet 2000 cardiac diet, RD to follow    Nutrition Diagnosis Status:   New

## 2017-11-17 NOTE — ASSESSMENT & PLAN NOTE
-continue PT/OT to increase ambulation, ADL performance and endurance  -continue heparin for DVT prophylaxis  -continue fall precautions  -continue docusate to prevent constipation; hold for frequent or loose stooling

## 2017-11-17 NOTE — ASSESSMENT & PLAN NOTE
Evaluated on 11/17/17  -Chronic and stable  -Continue therapy with atorvastatin 20mg nightly  -F/U with PCP for ongoing monitoring

## 2017-11-17 NOTE — ASSESSMENT & PLAN NOTE
Evaluated on 11/17/17  -Continue dietary supplements with vit C, calcium, cyanocobalamin ( + hx of B12 def.), MVI

## 2017-11-17 NOTE — ASSESSMENT & PLAN NOTE
Evaluated on 11/17/17  -Back pain with epidural injections in the past  -Continue percocet prn pain

## 2017-11-17 NOTE — PROGRESS NOTES
[facility]  Adult Nutrition  Progress Note    SUMMARY     Recommendations    Recommendation/Intervention: Continue diabetic cardiac diet, Recommend boost glucose control bid, chocolate, RD to follow  Goals: PO intake to meet 85% of EEN  Nutrition Goal Status: new  Communication of RD Recs: reviewed with RN    Continuum of Care Plan         Reason for Assessment    Reason for Assessment: physician consult  Diagnosis:  (Sepsis, Debility)  Relevent Medical History: sp I & D 11/13/17 cellulitis of buttocks, sp lumbosacral radiculopathy 10/3/17, DDD, chronic pain, HTN,AFIB, CHF , anemia   Interdisciplinary Rounds: attended     General Information Comments: pt feeds self, agrees to oral nutrition supplements    Nutrition Discharge Planning: DC on cardiac, diabetic diet 2000 calories    Nutrition Prescription Ordered    Current Diet Order: Cardiac , 2000 diabetic              Oral Nutrition Supplement: none     Evaluation of Received Nutrients/Fluid Intake                                           Energy Calories Required: meeting needs                 Protein Required: not meeting needs                              Fluid Required: meeting needs     Tolerance: tolerating  % Intake of Estimated Energy Needs: 75 - 100 %  % Meal Intake: 75%     Nutrition Risk Screen     Nutrition Risk Screen: no indicators present    Nutrition/Diet History    Patient Reported Diet/Restrictions/Preferences: general     Food Preferences: No cultural or Confucianist food preferences        Factors Affecting Nutritional Intake: pain, decreased appetite, depression                Labs/Tests/Procedures/Meds       Pertinent Labs Reviewed: reviewed, pertinent  Pertinent Labs Comments: Hg 9.0, Hct 27.9, BUN 27, Cr 1.4, glucose 109, Na 133  Pertinent Medications Reviewed: reviewed, pertinent  Pertinent Medications Comments: Ascorbic acid, statin, ASA, gabapentin, lasix, MVI, Fe, calcium, B12    Physical Findings    Overall Physical  "Appearance: weak, loss of muscle mass (pale)     Oral/Mouth Cavity:  (-)  Skin: incision, non-healing wound(s)    Anthropometrics    Temp: 97.6 °F (36.4 °C)     Height: 5' 9" (175.3 cm)  Weight Method: Standard Scale  Weight: 76.3 kg (168 lb 3.4 oz)  Ideal Body Weight (IBW), Male: 160 lb     % Ideal Body Weight, Male (lb): 105.13 lb     BMI (Calculated): 24.9  BMI Grade: 18.5-24.9 - normal                            Estimated/Assessed Needs    Weight Used For Calorie Calculations: 76.3 kg (168 lb 3.4 oz)         Energy Need Method: Neely-St Jeor       RMR (Neely-St. Jeor Equation): 1413.38 x 1766 kcal        Weight Used For Protein Calculations: 76.3 kg (168 lb 3.4 oz)  Protein Requirements:  (1.3-1.5gm/kg)    Fluid Requirements (mL): 1ml/yolis or per MD 1800          CHO Requirement: 50% of calories     Assessment and Plan    Cellulitis and abscess of buttock    Increased nutrient needs(protein) Related to (etiology):   Wound healing and infection    Signs and Symptoms (as evidenced by):   Non-healing wound, sp lumbar surgery    Interventions/Recommendations (treatment strategy):  Boost glucose control bid  Diabetic diet 2000 cardiac diet, RD to follow    Nutrition Diagnosis Status:   New              Monitor and Evaluation    Food and Nutrient Intake: energy intake  Food and Nutrient Adminstration: diet order     Physical Activity and Function: nutrition-related ADLs and IADLs  Anthropometric Measurements: weight change  Biochemical Data, Medical Tests and Procedures: electrolyte and renal panel, glucose/endocrine profile  Nutrition-Focused Physical Findings: overall appearance    Nutrition Risk    Level of Risk:  (follow one time per week)    Nutrition Follow-Up     Yes  "

## 2017-11-17 NOTE — ASSESSMENT & PLAN NOTE
Evaluated on 11/17/17  -continue PT/OT to increase ambulation, ADL performance and endurance  -continue heparin for DVT prophylaxis  -continue fall precautions  -continue docusate to prevent constipation; hold for frequent or loose stooling

## 2017-11-17 NOTE — HOSPITAL COURSE
The patient was admitted at [facility] from 11/10 to 11/15/2017.  11/15: Patient admitted to SNF for ongoing OT/PT following a hospitalization for MRSA bacteremia.  11/17: Patient seen at bedside, report pain to buttock, slept well  11/24: Patient seen at bedside, doing well, visualized buttocks removed dressing erythema improving  11/27: erythema much improved, denies pain, no complaints, reviewed labs, decreasing h/h with negative stool for occult blood, patient with moderate about of ecchymosis to ABD from heparin injections with hematoma, discussed with Dr. Bergman, stopped heparin injections and placed on Teds and SCDs  12/4: adjusted vanc repeat trough on 12/7, doing well, would like prune juice daily with breakfast, ordered cushion for wheelchair. Labs reviewed.  12/5: Patient seen at bedside, doing well, using cushion, pain better controlled with cushion, no complaints.  12/12: Patient seen at bedside, doing well, discussed discharge, answered all questions, discharged home with home health services.

## 2017-11-17 NOTE — ASSESSMENT & PLAN NOTE
Evaluated on 11/17/17  -Rate controlled  -Warfarin discontinue due to GI bleed with angioectasia  -Continue coreg 6.25mg and ASA  81mg daily therapy  -Patient with consistent rate in 60's so likely paced

## 2017-11-17 NOTE — ASSESSMENT & PLAN NOTE
Evaluated on 11/17/17  -Currently compensated  -Patient only takes lasix prn at home for edema  -Will monitor with daily weights and initiate lasix as needed for edema or weight gain

## 2017-11-17 NOTE — ASSESSMENT & PLAN NOTE
Chronic and stable  Continue medical therapy with levothyroxine  F/U with PCP for ongoing monitoring

## 2017-11-17 NOTE — ASSESSMENT & PLAN NOTE
Currently compensated  Patient only takes lasix prn at home for edema  Will monitor with daily weights and initiate lasix as needed for edema or weight gain

## 2017-11-17 NOTE — HPI
Chief Complaint/Reason for Admission: MRSA bacteremia    History of Present Illness:  Patient is a 89 y.o. male with A. Fib, BPH, CHF, HTN who presents to SNF after hospitalization for MRSA bacteremia thought to be due to a rectal abscess with cellulitis.  He required I&D of left buttock (which grew MRSA on culture) on 11/13 by Dr. Rahman.    He had a JEVON which did not show signs of infection involving valves or pacemaker.  He is due to complete Vanc for 4 weeks.  He complains of mild discomfort to his buttocks currently which if exacerbated by prolonged sitting and relieved by percocet. The patient has been admitted to SNF for ongoing PT/OT due to insufficient progress to go home safely from the hospital.

## 2017-11-17 NOTE — PT/OT/SLP PROGRESS
Occupational Therapy  Treatment    Zeyad Woodard   MRN: [id number]   Admitting Diagnosis: MRSA bacteremia    OT Date of Treatment: 11/17/17  Total Time (min): 42 min    Billable Minutes:  Self Care/Home Management 42    General Precautions: Standard, contact, fall  Orthopedic Precautions: N/A  Braces: N/A         Subjective:  Communicated with nurse prior to session.  I really just got here.     Pain/Comfort  Pain Rating 1: 5/10  Location 1: sacral spine  Pain Addressed 1: Reposition, Pre-medicate for activity  Pain Rating Post-Intervention 1: 5/10    Objective: Pt. Supine in bed with NP present       Functional Status:  MDS G  Bed Mobility Functional Status:-Min (A) supine to sit   Transfer Functional Status:Min (A) sit to stand from EOB to use urinal and Min A SPT from bed to w/c  Dressing Functional Status: 3:mod(A) LBD to completely don socks as well as to manage pants over hips in stand/Min A UBD to initiate completely over arm   Toilet Use Functional Status: Min (A) to use urinal in stand  Personal Hygiene Functional Status: Set Up A at sink seated in w/c  Bathing Functional Status: mod(A)- with assist to wash feet and buttocks region  Moving from seated to standing position: Not steady, only able to stabilize with staff assistance  Surface-to-surface transfer (transfer between bed and chair or wheelchair): Not steady, only able to stabilize with staff assistance        Additional Treatment:  Pt. Required CGA to sit EOB to SBA at times 2/2 posterior lean.   Pt. Educated on safety with ADL task performance as well as with transfers    Patient left up in chair with call button in reach    ASSESSMENT:  Zeyad Woodard is a 89 y.o. male with a medical diagnosis of MRSA bacteremia and presents with deficits in self-care skills as well as functional mobility and overall level of endurance and would benefit from continued OT services. .    Rehab identified problem list/impairments: weakness, impaired endurance,  impaired self care skills, impaired functional mobilty, gait instability, impaired balance, decreased upper extremity function, decreased lower extremity function, decreased safety awareness, pain, impaired coordination, impaired skin    Rehab potential is good    Activity tolerance: Good    Discharge recommendations: home health OT  May require supervision on d/c    Barriers to discharge: Decreased caregiver support     Equipment recommendations: wheelchair     GOALS:    Occupational Therapy Goals        Problem: Occupational Therapy Goal    Goal Priority Disciplines Outcome Interventions   Occupational Therapy Goal     OT, PT/OT Ongoing (interventions implemented as appropriate)    Description:  Goals to be met by: 14 days     Patient will increase functional independence with ADLs by performing:    Feeding with Modified [place].  UE Dressing with Modified [place].  LE Dressing with Modified [place] using AD.  Grooming while standing with Modified [place].  Toileting from bedside commode with Modified [place] for hygiene and clothing management.   Bathing from  shower chair/bench with Supervision.  Toilet transfer to bedside commode with Supervision.  Perform functional activity in standing with S for 10' in preparation for safety during standing ADls.                       Plan:  Patient to be seen  (5-6x/week) to address the above listed problems via self-care/home management, therapeutic activities, therapeutic exercises  Plan of Care expires: 12/16/17  Plan of Care reviewed with: patient    DELON Kapoor  11/17/2017

## 2017-11-17 NOTE — ASSESSMENT & PLAN NOTE
Patient calm and cooperative  Continue therapy with donepezil  Delirium precautions  Reorient as necessary

## 2017-11-17 NOTE — PROGRESS NOTES
[facility]  Department of Hospital Medicine  Progress Note    Patient Name: Zeyad Woodard  MRN: [id number]  Code Status: DNR  Admission Date: 11/15/2017  Length of Stay: 2 days  Attending Physician: Radha Fontaine MD  Primary Care Provider: Booker Ricci MD    Subjective:     Principal Problem:MRSA bacteremia    Chief Complaint/Reason for Admission: MRSA bacteremia    History of Present Illness:  Patient is a 89 y.o. male with A. Fib, BPH, CHF, HTN who presents to SNF after hospitalization for MRSA bacteremia thought to be due to a rectal abscess with cellulitis.  He required I&D of left buttock (which grew MRSA on culture) on 11/13 by Dr. Rahman.    He had a JEVON which did not show signs of infection involving valves or pacemaker.  He is due to complete Vanc for 4 weeks.  He complains of mild discomfort to his buttocks currently which if exacerbated by prolonged sitting and relieved by percocet. The patient has been admitted to SNF for ongoing PT/OT due to insufficient progress to go home safely from the hospital.    Hospital Course:  The patient was admitted at [facility] from 11/10 to 11/15/2017.  11/15: Patient admitted to SNF for ongoing OT/PT following a hospitalization for MRSA bacteremia.  11/17: Patient seen at bedside, report pain to buttock, slept well    Interval History: Patient seen at beside, slept well, c/o pain to buttocks, no acute events overnight.    Review of Systems   Constitutional: Negative for appetite change, chills, fatigue and fever.   HENT: Negative for trouble swallowing.    Respiratory: Negative for cough, chest tightness, shortness of breath and wheezing.    Cardiovascular: Negative for chest pain, palpitations and leg swelling.   Gastrointestinal: Negative for abdominal pain, constipation, diarrhea and nausea.   Genitourinary: Negative for difficulty urinating, frequency and urgency.   Musculoskeletal: Negative for arthralgias and myalgias.   Skin:  Negative for rash.   Neurological: Negative for dizziness, weakness, light-headedness and headaches.   Psychiatric/Behavioral: Negative for sleep disturbance.     Scheduled Meds:   acetaminophen  500 mg Oral Q8H    ascorbic acid (vitamin C)  500 mg Oral Daily    aspirin  81 mg Oral Daily    atorvastatin  20 mg Oral Nightly    calcium carbonate  500 mg Oral Daily    carvedilol  6.25 mg Oral BID    cyanocobalamin  1,000 mcg Oral Daily    docusate sodium  100 mg Oral BID    donepezil  10 mg Oral QHS    ferrous sulfate  325 mg Oral Daily    finasteride  5 mg Oral Daily    gabapentin  100 mg Oral Daily    gabapentin  300 mg Oral QHS    heparin (porcine)  5,000 Units Subcutaneous Q8H    levothyroxine  100 mcg Oral Before breakfast    multivitamin  1 tablet Oral Daily    sodium chloride 0.9%  10 mL Intravenous Q6H    tamsulosin  0.4 mg Oral Daily    vancomycin (VANCOCIN) IVPB  1,250 mg Intravenous Q24H     Continuous Infusions:   PRN Meds:.acetaminophen, acetaminophen, calcium carbonate, diphenhydrAMINE-zinc acetate 1-0.1%, furosemide, ondansetron, oxyCODONE-acetaminophen, ramelteon, senna, senna-docusate 8.6-50 mg, Flushing PICC Protocol **AND** sodium chloride 0.9% **AND** sodium chloride 0.9%, sodium chloride 0.9%    Objective:     Vital Signs (Most Recent):  Temp: 98.1 °F (36.7 °C) (11/17/17 0800)  Pulse: 64 (11/17/17 0800)  Resp: 18 (11/17/17 0800)  BP: (!) 153/67 (11/17/17 0800)  SpO2: 97 % (11/17/17 0800) Vital Signs (24h Range):  Temp:  [96.9 °F (36.1 °C)-98.1 °F (36.7 °C)] 98.1 °F (36.7 °C)  Pulse:  [60-64] 64  Resp:  [18-20] 18  SpO2:  [96 %-97 %] 97 %  BP: (124-153)/(58-67) 153/67     Weight: 75.5 kg (166 lb 7.2 oz)  Body mass index is 24.58 kg/m².    Intake/Output Summary (Last 24 hours) at 11/17/17 1145  Last data filed at 11/17/17 0600   Gross per 24 hour   Intake                0 ml   Output             1150 ml   Net            -1150 ml      Physical Exam   Constitutional: He is oriented  to person, place, and time. He appears well-developed and well-nourished. No distress.   Cardiovascular: Normal rate, regular rhythm and normal heart sounds.  Exam reveals no gallop and no friction rub.    No murmur heard.  Pulmonary/Chest: Effort normal and breath sounds normal. No respiratory distress. He has no wheezes. He has no rales.   Abdominal: Soft. Bowel sounds are normal. He exhibits no distension. There is no tenderness.   Musculoskeletal: Normal range of motion. He exhibits no edema or tenderness.   Neurological: He is alert and oriented to person, place, and time.   Skin: Skin is warm and dry. No rash noted. He is not diaphoretic. No cyanosis. Nails show no clubbing.   erythema at intergluteal cleft with open area with serosanguineous drainage, right buttock with sutures intact   Psychiatric: He has a normal mood and affect. His behavior is normal.     Significant Labs:    Recent Labs  Lab 11/14/17  1226 11/15/17  0534 11/16/17  0609   WBC 11.94 12.19 11.16   HGB 9.4* 9.4* 9.0*   HCT 28.0* 29.1* 27.9*   * 142* 145*       Recent Labs  Lab 11/14/17  0643 11/15/17  0533 11/16/17  0609   * 131* 133*   K 4.4 4.5 4.2    99 99   CO2 27 28 27   BUN 25* 27* 27*   CREATININE 1.3 1.3 1.4   CALCIUM 8.6* 8.7 8.5*   PROT 5.9* 6.1 5.9*   BILITOT 0.6 0.7 0.6   ALKPHOS 99 102 98   ALT 36 36 33   AST 36 38 36     Lab Results   Component Value Date    LABPROT 11.5 11/07/2017    ALBUMIN 2.9 (L) 11/16/2017     Lab Results   Component Value Date    CALCIUM 8.5 (L) 11/16/2017    PHOS 3.1 11/07/2017       Significant Imaging: n/a    Assessment/Plan:      * MRSA bacteremia    Evaluated on 11/17/17  -With source likely buttock abscess  -Continue vanc IV with end date of 12/8 which is 4 weeks from 1 negative culture on 11/10  -Picc line care with saline flushes and dressing changes        Cellulitis and abscess of buttock    Evaluated on 11/17/17  -Continue therapy with Vanc, next trough with 2100 dose  tonight  -Change mepilex dressing TIW and as needed for drainage  -F/U with Surgery as scheduled  -Continue scheduled tylenol for pain control with percocet as needed for breakthrough pain  -Contact isolation but wound able to be contained so may exit room to attend PT/OT utilizing [facility] contact isolation protocol        Debility    Evaluated on 11/17/17  -continue PT/OT to increase ambulation, ADL performance and endurance  -continue heparin for DVT prophylaxis  -continue fall precautions  -continue docusate to prevent constipation; hold for frequent or loose stooling        Takes dietary supplements    Evaluated on 11/17/17  -Continue dietary supplements with vit C, calcium, cyanocobalamin ( + hx of B12 def.), MVI        HLD (hyperlipidemia)    Evaluated on 11/17/17  -Chronic and stable  -Continue therapy with atorvastatin 20mg nightly  -F/U with PCP for ongoing monitoring        Chronic pain    Evaluated on 11/17/17  -Back pain with epidural injections in the past  -Continue percocet prn pain        Dementia without behavioral disturbance    Evaluated on 11/17/17  -Patient calm and cooperative  -Continue therapy with donepezil 10mg nightly  -Delirium precautions  -Reorient as necessary        SSS (sick sinus syndrome)    Evaluated on 11/17/17  + pacemaker in place with regular rhythm        CKD (chronic kidney disease), stage III    Evaluated on 11/17/17  -Cr is at baseline of 1.1-1.4  -Renally dose medications and avoid nephrotoxins  -Continue to monitor  -Regular Vanc level monitoring        Iron deficiency anemia due to chronic blood loss    Evaluated on 11/17/17  -H/H stable with last iron panel WNL  -Continue iron therapy  -Will continue to monitor with BIW labs        Hypothyroidism    Evaluated on 11/17/17  -Chronic and stable  -Continue medical therapy with levothyroxine 100mcg daily  -F/U with PCP for ongoing monitoring        Polyneuropathy    Evaluated on 11/17/17  -Continue medical therapy with  gabapentin  -He denies LE pain currently        Chronic diastolic congestive heart failure    Evaluated on 11/17/17  -Currently compensated  -Patient only takes lasix prn at home for edema  -Will monitor with daily weights and initiate lasix as needed for edema or weight gain        Benign prostatic hyperplasia    Evaluated on 11/17/17  -No difficulty urinating  -Continue medical therapy with finasteride 5mg daily and tamsulosin 0.4mg daily  -Will continue to monitor        Chronic atrial fibrillation    Evaluated on 11/17/17  -Rate controlled  -Warfarin discontinue due to GI bleed with angioectasia  -Continue coreg 6.25mg and ASA  81mg daily therapy  -Patient with consistent rate in 60's so likely paced        Renovascular hypertension    Evaluated on 11/17/17  -Chronic with improved control  -Continue therapy with coreg  -will continue to monitor and adjust regimen as necessary          Future Appointments  Date Time Provider Department Center   11/22/2017 11:00 AM Booker Ricci MD [facility]   11/30/2017 1:50 PM Elsa Rahman MD [facility] O[facility]   12/5/2017 9:15 AM CINDY Wilson [facility]   12/6/2017 1:00 PM INFECTIOUS DISEASE, [facility] INFECT [facility]   12/20/2017 9:40 AM Booker Ricci MD [facility] [place]     Nimo Gonzalez NP  Department of Hospital Medicine  [facility]

## 2017-11-17 NOTE — ASSESSMENT & PLAN NOTE
Evaluated on 11/17/17  -Cr is at baseline of 1.1-1.4  -Renally dose medications and avoid nephrotoxins  -Continue to monitor  -Regular Vanc level monitoring

## 2017-11-17 NOTE — ASSESSMENT & PLAN NOTE
Continue therapy with Vanc  Change mepilex dressing TIW and as needed for drainage  F/U with Surgery as scheduled  Continue scheduled tylenol for pain control with percocet as needed for breakthrough pain  Contact isolation but wound able to be contained so may exit room to attend PT/OT utilizing [facility] contact isolation protocol

## 2017-11-17 NOTE — H&P
[facility]  Department of Hospital Medicine  History & Physical    Patient Name: Zeyad Woodard  MRN: [id number]  Code Status: DNR  Admission Date: 11/15/2017  Attending Physician: Radha Fontaine MD   Primary Care Provider: Booker Ricci MD    Subjective:     Principal Problem:MRSA bacteremia      Chief Complaint/Reason for Admission: MRSA bacteremia    History of Present Illness:  Patient is a 89 y.o. male with A. Fib, BPH, CHF, HTN who presents to SNF after hospitalization for MRSA bacteremia thought to be due to a rectal abscess with cellulitis.  He required I&D of left buttock (which grew MRSA on culture) on 11/13 by Dr. Rahman.    He had a JEVON which did not show signs of infection involving valves or pacemaker.  He is due to complete Vanc for 4 weeks.  He complains of mild discomfort to his buttocks currently which if exacerbated by prolonged sitting and relieved by percocet. Daughter at bedside.    The patient has been admitted to SNF for ongoing PT/OT due to insufficient progress to go home safely from the hospital and insufficient social support to administer IV Vanc.    Records from last hospital stay reviewed and summarized above.     Past Medical History:   Diagnosis Date    Anemia     Atrial fibrillation     BPH (benign prostatic hypertrophy)     Cancer     Cardiac pacemaker in situ 7/2/2015    CHF (congestive heart failure)     Coronary artery disease     Encounter for blood transfusion     GI bleed     cecum angiectasia s/p cautery    Hypertension     Hypothyroidism     Polyneuropathy     Posture imbalance     due to neuropathy    Right posterior capsular opacification 1/18/2017    SSS (sick sinus syndrome) 2015    s/p pacemaker       Past Surgical History:   Procedure Laterality Date    CARDIAC SURGERY      CATARACT EXTRACTION W/  INTRAOCULAR LENS IMPLANT Right 05/17/2010        CATARACT EXTRACTION W/  INTRAOCULAR LENS IMPLANT Left 02/16/2004         cataract surgery      COLONOSCOPY  6/30/04    COLONOSCOPY N/A 2/15/2016    Procedure: COLONOSCOPY;  Surgeon: Stanton Kirk MD;  [facility] ([address] [place] [address]);  Service: Endoscopy;  Laterality: N/A;    EYE SURGERY      HERNIA REPAIR      inguinal hernia repair      Open heart surgery for pericardiectomy      for calcific constrictive pericarditis    UMBILICAL HERNIA REPAIR      Yag      Left Eye       Review of patient's allergies indicates:   Allergen Reactions    Penicillins Hives and Other (See Comments)     Fever       No current facility-administered medications on file prior to encounter.      Current Outpatient Prescriptions on File Prior to Encounter   Medication Sig    ASCORBATE CALCIUM (VITAMIN C ORAL) Take 1 tablet by mouth once daily.     aspirin (ECOTRIN) 81 MG EC tablet Take 1 tablet (81 mg total) by mouth once daily.    atorvastatin (LIPITOR) 20 MG tablet Take 1 tablet (20 mg total) by mouth nightly.    calcium 500 mg Tab Take 1 tablet by mouth Daily.    carvedilol (COREG) 6.25 MG tablet Take 1 tablet (6.25 mg total) by mouth 2 (two) times daily.    clotrimazole-betamethasone (LOTRISONE) lotion Apply topically 2 (two) times daily. (Patient taking differently: Apply topically as needed. )    cyanocobalamin (VITAMIN B-12) 1000 MCG tablet Take 100 mcg by mouth once daily.    donepezil (ARICEPT) 10 MG tablet Take 1 tablet (10 mg total) by mouth every evening.    ferrous sulfate 325 mg (65 mg iron) Tab tablet Take 325 mg by mouth once daily.    finasteride (PROSCAR) 5 mg tablet Take 1 tablet (5 mg total) by mouth once daily.    furosemide (LASIX) 20 MG tablet Take 20 mg by mouth 2 (two) times daily.    gabapentin (NEURONTIN) 100 MG capsule Take 100 mg by mouth 2 (two) times daily.    levothyroxine (SYNTHROID) 100 MCG tablet Take 1 tablet (100 mcg total) by mouth once daily.    MULTIVITS-MINERALS/FA/LYCOPENE (ONE-A-DAY MEN'S MULTIVITAMIN ORAL) Take 1 tablet by mouth  once daily.    senna-docusate 8.6-50 mg (PERICOLACE) 8.6-50 mg per tablet Take 1 tablet by mouth 2 (two) times daily.    tamsulosin (FLOMAX) 0.4 mg Cp24 Take 1 capsule (0.4 mg total) by mouth once daily.     Family History     Problem Relation (Age of Onset)    Cancer Father    Coronary artery disease Sister    Diabetes Sister    Peripheral vascular disease Sister    Stroke Mother        Social History Main Topics    Smoking status: Passive Smoke Exposure - Never Smoker    Smokeless tobacco: Never Used    Alcohol use No    Drug use: No    Sexual activity: No     Review of Systems   Constitutional: no fever or chills  Eyes: no visual changes  ENT: no nasal congestion or sore throat  Respiratory: no cough or shortness of breath  Cardiovascular: no chest pain or palpitations  Gastrointestinal: no nausea or vomiting, no abdominal pain; last BM: 11/16  Genitourinary: no hematuria or dysuria  Integument/Breast: no rash or pruritis  Hematologic/Lymphatic: no easy bruising or lymphadenopathy  Allergy/Immunology: no postnasal drip  Musculoskeletal: no arthralgias or myalgias  Neurological: no seizures or tremors  Behavioral/Psych: no auditory or visual hallucinations  Endocrine: no heat or cold intolerance    Objective:     Vital Signs (Most Recent):  Temp: 96.9 °F (36.1 °C) (11/16/17 2118)  Pulse: 60 (11/16/17 2118)  Resp: 20 (11/16/17 2118)  BP: (!) 124/58 (11/16/17 2118)  SpO2: 96 % (11/16/17 2118) Vital Signs (24h Range):  Temp:  [96.9 °F (36.1 °C)-97.6 °F (36.4 °C)] 96.9 °F (36.1 °C)  Pulse:  [60] 60  Resp:  [18-20] 20  SpO2:  [96 %-98 %] 96 %  BP: (124-170)/(58-60) 124/58     Weight: 76.3 kg (168 lb 3.4 oz)  Body mass index is 24.84 kg/m².    Physical Exam  General: well developed, well nourished, no distress  HENT: Head:normocephalic, atraumatic. Ears:bilateral external ear canals normal. Nose: Nares normal. Septum midline. Mucosa normal. Throat: lips, mucosa, and tongue normal and no throat erythema.  Eyes:  conjunctivae/corneas clear.  EOM normal  Neck: supple, symmetrical, trachea midline, no JVD and thyroid not enlarged.   Lungs:  clear to auscultation bilaterally and normal respiratory effort  Cardiovascular: Heart: regular rate and rhythm, S1, S2 normal, no murmur, click, rub or gallop. Chest Wall: no tenderness. Extremities: no cyanosis, no edema, no clubbing. Pulses: 2+ and symmetric.  Abdomen/Rectal: Abdomen: soft, non-tender non-distended; bowel sounds normal; no masses,  no organomegaly.   Skin: receding erythema at intergluteal cleft with open area with serosanguineous drainage  Musculoskeletal:no clubbing, cyanosis  Lymph Nodes: No cervical or supraclavicular adenopathy  Neurologic: Normal strength and tone. No focal numbness or weakness  Psych/Behavioral:  Alert and oriented, appropriate affect.  PICC line to RUE with insertion site C/D with no erythema    Significant Labs:   Recent Results (from the past 24 hour(s))   CBC auto differential    Collection Time: 11/16/17  6:09 AM   Result Value Ref Range    WBC 11.16 3.90 - 12.70 K/uL    RBC 2.96 (L) 4.60 - 6.20 M/uL    Hemoglobin 9.0 (L) 14.0 - 18.0 g/dL    Hematocrit 27.9 (L) 40.0 - 54.0 %    MCV 94 82 - 98 fL    MCH 30.4 27.0 - 31.0 pg    MCHC 32.3 32.0 - 36.0 g/dL    RDW 14.0 11.5 - 14.5 %    Platelets 145 (L) 150 - 350 K/uL    MPV 9.8 9.2 - 12.9 fL    Gran # 2.8 1.8 - 7.7 K/uL    Lymph # 6.1 (H) 1.0 - 4.8 K/uL    Mono # 2.0 (H) 0.3 - 1.0 K/uL    Eos # 0.3 0.0 - 0.5 K/uL    Baso # 0.02 0.00 - 0.20 K/uL    Gran% 25.2 (L) 38.0 - 73.0 %    Lymph% 54.5 (H) 18.0 - 48.0 %    Mono% 17.7 (H) 4.0 - 15.0 %    Eosinophil% 2.4 0.0 - 8.0 %    Basophil% 0.2 0.0 - 1.9 %    Differential Method Automated    Comprehensive metabolic panel    Collection Time: 11/16/17  6:09 AM   Result Value Ref Range    Sodium 133 (L) 136 - 145 mmol/L    Potassium 4.2 3.5 - 5.1 mmol/L    Chloride 99 95 - 110 mmol/L    CO2 27 23 - 29 mmol/L    Glucose 109 70 - 110 mg/dL    BUN, Bld 27 (H) 8  - 23 mg/dL    Creatinine 1.4 0.5 - 1.4 mg/dL    Calcium 8.5 (L) 8.7 - 10.5 mg/dL    Total Protein 5.9 (L) 6.0 - 8.4 g/dL    Albumin 2.9 (L) 3.5 - 5.2 g/dL    Total Bilirubin 0.6 0.1 - 1.0 mg/dL    Alkaline Phosphatase 98 55 - 135 U/L    AST 36 10 - 40 U/L    ALT 33 10 - 44 U/L    Anion Gap 7 (L) 8 - 16 mmol/L    eGFR if African American 50.8 (A) >60 mL/min/1.73 m^2    eGFR if non  43.9 (A) >60 mL/min/1.73 m^2   VANCOMYCIN, TROUGH before next dose    Collection Time: 11/16/17  4:35 PM   Result Value Ref Range    Vancomycin-Trough 20.3 10.0 - 22.0 ug/mL       Recent Labs  Lab 11/14/17  1226 11/15/17  0534 11/16/17  0609   WBC 11.94 12.19 11.16   HGB 9.4* 9.4* 9.0*   HCT 28.0* 29.1* 27.9*   * 142* 145*         Recent Labs  Lab 11/14/17  0643 11/15/17  0533 11/16/17  0609   * 131* 133*   K 4.4 4.5 4.2    99 99   CO2 27 28 27   BUN 25* 27* 27*   CREATININE 1.3 1.3 1.4   CALCIUM 8.6* 8.7 8.5*   PROT 5.9* 6.1 5.9*   BILITOT 0.6 0.7 0.6   ALKPHOS 99 102 98   ALT 36 36 33   AST 36 38 36             Assessment/Plan:     * MRSA bacteremia    With source likely buttock abscess  Continue vanc IV with end date of 12/8 which is 4 weeks from 1 negative culture on 11/10  PIcc line care with saline flushes and dressing changes          Cellulitis and abscess of buttock    Continue therapy with Vanc  Change mepilex dressing TIW and as needed for drainage  F/U with Surgery as scheduled  Continue scheduled tylenol for pain control with percocet as needed for breakthrough pain  Contact isolation but wound able to be contained so may exit room to attend PT/OT utilizing SNF contact isolation protocol            Takes dietary supplements    Continue dietary supplements with vit C, calcium, cyanocobalamin ( + hx of B12 def.), MVI        Debility    -continue PT/OT to increase ambulation, ADL performance and endurance  -continue heparin for DVT prophylaxis  -continue fall precautions  -continue docusate to  prevent constipation; hold for frequent or loose stooling          HLD (hyperlipidemia)    Chronic and stable  Continue therapy with atorvastatin  F/U with PCP for ongoing monitoring        Chronic pain    Back pain with epidural injections in the past  Continue percocet prn pain        Dementia without behavioral disturbance    Patient calm and cooperative  Continue therapy with donepezil  Delirium precautions  Reorient as necessary        SSS (sick sinus syndrome)    + pacemaker in place with regular rhythm        CKD (chronic kidney disease), stage III    Cr is at baseline of 1.1-1.4  Renally dose medications and avoid nephrotoxins  Continue to monitor  Regular Vanc level monitoring        Iron deficiency anemia due to chronic blood loss    H/Hh stable with last iron panel WNL  Continue iron therapy  Will continue to monitor        Hypothyroidism    Chronic and stable  Continue medical therapy with levothyroxine  F/U with PCP for ongoing monitoring        Polyneuropathy    Continue medical therapy with gabapentin  He denies LE paiin currently        Chronic diastolic congestive heart failure    Currently compensated  Patient only takes lasix prn at home for edema  Will monitor with daily weights and initiate lasix as needed for edema or weight gain        Benign prostatic hyperplasia    No difficulty urinating  Continue medical therapy with finasteride and tamsulosin  Will continue to monitor        Chronic atrial fibrillation    Rate controlled  Warfarin discontinue due to GI bleed with angioectasia  Continue coreg and ASA  therapy  Patient with consistent rate in 60's so likely paced        Renovascular hypertension    Chronic with improved control  Continue therapy with coreg  -will continue to monitor and adjust regimen as necessary          Future Appointments  Date Time Provider Department Center   11/22/2017 11:00 AM Booker Ricci MD [facility]   11/30/2017 1:50 PM Elsa FALLON  MD [facility]   12/5/2017 9:15 AM CINDY Wilson [facility]   12/6/2017 1:00 PM INFECTIOUS DISEASE, [facility] INFECT [facility]   12/20/2017 9:40 AM Booker Ricci MD [facility]     Patient's care plan and discharge planning will be discussed by the SNF team in IDT meeting weekly. Medications to be reviewed and discussed with the SNF unit clinical pharmacist.    Patient without support at home to administer IV antibiotics.    Radha Fontaine MD  Department of Hospital Medicine  [facility]

## 2017-11-17 NOTE — PLAN OF CARE
Problem: Physical Therapy Goal  Goal: Physical Therapy Goal  Goals to be met by: 14 DAYS     Patient will increase functional independence with mobility by performin. Supine to sit with Supervision   2. Sit to supine with Supervision  3. Sit to stand transfer with Stand-by Assistance with RW  4. Bed to chair transfer with Stand-by Assistance using Rolling Walker  5. Gait  x 150 feet with Stand-by Assistance using Rolling Walker.   6. Wheelchair propulsion x100 feet with Stand-by Assistance using bilateral upper extremities  7. Ascend/Descend 4 inch curb step with Contact Guard Assistance using Rolling Walker.  8. Sitting at edge of bed x8 minutes with Stand-by Assistance and without UE support, and performing exercises or activity  9. Stand for 5 minutes with Contact Guard Assistance using Rolling Walker and perform an activity  10. Lower extremity exercise program x20 reps per handout, with assistance as needed and gym therex     Outcome: Ongoing (interventions implemented as appropriate)  Goals remain appropriate

## 2017-11-17 NOTE — ASSESSMENT & PLAN NOTE
Evaluated on 11/17/17  -Patient calm and cooperative  -Continue therapy with donepezil 10mg nightly  -Delirium precautions  -Reorient as necessary

## 2017-11-17 NOTE — ASSESSMENT & PLAN NOTE
Evaluated on 11/17/17  -No difficulty urinating  -Continue medical therapy with finasteride 5mg daily and tamsulosin 0.4mg daily  -Will continue to monitor

## 2017-11-17 NOTE — ASSESSMENT & PLAN NOTE
With source likely buttock abscess  Continue vanc IV with end date of 12/8 which is 4 weeks from 1 negative culture on 11/10  PIcc line care with saline flushes and dressing changes

## 2017-11-17 NOTE — PLAN OF CARE
Problem: Patient Care Overview  Goal: Plan of Care Review  Outcome: Ongoing (interventions implemented as appropriate)  Patient medicated for pain per MAR.  Continent of B&B.  Repositions himself prn.  Contact precautions maintained.

## 2017-11-17 NOTE — ASSESSMENT & PLAN NOTE
Evaluated on 11/17/17  -Chronic with improved control  -Continue therapy with coreg  -will continue to monitor and adjust regimen as necessary

## 2017-11-17 NOTE — ASSESSMENT & PLAN NOTE
No difficulty urinating  Continue medical therapy with finasteride and tamsulosin  Will continue to monitor

## 2017-11-18 LAB — VANCOMYCIN TROUGH SERPL-MCNC: 21.8 UG/ML

## 2017-11-18 PROCEDURE — 63600175 PHARM REV CODE 636 W HCPCS: Performed by: INTERNAL MEDICINE

## 2017-11-18 PROCEDURE — 25000003 PHARM REV CODE 250: Performed by: INTERNAL MEDICINE

## 2017-11-18 PROCEDURE — 63600175 PHARM REV CODE 636 W HCPCS: Performed by: STUDENT IN AN ORGANIZED HEALTH CARE EDUCATION/TRAINING PROGRAM

## 2017-11-18 PROCEDURE — 11000004 HC SNF PRIVATE

## 2017-11-18 PROCEDURE — A4216 STERILE WATER/SALINE, 10 ML: HCPCS | Performed by: STUDENT IN AN ORGANIZED HEALTH CARE EDUCATION/TRAINING PROGRAM

## 2017-11-18 PROCEDURE — 25000003 PHARM REV CODE 250: Performed by: STUDENT IN AN ORGANIZED HEALTH CARE EDUCATION/TRAINING PROGRAM

## 2017-11-18 PROCEDURE — 36415 COLL VENOUS BLD VENIPUNCTURE: CPT

## 2017-11-18 RX ADMIN — ACETAMINOPHEN 500 MG: 500 TABLET ORAL at 10:11

## 2017-11-18 RX ADMIN — Medication 10 ML: at 03:11

## 2017-11-18 RX ADMIN — DOCUSATE SODIUM 100 MG: 100 CAPSULE, LIQUID FILLED ORAL at 09:11

## 2017-11-18 RX ADMIN — ACETAMINOPHEN 500 MG: 500 TABLET ORAL at 05:11

## 2017-11-18 RX ADMIN — DONEPEZIL HYDROCHLORIDE 10 MG: 10 TABLET, FILM COATED ORAL at 10:11

## 2017-11-18 RX ADMIN — HEPARIN SODIUM 5000 UNITS: 5000 INJECTION, SOLUTION INTRAVENOUS; SUBCUTANEOUS at 05:11

## 2017-11-18 RX ADMIN — ACETAMINOPHEN 500 MG: 500 TABLET ORAL at 03:11

## 2017-11-18 RX ADMIN — HEPARIN SODIUM 5000 UNITS: 5000 INJECTION, SOLUTION INTRAVENOUS; SUBCUTANEOUS at 10:11

## 2017-11-18 RX ADMIN — FINASTERIDE 5 MG: 5 TABLET, FILM COATED ORAL at 09:11

## 2017-11-18 RX ADMIN — CARVEDILOL 6.25 MG: 6.25 TABLET, FILM COATED ORAL at 09:11

## 2017-11-18 RX ADMIN — FERROUS SULFATE TAB EC 325 MG (65 MG FE EQUIVALENT) 325 MG: 325 (65 FE) TABLET DELAYED RESPONSE at 09:11

## 2017-11-18 RX ADMIN — THERA TABS 1 TABLET: TAB at 09:11

## 2017-11-18 RX ADMIN — GABAPENTIN 300 MG: 100 CAPSULE ORAL at 10:11

## 2017-11-18 RX ADMIN — HEPARIN SODIUM 5000 UNITS: 5000 INJECTION, SOLUTION INTRAVENOUS; SUBCUTANEOUS at 03:11

## 2017-11-18 RX ADMIN — OXYCODONE HYDROCHLORIDE AND ACETAMINOPHEN 500 MG: 500 TABLET ORAL at 09:11

## 2017-11-18 RX ADMIN — ATORVASTATIN CALCIUM 20 MG: 20 TABLET, FILM COATED ORAL at 10:11

## 2017-11-18 RX ADMIN — DOCUSATE SODIUM 100 MG: 100 CAPSULE, LIQUID FILLED ORAL at 10:11

## 2017-11-18 RX ADMIN — Medication 10 ML: at 06:11

## 2017-11-18 RX ADMIN — ASPIRIN 81 MG: 81 TABLET, COATED ORAL at 09:11

## 2017-11-18 RX ADMIN — LEVOTHYROXINE SODIUM 100 MCG: 100 TABLET ORAL at 05:11

## 2017-11-18 RX ADMIN — GABAPENTIN 100 MG: 100 CAPSULE ORAL at 09:11

## 2017-11-18 RX ADMIN — CARVEDILOL 6.25 MG: 6.25 TABLET, FILM COATED ORAL at 10:11

## 2017-11-18 RX ADMIN — OXYCODONE HYDROCHLORIDE AND ACETAMINOPHEN 1 TABLET: 7.5; 325 TABLET ORAL at 03:11

## 2017-11-18 RX ADMIN — TAMSULOSIN HYDROCHLORIDE 0.4 MG: 0.4 CAPSULE ORAL at 09:11

## 2017-11-18 RX ADMIN — CYANOCOBALAMIN TAB 1000 MCG 1000 MCG: 1000 TAB at 09:11

## 2017-11-18 RX ADMIN — CALCIUM 500 MG: 500 TABLET ORAL at 09:11

## 2017-11-18 RX ADMIN — RAMELTEON 8 MG: 8 TABLET, FILM COATED ORAL at 10:11

## 2017-11-18 RX ADMIN — OXYCODONE HYDROCHLORIDE AND ACETAMINOPHEN 1 TABLET: 7.5; 325 TABLET ORAL at 04:11

## 2017-11-18 RX ADMIN — VANCOMYCIN HYDROCHLORIDE 1000 MG: 1 INJECTION, POWDER, LYOPHILIZED, FOR SOLUTION INTRAVENOUS at 10:11

## 2017-11-18 NOTE — PLAN OF CARE
Problem: Patient Care Overview  Goal: Plan of Care Review  Outcome: Ongoing (interventions implemented as appropriate)   11/18/17 0343   Coping/Psychosocial   Plan Of Care Reviewed With patient       Problem: Infection, Risk/Actual (Adult)  Intervention: Manage Suspected/Actual Infection   11/18/17 0343   Prevent/Manage Colorectal Surgical Infection   Fever Reduction/Comfort Measures fluid intake increased;lightweight clothing;medication administered   Safety Interventions   Isolation Precautions contact precautions maintained     Intervention: Prevent Infection/Maximize Resistance   11/18/17 0343   Nutrition Interventions   Oral Nutrition Promotion calorie dense liquids provided;physical activity promoted;safe use of adaptive equipment encouraged   Respiratory Interventions   Airway/Ventilation Management airway patency maintained   Pain/Comfort/Sleep Interventions   Sleep/Rest Enhancement awakenings minimized       Goal: Identify Related Risk Factors and Signs and Symptoms  Related risk factors and signs and symptoms are identified upon initiation of Human Response Clinical Practice Guideline (CPG)   Outcome: Ongoing (interventions implemented as appropriate)   11/18/17 0343   Infection, Risk/Actual   Related Risk Factors (Infection, Risk/Actual) age extremes;skin integrity impairment;surgery/procedure   Signs and Symptoms (Infection, Risk/Actual) drainage;pain;lab value changes

## 2017-11-19 LAB
POCT GLUCOSE: 121 MG/DL (ref 70–110)
POCT GLUCOSE: 144 MG/DL (ref 70–110)
POCT GLUCOSE: 179 MG/DL (ref 70–110)

## 2017-11-19 PROCEDURE — A4216 STERILE WATER/SALINE, 10 ML: HCPCS | Performed by: STUDENT IN AN ORGANIZED HEALTH CARE EDUCATION/TRAINING PROGRAM

## 2017-11-19 PROCEDURE — 25000003 PHARM REV CODE 250: Performed by: INTERNAL MEDICINE

## 2017-11-19 PROCEDURE — 11000004 HC SNF PRIVATE

## 2017-11-19 PROCEDURE — 97110 THERAPEUTIC EXERCISES: CPT

## 2017-11-19 PROCEDURE — 97530 THERAPEUTIC ACTIVITIES: CPT

## 2017-11-19 PROCEDURE — 25000003 PHARM REV CODE 250: Performed by: STUDENT IN AN ORGANIZED HEALTH CARE EDUCATION/TRAINING PROGRAM

## 2017-11-19 PROCEDURE — 63600175 PHARM REV CODE 636 W HCPCS: Performed by: INTERNAL MEDICINE

## 2017-11-19 PROCEDURE — 63600175 PHARM REV CODE 636 W HCPCS: Performed by: STUDENT IN AN ORGANIZED HEALTH CARE EDUCATION/TRAINING PROGRAM

## 2017-11-19 PROCEDURE — 97116 GAIT TRAINING THERAPY: CPT

## 2017-11-19 RX ADMIN — HEPARIN SODIUM 5000 UNITS: 5000 INJECTION, SOLUTION INTRAVENOUS; SUBCUTANEOUS at 09:11

## 2017-11-19 RX ADMIN — TAMSULOSIN HYDROCHLORIDE 0.4 MG: 0.4 CAPSULE ORAL at 08:11

## 2017-11-19 RX ADMIN — Medication 10 ML: at 11:11

## 2017-11-19 RX ADMIN — HEPARIN SODIUM 5000 UNITS: 5000 INJECTION, SOLUTION INTRAVENOUS; SUBCUTANEOUS at 05:11

## 2017-11-19 RX ADMIN — Medication 10 ML: at 12:11

## 2017-11-19 RX ADMIN — HEPARIN SODIUM 5000 UNITS: 5000 INJECTION, SOLUTION INTRAVENOUS; SUBCUTANEOUS at 01:11

## 2017-11-19 RX ADMIN — DOCUSATE SODIUM 100 MG: 100 CAPSULE, LIQUID FILLED ORAL at 08:11

## 2017-11-19 RX ADMIN — GABAPENTIN 300 MG: 100 CAPSULE ORAL at 09:11

## 2017-11-19 RX ADMIN — ASPIRIN 81 MG: 81 TABLET, COATED ORAL at 08:11

## 2017-11-19 RX ADMIN — DONEPEZIL HYDROCHLORIDE 10 MG: 10 TABLET, FILM COATED ORAL at 08:11

## 2017-11-19 RX ADMIN — CALCIUM 500 MG: 500 TABLET ORAL at 08:11

## 2017-11-19 RX ADMIN — ACETAMINOPHEN 500 MG: 500 TABLET ORAL at 05:11

## 2017-11-19 RX ADMIN — FINASTERIDE 5 MG: 5 TABLET, FILM COATED ORAL at 08:11

## 2017-11-19 RX ADMIN — GABAPENTIN 100 MG: 100 CAPSULE ORAL at 08:11

## 2017-11-19 RX ADMIN — VANCOMYCIN HYDROCHLORIDE 1000 MG: 1 INJECTION, POWDER, LYOPHILIZED, FOR SOLUTION INTRAVENOUS at 10:11

## 2017-11-19 RX ADMIN — LEVOTHYROXINE SODIUM 100 MCG: 100 TABLET ORAL at 05:11

## 2017-11-19 RX ADMIN — ATORVASTATIN CALCIUM 20 MG: 20 TABLET, FILM COATED ORAL at 08:11

## 2017-11-19 RX ADMIN — ACETAMINOPHEN 500 MG: 500 TABLET ORAL at 01:11

## 2017-11-19 RX ADMIN — THERA TABS 1 TABLET: TAB at 08:11

## 2017-11-19 RX ADMIN — OXYCODONE HYDROCHLORIDE AND ACETAMINOPHEN 1 TABLET: 7.5; 325 TABLET ORAL at 08:11

## 2017-11-19 RX ADMIN — OXYCODONE HYDROCHLORIDE AND ACETAMINOPHEN 500 MG: 500 TABLET ORAL at 08:11

## 2017-11-19 RX ADMIN — OXYCODONE HYDROCHLORIDE AND ACETAMINOPHEN 1 TABLET: 7.5; 325 TABLET ORAL at 12:11

## 2017-11-19 RX ADMIN — ACETAMINOPHEN 500 MG: 500 TABLET ORAL at 09:11

## 2017-11-19 RX ADMIN — CARVEDILOL 6.25 MG: 6.25 TABLET, FILM COATED ORAL at 08:11

## 2017-11-19 RX ADMIN — FERROUS SULFATE TAB EC 325 MG (65 MG FE EQUIVALENT) 325 MG: 325 (65 FE) TABLET DELAYED RESPONSE at 08:11

## 2017-11-19 RX ADMIN — CYANOCOBALAMIN TAB 1000 MCG 1000 MCG: 1000 TAB at 08:11

## 2017-11-19 NOTE — PLAN OF CARE
Problem: Patient Care Overview  Goal: Plan of Care Review  Outcome: Ongoing (interventions implemented as appropriate)  AAOx4. Tolerating diet. Using urinal and ambulating with walker and assistance to bathroom. Dressings clean, dry, intact. Up in chair throughout shift. Patient remained free from falls and trauma. Call light in reach. TM.

## 2017-11-19 NOTE — PLAN OF CARE
Problem: Physical Therapy Goal  Goal: Physical Therapy Goal  Goals to be met by: 14 DAYS     Patient will increase functional independence with mobility by performin. Supine to sit with Supervision   2. Sit to supine with Supervision  3. Sit to stand transfer with Stand-by Assistance with RW  4. Bed to chair transfer with Stand-by Assistance using Rolling Walker  5. Gait  x 150 feet with Stand-by Assistance using Rolling Walker. Met (2017)  6. Wheelchair propulsion x100 feet with Stand-by Assistance using bilateral upper extremities  7. Ascend/Descend 4 inch curb step with Contact Guard Assistance using Rolling Walker.  8. Sitting at edge of bed x8 minutes with Stand-by Assistance and without UE support, and performing exercises or activity  9. Stand for 5 minutes with Contact Guard Assistance using Rolling Walker and perform an activity  10. Lower extremity exercise program x20 reps per handout, with assistance as needed and gym therex     Outcome: Ongoing (interventions implemented as appropriate)  Met one goal.

## 2017-11-19 NOTE — PLAN OF CARE
Problem: Occupational Therapy Goal  Goal: Occupational Therapy Goal  Goals to be met by: 14 days     Patient will increase functional independence with ADLs by performing:    Feeding with Modified Murray.  UE Dressing with Modified [place].  LE Dressing with Modified Murray using AD.  Grooming while standing with Modified [place].  Toileting from bedside commode with Modified Murray for hygiene and clothing management.   Bathing from  shower chair/bench with Supervision.  Toilet transfer to bedside commode with Supervision.  Perform functional activity in standing with S for 10' in preparation for safety during standing ADls.      Outcome: Ongoing (interventions implemented as appropriate)  Goals appropriate. Continue with current plan of care.  DELON Brock  11/19/2017

## 2017-11-19 NOTE — PT/OT/SLP PROGRESS
Physical Therapy  Treatment    Zeyad Woodard   MRN: [id number]   Admitting Diagnosis: MRSA bacteremia    PT Received On: 11/19/17  Total Time (min): 45       Billable Minutes:  Gait [id number], Therapeutic Activity 15 and Therapeutic Exercise 15    Treatment Type: Treatment  PT/PTA: PT     PTA Visit Number: 0       General Precautions: Standard, contact, fall  Orthopedic Precautions: N/A (B AFOs for ambulation/standing)  Braces: N/A    Subjective:  Communicated with pt prior to session. Reports his bottom is hurting him.     Pain/Comfort  Pain Rating 1: 7/10  Location - Side 1: Right  Location - Orientation 1: generalized  Location 1: sacral spine  Pain Addressed 1: Distraction  Pain Rating Post-Intervention 1: 7/10    Objective:  Patient found supine in bed.         Functional Status:  MDS G  Bed Mobility Functional Status: CGA-Min (A)  Transfer Functional Status: CGA-Min (A)  Walk in Room Functional Status: CGA-Min (A)  Walk in Corridor Functional Status: CGA-Min (A)  Locomotion on Unit Functional Status: CGA-Min (A)          Bed Mobility:  Supine>Sit: Michael    Transfers:  Sit<>Stand: Michael for forward translation of weight from WC.  Stand Pivot Transfer: CGA with RW    Gait:  Amb 150 feet with SBA and use of rolling walker. Needed reminders for standing upright and not getting to close to handrail.      Therex x20:  LAQ  Hip flexion  Ankle pumps        Balance:  Stood at sink leaning over sink with forearms resting on sink for washing hands and cleaning out dentures- SBA.    Additional Treatment:  LBE x 15 minutes to improve LE strength and endurance.    Patient left up in chair with call button in reach.    Assessment:  Zeyad Woodard is a 89 y.o. male with a medical diagnosis of MRSA bacteremia.  Mr. Woodard met one goal today and will benefit from further PT services.    Rehab identified problem list/impairments: weakness, impaired endurance, impaired self care skills, impaired functional mobilty, gait  instability, impaired balance, decreased lower extremity function, pain, impaired skin, other (comment) (contact prec/MRSA)    Rehab potential is good.    Activity tolerance: Good    Discharge recommendations: home with home health (and likely family/caregiver assist)     Barriers to discharge: Decreased caregiver support, Inaccessible home environment    Equipment recommendations: wheelchair     GOALS:    Physical Therapy Goals        Problem: Physical Therapy Goal    Goal Priority Disciplines Outcome Goal Variances Interventions   Physical Therapy Goal     PT/OT, PT Ongoing (interventions implemented as appropriate)     Description:  Goals to be met by: 14 DAYS     Patient will increase functional independence with mobility by performin. Supine to sit with Supervision   2. Sit to supine with Supervision  3. Sit to stand transfer with Stand-by Assistance with RW  4. Bed to chair transfer with Stand-by Assistance using Rolling Walker  5. Gait  x 150 feet with Stand-by Assistance using Rolling Walker. Met (2017)  6. Wheelchair propulsion x100 feet with Stand-by Assistance using bilateral upper extremities  7. Ascend/Descend 4 inch curb step with Contact Guard Assistance using Rolling Walker.  8. Sitting at edge of bed x8 minutes with Stand-by Assistance and without UE support, and performing exercises or activity  9. Stand for 5 minutes with Contact Guard Assistance using Rolling Walker and perform an activity  10. Lower extremity exercise program x20 reps per handout, with assistance as needed and gym therex                       PLAN:    Patient to be seen 5 x/week  to address the above listed problems via gait training, therapeutic activities, therapeutic exercises, wheelchair management/training  Plan of Care expires: 17  Plan of Care reviewed with: patient    Juanita Sanchez, PT  2017

## 2017-11-19 NOTE — PT/OT/SLP PROGRESS
"Occupational Therapy  Treatment    Zeyad Woodard   MRN: [id number]   Admitting Diagnosis: MRSA bacteremia    OT Date of Treatment: 11/19/17  Total Time (min): 31 min    Billable Minutes:  Therapeutic Activity 31    General Precautions: Standard, contact, fall  Orthopedic Precautions: N/A  Braces: N/A         Subjective:  Communicated with nurse and PT prior to session.  "I'm tired, but I can do arm stuff."    Pain/Comfort  Pain Rating 1: 3/10  Location 1: sacral spine  Pain Addressed 1: Reposition, Cessation of Activity  Pain Rating Post-Intervention 1: 4/10    Objective:  Patient found with: PICC line      Treatment:  Patient seen bedlevel for UE activity to increase strength, activity tolerance and maintain ROM. Able to participate in continuous UE alternating movements for 4 mins x 3 with 2 minute rest breaks.    Patient left supine with call button in reach    ASSESSMENT:  Zeyad Woodard is a 89 y.o. male with a medical diagnosis of MRSA bacteremia. Patient fatigued after being up in wheelchair most of the day. Agreeable to OT treatment bed level. Will continue to benefit from OT intervention to focus on caregiver training, safety, fall prevention and standing balance and tolerance in order to safely participate in meaningful self-care and functional mobility activities at highest level of independence.      Rehab identified problem list/impairments: weakness, impaired endurance, impaired self care skills, impaired functional mobilty, gait instability, impaired balance, decreased upper extremity function, decreased lower extremity function, decreased safety awareness, pain, impaired coordination, impaired skin    Rehab potential is good    Activity tolerance: Poor    Discharge recommendations: home health OT     Barriers to discharge: Decreased caregiver support     Equipment recommendations: wheelchair     GOALS:    Occupational Therapy Goals        Problem: Occupational Therapy Goal    Goal Priority " Disciplines Outcome Interventions   Occupational Therapy Goal     OT, PT/OT Ongoing (interventions implemented as appropriate)    Description:  Goals to be met by: 14 days     Patient will increase functional independence with ADLs by performing:    Feeding with Modified [place].  UE Dressing with Modified [place].  LE Dressing with Modified [place] using AD.  Grooming while standing with Modified [place].  Toileting from bedside commode with Modified [place] for hygiene and clothing management.   Bathing from  shower chair/bench with Supervision.  Toilet transfer to bedside commode with Supervision.  Perform functional activity in standing with S for 10' in preparation for safety during standing ADls.                       Plan:  Patient to be seen  (5-6x/week) to address the above listed problems via self-care/home management, therapeutic activities, therapeutic exercises  Plan of Care expires: 12/16/17  Plan of Care reviewed with: patient    DELON Brock  11/19/2017

## 2017-11-20 LAB
ALBUMIN SERPL BCP-MCNC: 3.1 G/DL
ALP SERPL-CCNC: 96 U/L
ALT SERPL W/O P-5'-P-CCNC: 28 U/L
ANION GAP SERPL CALC-SCNC: 5 MMOL/L
AST SERPL-CCNC: 38 U/L
BASOPHILS # BLD AUTO: 0.03 K/UL
BASOPHILS NFR BLD: 0.2 %
BILIRUB SERPL-MCNC: 0.6 MG/DL
BUN SERPL-MCNC: 27 MG/DL
CALCIUM SERPL-MCNC: 8.8 MG/DL
CHLORIDE SERPL-SCNC: 95 MMOL/L
CO2 SERPL-SCNC: 29 MMOL/L
CREAT SERPL-MCNC: 1.2 MG/DL
CRP SERPL-MCNC: 9 MG/L
DIFFERENTIAL METHOD: ABNORMAL
EOSINOPHIL # BLD AUTO: 0.3 K/UL
EOSINOPHIL NFR BLD: 2 %
ERYTHROCYTE [DISTWIDTH] IN BLOOD BY AUTOMATED COUNT: 14.1 %
ERYTHROCYTE [SEDIMENTATION RATE] IN BLOOD BY WESTERGREN METHOD: 7 MM/HR
EST. GFR  (AFRICAN AMERICAN): >60 ML/MIN/1.73 M^2
EST. GFR  (NON AFRICAN AMERICAN): 52.9 ML/MIN/1.73 M^2
GLUCOSE SERPL-MCNC: 94 MG/DL
HCT VFR BLD AUTO: 26.7 %
HGB BLD-MCNC: 8.8 G/DL
LYMPHOCYTES # BLD AUTO: 6.8 K/UL
LYMPHOCYTES NFR BLD: 54.7 %
MAGNESIUM SERPL-MCNC: 1.8 MG/DL
MCH RBC QN AUTO: 30.9 PG
MCHC RBC AUTO-ENTMCNC: 33 G/DL
MCV RBC AUTO: 94 FL
MONOCYTES # BLD AUTO: 1.9 K/UL
MONOCYTES NFR BLD: 15.6 %
NEUTROPHILS # BLD AUTO: 3.4 K/UL
NEUTROPHILS NFR BLD: 27.5 %
PHOSPHATE SERPL-MCNC: 3.4 MG/DL
PLATELET # BLD AUTO: 144 K/UL
PMV BLD AUTO: 10.8 FL
POCT GLUCOSE: 118 MG/DL (ref 70–110)
POTASSIUM SERPL-SCNC: 4.6 MMOL/L
PROT SERPL-MCNC: 6.1 G/DL
RBC # BLD AUTO: 2.85 M/UL
SODIUM SERPL-SCNC: 129 MMOL/L
WBC # BLD AUTO: 12.36 K/UL

## 2017-11-20 PROCEDURE — 86140 C-REACTIVE PROTEIN: CPT

## 2017-11-20 PROCEDURE — 25000003 PHARM REV CODE 250: Performed by: NURSE PRACTITIONER

## 2017-11-20 PROCEDURE — 97110 THERAPEUTIC EXERCISES: CPT

## 2017-11-20 PROCEDURE — 97535 SELF CARE MNGMENT TRAINING: CPT

## 2017-11-20 PROCEDURE — 85025 COMPLETE CBC W/AUTO DIFF WBC: CPT

## 2017-11-20 PROCEDURE — 80053 COMPREHEN METABOLIC PANEL: CPT

## 2017-11-20 PROCEDURE — 11000004 HC SNF PRIVATE

## 2017-11-20 PROCEDURE — 97530 THERAPEUTIC ACTIVITIES: CPT

## 2017-11-20 PROCEDURE — 63600175 PHARM REV CODE 636 W HCPCS: Performed by: INTERNAL MEDICINE

## 2017-11-20 PROCEDURE — 83735 ASSAY OF MAGNESIUM: CPT

## 2017-11-20 PROCEDURE — 97116 GAIT TRAINING THERAPY: CPT

## 2017-11-20 PROCEDURE — 85651 RBC SED RATE NONAUTOMATED: CPT

## 2017-11-20 PROCEDURE — 36415 COLL VENOUS BLD VENIPUNCTURE: CPT

## 2017-11-20 PROCEDURE — 63600175 PHARM REV CODE 636 W HCPCS: Performed by: STUDENT IN AN ORGANIZED HEALTH CARE EDUCATION/TRAINING PROGRAM

## 2017-11-20 PROCEDURE — 84100 ASSAY OF PHOSPHORUS: CPT

## 2017-11-20 PROCEDURE — A4216 STERILE WATER/SALINE, 10 ML: HCPCS | Performed by: STUDENT IN AN ORGANIZED HEALTH CARE EDUCATION/TRAINING PROGRAM

## 2017-11-20 PROCEDURE — 25000003 PHARM REV CODE 250: Performed by: INTERNAL MEDICINE

## 2017-11-20 PROCEDURE — 25000003 PHARM REV CODE 250: Performed by: STUDENT IN AN ORGANIZED HEALTH CARE EDUCATION/TRAINING PROGRAM

## 2017-11-20 RX ORDER — OXYCODONE AND ACETAMINOPHEN 7.5; 325 MG/1; MG/1
1 TABLET ORAL EVERY 6 HOURS PRN
Status: DISCONTINUED | OUTPATIENT
Start: 2017-11-20 | End: 2017-12-12 | Stop reason: HOSPADM

## 2017-11-20 RX ORDER — LISINOPRIL 5 MG/1
5 TABLET ORAL DAILY
Status: DISCONTINUED | OUTPATIENT
Start: 2017-11-20 | End: 2017-11-21

## 2017-11-20 RX ADMIN — CARVEDILOL 6.25 MG: 6.25 TABLET, FILM COATED ORAL at 09:11

## 2017-11-20 RX ADMIN — HEPARIN SODIUM 5000 UNITS: 5000 INJECTION, SOLUTION INTRAVENOUS; SUBCUTANEOUS at 01:11

## 2017-11-20 RX ADMIN — Medication 10 ML: at 12:11

## 2017-11-20 RX ADMIN — ACETAMINOPHEN 500 MG: 500 TABLET ORAL at 09:11

## 2017-11-20 RX ADMIN — CALCIUM 500 MG: 500 TABLET ORAL at 09:11

## 2017-11-20 RX ADMIN — Medication 10 ML: at 06:11

## 2017-11-20 RX ADMIN — DOCUSATE SODIUM 100 MG: 100 CAPSULE, LIQUID FILLED ORAL at 09:11

## 2017-11-20 RX ADMIN — DONEPEZIL HYDROCHLORIDE 10 MG: 10 TABLET, FILM COATED ORAL at 09:11

## 2017-11-20 RX ADMIN — ACETAMINOPHEN 500 MG: 500 TABLET ORAL at 01:11

## 2017-11-20 RX ADMIN — VANCOMYCIN HYDROCHLORIDE 1000 MG: 1 INJECTION, POWDER, LYOPHILIZED, FOR SOLUTION INTRAVENOUS at 09:11

## 2017-11-20 RX ADMIN — GABAPENTIN 300 MG: 100 CAPSULE ORAL at 09:11

## 2017-11-20 RX ADMIN — LISINOPRIL 5 MG: 5 TABLET ORAL at 01:11

## 2017-11-20 RX ADMIN — FINASTERIDE 5 MG: 5 TABLET, FILM COATED ORAL at 09:11

## 2017-11-20 RX ADMIN — OXYCODONE HYDROCHLORIDE AND ACETAMINOPHEN 500 MG: 500 TABLET ORAL at 09:11

## 2017-11-20 RX ADMIN — FERROUS SULFATE TAB EC 325 MG (65 MG FE EQUIVALENT) 325 MG: 325 (65 FE) TABLET DELAYED RESPONSE at 09:11

## 2017-11-20 RX ADMIN — ATORVASTATIN CALCIUM 20 MG: 20 TABLET, FILM COATED ORAL at 09:11

## 2017-11-20 RX ADMIN — GABAPENTIN 100 MG: 100 CAPSULE ORAL at 09:11

## 2017-11-20 RX ADMIN — LEVOTHYROXINE SODIUM 100 MCG: 100 TABLET ORAL at 06:11

## 2017-11-20 RX ADMIN — HEPARIN SODIUM 5000 UNITS: 5000 INJECTION, SOLUTION INTRAVENOUS; SUBCUTANEOUS at 06:11

## 2017-11-20 RX ADMIN — HEPARIN SODIUM 5000 UNITS: 5000 INJECTION, SOLUTION INTRAVENOUS; SUBCUTANEOUS at 09:11

## 2017-11-20 RX ADMIN — ACETAMINOPHEN 500 MG: 500 TABLET ORAL at 06:11

## 2017-11-20 RX ADMIN — CYANOCOBALAMIN TAB 1000 MCG 1000 MCG: 1000 TAB at 09:11

## 2017-11-20 RX ADMIN — OXYCODONE HYDROCHLORIDE AND ACETAMINOPHEN 1 TABLET: 7.5; 325 TABLET ORAL at 09:11

## 2017-11-20 RX ADMIN — THERA TABS 1 TABLET: TAB at 09:11

## 2017-11-20 RX ADMIN — OXYCODONE AND ACETAMINOPHEN 1 TABLET: 7.5; 325 TABLET ORAL at 10:11

## 2017-11-20 RX ADMIN — TAMSULOSIN HYDROCHLORIDE 0.4 MG: 0.4 CAPSULE ORAL at 09:11

## 2017-11-20 RX ADMIN — ASPIRIN 81 MG: 81 TABLET, COATED ORAL at 09:11

## 2017-11-20 NOTE — PT/OT/SLP DISCHARGE
Occupational Therapy Discharge Summary    Zeyad Woodard  MRN: [id number]   MRSA bacteremia   Patient Discharged from acute Occupational Therapy on 11/15/17.  Please refer to prior OT note dated on 11/14/17 for functional status.     Assessment:   Patient appropriate for care in another setting.  GOALS:    Occupational Therapy Goals        Problem: Occupational Therapy Goal    Goal Priority Disciplines Outcome Interventions   Occupational Therapy Goal     OT, PT/OT Ongoing (interventions implemented as appropriate)    Description:  Goals to be met by: 12/11     Patient will increase functional independence with ADLs by performing:    UE Dressing with Modified [place].  LE Dressing with Modified [place].  Grooming while standing with Modified [place].  Toileting from toilet with Modified [place] for hygiene and clothing management.   Toilet transfer to toilet with Modified [place].                    Reasons for Discontinuation of Therapy Services  Transfer to alternate level of care.      Plan:  Patient Discharged to: Skilled Nursing Facility.    Alysa Sanders, OT  11/20/2017

## 2017-11-20 NOTE — PLAN OF CARE
Problem: Physical Therapy Goal  Goal: Physical Therapy Goal  Goals to be met by: 14 DAYS     Patient will increase functional independence with mobility by performin. Supine to sit with Supervision   2. Sit to supine with Supervision  3. Sit to stand transfer with Stand-by Assistance with RW  4. Bed to chair transfer with Stand-by Assistance using Rolling Walker  5. Gait  x 150 feet with Stand-by Assistance using Rolling Walker. Met (2017)  6. Wheelchair propulsion x100 feet with Stand-by Assistance using bilateral upper extremities  7. Ascend/Descend 4 inch curb step with Contact Guard Assistance using Rolling Walker.  8. Sitting at edge of bed x8 minutes with Stand-by Assistance and without UE support, and performing exercises or activity  9. Stand for 5 minutes with Contact Guard Assistance using Rolling Walker and perform an activity  10. Lower extremity exercise program x20 reps per handout, with assistance as needed and gym therex      Goals remain appropriate. Continue with Physical therapy Plan of Care. More Sanchez, PT 2017

## 2017-11-20 NOTE — PT/OT/SLP PROGRESS
Physical Therapy  Treatment    Zeyad Woodard   MRN: [id number]   Admitting Diagnosis: MRSA bacteremia    PT Received On: 11/20/17          Billable Minutes:  Gait [id number], Therapeutic Activity 15 and Therapeutic Exercise 23=53    Treatment Type: Treatment  PT/PTA: PT     PTA Visit Number: 0       General Precautions: Standard, contact, fall  Orthopedic Precautions: N/A   Braces: N/A         Subjective:  Communicated with patient  prior to session.  Patient agreeable to sessin    Pain/Comfort  Pain Rating 1: 0/10  Location - Side 1: Right  Location - Orientation 1: generalized  Location 1: hip (pain w/ movement)  Pain Addressed 1: Reposition, Distraction  Pain Rating Post-Intervention 1: 0/10    Objective:  Patient found seated on toilet (PCT assisted to toilet w/ RW)   with         Functional Status:  MDS G  Transfer Functional Status: CGA-Min (A)  Walk in Room Functional Status: CGA-Min (A)  Walk in Corridor Functional Status: CGA-Min (A)  Locomotion on Unit Functional Status: CGA-Min (A)          Bed Mobility:  Sit>Supine:NT  Supine>Sit: NT    Transfers:  Sit<>Stand: from toilet w/gra bar and RW w/ min assist for hip elevation  W/c<>stand w/ RW and CGA x3 trials      Gait:  Amb 150 feet w/ RW and CGA w/ patient wearing house shoes. Patient w/ absent ankle DF and slides feet forward w/ toes touching, most steps (these are the house slippers he wears at home in the house.) No LOB, slow pace.  Amb 150 feet w/ RW and carbon fiber AFOs/tennis shoes. Patient amb w/ slight foot clearance w/ better clearance on right in swing and step-to w/ LLE. Small steps. Slow pace.   Amb w/ RW and house slippers w/ close SBA and w/c in tow, ~100 feet.       Therex:  Quad sets,   Glute sets,   Ankle  Pumps: ankle PF only  LAQ   x20 reps w/ assist as needed.    Additional Treatment:  Patient performed jose maria care after BM w/ PT completing. Patient stands w/ grab bar and PT assists w/ pants. Patient amb to doorway and transfers to clean  w/c, ~12 feet w/ RW and CGA. Dons gown in sitting in w/c to leave room. Buttock wound covered and clean and dry. Patient uses hand .  Patient returns to room and transfers to in-room w/c.    Patient left up in chair with call button in reach.    Assessment:  Zeyad Woodard is a 89 y.o. male with a medical diagnosis of MRSA bacteremia.  He came to the gym for treatment today w/ use of gym protocol. Patient amb well w/ RW and with and w/o AFOs. Patient will benefit from continued physical therapy to address deficits and improve safety and functional mobility. Continue with physical therapy plan of care. .    Rehab identified problem list/impairments: weakness, impaired endurance, impaired self care skills, impaired functional mobilty, gait instability, impaired balance, decreased lower extremity function, pain, impaired skin, other (comment) (contact prec/MRSA)    Rehab potential is good.    Activity tolerance: Good    Discharge recommendations: home with home health (and likely family/caregiver assist)     Barriers to discharge: Decreased caregiver support, Inaccessible home environment    Equipment recommendations: wheelchair     GOALS:    Physical Therapy Goals        Problem: Physical Therapy Goal    Goal Priority Disciplines Outcome Goal Variances Interventions   Physical Therapy Goal     PT/OT, PT Ongoing (interventions implemented as appropriate)     Description:  Goals to be met by: 14 DAYS     Patient will increase functional independence with mobility by performin. Supine to sit with Supervision   2. Sit to supine with Supervision  3. Sit to stand transfer with Stand-by Assistance with RW  4. Bed to chair transfer with Stand-by Assistance using Rolling Walker  5. Gait  x 150 feet with Stand-by Assistance using Rolling Walker. Met (2017)  6. Wheelchair propulsion x100 feet with Stand-by Assistance using bilateral upper extremities  7. Ascend/Descend 4 inch curb step with Contact Guard  Assistance using Rolling Walker.  8. Sitting at edge of bed x8 minutes with Stand-by Assistance and without UE support, and performing exercises or activity  9. Stand for 5 minutes with Contact Guard Assistance using Rolling Walker and perform an activity  10. Lower extremity exercise program x20 reps per handout, with assistance as needed and gym therex                       PLAN:    Patient to be seen 5 x/week  to address the above listed problems via gait training, therapeutic activities, therapeutic exercises, wheelchair management/training  Plan of Care expires: 12/16/17  Plan of Care reviewed with: patient    More JUSTIN Sanchez, PT  11/20/2017

## 2017-11-20 NOTE — PT/OT/SLP PROGRESS
Occupational Therapy  Treatment    Zeyad Woodard   MRN: [id number]   Admitting Diagnosis: MRSA bacteremia    OT Date of Treatment: 11/20/17  Total Time (min): 66 min    Billable Minutes:  Self Care/Home Management 66    General Precautions: Standard, contact, fall  Orthopedic Precautions: N/A  Braces: N/A         Subjective:  Communicated with nurse prior to session.  Pt. Reported that he needed to be cleaned up     Pain/Comfort  Pain Rating 1: 5/10  Location 1: sacral spine  Pain Addressed 1: Reposition, Distraction  Pain Rating Post-Intervention 1: 5/10    Objective:  Patient found with:  (supine in bed)    Functional Status:  MDS G  Bed Mobility Functional Status: SBA  Bed Mobility Level of (A):  (but does elevate the HOB)  Transfer Functional Status: SBA  Transfer Level of (A):  (sit to stand from EOB with RW and SPT from bed to BSC and bed to w/c  Dressing Functional Status: 2-Min (A) to initially don LLE into pants; educated  Pt. On use of AE to assist with task and also educated on donning LLE first 2/2 appears to give pt. More difficulty  Eating Functional Status: Set Up A  Toilet Use Functional Status: CGA to use BSC  Personal Hygiene Functional Status: Set Up A seated in w/c  Bathing Functional Status:Min (A) to steady  When washing buttocks region   Moving from seated to standing position: Not steady, but able to stabilize without staff assistance  Surface-to-surface transfer (transfer between bed and chair or wheelchair): Not steady, only able to stabilize with staff assistance          Additional Treatment:  Education provided on safety with sponge bath, AE to assist with LBD and bathing , safety with transfers       Patient left up in chair with PCT notified and chair alarm in place;  Pt. Attempting to utilize urinal seated but needed additional time therefore PCT was going to monitor the patient     ASSESSMENT:  Zeyad Woodard is a 89 y.o. male with a medical diagnosis of MRSA bacteremia and is  making improvements .    Rehab identified problem list/impairments: weakness, impaired endurance, impaired self care skills, impaired functional mobilty, gait instability, impaired balance, decreased upper extremity function, decreased lower extremity function, decreased safety awareness, pain, impaired coordination, impaired skin    Rehab potential is good    Activity tolerance: Good    Discharge recommendations: home health OT     Barriers to discharge: Decreased caregiver support     Equipment recommendations: wheelchair     GOALS:    Occupational Therapy Goals        Problem: Occupational Therapy Goal    Goal Priority Disciplines Outcome Interventions   Occupational Therapy Goal     OT, PT/OT Ongoing (interventions implemented as appropriate)    Description:  Goals to be met by: 14 days     Patient will increase functional independence with ADLs by performing:    Feeding with Modified [place].  UE Dressing with Modified [place].  LE Dressing with Modified [place] using AD.  Grooming while standing with Modified [place].  Toileting from bedside commode with Modified [place] for hygiene and clothing management.   Bathing from  shower chair/bench with Supervision.  Toilet transfer to bedside commode with Supervision.  Perform functional activity in standing with S for 10' in preparation for safety during standing ADls.                       Plan:  Patient to be seen  (5-6x/week) to address the above listed problems via self-care/home management, therapeutic activities, therapeutic exercises  Plan of Care expires: 12/16/17  Plan of Care reviewed with: patient    DELON Kapoor  11/20/2017

## 2017-11-20 NOTE — PLAN OF CARE
Problem: Occupational Therapy Goal  Goal: Occupational Therapy Goal  Goals to be met by: 14 days     Patient will increase functional independence with ADLs by performing:    Feeding with Modified [place].  UE Dressing with Modified [place].  LE Dressing with Modified [place] using AD.  Grooming while standing with Modified [place].  Toileting from bedside commode with Modified [place] for hygiene and clothing management.   Bathing from  shower chair/bench with Supervision.  Toilet transfer to bedside commode with Supervision.  Perform functional activity in standing with S for 10' in preparation for safety during standing ADls.      Pt. Tolerated session well.

## 2017-11-21 PROCEDURE — 63600175 PHARM REV CODE 636 W HCPCS: Performed by: INTERNAL MEDICINE

## 2017-11-21 PROCEDURE — 11000004 HC SNF PRIVATE

## 2017-11-21 PROCEDURE — 25000003 PHARM REV CODE 250: Performed by: NURSE PRACTITIONER

## 2017-11-21 PROCEDURE — 63600175 PHARM REV CODE 636 W HCPCS: Performed by: STUDENT IN AN ORGANIZED HEALTH CARE EDUCATION/TRAINING PROGRAM

## 2017-11-21 PROCEDURE — 97116 GAIT TRAINING THERAPY: CPT

## 2017-11-21 PROCEDURE — 25000003 PHARM REV CODE 250: Performed by: INTERNAL MEDICINE

## 2017-11-21 PROCEDURE — 80202 ASSAY OF VANCOMYCIN: CPT

## 2017-11-21 PROCEDURE — 25000003 PHARM REV CODE 250: Performed by: STUDENT IN AN ORGANIZED HEALTH CARE EDUCATION/TRAINING PROGRAM

## 2017-11-21 PROCEDURE — 97530 THERAPEUTIC ACTIVITIES: CPT

## 2017-11-21 PROCEDURE — 97110 THERAPEUTIC EXERCISES: CPT

## 2017-11-21 PROCEDURE — 97535 SELF CARE MNGMENT TRAINING: CPT

## 2017-11-21 PROCEDURE — A4216 STERILE WATER/SALINE, 10 ML: HCPCS | Performed by: STUDENT IN AN ORGANIZED HEALTH CARE EDUCATION/TRAINING PROGRAM

## 2017-11-21 RX ORDER — MICONAZOLE NITRATE 2 %
POWDER (GRAM) TOPICAL 2 TIMES DAILY
Status: DISCONTINUED | OUTPATIENT
Start: 2017-11-21 | End: 2017-12-12 | Stop reason: HOSPADM

## 2017-11-21 RX ORDER — LISINOPRIL 10 MG/1
10 TABLET ORAL DAILY
Status: DISCONTINUED | OUTPATIENT
Start: 2017-11-22 | End: 2017-12-07

## 2017-11-21 RX ADMIN — CYANOCOBALAMIN TAB 1000 MCG 1000 MCG: 1000 TAB at 09:11

## 2017-11-21 RX ADMIN — DOCUSATE SODIUM 100 MG: 100 CAPSULE, LIQUID FILLED ORAL at 10:11

## 2017-11-21 RX ADMIN — Medication 10 ML: at 12:11

## 2017-11-21 RX ADMIN — GABAPENTIN 300 MG: 100 CAPSULE ORAL at 10:11

## 2017-11-21 RX ADMIN — DOCUSATE SODIUM 100 MG: 100 CAPSULE, LIQUID FILLED ORAL at 09:11

## 2017-11-21 RX ADMIN — ACETAMINOPHEN 500 MG: 500 TABLET ORAL at 10:11

## 2017-11-21 RX ADMIN — OXYCODONE HYDROCHLORIDE AND ACETAMINOPHEN 500 MG: 500 TABLET ORAL at 09:11

## 2017-11-21 RX ADMIN — ACETAMINOPHEN 500 MG: 500 TABLET ORAL at 02:11

## 2017-11-21 RX ADMIN — MICONAZOLE NITRATE: 20 POWDER TOPICAL at 12:11

## 2017-11-21 RX ADMIN — CALCIUM 500 MG: 500 TABLET ORAL at 09:11

## 2017-11-21 RX ADMIN — ACETAMINOPHEN 500 MG: 500 TABLET ORAL at 06:11

## 2017-11-21 RX ADMIN — LEVOTHYROXINE SODIUM 100 MCG: 100 TABLET ORAL at 06:11

## 2017-11-21 RX ADMIN — HEPARIN SODIUM 5000 UNITS: 5000 INJECTION, SOLUTION INTRAVENOUS; SUBCUTANEOUS at 06:11

## 2017-11-21 RX ADMIN — LISINOPRIL 5 MG: 5 TABLET ORAL at 09:11

## 2017-11-21 RX ADMIN — THERA TABS 1 TABLET: TAB at 09:11

## 2017-11-21 RX ADMIN — FINASTERIDE 5 MG: 5 TABLET, FILM COATED ORAL at 09:11

## 2017-11-21 RX ADMIN — VANCOMYCIN HYDROCHLORIDE 1000 MG: 1 INJECTION, POWDER, LYOPHILIZED, FOR SOLUTION INTRAVENOUS at 10:11

## 2017-11-21 RX ADMIN — FERROUS SULFATE TAB EC 325 MG (65 MG FE EQUIVALENT) 325 MG: 325 (65 FE) TABLET DELAYED RESPONSE at 09:11

## 2017-11-21 RX ADMIN — HEPARIN SODIUM 5000 UNITS: 5000 INJECTION, SOLUTION INTRAVENOUS; SUBCUTANEOUS at 02:11

## 2017-11-21 RX ADMIN — CARVEDILOL 6.25 MG: 6.25 TABLET, FILM COATED ORAL at 09:11

## 2017-11-21 RX ADMIN — RAMELTEON 8 MG: 8 TABLET, FILM COATED ORAL at 10:11

## 2017-11-21 RX ADMIN — ASPIRIN 81 MG: 81 TABLET, COATED ORAL at 09:11

## 2017-11-21 RX ADMIN — TAMSULOSIN HYDROCHLORIDE 0.4 MG: 0.4 CAPSULE ORAL at 09:11

## 2017-11-21 RX ADMIN — GABAPENTIN 100 MG: 100 CAPSULE ORAL at 09:11

## 2017-11-21 RX ADMIN — ATORVASTATIN CALCIUM 20 MG: 20 TABLET, FILM COATED ORAL at 10:11

## 2017-11-21 RX ADMIN — Medication 10 ML: at 06:11

## 2017-11-21 RX ADMIN — DONEPEZIL HYDROCHLORIDE 10 MG: 10 TABLET, FILM COATED ORAL at 10:11

## 2017-11-21 RX ADMIN — HEPARIN SODIUM 5000 UNITS: 5000 INJECTION, SOLUTION INTRAVENOUS; SUBCUTANEOUS at 10:11

## 2017-11-21 RX ADMIN — CARVEDILOL 6.25 MG: 6.25 TABLET, FILM COATED ORAL at 10:11

## 2017-11-21 NOTE — PT/OT/SLP PROGRESS
Occupational Therapy  Treatment    Zeyad Woodard   MRN: [id number]   Admitting Diagnosis: MRSA bacteremia    OT Date of Treatment: 11/21/17  Total Time (min): 43 min    Billable Minutes:  Self Care/Home Management 43    General Precautions: Standard, contact, fall  Orthopedic Precautions: N/A  Braces: N/A         Subjective:  Communicated with nurse prior to session.  Pt. Reported that he felt light headed when standing    Pain/Comfort  Pain Rating 1: 7/10  Location - Side 1: Right  Location 1: hip  Pain Addressed 1: Pre-medicate for activity, Nurse notified, Reposition, Distraction  Pain Rating Post-Intervention 1:  (appeared to be ib less pain when returned supine)    Objective:  Patient found with:  (seated in w/c)    Functional Status:  MDS G  Bed Mobility Functional Status: Min (A)  Bed Mobility Level of (A):  (at RLE for sit to supine)  Transfer Functional Status: CGA-Min (A)  Transfer Level of (A):  (with use of RW from w/c)  Walk in Room Functional Status: -Min (A)  Walk In Room Level of (A):  (functional mobility from bed to bathroom to use toilet)  Dressing Functional Status: 2-Min (A)  Dressing Level of (A) :  (to remove pants and slippers)  Toilet Use Functional Status: -Min (A)  Toilet Use Level of (A) :  (to use bedside commode over toilet)  Personal Hygiene Functional Status: S-SBA  Personal Hygiene Level of (A) :  (seated in w/c)  Moving from seated to standing position: Not steady, only able to stabilize with staff assistance  Surface-to-surface transfer (transfer between bed and chair or wheelchair): Not steady, only able to stabilize with staff assistance        Additional Treatment:  Pt. Educated on safety with toileting and transfers  Pt. And pt. Family educated on positioning in bed, use of AE to assist with LBD   Built up handle issued to pt to assist with  for feeding    Patient left in left sidelying  with call button in reach daughter and nurse present    ASSESSMENT:  Zeyad Woodard  is a 89 y.o. male with a medical diagnosis of MRSA bacteremia and presents with deficits in functional mobility, self-care skills as well as overall level of endurance and would benefit from continued OT services.  Pt. Is progressing with ADL tasks as well as mobility .    Rehab identified problem list/impairments: weakness, impaired endurance, impaired self care skills, impaired functional mobilty, gait instability, impaired balance, decreased upper extremity function, decreased lower extremity function, decreased safety awareness, pain, impaired coordination, impaired skin    Rehab potential is good    Activity tolerance: Good    Discharge recommendations: home health OT     Barriers to discharge: Decreased caregiver support     Equipment recommendations: wheelchair     GOALS:    Occupational Therapy Goals        Problem: Occupational Therapy Goal    Goal Priority Disciplines Outcome Interventions   Occupational Therapy Goal     OT, PT/OT Ongoing (interventions implemented as appropriate)    Description:  Goals to be met by: 14 days     Patient will increase functional independence with ADLs by performing:    Feeding with Modified [place].  UE Dressing with Modified [place].  LE Dressing with Modified [place] using AD.  Grooming while standing with Modified [place].  Toileting from bedside commode with Modified [place] for hygiene and clothing management.   Bathing from  shower chair/bench with Supervision.  Toilet transfer to bedside commode with Supervision.  Perform functional activity in standing with S for 10' in preparation for safety during standing ADls.                       Plan:  Patient to be seen  (5-6x/week) to address the above listed problems via self-care/home management, therapeutic activities, therapeutic exercises  Plan of Care expires: 12/16/17  Plan of Care reviewed with: patient    Felicia DELON Cueva  11/21/2017

## 2017-11-21 NOTE — PLAN OF CARE
Problem: Fall Risk (Adult)  Goal: Absence of Falls  Patient will demonstrate the desired outcomes by discharge/transition of care.   Outcome: Ongoing (interventions implemented as appropriate)   11/20/17 2000   Fall Risk (Adult)   Absence of Falls making progress toward outcome   Pt remain free from falls/injury/trauma. Call light w/i reach. Will monitor

## 2017-11-21 NOTE — PT/OT/SLP PROGRESS
Physical Therapy  Treatment    Zeyad Woodard   MRN: [id number]   Admitting Diagnosis: MRSA bacteremia    PT Received On: 11/21/17      Billable Minutes:  Gait [id number], Therapeutic Activity 15 and Therapeutic Exercise 15=53    Treatment Type: Treatment  PT/PTA: PT     PTA Visit Number: 0       General Precautions: Standard, contact, fall  Orthopedic Precautions: N/A   Braces: N/A    Subjective:  Communicated with patient prior to session.  Patient agreeable to session    Pain/Comfort  Pain Rating 1: 8/10  Location - Side 1: Right  Location - Orientation 1: generalized  Location 1: hip  Pain Addressed 1: Distraction (not time for meds)  Pain Rating Post-Intervention 1: 8/10    Objective:  Patient found seated in w/c   Patient to gym for therapy using isolation protocl     Functional Status:  MDS G  Transfer Functional Status: CGA-Min (A)  Walk in Room Functional Status: CGA-Min (A)  Walk in Corridor Functional Status: CGA-Min (A)  Locomotion on Unit Functional Status: CGA-Min (A)        Transfers:  Sit<>Stand: from w/c w/ RW and close SBA. To w/c w/ RW and Close SBA. To/from w/c x2 trials w/ SBA. Extra time. Mild instability w/ transitioning hands to RW  Stand Pivot Transfer: to/from w/c w/ RW (at door to room for gym isolation protocol) w/ close SBA  Cues for technique. Extra time.    Gait:  Amb 135 feet w/ RW and close SBA to occ CGA w/o AFOs. Forward flexed posture. Steppage gait w/ right forefoot remaining in contact w/ floor during swing. Patient wearing house slippers. Amb in room ~ 15 feet x2 trials w/ RW and CGA in house slippers   Amb 220 feet w/ RW and SBA w/ BAFOs w/ improved foot clearance in swing bilaterally.      Therex:  Quad sets,   Glute sets,   Ankle  PF,   hip abduction/adduction,  LAQ   Hip flexion  x20 reps w/ assist as needed.    Additional Treatment:  Patient assisted w/ donning pants, slippers, AFOs/shoes. Patient used BSC over toilet w/ SBA and cues for technique for transfer. Stands w/ RW  and assisted w/ pants management before and after toileting. Patient assisted w/ donning gown at doorway in sitting for gym protocol.    Patient left up in chair with call button in reach. And lunch set-up.     Assessment:  Zeyad Woodard is a 89 y.o. male with a medical diagnosis of MRSA bacteremia.  He was able to participate in therapy using gym isolation protocol. Patient progressing w/ gait and transfers. Best technique w/ use of AFOs but patient is practicing w/ gait wearing house slippers also, as this is his usual footwear at home. Patient will benefit from continued physical therapy to address deficits and improve safety and functional mobility. Continue with physical therapy plan of care.     Rehab identified problem list/impairments: weakness, impaired endurance, impaired self care skills, impaired functional mobilty, gait instability, impaired balance, decreased lower extremity function, pain, impaired skin, other (comment) (contact prec/MRSA)    Rehab potential is good.    Activity tolerance: Good    Discharge recommendations: home with home health (and likely family/caregiver assist)     Barriers to discharge: Decreased caregiver support, Inaccessible home environment    Equipment recommendations: wheelchair     GOALS:    Physical Therapy Goals        Problem: Physical Therapy Goal    Goal Priority Disciplines Outcome Goal Variances Interventions   Physical Therapy Goal     PT/OT, PT Ongoing (interventions implemented as appropriate)     Description:  Goals to be met by: 14 DAYS     Patient will increase functional independence with mobility by performin. Supine to sit with Supervision   2. Sit to supine with Supervision  3. Sit to stand transfer with Stand-by Assistance with RW  4. Bed to chair transfer with Stand-by Assistance using Rolling Walker  5. Gait  x 150 feet with Stand-by Assistance using Rolling Walker. Met (2017)  6. Wheelchair propulsion x100 feet with Stand-by Assistance  using bilateral upper extremities  7. Ascend/Descend 4 inch curb step with Contact Guard Assistance using Rolling Walker.  8. Sitting at edge of bed x8 minutes with Stand-by Assistance and without UE support, and performing exercises or activity  9. Stand for 5 minutes with Contact Guard Assistance using Rolling Walker and perform an activity  10. Lower extremity exercise program x20 reps per handout, with assistance as needed and gym therex                       PLAN:    Patient to be seen 5 x/week  to address the above listed problems via gait training, therapeutic activities, therapeutic exercises, wheelchair management/training  Plan of Care expires: 12/16/17  Plan of Care reviewed with: patient    More ANDERSON Daniel, PT  11/21/2017

## 2017-11-21 NOTE — PROGRESS NOTES
Wound care follow-up:  The area of cellulitis is decreased, erythema noted around incision area, sutures intact, edges  with scant amount of brown drainage noted on dressing and jose maria-wound. No drainage/tenderness when jose maria-wound gently pressed.  Recommend hydrogel to wound bed and cover with mepore dressing daily.  The groin area has red satellite lesions noted.  Recommend miconazole powder BID.      11/21/17 0840       Incision/Site 11/13/17 0818 Right gluteal   Date First Assessed/Time First Assessed: 11/13/17 0818   Side: Right  Location: gluteal   Incision WDL ex   Dressing Appearance intact;dried drainage   Appearance sutures intact;edges ;pink;moist   Periwound Area redness   Drainage Characteristics/Odor brown   Drainage Amount scant   Wound Length (cm) 0.2   Wound Width (cm) 2   Wound Edges    Cleansed W/ soap and water   Dressing Dressing changed;foam;hydrogel   Dressing Change Due 11/22/17       Rash 11/21/17 0820 groin other (see comments)   Date of First Assessment/Time of First Assessment: 11/21/17 0820   Present Prior to Hospital Arrival?: No  Location: groin  Type: (c) other (see comments)   Distribution localized   Configuration/Shape other (see comments)  (fungal)   Groupings satellite   Borders irregular   Characteristics redness/erythema   Color red   Lesion Size pinpoint   Rash Care cleansed with;soap and water

## 2017-11-22 LAB
ALBUMIN SERPL BCP-MCNC: 2.7 G/DL
ALP SERPL-CCNC: 87 U/L
ALT SERPL W/O P-5'-P-CCNC: 22 U/L
ANION GAP SERPL CALC-SCNC: 5 MMOL/L
AST SERPL-CCNC: 33 U/L
BASOPHILS # BLD AUTO: 0.02 K/UL
BASOPHILS NFR BLD: 0.2 %
BILIRUB SERPL-MCNC: 0.6 MG/DL
BUN SERPL-MCNC: 34 MG/DL
CALCIUM SERPL-MCNC: 8.2 MG/DL
CHLORIDE SERPL-SCNC: 94 MMOL/L
CO2 SERPL-SCNC: 27 MMOL/L
CREAT SERPL-MCNC: 1.3 MG/DL
DIFFERENTIAL METHOD: ABNORMAL
EOSINOPHIL # BLD AUTO: 0.3 K/UL
EOSINOPHIL NFR BLD: 2.4 %
ERYTHROCYTE [DISTWIDTH] IN BLOOD BY AUTOMATED COUNT: 14.2 %
EST. GFR  (AFRICAN AMERICAN): 55.5 ML/MIN/1.73 M^2
EST. GFR  (NON AFRICAN AMERICAN): 48 ML/MIN/1.73 M^2
GLUCOSE SERPL-MCNC: 95 MG/DL
HCT VFR BLD AUTO: 21.3 %
HCT VFR BLD AUTO: 24 %
HGB BLD-MCNC: 7 G/DL
HGB BLD-MCNC: 7.9 G/DL
LYMPHOCYTES # BLD AUTO: 5.4 K/UL
LYMPHOCYTES NFR BLD: 49.7 %
MAGNESIUM SERPL-MCNC: 1.9 MG/DL
MCH RBC QN AUTO: 31 PG
MCHC RBC AUTO-ENTMCNC: 32.9 G/DL
MCV RBC AUTO: 94 FL
MONOCYTES # BLD AUTO: 1.6 K/UL
MONOCYTES NFR BLD: 15 %
NEUTROPHILS # BLD AUTO: 3.5 K/UL
NEUTROPHILS NFR BLD: 32.7 %
PHOSPHATE SERPL-MCNC: 3 MG/DL
PLATELET # BLD AUTO: 130 K/UL
PMV BLD AUTO: 11 FL
POTASSIUM SERPL-SCNC: 4.7 MMOL/L
PROT SERPL-MCNC: 5.3 G/DL
RBC # BLD AUTO: 2.26 M/UL
SODIUM SERPL-SCNC: 126 MMOL/L
VANCOMYCIN TROUGH SERPL-MCNC: 23.4 UG/ML
WBC # BLD AUTO: 10.79 K/UL

## 2017-11-22 PROCEDURE — 97110 THERAPEUTIC EXERCISES: CPT

## 2017-11-22 PROCEDURE — 83735 ASSAY OF MAGNESIUM: CPT

## 2017-11-22 PROCEDURE — 80202 ASSAY OF VANCOMYCIN: CPT

## 2017-11-22 PROCEDURE — A4216 STERILE WATER/SALINE, 10 ML: HCPCS | Performed by: STUDENT IN AN ORGANIZED HEALTH CARE EDUCATION/TRAINING PROGRAM

## 2017-11-22 PROCEDURE — 85025 COMPLETE CBC W/AUTO DIFF WBC: CPT

## 2017-11-22 PROCEDURE — 25000003 PHARM REV CODE 250: Performed by: NURSE PRACTITIONER

## 2017-11-22 PROCEDURE — 63600175 PHARM REV CODE 636 W HCPCS: Performed by: INTERNAL MEDICINE

## 2017-11-22 PROCEDURE — 97803 MED NUTRITION INDIV SUBSEQ: CPT

## 2017-11-22 PROCEDURE — 84100 ASSAY OF PHOSPHORUS: CPT

## 2017-11-22 PROCEDURE — 85018 HEMOGLOBIN: CPT

## 2017-11-22 PROCEDURE — 25000003 PHARM REV CODE 250: Performed by: STUDENT IN AN ORGANIZED HEALTH CARE EDUCATION/TRAINING PROGRAM

## 2017-11-22 PROCEDURE — 36415 COLL VENOUS BLD VENIPUNCTURE: CPT

## 2017-11-22 PROCEDURE — 97116 GAIT TRAINING THERAPY: CPT

## 2017-11-22 PROCEDURE — 25000003 PHARM REV CODE 250: Performed by: INTERNAL MEDICINE

## 2017-11-22 PROCEDURE — 11000004 HC SNF PRIVATE

## 2017-11-22 PROCEDURE — 85014 HEMATOCRIT: CPT

## 2017-11-22 PROCEDURE — 63600175 PHARM REV CODE 636 W HCPCS: Performed by: STUDENT IN AN ORGANIZED HEALTH CARE EDUCATION/TRAINING PROGRAM

## 2017-11-22 PROCEDURE — 80053 COMPREHEN METABOLIC PANEL: CPT

## 2017-11-22 PROCEDURE — 97535 SELF CARE MNGMENT TRAINING: CPT

## 2017-11-22 PROCEDURE — 97530 THERAPEUTIC ACTIVITIES: CPT

## 2017-11-22 PROCEDURE — 99900058 HC 022 PAID UNDER SNF PPS

## 2017-11-22 RX ADMIN — OXYCODONE AND ACETAMINOPHEN 1 TABLET: 7.5; 325 TABLET ORAL at 09:11

## 2017-11-22 RX ADMIN — DOCUSATE SODIUM 100 MG: 100 CAPSULE, LIQUID FILLED ORAL at 09:11

## 2017-11-22 RX ADMIN — FINASTERIDE 5 MG: 5 TABLET, FILM COATED ORAL at 09:11

## 2017-11-22 RX ADMIN — CARVEDILOL 6.25 MG: 6.25 TABLET, FILM COATED ORAL at 09:11

## 2017-11-22 RX ADMIN — CYANOCOBALAMIN TAB 1000 MCG 1000 MCG: 1000 TAB at 09:11

## 2017-11-22 RX ADMIN — Medication 10 ML: at 01:11

## 2017-11-22 RX ADMIN — ACETAMINOPHEN 500 MG: 500 TABLET ORAL at 09:11

## 2017-11-22 RX ADMIN — THERA TABS 1 TABLET: TAB at 09:11

## 2017-11-22 RX ADMIN — ATORVASTATIN CALCIUM 20 MG: 20 TABLET, FILM COATED ORAL at 09:11

## 2017-11-22 RX ADMIN — TAMSULOSIN HYDROCHLORIDE 0.4 MG: 0.4 CAPSULE ORAL at 09:11

## 2017-11-22 RX ADMIN — Medication 10 ML: at 11:11

## 2017-11-22 RX ADMIN — ASPIRIN 81 MG: 81 TABLET, COATED ORAL at 09:11

## 2017-11-22 RX ADMIN — Medication 10 ML: at 12:11

## 2017-11-22 RX ADMIN — HEPARIN SODIUM 5000 UNITS: 5000 INJECTION, SOLUTION INTRAVENOUS; SUBCUTANEOUS at 06:11

## 2017-11-22 RX ADMIN — CALCIUM 500 MG: 500 TABLET ORAL at 09:11

## 2017-11-22 RX ADMIN — MICONAZOLE NITRATE: 20 POWDER TOPICAL at 09:11

## 2017-11-22 RX ADMIN — GABAPENTIN 100 MG: 100 CAPSULE ORAL at 09:11

## 2017-11-22 RX ADMIN — DONEPEZIL HYDROCHLORIDE 10 MG: 10 TABLET, FILM COATED ORAL at 09:11

## 2017-11-22 RX ADMIN — ACETAMINOPHEN 500 MG: 500 TABLET ORAL at 01:11

## 2017-11-22 RX ADMIN — ACETAMINOPHEN 500 MG: 500 TABLET ORAL at 06:11

## 2017-11-22 RX ADMIN — VANCOMYCIN HYDROCHLORIDE 1000 MG: 1 INJECTION, POWDER, LYOPHILIZED, FOR SOLUTION INTRAVENOUS at 10:11

## 2017-11-22 RX ADMIN — GABAPENTIN 300 MG: 100 CAPSULE ORAL at 09:11

## 2017-11-22 RX ADMIN — HEPARIN SODIUM 5000 UNITS: 5000 INJECTION, SOLUTION INTRAVENOUS; SUBCUTANEOUS at 01:11

## 2017-11-22 RX ADMIN — OXYCODONE HYDROCHLORIDE AND ACETAMINOPHEN 500 MG: 500 TABLET ORAL at 09:11

## 2017-11-22 RX ADMIN — LEVOTHYROXINE SODIUM 100 MCG: 100 TABLET ORAL at 06:11

## 2017-11-22 RX ADMIN — LISINOPRIL 10 MG: 10 TABLET ORAL at 09:11

## 2017-11-22 RX ADMIN — FERROUS SULFATE TAB EC 325 MG (65 MG FE EQUIVALENT) 325 MG: 325 (65 FE) TABLET DELAYED RESPONSE at 09:11

## 2017-11-22 RX ADMIN — HEPARIN SODIUM 5000 UNITS: 5000 INJECTION, SOLUTION INTRAVENOUS; SUBCUTANEOUS at 09:11

## 2017-11-22 NOTE — PLAN OF CARE
Problem: Occupational Therapy Goal  Goal: Occupational Therapy Goal  Goals to be met by: 14 days     Patient will increase functional independence with ADLs by performing:    Feeding with Modified [place].  UE Dressing with Modified [place].  LE Dressing with Modified [place] using AD.  Grooming while standing with Modified [place].  Toileting from bedside commode with Modified [place] for hygiene and clothing management.   Bathing from  shower chair/bench with Supervision.  Toilet transfer to bedside commode with Supervision.  Perform functional activity in standing with S for 10' in preparation for safety during standing ADls.      Tolerated session fairly

## 2017-11-22 NOTE — PLAN OF CARE
Problem: Patient Care Overview  Goal: Plan of Care Review  Outcome: Ongoing (interventions implemented as appropriate)  Patient medicated for pain per MAR.  Patient repositions himself prn.  Aseptic technique maintained per protocol.  Transfer assistance provided to [facility] to prevent fall.  Wound care/interventions provided as per orders.

## 2017-11-22 NOTE — PLAN OF CARE
Problem: Patient Care Overview  Goal: Plan of Care Review  Outcome: Ongoing (interventions implemented as appropriate)   11/22/17 0346   Coping/Psychosocial   Plan Of Care Reviewed With patient       Problem: Fall Risk (Adult)  Goal: Identify Related Risk Factors and Signs and Symptoms  Related risk factors and signs and symptoms are identified upon initiation of Human Response Clinical Practice Guideline (CPG)   Outcome: Ongoing (interventions implemented as appropriate)   11/22/17 0346   Fall Risk   Related Risk Factors (Fall Risk) gait/mobility problems;history of falls       Problem: Pressure Ulcer Risk (Chucho Scale) (Adult,Obstetrics,Pediatric)  Goal: Identify Related Risk Factors and Signs and Symptoms  Related risk factors and signs and symptoms are identified upon initiation of Human Response Clinical Practice Guideline (CPG)   Outcome: Ongoing (interventions implemented as appropriate)   11/22/17 0346   Pressure Ulcer Risk (Chucho Scale)   Related Risk Factors (Pressure Ulcer Risk (Chucho Scale)) hospitalization prolonged;fluid intake inadequate

## 2017-11-22 NOTE — PT/OT/SLP PROGRESS
Occupational Therapy  Treatment    Zeyad Woodard   MRN: [id number]   Admitting Diagnosis: MRSA bacteremia    OT Date of Treatment: 11/22/17  Total Time (min): 46 min    Billable Minutes:  Self Care/Home Management 46    General Precautions: Standard, contact, fall  Orthopedic Precautions: N/A  Braces: N/A         Subjective:  Communicated with nurse prior to session.  Pt. Reported he had to have a bowel movement    Pain/Comfort  Pain Rating 1: 8/10  Location - Side 1: Right  Location 1: hip  Pain Addressed 1: Pre-medicate for activity, Nurse notified  Pain Rating Post-Intervention 1: 8/10    Objective:  Patient found with:  (supine in bed)    Functional Status:  MDS G  Bed Mobility Functional Status: CGA-Min (A) with supine to sit and HOB slightly elevated  Transfer Functional Status: CGA-Min (A) SPT from bed to w/c and w/c to commode   Dressing Functional Status: 2:CGA-Min (A) to don RLE into pants on this date   Toilet Use Functional Status: mod(A)-Max(A) assist with clothing management up over hips following task  Personal Hygiene Functional Status: S-SBA  Bathing Functional Status: CGA-Min (A) to wash Right leg below knee   Moving from seated to standing position: Not steady, only able to stabilize with staff assistance  Moving on and off the toilet: Not steady, only able to stabilize with staff assistance  Surface-to-surface transfer (transfer between bed and chair or wheelchair): Not steady, only able to stabilize with staff assistance     Additional Treatment:  Pt. Educated on safety with ADL tasks as well as transfers  Pt. Central line site became entangled in shirt on this date when donning and pulled at site.  Some bleeding noted at site and nurse came to assess and performed a dressing change at site. NP notified via messaging.     Patient left up in chair with call button in reach and chair alarm on    ASSESSMENT:  Zeyad Woodard is a 89 y.o. male with a medical diagnosis of MRSA bacteremia and  presents with deficits in self-care skills as well as functional mobility and decreased endurance.  Pt. Would benefit from continued OT services. .    Rehab identified problem list/impairments: weakness, impaired endurance, impaired self care skills, impaired functional mobilty, gait instability, impaired balance, decreased upper extremity function, decreased lower extremity function, decreased safety awareness, pain, impaired coordination, impaired skin    Rehab potential is good    Activity tolerance: Good    Discharge recommendations: home health OT     Barriers to discharge: Decreased caregiver support     Equipment recommendations: wheelchair     GOALS:    Occupational Therapy Goals        Problem: Occupational Therapy Goal    Goal Priority Disciplines Outcome Interventions   Occupational Therapy Goal     OT, PT/OT Ongoing (interventions implemented as appropriate)    Description:  Goals to be met by: 14 days     Patient will increase functional independence with ADLs by performing:    Feeding with Modified [place].  UE Dressing with Modified [place].  LE Dressing with Modified [place] using AD.  Grooming while standing with Modified [place].  Toileting from bedside commode with Modified [place] for hygiene and clothing management.   Bathing from  shower chair/bench with Supervision.  Toilet transfer to bedside commode with Supervision.  Perform functional activity in standing with S for 10' in preparation for safety during standing ADls.                       Plan:  Patient to be seen  (5-6x/week) to address the above listed problems via self-care/home management, therapeutic activities, therapeutic exercises  Plan of Care expires: 12/16/17  Plan of Care reviewed with: patient    DELON Kapoor  11/22/2017

## 2017-11-22 NOTE — PROGRESS NOTES
"[facility]  Adult Nutrition  Progress Note    SUMMARY     Recommendations    Recommendation/Intervention: Continue 2000 diabetic diet, continue boost glucose ONS BID, change to TID  Goals: PO intake to meet 85% of EEN  Nutrition Goal Status: new  Communication of RD Recs: reviewed with RN       Reason for Assessment    Reason for Assessment: RD follow-up  Diagnosis:  (Sepsis, Debility)  Relevent Medical History: sp I & D 11/13/17 cellulitis of buttocks, sp lumbosacral radiculopathy 10/3/17, DDD, chronic pain, HTN,AFIB, CHF , anemia   Interdisciplinary Rounds: attended     General Information Comments: patient eating 100%    Nutrition Discharge Planning: DC on cardiac, diabetic diet 2000 calories    Nutrition Prescription Ordered    Current Diet Order: 2000 diabetic , cardiac          Oral Nutrition Supplement: Boost glucose BID     Evaluation of Received Nutrients/Fluid Intake            Energy Calories Required: meeting needs            Protein Required: meeting needs         Fluid Required: meeting needs     Tolerance: tolerating     % Intake of Estimated Energy Needs: 75 - 100 %  % Meal Intake: 100%     Nutrition Risk Screen     Nutrition Risk Screen: no indicators present    Nutrition/Diet History    Patient Reported Diet/Restrictions/Preferences: general     Food Preferences: No cultural or Mosque food preferences        Factors Affecting Nutritional Intake: pain, decreased appetite, depression          Labs/Tests/Procedures/Meds     Pertinent Labs Reviewed: other (see comments)  Pertinent Labs Comments: Na 126  Pertinent Medications Reviewed: other (see comments)  Pertinent Medications Comments: Ascorbic acid, statin, ASA, gabapentin, lasix, MVI, Fe, calcium, B12    Physical Findings    Overall Physical Appearance: weak, loss of muscle mass (pale)     Oral/Mouth Cavity:  (-)  Skin: incision, non-healing wound(s)    Anthropometrics  Height: 5' 9" (175.3 cm)  Weight Method: Standard " Scale  Weight: 77.7 kg (171 lb 4.8 oz)  Ideal Body Weight (IBW), Male: 160 lb     % Ideal Body Weight, Male (lb): 105.13 lb     BMI (Calculated): 24.9  BMI Grade: 18.5-24.9 - normal          Estimated/Assessed Needs    Weight Used For Calorie Calculations: 76.3 kg (168 lb 3.4 oz)    Energy Need Method: [place]-St Jeor    RMR ([place]-St. Jeor Equation): 1413.38 x 1767 kcal      Weight Used For Protein Calculations: 76.3 kg (168 lb 3.4 oz)  Protein Requirements:  (1.3-1.5gm/kg)    Fluid Requirements (mL): 1ml/yolis or per MD  1800 ml           CHO Requirement: 50% of calories     Assessment and Plan    Increased nutrient needs(protein) Related to (etiology):   Wound healing and infection     Signs and Symptoms (as evidenced by):   Non-healing wound, sp lumbar surgery       Monitor and Evaluation    Food and Nutrient Intake: energy intake  Food and Nutrient Adminstration: diet order   Physical Activity and Function: nutrition-related ADLs and IADLs  Anthropometric Measurements: weight change  Biochemical Data, Medical Tests and Procedures: electrolyte and renal panel, glucose/endocrine profile  Nutrition-Focused Physical Findings: overall appearance    Nutrition Risk    Level of Risk:  (follow one time per week)    Nutrition Follow-Up    RD Follow-up?: Yes

## 2017-11-22 NOTE — PLAN OF CARE
Problem: Patient Care Overview  Goal: Plan of Care Review  Outcome: Ongoing (interventions implemented as appropriate)  Increased nutrient needs(protein) Related to (etiology):   Wound healing and infection   Signs and Symptoms (as evidenced by):   Non-healing wound, sp lumbar surgery     Recommendation/Intervention: Continue 2000 diabetic diet, continue boost glucose ONS BID, change to TID  Goals: PO intake to meet 85% of EEN

## 2017-11-22 NOTE — PROGRESS NOTES
[facility] NEUROLOGY  [facility] [place]    Date: November 22, 2017   Patient Name: Zeyad Woodard   MRN: [id number]   PCP: Booker Ricci  Referring Provider: Self, Aaareferral    Assessment:   Zeyad Woodard is a 89 y.o. male presenting with a likely multifactorial dementia.  Aricept has been increased to 10 mg nightly. Advanced care planning and safety reviewed.    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            Current Assessment & Plan     -- continue current gabapentin         Dementia without behavioral disturbance    Current Assessment & Plan     -- aricept 10 mg nightly            Renal/    CKD (chronic kidney disease), stage III    Current Assessment & Plan     Followed by Dr. Izaguirre            Other    Frequent falls    Overview     Falls prevention counseling discussed               Chele Juárez MD  [facility]   Department of Neurology    Patient note was created using Dragon Dictation.  Any errors in syntax or even information may not have been identified and edited on initial review prior to signing this note.  Subjective:   HPI:   Mr. Zeyad Woodard is a 89 y.o. male who presents with a chief complaint of memory changes.  Since the patient's last visit, his family reports that he has been doing fairly well.  They deny any substantial deterioration of his cognitive status as well as any dangerous behaviors, behavioral disturbances, or sleep disturbances.  They do continue to struggle with supervision and safety and home however have been working on this.  They are concerned as he has had several falls recently and has minimized and hidden these.  He has no new complaints today.    PAST MEDICAL HISTORY:  Past Medical History:   Diagnosis Date    Anemia     Atrial fibrillation     BPH (benign prostatic hypertrophy)     Cancer      Cardiac pacemaker in situ 7/2/2015    CHF (congestive heart failure)     Coronary artery disease     Encounter for blood transfusion     GI bleed     cecum angiectasia s/p cautery    Hypertension     Hypothyroidism     Polyneuropathy     Posture imbalance     due to neuropathy    Right posterior capsular opacification 1/18/2017    SSS (sick sinus syndrome) 2015    s/p pacemaker       PAST SURGICAL HISTORY:  Past Surgical History:   Procedure Laterality Date    CARDIAC SURGERY      CATARACT EXTRACTION W/  INTRAOCULAR LENS IMPLANT Right 05/17/2010        CATARACT EXTRACTION W/  INTRAOCULAR LENS IMPLANT Left 02/16/2004        cataract surgery      COLONOSCOPY  6/30/04    COLONOSCOPY N/A 2/15/2016    Procedure: COLONOSCOPY;  Surgeon: Stanton Kirk MD;  Location: [facility] ([address] [place] [address]);  Service: Endoscopy;  Laterality: N/A;    EYE SURGERY      HERNIA REPAIR      inguinal hernia repair      Open heart surgery for pericardiectomy      for calcific constrictive pericarditis    UMBILICAL HERNIA REPAIR      Yag      Left Eye       CURRENT MEDS:  No current facility-administered medications for this visit.      No current outpatient prescriptions on file.     Facility-Administered Medications Ordered in Other Visits   Medication Dose Route Frequency Provider Last Rate Last Dose    acetaminophen tablet 500 mg  500 mg Oral Q8H Radha Fontaine MD   500 mg at 11/22/17 1315    acetaminophen tablet 650 mg  650 mg Oral Q6H PRN Theresa Silver, DO   650 mg at 11/15/17 2113    acetaminophen tablet 650 mg  650 mg Oral Q6H PRN Theresa Silver, DO        ascorbic acid (vitamin C) tablet 500 mg  500 mg Oral Daily Theresa Silver, DO   500 mg at 11/22/17 0928    aspirin EC tablet 81 mg  81 mg Oral Daily Theresa Silver, DO   81 mg at 11/22/17 0928    atorvastatin tablet 20 mg  20 mg Oral Nightly Theresa Silver, DO   20 mg at 11/21/17 2224    calcium carbonate 200 mg calcium (500 mg)  chewable tablet 500 mg  500 mg Oral BID PRN Theresa Silver, DO        calcium carbonate tablet 500 mg  500 mg Oral Daily Theresa Silver, DO   500 mg at 11/22/17 0928    carvedilol tablet 6.25 mg  6.25 mg Oral BID Radha Fontaine MD   6.25 mg at 11/22/17 0928    cyanocobalamin tablet 1,000 mcg  1,000 mcg Oral Daily Theresa Silver, DO   1,000 mcg at 11/22/17 0928    diphenhydrAMINE-zinc acetate 1-0.1% cream   Topical TID PRN Theresa Silver, DO        docusate sodium capsule 100 mg  100 mg Oral BID Theresa Silver, DO   100 mg at 11/22/17 0928    donepezil tablet 10 mg  10 mg Oral QHS Theresa Silver, DO   10 mg at 11/21/17 2223    ferrous sulfate EC tablet 325 mg  325 mg Oral Daily Theresa Silver, DO   325 mg at 11/22/17 0929    finasteride tablet 5 mg  5 mg Oral Daily Theresa Silver, DO   5 mg at 11/22/17 0928    furosemide tablet 20 mg  20 mg Oral BID PRN Radha Fontaine MD        gabapentin capsule 100 mg  100 mg Oral Daily Theresa Silver, DO   100 mg at 11/22/17 0929    gabapentin capsule 300 mg  300 mg Oral QHS Theresa Silver, DO   300 mg at 11/21/17 2224    heparin (porcine) injection 5,000 Units  5,000 Units Subcutaneous Q8H Theresa Silver, DO   5,000 Units at 11/22/17 1316    levothyroxine tablet 100 mcg  100 mcg Oral Before breakfast Theresa Silver, DO   100 mcg at 11/22/17 0612    lisinopril tablet 10 mg  10 mg Oral Daily Radha Fontaine MD   10 mg at 11/22/17 0928    miconazole NITRATE 2 % top powder   Topical BID Nimo Gonzalez NP        multivitamin tablet 1 tablet  1 tablet Oral Daily Theresa Silver, DO   1 tablet at 11/22/17 0928    ondansetron disintegrating tablet 4 mg  4 mg Oral Q6H PRN Rdaha Fontaine MD        oxyCODONE-acetaminophen 7.5-325 mg per tablet 1 tablet  1 tablet Oral Q6H PRN Nimo Gonzalez NP   1 tablet at 11/22/17 0928    ramelteon tablet 8 mg  8 mg Oral Nightly PRN Theresa Silver, DO   8 mg at 11/21/17 2220    senna tablet 8.6 mg  8.6 mg Oral  Daily PRN Theresa Silver, DO        senna-docusate 8.6-50 mg per tablet 1 tablet  1 tablet Oral BID PRN Radha Fontaine MD        sodium chloride 0.9% flush 10 mL  10 mL Intravenous Q6H Theresa Silver, DO   10 mL at 17 1300    And    sodium chloride 0.9% flush 10 mL  10 mL Intravenous PRN Theresa Silver, DO        sodium chloride 0.9% flush 3 mL  3 mL Intravenous PRN Theresa Silver, DO        tamsulosin 24 hr capsule 0.4 mg  0.4 mg Oral Daily Theresa Silver, DO   0.4 mg at 17 0928    vancomycin 1 g in dextrose 5 % 250 mL IVPB (ready to mix system)  1,000 mg Intravenous Q24H Radha Fontaine .7 mL/hr at 17 2223 1,000 mg at 17 2223       ALLERGIES:  Review of patient's allergies indicates:   Allergen Reactions    Penicillins Hives and Other (See Comments)     Fever       FAMILY HISTORY:  Family History   Problem Relation Age of Onset    Stroke Mother          Cancer Father      lung;     Diabetes Sister     Coronary artery disease Sister     Peripheral vascular disease Sister     Amblyopia Neg Hx     Blindness Neg Hx     Cataracts Neg Hx     Glaucoma Neg Hx     Hypertension Neg Hx     Macular degeneration Neg Hx     Retinal detachment Neg Hx     Strabismus Neg Hx     Thyroid disease Neg Hx     Prostate cancer Neg Hx     Kidney disease Neg Hx      SOCIAL HISTORY:  Social History   Substance Use Topics    Smoking status: Passive Smoke Exposure - Never Smoker    Smokeless tobacco: Never Used    Alcohol use No     Review of Systems:  12 review of systems is negative except for the symptoms mentioned in HPI.      Objective:   General: NAD, well nourished   Eyes: no tearing, discharge, no erythema   ENT: moist mucous membranes of the oral cavity, nares patent    Neck: Supple, full range of motion  Cardiovascular: Warm and well perfused, pulses equal and symmetrical  Lungs: Normal work of breathing, normal chest wall excursions  Skin: No rash,  lesions, or breakdown on exposed skin  Psychiatry: Mood and affect are appropriate   Abdomen: soft, non tender, non distended  Extremeties: No cyanosis, clubbing or edema.    Neurological   MENTAL STATUS: Alert and oriented to person, place, and time. Attention and concentration within normal limits. Speech without dysarthria, able to name and repeat without difficulty. Recent and remote memory fair  CRANIAL NERVES: Visual fields intact. PERRL. EOMI. Facial sensation intact. Face symmetrical. Hearing grossly intact. Full shoulder shrug bilaterally. Tongue protrudes midline   SENSORY: Sensation is intact to light touch throughout  MOTOR: Normal bulk and tone.  5/5 deltoid, biceps, triceps, interosseous, hand  bilaterally. 4/5 iliopsoas, knee extension/flexion, foot dorsi/plantarflexion bilaterally.    REFLEXES: Symmetric and 1+ throughout.  CEREBELLAR/COORDINATION/GAIT: Patient wheelchair bound at baseline.     MOCA Results 6/5/17:   Visuospatial/Executive: 4/5  Naming: 3/3  Attention: 3/6  Language: 2/3  Abstraction: 2/2  Delayed Recall: 1/5  Orientation: 4/6  Total:  19/30

## 2017-11-22 NOTE — PT/OT/SLP PROGRESS
Physical Therapy  Treatment    Zeyad Woodard   MRN: [id number]   Admitting Diagnosis: MRSA bacteremia    PT Received On: 11/22/17          Billable Minutes:  Gait [id number], Therapeutic Activity 20 and Therapeutic Exercise 25=57  Patient seen in 2 session. [id number] and [id number]=57  Treatment Type: Treatment  PT/PTA: PT     PTA Visit Number: 0       General Precautions: Standard, contact, fall  Orthopedic Precautions: N/A   Braces: N/A    Subjective:  Communicated with patient prior to session.  Patient agreeable to split session. Each session asks to urinate.    Pain/Comfort  Pain Rating 1: 4/10  Location - Side 1: Right  Location 1: hip (buttock)  Pain Addressed 1: Pre-medicate for activity, Reposition, Distraction  Pain Rating Post-Intervention 1: 4/10    Objective:  Patient found seated in w/c both times.   with         Functional Status:  MDS G  Transfer Functional Status: CGA-Min (A)  Walk in Room Functional Status: CGA-Min (A)  Walk in Corridor Functional Status: CGA-Min (A)  Locomotion on Unit Functional Status: CGA-Min (A)          Transfers:  Sit<>Stand: to/from w/c w/ RW and close SBA x2 trials; w/ CGA x1 trial, w/ min assist due to posterior LOB x1 trial. T/f BSC over toilet x2 trials w/ use of grab bars w/ CGA  Stand Pivot Transfer: to/from BSC over toilet x2 trials w/ CGA  Cues for technique, extra time, one instance mild posterior LOB requiring steadying    Gait:  Amb in room w/ RW and CGA wearing house slippers to restroom x3 trials ~15 feet. Amb from BSC to w/c at door per clean protocol ~10 feet x1 trial, w/ RW. Amb in hallway w/ RW and CGA/ feet w/ patient wearing house slippers. Forefoot in contact w/ floor in swing. Patient w/ equal steps. No LOB  All walks w/ house slippers (AFOs not utilized today)       Therex:  Quad sets,   Glute sets,   Ankle  PF  LAQ   Hip flexion  x20 reps w/ assist as needed.      Additional Treatment:  Patient assisted w/ toileting on BSC over toilet in his  room x2 (am and PM session). Assist for pants management. First trial PT performs posterior jose maria-care. Nurse notified.  Patient to gym via isolation gym protocol in second session. Don gown and to clean w/c at door of room exiting; walks to w/c in room after session.  Patient left up in chair with call button in reach, chair alarm on and nursing notified.    Assessment:  Zeyad Woodard is a 89 y.o. male with a medical diagnosis of MRSA bacteremia.  Patient progressing well, but continues w/ need for frequent CGA for safety and one instance of Posterior LOB when coming to stand. Patient demonstrated improved gait technique wearing house slippers today, able to take equal steps. Did not use AFOs this session. Patient will benefit from continued physical therapy to address deficits and improve safety and functional mobility. Continue with physical therapy plan of care. .    Rehab identified problem list/impairments: weakness, impaired endurance, impaired self care skills, impaired functional mobilty, gait instability, impaired balance, decreased lower extremity function, pain, impaired skin, other (comment) (contact prec/MRSA)    Rehab potential is good.    Activity tolerance: Good    Discharge recommendations: home with home health (and likely family/caregiver assist)     Barriers to discharge: Decreased caregiver support, Inaccessible home environment    Equipment recommendations: wheelchair     GOALS:    Physical Therapy Goals        Problem: Physical Therapy Goal    Goal Priority Disciplines Outcome Goal Variances Interventions   Physical Therapy Goal     PT/OT, PT Ongoing (interventions implemented as appropriate)     Description:  Goals to be met by: 14 DAYS     Patient will increase functional independence with mobility by performin. Supine to sit with Supervision   2. Sit to supine with Supervision  3. Sit to stand transfer with Stand-by Assistance with RW  4. Bed to chair transfer with Stand-by  Assistance using Rolling Walker  5. Gait  x 150 feet with Stand-by Assistance using Rolling Walker. Met (11/19/2017)  6. Wheelchair propulsion x100 feet with Stand-by Assistance using bilateral upper extremities  7. Ascend/Descend 4 inch curb step with Contact Guard Assistance using Rolling Walker.  8. Sitting at edge of bed x8 minutes with Stand-by Assistance and without UE support, and performing exercises or activity  9. Stand for 5 minutes with Contact Guard Assistance using Rolling Walker and perform an activity  10. Lower extremity exercise program x20 reps per handout, with assistance as needed and gym therex                       PLAN:    Patient to be seen 5 x/week  to address the above listed problems via gait training, therapeutic activities, therapeutic exercises, wheelchair management/training  Plan of Care expires: 12/16/17  Plan of Care reviewed with: patient    More JUSTIN Sanchez, PT  11/22/2017

## 2017-11-23 LAB — VANCOMYCIN TROUGH SERPL-MCNC: 20 UG/ML

## 2017-11-23 PROCEDURE — A4216 STERILE WATER/SALINE, 10 ML: HCPCS | Performed by: STUDENT IN AN ORGANIZED HEALTH CARE EDUCATION/TRAINING PROGRAM

## 2017-11-23 PROCEDURE — 25000003 PHARM REV CODE 250: Performed by: INTERNAL MEDICINE

## 2017-11-23 PROCEDURE — 25000003 PHARM REV CODE 250: Performed by: NURSE PRACTITIONER

## 2017-11-23 PROCEDURE — 36415 COLL VENOUS BLD VENIPUNCTURE: CPT

## 2017-11-23 PROCEDURE — 63600175 PHARM REV CODE 636 W HCPCS: Performed by: INTERNAL MEDICINE

## 2017-11-23 PROCEDURE — 63600175 PHARM REV CODE 636 W HCPCS: Performed by: STUDENT IN AN ORGANIZED HEALTH CARE EDUCATION/TRAINING PROGRAM

## 2017-11-23 PROCEDURE — 25000003 PHARM REV CODE 250: Performed by: STUDENT IN AN ORGANIZED HEALTH CARE EDUCATION/TRAINING PROGRAM

## 2017-11-23 PROCEDURE — 11000004 HC SNF PRIVATE

## 2017-11-23 RX ADMIN — HEPARIN SODIUM 5000 UNITS: 5000 INJECTION, SOLUTION INTRAVENOUS; SUBCUTANEOUS at 05:11

## 2017-11-23 RX ADMIN — ATORVASTATIN CALCIUM 20 MG: 20 TABLET, FILM COATED ORAL at 10:11

## 2017-11-23 RX ADMIN — OXYCODONE HYDROCHLORIDE AND ACETAMINOPHEN 500 MG: 500 TABLET ORAL at 09:11

## 2017-11-23 RX ADMIN — DONEPEZIL HYDROCHLORIDE 10 MG: 10 TABLET, FILM COATED ORAL at 10:11

## 2017-11-23 RX ADMIN — VANCOMYCIN HYDROCHLORIDE 1000 MG: 1 INJECTION, POWDER, LYOPHILIZED, FOR SOLUTION INTRAVENOUS at 10:11

## 2017-11-23 RX ADMIN — MICONAZOLE NITRATE: 20 POWDER TOPICAL at 10:11

## 2017-11-23 RX ADMIN — FINASTERIDE 5 MG: 5 TABLET, FILM COATED ORAL at 09:11

## 2017-11-23 RX ADMIN — CALCIUM 500 MG: 500 TABLET ORAL at 09:11

## 2017-11-23 RX ADMIN — FERROUS SULFATE TAB EC 325 MG (65 MG FE EQUIVALENT) 325 MG: 325 (65 FE) TABLET DELAYED RESPONSE at 09:11

## 2017-11-23 RX ADMIN — Medication 10 ML: at 05:11

## 2017-11-23 RX ADMIN — HEPARIN SODIUM 5000 UNITS: 5000 INJECTION, SOLUTION INTRAVENOUS; SUBCUTANEOUS at 10:11

## 2017-11-23 RX ADMIN — LISINOPRIL 10 MG: 10 TABLET ORAL at 09:11

## 2017-11-23 RX ADMIN — ACETAMINOPHEN 500 MG: 500 TABLET ORAL at 10:11

## 2017-11-23 RX ADMIN — ONDANSETRON 4 MG: 4 TABLET, ORALLY DISINTEGRATING ORAL at 12:11

## 2017-11-23 RX ADMIN — THERA TABS 1 TABLET: TAB at 09:11

## 2017-11-23 RX ADMIN — CARVEDILOL 6.25 MG: 6.25 TABLET, FILM COATED ORAL at 10:11

## 2017-11-23 RX ADMIN — HEPARIN SODIUM 5000 UNITS: 5000 INJECTION, SOLUTION INTRAVENOUS; SUBCUTANEOUS at 02:11

## 2017-11-23 RX ADMIN — ASPIRIN 81 MG: 81 TABLET, COATED ORAL at 09:11

## 2017-11-23 RX ADMIN — RAMELTEON 8 MG: 8 TABLET, FILM COATED ORAL at 10:11

## 2017-11-23 RX ADMIN — DOCUSATE SODIUM 100 MG: 100 CAPSULE, LIQUID FILLED ORAL at 09:11

## 2017-11-23 RX ADMIN — CARVEDILOL 6.25 MG: 6.25 TABLET, FILM COATED ORAL at 09:11

## 2017-11-23 RX ADMIN — GABAPENTIN 100 MG: 100 CAPSULE ORAL at 09:11

## 2017-11-23 RX ADMIN — DOCUSATE SODIUM 100 MG: 100 CAPSULE, LIQUID FILLED ORAL at 10:11

## 2017-11-23 RX ADMIN — LEVOTHYROXINE SODIUM 100 MCG: 100 TABLET ORAL at 05:11

## 2017-11-23 RX ADMIN — OXYCODONE AND ACETAMINOPHEN 1 TABLET: 7.5; 325 TABLET ORAL at 09:11

## 2017-11-23 RX ADMIN — Medication 10 ML: at 12:11

## 2017-11-23 RX ADMIN — ACETAMINOPHEN 500 MG: 500 TABLET ORAL at 05:11

## 2017-11-23 RX ADMIN — CYANOCOBALAMIN TAB 1000 MCG 1000 MCG: 1000 TAB at 09:11

## 2017-11-23 RX ADMIN — GABAPENTIN 300 MG: 100 CAPSULE ORAL at 10:11

## 2017-11-23 RX ADMIN — ACETAMINOPHEN 500 MG: 500 TABLET ORAL at 02:11

## 2017-11-23 RX ADMIN — MICONAZOLE NITRATE: 20 POWDER TOPICAL at 09:11

## 2017-11-23 RX ADMIN — FUROSEMIDE 20 MG: 20 TABLET ORAL at 09:11

## 2017-11-23 RX ADMIN — TAMSULOSIN HYDROCHLORIDE 0.4 MG: 0.4 CAPSULE ORAL at 09:11

## 2017-11-23 NOTE — PLAN OF CARE
Problem: Patient Care Overview  Goal: Plan of Care Review  Outcome: Ongoing (interventions implemented as appropriate)   11/23/17 1541   Coping/Psychosocial   Plan Of Care Reviewed With patient       Problem: Fall Risk (Adult)  Goal: Absence of Falls  Patient will demonstrate the desired outcomes by discharge/transition of care.   Outcome: Ongoing (interventions implemented as appropriate)   11/23/17 1541   Fall Risk (Adult)   Absence of Falls making progress toward outcome       Problem: Pressure Ulcer Risk (Chucho Scale) (Adult,Obstetrics,Pediatric)  Goal: Skin Integrity  Patient will demonstrate the desired outcomes by discharge/transition of care.   Outcome: Ongoing (interventions implemented as appropriate)   11/23/17 1541   Pressure Ulcer Risk (Chucho Scale) (Adult,Obstetrics,Pediatric)   Skin Integrity making progress toward outcome       Problem: Infection, Risk/Actual (Adult)  Goal: Infection Prevention/Resolution  Patient will demonstrate the desired outcomes by discharge/transition of care.   Outcome: Ongoing (interventions implemented as appropriate)   11/23/17 1541   Infection, Risk/Actual (Adult)   Infection Prevention/Resolution making progress toward outcome       Problem: Wound, Traumatic, Nonburn (Adult)  Goal: Signs and Symptoms of Listed Potential Problems Will be Absent, Minimized or Managed (Wound, Traumatic, Nonburn)  Signs and symptoms of listed potential problems will be absent, minimized or managed by discharge/transition of care (reference Wound, Traumatic, Nonburn (Adult) CPG).   Outcome: Ongoing (interventions implemented as appropriate)   11/23/17 1541   Wound, Traumatic, Nonburn   Problems Assessed (Wound) all   Problems Present (Wound) none       Comments: Patient instructed to call for assistance.Call  Light and persoanl items in reach.

## 2017-11-24 PROCEDURE — A4216 STERILE WATER/SALINE, 10 ML: HCPCS | Performed by: STUDENT IN AN ORGANIZED HEALTH CARE EDUCATION/TRAINING PROGRAM

## 2017-11-24 PROCEDURE — 25000003 PHARM REV CODE 250: Performed by: STUDENT IN AN ORGANIZED HEALTH CARE EDUCATION/TRAINING PROGRAM

## 2017-11-24 PROCEDURE — 97530 THERAPEUTIC ACTIVITIES: CPT

## 2017-11-24 PROCEDURE — 97110 THERAPEUTIC EXERCISES: CPT

## 2017-11-24 PROCEDURE — 63600175 PHARM REV CODE 636 W HCPCS: Performed by: STUDENT IN AN ORGANIZED HEALTH CARE EDUCATION/TRAINING PROGRAM

## 2017-11-24 PROCEDURE — 25000003 PHARM REV CODE 250: Performed by: NURSE PRACTITIONER

## 2017-11-24 PROCEDURE — 97535 SELF CARE MNGMENT TRAINING: CPT

## 2017-11-24 PROCEDURE — 11000004 HC SNF PRIVATE

## 2017-11-24 PROCEDURE — 63600175 PHARM REV CODE 636 W HCPCS: Performed by: INTERNAL MEDICINE

## 2017-11-24 PROCEDURE — 25000003 PHARM REV CODE 250: Performed by: INTERNAL MEDICINE

## 2017-11-24 PROCEDURE — 97116 GAIT TRAINING THERAPY: CPT

## 2017-11-24 RX ADMIN — THERA TABS 1 TABLET: TAB at 08:11

## 2017-11-24 RX ADMIN — MICONAZOLE NITRATE: 20 POWDER TOPICAL at 09:11

## 2017-11-24 RX ADMIN — CARVEDILOL 6.25 MG: 6.25 TABLET, FILM COATED ORAL at 09:11

## 2017-11-24 RX ADMIN — HEPARIN SODIUM 5000 UNITS: 5000 INJECTION, SOLUTION INTRAVENOUS; SUBCUTANEOUS at 02:11

## 2017-11-24 RX ADMIN — HEPARIN SODIUM 5000 UNITS: 5000 INJECTION, SOLUTION INTRAVENOUS; SUBCUTANEOUS at 06:11

## 2017-11-24 RX ADMIN — VANCOMYCIN HYDROCHLORIDE 1000 MG: 1 INJECTION, POWDER, LYOPHILIZED, FOR SOLUTION INTRAVENOUS at 09:11

## 2017-11-24 RX ADMIN — GABAPENTIN 100 MG: 100 CAPSULE ORAL at 08:11

## 2017-11-24 RX ADMIN — FERROUS SULFATE TAB EC 325 MG (65 MG FE EQUIVALENT) 325 MG: 325 (65 FE) TABLET DELAYED RESPONSE at 08:11

## 2017-11-24 RX ADMIN — LISINOPRIL 10 MG: 10 TABLET ORAL at 08:11

## 2017-11-24 RX ADMIN — ATORVASTATIN CALCIUM 20 MG: 20 TABLET, FILM COATED ORAL at 09:11

## 2017-11-24 RX ADMIN — OXYCODONE AND ACETAMINOPHEN 1 TABLET: 7.5; 325 TABLET ORAL at 02:11

## 2017-11-24 RX ADMIN — FINASTERIDE 5 MG: 5 TABLET, FILM COATED ORAL at 08:11

## 2017-11-24 RX ADMIN — TAMSULOSIN HYDROCHLORIDE 0.4 MG: 0.4 CAPSULE ORAL at 08:11

## 2017-11-24 RX ADMIN — CYANOCOBALAMIN TAB 1000 MCG 1000 MCG: 1000 TAB at 08:11

## 2017-11-24 RX ADMIN — DONEPEZIL HYDROCHLORIDE 10 MG: 10 TABLET, FILM COATED ORAL at 09:11

## 2017-11-24 RX ADMIN — ACETAMINOPHEN 500 MG: 500 TABLET ORAL at 06:11

## 2017-11-24 RX ADMIN — DOCUSATE SODIUM 100 MG: 100 CAPSULE, LIQUID FILLED ORAL at 09:11

## 2017-11-24 RX ADMIN — HEPARIN SODIUM 5000 UNITS: 5000 INJECTION, SOLUTION INTRAVENOUS; SUBCUTANEOUS at 09:11

## 2017-11-24 RX ADMIN — ACETAMINOPHEN 500 MG: 500 TABLET ORAL at 09:11

## 2017-11-24 RX ADMIN — OXYCODONE HYDROCHLORIDE AND ACETAMINOPHEN 500 MG: 500 TABLET ORAL at 08:11

## 2017-11-24 RX ADMIN — LEVOTHYROXINE SODIUM 100 MCG: 100 TABLET ORAL at 06:11

## 2017-11-24 RX ADMIN — DOCUSATE SODIUM 100 MG: 100 CAPSULE, LIQUID FILLED ORAL at 08:11

## 2017-11-24 RX ADMIN — ASPIRIN 81 MG: 81 TABLET, COATED ORAL at 08:11

## 2017-11-24 RX ADMIN — Medication 10 ML: at 06:11

## 2017-11-24 RX ADMIN — GABAPENTIN 300 MG: 100 CAPSULE ORAL at 09:11

## 2017-11-24 RX ADMIN — ACETAMINOPHEN 500 MG: 500 TABLET ORAL at 02:11

## 2017-11-24 RX ADMIN — Medication 10 ML: at 12:11

## 2017-11-24 RX ADMIN — OXYCODONE AND ACETAMINOPHEN 1 TABLET: 7.5; 325 TABLET ORAL at 08:11

## 2017-11-24 RX ADMIN — CALCIUM 500 MG: 500 TABLET ORAL at 08:11

## 2017-11-24 NOTE — PLAN OF CARE
Problem: Patient Care Overview  Goal: Plan of Care Review  Outcome: Ongoing (interventions implemented as appropriate)   11/24/17 1237   Coping/Psychosocial   Plan Of Care Reviewed With patient       Problem: Infection, Risk/Actual (Adult)  Goal: Infection Prevention/Resolution  Patient will demonstrate the desired outcomes by discharge/transition of care.   Outcome: Ongoing (interventions implemented as appropriate)   11/24/17 1237   Infection, Risk/Actual (Adult)   Infection Prevention/Resolution making progress toward outcome       Problem: Fall Risk (Adult)  Goal: Absence of Falls  Patient will demonstrate the desired outcomes by discharge/transition of care.   Outcome: Ongoing (interventions implemented as appropriate)   11/24/17 1237   Fall Risk (Adult)   Absence of Falls making progress toward outcome       Problem: Wound, Traumatic, Nonburn (Adult)  Goal: Signs and Symptoms of Listed Potential Problems Will be Absent, Minimized or Managed (Wound, Traumatic, Nonburn)  Signs and symptoms of listed potential problems will be absent, minimized or managed by discharge/transition of care (reference Wound, Traumatic, Nonburn (Adult) CPG).   Outcome: Ongoing (interventions implemented as appropriate)   11/24/17 1237   Wound, Traumatic, Nonburn   Problems Assessed (Wound) all   Problems Present (Wound) none       Comments: Patient monitored every 1 to 2 hours for pain and safety.  Safety maintained,.  Patient instructed to call for assistance.Call  Light and persoanl items in reach.

## 2017-11-24 NOTE — PT/OT/SLP PROGRESS
Physical Therapy  Treatment    Zeyad Woodard   MRN: [id number]   Admitting Diagnosis: MRSA bacteremia    PT Received On: 11/24/17  Total Time (min): 50       Billable Minutes:  Gait [id number], Therapeutic Activity 20 and Therapeutic Exercise 15    Treatment Type: Treatment  PT/PTA: PT     PTA Visit Number: 0       General Precautions: Standard, contact, fall  Orthopedic Precautions: N/A   Braces: N/A         Subjective:  Communicated with pt prior to session. Pt agreeable to PT services.  Pain/Comfort  Pain Rating 1: 5/10  Location - Side 1: Right  Location - Orientation 1: generalized  Location 1: gluteal  Pain Addressed 1: Reposition  Pain Rating Post-Intervention 1: 5/10    Objective:  Patient found seated in wheelchair. with         Functional Status:  MDS G  Bed Mobility Functional Status: CGA-Min (A)  Transfer Functional Status: CGA-Min (A)  Walk in Room Functional Status: CGA-Min (A)  Walk in Corridor Functional Status: CGA-Min (A)  Locomotion on Unit Functional Status: CGA-Min (A)  Moving from seated to standing position: Not steady, only able to stabilize with staff assistance  Walking (with assistive device if used): Not steady, only able to stabilize with staff assistance  Turning around and facing the opposite direction while walking: Not steady, only able to stabilize with staff assistance  Surface-to-surface transfer (transfer between bed and chair or wheelchair): Not steady, only able to stabilize with staff assistance          Bed Mobility:  Sit>Supine: Min A for mgmt of LE    Transfers:   Sit<>Stand: CGA with facilitation forward for sit>stand and facilitation straight down (not backward) for stand>sit.  Stand Pivot Transfer: Min A with no assistive device, hand-held assistance with verbal cues for proper positioning of feet prior to sitting on bed (transfer: WC>bed).    Gait:  Ambulated 110, 120, 100 feet with a rest break in between, using a rolling walker, Michael provided for one loss of balance  and general instability. Pt presents with uneven step length and decreased ability to weight-shift onto each lower extremity.     Therex:  Standing- hip flexion, hip abduction (with rest break in between each exercise) - 10 reps x 2 trials    Balance:  Able to stand and perform exercises with upper extremity support on the stairs, SBA.    Additional Treatment:  Able to complete 15 minutes on the lower body ergometer to improve his LE strength and endurance and improve his ability to mobilize his lower extremities reciprocally (for ambulation purposes).    Spoke with pt's daughter, Elenita, who asked if he could return home alone. I told her that I do not recommend he return home alone due to his instability in standing, overall rigidity, and decreased endurance.    Patient left supine with call button in reach.    Assessment:  Zeyad Woodard is a 89 y.o. male with a medical diagnosis of MRSA bacteremia.  Mr. Woodard was able to ambulate three times today, though he did need a rest break in between trials. He will benefit from further PT services to improve his endurance and balance, thereby decreasing his overall fall risk. As of today, he is not appropriate to discharge home alone.    Rehab identified problem list/impairments: weakness, impaired endurance, impaired self care skills, impaired functional mobilty, gait instability, impaired balance, decreased lower extremity function, pain, impaired skin, other (comment) (contact prec/MRSA)    Rehab potential is good.    Activity tolerance: Good    Discharge recommendations: home with home health (and likely family/caregiver assist)     Barriers to discharge: Decreased caregiver support, Inaccessible home environment    Equipment recommendations: wheelchair     GOALS:    Physical Therapy Goals        Problem: Physical Therapy Goal    Goal Priority Disciplines Outcome Goal Variances Interventions   Physical Therapy Goal     PT/OT, PT Ongoing (interventions  implemented as appropriate)     Description:  Goals to be met by: 14 DAYS     Patient will increase functional independence with mobility by performin. Supine to sit with Supervision   2. Sit to supine with Supervision  3. Sit to stand transfer with Stand-by Assistance with RW  4. Bed to chair transfer with Stand-by Assistance using Rolling Walker  5. Gait  x 150 feet with Stand-by Assistance using Rolling Walker. Met (2017)  6. Wheelchair propulsion x100 feet with Stand-by Assistance using bilateral upper extremities  7. Ascend/Descend 4 inch curb step with Contact Guard Assistance using Rolling Walker.  8. Sitting at edge of bed x8 minutes with Stand-by Assistance and without UE support, and performing exercises or activity  9. Stand for 5 minutes with Contact Guard Assistance using Rolling Walker and perform an activity  10. Lower extremity exercise program x20 reps per handout, with assistance as needed and gym therex                       PLAN:    Patient to be seen 5 x/week  to address the above listed problems via gait training, therapeutic activities, therapeutic exercises, wheelchair management/training  Plan of Care expires: 17  Plan of Care reviewed with: patient    Juanita Sanchez, PT  2017

## 2017-11-24 NOTE — PT/OT/SLP PROGRESS
Occupational Therapy  Treatment    Zeyad Woodard   MRN: [id number]   Admitting Diagnosis: MRSA bacteremia    OT Date of Treatment: 11/24/17  Total Time (min): 60 min    Billable Minutes:  Self Care/Home Management 17, Therapeutic Activity 20 and Therapeutic Exercise 23    General Precautions: Standard, contact, fall  Orthopedic Precautions: N/A  Braces: N/A         Subjective:  Communicated with nurse prior to session.  Pt. Reported wanting to exercise    Pain/Comfort  Pain Rating 1: 6/10  Location - Side 1: Right  Location 1: hip  Pain Addressed 1: Nurse notified, Pre-medicate for activity  Pain Rating Post-Intervention 1: 4/10    Objective:  Patient found with:  (seated in w/c)    Functional Status:  MDS G  Transfer Functional Status: CGA-Min (A)  Transfer Level of (A):  (x 2 trials at table and SPT in room from w/c to new w/c )  Moving from seated to standing position: Not steady, only able to stabilize with staff assistance  Surface-to-surface transfer (transfer between bed and chair or wheelchair): Not steady, only able to stabilize with staff assistance          OT Exercises: AROM with 3# dowel x with 3# dowel x 3 sets 10 reps for chest presses and elbow flex/ext   UE Ergometer x 10 minutes to help improve level of endurance to perform daily activities.     Additional Treatment:  Pt. Engaged in dynamic standing activities at table focusing on weight shifting laterally and anteriorly in order to facilitate better balance for ADL task performance.     Patient left up in chair with all lines intact and with PT    ASSESSMENT:  Zeyad Woodard is a 89 y.o. male with a medical diagnosis of MRSA bacteremia and presents with deficits in self-care skills, endurance as well as functional mobility and would benefit from continued OT services to maximize safety and independence with ADL tasks. .    Rehab identified problem list/impairments: weakness, impaired endurance, impaired self care skills, impaired functional  mobilty, gait instability, impaired balance, decreased upper extremity function, decreased lower extremity function, decreased safety awareness, pain, impaired coordination, impaired skin    Rehab potential is good    Activity tolerance: Good    Discharge recommendations: home health OT     Barriers to discharge: Decreased caregiver support     Equipment recommendations: wheelchair     GOALS:    Occupational Therapy Goals        Problem: Occupational Therapy Goal    Goal Priority Disciplines Outcome Interventions   Occupational Therapy Goal     OT, PT/OT Ongoing (interventions implemented as appropriate)    Description:  Goals to be met by: 14 days     Patient will increase functional independence with ADLs by performing:    Feeding with Modified [place].  UE Dressing with Modified [place].  LE Dressing with Modified [place] using AD.  Grooming while standing with Modified [place].  Toileting from bedside commode with Modified [place] for hygiene and clothing management.   Bathing from  shower chair/bench with Supervision.  Toilet transfer to bedside commode with Supervision.  Perform functional activity in standing with S for 10' in preparation for safety during standing ADls.                       Plan:  Patient to be seen  (5-6x/week) to address the above listed problems via self-care/home management, therapeutic activities, therapeutic exercises  Plan of Care expires: 12/16/17  Plan of Care reviewed with: patient    DELON Kapoor  11/24/2017

## 2017-11-24 NOTE — PLAN OF CARE
Problem: Physical Therapy Goal  Goal: Physical Therapy Goal  Goals to be met by: 14 DAYS     Patient will increase functional independence with mobility by performin. Supine to sit with Supervision   2. Sit to supine with Supervision  3. Sit to stand transfer with Stand-by Assistance with RW  4. Bed to chair transfer with Stand-by Assistance using Rolling Walker  5. Gait  x 150 feet with Stand-by Assistance using Rolling Walker. Met (2017)  6. Wheelchair propulsion x100 feet with Stand-by Assistance using bilateral upper extremities  7. Ascend/Descend 4 inch curb step with Contact Guard Assistance using Rolling Walker.  8. Sitting at edge of bed x8 minutes with Stand-by Assistance and without UE support, and performing exercises or activity  9. Stand for 5 minutes with Contact Guard Assistance using Rolling Walker and perform an activity  10. Lower extremity exercise program x20 reps per handout, with assistance as needed and gym therex      Outcome: Ongoing (interventions implemented as appropriate)  Goals remain appropriate.

## 2017-11-24 NOTE — PLAN OF CARE
Problem: Occupational Therapy Goal  Goal: Occupational Therapy Goal  Goals to be met by: 14 days     Patient will increase functional independence with ADLs by performing:    Feeding with Modified [place].  UE Dressing with Modified [place].  LE Dressing with Modified [place] using AD.  Grooming while standing with Modified [place].  Toileting from bedside commode with Modified [place] for hygiene and clothing management.   Bathing from  shower chair/bench with Supervision.  Toilet transfer to bedside commode with Supervision.  Perform functional activity in standing with S for 10' in preparation for safety during standing ADls.      Pt. Tolerated session well.

## 2017-11-24 NOTE — PLAN OF CARE
11/24/17 1318   Discharge Assessment   Assessment Type Discharge Planning Assessment   Confirmed/corrected address and phone number on facesheet? Yes   Assessment information obtained from? Patient;Caregiver   Expected Length of Stay (days) 14   Communicated expected length of stay with patient/caregiver no   Prior to hospitilization cognitive status: Alert/Oriented   Prior to hospitalization functional status: Needs Assistance   Current cognitive status: Alert/Oriented   Current Functional Status: Needs Assistance   Facility Arrived From: [facility]   Lives With alone   Able to Return to Prior Arrangements unable to determine at this time (comments)   Is patient able to care for self after discharge? Unable to determine at this time (comments)   Who are your caregiver(s) and their phone number(s)? [place] Daughter [phone]   Patient's perception of discharge disposition home health   Readmission Within The Last 30 Days no previous admission in last 30 days   Patient currently being followed by outpatient case management? No   Patient currently receives any other outside agency services? Yes   Equipment Currently Used at Home rollator;shower chair;walker, rolling;grab bar   Do you have any problems affording any of your prescribed medications? No   Is the patient taking medications as prescribed? yes   Does the patient have transportation home? Yes   Transportation Available family or friend will provide   Does the patient receive services at the [facility]? No   Discharge Plan A Home with family   Discharge Plan B Home with family   Patient/Family In Agreement With Plan yes

## 2017-11-25 LAB
POCT GLUCOSE: 120 MG/DL (ref 70–110)
POCT GLUCOSE: 149 MG/DL (ref 70–110)
VANCOMYCIN TROUGH SERPL-MCNC: 24.4 UG/ML

## 2017-11-25 PROCEDURE — 97530 THERAPEUTIC ACTIVITIES: CPT

## 2017-11-25 PROCEDURE — 97110 THERAPEUTIC EXERCISES: CPT

## 2017-11-25 PROCEDURE — 80202 ASSAY OF VANCOMYCIN: CPT

## 2017-11-25 PROCEDURE — 25000003 PHARM REV CODE 250: Performed by: INTERNAL MEDICINE

## 2017-11-25 PROCEDURE — 25000003 PHARM REV CODE 250: Performed by: STUDENT IN AN ORGANIZED HEALTH CARE EDUCATION/TRAINING PROGRAM

## 2017-11-25 PROCEDURE — 11000004 HC SNF PRIVATE

## 2017-11-25 PROCEDURE — 97116 GAIT TRAINING THERAPY: CPT

## 2017-11-25 PROCEDURE — 63600175 PHARM REV CODE 636 W HCPCS: Performed by: STUDENT IN AN ORGANIZED HEALTH CARE EDUCATION/TRAINING PROGRAM

## 2017-11-25 PROCEDURE — 63600175 PHARM REV CODE 636 W HCPCS: Performed by: INTERNAL MEDICINE

## 2017-11-25 PROCEDURE — A4216 STERILE WATER/SALINE, 10 ML: HCPCS | Performed by: STUDENT IN AN ORGANIZED HEALTH CARE EDUCATION/TRAINING PROGRAM

## 2017-11-25 PROCEDURE — 25000003 PHARM REV CODE 250: Performed by: NURSE PRACTITIONER

## 2017-11-25 PROCEDURE — 97535 SELF CARE MNGMENT TRAINING: CPT

## 2017-11-25 RX ADMIN — DONEPEZIL HYDROCHLORIDE 10 MG: 10 TABLET, FILM COATED ORAL at 09:11

## 2017-11-25 RX ADMIN — ACETAMINOPHEN 500 MG: 500 TABLET ORAL at 05:11

## 2017-11-25 RX ADMIN — CARVEDILOL 6.25 MG: 6.25 TABLET, FILM COATED ORAL at 08:11

## 2017-11-25 RX ADMIN — HEPARIN SODIUM 5000 UNITS: 5000 INJECTION, SOLUTION INTRAVENOUS; SUBCUTANEOUS at 09:11

## 2017-11-25 RX ADMIN — DOCUSATE SODIUM 100 MG: 100 CAPSULE, LIQUID FILLED ORAL at 08:11

## 2017-11-25 RX ADMIN — OXYCODONE HYDROCHLORIDE AND ACETAMINOPHEN 500 MG: 500 TABLET ORAL at 08:11

## 2017-11-25 RX ADMIN — CYANOCOBALAMIN TAB 1000 MCG 1000 MCG: 1000 TAB at 08:11

## 2017-11-25 RX ADMIN — DOCUSATE SODIUM 100 MG: 100 CAPSULE, LIQUID FILLED ORAL at 09:11

## 2017-11-25 RX ADMIN — OXYCODONE AND ACETAMINOPHEN 1 TABLET: 7.5; 325 TABLET ORAL at 09:11

## 2017-11-25 RX ADMIN — ACETAMINOPHEN 500 MG: 500 TABLET ORAL at 09:11

## 2017-11-25 RX ADMIN — Medication 10 ML: at 05:11

## 2017-11-25 RX ADMIN — HEPARIN SODIUM 5000 UNITS: 5000 INJECTION, SOLUTION INTRAVENOUS; SUBCUTANEOUS at 05:11

## 2017-11-25 RX ADMIN — GABAPENTIN 100 MG: 100 CAPSULE ORAL at 08:11

## 2017-11-25 RX ADMIN — VANCOMYCIN HYDROCHLORIDE 1000 MG: 1 INJECTION, POWDER, LYOPHILIZED, FOR SOLUTION INTRAVENOUS at 09:11

## 2017-11-25 RX ADMIN — LISINOPRIL 10 MG: 10 TABLET ORAL at 08:11

## 2017-11-25 RX ADMIN — GABAPENTIN 300 MG: 100 CAPSULE ORAL at 09:11

## 2017-11-25 RX ADMIN — FERROUS SULFATE TAB EC 325 MG (65 MG FE EQUIVALENT) 325 MG: 325 (65 FE) TABLET DELAYED RESPONSE at 08:11

## 2017-11-25 RX ADMIN — ACETAMINOPHEN 500 MG: 500 TABLET ORAL at 02:11

## 2017-11-25 RX ADMIN — ASPIRIN 81 MG: 81 TABLET, COATED ORAL at 08:11

## 2017-11-25 RX ADMIN — Medication 10 ML: at 06:11

## 2017-11-25 RX ADMIN — MICONAZOLE NITRATE: 20 POWDER TOPICAL at 09:11

## 2017-11-25 RX ADMIN — HEPARIN SODIUM 5000 UNITS: 5000 INJECTION, SOLUTION INTRAVENOUS; SUBCUTANEOUS at 02:11

## 2017-11-25 RX ADMIN — Medication 10 ML: at 12:11

## 2017-11-25 RX ADMIN — FINASTERIDE 5 MG: 5 TABLET, FILM COATED ORAL at 08:11

## 2017-11-25 RX ADMIN — TAMSULOSIN HYDROCHLORIDE 0.4 MG: 0.4 CAPSULE ORAL at 08:11

## 2017-11-25 RX ADMIN — CALCIUM 500 MG: 500 TABLET ORAL at 08:11

## 2017-11-25 RX ADMIN — THERA TABS 1 TABLET: TAB at 08:11

## 2017-11-25 RX ADMIN — CARVEDILOL 6.25 MG: 6.25 TABLET, FILM COATED ORAL at 09:11

## 2017-11-25 RX ADMIN — LEVOTHYROXINE SODIUM 100 MCG: 100 TABLET ORAL at 05:11

## 2017-11-25 RX ADMIN — Medication 10 ML: at 02:11

## 2017-11-25 RX ADMIN — ATORVASTATIN CALCIUM 20 MG: 20 TABLET, FILM COATED ORAL at 09:11

## 2017-11-25 NOTE — PLAN OF CARE
Problem: Occupational Therapy Goal  Goal: Occupational Therapy Goal  Goals to be met by: 14 days     Patient will increase functional independence with ADLs by performing:    Feeding with Modified [place]. GOAL MET 11/25/17  UE Dressing with Modified [place]. GOAL MET 11/25/17  LE Dressing with Modified [place] using AD.  Grooming while standing with Modified [place].  Toileting from bedside commode with Modified [place] for hygiene and clothing management.   Bathing from  shower chair/bench with Supervision.  Toilet transfer to bedside commode with Supervision.  Perform functional activity in standing with S for 10' in preparation for safety during standing ADls.      Outcome: Ongoing (interventions implemented as appropriate)    Pt was agreeable to OT and was noted to make progress towards his goals in therapy.  During today's session, pt met 2 goals for UE dressing and feeding. Pt's goals remain appropriate at this time.  He will continue to benefit from skilled OT services in order to assist him with increasing his safety and level of independence with self care and mobility tasks.     Opal Garcia, OT  11/25/2017

## 2017-11-25 NOTE — PROGRESS NOTES
Notified by Opal AUSTIN that patient has abrasion to right shin.  Patient has bruise with abrasion to right shin. Area cleaned with ns and island dressing applied.  Charge nurse and Dr. Bell notified.

## 2017-11-25 NOTE — PLAN OF CARE
Problem: Patient Care Overview  Goal: Plan of Care Review  Outcome: Ongoing (interventions implemented as appropriate)   11/25/17 1506   Coping/Psychosocial   Plan Of Care Reviewed With patient       Problem: Infection, Risk/Actual (Adult)  Goal: Infection Prevention/Resolution  Patient will demonstrate the desired outcomes by discharge/transition of care.   Outcome: Ongoing (interventions implemented as appropriate)   11/25/17 1506   Infection, Risk/Actual (Adult)   Infection Prevention/Resolution making progress toward outcome       Problem: Fall Risk (Adult)  Goal: Absence of Falls  Patient will demonstrate the desired outcomes by discharge/transition of care.   Outcome: Ongoing (interventions implemented as appropriate)   11/25/17 1506   Fall Risk (Adult)   Absence of Falls making progress toward outcome       Problem: Pressure Ulcer Risk (Chucho Scale) (Adult,Obstetrics,Pediatric)  Goal: Identify Related Risk Factors and Signs and Symptoms  Related risk factors and signs and symptoms are identified upon initiation of Human Response Clinical Practice Guideline (CPG)   Outcome: Ongoing (interventions implemented as appropriate)   11/25/17 1506   Pressure Ulcer Risk (Chucho Scale)   Related Risk Factors (Pressure Ulcer Risk (Chucho Scale)) age extremes;mobility impaired       Problem: Infection, Risk/Actual (Adult)  Goal: Infection Prevention/Resolution  Patient will demonstrate the desired outcomes by discharge/transition of care.   Outcome: Ongoing (interventions implemented as appropriate)   11/25/17 1506   Infection, Risk/Actual (Adult)   Infection Prevention/Resolution making progress toward outcome       Problem: Wound, Traumatic, Nonburn (Adult)  Goal: Signs and Symptoms of Listed Potential Problems Will be Absent, Minimized or Managed (Wound, Traumatic, Nonburn)  Signs and symptoms of listed potential problems will be absent, minimized or managed by discharge/transition of care (reference Wound, Traumatic,  Nonburn (Adult) CPG).   Outcome: Ongoing (interventions implemented as appropriate)   11/25/17 1506   Wound, Traumatic, Nonburn   Problems Assessed (Wound) all   Problems Present (Wound) none       Comments: Patient monitored every 1 to 2 hours for pain and safety.  Safety maintained.  Patient on contact isolation for mrsa in wound.  Patient instructed to call for assistance.Call  Light and persoanl items in reach.

## 2017-11-25 NOTE — PT/OT/SLP PROGRESS
Physical Therapy  Treatment    Zeyad Woodard   MRN: [id number]   Admitting Diagnosis: MRSA bacteremia    PT Received On: 11/25/17          Billable Minutes:  Gait [id number], Therapeutic Activity 17 and Therapeutic Exercise 15=55    Treatment Type: Treatment  PT/PTA: PT     PTA Visit Number: 0       General Precautions: Standard, contact, fall  Orthopedic Precautions: N/A   Braces: N/A         Subjective:  Communicated with patient prior to session.  Patient agreeable to session.     Pain/Comfort  Pain Rating 1: 4/10  Location - Side 1: Right  Location 1: gluteal  Pain Addressed 1: Distraction  Pain Rating Post-Intervention 1: 4/10    Objective:  Patient found seated in w/c w/ shoes and B AFOs donned   with         Functional Status:  MDS G  Transfer Functional Status: CGA-Min (A)  Walk in Room Functional Status: CGA-Min (A)  Walk in Corridor Functional Status: CGA-Min (A)  Locomotion on Unit Functional Status: CGA-Min (A)  Toilet Use Functional Status: CGA-Min (A)       Transfers:  Sit<>Stand: to/from w/c w/ CGA x2 trials, w/ close SBA 2 trials; to/from BSC over toilet w/ CGA/min assist Stand Pivot Transfer: to BSC over toilet w/ CGA/min assist for steadying w/ posterior LOB  Slowly; occ cues for technique    Gait:  Amb 150 feet w/ B AFOs w/ RW and SBA w/ w/c follow. Slow pace. Decreased step LLE in swing.  Amb in room (to bathroom) w/ B AFOs and RW, 12 feet to bathroom, then 10 feet to w/c at door. CGA.  Amb w/ slippers ( without use of AFOs) w/ RW and SBA w/ w/c follow. Slow pace. Slides BLEs forward w/ forefoot contact.  Amb w/ RW and slippers on w/ SBA from w/c in doorway of room to w/c next to bed w/ SBA  (transfer to w/c at door for clean technique/isolation protocol)  Advanced Gait:    Curb Step: up/down 4 inch curb step w/ RW and min assist. Patient wearing B AFOs .  Balance:  Patient stands and walks w/ RW w/ slight forward lean. Occ in transfers, LOB backwards/posterior lean w/ CGA/min assist for safety  and to regain balance    Additional Treatment:  Patient uses BSC over toilet. Assisted w/ pants and diaper management.  Patient pedaled LBE (lower body ergometer) x15 minutes to increase strength and endurance.    Patient left up in chair with call button in reach.    Assessment:  Zeyad Woodard is a 89 y.o. male with a medical diagnosis of MRSA bacteremia.  He is in contact isolation but comes to the gym following isolation/contact protocol. Patient continues to require extra time. Appeared more fatigued this session but amb several trials both with and without AFOs. At home he wears slippers while walking and uses AFOs when he goes out. Patient will benefit from continued physical therapy to address deficits and improve safety and functional mobility. Continue with physical therapy plan of care. .    Rehab identified problem list/impairments: weakness, impaired endurance, impaired self care skills, impaired functional mobilty, gait instability, impaired balance, decreased lower extremity function, pain, impaired skin, other (comment) (contact prec/MRSA)    Rehab potential is good.    Activity tolerance: Good    Discharge recommendations: home with home health (and likely family/caregiver assist)     Barriers to discharge: Decreased caregiver support, Inaccessible home environment    Equipment recommendations: wheelchair     GOALS:    Physical Therapy Goals        Problem: Physical Therapy Goal    Goal Priority Disciplines Outcome Goal Variances Interventions   Physical Therapy Goal     PT/OT, PT Ongoing (interventions implemented as appropriate)     Description:  Goals to be met by: 14 DAYS     Patient will increase functional independence with mobility by performin. Supine to sit with Supervision   2. Sit to supine with Supervision  3. Sit to stand transfer with Stand-by Assistance with RW  4. Bed to chair transfer with Stand-by Assistance using Rolling Walker  5. Gait  x 150 feet with Stand-by  Assistance using Rolling Walker. Met (11/19/2017)  6. Wheelchair propulsion x100 feet with Stand-by Assistance using bilateral upper extremities  7. Ascend/Descend 4 inch curb step with Contact Guard Assistance using Rolling Walker.  8. Sitting at edge of bed x8 minutes with Stand-by Assistance and without UE support, and performing exercises or activity  9. Stand for 5 minutes with Contact Guard Assistance using Rolling Walker and perform an activity  10. Lower extremity exercise program x20 reps per handout, with assistance as needed and gym therex                       PLAN:    Patient to be seen 5 x/week  to address the above listed problems via gait training, therapeutic activities, therapeutic exercises, wheelchair management/training  Plan of Care expires: 12/16/17  Plan of Care reviewed with: patient    More Sanchez, PT  11/25/2017

## 2017-11-25 NOTE — PT/OT/SLP PROGRESS
"Occupational Therapy  Treatment    Zeyad Woodard   MRN: [id number]   Admitting Diagnosis: MRSA bacteremia    OT Date of Treatment: 11/25/17  Total Time (min): 58 min    Billable Minutes:  Self Care/Home Management 45 and Therapeutic Activity 13    General Precautions: Standard, contact, fall  Orthopedic Precautions: N/A  Braces: N/A         Subjective:  Communicated with nurse prior to session.  "I'll bathe in the bathroom."    Pain/Comfort  Pain Rating 1: 4/10  Location - Side 1: Right  Location 1: gluteal  Pain Addressed 1: Reposition, Distraction, Cessation of Activity  Pain Rating Post-Intervention 1: 5/10    Objective:  Patient found with:  (supine in bed - no lines)    Functional Status:  MDS G  Bed Mobility Functional Status: CGA-Min (A)  Bed Mobility Level of (A):  (supine to sit with min A to lift trunk - used bed rails)    Transfer Functional Status: CGA-Min (A)  Transfer Level of (A):  (Min A to stand from bed; CGA BSC transfer (over toilet) )    Walk in Room Functional Status: CGA-Min (A)  Walk In Room Level of (A):  (CGA using RW - from bed <-> bath)    Dressing Functional Status: 2:CGA-Min (A)  Dressing Level of (A) :  (UE = mod I to rhonda/doff tshirt; LE = min A with R LE to rhonda/doff socks and shoe with insert - AD not used)    Eating Functional Status: mod(I) - (I)  Eating Level of (A) :  (Mod I - dentures top teeth)    Toilet Use Functional Status: CGA-Min (A)  Toilet Use Level of (A) :  (CGA in stand during clothing management and hygiene)    Personal Hygiene Functional Status: S-SBA  Personal Hygiene Level of (A) :  (set up A to wash face while seated)    Bathing Functional Status: CGA-Min (A)  Bathing Level of (A) :  (CGA when standing to wash perineal - sponge bath)    Moving from seated to standing position: Not steady, only able to stabilize with staff assistance  Walking (with assistive device if used): Not steady, only able to stabilize with staff assistance  Turning around and facing the " opposite direction while walking: Not steady, only able to stabilize with staff assistance  Moving on and off the toilet: Not steady, only able to stabilize with staff assistance  Surface-to-surface transfer (transfer between bed and chair or wheelchair): Not steady, only able to stabilize with staff assistance        Additional Treatment:  · Pt completed ADLs and func mobility activities for tx session as noted above  · Pt continues to require increased time with ADls  · Pt educated on role of OT and POC      Patient left up in chair with call button in reach    ASSESSMENT:  Zeyad Woodard is a 89 y.o. male with a medical diagnosis of MRSA bacteremia. Pt was agreeable to OT and was noted to make progress towards his goals in therapy.  During today's session, pt met 2 goals for UE dressing and feeding. Pt's goals remain appropriate at this time.  He will continue to benefit from skilled OT services in order to assist him with increasing his safety and level of independence with self care and mobility tasks.           Rehab identified problem list/impairments: weakness, impaired endurance, impaired self care skills, impaired functional mobilty, gait instability, impaired balance, decreased upper extremity function, decreased lower extremity function, decreased safety awareness, pain, impaired coordination, impaired skin    Rehab potential is good    Activity tolerance: Good    Discharge recommendations: home health OT     Barriers to discharge: Decreased caregiver support     Equipment recommendations: wheelchair     GOALS:    Occupational Therapy Goals        Problem: Occupational Therapy Goal    Goal Priority Disciplines Outcome Interventions   Occupational Therapy Goal     OT, PT/OT Ongoing (interventions implemented as appropriate)    Description:  Goals to be met by: 14 days     Patient will increase functional independence with ADLs by performing:    Feeding with Modified [place]. GOAL MET 11/25/17  UE  Dressing with Modified [place]. GOAL MET 11/25/17  LE Dressing with Modified [place] using AD.  Grooming while standing with Modified [place].  Toileting from bedside commode with Modified [place] for hygiene and clothing management.   Bathing from  shower chair/bench with Supervision.  Toilet transfer to bedside commode with Supervision.  Perform functional activity in standing with S for 10' in preparation for safety during standing ADls.                        Plan:  Patient to be seen  (5-6x/week) to address the above listed problems via self-care/home management, therapeutic activities, therapeutic exercises  Plan of Care expires: 12/16/17  Plan of Care reviewed with: patient    Opal TRIANA Jose, OT  11/25/2017

## 2017-11-25 NOTE — PLAN OF CARE
Problem: Patient Care Overview  Goal: Plan of Care Review  Outcome: Ongoing (interventions implemented as appropriate)   11/24/17 2233   Coping/Psychosocial   Plan Of Care Reviewed With patient       Problem: Fall Risk (Adult)  Goal: Absence of Falls  Patient will demonstrate the desired outcomes by discharge/transition of care.   Outcome: Ongoing (interventions implemented as appropriate)   11/24/17 2233   Fall Risk (Adult)   Absence of Falls making progress toward outcome       Problem: Pressure Ulcer Risk (Chucho Scale) (Adult,Obstetrics,Pediatric)  Goal: Skin Integrity  Patient will demonstrate the desired outcomes by discharge/transition of care.   Outcome: Outcome(s) achieved Date Met: 11/24/17 11/24/17 2233   Pressure Ulcer Risk (Chucho Scale) (Adult,Obstetrics,Pediatric)   Skin Integrity making progress toward outcome       Problem: Wound, Traumatic, Nonburn (Adult)  Goal: Signs and Symptoms of Listed Potential Problems Will be Absent, Minimized or Managed (Wound, Traumatic, Nonburn)  Signs and symptoms of listed potential problems will be absent, minimized or managed by discharge/transition of care (reference Wound, Traumatic, Nonburn (Adult) CPG).   Outcome: Ongoing (interventions implemented as appropriate)   11/24/17 2233   Wound, Traumatic, Nonburn   Problems Assessed (Wound) all   Problems Present (Wound) none

## 2017-11-26 PROCEDURE — 82272 OCCULT BLD FECES 1-3 TESTS: CPT

## 2017-11-26 PROCEDURE — 25000003 PHARM REV CODE 250: Performed by: NURSE PRACTITIONER

## 2017-11-26 PROCEDURE — 11000004 HC SNF PRIVATE

## 2017-11-26 PROCEDURE — 25000003 PHARM REV CODE 250: Performed by: STUDENT IN AN ORGANIZED HEALTH CARE EDUCATION/TRAINING PROGRAM

## 2017-11-26 PROCEDURE — 25000003 PHARM REV CODE 250: Performed by: INTERNAL MEDICINE

## 2017-11-26 PROCEDURE — A4216 STERILE WATER/SALINE, 10 ML: HCPCS | Performed by: STUDENT IN AN ORGANIZED HEALTH CARE EDUCATION/TRAINING PROGRAM

## 2017-11-26 PROCEDURE — 63600175 PHARM REV CODE 636 W HCPCS: Performed by: STUDENT IN AN ORGANIZED HEALTH CARE EDUCATION/TRAINING PROGRAM

## 2017-11-26 RX ADMIN — GABAPENTIN 100 MG: 100 CAPSULE ORAL at 09:11

## 2017-11-26 RX ADMIN — ATORVASTATIN CALCIUM 20 MG: 20 TABLET, FILM COATED ORAL at 10:11

## 2017-11-26 RX ADMIN — LEVOTHYROXINE SODIUM 100 MCG: 100 TABLET ORAL at 05:11

## 2017-11-26 RX ADMIN — CALCIUM 500 MG: 500 TABLET ORAL at 09:11

## 2017-11-26 RX ADMIN — ACETAMINOPHEN 500 MG: 500 TABLET ORAL at 02:11

## 2017-11-26 RX ADMIN — DONEPEZIL HYDROCHLORIDE 10 MG: 10 TABLET, FILM COATED ORAL at 10:11

## 2017-11-26 RX ADMIN — OXYCODONE HYDROCHLORIDE AND ACETAMINOPHEN 500 MG: 500 TABLET ORAL at 09:11

## 2017-11-26 RX ADMIN — MICONAZOLE NITRATE: 20 POWDER TOPICAL at 09:11

## 2017-11-26 RX ADMIN — OXYCODONE AND ACETAMINOPHEN 1 TABLET: 7.5; 325 TABLET ORAL at 10:11

## 2017-11-26 RX ADMIN — ASPIRIN 81 MG: 81 TABLET, COATED ORAL at 09:11

## 2017-11-26 RX ADMIN — CARVEDILOL 6.25 MG: 6.25 TABLET, FILM COATED ORAL at 09:11

## 2017-11-26 RX ADMIN — MICONAZOLE NITRATE: 20 POWDER TOPICAL at 10:11

## 2017-11-26 RX ADMIN — ACETAMINOPHEN 500 MG: 500 TABLET ORAL at 05:11

## 2017-11-26 RX ADMIN — HEPARIN SODIUM 5000 UNITS: 5000 INJECTION, SOLUTION INTRAVENOUS; SUBCUTANEOUS at 02:11

## 2017-11-26 RX ADMIN — RAMELTEON 8 MG: 8 TABLET, FILM COATED ORAL at 10:11

## 2017-11-26 RX ADMIN — DOCUSATE SODIUM 100 MG: 100 CAPSULE, LIQUID FILLED ORAL at 09:11

## 2017-11-26 RX ADMIN — TAMSULOSIN HYDROCHLORIDE 0.4 MG: 0.4 CAPSULE ORAL at 09:11

## 2017-11-26 RX ADMIN — LISINOPRIL 10 MG: 10 TABLET ORAL at 09:11

## 2017-11-26 RX ADMIN — Medication 10 ML: at 12:11

## 2017-11-26 RX ADMIN — Medication 10 ML: at 05:11

## 2017-11-26 RX ADMIN — ACETAMINOPHEN 500 MG: 500 TABLET ORAL at 10:11

## 2017-11-26 RX ADMIN — GABAPENTIN 300 MG: 100 CAPSULE ORAL at 10:11

## 2017-11-26 RX ADMIN — CYANOCOBALAMIN TAB 1000 MCG 1000 MCG: 1000 TAB at 09:11

## 2017-11-26 RX ADMIN — STANDARDIZED SENNA CONCENTRATE AND DOCUSATE SODIUM 1 TABLET: 8.6; 5 TABLET, FILM COATED ORAL at 09:11

## 2017-11-26 RX ADMIN — THERA TABS 1 TABLET: TAB at 09:11

## 2017-11-26 RX ADMIN — HEPARIN SODIUM 5000 UNITS: 5000 INJECTION, SOLUTION INTRAVENOUS; SUBCUTANEOUS at 10:11

## 2017-11-26 RX ADMIN — FINASTERIDE 5 MG: 5 TABLET, FILM COATED ORAL at 09:11

## 2017-11-26 RX ADMIN — DOCUSATE SODIUM 100 MG: 100 CAPSULE, LIQUID FILLED ORAL at 10:11

## 2017-11-26 RX ADMIN — CARVEDILOL 6.25 MG: 6.25 TABLET, FILM COATED ORAL at 10:11

## 2017-11-26 RX ADMIN — HEPARIN SODIUM 5000 UNITS: 5000 INJECTION, SOLUTION INTRAVENOUS; SUBCUTANEOUS at 05:11

## 2017-11-26 RX ADMIN — FERROUS SULFATE TAB EC 325 MG (65 MG FE EQUIVALENT) 325 MG: 325 (65 FE) TABLET DELAYED RESPONSE at 09:11

## 2017-11-26 NOTE — PLAN OF CARE
Problem: Patient Care Overview  Goal: Plan of Care Review  Outcome: Ongoing (interventions implemented as appropriate)   11/26/17 1520   Coping/Psychosocial   Plan Of Care Reviewed With patient       Problem: Infection, Risk/Actual (Adult)  Goal: Infection Prevention/Resolution  Patient will demonstrate the desired outcomes by discharge/transition of care.   Outcome: Ongoing (interventions implemented as appropriate)   11/26/17 1520   Infection, Risk/Actual (Adult)   Infection Prevention/Resolution making progress toward outcome       Problem: Fall Risk (Adult)  Goal: Absence of Falls  Patient will demonstrate the desired outcomes by discharge/transition of care.   Outcome: Ongoing (interventions implemented as appropriate)   11/26/17 1520   Fall Risk (Adult)   Absence of Falls making progress toward outcome       Problem: Infection, Risk/Actual (Adult)  Goal: Identify Related Risk Factors and Signs and Symptoms  Related risk factors and signs and symptoms are identified upon initiation of Human Response Clinical Practice Guideline (CPG)   Outcome: Ongoing (interventions implemented as appropriate)   11/26/17 1520   Infection, Risk/Actual   Related Risk Factors (Infection, Risk/Actual) age extremes;skin integrity impairment   Signs and Symptoms (Infection, Risk/Actual) blood glucose changes;weakness       Comments: Patient monitored ev bonifacio 1 to 2 hours for pain and safety.  Safety maintained. Patient instructed to call for assistance.Call  Light and persoanl items in reach.

## 2017-11-26 NOTE — PLAN OF CARE
Problem: Fall Risk (Adult)  Goal: Identify Related Risk Factors and Signs and Symptoms  Related risk factors and signs and symptoms are identified upon initiation of Human Response Clinical Practice Guideline (CPG)   Outcome: Ongoing (interventions implemented as appropriate)   11/26/17 0257   Fall Risk   Related Risk Factors (Fall Risk) fatigue/slow reaction;gait/mobility problems

## 2017-11-27 LAB
ALBUMIN SERPL BCP-MCNC: 3.1 G/DL
ALP SERPL-CCNC: 92 U/L
ALT SERPL W/O P-5'-P-CCNC: 23 U/L
ANION GAP SERPL CALC-SCNC: 5 MMOL/L
AST SERPL-CCNC: 34 U/L
BASOPHILS # BLD AUTO: 0.02 K/UL
BASOPHILS NFR BLD: 0.2 %
BILIRUB SERPL-MCNC: 0.8 MG/DL
BUN SERPL-MCNC: 38 MG/DL
CALCIUM SERPL-MCNC: 8.7 MG/DL
CHLORIDE SERPL-SCNC: 92 MMOL/L
CO2 SERPL-SCNC: 28 MMOL/L
CREAT SERPL-MCNC: 1.3 MG/DL
CRP SERPL-MCNC: 17.4 MG/L
DIFFERENTIAL METHOD: ABNORMAL
EOSINOPHIL # BLD AUTO: 0.2 K/UL
EOSINOPHIL NFR BLD: 2.5 %
ERYTHROCYTE [DISTWIDTH] IN BLOOD BY AUTOMATED COUNT: 14.9 %
ERYTHROCYTE [SEDIMENTATION RATE] IN BLOOD BY WESTERGREN METHOD: 20 MM/HR
EST. GFR  (AFRICAN AMERICAN): 55.5 ML/MIN/1.73 M^2
EST. GFR  (NON AFRICAN AMERICAN): 48 ML/MIN/1.73 M^2
GLUCOSE SERPL-MCNC: 98 MG/DL
HCT VFR BLD AUTO: 23.1 %
HGB BLD-MCNC: 7.5 G/DL
LYMPHOCYTES # BLD AUTO: 5.2 K/UL
LYMPHOCYTES NFR BLD: 53.3 %
MAGNESIUM SERPL-MCNC: 2.2 MG/DL
MCH RBC QN AUTO: 31.1 PG
MCHC RBC AUTO-ENTMCNC: 32.5 G/DL
MCV RBC AUTO: 96 FL
MONOCYTES # BLD AUTO: 1.5 K/UL
MONOCYTES NFR BLD: 15.5 %
NEUTROPHILS # BLD AUTO: 2.8 K/UL
NEUTROPHILS NFR BLD: 28.5 %
OB PNL STL: NEGATIVE
PHOSPHATE SERPL-MCNC: 3.5 MG/DL
PLATELET # BLD AUTO: 120 K/UL
PMV BLD AUTO: 10.8 FL
POTASSIUM SERPL-SCNC: 5.1 MMOL/L
PROT SERPL-MCNC: 6 G/DL
RBC # BLD AUTO: 2.41 M/UL
SODIUM SERPL-SCNC: 125 MMOL/L
WBC # BLD AUTO: 9.68 K/UL

## 2017-11-27 PROCEDURE — 97116 GAIT TRAINING THERAPY: CPT

## 2017-11-27 PROCEDURE — 86140 C-REACTIVE PROTEIN: CPT

## 2017-11-27 PROCEDURE — 36415 COLL VENOUS BLD VENIPUNCTURE: CPT

## 2017-11-27 PROCEDURE — 25000003 PHARM REV CODE 250: Performed by: STUDENT IN AN ORGANIZED HEALTH CARE EDUCATION/TRAINING PROGRAM

## 2017-11-27 PROCEDURE — 25000003 PHARM REV CODE 250: Performed by: NURSE PRACTITIONER

## 2017-11-27 PROCEDURE — 25000003 PHARM REV CODE 250: Performed by: INTERNAL MEDICINE

## 2017-11-27 PROCEDURE — 97110 THERAPEUTIC EXERCISES: CPT

## 2017-11-27 PROCEDURE — 97530 THERAPEUTIC ACTIVITIES: CPT

## 2017-11-27 PROCEDURE — 84100 ASSAY OF PHOSPHORUS: CPT

## 2017-11-27 PROCEDURE — 80053 COMPREHEN METABOLIC PANEL: CPT

## 2017-11-27 PROCEDURE — 11000004 HC SNF PRIVATE

## 2017-11-27 PROCEDURE — 83735 ASSAY OF MAGNESIUM: CPT

## 2017-11-27 PROCEDURE — 63600175 PHARM REV CODE 636 W HCPCS: Performed by: STUDENT IN AN ORGANIZED HEALTH CARE EDUCATION/TRAINING PROGRAM

## 2017-11-27 PROCEDURE — 99309 SBSQ NF CARE MODERATE MDM 30: CPT | Mod: ,,, | Performed by: NURSE PRACTITIONER

## 2017-11-27 PROCEDURE — A4216 STERILE WATER/SALINE, 10 ML: HCPCS | Performed by: STUDENT IN AN ORGANIZED HEALTH CARE EDUCATION/TRAINING PROGRAM

## 2017-11-27 PROCEDURE — 85025 COMPLETE CBC W/AUTO DIFF WBC: CPT

## 2017-11-27 PROCEDURE — 85651 RBC SED RATE NONAUTOMATED: CPT

## 2017-11-27 RX ADMIN — Medication 10 ML: at 12:11

## 2017-11-27 RX ADMIN — ACETAMINOPHEN 500 MG: 500 TABLET ORAL at 01:11

## 2017-11-27 RX ADMIN — ACETAMINOPHEN 500 MG: 500 TABLET ORAL at 05:11

## 2017-11-27 RX ADMIN — OXYCODONE AND ACETAMINOPHEN 1 TABLET: 7.5; 325 TABLET ORAL at 08:11

## 2017-11-27 RX ADMIN — TAMSULOSIN HYDROCHLORIDE 0.4 MG: 0.4 CAPSULE ORAL at 08:11

## 2017-11-27 RX ADMIN — LISINOPRIL 10 MG: 10 TABLET ORAL at 08:11

## 2017-11-27 RX ADMIN — LEVOTHYROXINE SODIUM 100 MCG: 100 TABLET ORAL at 05:11

## 2017-11-27 RX ADMIN — CARVEDILOL 6.25 MG: 6.25 TABLET, FILM COATED ORAL at 08:11

## 2017-11-27 RX ADMIN — ATORVASTATIN CALCIUM 20 MG: 20 TABLET, FILM COATED ORAL at 08:11

## 2017-11-27 RX ADMIN — FERROUS SULFATE TAB EC 325 MG (65 MG FE EQUIVALENT) 325 MG: 325 (65 FE) TABLET DELAYED RESPONSE at 08:11

## 2017-11-27 RX ADMIN — SODIUM CHLORIDE, PRESERVATIVE FREE 10 ML: 5 INJECTION INTRAVENOUS at 08:11

## 2017-11-27 RX ADMIN — CALCIUM 500 MG: 500 TABLET ORAL at 08:11

## 2017-11-27 RX ADMIN — GABAPENTIN 300 MG: 100 CAPSULE ORAL at 08:11

## 2017-11-27 RX ADMIN — MICONAZOLE NITRATE: 20 POWDER TOPICAL at 08:11

## 2017-11-27 RX ADMIN — DOCUSATE SODIUM 100 MG: 100 CAPSULE, LIQUID FILLED ORAL at 08:11

## 2017-11-27 RX ADMIN — Medication 10 ML: at 06:11

## 2017-11-27 RX ADMIN — DONEPEZIL HYDROCHLORIDE 10 MG: 10 TABLET, FILM COATED ORAL at 08:11

## 2017-11-27 RX ADMIN — ASPIRIN 81 MG: 81 TABLET, COATED ORAL at 08:11

## 2017-11-27 RX ADMIN — CYANOCOBALAMIN TAB 1000 MCG 1000 MCG: 1000 TAB at 08:11

## 2017-11-27 RX ADMIN — FINASTERIDE 5 MG: 5 TABLET, FILM COATED ORAL at 08:11

## 2017-11-27 RX ADMIN — OXYCODONE HYDROCHLORIDE AND ACETAMINOPHEN 500 MG: 500 TABLET ORAL at 08:11

## 2017-11-27 RX ADMIN — GABAPENTIN 100 MG: 100 CAPSULE ORAL at 08:11

## 2017-11-27 RX ADMIN — THERA TABS 1 TABLET: TAB at 08:11

## 2017-11-27 RX ADMIN — HEPARIN SODIUM 5000 UNITS: 5000 INJECTION, SOLUTION INTRAVENOUS; SUBCUTANEOUS at 05:11

## 2017-11-27 NOTE — PLAN OF CARE
Problem: Patient Care Overview  Goal: Plan of Care Review  Outcome: Ongoing (interventions implemented as appropriate)   11/27/17 1487   Coping/Psychosocial   Plan Of Care Reviewed With patient       Problem: Fall Risk (Adult)  Goal: Identify Related Risk Factors and Signs and Symptoms  Related risk factors and signs and symptoms are identified upon initiation of Human Response Clinical Practice Guideline (CPG)   Outcome: Ongoing (interventions implemented as appropriate)   11/27/17 5237   Fall Risk   Related Risk Factors (Fall Risk) gait/mobility problems;fatigue/slow reaction

## 2017-11-27 NOTE — ASSESSMENT & PLAN NOTE
Evaluated on 11/27/17  -Continue therapy with Vanc, but was held yesterday due to increase in trough, obtaining random and will resume  -Change mepilex dressing TIW and as needed for drainage  -F/U with Surgery as scheduled  -Continue scheduled tylenol for pain control with percocet as needed for breakthrough pain  -Contact isolation discontinue since patient with 2 negative blood cultures

## 2017-11-27 NOTE — ASSESSMENT & PLAN NOTE
Evaluated on 11/27/17  -Rate controlled  -Warfarin discontinue due to GI bleed with angioectasia  -Continue coreg 6.25mg and ASA  81mg daily therapy  -Patient with consistent rate in 60's so likely paced

## 2017-11-27 NOTE — ASSESSMENT & PLAN NOTE
Evaluated on 11/27/17  -Patient calm and cooperative  -Continue therapy with donepezil 10mg nightly  -Delirium precautions  -Reorient as necessary

## 2017-11-27 NOTE — ASSESSMENT & PLAN NOTE
Evaluated on 11/27/17  -Cr is at baseline of 1.1-1.4  -Renally dose medications and avoid nephrotoxins  -Continue to monitor  -Regular Vanc level monitoring

## 2017-11-27 NOTE — SUBJECTIVE & OBJECTIVE
Hospital Course:  The patient was admitted at [facility] from 11/10 to 11/15/2017.  11/15: Patient admitted to SNF for ongoing OT/PT following a hospitalization for MRSA bacteremia.  11/17: Patient seen at bedside, report pain to buttock, slept well  11/24: Patient seen at bedside, doing well, visualized buttocks removed dressing erythema improving  11/27: erythema much improved, denies pain, no complaints, reviewed labs, decreasing h/h with negative stool for occult blood, patient with moderate about of ecchymosis to ABD from heparin injections with hematoma, discussed with Dr. Bergman, stopped heparin injections and placed on Teds and SCDs    Interval History: Patient seen at beside, slept well, no acute events overnight.    Review of Systems   Constitutional: Negative for appetite change, chills, fatigue and fever.   HENT: Negative for trouble swallowing.    Respiratory: Negative for cough, chest tightness, shortness of breath and wheezing.    Cardiovascular: Negative for chest pain, palpitations and leg swelling.   Gastrointestinal: Negative for abdominal pain, constipation, diarrhea and nausea.   Genitourinary: Negative for difficulty urinating, frequency and urgency.   Musculoskeletal: Negative for arthralgias and myalgias.   Skin: Negative for rash.   Neurological: Negative for dizziness, weakness, light-headedness and headaches.   Psychiatric/Behavioral: Negative for sleep disturbance.     Scheduled Meds:   acetaminophen  500 mg Oral Q8H    ascorbic acid (vitamin C)  500 mg Oral Daily    aspirin  81 mg Oral Daily    atorvastatin  20 mg Oral Nightly    calcium carbonate  500 mg Oral Daily    carvedilol  6.25 mg Oral BID    cyanocobalamin  1,000 mcg Oral Daily    docusate sodium  100 mg Oral BID    donepezil  10 mg Oral QHS    ferrous sulfate  325 mg Oral Daily    finasteride  5 mg Oral Daily    gabapentin  100 mg Oral Daily    gabapentin  300 mg Oral QHS    levothyroxine  100 mcg Oral Before  breakfast    lisinopril  10 mg Oral Daily    miconazole NITRATE 2 %   Topical BID    multivitamin  1 tablet Oral Daily    sodium chloride 0.9%  10 mL Intravenous Q6H    tamsulosin  0.4 mg Oral Daily     Continuous Infusions:   PRN Meds:.acetaminophen, acetaminophen, calcium carbonate, diphenhydrAMINE-zinc acetate 1-0.1%, furosemide, ondansetron, oxyCODONE-acetaminophen, ramelteon, senna, senna-docusate 8.6-50 mg, [place] PICC Protocol **AND** sodium chloride 0.9% **AND** sodium chloride 0.9%, sodium chloride 0.9%    Objective:     Vital Signs (Most Recent):  Temp: 97.5 °F (36.4 °C) (11/27/17 0745)  Pulse: 62 (11/27/17 0819)  Resp: 18 (11/27/17 0745)  BP: (!) 149/74 (11/27/17 0818)  SpO2: 97 % (11/27/17 0745) Vital Signs (24h Range):  Temp:  [97.5 °F (36.4 °C)-98.5 °F (36.9 °C)] 97.5 °F (36.4 °C)  Pulse:  [61-62] 62  Resp:  [18] 18  SpO2:  [96 %-97 %] 97 %  BP: (140-170)/(74-76) 149/74     Weight: 81.7 kg (180 lb 1.9 oz)  Body mass index is 26.6 kg/m².    Intake/Output Summary (Last 24 hours) at 11/27/17 1548  Last data filed at 11/27/17 0624   Gross per 24 hour   Intake                0 ml   Output             1250 ml   Net            -1250 ml      Physical Exam   Constitutional: He is oriented to person, place, and time. He appears well-developed and well-nourished. No distress.   Cardiovascular: Normal rate, regular rhythm and normal heart sounds.  Exam reveals no gallop and no friction rub.    No murmur heard.  Pulmonary/Chest: Effort normal and breath sounds normal. No respiratory distress. He has no wheezes. He has no rales.   Abdominal: Soft. Bowel sounds are normal. He exhibits no distension. There is no tenderness.   Musculoskeletal: Normal range of motion. He exhibits no edema or tenderness.   Neurological: He is alert and oriented to person, place, and time.   Skin: Skin is warm and dry. Ecchymosis noted. No rash noted. He is not diaphoretic. No cyanosis. Nails show no clubbing.   Much improved  erythema at intergluteal cleft with open area without drainage, right buttock with sutures intact, moderated amount of ecchymosis to abd from heparin injections without hematoma   Psychiatric: He has a normal mood and affect. His behavior is normal.     Significant Labs:    Recent Labs  Lab 11/22/17  0700 11/22/17  1332 11/27/17  0454   WBC 10.79  --  9.68   HGB 7.0* 7.9* 7.5*   HCT 21.3* 24.0* 23.1*   *  --  120*       Recent Labs  Lab 11/22/17  0700 11/27/17  0454   * 125*   K 4.7 5.1   CL 94* 92*   CO2 27 28   BUN 34* 38*   CREATININE 1.3 1.3   CALCIUM 8.2* 8.7   PROT 5.3* 6.0   BILITOT 0.6 0.8   ALKPHOS 87 92   ALT 22 23   AST 33 34     Lab Results   Component Value Date    LABPROT 11.5 11/07/2017    ALBUMIN 3.1 (L) 11/27/2017     Lab Results   Component Value Date    CALCIUM 8.7 11/27/2017    PHOS 3.5 11/27/2017   Results for EVERT ARAUJO (MRN [id number]) as of 11/27/2017 15:50   Ref. Range 11/27/2017 04:54   Magnesium Latest Ref Range: 1.6 - 2.6 mg/dL 2.2       Significant Imaging: n/a

## 2017-11-27 NOTE — ASSESSMENT & PLAN NOTE
Evaluated on 11/27/17  -Continue dietary supplements with vit C, calcium, cyanocobalamin ( + hx of B12 def.), MVI

## 2017-11-27 NOTE — ASSESSMENT & PLAN NOTE
Evaluated on 11/27/17  -continue PT/OT to increase ambulation, ADL performance and endurance  -continue Teds/SCDS for DVT prophylaxis, patient with moderate amount of ecchymosis to ABD from heparin injections and with decreased h/h, neg stool for occult blood  -continue fall precautions  -continue docusate to prevent constipation; hold for frequent or loose stooling

## 2017-11-27 NOTE — PLAN OF CARE
Problem: Occupational Therapy Goal  Goal: Occupational Therapy Goal  Goals to be met by: 14 days     Patient will increase functional independence with ADLs by performing:    Feeding with Modified [place]. GOAL MET 11/25/17  UE Dressing with Modified [place]. GOAL MET 11/25/17  LE Dressing with Modified [place] using AD.  Grooming while standing with Modified [place].  Toileting from bedside commode with Modified [place] for hygiene and clothing management.   Bathing from  shower chair/bench with Supervision.  Toilet transfer to bedside commode with Supervision.  Perform functional activity in standing with S for 10' in preparation for safety during standing ADls.       Outcome: Ongoing (interventions implemented as appropriate)  Progressing. DELON Pollock  11/27/2017

## 2017-11-27 NOTE — PLAN OF CARE
Problem: Patient Care Overview  Goal: Plan of Care Review  Outcome: Ongoing (interventions implemented as appropriate)   11/27/17 1304   Coping/Psychosocial   Plan Of Care Reviewed With patient       Problem: Fall Risk (Adult)  Goal: Absence of Falls  Patient will demonstrate the desired outcomes by discharge/transition of care.   Outcome: Ongoing (interventions implemented as appropriate)   11/27/17 1304   Fall Risk (Adult)   Absence of Falls making progress toward outcome       Problem: Pressure Ulcer Risk (Chucho Scale) (Adult,Obstetrics,Pediatric)  Goal: Identify Related Risk Factors and Signs and Symptoms  Related risk factors and signs and symptoms are identified upon initiation of Human Response Clinical Practice Guideline (CPG)   Outcome: Ongoing (interventions implemented as appropriate)   11/27/17 1304   Pressure Ulcer Risk (Chucho Scale)   Related Risk Factors (Pressure Ulcer Risk (Chucho Scale)) age extremes       Problem: Infection, Risk/Actual (Adult)  Goal: Infection Prevention/Resolution  Patient will demonstrate the desired outcomes by discharge/transition of care.   Outcome: Ongoing (interventions implemented as appropriate)   11/27/17 1304   Infection, Risk/Actual (Adult)   Infection Prevention/Resolution making progress toward outcome       Problem: Wound, Traumatic, Nonburn (Adult)  Goal: Signs and Symptoms of Listed Potential Problems Will be Absent, Minimized or Managed (Wound, Traumatic, Nonburn)  Signs and symptoms of listed potential problems will be absent, minimized or managed by discharge/transition of care (reference Wound, Traumatic, Nonburn (Adult) CPG).   Outcome: Ongoing (interventions implemented as appropriate)   11/27/17 1304   Wound, Traumatic, Nonburn   Problems Assessed (Wound) all   Problems Present (Wound) none       Comments: Patient moPatient instructed to call for assistance.Call  Light and persoanl items in reach.nitored every 1 to 2 hours for pain and safety.  Safety  maintained.  Patient 's contact isolation discontinued.

## 2017-11-27 NOTE — ASSESSMENT & PLAN NOTE
Evaluated on 11/27/17  -No difficulty urinating  -Continue medical therapy with finasteride 5mg daily and tamsulosin 0.4mg daily  -Will continue to monitor

## 2017-11-27 NOTE — PROGRESS NOTES
Dressing changed to right buttock and right shin with aseptic technique.  Patient tolerated procedure well.  Call  Light in reach.

## 2017-11-27 NOTE — PT/OT/SLP PROGRESS
"Occupational Therapy  Treatment    Zeyad Woodard   MRN: [id number]   Admitting Diagnosis: MRSA bacteremia    OT Date of Treatment: 11/27/17  Total Time (min): 45 min    Billable Minutes:  Therapeutic Activity 20 and Therapeutic Exercise 25    General Precautions: Standard, contact, fall  Orthopedic Precautions: N/A  Braces: N/A         Subjective:  Communicated with pt prior to session.  "I am off of isolation"    Pain/Comfort  Pain Rating 1:  (no complaints of pain)    Objective:  Patient found with:  (seated in w/c )    Functional Status:  MDS G  Transfer Functional Status: CGA-SBA with RW from w/c, BSC over toilet  Walk in Room Functional Status: CGA-with RW to ambulate in room and to bathroom and out of room to w/c in villarreal  Locomotion Off Unit Functional Status: total(A)-w/c mobility to gym  Toilet Use Functional Status: min (A)--BSC over toilet--complete cleaning of buttocks  Personal Hygiene Functional Status: S-SBA--standing at sink to wash hanvds and mouth           OT Exercises: UE Ergometer 15 min to increase functional endurance for ADLs  Rowing 20# 15 x 4 to increase UE strength for mobility    Additional Treatment:  Re-educated re: safety with RW and in bathroom    Patient left up in chair with PT in gym    ASSESSMENT:  Zeyad Woodard is a 89 y.o. male with a medical diagnosis of MRSA bacteremia and presents with decreased indep with self care and functional mobility.  Pt demonstrated increased functional mobility in the room and bathroom. Pt cont to benefit from OT to address limitations and increase functional indep and safety .    Rehab identified problem list/impairments: weakness, impaired endurance, impaired self care skills, impaired functional mobilty, gait instability, impaired balance, decreased upper extremity function, decreased lower extremity function, decreased safety awareness, pain, impaired coordination, impaired skin    Rehab potential is good    Activity tolerance: " Good    Discharge recommendations: home health OT     Barriers to discharge: Decreased caregiver support     Equipment recommendations: wheelchair     GOALS:    Occupational Therapy Goals        Problem: Occupational Therapy Goal    Goal Priority Disciplines Outcome Interventions   Occupational Therapy Goal     OT, PT/OT Ongoing (interventions implemented as appropriate)    Description:  Goals to be met by: 14 days     Patient will increase functional independence with ADLs by performing:    Feeding with Modified [place]. GOAL MET 11/25/17  UE Dressing with Modified [place]. GOAL MET 11/25/17  LE Dressing with Modified Williamson using AD.  Grooming while standing with Modified [place].  Toileting from bedside commode with Modified Williamson for hygiene and clothing management.   Bathing from  shower chair/bench with Supervision.  Toilet transfer to bedside commode with Supervision.  Perform functional activity in standing with S for 10' in preparation for safety during standing ADls.                        Plan:  Patient to be seen  (5-6x/week) to address the above listed problems via self-care/home management, therapeutic activities, therapeutic exercises  Plan of Care expires: 12/16/17  Plan of Care reviewed with: patient    DELON Pollock  11/27/2017

## 2017-11-27 NOTE — ASSESSMENT & PLAN NOTE
Evaluated on 11/27/17  -Chronic with improved control  -Continue therapy with coreg  -will continue to monitor and adjust regimen as necessary

## 2017-11-27 NOTE — ASSESSMENT & PLAN NOTE
Evaluated on 11/27/17  -Currently compensated  -Patient only takes lasix prn at home for edema  -Will monitor with daily weights and initiate lasix as needed for edema or weight gain

## 2017-11-27 NOTE — ASSESSMENT & PLAN NOTE
Evaluated on 11/27/17  -With source likely buttock abscess  -Continue vanc IV with end date of 12/8 which is 4 weeks from 1 negative culture on 11/10  -patient with 2 negative blood cultures, removed from isolation  -Picc line care with saline flushes and dressing changes

## 2017-11-27 NOTE — ASSESSMENT & PLAN NOTE
Evaluated on 11/27/17  -Chronic and stable  -Continue therapy with atorvastatin 20mg nightly  -F/U with PCP for ongoing monitoring

## 2017-11-27 NOTE — ASSESSMENT & PLAN NOTE
Evaluated on 11/27/17  -H/H stable with last iron panel WNL  -Continue iron therapy  -Will continue to monitor with BIW labs

## 2017-11-27 NOTE — PT/OT/SLP PROGRESS
"Physical Therapy  Treatment    Zeyad Woodard   MRN: [id number]   Admitting Diagnosis: MRSA bacteremia    PT Received On: 11/27/17  Total Time (min): 50       Billable Minutes:50    Gait [id number], Therapeutic Activity 10 and Therapeutic Exercise 25    Treatment Type: Treatment  PT/PTA: PTA     PTA Visit Number: 1       General Precautions: Standard, fall  Orthopedic Precautions: N/A   Braces: N/A         Subjective:  "OK"      Pain/Comfort  Pain Rating 1:  (did not rate)  Location - Side 1: Right  Location - Orientation 1: generalized  Location 1: gluteal  Pain Addressed 1: Reposition, Distraction, Cessation of Activity (decline meds)  Pain Rating Post-Intervention 1:  (inc with mvmt)    Objective:  Patient found in wc     Functional Status:  MDS G  Transfer Functional Status: CGA-Min (A)  Walk in Room Functional Status: CGA-Min (A)  Walk in Corridor Functional Status: CGA-Min (A)        Transfers:  Sit<>Stand: with RW min/CGA  Stand Pivot Transfer: CGA with RW WC<>BSC over toilet      Gait:  Amb with RW CGA no AFO over small threshold to / from bathroom ~ 10 ft x 2 in room  and 150 ft B AFO with RW CGA       Therex:  2x10 reps AP,GS,LAQ,hip flex    Additional Treatment:  LBE x 15 min  toileting min/CGA with RW for trfs, min A with clothing mgmt, urinated only    Patient left supine with call button in reach and belonging in reach.    Assessment:  Zeyad Woodard is a 89 y.o. male with a medical diagnosis of MRSA bacteremia.  Pt tolerated well, pt would continue to benefit from skilled PT services to improve overall functional mobility, strength and endurance.  .    Rehab identified problem list/impairments: weakness, impaired endurance, impaired self care skills, impaired functional mobilty, gait instability, impaired balance, decreased lower extremity function, pain, impaired skin, other (comment) (contact prec/MRSA)    Rehab potential is good.    Activity tolerance: Fair    Discharge recommendations: home with " home health (and likely family/caregiver assist)     Barriers to discharge: Decreased caregiver support, Inaccessible home environment    Equipment recommendations: wheelchair     GOALS:    Physical Therapy Goals        Problem: Physical Therapy Goal    Goal Priority Disciplines Outcome Goal Variances Interventions   Physical Therapy Goal     PT/OT, PT Ongoing (interventions implemented as appropriate)     Description:  Goals to be met by: 14 DAYS     Patient will increase functional independence with mobility by performin. Supine to sit with Supervision   2. Sit to supine with Supervision  3. Sit to stand transfer with Stand-by Assistance with RW  4. Bed to chair transfer with Stand-by Assistance using Rolling Walker  5. Gait  x 150 feet with Stand-by Assistance using Rolling Walker. Met (2017)  6. Wheelchair propulsion x100 feet with Stand-by Assistance using bilateral upper extremities  7. Ascend/Descend 4 inch curb step with Contact Guard Assistance using Rolling Walker.  8. Sitting at edge of bed x8 minutes with Stand-by Assistance and without UE support, and performing exercises or activity  9. Stand for 5 minutes with Contact Guard Assistance using Rolling Walker and perform an activity  10. Lower extremity exercise program x20 reps per handout, with assistance as needed and gym therex                       PLAN:    Patient to be seen 5 x/week  to address the above listed problems via gait training, therapeutic activities, therapeutic exercises, wheelchair management/training  Plan of Care expires: 17  Plan of Care reviewed with: patient    Mary Gilbert, PTA  2017

## 2017-11-27 NOTE — PROGRESS NOTES
[facility] Department of Hospital Medicine  Progress Note    Patient Name: Zeyad Woodard  MRN: [id number]  Code Status: DNR  Admission Date: 11/15/2017  Length of Stay: 12 days  Attending Physician: Tannre Bergman MD  Primary Care Provider: Booker Ricci MD    Subjective:     Principal Problem:MRSA bacteremia    Chief Complaint/Reason for Admission: MRSA bacteremia    History of Present Illness:  Patient is a 89 y.o. male with A. Fib, BPH, CHF, HTN who presents to SNF after hospitalization for MRSA bacteremia thought to be due to a rectal abscess with cellulitis.  He required I&D of left buttock (which grew MRSA on culture) on 11/13 by Dr. Rahman.    He had a JEVON which did not show signs of infection involving valves or pacemaker.  He is due to complete Vanc for 4 weeks.  He complains of mild discomfort to his buttocks currently which if exacerbated by prolonged sitting and relieved by percocet. The patient has been admitted to SNF for ongoing PT/OT due to insufficient progress to go home safely from the hospital.    Hospital Course:  The patient was admitted at [facility] from 11/10 to 11/15/2017.  11/15: Patient admitted to SNF for ongoing OT/PT following a hospitalization for MRSA bacteremia.  11/17: Patient seen at bedside, report pain to buttock, slept well  11/24: Patient seen at bedside, doing well, visualized buttocks removed dressing erythema improving  11/27: erythema much improved, denies pain, no complaints, reviewed labs, decreasing h/h with negative stool for occult blood, patient with moderate about of ecchymosis to ABD from heparin injections with hematoma, discussed with Dr. Bergman, stopped heparin injections and placed on Teds and SCDs    Interval History: Patient seen at beside, slept well, no acute events overnight.    Review of Systems   Constitutional: Negative for appetite change, chills, fatigue and fever.   HENT: Negative for trouble swallowing.    Respiratory:  Negative for cough, chest tightness, shortness of breath and wheezing.    Cardiovascular: Negative for chest pain, palpitations and leg swelling.   Gastrointestinal: Negative for abdominal pain, constipation, diarrhea and nausea.   Genitourinary: Negative for difficulty urinating, frequency and urgency.   Musculoskeletal: Negative for arthralgias and myalgias.   Skin: Negative for rash.   Neurological: Negative for dizziness, weakness, light-headedness and headaches.   Psychiatric/Behavioral: Negative for sleep disturbance.     Scheduled Meds:   acetaminophen  500 mg Oral Q8H    ascorbic acid (vitamin C)  500 mg Oral Daily    aspirin  81 mg Oral Daily    atorvastatin  20 mg Oral Nightly    calcium carbonate  500 mg Oral Daily    carvedilol  6.25 mg Oral BID    cyanocobalamin  1,000 mcg Oral Daily    docusate sodium  100 mg Oral BID    donepezil  10 mg Oral QHS    ferrous sulfate  325 mg Oral Daily    finasteride  5 mg Oral Daily    gabapentin  100 mg Oral Daily    gabapentin  300 mg Oral QHS    levothyroxine  100 mcg Oral Before breakfast    lisinopril  10 mg Oral Daily    miconazole NITRATE 2 %   Topical BID    multivitamin  1 tablet Oral Daily    sodium chloride 0.9%  10 mL Intravenous Q6H    tamsulosin  0.4 mg Oral Daily     Continuous Infusions:   PRN Meds:.acetaminophen, acetaminophen, calcium carbonate, diphenhydrAMINE-zinc acetate 1-0.1%, furosemide, ondansetron, oxyCODONE-acetaminophen, ramelteon, senna, senna-docusate 8.6-50 mg, Flushing PICC Protocol **AND** sodium chloride 0.9% **AND** sodium chloride 0.9%, sodium chloride 0.9%    Objective:     Vital Signs (Most Recent):  Temp: 97.5 °F (36.4 °C) (11/27/17 0745)  Pulse: 62 (11/27/17 0819)  Resp: 18 (11/27/17 0745)  BP: (!) 149/74 (11/27/17 0818)  SpO2: 97 % (11/27/17 0745) Vital Signs (24h Range):  Temp:  [97.5 °F (36.4 °C)-98.5 °F (36.9 °C)] 97.5 °F (36.4 °C)  Pulse:  [61-62] 62  Resp:  [18] 18  SpO2:  [96 %-97 %] 97 %  BP:  (140-170)/(74-76) 149/74     Weight: 81.7 kg (180 lb 1.9 oz)  Body mass index is 26.6 kg/m².    Intake/Output Summary (Last 24 hours) at 11/27/17 1548  Last data filed at 11/27/17 0624   Gross per 24 hour   Intake                0 ml   Output             1250 ml   Net            -1250 ml      Physical Exam   Constitutional: He is oriented to person, place, and time. He appears well-developed and well-nourished. No distress.   Cardiovascular: Normal rate, regular rhythm and normal heart sounds.  Exam reveals no gallop and no friction rub.    No murmur heard.  Pulmonary/Chest: Effort normal and breath sounds normal. No respiratory distress. He has no wheezes. He has no rales.   Abdominal: Soft. Bowel sounds are normal. He exhibits no distension. There is no tenderness.   Musculoskeletal: Normal range of motion. He exhibits no edema or tenderness.   Neurological: He is alert and oriented to person, place, and time.   Skin: Skin is warm and dry. Ecchymosis noted. No rash noted. He is not diaphoretic. No cyanosis. Nails show no clubbing.   Much improved erythema at intergluteal cleft with open area without drainage, right buttock with sutures intact, moderated amount of ecchymosis to abd from heparin injections without hematoma   Psychiatric: He has a normal mood and affect. His behavior is normal.     Significant Labs:    Recent Labs  Lab 11/22/17  0700 11/22/17  1332 11/27/17  0454   WBC 10.79  --  9.68   HGB 7.0* 7.9* 7.5*   HCT 21.3* 24.0* 23.1*   *  --  120*       Recent Labs  Lab 11/22/17  0700 11/27/17  0454   * 125*   K 4.7 5.1   CL 94* 92*   CO2 27 28   BUN 34* 38*   CREATININE 1.3 1.3   CALCIUM 8.2* 8.7   PROT 5.3* 6.0   BILITOT 0.6 0.8   ALKPHOS 87 92   ALT 22 23   AST 33 34     Lab Results   Component Value Date    LABPROT 11.5 11/07/2017    ALBUMIN 3.1 (L) 11/27/2017     Lab Results   Component Value Date    CALCIUM 8.7 11/27/2017    PHOS 3.5 11/27/2017   Results for EVERT ARAUJO (MRN  [id number]) as of 11/27/2017 15:50   Ref. Range 11/27/2017 04:54   Magnesium Latest Ref Range: 1.6 - 2.6 mg/dL 2.2       Significant Imaging: n/a    Assessment/Plan:      * MRSA bacteremia    Evaluated on 11/27/17  -With source likely buttock abscess  -Continue vanc IV with end date of 12/8 which is 4 weeks from 1 negative culture on 11/10  -patient with 2 negative blood cultures, removed from isolation  -Picc line care with saline flushes and dressing changes        Cellulitis and abscess of buttock    Evaluated on 11/27/17  -Continue therapy with Vanc, but was held yesterday due to increase in trough, obtaining random and will resume  -Change mepilex dressing TIW and as needed for drainage  -F/U with Surgery as scheduled  -Continue scheduled tylenol for pain control with percocet as needed for breakthrough pain  -Contact isolation discontinue since patient with 2 negative blood cultures         Debility    Evaluated on 11/27/17  -continue PT/OT to increase ambulation, ADL performance and endurance  -continue Teds/SCDS for DVT prophylaxis, patient with moderate amount of ecchymosis to ABD from heparin injections and with decreased h/h, neg stool for occult blood  -continue fall precautions  -continue docusate to prevent constipation; hold for frequent or loose stooling        Takes dietary supplements    Evaluated on 11/27/17  -Continue dietary supplements with vit C, calcium, cyanocobalamin ( + hx of B12 def.), MVI        HLD (hyperlipidemia)    Evaluated on 11/27/17  -Chronic and stable  -Continue therapy with atorvastatin 20mg nightly  -F/U with PCP for ongoing monitoring        Chronic pain    Evaluated on 11/27/17  -Back pain with epidural injections in the past  -Continue percocet prn pain        Dementia without behavioral disturbance    Evaluated on 11/27/17  -Patient calm and cooperative  -Continue therapy with donepezil 10mg nightly  -Delirium precautions  -Reorient as necessary        SSS (sick sinus  syndrome)    Evaluated on 11/27/17  + pacemaker in place with regular rhythm        CKD (chronic kidney disease), stage III    Evaluated on 11/27/17  -Cr is at baseline of 1.1-1.4  -Renally dose medications and avoid nephrotoxins  -Continue to monitor  -Regular Vanc level monitoring        Iron deficiency anemia due to chronic blood loss    Evaluated on 11/27/17  -H/H stable with last iron panel WNL  -Continue iron therapy  -Will continue to monitor with BIW labs        Hypothyroidism    Evaluated on 11/27/17  -Chronic and stable  -Continue medical therapy with levothyroxine 100mcg daily  -F/U with PCP for ongoing monitoring        Polyneuropathy    Evaluated on 11/27/17  -Continue medical therapy with gabapentin  -He denies LE pain currently        Chronic diastolic congestive heart failure    Evaluated on 11/27/17  -Currently compensated  -Patient only takes lasix prn at home for edema  -Will monitor with daily weights and initiate lasix as needed for edema or weight gain        Benign prostatic hyperplasia    Evaluated on 11/27/17  -No difficulty urinating  -Continue medical therapy with finasteride 5mg daily and tamsulosin 0.4mg daily  -Will continue to monitor        Chronic atrial fibrillation    Evaluated on 11/27/17  -Rate controlled  -Warfarin discontinue due to GI bleed with angioectasia  -Continue coreg 6.25mg and ASA  81mg daily therapy  -Patient with consistent rate in 60's so likely paced        Renovascular hypertension    Evaluated on 11/27/17  -Chronic with improved control  -Continue therapy with coreg  -will continue to monitor and adjust regimen as necessary          Future Appointments  Date Time Provider Department Center   11/30/2017 10:30 AM Elsa Rahman MD [facility] WOUND [facility]   11/30/2017 1:50 PM Elsa Rahman MD [facility] OCC   12/6/2017 1:00 PM INFECTIOUS DISEASE, [facility] INFECT [facility]   12/20/2017 9:40 AM Booker Ricci MD [facility]     Nimo  SHRUTHI Gonzalez NP  Department of [facility] Medicine  [facility]

## 2017-11-27 NOTE — PLAN OF CARE
Problem: Physical Therapy Goal  Goal: Physical Therapy Goal  Goals to be met by: 14 DAYS     Patient will increase functional independence with mobility by performin. Supine to sit with Supervision   2. Sit to supine with Supervision  3. Sit to stand transfer with Stand-by Assistance with RW  4. Bed to chair transfer with Stand-by Assistance using Rolling Walker  5. Gait  x 150 feet with Stand-by Assistance using Rolling Walker. Met (2017)  6. Wheelchair propulsion x100 feet with Stand-by Assistance using bilateral upper extremities  7. Ascend/Descend 4 inch curb step with Contact Guard Assistance using Rolling Walker.  8. Sitting at edge of bed x8 minutes with Stand-by Assistance and without UE support, and performing exercises or activity  9. Stand for 5 minutes with Contact Guard Assistance using Rolling Walker and perform an activity  10. Lower extremity exercise program x20 reps per handout, with assistance as needed and gym therex      Outcome: Ongoing (interventions implemented as appropriate)  Goals remain appropriate

## 2017-11-27 NOTE — ASSESSMENT & PLAN NOTE
Evaluated on 11/27/17  -Back pain with epidural injections in the past  -Continue percocet prn pain

## 2017-11-27 NOTE — ASSESSMENT & PLAN NOTE
Evaluated on 11/27/17  -Chronic and stable  -Continue medical therapy with levothyroxine 100mcg daily  -F/U with PCP for ongoing monitoring

## 2017-11-28 LAB — VANCOMYCIN SERPL-MCNC: 15.5 UG/ML

## 2017-11-28 PROCEDURE — A4216 STERILE WATER/SALINE, 10 ML: HCPCS | Performed by: STUDENT IN AN ORGANIZED HEALTH CARE EDUCATION/TRAINING PROGRAM

## 2017-11-28 PROCEDURE — 25000003 PHARM REV CODE 250: Performed by: INTERNAL MEDICINE

## 2017-11-28 PROCEDURE — 97530 THERAPEUTIC ACTIVITIES: CPT

## 2017-11-28 PROCEDURE — 99900058 HC 022 PAID UNDER SNF PPS

## 2017-11-28 PROCEDURE — 97116 GAIT TRAINING THERAPY: CPT

## 2017-11-28 PROCEDURE — 11000004 HC SNF PRIVATE

## 2017-11-28 PROCEDURE — 97535 SELF CARE MNGMENT TRAINING: CPT

## 2017-11-28 PROCEDURE — 25000003 PHARM REV CODE 250: Performed by: NURSE PRACTITIONER

## 2017-11-28 PROCEDURE — 36415 COLL VENOUS BLD VENIPUNCTURE: CPT

## 2017-11-28 PROCEDURE — 80202 ASSAY OF VANCOMYCIN: CPT

## 2017-11-28 PROCEDURE — 97110 THERAPEUTIC EXERCISES: CPT

## 2017-11-28 PROCEDURE — 63600175 PHARM REV CODE 636 W HCPCS: Performed by: NURSE PRACTITIONER

## 2017-11-28 PROCEDURE — 25000003 PHARM REV CODE 250: Performed by: STUDENT IN AN ORGANIZED HEALTH CARE EDUCATION/TRAINING PROGRAM

## 2017-11-28 RX ADMIN — FERROUS SULFATE TAB EC 325 MG (65 MG FE EQUIVALENT) 325 MG: 325 (65 FE) TABLET DELAYED RESPONSE at 09:11

## 2017-11-28 RX ADMIN — CARVEDILOL 6.25 MG: 6.25 TABLET, FILM COATED ORAL at 09:11

## 2017-11-28 RX ADMIN — LEVOTHYROXINE SODIUM 100 MCG: 100 TABLET ORAL at 05:11

## 2017-11-28 RX ADMIN — ASPIRIN 81 MG: 81 TABLET, COATED ORAL at 09:11

## 2017-11-28 RX ADMIN — VANCOMYCIN HYDROCHLORIDE 750 MG: 750 INJECTION, POWDER, LYOPHILIZED, FOR SOLUTION INTRAVENOUS at 12:11

## 2017-11-28 RX ADMIN — THERA TABS 1 TABLET: TAB at 09:11

## 2017-11-28 RX ADMIN — DOCUSATE SODIUM 100 MG: 100 CAPSULE, LIQUID FILLED ORAL at 09:11

## 2017-11-28 RX ADMIN — FINASTERIDE 5 MG: 5 TABLET, FILM COATED ORAL at 09:11

## 2017-11-28 RX ADMIN — ATORVASTATIN CALCIUM 20 MG: 20 TABLET, FILM COATED ORAL at 09:11

## 2017-11-28 RX ADMIN — OXYCODONE HYDROCHLORIDE AND ACETAMINOPHEN 500 MG: 500 TABLET ORAL at 09:11

## 2017-11-28 RX ADMIN — GABAPENTIN 300 MG: 100 CAPSULE ORAL at 09:11

## 2017-11-28 RX ADMIN — MICONAZOLE NITRATE: 20 POWDER TOPICAL at 09:11

## 2017-11-28 RX ADMIN — LISINOPRIL 10 MG: 10 TABLET ORAL at 09:11

## 2017-11-28 RX ADMIN — ACETAMINOPHEN 500 MG: 500 TABLET ORAL at 03:11

## 2017-11-28 RX ADMIN — CYANOCOBALAMIN TAB 1000 MCG 1000 MCG: 1000 TAB at 09:11

## 2017-11-28 RX ADMIN — OXYCODONE AND ACETAMINOPHEN 1 TABLET: 7.5; 325 TABLET ORAL at 04:11

## 2017-11-28 RX ADMIN — Medication 10 ML: at 12:11

## 2017-11-28 RX ADMIN — OXYCODONE AND ACETAMINOPHEN 1 TABLET: 7.5; 325 TABLET ORAL at 08:11

## 2017-11-28 RX ADMIN — DONEPEZIL HYDROCHLORIDE 10 MG: 10 TABLET, FILM COATED ORAL at 09:11

## 2017-11-28 RX ADMIN — ACETAMINOPHEN 500 MG: 500 TABLET ORAL at 09:11

## 2017-11-28 RX ADMIN — CALCIUM 500 MG: 500 TABLET ORAL at 09:11

## 2017-11-28 RX ADMIN — Medication 10 ML: at 06:11

## 2017-11-28 RX ADMIN — ACETAMINOPHEN 500 MG: 500 TABLET ORAL at 05:11

## 2017-11-28 RX ADMIN — TAMSULOSIN HYDROCHLORIDE 0.4 MG: 0.4 CAPSULE ORAL at 09:11

## 2017-11-28 RX ADMIN — GABAPENTIN 100 MG: 100 CAPSULE ORAL at 09:11

## 2017-11-28 NOTE — PLAN OF CARE
Problem: Occupational Therapy Goal  Goal: Occupational Therapy Goal  Goals to be met by: 14 days     Patient will increase functional independence with ADLs by performing:    Feeding with Modified Wright. GOAL MET 11/25/17  UE Dressing with Modified [place]. GOAL MET 11/25/17  LE Dressing with Modified Wright using AD.  Grooming while standing with Modified Wright.  Toileting from bedside commode with Modified Wright for hygiene and clothing management.   Bathing from  shower chair/bench with Supervision.  Toilet transfer to bedside commode with Supervision.  Perform functional activity in standing with S for 10' in preparation for safety during standing ADls.       Outcome: Ongoing (interventions implemented as appropriate)  Progressing slowly. DELON Pollock  11/28/2017

## 2017-11-28 NOTE — PT/OT/SLP PROGRESS
"Occupational Therapy  Treatment    Zeyad Woodard   MRN: [id number]   Admitting Diagnosis: MRSA bacteremia    OT Date of Treatment: 11/28/17  Total Time (min): 54 min    Billable Minutes:  Self Care/Home Management 14, Therapeutic Activity 25 and Therapeutic Exercise 15    General Precautions: Standard, contact, fall  Orthopedic Precautions: N/A  Braces: N/A         Subjective:  Communicated with pt prior to session.  "I was ready to get back to bed"    Pain/Comfort  Pain Rating 1: 6/10  Location - Side 1: Right  Location 1: hip  Pain Addressed 1: Pre-medicate for activity, Cessation of Activity  Pain Rating Post-Intervention 1: 6/10    Objective:  Patient found with: PICC line, peripheral IV (seated in w/c)    Functional Status:  MDS G  Bed Mobility Functional Status: S-SBA (sit to supine) and to scoot to HOB supine  Transfer Functional Status: SBA-Min (A)--pt stood from w/c at table with min A; pt stood from w/c with grab bar and w/c armrest to t/f to BSC over toilet with SBA; pt stood from BSC with grab bar SBA 2x then pivot to w/c  Locomotion Off Unit Functional Status: total(A) (w/c mobiltiy)  Dressing Functional Status:-Min (A) (min A to rhonda gown around back)  Toilet Use Functional Status:-Min (A) (on BSC over toilet with grab bar-assist to complete hygiene)  Personal Hygiene Functional Status: mod(I) - (I) (wash hands seated)          OT Exercises: UE Ergometer 15 min to increase functional endurance for ADLs    Additional Treatment:  Pt stood from w/c at table with min A and pt performed side stepping and weightshifting to t/f objects right and left x 3 min prior to fatigue and pain in knees to increase standing tolerance and balance for standing ADLs    Patient left supine with call button in reach    ASSESSMENT:  Pt tolerated tx and demonstrated increased indep with toileting;however, pt cont with decreased standing tolerance and balance. Pt cont to benefit from OT to address limitations and increase " functional indep and safety. Pt will need assistance upon d/c    Rehab identified problem list/impairments: weakness, impaired endurance, impaired self care skills, impaired functional mobilty, gait instability, impaired balance, decreased upper extremity function, decreased lower extremity function, decreased safety awareness, pain, impaired coordination, impaired skin    Rehab potential is fair    Activity tolerance: Good    Discharge recommendations: home health OT     Barriers to discharge: Decreased caregiver support     Equipment recommendations: wheelchair     GOALS:    Occupational Therapy Goals        Problem: Occupational Therapy Goal    Goal Priority Disciplines Outcome Interventions   Occupational Therapy Goal     OT, PT/OT Ongoing (interventions implemented as appropriate)    Description:  Goals to be met by: 14 days     Patient will increase functional independence with ADLs by performing:    Feeding with Modified [place]. GOAL MET 11/25/17  UE Dressing with Modified [place]. GOAL MET 11/25/17  LE Dressing with Modified [place] using AD.  Grooming while standing with Modified [place].  Toileting from bedside commode with Modified [place] for hygiene and clothing management.   Bathing from  shower chair/bench with Supervision.  Toilet transfer to bedside commode with Supervision.  Perform functional activity in standing with S for 10' in preparation for safety during standing ADls.                        Plan:  Patient to be seen  (5-6x/week) to address the above listed problems via self-care/home management, therapeutic activities, therapeutic exercises  Plan of Care expires: 12/16/17  Plan of Care reviewed with: patient    DELON Pollock  11/28/2017

## 2017-11-28 NOTE — PT/OT/SLP PROGRESS
Physical Therapy  Treatment    Zeyad Woodard   MRN: [id number]   Admitting Diagnosis: MRSA bacteremia    PT Received On: 11/28/17          Billable Minutes:  Gait [id number], Therapeutic Activity 15 and Therapeutic Exercise 15=44    Treatment Type: Treatment  PT/PTA: PT     PTA Visit Number: 0       General Precautions: Standard, fall  Orthopedic Precautions: N/A   Braces: N/A         Subjective:  Communicated with patient prior to session.  First contact, patient needs to use urinal for extended time and patient agreeable to rescheduling to later.   Second time, patient supine in bed, but agreeable to therapy.    Pain/Comfort  Pain Rating 1: 6/10  Location - Side 1: Right  Location 1: sacral spine (buttock)  Pain Addressed 1: Pre-medicate for activity  Pain Rating Post-Intervention 1: 6/10    Objective:  Patient found supine in bed   with         Functional Status:  MDS G  Transfer Functional Status: CGA-Min (A)  Walk in Room Functional Status: CGA-Min (A)  Walk in Corridor Functional Status: CGA-Min (A)  Locomotion on Unit Functional Status: CGA-Min (A)          Bed Mobility:  Sit>Supine:NT  Supine>Sit: min assist     Transfers:  Sit<>Stand: from EOB w/ RW and min assist w/ posterior lean; to/from w/c x2  Trials w/ CGA/min w/ tendency for posterior lean  Stand Pivot Transfer: bed>w/c w/ RW and CGA once balance established      Gait:  Amb 150 feet w/ RW and B AFOs w/ CGA. Slow pace, shorter step on LLE.  amb 150 feet w/ RW and w/o AFOs, wearing house slipper w/ slow pace, mild steppage gait   Therex:  Patient pedaled LBE (lower body ergometer) x15 minutes to increase strength and endurance.    Balance:  Patient stands at EOB w/ RW and CGA for balance, assist to manage pants.    Additional Treatment:  PT assists w/ donning pants; donning AFOs/shoes and later changing to slippers    Patient left up in chair with call button in reach. And lunch set up.    Assessment:  Zeyad Woodard is a 89 y.o. male with a medical  diagnosis of MRSA bacteremia.  He is no longer in contact isolation. Patient w/ posterior lean in transfers w/ RW. Patient will benefit from continued physical therapy to address deficits and improve safety and functional mobility. Continue with physical therapy plan of care. .    Rehab identified problem list/impairments: weakness, impaired endurance, impaired self care skills, impaired functional mobilty, gait instability, impaired balance, decreased lower extremity function, pain, impaired skin, other (comment) (contact prec/MRSA)    Rehab potential is good.    Activity tolerance: Good    Discharge recommendations: home with home health (and likely family/caregiver assist)     Barriers to discharge: Decreased caregiver support, Inaccessible home environment    Equipment recommendations: wheelchair     GOALS:    Physical Therapy Goals        Problem: Physical Therapy Goal    Goal Priority Disciplines Outcome Goal Variances Interventions   Physical Therapy Goal     PT/OT, PT Ongoing (interventions implemented as appropriate)     Description:  Goals to be met by: 14 DAYS     Patient will increase functional independence with mobility by performin. Supine to sit with Supervision   2. Sit to supine with Supervision  3. Sit to stand transfer with Stand-by Assistance with RW  4. Bed to chair transfer with Stand-by Assistance using Rolling Walker  5. Gait  x 150 feet with Stand-by Assistance using Rolling Walker. Met (2017)  6. Wheelchair propulsion x100 feet with Stand-by Assistance using bilateral upper extremities  7. Ascend/Descend 4 inch curb step with Contact Guard Assistance using Rolling Walker.  8. Sitting at edge of bed x8 minutes with Stand-by Assistance and without UE support, and performing exercises or activity  9. Stand for 5 minutes with Contact Guard Assistance using Rolling Walker and perform an activity  10. Lower extremity exercise program x20 reps per handout, with assistance as needed  and gym therex                       PLAN:    Patient to be seen 5 x/week  to address the above listed problems via gait training, therapeutic activities, therapeutic exercises, wheelchair management/training  Plan of Care expires: 12/16/17  Plan of Care reviewed with: patient    More Sanchez, PT  11/28/2017

## 2017-11-29 PROCEDURE — 25000003 PHARM REV CODE 250: Performed by: STUDENT IN AN ORGANIZED HEALTH CARE EDUCATION/TRAINING PROGRAM

## 2017-11-29 PROCEDURE — 11000004 HC SNF PRIVATE

## 2017-11-29 PROCEDURE — 63600175 PHARM REV CODE 636 W HCPCS: Performed by: NURSE PRACTITIONER

## 2017-11-29 PROCEDURE — 97116 GAIT TRAINING THERAPY: CPT

## 2017-11-29 PROCEDURE — 25000003 PHARM REV CODE 250: Performed by: INTERNAL MEDICINE

## 2017-11-29 PROCEDURE — A4216 STERILE WATER/SALINE, 10 ML: HCPCS | Performed by: STUDENT IN AN ORGANIZED HEALTH CARE EDUCATION/TRAINING PROGRAM

## 2017-11-29 PROCEDURE — 25000003 PHARM REV CODE 250: Performed by: NURSE PRACTITIONER

## 2017-11-29 PROCEDURE — 97110 THERAPEUTIC EXERCISES: CPT

## 2017-11-29 PROCEDURE — 97530 THERAPEUTIC ACTIVITIES: CPT

## 2017-11-29 RX ADMIN — OXYCODONE AND ACETAMINOPHEN 1 TABLET: 7.5; 325 TABLET ORAL at 10:11

## 2017-11-29 RX ADMIN — FERROUS SULFATE TAB EC 325 MG (65 MG FE EQUIVALENT) 325 MG: 325 (65 FE) TABLET DELAYED RESPONSE at 10:11

## 2017-11-29 RX ADMIN — FINASTERIDE 5 MG: 5 TABLET, FILM COATED ORAL at 10:11

## 2017-11-29 RX ADMIN — DOCUSATE SODIUM 100 MG: 100 CAPSULE, LIQUID FILLED ORAL at 10:11

## 2017-11-29 RX ADMIN — TAMSULOSIN HYDROCHLORIDE 0.4 MG: 0.4 CAPSULE ORAL at 10:11

## 2017-11-29 RX ADMIN — CARVEDILOL 6.25 MG: 6.25 TABLET, FILM COATED ORAL at 09:11

## 2017-11-29 RX ADMIN — ACETAMINOPHEN 500 MG: 500 TABLET ORAL at 05:11

## 2017-11-29 RX ADMIN — CALCIUM 500 MG: 500 TABLET ORAL at 10:11

## 2017-11-29 RX ADMIN — LEVOTHYROXINE SODIUM 100 MCG: 100 TABLET ORAL at 05:11

## 2017-11-29 RX ADMIN — MICONAZOLE NITRATE: 20 POWDER TOPICAL at 09:11

## 2017-11-29 RX ADMIN — MICONAZOLE NITRATE: 20 POWDER TOPICAL at 10:11

## 2017-11-29 RX ADMIN — ASPIRIN 81 MG: 81 TABLET, COATED ORAL at 10:11

## 2017-11-29 RX ADMIN — ACETAMINOPHEN 500 MG: 500 TABLET ORAL at 09:11

## 2017-11-29 RX ADMIN — Medication 10 ML: at 12:11

## 2017-11-29 RX ADMIN — LISINOPRIL 10 MG: 10 TABLET ORAL at 10:11

## 2017-11-29 RX ADMIN — ACETAMINOPHEN 500 MG: 500 TABLET ORAL at 03:11

## 2017-11-29 RX ADMIN — VANCOMYCIN HYDROCHLORIDE 750 MG: 750 INJECTION, POWDER, LYOPHILIZED, FOR SOLUTION INTRAVENOUS at 12:11

## 2017-11-29 RX ADMIN — CARVEDILOL 6.25 MG: 6.25 TABLET, FILM COATED ORAL at 10:11

## 2017-11-29 RX ADMIN — ATORVASTATIN CALCIUM 20 MG: 20 TABLET, FILM COATED ORAL at 09:11

## 2017-11-29 RX ADMIN — DOCUSATE SODIUM 100 MG: 100 CAPSULE, LIQUID FILLED ORAL at 09:11

## 2017-11-29 RX ADMIN — THERA TABS 1 TABLET: TAB at 10:11

## 2017-11-29 RX ADMIN — Medication 10 ML: at 05:11

## 2017-11-29 RX ADMIN — OXYCODONE HYDROCHLORIDE AND ACETAMINOPHEN 500 MG: 500 TABLET ORAL at 10:11

## 2017-11-29 RX ADMIN — CYANOCOBALAMIN TAB 1000 MCG 1000 MCG: 1000 TAB at 10:11

## 2017-11-29 RX ADMIN — DONEPEZIL HYDROCHLORIDE 10 MG: 10 TABLET, FILM COATED ORAL at 09:11

## 2017-11-29 RX ADMIN — GABAPENTIN 300 MG: 100 CAPSULE ORAL at 09:11

## 2017-11-29 RX ADMIN — GABAPENTIN 100 MG: 100 CAPSULE ORAL at 10:11

## 2017-11-29 NOTE — PT/OT/SLP PROGRESS
Occupational Therapy      Zeyad Woodard  MRN: [id number]    Patient not seen today secondary to Toileting (several times this date not allowing therapy and pt not ready to complete toileting when therapist present). Pt indep with use of call light in bathroom and nurse notified . Will follow-up 11/30/17.    DELON Pollock  11/29/2017

## 2017-11-29 NOTE — PLAN OF CARE
Problem: Physical Therapy Goal  Goal: Physical Therapy Goal  Goals to be met by: 14 DAYS     Patient will increase functional independence with mobility by performin. Supine to sit with Supervision   2. Sit to supine with Supervision  3. Sit to stand transfer with Stand-by Assistance with RW  4. Bed to chair transfer with Stand-by Assistance using Rolling Walker  5. Gait  x 150 feet with Stand-by Assistance using Rolling Walker. Met (2017)  6. Wheelchair propulsion x100 feet with Stand-by Assistance using bilateral upper extremities  7. Ascend/Descend 4 inch curb step with Contact Guard Assistance using Rolling Walker.  8. Sitting at edge of bed x8 minutes with Stand-by Assistance and without UE support, and performing exercises or activity= discontinue  9. Stand for 5 minutes with Contact Guard Assistance using Rolling Walker and perform an activity  10. Lower extremity exercise program x20 reps per handout, with assistance as needed and gym therex      Goals remain appropriate. Continue with Physical therapy Plan of Care. More Sanchez, PT 2017

## 2017-11-29 NOTE — PT/OT/SLP PROGRESS
Physical Therapy  Treatment    Zeyad Woodard   MRN: [id number]   Admitting Diagnosis: MRSA bacteremia    PT Received On: 11/29/17          Billable Minutes:  Gait [id number], Therapeutic Activity 15 and Therapeutic Exercise 20=50    Treatment Type: Treatment  PT/PTA: PT     PTA Visit Number: 0       General Precautions: Standard, fall  Orthopedic Precautions: N/A   Braces: N/A         Subjective:  Communicated with patient prior to session.  First two attempts patient toileting.   Third attempt, patient agreeable. Nurse okays session.    Pain/Comfort  Pain Rating 1: 6/10  Location - Side 1: Right  Location - Orientation 1: generalized  Location 1: sacral spine  Pain Addressed 1: Pre-medicate for activity, Reposition, Distraction  Pain Rating Post-Intervention 1: 6/10    Objective:  Patient found sitting in w/c with         Functional Status:  MDS G  Transfer Functional Status: S-SBA  Walk in Room Functional Status: S-SBA  Walk in Corridor Functional Status: S-SBA  Locomotion on Unit Functional Status: S-SBA          Bed Mobility:  Sit>Supine:SBA bed flat and w/o side rails  Supine>Sit: Not tested    Transfers:  Sit<>Stand: to/from w/c w/ RW and SBA w/ cues for technique  Stand Pivot Transfer: to bed w/ RW and SBA      Gait:  Amb 179 feet w/ RW and SBA w/ w/c in tow x2 trials. First trial w/ B AFOs. Second trial w/ patient wearing house slippers. Forward flexed posture. Improved clearance in swing w/ use of B AFOs. Slow pace. No LOB.   Therex:  Quad sets,   Glute sets,   Ankle  Pumps,   hip abduction/adduction,  LAQ   Hip flexion  x20 reps w/ assist as needed.    Balance:  Patient stands w/ SBA for PT to manage pants up and down.  Patient sits EOB to use urinal w/ supervision.  PT removes soiled pants; removes house shoes prior to patient going to bed    Additional Treatment:  Patient pedaled LBE (lower body ergometer) x15 minutes to increase strength and endurance.  PT assists patient w/ donning pants and shoes/AFOs  prior to walking.    Patient left supine with call button in reach.    Assessment:  Zeyad Woodard is a 89 y.o. male with a medical diagnosis of MRSA bacteremia.  Patient performed better w/gait and transfers. Today he did not experience any posterior lean or LOB w/ transfers. He does amb w/ RW and SBA w/ forward flexed posture. Patient will benefit from continued physical therapy to address deficits and improve safety and functional mobility. Continue with physical therapy plan of care. .    Rehab identified problem list/impairments: weakness, impaired endurance, impaired self care skills, impaired functional mobilty, gait instability, impaired balance, decreased lower extremity function, pain, impaired skin, other (comment) (contact prec/MRSA)    Rehab potential is good.    Activity tolerance: Good    Discharge recommendations: home with home health (and likely family/caregiver assist)     Barriers to discharge: Decreased caregiver support, Inaccessible home environment    Equipment recommendations: wheelchair     GOALS:    Physical Therapy Goals        Problem: Physical Therapy Goal    Goal Priority Disciplines Outcome Goal Variances Interventions   Physical Therapy Goal     PT/OT, PT Ongoing (interventions implemented as appropriate)     Description:  Goals to be met by: 14 DAYS     Patient will increase functional independence with mobility by performin. Supine to sit with Supervision   2. Sit to supine with Supervision  3. Sit to stand transfer with Stand-by Assistance with RW  4. Bed to chair transfer with Stand-by Assistance using Rolling Walker  5. Gait  x 150 feet with Stand-by Assistance using Rolling Walker. Met (2017)  6. Wheelchair propulsion x100 feet with Stand-by Assistance using bilateral upper extremities  7. Ascend/Descend 4 inch curb step with Contact Guard Assistance using Rolling Walker.  8. Sitting at edge of bed x8 minutes with Stand-by Assistance and without UE support, and  performing exercises or activity= discontinue  9. Stand for 5 minutes with Contact Guard Assistance using Rolling Walker and perform an activity  10. Lower extremity exercise program x20 reps per handout, with assistance as needed and gym therex                        PLAN:    Patient to be seen 5 x/week  to address the above listed problems via gait training, therapeutic activities, therapeutic exercises, wheelchair management/training  Plan of Care expires: 12/16/17  Plan of Care reviewed with: patient    More Sanchez, PT  11/29/2017

## 2017-11-30 ENCOUNTER — HOSPITAL ENCOUNTER (OUTPATIENT)
Dept: WOUND CARE | Facility: HOSPITAL | Age: 82
Discharge: HOME | End: 2017-11-30
Attending: SURGERY

## 2017-11-30 VITALS
WEIGHT: 181 LBS | HEIGHT: 69 IN | TEMPERATURE: 99 F | SYSTOLIC BLOOD PRESSURE: 191 MMHG | BODY MASS INDEX: 26.81 KG/M2 | DIASTOLIC BLOOD PRESSURE: 83 MMHG | HEART RATE: 62 BPM

## 2017-11-30 LAB
ALBUMIN SERPL BCP-MCNC: 3 G/DL
ALP SERPL-CCNC: 90 U/L
ALT SERPL W/O P-5'-P-CCNC: 20 U/L
ANION GAP SERPL CALC-SCNC: 6 MMOL/L
ANISOCYTOSIS BLD QL SMEAR: SLIGHT
AST SERPL-CCNC: 30 U/L
BASOPHILS # BLD AUTO: ABNORMAL K/UL
BASOPHILS NFR BLD: 1 %
BILIRUB SERPL-MCNC: 0.7 MG/DL
BUN SERPL-MCNC: 32 MG/DL
CALCIUM SERPL-MCNC: 8.6 MG/DL
CHLORIDE SERPL-SCNC: 93 MMOL/L
CO2 SERPL-SCNC: 26 MMOL/L
CREAT SERPL-MCNC: 1.2 MG/DL
DIFFERENTIAL METHOD: ABNORMAL
EOSINOPHIL # BLD AUTO: ABNORMAL K/UL
EOSINOPHIL NFR BLD: 1 %
ERYTHROCYTE [DISTWIDTH] IN BLOOD BY AUTOMATED COUNT: 14.8 %
EST. GFR  (AFRICAN AMERICAN): >60 ML/MIN/1.73 M^2
EST. GFR  (NON AFRICAN AMERICAN): 52.9 ML/MIN/1.73 M^2
GLUCOSE SERPL-MCNC: 105 MG/DL
HCT VFR BLD AUTO: 23.4 %
HGB BLD-MCNC: 7.7 G/DL
HYPOCHROMIA BLD QL SMEAR: ABNORMAL
LYMPHOCYTES # BLD AUTO: ABNORMAL K/UL
LYMPHOCYTES NFR BLD: 42 %
MAGNESIUM SERPL-MCNC: 2.2 MG/DL
MCH RBC QN AUTO: 31.3 PG
MCHC RBC AUTO-ENTMCNC: 32.9 G/DL
MCV RBC AUTO: 95 FL
MONOCYTES # BLD AUTO: ABNORMAL K/UL
MONOCYTES NFR BLD: 6 %
NEUTROPHILS NFR BLD: 50 %
PHOSPHATE SERPL-MCNC: 3.4 MG/DL
PLATELET # BLD AUTO: 122 K/UL
PLATELET BLD QL SMEAR: ABNORMAL
PMV BLD AUTO: 10.6 FL
POTASSIUM SERPL-SCNC: 5.1 MMOL/L
PROT SERPL-MCNC: 6 G/DL
RBC # BLD AUTO: 2.46 M/UL
SODIUM SERPL-SCNC: 125 MMOL/L
WBC # BLD AUTO: 9.88 K/UL

## 2017-11-30 PROCEDURE — 85007 BL SMEAR W/DIFF WBC COUNT: CPT

## 2017-11-30 PROCEDURE — 97530 THERAPEUTIC ACTIVITIES: CPT

## 2017-11-30 PROCEDURE — 97535 SELF CARE MNGMENT TRAINING: CPT

## 2017-11-30 PROCEDURE — 25000003 PHARM REV CODE 250: Performed by: STUDENT IN AN ORGANIZED HEALTH CARE EDUCATION/TRAINING PROGRAM

## 2017-11-30 PROCEDURE — 80053 COMPREHEN METABOLIC PANEL: CPT

## 2017-11-30 PROCEDURE — 11000004 HC SNF PRIVATE

## 2017-11-30 PROCEDURE — 25000003 PHARM REV CODE 250: Performed by: NURSE PRACTITIONER

## 2017-11-30 PROCEDURE — 85027 COMPLETE CBC AUTOMATED: CPT

## 2017-11-30 PROCEDURE — 84100 ASSAY OF PHOSPHORUS: CPT

## 2017-11-30 PROCEDURE — 83735 ASSAY OF MAGNESIUM: CPT

## 2017-11-30 PROCEDURE — 99204 OFFICE O/P NEW MOD 45 MIN: CPT

## 2017-11-30 PROCEDURE — 25000003 PHARM REV CODE 250: Performed by: INTERNAL MEDICINE

## 2017-11-30 PROCEDURE — 97116 GAIT TRAINING THERAPY: CPT

## 2017-11-30 PROCEDURE — 97110 THERAPEUTIC EXERCISES: CPT

## 2017-11-30 PROCEDURE — 63600175 PHARM REV CODE 636 W HCPCS: Performed by: NURSE PRACTITIONER

## 2017-11-30 PROCEDURE — 36415 COLL VENOUS BLD VENIPUNCTURE: CPT

## 2017-11-30 PROCEDURE — A4216 STERILE WATER/SALINE, 10 ML: HCPCS | Performed by: STUDENT IN AN ORGANIZED HEALTH CARE EDUCATION/TRAINING PROGRAM

## 2017-11-30 RX ORDER — FUROSEMIDE 20 MG/1
20 TABLET ORAL DAILY
Status: COMPLETED | OUTPATIENT
Start: 2017-12-01 | End: 2017-12-03

## 2017-11-30 RX ADMIN — CARVEDILOL 6.25 MG: 6.25 TABLET, FILM COATED ORAL at 09:11

## 2017-11-30 RX ADMIN — THERA TABS 1 TABLET: TAB at 09:11

## 2017-11-30 RX ADMIN — FINASTERIDE 5 MG: 5 TABLET, FILM COATED ORAL at 09:11

## 2017-11-30 RX ADMIN — ACETAMINOPHEN 500 MG: 500 TABLET ORAL at 09:11

## 2017-11-30 RX ADMIN — ATORVASTATIN CALCIUM 20 MG: 20 TABLET, FILM COATED ORAL at 08:11

## 2017-11-30 RX ADMIN — Medication 10 ML: at 01:11

## 2017-11-30 RX ADMIN — TAMSULOSIN HYDROCHLORIDE 0.4 MG: 0.4 CAPSULE ORAL at 09:11

## 2017-11-30 RX ADMIN — DONEPEZIL HYDROCHLORIDE 10 MG: 10 TABLET, FILM COATED ORAL at 08:11

## 2017-11-30 RX ADMIN — OXYCODONE HYDROCHLORIDE AND ACETAMINOPHEN 500 MG: 500 TABLET ORAL at 09:11

## 2017-11-30 RX ADMIN — ACETAMINOPHEN 500 MG: 500 TABLET ORAL at 01:11

## 2017-11-30 RX ADMIN — FERROUS SULFATE TAB EC 325 MG (65 MG FE EQUIVALENT) 325 MG: 325 (65 FE) TABLET DELAYED RESPONSE at 09:11

## 2017-11-30 RX ADMIN — GABAPENTIN 100 MG: 100 CAPSULE ORAL at 09:11

## 2017-11-30 RX ADMIN — LISINOPRIL 10 MG: 10 TABLET ORAL at 09:11

## 2017-11-30 RX ADMIN — RAMELTEON 8 MG: 8 TABLET, FILM COATED ORAL at 08:11

## 2017-11-30 RX ADMIN — Medication 10 ML: at 12:11

## 2017-11-30 RX ADMIN — ACETAMINOPHEN 500 MG: 500 TABLET ORAL at 05:11

## 2017-11-30 RX ADMIN — SODIUM CHLORIDE, PRESERVATIVE FREE 10 ML: 5 INJECTION INTRAVENOUS at 02:11

## 2017-11-30 RX ADMIN — ASPIRIN 81 MG: 81 TABLET, COATED ORAL at 09:11

## 2017-11-30 RX ADMIN — CARVEDILOL 6.25 MG: 6.25 TABLET, FILM COATED ORAL at 08:11

## 2017-11-30 RX ADMIN — Medication 10 ML: at 05:11

## 2017-11-30 RX ADMIN — DOCUSATE SODIUM 100 MG: 100 CAPSULE, LIQUID FILLED ORAL at 09:11

## 2017-11-30 RX ADMIN — MICONAZOLE NITRATE: 20 POWDER TOPICAL at 08:11

## 2017-11-30 RX ADMIN — CYANOCOBALAMIN TAB 1000 MCG 1000 MCG: 1000 TAB at 09:11

## 2017-11-30 RX ADMIN — MICONAZOLE NITRATE: 20 POWDER TOPICAL at 09:11

## 2017-11-30 RX ADMIN — CALCIUM 500 MG: 500 TABLET ORAL at 09:11

## 2017-11-30 RX ADMIN — GABAPENTIN 300 MG: 100 CAPSULE ORAL at 08:11

## 2017-11-30 RX ADMIN — Medication 10 ML: at 11:11

## 2017-11-30 RX ADMIN — VANCOMYCIN HYDROCHLORIDE 750 MG: 750 INJECTION, POWDER, LYOPHILIZED, FOR SOLUTION INTRAVENOUS at 01:11

## 2017-11-30 RX ADMIN — DOCUSATE SODIUM 100 MG: 100 CAPSULE, LIQUID FILLED ORAL at 08:11

## 2017-11-30 RX ADMIN — LEVOTHYROXINE SODIUM 100 MCG: 100 TABLET ORAL at 05:11

## 2017-11-30 NOTE — PLAN OF CARE
Problem: Physical Therapy Goal  Goal: Physical Therapy Goal  Goals to be met by: 14 DAYS     Patient will increase functional independence with mobility by performin. Supine to sit with Supervision   2. Sit to supine with Supervision   3. Sit to stand transfer with Stand-by Assistance with RW met (2017)  4. Bed to chair transfer with Stand-by Assistance using Rolling Walker  5. Gait  x 150 feet with Stand-by Assistance using Rolling Walker. Met (2017)  6. Wheelchair propulsion x100 feet with Stand-by Assistance using bilateral upper extremities   7. Ascend/Descend 4 inch curb step with Contact Guard Assistance using Rolling Walker.  8. Sitting at edge of bed x8 minutes with Stand-by Assistance and without UE support, and performing exercises or activity= discontinue  9. Stand for 5 minutes with Contact Guard Assistance using Rolling Walker and perform an activity  10. Lower extremity exercise program x20 reps per handout, with assistance as needed and gym therex        Outcome: Ongoing (interventions implemented as appropriate)  Met one goal today.

## 2017-11-30 NOTE — PT/OT/SLP PROGRESS
"Occupational Therapy  Treatment    Zeyad Woodard   MRN: [id number]   Admitting Diagnosis: MRSA bacteremia    OT Date of Treatment: 11/30/17  Total Time (min): 69 min    Billable Minutes:  Self Care/Home Management 40 and Therapeutic Activity 29    General Precautions: Standard, contact, fall  Orthopedic Precautions: N/A  Braces: N/A         Subjective:  Communicated with pt prior to session.  "I would love a shower"    Pain/Comfort  Pain Rating 1:  (no complaints of pain)    Objective:  Patient found with: PICC line (supine in bed)    Functional Status:  MDS G  Bed Mobility Functional Status: S-SBA (with rail and HOB elevated-inc time and cues-education re: bed at home being flat with no rails  Transfer Functional Status: S-SBA--pt stood from bed with RW sup; t/f to BSC over toilet and off with RW and grab bar; to w/c and then to TTB with grab bars; pt stood from TTB with grab bars4x; t/f to w/c from TTB with grab bars; pt stood from w/c 2x --cues for hand placement for safety and balance  Walk in Room Functional Status: S-SBA--pt ambulated from bed to and from bathroom with RW and cues for safety turning and with threshold  Locomotion Off Unit Functional Status: total(A)-w/c to and from shower room  Dressing Functional Status: -Min (A)-- A to cover PICC line; setup with pullover shirt; min A to rhonda pants; pt doffed socks and donned slippers with cues and several attempts as side cont to fold down with supervision.  Placed mepilex on sacrum; medipore to right shin and knee skin tears; miconazole powder to groin  Eating Functional Status: setup--pt unable to open containers  Toilet Use Functional Status:-Min (A) on BSC over toilet for complete buttocks cleaning  Bathing Functional Status: Min (A) in open shower on TTB with grab bars--A with B toes and complete buttocks    Additional Treatment:  Pt educated on LBD tech, sit to stand; safety in shower and with standing tasks; skin management and posture    Patient " left up in chair with PCT present and eating breakfasst    ASSESSMENT:  Pt tolerated tx and demonstrated increased indep with self care, functional mobility and endurance;however, pt cont with minimal assistance needed with LBD, toileting and bathing. Pt cont with decreased balance and safety awareness increasing pt's fall risk.  Pt may require increased assistance upon d/c. Pt will cont to benefit from OT to address limitations and increase functional indep and safety    Rehab identified problem list/impairments: weakness, impaired endurance, impaired self care skills, impaired functional mobilty, gait instability, impaired balance, decreased upper extremity function, decreased lower extremity function, decreased safety awareness, pain, impaired coordination, impaired skin    Rehab potential is good    Activity tolerance: Good    Discharge recommendations:  (pt may need more assistance than previously at home)     Barriers to discharge: Decreased caregiver support     Equipment recommendations: wheelchair     GOALS:    Occupational Therapy Goals        Problem: Occupational Therapy Goal    Goal Priority Disciplines Outcome Interventions   Occupational Therapy Goal     OT, PT/OT Ongoing (interventions implemented as appropriate)    Description:  Goals to be met by: 14 days     Patient will increase functional independence with ADLs by performing:    Feeding with Modified [place]  UE Dressing with Modified [place].   LE Dressing with Modified [place] using AD.  Grooming while standing with Modified [place].  Toileting from bedside commode with Modified [place] for hygiene and clothing management.   Bathing from  shower chair/bench with Supervision.  Toilet transfer to bedside commode with Supervision.  Perform functional activity in standing with S for 10' in preparation for safety during standing ADls.                         Plan:  Patient to be seen  (5-6x/week) to address the above listed  problems via self-care/home management, therapeutic activities, therapeutic exercises  Plan of Care expires: 12/16/17  Plan of Care reviewed with: patient    DELON Pollock  11/30/2017

## 2017-11-30 NOTE — PLAN OF CARE
Problem: Fall Risk (Adult)  Intervention: Safety Promotion/Fall Prevention   11/30/17 0020   Safety Interventions   Safety Promotion/Fall Prevention assistive device/personal item within reach;commode/urinal/bedpan at bedside;Fall Risk reviewed with patient/family;Fall Risk signage in place;family to remain at bedside;high risk medications identified;medications reviewed;nonskid shoes/socks when out of bed;side rails raised x 2

## 2017-11-30 NOTE — PROGRESS NOTES
Pt. transported to [facility] to wound  care center per [facility] transportation via w/c.will continue to monitor..

## 2017-11-30 NOTE — PT/OT/SLP PROGRESS
"Physical Therapy  Treatment    Zeyad Woodard   MRN: [id number]   Admitting Diagnosis: MRSA bacteremia    PT Received On: 11/30/17  Total Time (min): 70       Billable Minutes:  Gait [id number], Therapeutic Activity 30 and Therapeutic Exercise 25    Treatment Type: Treatment  PT/PTA: PT     PTA Visit Number: 0       General Precautions: Standard, fall  Orthopedic Precautions: N/A   Braces: N/A    Subjective:  Communicated with pt prior to session.  Pt was agreeable to PT services today. Reports his "boots" are heavy (shoes with AFOs).    Pain/Comfort  Pain Rating 1: 0/10    Objective:  Patient found seated on toilet with IV pole attached.         Functional Status:  MDS G  Transfer Functional Status: CGA-Min (A)  Walk in Room Functional Status: CGA-Min (A)  Walk in Corridor Functional Status: CGA-Min (A)  Locomotion on Unit Functional Status: CGA-Min (A)     Transfers:  Sit<>Stand: SBA from wheelchair  Stand Pivot Transfer: SBA with rolling walker, displaying increased forward flexion of his trunk    Gait:  Ambulated 150 feet with increased flexion of his trunk, CGA, RW; small step length.     Wheelchair Mobility:  Patient propels w/c 100 feet with Michael intermittently; otherwise SBA, using both upper extremities, slow pace.     Therex:  Standing- hip flexion, hip abduction x 20 reps BLE (holding on to handrails, SBA)  Seated- hip flexion, long arc quads x 20 reps BLE    Balance:  Pt stood at the sink with SBA washing his teeth but had both elbows on the sink, flexed forward. Reported he could not stand up straight to finish the task.  Pt was able to stand with SBA for his standing exercises, but pt displays a posterior pelvic tilt and increased trunk flexion during these exercises. He's able to obtain upright posture but he cannot sustain it while engaged in an activity.    Additional Treatment:  Completed 15 minutes on the LBE to improve his strength and endurance (resistance added).    In addition, while donning " his socks and shoes with AFOs, noted that pt has maceration in the webspace between his fourth and fifth toes (on L foot).  This was cleaned with saline solution and dried. Tracy nurse, was notified.     Patient left on bedside commode with call button in reach, nurse, Tracy notified- seated at back nurse's station.    Assessment:  Zeyad Woodard is a 89 y.o. male with a medical diagnosis of MRSA bacteremia.  Mr. Woodard met a goal today and is improving steadily. He presents with weakness in his core, thus displaying forward flexion of his trunk while standing. He will benefit from further skilled therapy services to improve his overall functional mobility and decrease his fall risk.    Rehab identified problem list/impairments: weakness, impaired endurance, impaired self care skills, impaired functional mobilty, gait instability, impaired balance, decreased lower extremity function, pain, impaired skin, other (comment) (contact prec/MRSA)    Rehab potential is good.    Activity tolerance: Fair    Discharge recommendations: home with home health (and likely family/caregiver assist)     Barriers to discharge: Decreased caregiver support, Inaccessible home environment    Equipment recommendations: wheelchair     GOALS:    Physical Therapy Goals        Problem: Physical Therapy Goal    Goal Priority Disciplines Outcome Goal Variances Interventions   Physical Therapy Goal     PT/OT, PT Ongoing (interventions implemented as appropriate)     Description:  Goals to be met by: 14 DAYS     Patient will increase functional independence with mobility by performin. Supine to sit with Supervision   2. Sit to supine with Supervision   3. Sit to stand transfer with Stand-by Assistance with RW met (2017)  4. Bed to chair transfer with Stand-by Assistance using Rolling Walker  5. Gait  x 150 feet with Stand-by Assistance using Rolling Walker. Met (2017)  6. Wheelchair propulsion x100 feet with Stand-by  Assistance using bilateral upper extremities   7. Ascend/Descend 4 inch curb step with Contact Guard Assistance using Rolling Walker.  8. Sitting at edge of bed x8 minutes with Stand-by Assistance and without UE support, and performing exercises or activity= discontinue  9. Stand for 5 minutes with Contact Guard Assistance using Rolling Walker and perform an activity  10. Lower extremity exercise program x20 reps per handout, with assistance as needed and gym therex                          PLAN:    Patient to be seen 5 x/week  to address the above listed problems via gait training, therapeutic activities, therapeutic exercises, wheelchair management/training  Plan of Care expires: 12/16/17  Plan of Care reviewed with: patient    Juanita J Laura, PT  11/30/2017

## 2017-11-30 NOTE — PROGRESS NOTES
Much of the documentation for this visit was completed in the [facility] system. Please see the attached documentation for further details about this patient's care.     Kavitha Asencio RN

## 2017-11-30 NOTE — PROGRESS NOTES
Much of the documentation for this visit was completed in the [facility] [employer] system. Please see the attached documentation for further details about this patient's care.     Elsa Rahman MD

## 2017-11-30 NOTE — PROGRESS NOTES
Wound care follow-up:  I & D site remains reddened, blanches, no drainage noted from wound- sutures pulling on skin with scant amount of bleeding.  Appt today with outpatient wound care.  Plan of care discussed with patient who verbalized understanding.     11/30/17 0900       Incision/Site 11/13/17 0818 Right gluteal   Date First Assessed/Time First Assessed: 11/13/17 0818   Side: Right  Location: gluteal   Wound Image    Incision WDL ex   Dressing Appearance intact;moist drainage   Appearance sutures intact   Periwound Area redness   Drainage Characteristics/Odor serosanguineous   Drainage Amount scant   Wound Length (cm) 0.1   Wound Width (cm) 2   Cleansed W/ soap and water   Dressing hydrogel;foam   Dressing Change Due 12/01/17

## 2017-11-30 NOTE — PLAN OF CARE
Problem: Occupational Therapy Goal  Goal: Occupational Therapy Goal  Goals to be met by: 14 days     Patient will increase functional independence with ADLs by performing:    Feeding with Modified [place]  UE Dressing with Modified [place].   LE Dressing with Modified [place] using AD.  Grooming while standing with Modified [place].  Toileting from bedside commode with Modified [place] for hygiene and clothing management.   Bathing from  shower chair/bench with Supervision.  Toilet transfer to bedside commode with Supervision.  Perform functional activity in standing with S for 10' in preparation for safety during standing ADls.       Outcome: Ongoing (interventions implemented as appropriate)  Progressing slowly. DELON Pollock  11/30/2017

## 2017-12-01 PROCEDURE — 25000003 PHARM REV CODE 250: Performed by: INTERNAL MEDICINE

## 2017-12-01 PROCEDURE — 97530 THERAPEUTIC ACTIVITIES: CPT

## 2017-12-01 PROCEDURE — 11000004 HC SNF PRIVATE

## 2017-12-01 PROCEDURE — 97803 MED NUTRITION INDIV SUBSEQ: CPT

## 2017-12-01 PROCEDURE — 63600175 PHARM REV CODE 636 W HCPCS: Performed by: NURSE PRACTITIONER

## 2017-12-01 PROCEDURE — 97535 SELF CARE MNGMENT TRAINING: CPT

## 2017-12-01 PROCEDURE — 25000003 PHARM REV CODE 250: Performed by: NURSE PRACTITIONER

## 2017-12-01 PROCEDURE — A4216 STERILE WATER/SALINE, 10 ML: HCPCS | Performed by: STUDENT IN AN ORGANIZED HEALTH CARE EDUCATION/TRAINING PROGRAM

## 2017-12-01 PROCEDURE — 25000003 PHARM REV CODE 250: Performed by: STUDENT IN AN ORGANIZED HEALTH CARE EDUCATION/TRAINING PROGRAM

## 2017-12-01 PROCEDURE — 97116 GAIT TRAINING THERAPY: CPT

## 2017-12-01 PROCEDURE — 97110 THERAPEUTIC EXERCISES: CPT

## 2017-12-01 RX ADMIN — FINASTERIDE 5 MG: 5 TABLET, FILM COATED ORAL at 08:12

## 2017-12-01 RX ADMIN — LISINOPRIL 10 MG: 10 TABLET ORAL at 08:12

## 2017-12-01 RX ADMIN — ACETAMINOPHEN 500 MG: 500 TABLET ORAL at 09:12

## 2017-12-01 RX ADMIN — CALCIUM 500 MG: 500 TABLET ORAL at 08:12

## 2017-12-01 RX ADMIN — Medication 10 ML: at 05:12

## 2017-12-01 RX ADMIN — MICONAZOLE NITRATE: 20 POWDER TOPICAL at 08:12

## 2017-12-01 RX ADMIN — GABAPENTIN 100 MG: 100 CAPSULE ORAL at 08:12

## 2017-12-01 RX ADMIN — CYANOCOBALAMIN TAB 1000 MCG 1000 MCG: 1000 TAB at 08:12

## 2017-12-01 RX ADMIN — Medication 10 ML: at 11:12

## 2017-12-01 RX ADMIN — Medication 10 ML: at 12:12

## 2017-12-01 RX ADMIN — DONEPEZIL HYDROCHLORIDE 10 MG: 10 TABLET, FILM COATED ORAL at 08:12

## 2017-12-01 RX ADMIN — TAMSULOSIN HYDROCHLORIDE 0.4 MG: 0.4 CAPSULE ORAL at 08:12

## 2017-12-01 RX ADMIN — DOCUSATE SODIUM 100 MG: 100 CAPSULE, LIQUID FILLED ORAL at 08:12

## 2017-12-01 RX ADMIN — ATORVASTATIN CALCIUM 20 MG: 20 TABLET, FILM COATED ORAL at 08:12

## 2017-12-01 RX ADMIN — ASPIRIN 81 MG: 81 TABLET, COATED ORAL at 08:12

## 2017-12-01 RX ADMIN — GABAPENTIN 300 MG: 100 CAPSULE ORAL at 08:12

## 2017-12-01 RX ADMIN — FERROUS SULFATE TAB EC 325 MG (65 MG FE EQUIVALENT) 325 MG: 325 (65 FE) TABLET DELAYED RESPONSE at 08:12

## 2017-12-01 RX ADMIN — SODIUM CHLORIDE, PRESERVATIVE FREE 10 ML: 5 INJECTION INTRAVENOUS at 02:12

## 2017-12-01 RX ADMIN — VANCOMYCIN HYDROCHLORIDE 750 MG: 750 INJECTION, POWDER, LYOPHILIZED, FOR SOLUTION INTRAVENOUS at 12:12

## 2017-12-01 RX ADMIN — OXYCODONE HYDROCHLORIDE AND ACETAMINOPHEN 500 MG: 500 TABLET ORAL at 08:12

## 2017-12-01 RX ADMIN — ACETAMINOPHEN 500 MG: 500 TABLET ORAL at 02:12

## 2017-12-01 RX ADMIN — OXYCODONE AND ACETAMINOPHEN 1 TABLET: 7.5; 325 TABLET ORAL at 08:12

## 2017-12-01 RX ADMIN — CARVEDILOL 6.25 MG: 6.25 TABLET, FILM COATED ORAL at 08:12

## 2017-12-01 RX ADMIN — THERA TABS 1 TABLET: TAB at 08:12

## 2017-12-01 RX ADMIN — ACETAMINOPHEN 500 MG: 500 TABLET ORAL at 05:12

## 2017-12-01 RX ADMIN — RAMELTEON 8 MG: 8 TABLET, FILM COATED ORAL at 11:12

## 2017-12-01 RX ADMIN — LEVOTHYROXINE SODIUM 100 MCG: 100 TABLET ORAL at 05:12

## 2017-12-01 RX ADMIN — FUROSEMIDE 20 MG: 20 TABLET ORAL at 08:12

## 2017-12-01 NOTE — PT/OT/SLP PROGRESS
"Occupational Therapy  Treatment    Zeyad Woodard   MRN: [id number]   Admitting Diagnosis: MRSA bacteremia    OT Date of Treatment: 12/01/17  Total Time (min): 39 min    Billable Minutes:  Self Care/Home Management 24 and Therapeutic Activity 15    General Precautions: Standard, contact, fall  Orthopedic Precautions: N/A  Braces: N/A         Subjective:  Communicated with pt prior to session.  "Sure, I can put on pants"    Pain/Comfort  Pain Rating 1: 6/10  Location - Side 1: Right  Location 1: hip  Pain Addressed 1: Pre-medicate for activity  Pain Rating Post-Intervention 1: 6/10    Objective:  Patient found with: PICC line (seated in w/c)    Functional Status:  MDS G  Transfer Functional Status: CGA-Min (A)--pt stood from w/c with SBA and RW 2x to perform LBD--pt with LOB during LBD and required min A to regain balance; pt stood from w/c at table with sup  Locomotion on Unit Functional Status: S-SBA--w/c   Locomotion Off Unit Functional Status: S-SBA--pt propelled w/c with sup for direction and manages brakes approx 150ft  Dressing Functional Status: -Min (A)-  Setup with pullover shirt; min A with pants over hips due to LOB posteriorly  Toilet Use Functional Status:-Min (A) --pt uses urinal with mod I seated in w/c--assist with pants          Additional Treatment:  Pt stood at table x 10 min with sup while performing an spatial relations FM task to increase standing tolerance, LE strength and balance for standing self care tasks    Patient left up in chair with call button in reach    ASSESSMENT:  Pt cont to progress toward goals;however, pt with occasional loss of balance with dynamic standing tasks. Pt cont to require min A with some self care tasks due to decreased balance.  Pt cont to benefit from OT to address limitations and increase functional indep and safety to return home with assist as needed.    Rehab identified problem list/impairments: weakness, impaired endurance, impaired self care skills, impaired " functional mobilty, gait instability, impaired balance, decreased upper extremity function, decreased lower extremity function, decreased safety awareness, pain, impaired coordination, impaired skin    Rehab potential is good    Activity tolerance: Good    Discharge recommendations:  (pt may need more assistance than previously at home)     Barriers to discharge: Decreased caregiver support     Equipment recommendations: wheelchair     GOALS:    Occupational Therapy Goals        Problem: Occupational Therapy Goal    Goal Priority Disciplines Outcome Interventions   Occupational Therapy Goal     OT, PT/OT Ongoing (interventions implemented as appropriate)    Description:  Goals to be met by: 14 days     Patient will increase functional independence with ADLs by performing:    Feeding with Modified [place]  UE Dressing with Modified [place].   LE Dressing with Modified [place] using AD.  Grooming while standing with Modified [place].  Toileting from bedside commode with Modified [place] for hygiene and clothing management.   Bathing from  shower chair/bench with Supervision.  Toilet transfer to bedside commode with Supervision.  Perform functional activity in standing with S for 10' in preparation for safety during standing ADls. --MET                         Plan:  Patient to be seen  (5-6x/week) to address the above listed problems via self-care/home management, therapeutic activities, therapeutic exercises  Plan of Care expires: 12/16/17  Plan of Care reviewed with: patient    DELON Pollock  12/01/2017

## 2017-12-01 NOTE — PT/OT/SLP PROGRESS
"Physical Therapy  Treatment    Zeyad Woodard   MRN: [id number]   Admitting Diagnosis: MRSA bacteremia    PT Received On: 12/01/17  Total Time (min): 60       Billable Minutes:60    Gait [id number], Therapeutic Activity 15 and Therapeutic Exercise 30    Treatment Type: Treatment  PT/PTA: PTA     PTA Visit Number: 1       General Precautions: Standard, fall  Orthopedic Precautions: N/A   Braces: N/A         Subjective:  Communicated with nsg Tracy prior to session.re pts c/o having to strain for the past 2 days, with no BM, would like something stronger, pt did have a small BM today  "alright I think" no specifics other than the above     Pain/Comfort  Pain Rating 1: 0/10  Pain Rating Post-Intervention 1: 0/10    Objective:   Patient found with:  (in bed) min A to rhonda B AFO       Functional Status:  MDS G  Bed Mobility Functional Status: S-SBA  Transfer Functional Status: CGA-Min (A)  Walk in Room Functional Status: CGA-Min (A)  Walk in Corridor Functional Status: CGA-Min (A)  Toilet Use Functional Status: CGA-Min (A)  Personal Hygiene Functional Status: S-SBA          Bed Mobility:  Sit>Supine:SBA/S  Supine>Sit: SBA    Transfers:  Sit<>Stand: CG/SBA with RW  Stand Pivot Transfer: CGA WC>EOB no AD, CGA with RW to BSC      Gait:  Amb with RW CGA ~ 180 ft no LOB, vc/tcs for erect posture, looking ahead    Therex:  2x15 reps AP,GS,LAQ,hip flex,abd/add    Balance:  CGA with RW while pt performed post jose maria care    Additional Treatment:  LBE x 15 min  toileting CGA with RW for trfs, min A with clothing mgmt, CGA during post jose maria care    Patient left supine with call button in reach and belongings in reach.    Assessment:  Zeyad Woodard is a 89 y.o. male with a medical diagnosis of MRSA bacteremia.  Pt tolerated well, pt demo good effort, motivated to improve, pt would continue to benefit from skilled PT services to improve overall functional mobility, strength and endurance.  .    Rehab identified problem " list/impairments: weakness, impaired endurance, impaired self care skills, impaired functional mobilty, gait instability, impaired balance, decreased lower extremity function, pain, impaired skin, other (comment) (contact prec/MRSA)    Rehab potential is good.    Activity tolerance: Fair    Discharge recommendations: home with home health (and likely family/caregiver assist)     Barriers to discharge: Decreased caregiver support, Inaccessible home environment    Equipment recommendations: wheelchair     GOALS:    Physical Therapy Goals        Problem: Physical Therapy Goal    Goal Priority Disciplines Outcome Goal Variances Interventions   Physical Therapy Goal     PT/OT, PT Ongoing (interventions implemented as appropriate)     Description:  Goals to be met by: 14 DAYS     Patient will increase functional independence with mobility by performin. Supine to sit with Supervision   2. Sit to supine with Supervision   3. Sit to stand transfer with Stand-by Assistance with RW met (2017)  4. Bed to chair transfer with Stand-by Assistance using Rolling Walker  5. Gait  x 150 feet with Stand-by Assistance using Rolling Walker. Met (2017)  6. Wheelchair propulsion x100 feet with Stand-by Assistance using bilateral upper extremities   7. Ascend/Descend 4 inch curb step with Contact Guard Assistance using Rolling Walker.  8. Sitting at edge of bed x8 minutes with Stand-by Assistance and without UE support, and performing exercises or activity= discontinue  9. Stand for 5 minutes with Contact Guard Assistance using Rolling Walker and perform an activity  10. Lower extremity exercise program x20 reps per handout, with assistance as needed and gym therex                          PLAN:    Patient to be seen 5 x/week  to address the above listed problems via gait training, therapeutic activities, therapeutic exercises, wheelchair management/training  Plan of Care expires: 17  Plan of Care reviewed with:  patient    Mary Gilbert, PTA  12/01/2017

## 2017-12-01 NOTE — PLAN OF CARE
Problem: Physical Therapy Goal  Goal: Physical Therapy Goal  Goals to be met by: 14 DAYS     Patient will increase functional independence with mobility by performin. Supine to sit with Supervision   2. Sit to supine with Supervision   3. Sit to stand transfer with Stand-by Assistance with RW met (2017)  4. Bed to chair transfer with Stand-by Assistance using Rolling Walker  5. Gait  x 150 feet with Stand-by Assistance using Rolling Walker. Met (2017)  6. Wheelchair propulsion x100 feet with Stand-by Assistance using bilateral upper extremities   7. Ascend/Descend 4 inch curb step with Contact Guard Assistance using Rolling Walker.  8. Sitting at edge of bed x8 minutes with Stand-by Assistance and without UE support, and performing exercises or activity= discontinue  9. Stand for 5 minutes with Contact Guard Assistance using Rolling Walker and perform an activity  10. Lower extremity exercise program x20 reps per handout, with assistance as needed and gym therex         Outcome: Ongoing (interventions implemented as appropriate)  Goals remain appropriate

## 2017-12-01 NOTE — PLAN OF CARE
Problem: Occupational Therapy Goal  Goal: Occupational Therapy Goal  Goals to be met by: 14 days     Patient will increase functional independence with ADLs by performing:    Feeding with Modified [place]  UE Dressing with Modified [place].   LE Dressing with Modified [place] using AD.  Grooming while standing with Modified [place].  Toileting from bedside commode with Modified [place] for hygiene and clothing management.   Bathing from  shower chair/bench with Supervision.  Toilet transfer to bedside commode with Supervision.  Perform functional activity in standing with S for 10' in preparation for safety during standing ADls. --MET       Outcome: Ongoing (interventions implemented as appropriate)  Progressing;however, decreased balance. DELON Pollock  12/1/2017

## 2017-12-01 NOTE — PLAN OF CARE
Problem: Fall Risk (Adult)  Goal: Absence of Falls  Patient will demonstrate the desired outcomes by discharge/transition of care.   Outcome: Ongoing (interventions implemented as appropriate)  Pt. Had no falls this shift.will continue to monitor.

## 2017-12-02 LAB — VANCOMYCIN TROUGH SERPL-MCNC: 14.6 UG/ML

## 2017-12-02 PROCEDURE — 80202 ASSAY OF VANCOMYCIN: CPT

## 2017-12-02 PROCEDURE — 25000003 PHARM REV CODE 250: Performed by: INTERNAL MEDICINE

## 2017-12-02 PROCEDURE — A4216 STERILE WATER/SALINE, 10 ML: HCPCS | Performed by: STUDENT IN AN ORGANIZED HEALTH CARE EDUCATION/TRAINING PROGRAM

## 2017-12-02 PROCEDURE — 63600175 PHARM REV CODE 636 W HCPCS: Performed by: NURSE PRACTITIONER

## 2017-12-02 PROCEDURE — 25000003 PHARM REV CODE 250: Performed by: NURSE PRACTITIONER

## 2017-12-02 PROCEDURE — 11000004 HC SNF PRIVATE

## 2017-12-02 PROCEDURE — 25000003 PHARM REV CODE 250: Performed by: STUDENT IN AN ORGANIZED HEALTH CARE EDUCATION/TRAINING PROGRAM

## 2017-12-02 RX ADMIN — LISINOPRIL 10 MG: 10 TABLET ORAL at 09:12

## 2017-12-02 RX ADMIN — CALCIUM 500 MG: 500 TABLET ORAL at 09:12

## 2017-12-02 RX ADMIN — DONEPEZIL HYDROCHLORIDE 10 MG: 10 TABLET, FILM COATED ORAL at 10:12

## 2017-12-02 RX ADMIN — THERA TABS 1 TABLET: TAB at 09:12

## 2017-12-02 RX ADMIN — Medication 10 ML: at 06:12

## 2017-12-02 RX ADMIN — OXYCODONE AND ACETAMINOPHEN 1 TABLET: 7.5; 325 TABLET ORAL at 10:12

## 2017-12-02 RX ADMIN — ATORVASTATIN CALCIUM 20 MG: 20 TABLET, FILM COATED ORAL at 10:12

## 2017-12-02 RX ADMIN — CYANOCOBALAMIN TAB 1000 MCG 1000 MCG: 1000 TAB at 09:12

## 2017-12-02 RX ADMIN — ACETAMINOPHEN 500 MG: 500 TABLET ORAL at 03:12

## 2017-12-02 RX ADMIN — LEVOTHYROXINE SODIUM 100 MCG: 100 TABLET ORAL at 05:12

## 2017-12-02 RX ADMIN — RAMELTEON 8 MG: 8 TABLET, FILM COATED ORAL at 10:12

## 2017-12-02 RX ADMIN — OXYCODONE AND ACETAMINOPHEN 1 TABLET: 7.5; 325 TABLET ORAL at 12:12

## 2017-12-02 RX ADMIN — VANCOMYCIN HYDROCHLORIDE 750 MG: 750 INJECTION, POWDER, LYOPHILIZED, FOR SOLUTION INTRAVENOUS at 03:12

## 2017-12-02 RX ADMIN — FINASTERIDE 5 MG: 5 TABLET, FILM COATED ORAL at 09:12

## 2017-12-02 RX ADMIN — CARVEDILOL 6.25 MG: 6.25 TABLET, FILM COATED ORAL at 09:12

## 2017-12-02 RX ADMIN — OXYCODONE AND ACETAMINOPHEN 1 TABLET: 7.5; 325 TABLET ORAL at 04:12

## 2017-12-02 RX ADMIN — MICONAZOLE NITRATE: 20 POWDER TOPICAL at 09:12

## 2017-12-02 RX ADMIN — OXYCODONE HYDROCHLORIDE AND ACETAMINOPHEN 500 MG: 500 TABLET ORAL at 09:12

## 2017-12-02 RX ADMIN — Medication 10 ML: at 12:12

## 2017-12-02 RX ADMIN — ASPIRIN 81 MG: 81 TABLET, COATED ORAL at 09:12

## 2017-12-02 RX ADMIN — ACETAMINOPHEN 500 MG: 500 TABLET ORAL at 05:12

## 2017-12-02 RX ADMIN — CARVEDILOL 6.25 MG: 6.25 TABLET, FILM COATED ORAL at 10:12

## 2017-12-02 RX ADMIN — FUROSEMIDE 20 MG: 20 TABLET ORAL at 09:12

## 2017-12-02 RX ADMIN — ACETAMINOPHEN 500 MG: 500 TABLET ORAL at 10:12

## 2017-12-02 RX ADMIN — GABAPENTIN 300 MG: 100 CAPSULE ORAL at 10:12

## 2017-12-02 RX ADMIN — MICONAZOLE NITRATE: 20 POWDER TOPICAL at 10:12

## 2017-12-02 RX ADMIN — DOCUSATE SODIUM 100 MG: 100 CAPSULE, LIQUID FILLED ORAL at 10:12

## 2017-12-02 RX ADMIN — FERROUS SULFATE TAB EC 325 MG (65 MG FE EQUIVALENT) 325 MG: 325 (65 FE) TABLET DELAYED RESPONSE at 09:12

## 2017-12-02 RX ADMIN — GABAPENTIN 100 MG: 100 CAPSULE ORAL at 09:12

## 2017-12-02 RX ADMIN — SODIUM CHLORIDE, PRESERVATIVE FREE 10 ML: 5 INJECTION INTRAVENOUS at 04:12

## 2017-12-02 RX ADMIN — TAMSULOSIN HYDROCHLORIDE 0.4 MG: 0.4 CAPSULE ORAL at 09:12

## 2017-12-02 NOTE — PLAN OF CARE
Problem: Patient Care Overview  Goal: Plan of Care Review  Outcome: Ongoing (interventions implemented as appropriate)  Increased nutrient needs(protein) Related to (etiology):   Wound healing and infection   Signs and Symptoms (as evidenced by):   Non-healing wound, sp lumbar surgery  Recommendation/Intervention: Continue diabetic cardiac diet, continue boost glucose, RD to follow  Goals: PO intake to meet 85% of EEN  Nutrition Goal Status: goal met  Communication of RD Recs: reviewed with RN

## 2017-12-02 NOTE — PROGRESS NOTES
Vancomycin through ordered before 1200 noon , charge nurse to draw it. Dr armas instructed this nurse not to give vancomycin iv, and wait until through is resulted and he is informed.

## 2017-12-02 NOTE — PROGRESS NOTES
"[facility]  Adult Nutrition  Progress Note    SUMMARY     Recommendations    Recommendation/Intervention: Continue diabetic cardiac diet, continue boost glucose, RD to follow  Goals: PO intake to meet 85% of EEN  Nutrition Goal Status: goal met  Communication of RD Recs: reviewed with RN             Reason for Assessment    Reason for Assessment: RD follow-up  Diagnosis:  (Sepsis, Debility)  Relevent Medical History: sp I & D 11/13/17 cellulitis of buttocks, sp lumbosacral radiculopathy 10/3/17, DDD, chronic pain, HTN,AFIB, CHF , anemia   Interdisciplinary Rounds: attended     General Information Comments: patient eating 100%    Nutrition Discharge Planning: DC on cardiac, diabetic diet 2000 calories    Nutrition Prescription Ordered    Current Diet Order: Cardiac, Diabetic 2000  Nutrition Order Comments: %           Oral Nutrition Supplement: Boost glucose chocolate     Evaluation of Received Nutrients/Fluid Intake            Energy Calories Required: meeting needs         Protein Required: meeting needs         Fluid Required: meeting needs     Tolerance: tolerating     % Intake of Estimated Energy Needs: 75 - 100 %  % Meal Intake: 100%     Nutrition Risk Screen     Nutrition Risk Screen: no indicators present    Nutrition/Diet History    Patient Reported Diet/Restrictions/Preferences: general     Food Preferences: No cultural or Caodaism food preferences        Factors Affecting Nutritional Intake: pain, decreased appetite, depression                Labs/Tests/Procedures/Meds       Pertinent Labs Reviewed: other (see comments)  Pertinent Labs Comments: Na 126  Pertinent Medications Reviewed: reviewed, pertinent  Pertinent Medications Comments: IV Abx    Physical Findings    Overall Physical Appearance: weak, loss of muscle mass (pale)     Oral/Mouth Cavity:  (-)  Skin: incision, non-healing wound(s)    Anthropometrics    Temp: 97.8 °F (36.6 °C)     Height: 5' 9" (175.3 cm)  Weight " Method: Standard Scale  Weight: 81.6 kg (179 lb 14.3 oz)  Ideal Body Weight (IBW), Male: 160 lb     % Ideal Body Weight, Male (lb): 110.92 lb     BMI (Calculated): 26.3  BMI Grade: 18.5-24.9 - normal         Estimated/Assessed Needs    Weight Used For Calorie Calculations: 76.3 kg (168 lb 3.4 oz)         Energy Need Method: Warrick-St Jeor    RMR (Warrick-St. Jeor Equation): 1413.38        Weight Used For Protein Calculations: 76.3 kg (168 lb 3.4 oz)  Protein Requirements:  (1.3-1.5gm/kg)    Fluid Requirements (mL): 1ml/yolis or per MD           CHO Requirement: 50% of calories     Assessment and Plan    Increased nutrient needs(protein) Related to (etiology):   Wound healing and infection   Signs and Symptoms (as evidenced by):   Non-healing wound, sp lumbar surgery    Monitor and Evaluation  Food and Nutrient Intake: energy intake  Food and Nutrient Adminstration: diet order  Physical Activity and Function: nutrition-related ADLs and IADLs  Anthropometric Measurements: weight change  Biochemical Data, Medical Tests and Procedures: electrolyte and renal panel, glucose/endocrine profile  Nutrition-Focused Physical Findings: overall appearance    Nutrition Risk    Level of Risk:  (follow weekly)    Nutrition Follow-Up    RD Follow-up?: Yes

## 2017-12-02 NOTE — PROGRESS NOTES
Dr. Armas informed per this nurse of Vancomycin through result at this time is 14.6 , . Dr armas instructed this nurse to give vancomycin iv at this time and continue vancomycin as ordered.

## 2017-12-03 PROCEDURE — 25000003 PHARM REV CODE 250: Performed by: STUDENT IN AN ORGANIZED HEALTH CARE EDUCATION/TRAINING PROGRAM

## 2017-12-03 PROCEDURE — 97112 NEUROMUSCULAR REEDUCATION: CPT

## 2017-12-03 PROCEDURE — 25000003 PHARM REV CODE 250: Performed by: INTERNAL MEDICINE

## 2017-12-03 PROCEDURE — A4216 STERILE WATER/SALINE, 10 ML: HCPCS | Performed by: STUDENT IN AN ORGANIZED HEALTH CARE EDUCATION/TRAINING PROGRAM

## 2017-12-03 PROCEDURE — 97535 SELF CARE MNGMENT TRAINING: CPT

## 2017-12-03 PROCEDURE — 63600175 PHARM REV CODE 636 W HCPCS: Performed by: NURSE PRACTITIONER

## 2017-12-03 PROCEDURE — 25000003 PHARM REV CODE 250: Performed by: HOSPITALIST

## 2017-12-03 PROCEDURE — 25000003 PHARM REV CODE 250: Performed by: NURSE PRACTITIONER

## 2017-12-03 PROCEDURE — 11000004 HC SNF PRIVATE

## 2017-12-03 RX ORDER — GUAIFENESIN 100 MG/5ML
200 SOLUTION ORAL EVERY 4 HOURS PRN
Status: DISCONTINUED | OUTPATIENT
Start: 2017-12-03 | End: 2017-12-12 | Stop reason: HOSPADM

## 2017-12-03 RX ADMIN — GUAIFENESIN 200 MG: 200 SOLUTION ORAL at 03:12

## 2017-12-03 RX ADMIN — CALCIUM 500 MG: 500 TABLET ORAL at 09:12

## 2017-12-03 RX ADMIN — Medication 10 ML: at 11:12

## 2017-12-03 RX ADMIN — THERA TABS 1 TABLET: TAB at 09:12

## 2017-12-03 RX ADMIN — CARVEDILOL 6.25 MG: 6.25 TABLET, FILM COATED ORAL at 08:12

## 2017-12-03 RX ADMIN — OXYCODONE AND ACETAMINOPHEN 1 TABLET: 7.5; 325 TABLET ORAL at 04:12

## 2017-12-03 RX ADMIN — CYANOCOBALAMIN TAB 1000 MCG 1000 MCG: 1000 TAB at 09:12

## 2017-12-03 RX ADMIN — FUROSEMIDE 20 MG: 20 TABLET ORAL at 09:12

## 2017-12-03 RX ADMIN — DONEPEZIL HYDROCHLORIDE 10 MG: 10 TABLET, FILM COATED ORAL at 08:12

## 2017-12-03 RX ADMIN — LISINOPRIL 10 MG: 10 TABLET ORAL at 09:12

## 2017-12-03 RX ADMIN — FINASTERIDE 5 MG: 5 TABLET, FILM COATED ORAL at 09:12

## 2017-12-03 RX ADMIN — OXYCODONE HYDROCHLORIDE AND ACETAMINOPHEN 500 MG: 500 TABLET ORAL at 09:12

## 2017-12-03 RX ADMIN — ACETAMINOPHEN 500 MG: 500 TABLET ORAL at 05:12

## 2017-12-03 RX ADMIN — VANCOMYCIN HYDROCHLORIDE 750 MG: 750 INJECTION, POWDER, LYOPHILIZED, FOR SOLUTION INTRAVENOUS at 03:12

## 2017-12-03 RX ADMIN — FERROUS SULFATE TAB EC 325 MG (65 MG FE EQUIVALENT) 325 MG: 325 (65 FE) TABLET DELAYED RESPONSE at 09:12

## 2017-12-03 RX ADMIN — LEVOTHYROXINE SODIUM 100 MCG: 100 TABLET ORAL at 05:12

## 2017-12-03 RX ADMIN — MICONAZOLE NITRATE: 20 POWDER TOPICAL at 08:12

## 2017-12-03 RX ADMIN — ATORVASTATIN CALCIUM 20 MG: 20 TABLET, FILM COATED ORAL at 08:12

## 2017-12-03 RX ADMIN — DOCUSATE SODIUM 100 MG: 100 CAPSULE, LIQUID FILLED ORAL at 08:12

## 2017-12-03 RX ADMIN — GABAPENTIN 100 MG: 100 CAPSULE ORAL at 09:12

## 2017-12-03 RX ADMIN — OXYCODONE AND ACETAMINOPHEN 1 TABLET: 7.5; 325 TABLET ORAL at 11:12

## 2017-12-03 RX ADMIN — Medication 10 ML: at 12:12

## 2017-12-03 RX ADMIN — ASPIRIN 81 MG: 81 TABLET, COATED ORAL at 09:12

## 2017-12-03 RX ADMIN — GABAPENTIN 300 MG: 100 CAPSULE ORAL at 08:12

## 2017-12-03 RX ADMIN — DOCUSATE SODIUM 100 MG: 100 CAPSULE, LIQUID FILLED ORAL at 09:12

## 2017-12-03 RX ADMIN — ACETAMINOPHEN 500 MG: 500 TABLET ORAL at 02:12

## 2017-12-03 RX ADMIN — TAMSULOSIN HYDROCHLORIDE 0.4 MG: 0.4 CAPSULE ORAL at 09:12

## 2017-12-03 RX ADMIN — Medication 10 ML: at 06:12

## 2017-12-03 RX ADMIN — CARVEDILOL 6.25 MG: 6.25 TABLET, FILM COATED ORAL at 09:12

## 2017-12-03 RX ADMIN — MICONAZOLE NITRATE: 20 POWDER TOPICAL at 09:12

## 2017-12-03 NOTE — PLAN OF CARE
Problem: Occupational Therapy Goal  Goal: Occupational Therapy Goal  Goals to be met by: 14 days     Patient will increase functional independence with ADLs by performing:    Feeding with Modified [place]  UE Dressing with Modified [place].   LE Dressing with Modified [place] using AD.  Grooming while standing with Modified [place].  Toileting from bedside commode with Modified [place] for hygiene and clothing management.   Bathing from  shower chair/bench with Supervision.  Toilet transfer to bedside commode with Supervision.  Perform functional activity in standing with S for 10' in preparation for safety during standing ADls. --MET        Outcome: Ongoing (interventions implemented as appropriate)  Goals appropriate. Continue with current plan of care.  DELON Brock  12/3/2017

## 2017-12-03 NOTE — PT/OT/SLP PROGRESS
"Occupational Therapy  Treatment    Zeyad Woodard   MRN: [id number]   Admitting Diagnosis: MRSA bacteremia    OT Date of Treatment: 12/03/17  Total Time (min): 46 min    Billable Minutes:  Self Care/Home Management 25 and Neuromuscular Re-education 21    General Precautions: Standard, fall  Orthopedic Precautions: N/A  Braces: N/A         Subjective:  Communicated with patient and nurse prior to session.  "I'm getting there."    Pain/Comfort  Pain Rating 1: 7/10  Location - Side 1: Right  Location - Orientation 1: generalized  Location 1: hip  Pain Addressed 1: Reposition, Cessation of Activity, Nurse notified  Pain Rating Post-Intervention 1: 7/10    Objective:  Patient found with:  (seated in wheelchair bedside with visitor)    Functional Status:  MDS G  Transfer Functional Status: CGA-Min (A) (with FWW to cmmode over toilet and to WC)  Walk in Room Functional Status: CGA-Min (A) (with FWW and assist for balance)  Dressing Functional Status: 3:mod(A)-Max(A) (to therad left LE and UE, and don socks and shoes with bilateral AFO's) uses button hook independently at home to assist with buttons  Toilet Use Functional Status: CGA-Min (A) (for balance when standing for pericare and clothes mgmt)  Personal Hygiene Functional Status: S-SBA (for balance standing at sink to wash hands)  Moving from seated to standing position: Not steady, only able to stabilize with staff assistance  Walking (with assistive device if used): Not steady, only able to stabilize with staff assistance  Moving on and off the toilet: Not steady, only able to stabilize with staff assistance  Surface-to-surface transfer (transfer between bed and chair or wheelchair): Not steady, only able to stabilize with staff assistance          Treatment:  Education and training provided for safety, standing balance, FMC and fall prevention during self-care and functional mobility. Completed dressing, toileting, hand washing at sink and standing balance challenges " with min A secondary to increased knee flexion and posterior lean. Verbal cues provided for upright posture and to lean forward during sit to stand.      Patient left up in chair with call button in reach    ASSESSMENT:  Zeyad Woodard is a 89 y.o. male with a medical diagnosis of MRSA bacteremia. Tolerated session well with seated restbreaks. Able to stand for 8-10 minutes with fair static standing balance using BUE for support. Will continue to benefit from OT intervention to focus on caregiver training, safety, fall prevention and standing balance and tolerance in order to safely participate in meaningful self-care and functional mobility activities at highest level of independence.       Rehab identified problem list/impairments: weakness, impaired endurance, impaired self care skills, impaired functional mobilty, gait instability, impaired balance, decreased upper extremity function, decreased lower extremity function, decreased safety awareness, pain, impaired coordination, impaired skin    Rehab potential is good    Activity tolerance: Fair    Discharge recommendations:  (pt may need more assistance than previously at home)     Barriers to discharge: Decreased caregiver support     Equipment recommendations: wheelchair     GOALS:    Occupational Therapy Goals        Problem: Occupational Therapy Goal    Goal Priority Disciplines Outcome Interventions   Occupational Therapy Goal     OT, PT/OT Ongoing (interventions implemented as appropriate)    Description:  Goals to be met by: 14 days     Patient will increase functional independence with ADLs by performing:    Feeding with Modified [place]  UE Dressing with Modified [place].   LE Dressing with Modified [place] using AD.  Grooming while standing with Modified [place].  Toileting from bedside commode with Modified [place] for hygiene and clothing management.   Bathing from  shower chair/bench with Supervision.  Toilet transfer to  bedside commode with Supervision.  Perform functional activity in standing with S for 10' in preparation for safety during standing ADls. --MET                         Plan:  Patient to be seen  (5-6x/week) to address the above listed problems via self-care/home management, therapeutic activities, therapeutic exercises  Plan of Care expires: 12/16/17  Plan of Care reviewed with: patient    Mary DELON Aguillon  12/03/2017

## 2017-12-04 LAB
ALBUMIN SERPL BCP-MCNC: 3.3 G/DL
ALP SERPL-CCNC: 84 U/L
ALT SERPL W/O P-5'-P-CCNC: 22 U/L
ANION GAP SERPL CALC-SCNC: 6 MMOL/L
AST SERPL-CCNC: 32 U/L
BASOPHILS # BLD AUTO: 0.03 K/UL
BASOPHILS NFR BLD: 0.3 %
BILIRUB SERPL-MCNC: 0.9 MG/DL
BUN SERPL-MCNC: 33 MG/DL
CALCIUM SERPL-MCNC: 9 MG/DL
CHLORIDE SERPL-SCNC: 94 MMOL/L
CO2 SERPL-SCNC: 28 MMOL/L
CREAT SERPL-MCNC: 1.3 MG/DL
CRP SERPL-MCNC: 21.3 MG/L
DIFFERENTIAL METHOD: ABNORMAL
EOSINOPHIL # BLD AUTO: 0.3 K/UL
EOSINOPHIL NFR BLD: 3.2 %
ERYTHROCYTE [DISTWIDTH] IN BLOOD BY AUTOMATED COUNT: 14.7 %
ERYTHROCYTE [SEDIMENTATION RATE] IN BLOOD BY WESTERGREN METHOD: 20 MM/HR
EST. GFR  (AFRICAN AMERICAN): 55.5 ML/MIN/1.73 M^2
EST. GFR  (NON AFRICAN AMERICAN): 48 ML/MIN/1.73 M^2
GLUCOSE SERPL-MCNC: 90 MG/DL
HCT VFR BLD AUTO: 24.1 %
HGB BLD-MCNC: 8 G/DL
LYMPHOCYTES # BLD AUTO: 4.3 K/UL
LYMPHOCYTES NFR BLD: 46.8 %
MAGNESIUM SERPL-MCNC: 2 MG/DL
MCH RBC QN AUTO: 31.5 PG
MCHC RBC AUTO-ENTMCNC: 33.2 G/DL
MCV RBC AUTO: 95 FL
MONOCYTES # BLD AUTO: 1.6 K/UL
MONOCYTES NFR BLD: 16.8 %
NEUTROPHILS # BLD AUTO: 3 K/UL
NEUTROPHILS NFR BLD: 32.9 %
PHOSPHATE SERPL-MCNC: 4.1 MG/DL
PLATELET # BLD AUTO: 125 K/UL
PMV BLD AUTO: 10.1 FL
POTASSIUM SERPL-SCNC: 5 MMOL/L
PROT SERPL-MCNC: 6.2 G/DL
RBC # BLD AUTO: 2.54 M/UL
SODIUM SERPL-SCNC: 128 MMOL/L
WBC # BLD AUTO: 9.2 K/UL

## 2017-12-04 PROCEDURE — 97110 THERAPEUTIC EXERCISES: CPT

## 2017-12-04 PROCEDURE — 80053 COMPREHEN METABOLIC PANEL: CPT

## 2017-12-04 PROCEDURE — A4216 STERILE WATER/SALINE, 10 ML: HCPCS | Performed by: STUDENT IN AN ORGANIZED HEALTH CARE EDUCATION/TRAINING PROGRAM

## 2017-12-04 PROCEDURE — 83735 ASSAY OF MAGNESIUM: CPT

## 2017-12-04 PROCEDURE — 25000003 PHARM REV CODE 250: Performed by: NURSE PRACTITIONER

## 2017-12-04 PROCEDURE — 86140 C-REACTIVE PROTEIN: CPT

## 2017-12-04 PROCEDURE — 63600175 PHARM REV CODE 636 W HCPCS: Performed by: NURSE PRACTITIONER

## 2017-12-04 PROCEDURE — 84100 ASSAY OF PHOSPHORUS: CPT

## 2017-12-04 PROCEDURE — 36415 COLL VENOUS BLD VENIPUNCTURE: CPT

## 2017-12-04 PROCEDURE — 97530 THERAPEUTIC ACTIVITIES: CPT

## 2017-12-04 PROCEDURE — 11000004 HC SNF PRIVATE

## 2017-12-04 PROCEDURE — 25000003 PHARM REV CODE 250: Performed by: INTERNAL MEDICINE

## 2017-12-04 PROCEDURE — 85025 COMPLETE CBC W/AUTO DIFF WBC: CPT

## 2017-12-04 PROCEDURE — 25000003 PHARM REV CODE 250: Performed by: STUDENT IN AN ORGANIZED HEALTH CARE EDUCATION/TRAINING PROGRAM

## 2017-12-04 PROCEDURE — 97116 GAIT TRAINING THERAPY: CPT

## 2017-12-04 PROCEDURE — 85651 RBC SED RATE NONAUTOMATED: CPT

## 2017-12-04 RX ORDER — VANCOMYCIN HCL IN 5 % DEXTROSE 1G/250ML
1000 PLASTIC BAG, INJECTION (ML) INTRAVENOUS
Status: DISCONTINUED | OUTPATIENT
Start: 2017-12-04 | End: 2017-12-05

## 2017-12-04 RX ADMIN — OXYCODONE HYDROCHLORIDE AND ACETAMINOPHEN 500 MG: 500 TABLET ORAL at 09:12

## 2017-12-04 RX ADMIN — Medication 10 ML: at 12:12

## 2017-12-04 RX ADMIN — VANCOMYCIN HYDROCHLORIDE 1000 MG: 1 INJECTION, POWDER, LYOPHILIZED, FOR SOLUTION INTRAVENOUS at 02:12

## 2017-12-04 RX ADMIN — THERA TABS 1 TABLET: TAB at 09:12

## 2017-12-04 RX ADMIN — ATORVASTATIN CALCIUM 20 MG: 20 TABLET, FILM COATED ORAL at 09:12

## 2017-12-04 RX ADMIN — CYANOCOBALAMIN TAB 1000 MCG 1000 MCG: 1000 TAB at 09:12

## 2017-12-04 RX ADMIN — DONEPEZIL HYDROCHLORIDE 10 MG: 10 TABLET, FILM COATED ORAL at 09:12

## 2017-12-04 RX ADMIN — GABAPENTIN 300 MG: 100 CAPSULE ORAL at 09:12

## 2017-12-04 RX ADMIN — ACETAMINOPHEN 500 MG: 500 TABLET ORAL at 07:12

## 2017-12-04 RX ADMIN — GABAPENTIN 100 MG: 100 CAPSULE ORAL at 09:12

## 2017-12-04 RX ADMIN — FERROUS SULFATE TAB EC 325 MG (65 MG FE EQUIVALENT) 325 MG: 325 (65 FE) TABLET DELAYED RESPONSE at 09:12

## 2017-12-04 RX ADMIN — ACETAMINOPHEN 500 MG: 500 TABLET ORAL at 02:12

## 2017-12-04 RX ADMIN — MICONAZOLE NITRATE: 20 POWDER TOPICAL at 09:12

## 2017-12-04 RX ADMIN — DOCUSATE SODIUM 100 MG: 100 CAPSULE, LIQUID FILLED ORAL at 09:12

## 2017-12-04 RX ADMIN — RAMELTEON 8 MG: 8 TABLET, FILM COATED ORAL at 09:12

## 2017-12-04 RX ADMIN — CARVEDILOL 6.25 MG: 6.25 TABLET, FILM COATED ORAL at 09:12

## 2017-12-04 RX ADMIN — Medication 10 ML: at 06:12

## 2017-12-04 RX ADMIN — LEVOTHYROXINE SODIUM 100 MCG: 100 TABLET ORAL at 07:12

## 2017-12-04 RX ADMIN — LISINOPRIL 10 MG: 10 TABLET ORAL at 09:12

## 2017-12-04 RX ADMIN — OXYCODONE AND ACETAMINOPHEN 1 TABLET: 7.5; 325 TABLET ORAL at 09:12

## 2017-12-04 RX ADMIN — ACETAMINOPHEN 500 MG: 500 TABLET ORAL at 09:12

## 2017-12-04 RX ADMIN — FINASTERIDE 5 MG: 5 TABLET, FILM COATED ORAL at 09:12

## 2017-12-04 RX ADMIN — TAMSULOSIN HYDROCHLORIDE 0.4 MG: 0.4 CAPSULE ORAL at 09:12

## 2017-12-04 RX ADMIN — ASPIRIN 81 MG: 81 TABLET, COATED ORAL at 09:12

## 2017-12-04 RX ADMIN — CALCIUM 500 MG: 500 TABLET ORAL at 09:12

## 2017-12-04 NOTE — PT/OT/SLP PROGRESS
Occupational Therapy  Treatment    Zeyad Woodard   MRN: [id number]   Admitting Diagnosis: MRSA bacteremia    OT Date of Treatment: 12/04/17  Total Time (min): 54 min    Billable Minutes:  Therapeutic Activity 14 and Therapeutic Exercise 40    General Precautions: Standard, fall  Orthopedic Precautions: N/A  Braces: N/A         Subjective:  Communicated with pt prior to session.      Pain/Comfort  Pain Rating 1: 7/10  Location - Side 1: Right  Location 1: hip  Pain Rating Post-Intervention 1: 7/10    Objective:  Patient found with:  (seated in w/c)    Functional Status:  MDS G  Transfer Functional Status: S-SBA  Locomotion on Unit Functional Status: S-SBA          OT Exercises: UE Ergometer 15 min to increase functional endurance for ADLs  Rickshaw 15# 15 x 5 to increase UE strength for mobility  Rowing 20# 15 x 4 to increase UE strength for mobiilty    Additional Treatment:  Pt performed 2# dowel exercises seated 15 reps x 2 sets  Pt propels w/c  With sup and increased time in hallways  Pt stood at table with sup and performed a sidestepping activity x 10 min to increase standing tolerance, balance and safety for household tasks    Patient left up in chair with call button in reach    ASSESSMENT:  Zeyad Woodard is a 89 y.o. male with a medical diagnosis of MRSA bacteremia.  Pt tolerated tx and demonstrated increased indep with standing task and balance. Pt will cont to benefit from OT to address limitations and increase functional indep and safety.    Rehab identified problem list/impairments: weakness, impaired endurance, impaired self care skills, impaired functional mobilty, gait instability, impaired balance, decreased upper extremity function, decreased lower extremity function, decreased safety awareness, pain, impaired coordination, impaired skin    Rehab potential is good    Activity tolerance: Good    Discharge recommendations:  (pt may need more assistance than previously at home)     Barriers to  discharge: Decreased caregiver support     Equipment recommendations: wheelchair     GOALS:    Occupational Therapy Goals        Problem: Occupational Therapy Goal    Goal Priority Disciplines Outcome Interventions   Occupational Therapy Goal     OT, PT/OT Ongoing (interventions implemented as appropriate)    Description:  Goals to be met by: 14 days     Patient will increase functional independence with ADLs by performing:    Feeding with Modified [place]  UE Dressing with Modified [place].   LE Dressing with Modified [place] using AD.  Grooming while standing with Modified [place].  Toileting from bedside commode with Modified [place] for hygiene and clothing management.   Bathing from  shower chair/bench with Supervision.  Toilet transfer to bedside commode with Supervision.  Perform functional activity in standing with S for 10' in preparation for safety during standing ADls. --MET                         Plan:  Patient to be seen  (5-6x/week) to address the above listed problems via self-care/home management, therapeutic activities, therapeutic exercises  Plan of Care expires: 12/16/17  Plan of Care reviewed with: patient    DELON Pollock  12/04/2017

## 2017-12-04 NOTE — PT/OT/SLP PROGRESS
"Physical Therapy  Treatment    Zeyad Woodard   MRN: [id number]   Admitting Diagnosis: MRSA bacteremia    PT Received On: 12/04/17  Total Time (min): 53       Billable Minutes:53    Gait Training 15, Therapeutic Activity 8 and Therapeutic Exercise 30    Treatment Type: Treatment  PT/PTA: PTA     PTA Visit Number: 2       General Precautions: Standard, fall  Orthopedic Precautions: N/A   Braces: B AFO         Subjective:  "alright"      Pain/Comfort  Pain Rating 1: 7/10  Location - Side 1: Right  Location - Orientation 1: generalized  Location 1: hip (buttocks area)  Pain Addressed 1: Pre-medicate for activity, Reposition, Distraction  Pain Rating Post-Intervention 1:  (no further mention)    Objective:   Patient found with: peripheral IV (in WC)       Functional Status:  MDS G  Transfer Functional Status: S-SBA  Walk in Corridor Functional Status: CGA-Min (A)        Transfers:  Sit<>Stand: with RW CG/SBA    Gait:  Amb with RW CG mostly holding up pants, vcs for RW mgmt/closeness, erect posture, inc L step length ~ 275 ft, wc/IV in tow     Advanced Gait:  Curb Step: plan to practice tommorrow    Therex:  2x15 reps heel raises,LAQ,hip flex,    Additional Treatment:  LBE x 15 min  Total A to rhonda/doff B AFO and shoes however pt repts he can do independently just takes longer    Patient left up in chair with all lines intact, call button in reach and belonging in reach.    Assessment:  Zeyad Woodard is a 89 y.o. male with a medical diagnosis of MRSA bacteremia.  Pt tolerated well, pt would continue to benefit from skilled PT services to improve overall functional mobility, strength and endurance.  .    Rehab identified problem list/impairments: weakness, impaired endurance, impaired self care skills, impaired functional mobilty, gait instability, impaired balance, decreased lower extremity function, pain, impaired skin, other (comment) (contact prec/MRSA)    Rehab potential is good.    Activity tolerance: " Fair    Discharge recommendations: home with home health (and likely family/caregiver assist)     Barriers to discharge: Decreased caregiver support, Inaccessible home environment    Equipment recommendations: wheelchair     GOALS:    Physical Therapy Goals        Problem: Physical Therapy Goal    Goal Priority Disciplines Outcome Goal Variances Interventions   Physical Therapy Goal     PT/OT, PT Ongoing (interventions implemented as appropriate)     Description:  Goals to be met by: 14 DAYS     Patient will increase functional independence with mobility by performin. Supine to sit with Supervision   2. Sit to supine with Supervision   3. Sit to stand transfer with Stand-by Assistance with RW met (2017)  4. Bed to chair transfer with Stand-by Assistance using Rolling Walker  5. Gait  x 150 feet with Stand-by Assistance using Rolling Walker. Met (2017)  6. Wheelchair propulsion x100 feet with Stand-by Assistance using bilateral upper extremities   7. Ascend/Descend 4 inch curb step with Contact Guard Assistance using Rolling Walker.  8. Sitting at edge of bed x8 minutes with Stand-by Assistance and without UE support, and performing exercises or activity= discontinue  9. Stand for 5 minutes with Contact Guard Assistance using Rolling Walker and perform an activity  10. Lower extremity exercise program x20 reps per handout, with assistance as needed and gym therex                          PLAN:    Patient to be seen 5 x/week  to address the above listed problems via gait training, therapeutic activities, therapeutic exercises, wheelchair management/training  Plan of Care expires: 17  Plan of Care reviewed with: patient    Mary Gilbert, PTA  2017

## 2017-12-04 NOTE — PLAN OF CARE
Problem: Occupational Therapy Goal  Goal: Occupational Therapy Goal  Goals to be met by: 14 days     Patient will increase functional independence with ADLs by performing:    Feeding with Modified [place]  UE Dressing with Modified [place].   LE Dressing with Modified [place] using AD.  Grooming while standing with Modified [place].  Toileting from bedside commode with Modified [place] for hygiene and clothing management.   Bathing from  shower chair/bench with Supervision.  Toilet transfer to bedside commode with Supervision.  Perform functional activity in standing with S for 10' in preparation for safety during standing ADls. --MET        Outcome: Ongoing (interventions implemented as appropriate)  Progressing. DELON Pollock  12/4/2017

## 2017-12-04 NOTE — PLAN OF CARE
Problem: Patient Care Overview  Goal: Plan of Care Review  Outcome: Ongoing (interventions implemented as appropriate)  AFEBRILE CONTINUES IV ANTIBIOTICS.FALL PRECAUTIONS MAINTAINED  NO INJURIES NOTED,

## 2017-12-04 NOTE — PLAN OF CARE
12/04/2017  12:09 PM    ROMÁN met with pt and pt's daughter (Asia) in room to discuss d/c plans.  SW notified both of SNF d/c date of 12/8/17.  Both expressed understanding regarding same.  Pt plans to return home upon SNF d/c where he lived alone PTA.  Asia reports being the primary person to provide assistance to pt at home.  PTA pt was independent with mobility and ADLs and required only set-up assistance for meals and assistance with errands and housekeeping.  Pt reports having a daughter (Elenita) who lives in [place], and another daughter (Ghazala) who lives in [place].  Ghazala plans to travel into [facility] to stay with pt during the holiday season.  Asia reports she is expected to return to work soon and will not be able to be with pt throughout the day.  Asia states plans of having pt return home upon SNF d/c to assess how pt will function with family assistance as available before deciding to either hire additional caregiver assistance.  Pt/family does not willing to consider nursing home placement.  Pt's home is a [facility] with a threshold ALDO.  Pt has a walk-in shower and owns a SC, RW, lift chair, transport WC, rollator, and elevated toilet seat.  Pt/family request placement with [facility] upon SNF d/c.  Asia plans to accompany and transport pt home upon d/c.  ROMÁN scheduled for Asia to receive updates/training with PT/OT at 1pm on day of discharge prior to returning home.  Pt/Asia deny having any questions or concerns regarding d/c plan at this time.  ROMÁN remains available for further d/c planning assistance and will f/u as needed.    Charanjit Oviedo, ROMÁN  [id number]

## 2017-12-05 PROCEDURE — 25000003 PHARM REV CODE 250: Performed by: STUDENT IN AN ORGANIZED HEALTH CARE EDUCATION/TRAINING PROGRAM

## 2017-12-05 PROCEDURE — 25000003 PHARM REV CODE 250: Performed by: INTERNAL MEDICINE

## 2017-12-05 PROCEDURE — 97530 THERAPEUTIC ACTIVITIES: CPT

## 2017-12-05 PROCEDURE — A4216 STERILE WATER/SALINE, 10 ML: HCPCS | Performed by: STUDENT IN AN ORGANIZED HEALTH CARE EDUCATION/TRAINING PROGRAM

## 2017-12-05 PROCEDURE — 97535 SELF CARE MNGMENT TRAINING: CPT

## 2017-12-05 PROCEDURE — 99309 SBSQ NF CARE MODERATE MDM 30: CPT | Mod: ,,, | Performed by: NURSE PRACTITIONER

## 2017-12-05 PROCEDURE — 11000004 HC SNF PRIVATE

## 2017-12-05 PROCEDURE — 25000003 PHARM REV CODE 250: Performed by: NURSE PRACTITIONER

## 2017-12-05 PROCEDURE — 63600175 PHARM REV CODE 636 W HCPCS: Performed by: HOSPITALIST

## 2017-12-05 RX ADMIN — LEVOTHYROXINE SODIUM 100 MCG: 100 TABLET ORAL at 05:12

## 2017-12-05 RX ADMIN — GABAPENTIN 100 MG: 100 CAPSULE ORAL at 08:12

## 2017-12-05 RX ADMIN — MICONAZOLE NITRATE: 20 POWDER TOPICAL at 08:12

## 2017-12-05 RX ADMIN — Medication 10 ML: at 02:12

## 2017-12-05 RX ADMIN — CARVEDILOL 6.25 MG: 6.25 TABLET, FILM COATED ORAL at 08:12

## 2017-12-05 RX ADMIN — GABAPENTIN 300 MG: 100 CAPSULE ORAL at 08:12

## 2017-12-05 RX ADMIN — DONEPEZIL HYDROCHLORIDE 10 MG: 10 TABLET, FILM COATED ORAL at 09:12

## 2017-12-05 RX ADMIN — CYANOCOBALAMIN TAB 1000 MCG 1000 MCG: 1000 TAB at 08:12

## 2017-12-05 RX ADMIN — FERROUS SULFATE TAB EC 325 MG (65 MG FE EQUIVALENT) 325 MG: 325 (65 FE) TABLET DELAYED RESPONSE at 08:12

## 2017-12-05 RX ADMIN — ASPIRIN 81 MG: 81 TABLET, COATED ORAL at 08:12

## 2017-12-05 RX ADMIN — LISINOPRIL 10 MG: 10 TABLET ORAL at 08:12

## 2017-12-05 RX ADMIN — OXYCODONE AND ACETAMINOPHEN 1 TABLET: 7.5; 325 TABLET ORAL at 09:12

## 2017-12-05 RX ADMIN — ACETAMINOPHEN 500 MG: 500 TABLET ORAL at 02:12

## 2017-12-05 RX ADMIN — TAMSULOSIN HYDROCHLORIDE 0.4 MG: 0.4 CAPSULE ORAL at 08:12

## 2017-12-05 RX ADMIN — ATORVASTATIN CALCIUM 20 MG: 20 TABLET, FILM COATED ORAL at 08:12

## 2017-12-05 RX ADMIN — OXYCODONE HYDROCHLORIDE AND ACETAMINOPHEN 500 MG: 500 TABLET ORAL at 08:12

## 2017-12-05 RX ADMIN — RAMELTEON 8 MG: 8 TABLET, FILM COATED ORAL at 08:12

## 2017-12-05 RX ADMIN — CALCIUM 500 MG: 500 TABLET ORAL at 08:12

## 2017-12-05 RX ADMIN — OXYCODONE AND ACETAMINOPHEN 1 TABLET: 7.5; 325 TABLET ORAL at 08:12

## 2017-12-05 RX ADMIN — ACETAMINOPHEN 500 MG: 500 TABLET ORAL at 05:12

## 2017-12-05 RX ADMIN — DOCUSATE SODIUM 100 MG: 100 CAPSULE, LIQUID FILLED ORAL at 08:12

## 2017-12-05 RX ADMIN — ACETAMINOPHEN 500 MG: 500 TABLET ORAL at 10:12

## 2017-12-05 RX ADMIN — Medication 10 ML: at 12:12

## 2017-12-05 RX ADMIN — THERA TABS 1 TABLET: TAB at 08:12

## 2017-12-05 RX ADMIN — Medication 10 ML: at 06:12

## 2017-12-05 RX ADMIN — FINASTERIDE 5 MG: 5 TABLET, FILM COATED ORAL at 08:12

## 2017-12-05 RX ADMIN — VANCOMYCIN HYDROCHLORIDE 1000 MG: 1 INJECTION, POWDER, LYOPHILIZED, FOR SOLUTION INTRAVENOUS at 03:12

## 2017-12-05 NOTE — ASSESSMENT & PLAN NOTE
Evaluated on 12/5/17  -With source likely buttock abscess, seen in surgery clinic and sutures were removed   -Continue vanc IV with end date of 12/8 which is 4 weeks from negative culture on 11/10  -patient with 2 negative blood cultures, removed from isolation  -Picc line care with saline flushes and dressing changes

## 2017-12-05 NOTE — PLAN OF CARE
Problem: Occupational Therapy Goal  Goal: Occupational Therapy Goal  Goals to be met by: 14 days     Patient will increase functional independence with ADLs by performing:    Feeding with Modified [place]  UE Dressing with Modified [place].   LE Dressing with Modified [place] using AD.  Grooming while standing with Modified [place].  Toileting from bedside commode with Modified [place] for hygiene and clothing management.   Bathing from  shower chair/bench with Supervision.  Toilet transfer to bedside commode with Supervision.  Perform functional activity in standing with S for 10' in preparation for safety during standing ADls. --MET        Outcome: Ongoing (interventions implemented as appropriate)  Cont to require assist. DELON Pollock  12/5/2017

## 2017-12-05 NOTE — ASSESSMENT & PLAN NOTE
Evaluated on 12/5/17  -Chronic with improved control  -Continue therapy with coreg  -will continue to monitor and adjust regimen as necessary

## 2017-12-05 NOTE — ASSESSMENT & PLAN NOTE
Evaluated on 12/5/17  -Patient calm and cooperative  -Continue therapy with donepezil 10mg nightly  -Delirium precautions  -Reorient as necessary

## 2017-12-05 NOTE — PROGRESS NOTES
[facility] Department of Hospital Medicine  Progress Note    Patient Name: Zeyad Woodard  MRN: [id number]  Code Status: DNR  Admission Date: 11/15/2017  Length of Stay: 20 days  Attending Physician: Radha Fontaine MD  Primary Care Provider: Booker Ricci MD    Subjective:     Principal Problem:MRSA bacteremia    Chief Complaint/Reason for Admission: MRSA bacteremia    History of Present Illness:  Patient is a 89 y.o. male with A. Fib, BPH, CHF, HTN who presents to SNF after hospitalization for MRSA bacteremia thought to be due to a rectal abscess with cellulitis.  He required I&D of left buttock (which grew MRSA on culture) on 11/13 by Dr. Rahman.    He had a JEVON which did not show signs of infection involving valves or pacemaker.  He is due to complete Vanc for 4 weeks.  He complains of mild discomfort to his buttocks currently which if exacerbated by prolonged sitting and relieved by percocet. The patient has been admitted to SNF for ongoing PT/OT due to insufficient progress to go home safely from the hospital.    Hospital Course:  The patient was admitted at [facility] from 11/10 to 11/15/2017.  11/15: Patient admitted to SNF for ongoing OT/PT following a hospitalization for MRSA bacteremia.  11/17: Patient seen at bedside, report pain to buttock, slept well  11/24: Patient seen at bedside, doing well, visualized buttocks removed dressing erythema improving  11/27: erythema much improved, denies pain, no complaints, reviewed labs, decreasing h/h with negative stool for occult blood, patient with moderate about of ecchymosis to ABD from heparin injections with hematoma, discussed with Dr. Bergman, stopped heparin injections and placed on Teds and SCDs  12/4: adjusted vanc repeat trough on 12/7, doing well, would like prune juice daily with breakfast, ordered cushion for wheelchair. Labs reviewed.  12/5: Patient seen at bedside, doing well, using cushion, pain better controlled with  cushion, no complaints.    Interval History: Patient seen at beside, slept well, no acute events overnight.    Review of Systems   Constitutional: Negative for appetite change, chills, fatigue and fever.   HENT: Negative for trouble swallowing.    Respiratory: Negative for cough, chest tightness, shortness of breath and wheezing.    Cardiovascular: Negative for chest pain, palpitations and leg swelling.   Gastrointestinal: Negative for abdominal pain, constipation, diarrhea and nausea.   Genitourinary: Negative for difficulty urinating, frequency and urgency.   Musculoskeletal: Negative for arthralgias and myalgias.   Skin: Negative for rash.   Neurological: Negative for dizziness, weakness, light-headedness and headaches.   Psychiatric/Behavioral: Negative for sleep disturbance.     Scheduled Meds:   acetaminophen  500 mg Oral Q8H    ascorbic acid (vitamin C)  500 mg Oral Daily    aspirin  81 mg Oral Daily    atorvastatin  20 mg Oral Nightly    calcium carbonate  500 mg Oral Daily    carvedilol  6.25 mg Oral BID    cyanocobalamin  1,000 mcg Oral Daily    docusate sodium  100 mg Oral BID    donepezil  10 mg Oral QHS    ferrous sulfate  325 mg Oral Daily    finasteride  5 mg Oral Daily    gabapentin  100 mg Oral Daily    gabapentin  300 mg Oral QHS    levothyroxine  100 mcg Oral Before breakfast    lisinopril  10 mg Oral Daily    miconazole NITRATE 2 %   Topical BID    multivitamin  1 tablet Oral Daily    sodium chloride 0.9%  10 mL Intravenous Q6H    tamsulosin  0.4 mg Oral Daily    vancomycin (VANCOCIN) IVPB  1,000 mg Intravenous Q24H     Continuous Infusions:   PRN Meds:.acetaminophen, acetaminophen, calcium carbonate, diphenhydrAMINE-zinc acetate 1-0.1%, guaifenesin 100 mg/5 ml, ondansetron, oxyCODONE-acetaminophen, ramelteon, senna, senna-docusate 8.6-50 mg, Flushing PICC Protocol **AND** sodium chloride 0.9% **AND** sodium chloride 0.9%, sodium chloride 0.9%    Objective:     Vital Signs  (Most Recent):  Temp: 97.7 °F (36.5 °C) (12/05/17 0743)  Pulse: 60 (12/05/17 0743)  Resp: 18 (12/05/17 0743)  BP: (!) 150/64 (12/05/17 0743)  SpO2: 98 % (12/05/17 0743) Vital Signs (24h Range):  Temp:  [97.7 °F (36.5 °C)-97.8 °F (36.6 °C)] 97.7 °F (36.5 °C)  Pulse:  [60] 60  Resp:  [18] 18  SpO2:  [95 %-98 %] 98 %  BP: (150-155)/(64-67) 150/64     Weight: 82.8 kg (182 lb 8.7 oz)  Body mass index is 26.96 kg/m².    Intake/Output Summary (Last 24 hours) at 12/05/17 1655  Last data filed at 12/05/17 0541   Gross per 24 hour   Intake              240 ml   Output              600 ml   Net             -360 ml      Physical Exam   Constitutional: He is oriented to person, place, and time. He appears well-developed and well-nourished. No distress.   Cardiovascular: Normal rate, regular rhythm and normal heart sounds.  Exam reveals no gallop and no friction rub.    No murmur heard.  Pulmonary/Chest: Effort normal and breath sounds normal. No respiratory distress. He has no wheezes. He has no rales.   Abdominal: Soft. Bowel sounds are normal. He exhibits no distension. There is no tenderness.   Musculoskeletal: Normal range of motion. He exhibits no edema or tenderness.   Neurological: He is alert and oriented to person, place, and time.   Skin: Skin is warm and dry. Ecchymosis noted. No rash noted. He is not diaphoretic. No cyanosis. Nails show no clubbing.   Ecchymosis improving to abd from heparin injections without hematoma, no erythema noted to buttocks   Psychiatric: He has a normal mood and affect. His behavior is normal.     Significant Labs:    Recent Labs  Lab 11/30/17 0718 12/04/17 0714   WBC 9.88 9.20   HGB 7.7* 8.0*   HCT 23.4* 24.1*   * 125*       Recent Labs  Lab 11/30/17  0718 12/04/17  0714   * 128*   K 5.1 5.0   CL 93* 94*   CO2 26 28   BUN 32* 33*   CREATININE 1.2 1.3   CALCIUM 8.6* 9.0   PROT 6.0 6.2   BILITOT 0.7 0.9   ALKPHOS 90 84   ALT 20 22   AST 30 32     Lab Results   Component Value  Date    LABPROT 11.5 11/07/2017    ALBUMIN 3.3 (L) 12/04/2017     Lab Results   Component Value Date    CALCIUM 9.0 12/04/2017    PHOS 4.1 12/04/2017     Results for EVERT ARAUJO (MRN [id number]) as of 12/5/2017 17:06   Ref. Range 11/30/2017 07:18 12/2/2017 11:55 12/4/2017 07:14   Magnesium Latest Ref Range: 1.6 - 2.6 mg/dL 2.2  2.0       Significant Imaging: n/a    Assessment/Plan:      * MRSA bacteremia    Evaluated on 12/5/17  -With source likely buttock abscess, seen in surgery clinic and sutures were removed   -Continue vanc IV with end date of 12/8 which is 4 weeks from negative culture on 11/10  -patient with 2 negative blood cultures, removed from isolation  -Picc line care with saline flushes and dressing changes        Cellulitis and abscess of buttock    Evaluated on 12/517  -Continue therapy with Vanc and trough  -Change mepilex dressing TIW and as needed for drainage  -F/U with Surgery as scheduled  -Continue scheduled tylenol for pain control with percocet as needed for breakthrough pain  -Contact isolation discontinued since patient with 2 negative blood cultures         Debility    Evaluated on 12/5/17  -continue PT/OT to increase ambulation, ADL performance and endurance  -continue Teds/SCDS for DVT prophylaxis, patient with moderate amount of ecchymosis (which is improving) to ABD from heparin injections and with decreased h/h, neg stool for occult blood, H/H is improving   -continue fall precautions  -continue docusate to prevent constipation; hold for frequent or loose stooling        Takes dietary supplements    Evaluated on 12/5/17  -Continue dietary supplements with vit C, calcium, cyanocobalamin ( + hx of B12 def.), MVI        HLD (hyperlipidemia)    Evaluated on 12/5/17  -Chronic and stable  -Continue therapy with atorvastatin 20mg nightly  -F/U with PCP for ongoing monitoring        Chronic pain    Evaluated on 12/5/17  -Back pain with epidural injections in the past  -Continue percocet  prn pain        Dementia without behavioral disturbance    Evaluated on 12/5/17  -Patient calm and cooperative  -Continue therapy with donepezil 10mg nightly  -Delirium precautions  -Reorient as necessary        SSS (sick sinus syndrome)    Evaluated on 12/5/17  + pacemaker in place with regular rhythm        CKD (chronic kidney disease), stage III    Evaluated on 12/5/17  -Cr is at baseline of 1.1-1.4  -Renally dose medications and avoid nephrotoxins  -Continue to monitor  -Regular Vanc level monitoring        Iron deficiency anemia due to chronic blood loss    Evaluated on 12/5/17  -H/H stable with last iron panel WNL  -Continue iron therapy  -Will continue to monitor with BIW labs        Hypothyroidism    Evaluated on 12/5/17  -Chronic and stable  -Continue medical therapy with levothyroxine 100mcg daily  -F/U with PCP for ongoing monitoring        Polyneuropathy    Evaluated on 12/5/17  -Continue medical therapy with gabapentin  -He denies LE pain currently        Chronic diastolic congestive heart failure    Evaluated on 12/5/17  -Currently compensated  -Patient only takes lasix prn at home for edema  -Will monitor with daily weights and initiate lasix as needed for edema or weight gain, currently will hold for now due to hyponatremia         Benign prostatic hyperplasia    Evaluated on 12/5/17  -No difficulty urinating  -Continue medical therapy with finasteride 5mg daily and tamsulosin 0.4mg daily  -Will continue to monitor        Chronic atrial fibrillation    Evaluated on 12/5/17  -Rate controlled  -Warfarin discontinue due to GI bleed with angioectasia  -Continue coreg 6.25mg and ASA  81mg daily therapy  -Patient with consistent rate in 60's so likely paced        Renovascular hypertension    Evaluated on 12/5/17  -Chronic with improved control  -Continue therapy with coreg  -will continue to monitor and adjust regimen as necessary          Future Appointments  Date Time Provider Department Center    12/6/2017 1:00 PM INFECTIOUS DISEASE, [facility] INFECT [facility]   12/14/2017 11:00 AM Booker Ricci MD [facility]   12/20/2017 2:00 PM Elsa Rahman MD [facility] WOUND [facility]       Nimo Gonzalez NP  Department of Hospital Medicine  [facility]

## 2017-12-05 NOTE — SUBJECTIVE & OBJECTIVE
Hospital Course:  The patient was admitted at [facility] from 11/10 to 11/15/2017.  11/15: Patient admitted to SNF for ongoing OT/PT following a hospitalization for MRSA bacteremia.  11/17: Patient seen at bedside, report pain to buttock, slept well  11/24: Patient seen at bedside, doing well, visualized buttocks removed dressing erythema improving  11/27: erythema much improved, denies pain, no complaints, reviewed labs, decreasing h/h with negative stool for occult blood, patient with moderate about of ecchymosis to ABD from heparin injections with hematoma, discussed with Dr. Bergman, stopped heparin injections and placed on Teds and SCDs  12/4: adjusted vanc repeat trough on 12/7, doing well, would like prune juice daily with breakfast, ordered cushion for wheelchair. Labs reviewed.  12/5: Patient seen at bedside, doing well, using cushion, pain better controlled with cushion, no complaints.    Interval History: Patient seen at beside, slept well, no acute events overnight.    Review of Systems   Constitutional: Negative for appetite change, chills, fatigue and fever.   HENT: Negative for trouble swallowing.    Respiratory: Negative for cough, chest tightness, shortness of breath and wheezing.    Cardiovascular: Negative for chest pain, palpitations and leg swelling.   Gastrointestinal: Negative for abdominal pain, constipation, diarrhea and nausea.   Genitourinary: Negative for difficulty urinating, frequency and urgency.   Musculoskeletal: Negative for arthralgias and myalgias.   Skin: Negative for rash.   Neurological: Negative for dizziness, weakness, light-headedness and headaches.   Psychiatric/Behavioral: Negative for sleep disturbance.     Scheduled Meds:   acetaminophen  500 mg Oral Q8H    ascorbic acid (vitamin C)  500 mg Oral Daily    aspirin  81 mg Oral Daily    atorvastatin  20 mg Oral Nightly    calcium carbonate  500 mg Oral Daily    carvedilol  6.25 mg Oral BID    cyanocobalamin  1,000 mcg  Oral Daily    docusate sodium  100 mg Oral BID    donepezil  10 mg Oral QHS    ferrous sulfate  325 mg Oral Daily    finasteride  5 mg Oral Daily    gabapentin  100 mg Oral Daily    gabapentin  300 mg Oral QHS    levothyroxine  100 mcg Oral Before breakfast    lisinopril  10 mg Oral Daily    miconazole NITRATE 2 %   Topical BID    multivitamin  1 tablet Oral Daily    sodium chloride 0.9%  10 mL Intravenous Q6H    tamsulosin  0.4 mg Oral Daily    vancomycin (VANCOCIN) IVPB  1,000 mg Intravenous Q24H     Continuous Infusions:   PRN Meds:.acetaminophen, acetaminophen, calcium carbonate, diphenhydrAMINE-zinc acetate 1-0.1%, guaifenesin 100 mg/5 ml, ondansetron, oxyCODONE-acetaminophen, ramelteon, senna, senna-docusate 8.6-50 mg, Flushing PICC Protocol **AND** sodium chloride 0.9% **AND** sodium chloride 0.9%, sodium chloride 0.9%    Objective:     Vital Signs (Most Recent):  Temp: 97.7 °F (36.5 °C) (12/05/17 0743)  Pulse: 60 (12/05/17 0743)  Resp: 18 (12/05/17 0743)  BP: (!) 150/64 (12/05/17 0743)  SpO2: 98 % (12/05/17 0743) Vital Signs (24h Range):  Temp:  [97.7 °F (36.5 °C)-97.8 °F (36.6 °C)] 97.7 °F (36.5 °C)  Pulse:  [60] 60  Resp:  [18] 18  SpO2:  [95 %-98 %] 98 %  BP: (150-155)/(64-67) 150/64     Weight: 82.8 kg (182 lb 8.7 oz)  Body mass index is 26.96 kg/m².    Intake/Output Summary (Last 24 hours) at 12/05/17 1655  Last data filed at 12/05/17 0541   Gross per 24 hour   Intake              240 ml   Output              600 ml   Net             -360 ml      Physical Exam   Constitutional: He is oriented to person, place, and time. He appears well-developed and well-nourished. No distress.   Cardiovascular: Normal rate, regular rhythm and normal heart sounds.  Exam reveals no gallop and no friction rub.    No murmur heard.  Pulmonary/Chest: Effort normal and breath sounds normal. No respiratory distress. He has no wheezes. He has no rales.   Abdominal: Soft. Bowel sounds are normal. He exhibits no  distension. There is no tenderness.   Musculoskeletal: Normal range of motion. He exhibits no edema or tenderness.   Neurological: He is alert and oriented to person, place, and time.   Skin: Skin is warm and dry. Ecchymosis noted. No rash noted. He is not diaphoretic. No cyanosis. Nails show no clubbing.   Ecchymosis improving to abd from heparin injections without hematoma, no erythema noted to buttocks   Psychiatric: He has a normal mood and affect. His behavior is normal.     Significant Labs:    Recent Labs  Lab 11/30/17 0718 12/04/17 0714   WBC 9.88 9.20   HGB 7.7* 8.0*   HCT 23.4* 24.1*   * 125*       Recent Labs  Lab 11/30/17 0718 12/04/17 0714   * 128*   K 5.1 5.0   CL 93* 94*   CO2 26 28   BUN 32* 33*   CREATININE 1.2 1.3   CALCIUM 8.6* 9.0   PROT 6.0 6.2   BILITOT 0.7 0.9   ALKPHOS 90 84   ALT 20 22   AST 30 32     Lab Results   Component Value Date    LABPROT 11.5 11/07/2017    ALBUMIN 3.3 (L) 12/04/2017     Lab Results   Component Value Date    CALCIUM 9.0 12/04/2017    PHOS 4.1 12/04/2017     Results for EVERT ARAUJO (MRN [id number]) as of 12/5/2017 17:06   Ref. Range 11/30/2017 07:18 12/2/2017 11:55 12/4/2017 07:14   Magnesium Latest Ref Range: 1.6 - 2.6 mg/dL 2.2  2.0       Significant Imaging: n/a

## 2017-12-05 NOTE — ASSESSMENT & PLAN NOTE
Evaluated on 12/5/17  -Cr is at baseline of 1.1-1.4  -Renally dose medications and avoid nephrotoxins  -Continue to monitor  -Regular Vanc level monitoring

## 2017-12-05 NOTE — ASSESSMENT & PLAN NOTE
Evaluated on 12/5/17  -Rate controlled  -Warfarin discontinue due to GI bleed with angioectasia  -Continue coreg 6.25mg and ASA  81mg daily therapy  -Patient with consistent rate in 60's so likely paced

## 2017-12-05 NOTE — ASSESSMENT & PLAN NOTE
Evaluated on 12/5/17  -H/H stable with last iron panel WNL  -Continue iron therapy  -Will continue to monitor with BIW labs

## 2017-12-05 NOTE — ASSESSMENT & PLAN NOTE
Evaluated on 12/5/17  -Continue dietary supplements with vit C, calcium, cyanocobalamin ( + hx of B12 def.), MVI

## 2017-12-05 NOTE — ASSESSMENT & PLAN NOTE
Evaluated on 12/5/17  -Chronic and stable  -Continue therapy with atorvastatin 20mg nightly  -F/U with PCP for ongoing monitoring

## 2017-12-05 NOTE — ASSESSMENT & PLAN NOTE
Evaluated on 12/5/17  -No difficulty urinating  -Continue medical therapy with finasteride 5mg daily and tamsulosin 0.4mg daily  -Will continue to monitor

## 2017-12-05 NOTE — ASSESSMENT & PLAN NOTE
Evaluated on 12/5/17  -Currently compensated  -Patient only takes lasix prn at home for edema  -Will monitor with daily weights and initiate lasix as needed for edema or weight gain, currently will hold for now due to hyponatremia

## 2017-12-05 NOTE — PLAN OF CARE
Problem: Patient Care Overview  Goal: Plan of Care Review  Outcome: Ongoing (interventions implemented as appropriate)   12/05/17 0246   Coping/Psychosocial   Plan Of Care Reviewed With patient       Problem: Fall Risk (Adult)  Goal: Identify Related Risk Factors and Signs and Symptoms  Related risk factors and signs and symptoms are identified upon initiation of Human Response Clinical Practice Guideline (CPG)   Outcome: Ongoing (interventions implemented as appropriate)   12/05/17 0246   Fall Risk   Related Risk Factors (Fall Risk) age-related changes;gait/mobility problems

## 2017-12-05 NOTE — ASSESSMENT & PLAN NOTE
Evaluated on 12/5/17  -continue PT/OT to increase ambulation, ADL performance and endurance  -continue Teds/SCDS for DVT prophylaxis, patient with moderate amount of ecchymosis (which is improving) to ABD from heparin injections and with decreased h/h, neg stool for occult blood, H/H is improving   -continue fall precautions  -continue docusate to prevent constipation; hold for frequent or loose stooling

## 2017-12-05 NOTE — ASSESSMENT & PLAN NOTE
Evaluated on 12/517  -Continue therapy with Vanc and trough  -Change mepilex dressing TIW and as needed for drainage  -F/U with Surgery as scheduled  -Continue scheduled tylenol for pain control with percocet as needed for breakthrough pain  -Contact isolation discontinued since patient with 2 negative blood cultures

## 2017-12-05 NOTE — ASSESSMENT & PLAN NOTE
Evaluated on 12/5/17  -Chronic and stable  -Continue medical therapy with levothyroxine 100mcg daily  -F/U with PCP for ongoing monitoring

## 2017-12-05 NOTE — PT/OT/SLP PROGRESS
"Occupational Therapy  Treatment    Zeyad Woodard   MRN: [id number]   Admitting Diagnosis: MRSA bacteremia    OT Date of Treatment: 12/05/17  Total Time (min): 54 min    Billable Minutes:  Self Care/Home Management 30 and Therapeutic Activity 24    General Precautions: Standard, fall  Orthopedic Precautions: N/A  Braces: N/A         Subjective:  Communicated with pt prior to session.  "A shower would be good"    Pain/Comfort  Pain Rating 1: 7/10  Location - Side 1: Right  Location 1: hip  Pain Addressed 1: Nurse notified  Pain Rating Post-Intervention 1: 7/10    Objective:  Patient found with: PICC line (supine in bed)    Functional Status:  MDS G  Bed Mobility Functional Status: S-SBA (supine to sit with rail and inc time anc cues)  Transfer Functional Status: CGA-Min (A)--pt stood from bed with use of rails SBA and t/f to w/c SBA to CGA; pt t/f from w/c to TTB CGA with grab bars and back to w/c; stood in shower with grab bar from TTB SBA;  Pt stood from w/c with grab bar to complete dressing with CGA  Dressing Functional Status: Min (A)--min A with pullover to adjust back; setup with slippers; min A with shorts over hips standing  Bathing Functional Status: CGA-Min (A)--from TTB in shower with grab bars--assist with B feet and complete buttocks            Additional Treatment:  Pt with no LOB during self care tasks;however, requires min A to perform and use of grab bar on wall for safety and balance  Pt with skin break between left 4th and 5th digit (placed telfa and notified nurse). Also skin tear reopened on right knee and covered--nurse notified    Patient left up in chair with all lines intact and call button in reach    ASSESSMENT:  Pt cont to participate with therapy and is motivated to increase indep. Pt cont to require min A for self care and functional mobility due to age factors, pain, decreased strength and balance. Pt will cont to benefit from OT to address limitations and increase functional indep and " safety;however, pt will require assistance upon d/c in the home.    Rehab identified problem list/impairments: weakness, impaired endurance, impaired self care skills, impaired functional mobilty, gait instability, impaired balance, decreased upper extremity function, decreased lower extremity function, decreased safety awareness, pain, impaired coordination, impaired skin    Rehab potential is good    Activity tolerance: Good    Discharge recommendations:  (pt may need more assistance than previously at home)     Barriers to discharge: Decreased caregiver support     Equipment recommendations: wheelchair     GOALS:    Occupational Therapy Goals        Problem: Occupational Therapy Goal    Goal Priority Disciplines Outcome Interventions   Occupational Therapy Goal     OT, PT/OT Ongoing (interventions implemented as appropriate)    Description:  Goals to be met by: 14 days     Patient will increase functional independence with ADLs by performing:    Feeding with Modified [place]  UE Dressing with Modified [place].   LE Dressing with Modified [place] using AD.  Grooming while standing with Modified [place].  Toileting from bedside commode with Modified [place] for hygiene and clothing management.   Bathing from  shower chair/bench with Supervision.  Toilet transfer to bedside commode with Supervision.  Perform functional activity in standing with S for 10' in preparation for safety during standing ADls. --MET                         Plan:  Patient to be seen  (5-6x/week) to address the above listed problems via self-care/home management, therapeutic activities, therapeutic exercises  Plan of Care expires: 12/16/17  Plan of Care reviewed with: patient    DELON Pollock  12/05/2017

## 2017-12-06 ENCOUNTER — OFFICE VISIT (OUTPATIENT)
Dept: INFECTIOUS DISEASES | Facility: CLINIC | Age: 82
End: 2017-12-06
Payer: MEDICARE

## 2017-12-06 VITALS
BODY MASS INDEX: 26.43 KG/M2 | HEART RATE: 60 BPM | SYSTOLIC BLOOD PRESSURE: 151 MMHG | WEIGHT: 179 LBS | DIASTOLIC BLOOD PRESSURE: 66 MMHG | OXYGEN SATURATION: 98 %

## 2017-12-06 DIAGNOSIS — L03.317 CELLULITIS AND ABSCESS OF BUTTOCK: ICD-10-CM

## 2017-12-06 DIAGNOSIS — R78.81 MRSA BACTEREMIA: Primary | ICD-10-CM

## 2017-12-06 DIAGNOSIS — L02.31 CELLULITIS AND ABSCESS OF BUTTOCK: ICD-10-CM

## 2017-12-06 DIAGNOSIS — B95.62 MRSA BACTEREMIA: Primary | ICD-10-CM

## 2017-12-06 PROCEDURE — 25000003 PHARM REV CODE 250: Performed by: INTERNAL MEDICINE

## 2017-12-06 PROCEDURE — 25000003 PHARM REV CODE 250: Performed by: NURSE PRACTITIONER

## 2017-12-06 PROCEDURE — 99999 PR PBB SHADOW E&M-EST. PATIENT-LVL III: CPT | Mod: PBBFAC,,,

## 2017-12-06 PROCEDURE — 97110 THERAPEUTIC EXERCISES: CPT

## 2017-12-06 PROCEDURE — A4216 STERILE WATER/SALINE, 10 ML: HCPCS | Performed by: STUDENT IN AN ORGANIZED HEALTH CARE EDUCATION/TRAINING PROGRAM

## 2017-12-06 PROCEDURE — 11000004 HC SNF PRIVATE

## 2017-12-06 PROCEDURE — 63600175 PHARM REV CODE 636 W HCPCS: Performed by: HOSPITALIST

## 2017-12-06 PROCEDURE — 97530 THERAPEUTIC ACTIVITIES: CPT

## 2017-12-06 PROCEDURE — 25000003 PHARM REV CODE 250: Performed by: STUDENT IN AN ORGANIZED HEALTH CARE EDUCATION/TRAINING PROGRAM

## 2017-12-06 PROCEDURE — 97116 GAIT TRAINING THERAPY: CPT

## 2017-12-06 PROCEDURE — 99499 UNLISTED E&M SERVICE: CPT | Mod: S$GLB,,, | Performed by: INTERNAL MEDICINE

## 2017-12-06 PROCEDURE — 99203 OFFICE O/P NEW LOW 30 MIN: CPT | Mod: S$GLB,,, | Performed by: INTERNAL MEDICINE

## 2017-12-06 RX ORDER — OXYCODONE AND ACETAMINOPHEN 7.5; 325 MG/1; MG/1
1 TABLET ORAL EVERY 8 HOURS PRN
Status: ON HOLD | COMMUNITY
End: 2017-12-11

## 2017-12-06 RX ADMIN — DONEPEZIL HYDROCHLORIDE 10 MG: 10 TABLET, FILM COATED ORAL at 09:12

## 2017-12-06 RX ADMIN — OXYCODONE AND ACETAMINOPHEN 1 TABLET: 7.5; 325 TABLET ORAL at 06:12

## 2017-12-06 RX ADMIN — Medication 10 ML: at 05:12

## 2017-12-06 RX ADMIN — GABAPENTIN 300 MG: 100 CAPSULE ORAL at 09:12

## 2017-12-06 RX ADMIN — CARVEDILOL 6.25 MG: 6.25 TABLET, FILM COATED ORAL at 09:12

## 2017-12-06 RX ADMIN — CALCIUM 500 MG: 500 TABLET ORAL at 09:12

## 2017-12-06 RX ADMIN — LISINOPRIL 10 MG: 10 TABLET ORAL at 09:12

## 2017-12-06 RX ADMIN — FINASTERIDE 5 MG: 5 TABLET, FILM COATED ORAL at 09:12

## 2017-12-06 RX ADMIN — OXYCODONE AND ACETAMINOPHEN 1 TABLET: 7.5; 325 TABLET ORAL at 09:12

## 2017-12-06 RX ADMIN — CYANOCOBALAMIN TAB 1000 MCG 1000 MCG: 1000 TAB at 09:12

## 2017-12-06 RX ADMIN — MICONAZOLE NITRATE: 20 POWDER TOPICAL at 09:12

## 2017-12-06 RX ADMIN — FERROUS SULFATE TAB EC 325 MG (65 MG FE EQUIVALENT) 325 MG: 325 (65 FE) TABLET DELAYED RESPONSE at 09:12

## 2017-12-06 RX ADMIN — GABAPENTIN 100 MG: 100 CAPSULE ORAL at 09:12

## 2017-12-06 RX ADMIN — TAMSULOSIN HYDROCHLORIDE 0.4 MG: 0.4 CAPSULE ORAL at 09:12

## 2017-12-06 RX ADMIN — DOCUSATE SODIUM 100 MG: 100 CAPSULE, LIQUID FILLED ORAL at 09:12

## 2017-12-06 RX ADMIN — LEVOTHYROXINE SODIUM 100 MCG: 100 TABLET ORAL at 05:12

## 2017-12-06 RX ADMIN — ATORVASTATIN CALCIUM 20 MG: 20 TABLET, FILM COATED ORAL at 09:12

## 2017-12-06 RX ADMIN — ACETAMINOPHEN 500 MG: 500 TABLET ORAL at 05:12

## 2017-12-06 RX ADMIN — THERA TABS 1 TABLET: TAB at 09:12

## 2017-12-06 RX ADMIN — ASPIRIN 81 MG: 81 TABLET, COATED ORAL at 09:12

## 2017-12-06 RX ADMIN — Medication 10 ML: at 06:12

## 2017-12-06 RX ADMIN — VANCOMYCIN HYDROCHLORIDE 1000 MG: 1 INJECTION, POWDER, LYOPHILIZED, FOR SOLUTION INTRAVENOUS at 05:12

## 2017-12-06 RX ADMIN — OXYCODONE HYDROCHLORIDE AND ACETAMINOPHEN 500 MG: 500 TABLET ORAL at 09:12

## 2017-12-06 RX ADMIN — ACETAMINOPHEN 500 MG: 500 TABLET ORAL at 09:12

## 2017-12-06 NOTE — PLAN OF CARE
Problem: Occupational Therapy Goal  Goal: Occupational Therapy Goal  Goals to be met by: 14 days     Patient will increase functional independence with ADLs by performing:    Feeding with Modified [place]  UE Dressing with Modified [place].   REVISED to setup  LE Dressing with Modified [place] using AD.  REVISED to min A  Grooming while standing with Modified [place].  REVISED to seated with mod I  Toileting from bedside commode with Modified [place] for hygiene and clothing management.   REVISED to MIN A  Bathing from  shower chair/bench with Supervision.  Toilet transfer to bedside commode with Supervision.  Perform functional activity in standing with S for 10' in preparation for safety during standing ADls. --MET        Outcome: Ongoing (interventions implemented as appropriate)  Goals revised. DELON Pollock  12/6/2017

## 2017-12-06 NOTE — PLAN OF CARE
Problem: Patient Care Overview  Goal: Plan of Care Review  Outcome: Ongoing (interventions implemented as appropriate)   12/06/17 0217   Coping/Psychosocial   Plan Of Care Reviewed With patient       Problem: Fall Risk (Adult)  Goal: Identify Related Risk Factors and Signs and Symptoms  Related risk factors and signs and symptoms are identified upon initiation of Human Response Clinical Practice Guideline (CPG)   Outcome: Ongoing (interventions implemented as appropriate)   12/06/17 0217   Fall Risk   Related Risk Factors (Fall Risk) age-related changes;gait/mobility problems

## 2017-12-06 NOTE — PLAN OF CARE
Problem: Physical Therapy Goal  Goal: Physical Therapy Goal  Goals to be met by: 14 DAYS     Patient will increase functional independence with mobility by performin. Supine to sit with Supervision   2. Sit to supine with Supervision   3. Sit to stand transfer with Stand-by Assistance with RW met (2017)  4. Bed to chair transfer with Stand-by Assistance using Rolling Walker = met 2017  5. Gait  x 150 feet with Stand-by Assistance using Rolling Walker. Met (2017)  6. Wheelchair propulsion x100 feet with Stand-by Assistance using bilateral upper extremities   7. Ascend/Descend 4 inch curb step with Contact Guard Assistance using Rolling Walker.= met 2017  8. Sitting at edge of bed x8 minutes with Stand-by Assistance and without UE support, and performing exercises or activity= discontinue  9. Stand for 5 minutes with Contact Guard Assistance using Rolling Walker and perform an activity= met 2017  10. Lower extremity exercise program x20 reps per handout, with assistance as needed and gym therex         Goals remain appropriate. Continue with Physical therapy Plan of Care. More Sanchez, PT 2017

## 2017-12-06 NOTE — PROGRESS NOTES
Patient back from infectious Disease appointment via w/c per hospital transportation. Ndn. Pt is aaox4. Vitals taken ,recorded, pt verbalize comfort and verbalize he is tired. Meal tray warmed and served. Recommendations received in patients folder viewed per charge nurse, original sheet placed in nurse practitioner's Cook mail box a copy in pt's folder. Vancomycin iv administered as ordered.

## 2017-12-06 NOTE — PT/OT/SLP PROGRESS
Physical Therapy  Treatment    Zeyad Woodard   MRN: [id number]   Admitting Diagnosis: MRSA bacteremia    PT Received On: 12/06/17          Billable Minutes:  Gait Training 20, Therapeutic Activity 10 and Therapeutic Exercise 15=45    Treatment Type: Treatment  PT/PTA: PT     PTA Visit Number: 0       General Precautions: Standard, fall  Orthopedic Precautions: N/A   Braces: N/A         Subjective:  Communicated with patient prior to session.  Agreeable to session. Needs to use restroom.   Reports prepared for d/c home as scheduled and daughter will be assisting.    Pain/Comfort  Pain Rating 1: 5/10  Location - Side 1: Right  Location 1: hip  Pain Addressed 1: Distraction  Pain Rating Post-Intervention 1: 5/10    Objective:  Patient found supine in bed   with         Functional Status:  MDS G  Bed Mobility Functional Status: S-SBA  Transfer Functional Status: S-SBA  Walk in Room Functional Status: S-SBA  Walk in Corridor Functional Status: S-SBA  Locomotion on Unit Functional Status: S-SBA  Locomotion Off Unit Functional Status: S-SBA          Bed Mobility:  Sit>Supine:NT  Supine>Sit: SBA bed    Transfers:  Sit<>Stand: w/ RW and SBA from bed, BSC over toilet w/ RW; w/c w/ RW and SBA  Stand Pivot Transfer: w/ RW and SBA to BSC; to w/c  No LOB. Close SBA for all transfers. Occ verbal cues    Gait:  Amb w/ RW and SBA in room 15 feet x2; in hallway w/ RW and  feet   Amb w/ house slippers, did not use AFOs  Advanced Gait:  Curb Step: up/down 4 inch curb step w/ rW and SBA x2 trials.   Amb w/ house slippers; did not use AFOs  Wheelchair Mobility:  Patient propels w/c w/ BUEs and supervision     Therex:  Quad sets,   Ankle  Pumps,   LAQ   Hip flexion  x20 reps       Additional Treatment:  Patient pedaled LBE (lower body ergometer) x15 minutes to increase strength and endurance.  Patient assisted w/ toileting on BSC over toilet. SBA for transfers. Patient performs hygiene. PT assists w/ donning sox and pants, patient  able to pull pants up after toileting.    Patient left up in chair with call button in reach.    Assessment:  Zeyad Woodard is a 89 y.o. male with a medical diagnosis of MRSA bacteremia.  He tolerated session well and demonstrating more consistent performance w/ gait and transfers. Continue to recommend some assistance at time of d/c for safety. Patient will benefit from continued physical therapy to address deficits and improve safety and functional mobility. Continue with physical therapy plan of care. .    Rehab identified problem list/impairments: weakness, impaired endurance, impaired self care skills, impaired functional mobilty, gait instability, impaired balance, decreased lower extremity function, pain, impaired skin, other (comment) (contact prec/MRSA)    Rehab potential is good.    Activity tolerance: Good    Discharge recommendations: home with home health (and likely family/caregiver assist)     Barriers to discharge: Decreased caregiver support, Inaccessible home environment    Equipment recommendations: wheelchair     GOALS:    Physical Therapy Goals        Problem: Physical Therapy Goal    Goal Priority Disciplines Outcome Goal Variances Interventions   Physical Therapy Goal     PT/OT, PT Ongoing (interventions implemented as appropriate)     Description:  Goals to be met by: 14 DAYS     Patient will increase functional independence with mobility by performin. Supine to sit with Supervision   2. Sit to supine with Supervision   3. Sit to stand transfer with Stand-by Assistance with RW met (2017)  4. Bed to chair transfer with Stand-by Assistance using Rolling Walker = met 2017  5. Gait  x 150 feet with Stand-by Assistance using Rolling Walker. Met (2017)  6. Wheelchair propulsion x100 feet with Stand-by Assistance using bilateral upper extremities   7. Ascend/Descend 4 inch curb step with Contact Guard Assistance using Rolling Walker.= met 2017  8. Sitting at edge of  bed x8 minutes with Stand-by Assistance and without UE support, and performing exercises or activity= discontinue  9. Stand for 5 minutes with Contact Guard Assistance using Rolling Walker and perform an activity= met 12/6/2017  10. Lower extremity exercise program x20 reps per handout, with assistance as needed and gym therex                           PLAN:    Patient to be seen 5 x/week  to address the above listed problems via gait training, therapeutic activities, therapeutic exercises, wheelchair management/training  Plan of Care expires: 12/16/17  Plan of Care reviewed with: patient    More JUSTIN Sanchez, PT  12/06/2017

## 2017-12-06 NOTE — PT/OT/SLP PROGRESS
"Occupational Therapy  Treatment    Zeyad Woodard   MRN: [id number]   Admitting Diagnosis: MRSA bacteremia    OT Date of Treatment: 12/06/17  Total Time (min): 25 min    Billable Minutes:  Therapeutic Exercise 25    General Precautions: Standard, fall  Orthopedic Precautions: N/A  Braces: N/A         Subjective:  Communicated with pt prior to session.  'I am trying to do better"    Pain/Comfort  Pain Rating 1: 7/10  Location - Side 1: Right  Location 1: hip  Pain Addressed 1: Pre-medicate for activity  Pain Rating Post-Intervention 1: 7/10    Objective:  Patient found with: PICC line (seated in w/c)                 OT Exercises: UE Ergometer 15 min to increase functional endurance for ADLs  Rickshaw 20# 15 x 4 to increase UE strength and core for mobiltiy  Rowing 20# 15 x 4 to increase UE stength and posture for mobility        Patient left up in chair with call button in reach    ASSESSMENT:  Pt tolerated tx and demonstrated increased endurance and UE strength for ADL. Pt cont to require assist for self care and safe mobility and will cont to benefit from OT to address limitations and increase functional indep and ssafety    Rehab identified problem list/impairments: weakness, impaired endurance, impaired self care skills, impaired functional mobilty, gait instability, impaired balance, decreased upper extremity function, decreased lower extremity function, decreased safety awareness, pain, impaired coordination, impaired skin    Rehab potential is fair    Activity tolerance: Good    Discharge recommendations:  (pt may need more assistance than previously at home)     Barriers to discharge: Decreased caregiver support     Equipment recommendations: wheelchair     GOALS:    Occupational Therapy Goals        Problem: Occupational Therapy Goal    Goal Priority Disciplines Outcome Interventions   Occupational Therapy Goal     OT, PT/OT Ongoing (interventions implemented as appropriate)    Description:  Goals to be met " by: 14 days     Patient will increase functional independence with ADLs by performing:    Feeding with Modified [place]  UE Dressing with Modified [place].   REVISED to setup  LE Dressing with Modified [place] using AD.  REVISED to min A  Grooming while standing with Modified [place].  REVISED to seated with mod I  Toileting from bedside commode with Modified [place] for hygiene and clothing management.   REVISED to MIN A  Bathing from  shower chair/bench with Supervision.  Toilet transfer to bedside commode with Supervision.  Perform functional activity in standing with S for 10' in preparation for safety during standing ADls. --MET                          Plan:  Patient to be seen  (5-6x/week) to address the above listed problems via self-care/home management, therapeutic activities, therapeutic exercises  Plan of Care expires: 12/16/17  Plan of Care reviewed with: patient    DELON Pollock  12/06/2017

## 2017-12-06 NOTE — PROGRESS NOTES
Seen in follow up from hospital stay - treated with Vancomycin for MRSA bacteremia with pacemaker. Abscess in buttock found to be source and removed. First negative blood culture on 11/10. Wound cultures from 11/13 positive for MRSA. Currently at SNF. CRP on 12/4 was high at 21. Vancomycin troughs at 15 - being managed by SNF physicians. No diarrhea, pain,fever,chills,etc.    PICC - no erythema, no tenderness.  Right buttock - wound healing.No periwound erythema.    1. MRSA bacteremia    2. Cellulitis and abscess of buttock         Plan: Continue treatment with IV antibiotics until 12/11, then pull PICC and discharge from SNF.   Continue wound care.  Return in 1 month.

## 2017-12-06 NOTE — PROGRESS NOTES
Informed per pct that patient was picked up for appointment with infectious disease per hospital transportation, via wheel chair.

## 2017-12-07 LAB
ALBUMIN SERPL BCP-MCNC: 3.3 G/DL
ALP SERPL-CCNC: 83 U/L
ALT SERPL W/O P-5'-P-CCNC: 20 U/L
ANION GAP SERPL CALC-SCNC: 6 MMOL/L
AST SERPL-CCNC: 32 U/L
BASOPHILS # BLD AUTO: 0.03 K/UL
BASOPHILS NFR BLD: 0.3 %
BILIRUB SERPL-MCNC: 0.6 MG/DL
BUN SERPL-MCNC: 29 MG/DL
CALCIUM SERPL-MCNC: 9.1 MG/DL
CHLORIDE SERPL-SCNC: 100 MMOL/L
CO2 SERPL-SCNC: 29 MMOL/L
CREAT SERPL-MCNC: 1.2 MG/DL
DIFFERENTIAL METHOD: ABNORMAL
EOSINOPHIL # BLD AUTO: 0.3 K/UL
EOSINOPHIL NFR BLD: 3.5 %
ERYTHROCYTE [DISTWIDTH] IN BLOOD BY AUTOMATED COUNT: 14.7 %
EST. GFR  (AFRICAN AMERICAN): >60 ML/MIN/1.73 M^2
EST. GFR  (NON AFRICAN AMERICAN): 52.9 ML/MIN/1.73 M^2
GLUCOSE SERPL-MCNC: 93 MG/DL
HCT VFR BLD AUTO: 26.1 %
HGB BLD-MCNC: 8.3 G/DL
LYMPHOCYTES # BLD AUTO: 4.6 K/UL
LYMPHOCYTES NFR BLD: 48.1 %
MAGNESIUM SERPL-MCNC: 1.9 MG/DL
MCH RBC QN AUTO: 30.7 PG
MCHC RBC AUTO-ENTMCNC: 31.8 G/DL
MCV RBC AUTO: 97 FL
MONOCYTES # BLD AUTO: 1.2 K/UL
MONOCYTES NFR BLD: 12.6 %
NEUTROPHILS # BLD AUTO: 3.4 K/UL
NEUTROPHILS NFR BLD: 35.5 %
PHOSPHATE SERPL-MCNC: 3.6 MG/DL
PLATELET # BLD AUTO: 130 K/UL
PMV BLD AUTO: 10.1 FL
POCT GLUCOSE: 165 MG/DL (ref 70–110)
POTASSIUM SERPL-SCNC: 4.8 MMOL/L
PROT SERPL-MCNC: 6.2 G/DL
RBC # BLD AUTO: 2.7 M/UL
SODIUM SERPL-SCNC: 135 MMOL/L
VANCOMYCIN TROUGH SERPL-MCNC: 19.2 UG/ML
WBC # BLD AUTO: 9.48 K/UL

## 2017-12-07 PROCEDURE — 80202 ASSAY OF VANCOMYCIN: CPT

## 2017-12-07 PROCEDURE — 80053 COMPREHEN METABOLIC PANEL: CPT

## 2017-12-07 PROCEDURE — 63600175 PHARM REV CODE 636 W HCPCS: Performed by: HOSPITALIST

## 2017-12-07 PROCEDURE — 11000004 HC SNF PRIVATE

## 2017-12-07 PROCEDURE — 25000003 PHARM REV CODE 250: Performed by: INTERNAL MEDICINE

## 2017-12-07 PROCEDURE — 85025 COMPLETE CBC W/AUTO DIFF WBC: CPT

## 2017-12-07 PROCEDURE — 97110 THERAPEUTIC EXERCISES: CPT

## 2017-12-07 PROCEDURE — 97530 THERAPEUTIC ACTIVITIES: CPT

## 2017-12-07 PROCEDURE — 25000003 PHARM REV CODE 250: Performed by: NURSE PRACTITIONER

## 2017-12-07 PROCEDURE — 36415 COLL VENOUS BLD VENIPUNCTURE: CPT

## 2017-12-07 PROCEDURE — 25000003 PHARM REV CODE 250: Performed by: STUDENT IN AN ORGANIZED HEALTH CARE EDUCATION/TRAINING PROGRAM

## 2017-12-07 PROCEDURE — 83735 ASSAY OF MAGNESIUM: CPT

## 2017-12-07 PROCEDURE — 84100 ASSAY OF PHOSPHORUS: CPT

## 2017-12-07 PROCEDURE — A4216 STERILE WATER/SALINE, 10 ML: HCPCS | Performed by: STUDENT IN AN ORGANIZED HEALTH CARE EDUCATION/TRAINING PROGRAM

## 2017-12-07 PROCEDURE — 97116 GAIT TRAINING THERAPY: CPT

## 2017-12-07 RX ORDER — FUROSEMIDE 20 MG/1
20 TABLET ORAL DAILY
Status: COMPLETED | OUTPATIENT
Start: 2017-12-07 | End: 2017-12-08

## 2017-12-07 RX ORDER — LISINOPRIL 20 MG/1
20 TABLET ORAL DAILY
Status: DISCONTINUED | OUTPATIENT
Start: 2017-12-08 | End: 2017-12-12 | Stop reason: HOSPADM

## 2017-12-07 RX ADMIN — FERROUS SULFATE TAB EC 325 MG (65 MG FE EQUIVALENT) 325 MG: 325 (65 FE) TABLET DELAYED RESPONSE at 10:12

## 2017-12-07 RX ADMIN — DOCUSATE SODIUM 100 MG: 100 CAPSULE, LIQUID FILLED ORAL at 10:12

## 2017-12-07 RX ADMIN — ATORVASTATIN CALCIUM 20 MG: 20 TABLET, FILM COATED ORAL at 09:12

## 2017-12-07 RX ADMIN — RAMELTEON 8 MG: 8 TABLET, FILM COATED ORAL at 09:12

## 2017-12-07 RX ADMIN — FUROSEMIDE 20 MG: 20 TABLET ORAL at 01:12

## 2017-12-07 RX ADMIN — Medication 10 ML: at 12:12

## 2017-12-07 RX ADMIN — ASPIRIN 81 MG: 81 TABLET, COATED ORAL at 10:12

## 2017-12-07 RX ADMIN — CYANOCOBALAMIN TAB 1000 MCG 1000 MCG: 1000 TAB at 10:12

## 2017-12-07 RX ADMIN — CARVEDILOL 6.25 MG: 6.25 TABLET, FILM COATED ORAL at 10:12

## 2017-12-07 RX ADMIN — Medication 10 ML: at 05:12

## 2017-12-07 RX ADMIN — VANCOMYCIN HYDROCHLORIDE 1000 MG: 1 INJECTION, POWDER, LYOPHILIZED, FOR SOLUTION INTRAVENOUS at 05:12

## 2017-12-07 RX ADMIN — DOCUSATE SODIUM 100 MG: 100 CAPSULE, LIQUID FILLED ORAL at 09:12

## 2017-12-07 RX ADMIN — GABAPENTIN 100 MG: 100 CAPSULE ORAL at 10:12

## 2017-12-07 RX ADMIN — GABAPENTIN 300 MG: 100 CAPSULE ORAL at 09:12

## 2017-12-07 RX ADMIN — OXYCODONE HYDROCHLORIDE AND ACETAMINOPHEN 500 MG: 500 TABLET ORAL at 10:12

## 2017-12-07 RX ADMIN — FINASTERIDE 5 MG: 5 TABLET, FILM COATED ORAL at 10:12

## 2017-12-07 RX ADMIN — THERA TABS 1 TABLET: TAB at 10:12

## 2017-12-07 RX ADMIN — ACETAMINOPHEN 500 MG: 500 TABLET ORAL at 05:12

## 2017-12-07 RX ADMIN — LISINOPRIL 10 MG: 10 TABLET ORAL at 10:12

## 2017-12-07 RX ADMIN — OXYCODONE AND ACETAMINOPHEN 1 TABLET: 7.5; 325 TABLET ORAL at 10:12

## 2017-12-07 RX ADMIN — DONEPEZIL HYDROCHLORIDE 10 MG: 10 TABLET, FILM COATED ORAL at 09:12

## 2017-12-07 RX ADMIN — TAMSULOSIN HYDROCHLORIDE 0.4 MG: 0.4 CAPSULE ORAL at 10:12

## 2017-12-07 RX ADMIN — ACETAMINOPHEN 500 MG: 500 TABLET ORAL at 02:12

## 2017-12-07 RX ADMIN — CARVEDILOL 6.25 MG: 6.25 TABLET, FILM COATED ORAL at 09:12

## 2017-12-07 RX ADMIN — Medication 10 ML: at 01:12

## 2017-12-07 RX ADMIN — MICONAZOLE NITRATE: 20 POWDER TOPICAL at 10:12

## 2017-12-07 RX ADMIN — CALCIUM 500 MG: 500 TABLET ORAL at 10:12

## 2017-12-07 RX ADMIN — LEVOTHYROXINE SODIUM 100 MCG: 100 TABLET ORAL at 05:12

## 2017-12-07 RX ADMIN — MICONAZOLE NITRATE: 20 POWDER TOPICAL at 09:12

## 2017-12-07 RX ADMIN — ACETAMINOPHEN 500 MG: 500 TABLET ORAL at 09:12

## 2017-12-07 NOTE — PLAN OF CARE
Problem: Occupational Therapy Goal  Goal: Occupational Therapy Goal  Goals to be met by: 14 days     Patient will increase functional independence with ADLs by performing:    Feeding with Modified [place]  UE Dressing with Modified [place].   REVISED to setup  LE Dressing with Modified [place] using AD.  REVISED to min A  Grooming while standing with Modified [place].  REVISED to seated with mod I  Toileting from bedside commode with Modified [place] for hygiene and clothing management.   REVISED to MIN A  Bathing from  shower chair/bench with Supervision.  Toilet transfer to bedside commode with Supervision.  Perform functional activity in standing with S for 10' in preparation for safety during standing ADls. --MET         Outcome: Ongoing (interventions implemented as appropriate)  Patient's goals are appropriate.   DELON Hutchins  12/7/2017

## 2017-12-07 NOTE — PROGRESS NOTES
12/07/2017  9:49 AM    Discharge Planning-Met with patient in room to inform of new discharge date of 12/12/2017. Informed patient ID MD wants to extend IV Abx. Discharge date written on dry erase board in room. Patient stated understanding to discharge date.  Melissa Wyatt RN, CM Skilled  [id number]

## 2017-12-07 NOTE — PT/OT/SLP PROGRESS
Occupational Therapy  Treatment    Zeyad Woodard   MRN: [id number]   Admitting Diagnosis: MRSA bacteremia    OT Date of Treatment: 12/07/17  Total Time (min): 55 min    Billable Minutes:  Therapeutic Activity 25 and Therapeutic Exercise 35    General Precautions: Standard, fall  Orthopedic Precautions: N/A  Braces: N/A         Subjective:  Communicated with patient prior to session.    Pain/Comfort  Pain Rating 1: 5/10  Location - Side 1: Right  Location - Orientation 1: generalized  Location 1: hip  Pain Addressed 1: Reposition, Distraction  Pain Rating Post-Intervention 1: 5/10    Objective:       Functional Status:  MDS G  Personal Hygiene Functional Status: mod(I) seated in w/c at sink for oral care and washing face          OT Exercises: UE Ergometer 15 min for improving endurance to increase independence with ADLs.     Patient performed B UE ROM exercises in all planes and joints using 2# dowel vandana 1 x 15 for improving endurance to increase independence with ADLs.     Additional Treatment:  Patient performed a functional activity standing and sidestepping L<>R to remove and replace objects /c supervision in order to perform household tasks    Patient left up in chair with call button in reach and all needs met.     ASSESSMENT:  Zeyad Woodard is a 89 y.o. male with a medical diagnosis of MRSA bacteremia and presents with the deficits listed below. Patient tolerated treatment session and was motivated to complete tasks. Patient continues to benefit from skilled OT services to achieve maximal independence.    Rehab identified problem list/impairments: weakness, impaired endurance, impaired self care skills, impaired functional mobilty, gait instability, impaired balance, decreased lower extremity function, pain, impaired skin    Rehab potential is good    Activity tolerance: Good    Discharge recommendations:  (patient may need more assistance than previously at home)     Barriers to discharge: Inaccessible  home environment, Decreased caregiver support     Equipment recommendations: wheelchair     GOALS:    Occupational Therapy Goals        Problem: Occupational Therapy Goal    Goal Priority Disciplines Outcome Interventions   Occupational Therapy Goal     OT, PT/OT Ongoing (interventions implemented as appropriate)    Description:  Goals to be met by: 14 days     Patient will increase functional independence with ADLs by performing:    Feeding with Modified [place]  UE Dressing with Modified [place].   REVISED to setup  LE Dressing with Modified [place] using AD.  REVISED to min A  Grooming while standing with Modified [place].  REVISED to seated with mod I  Toileting from bedside commode with Modified [place] for hygiene and clothing management.   REVISED to MIN A  Bathing from  shower chair/bench with Supervision.  Toilet transfer to bedside commode with Supervision.  Perform functional activity in standing with S for 10' in preparation for safety during standing ADls. --MET                          Plan:  Patient to be seen 5 x/week to address the above listed problems via self-care/home management, therapeutic activities, therapeutic exercises  Plan of Care expires: 12/16/17  Plan of Care reviewed with: patient    DELON Hutchins  12/07/2017

## 2017-12-07 NOTE — PT/OT/SLP PROGRESS
Physical Therapy  Treatment    Zeyad Woodard   MRN: [id number]   Admitting Diagnosis: MRSA bacteremia    PT Received On: 12/07/17          Billable Minutes:  Gait Training 15, Therapeutic Activity 19 and Therapeutic Exercise 15=45    Treatment Type: Treatment  PT/PTA: PT     PTA Visit Number: 0       General Precautions: Standard, fall  Orthopedic Precautions: N/A   Braces: N/A    Subjective:  Communicated with patient prior to session.  Patient agreeable to schedule change.     Pain/Comfort  Pain Rating 1: 5/10  Location - Side 1: Right  Location - Orientation 1: generalized  Location 1: hip  Pain Addressed 1: Reposition, Distraction  Pain Rating Post-Intervention 1: 5/10    Objective:  Patient found transferring w/ PCT to w/c for ADLS  Pt returns at agreed upon rescheduled time and patient in w/c   with         Functional Status:  MDS G  Transfer Functional Status: S-SBA  Walk in Room Functional Status: S-SBA  Walk in Corridor Functional Status: S-SBA  Locomotion on Unit Functional Status: S-SBA  Locomotion Off Unit Functional Status: S-SBA  Moving from seated to standing position: Not steady, only able to stabilize with staff assistance  Walking (with assistive device if used): Not steady, only able to stabilize with staff assistance  Turning around and facing the opposite direction while walking: Not steady, only able to stabilize with staff assistance  Moving on and off the toilet: Not steady, only able to stabilize with staff assistance  Surface-to-surface transfer (transfer between bed and chair or wheelchair): Not steady, only able to stabilize with staff assistance        Transfers:  Sit<>Stand: to/from w/c w/ RW and SBA x2 trials; to/from BSC over toilet w/ RW and SBA for transfer (but LOB when managing pants)  Stand Pivot Transfer: w/c<>BSC over toilet w/ RW and SBA      Gait:  Amb 150 feet w/ RW and SBA w/ patient wearing house slippers; Amb 150 feet w/ patient wearing B AFOs w/ RW and SBA  Forward  flexed posture, cues for improved erect posture. Morris step on LLE; each gait trial w/ x2 on curb step     Advanced Gait:   Curb step: up/down 4 inch curb step x2 trials w/ RW and close SBA x1 trial and CGA x1 trial w/ patient exhibiting mild unsteadiness placing RW up on step second trial - patient wearing house slippers  Up/down curb step x2 trials w/ RW and SBA/CGA w/ patient wearing B AFOs     Additional Treatment:  Patient asks to use restroom. Amb w/ RW 10 feet to [facility] over toilet. Patient has BM and performs posteior jose maria care w/o assist. Patient stands to manage pants and has 2 posterior LOB w/ PT providing min assist for stability and then assisting w/ pants.   Patient pedaled LBE (lower body ergometer) x15 minutes to increase strength and endurance.    Patient left up in chair with call button in reach.    Assessment:  Zeyad Woodard is a 89 y.o. male with a medical diagnosis of MRSA bacteremia.  Patient progressing well w/ gait and transfers. He continues w/ occ LOB posteriorly, today experiencing 2 LOB requiring min assist to regain while pulling up pants after toileting and unsteadiness w/ curb step ambulation. Continue to recommend assistance at home at time of discharge. Patient will benefit from continued physical therapy to address deficits and improve safety and functional mobility. Continue with physical therapy plan of care. .    Rehab identified problem list/impairments: weakness, impaired endurance, impaired self care skills, impaired functional mobilty, gait instability, impaired balance, decreased lower extremity function, pain, impaired skin, other (comment) (contact prec/MRSA)    Rehab potential is good.    Activity tolerance: Good    Discharge recommendations: home with home health (and likely family/caregiver assist)     Barriers to discharge: Decreased caregiver support, Inaccessible home environment    Equipment recommendations: wheelchair     GOALS:    Physical Therapy Goals         Problem: Physical Therapy Goal    Goal Priority Disciplines Outcome Goal Variances Interventions   Physical Therapy Goal     PT/OT, PT Ongoing (interventions implemented as appropriate)     Description:  Goals to be met by: 14 DAYS     Patient will increase functional independence with mobility by performin. Supine to sit with Supervision   2. Sit to supine with Supervision   3. Sit to stand transfer with Stand-by Assistance with RW= met (2017)  4. Bed to chair transfer with Stand-by Assistance using Rolling Walker = met 2017  5. Gait  x 150 feet with Stand-by Assistance using Rolling Walker. =Met (2017)  6. Wheelchair propulsion x100 feet with Stand-by Assistance using bilateral upper extremities =met  7. Ascend/Descend 4 inch curb step with Contact Guard Assistance using Rolling Walker.= met 2017  8. Sitting at edge of bed x8 minutes with Stand-by Assistance and without UE support, and performing exercises or activity= discontinue  9. Stand for 5 minutes with Contact Guard Assistance using Rolling Walker and perform an activity= met 2017  10. Lower extremity exercise program x20 reps per handout, with assistance as needed and gym therex= not met                            PLAN:    Patient to be seen 5 x/week  to address the above listed problems via gait training, therapeutic activities, therapeutic exercises, wheelchair management/training  Plan of Care expires: 17  Plan of Care reviewed with: patient    More Sanchez, PT  2017

## 2017-12-07 NOTE — PLAN OF CARE
Problem: Physical Therapy Goal  Goal: Physical Therapy Goal  Goals to be met by: 14 DAYS     Patient will increase functional independence with mobility by performin. Supine to sit with Supervision   2. Sit to supine with Supervision   3. Sit to stand transfer with Stand-by Assistance with RW= met (2017)  4. Bed to chair transfer with Stand-by Assistance using Rolling Walker = met 2017  5. Gait  x 150 feet with Stand-by Assistance using Rolling Walker. =Met (2017)  6. Wheelchair propulsion x100 feet with Stand-by Assistance using bilateral upper extremities =met  7. Ascend/Descend 4 inch curb step with Contact Guard Assistance using Rolling Walker.= met 2017  8. Sitting at edge of bed x8 minutes with Stand-by Assistance and without UE support, and performing exercises or activity= discontinue  9. Stand for 5 minutes with Contact Guard Assistance using Rolling Walker and perform an activity= met 2017  10. Lower extremity exercise program x20 reps per handout, with assistance as needed and gym therex= not met          Goals remain appropriate. Continue with Physical therapy Plan of Care. More Sanchez, PT 2017

## 2017-12-07 NOTE — TREATMENT PLAN
Rehab Services' DME recommendations    Zeyad Woodard  MRN: [id number]    [x] Wheelchair  Family does want a w/c for patient; foam/basic cushion unless patient is qualified for a gel (has to have stage 3, per MSW)  Number of hours up in a wheelchair per day 8+    Style Light weight    Seat Width 18 (Standard adult)    Seat Depth standard    Back Height Standard    Leg Support Standard, Heel Loops and Swing Away    Arm Height Full and Swing Away    Lap Belt Velcro    Accessories     **Cushion Gel only if qualifies for this;  Basic/Foam otherwise.     Justification for Cushion for pressure relief.     Justification for wheelchair order: (Please select all that apply) Caregiver is capable and willing to operate wheelchair safely, Patient's upper body strength is sufficient for propulsion, The patient requires the use of a wheelchair for ADLs within the home and Patient mobility limitations cannot be sufficiently resolved by the use of other ambulatory therapies      [x] 3 in 1 commode Standard  [x] Home health PT and OT        More Sanchez, PT 12/7/2017

## 2017-12-08 LAB — VANCOMYCIN TROUGH SERPL-MCNC: 18.2 UG/ML

## 2017-12-08 PROCEDURE — 25000003 PHARM REV CODE 250: Performed by: INTERNAL MEDICINE

## 2017-12-08 PROCEDURE — 97530 THERAPEUTIC ACTIVITIES: CPT

## 2017-12-08 PROCEDURE — 97116 GAIT TRAINING THERAPY: CPT

## 2017-12-08 PROCEDURE — 97535 SELF CARE MNGMENT TRAINING: CPT

## 2017-12-08 PROCEDURE — 63600175 PHARM REV CODE 636 W HCPCS: Performed by: HOSPITALIST

## 2017-12-08 PROCEDURE — 25000003 PHARM REV CODE 250: Performed by: NURSE PRACTITIONER

## 2017-12-08 PROCEDURE — 80202 ASSAY OF VANCOMYCIN: CPT

## 2017-12-08 PROCEDURE — 11000004 HC SNF PRIVATE

## 2017-12-08 PROCEDURE — 25000003 PHARM REV CODE 250: Performed by: STUDENT IN AN ORGANIZED HEALTH CARE EDUCATION/TRAINING PROGRAM

## 2017-12-08 PROCEDURE — A4216 STERILE WATER/SALINE, 10 ML: HCPCS | Performed by: STUDENT IN AN ORGANIZED HEALTH CARE EDUCATION/TRAINING PROGRAM

## 2017-12-08 PROCEDURE — 36415 COLL VENOUS BLD VENIPUNCTURE: CPT

## 2017-12-08 RX ADMIN — Medication 10 ML: at 11:12

## 2017-12-08 RX ADMIN — ASPIRIN 81 MG: 81 TABLET, COATED ORAL at 09:12

## 2017-12-08 RX ADMIN — TAMSULOSIN HYDROCHLORIDE 0.4 MG: 0.4 CAPSULE ORAL at 09:12

## 2017-12-08 RX ADMIN — FINASTERIDE 5 MG: 5 TABLET, FILM COATED ORAL at 09:12

## 2017-12-08 RX ADMIN — OXYCODONE HYDROCHLORIDE AND ACETAMINOPHEN 500 MG: 500 TABLET ORAL at 09:12

## 2017-12-08 RX ADMIN — DOCUSATE SODIUM 100 MG: 100 CAPSULE, LIQUID FILLED ORAL at 09:12

## 2017-12-08 RX ADMIN — CARVEDILOL 6.25 MG: 6.25 TABLET, FILM COATED ORAL at 09:12

## 2017-12-08 RX ADMIN — ACETAMINOPHEN 500 MG: 500 TABLET ORAL at 09:12

## 2017-12-08 RX ADMIN — FUROSEMIDE 20 MG: 20 TABLET ORAL at 09:12

## 2017-12-08 RX ADMIN — Medication 10 ML: at 09:12

## 2017-12-08 RX ADMIN — Medication 10 ML: at 05:12

## 2017-12-08 RX ADMIN — RAMELTEON 8 MG: 8 TABLET, FILM COATED ORAL at 09:12

## 2017-12-08 RX ADMIN — LEVOTHYROXINE SODIUM 100 MCG: 100 TABLET ORAL at 05:12

## 2017-12-08 RX ADMIN — ACETAMINOPHEN 500 MG: 500 TABLET ORAL at 02:12

## 2017-12-08 RX ADMIN — ATORVASTATIN CALCIUM 20 MG: 20 TABLET, FILM COATED ORAL at 09:12

## 2017-12-08 RX ADMIN — FERROUS SULFATE TAB EC 325 MG (65 MG FE EQUIVALENT) 325 MG: 325 (65 FE) TABLET DELAYED RESPONSE at 09:12

## 2017-12-08 RX ADMIN — CYANOCOBALAMIN TAB 1000 MCG 1000 MCG: 1000 TAB at 09:12

## 2017-12-08 RX ADMIN — Medication 10 ML: at 12:12

## 2017-12-08 RX ADMIN — LISINOPRIL 20 MG: 20 TABLET ORAL at 09:12

## 2017-12-08 RX ADMIN — DONEPEZIL HYDROCHLORIDE 10 MG: 10 TABLET, FILM COATED ORAL at 09:12

## 2017-12-08 RX ADMIN — MICONAZOLE NITRATE: 20 POWDER TOPICAL at 09:12

## 2017-12-08 RX ADMIN — ACETAMINOPHEN 500 MG: 500 TABLET ORAL at 05:12

## 2017-12-08 RX ADMIN — THERA TABS 1 TABLET: TAB at 09:12

## 2017-12-08 RX ADMIN — VANCOMYCIN HYDROCHLORIDE 1000 MG: 1 INJECTION, POWDER, LYOPHILIZED, FOR SOLUTION INTRAVENOUS at 05:12

## 2017-12-08 RX ADMIN — CALCIUM 500 MG: 500 TABLET ORAL at 09:12

## 2017-12-08 RX ADMIN — GABAPENTIN 100 MG: 100 CAPSULE ORAL at 09:12

## 2017-12-08 RX ADMIN — GABAPENTIN 300 MG: 100 CAPSULE ORAL at 09:12

## 2017-12-08 NOTE — PT/OT/SLP PROGRESS
Occupational Therapy  Treatment    Zeyad Woodard   MRN: [id number]   Admitting Diagnosis: MRSA bacteremia    OT Date of Treatment: 12/08/17  Total Time (min): 50 min    Billable Minutes:  Self Care/Home Management 40 and Therapeutic Activity 10    General Precautions: Standard, fall (standard)  Orthopedic Precautions: N/A  Braces: N/A         Subjective:  Communicated with nurse prior to session.      Pain/Comfort  Pain Rating 1: 3/10  Location - Side 1: Right  Location - Orientation 1: lower  Location 1: back  Pain Addressed 1: Reposition, Distraction  Pain Rating Post-Intervention 1: 3/10    Objective:  Patient found with:  (no lines)    Functional Status:  MDS G  Bed Mobility Functional Status: S-SBA (supine to sitting EOB)     Transfer Functional Status: S-SBA (RW from EOB to W/C and W/C to BSC.)    Dressing Functional Status: CGA-Min (A) (Performed UBD donning gown like a robe with SBA. LBD donning pants, socks and slip on shoes with SBA /CGA when standing.)    Toilet Use Functional Status: CGA-Min (A) (CGA provided when standing to clean [place] area and to manage clothing over hips.)    Personal Hygiene Functional Status: mod(I) - (I) (sitting sinkside to perform all aspects of grooming.)      Moving from seated to standing position: Not steady, but able to stabilize without staff assistance  Walking (with assistive device if used): Not steady, but able to stabilize without staff assistance  Turning around and facing the opposite direction while walking: Not steady, but able to stabilize without staff assistance  Moving on and off the toilet: Not steady, but able to stabilize without staff assistance  Surface-to-surface transfer (transfer between bed and chair or wheelchair): Not steady, but able to stabilize without staff assistance (RW to steady.)      Additional Treatment:    Walk in Room Functional Status: S-SBA (ambulated from EOB to restroom 12 ft with RW and (S).)      Patient left up in chair with call  button in reach and nurse notified and daughter present.    ASSESSMENT:  Zeyad Woodard is a 89 y.o. male with a medical diagnosis of MRSA bacteremia .  Pt was agreeable to OT and tolerated Tx without incident but continues to require OT intervention (A) to perform functional activities to completion as well as to safely perform functional mobility and functional transfers.  Pt is making progress and daughter instructed in level of assist required for Pt to perform self care tasks and functional mobility safely.   OT is recommended to further his functional (I)ce and safety. Goals remain appropriate and continued OT is recommended.    Rehab identified problem list/impairments: weakness, impaired endurance, impaired self care skills, impaired functional mobilty, gait instability, impaired balance, decreased lower extremity function, pain, impaired skin    Rehab potential is good    Activity tolerance: Good    Discharge recommendations:  (patient may need more assistance than previously at home)     Barriers to discharge: Inaccessible home environment, Decreased caregiver support     Equipment recommendations: wheelchair     GOALS:    Occupational Therapy Goals        Problem: Occupational Therapy Goal    Goal Priority Disciplines Outcome Interventions   Occupational Therapy Goal     OT, PT/OT Ongoing (interventions implemented as appropriate)    Description:  Goals to be met by: 14 days     Patient will increase functional independence with ADLs by performing:    Feeding with Modified [place]  UE Dressing with Modified [place].   REVISED to setup .   Met 12/8/17  LE Dressing with Modified [place] using AD.  REVISED to min A  Grooming while standing with Modified [place].  REVISED to seated with mod I   Met 12/8/17  Toileting from bedside commode with Modified [place] for hygiene and clothing management.   REVISED to MIN A.    Met  12/8/17  Bathing from  shower chair/bench with  Supervision.  Toilet transfer to bedside commode with Supervision.  Perform functional activity in standing with S for 10' in preparation for safety during standing ADls. --MET                           Plan:  Patient to be seen 5 x/week to address the above listed problems via self-care/home management, therapeutic activities, therapeutic exercises  Plan of Care expires: 12/16/17  Plan of Care reviewed with: patient    Wilder JULIO Blake, ANA PAULA/PER  12/08/2017

## 2017-12-08 NOTE — PLAN OF CARE
Problem: Occupational Therapy Goal  Goal: Occupational Therapy Goal  Goals to be met by: 14 days     Patient will increase functional independence with ADLs by performing:    Feeding with Modified [place]  UE Dressing with Modified [place].   REVISED to setup .   Met 12/8/17  LE Dressing with Modified [place] using AD.  REVISED to min A  Grooming while standing with Modified [place].  REVISED to seated with mod I   Met 12/8/17  Toileting from bedside commode with Modified [place] for hygiene and clothing management.   REVISED to MIN A.    Met  12/8/17  Bathing from  shower chair/bench with Supervision.  Toilet transfer to bedside commode with Supervision.  Perform functional activity in standing with S for 10' in preparation for safety during standing ADls. --MET         Outcome: Ongoing (interventions implemented as appropriate)  ANA PAULA Booth/PER      12/8/2017

## 2017-12-08 NOTE — PT/OT/SLP PROGRESS
Physical Therapy  Treatment    Zeyad Woodard   MRN: [id number]   Admitting Diagnosis: MRSA bacteremia    PT Received On: 12/08/17          Billable Minutes:  Gait Training 25 and Therapeutic Activity 15=40    Treatment Type: Treatment  PT/PTA: PT     PTA Visit Number: 0       General Precautions: Standard, fall  Orthopedic Precautions: N/A   Braces: N/A         Subjective:  Communicated with patient prior to session.  Patient agreeable to session;  Asia Avery present for FT and demonstrates understanding and ability to assist/supervise w/ fxnl mobility    Pain/Comfort  Pain Rating 1: 5/10  Location - Side 1: Right  Location - Orientation 1: lower  Location 1: back  Pain Addressed 1: Reposition, Distraction  Pain Rating Post-Intervention 1: 5/10    Objective:  Patient found w/ OT completing FT   with         Functional Status:  MDS G  Bed Mobility Functional Status: S-SBA  Transfer Functional Status: S-SBA  Walk in Room Functional Status: S-SBA  Walk in Corridor Functional Status: S-SBA  Locomotion on Unit Functional Status: S-SBA  Locomotion Off Unit Functional Status: S-SBA  Moving from seated to standing position: Not steady, but able to stabilize without staff assistance  Walking (with assistive device if used): Not steady, but able to stabilize without staff assistance  Turning around and facing the opposite direction while walking: Not steady, only able to stabilize with staff assistance  Surface-to-surface transfer (transfer between bed and chair or wheelchair): Not steady, but able to stabilize without staff assistance          Bed Mobility:  Sit>Supine:supervision on bed  Supine>Sit: supervision w/ OT    Transfers:  Sit<>Stand: to/from w/c w/ RW and SBA x2 trials  Stand Pivot Transfer: to/from w/c w/ RW and SBA   Dtr participates     Gait:  Amb w/ RW and  feet w/ house slippers; after seated rest break, amb 150 feet w/ shoes and B AFOs w SBa  Dtr participates.     Advanced Gait:  Curb Step: up/down  4 inch curb step w/ RW and Close SBA x2 trials=once wearing house slippers and one w/ B AFOs    Wheelchair Mobility:  Patient propels w/c w/ BUEs and SBA 75 feet.   Patient and dtr educated in use of w/c up/down curb step. PT demonstrates. Dtr declines to perform as she has a shoulder injury.  Additional Treatment:  Educate/discuss DME needs and options for w/c    Patient left up in chair with call button in reach and daughter present.    Assessment:  Zeyad Woodard is a 89 y.o. male with a medical diagnosis of MRSA bacteremia.  His daughter Asia participated in family training today. She has been assisting patient daily PTA and her sister will be with patient over the holidays and Asia will educate her. She demonstrates ability to assist patient appropriately and to understand concerns regarding falling. She reports they will likely get some assistance for patient after visiting sister departs. Patient will benefit from continued physical therapy to address deficits and improve safety and functional mobility. Continue with physical therapy plan of care.     Rehab identified problem list/impairments: weakness, impaired endurance, impaired self care skills, impaired functional mobilty, gait instability, impaired balance, decreased lower extremity function, pain, impaired skin, other (comment) (contact prec/MRSA)    Rehab potential is good.    Activity tolerance: Good    Discharge recommendations: home with home health (and likely family/caregiver assist)     Barriers to discharge: Decreased caregiver support, Inaccessible home environment    Equipment recommendations: wheelchair     GOALS:    Physical Therapy Goals        Problem: Physical Therapy Goal    Goal Priority Disciplines Outcome Goal Variances Interventions   Physical Therapy Goal     PT/OT, PT Ongoing (interventions implemented as appropriate)     Description:  Goals to be met by: 14 DAYS     Patient will increase functional independence with  mobility by performin. Supine to sit with Supervision = met 2017   2. Sit to supine with Supervision =met 2017  3. Sit to stand transfer with Stand-by Assistance with RW= met (2017)  4. Bed to chair transfer with Stand-by Assistance using Rolling Walker = met 2017  5. Gait  x 150 feet with Stand-by Assistance using Rolling Walker. =Met (2017)  6. Wheelchair propulsion x100 feet with Stand-by Assistance using bilateral upper extremities =met  7. Ascend/Descend 4 inch curb step with Contact Guard Assistance using Rolling Walker.= met 2017  8. Sitting at edge of bed x8 minutes with Stand-by Assistance and without UE support, and performing exercises or activity= discontinue  9. Stand for 5 minutes with Contact Guard Assistance using Rolling Walker and perform an activity= met 2017    10. Lower extremity exercise program x20 reps per handout, with assistance as needed and gym therex= not met   NEW GOAL 2017 11. Patient will ambulate 50 feet w/ RW and supervision = not met                          PLAN:    Patient to be seen 5 x/week  to address the above listed problems via gait training, therapeutic activities, therapeutic exercises, wheelchair management/training  Plan of Care expires: 17  Plan of Care reviewed with: patient    More JUSTIN Sanchez, PT  2017

## 2017-12-08 NOTE — PLAN OF CARE
Problem: Physical Therapy Goal  Goal: Physical Therapy Goal  Goals to be met by: 14 DAYS     Patient will increase functional independence with mobility by performin. Supine to sit with Supervision = met 2017   2. Sit to supine with Supervision =met 2017  3. Sit to stand transfer with Stand-by Assistance with RW= met (2017)  4. Bed to chair transfer with Stand-by Assistance using Rolling Walker = met 2017  5. Gait  x 150 feet with Stand-by Assistance using Rolling Walker. =Met (2017)  6. Wheelchair propulsion x100 feet with Stand-by Assistance using bilateral upper extremities =met  7. Ascend/Descend 4 inch curb step with Contact Guard Assistance using Rolling Walker.= met 2017  8. Sitting at edge of bed x8 minutes with Stand-by Assistance and without UE support, and performing exercises or activity= discontinue  9. Stand for 5 minutes with Contact Guard Assistance using Rolling Walker and perform an activity= met 2017    10. Lower extremity exercise program x20 reps per handout, with assistance as needed and gym therex= not met   NEW GOAL 2017 11. Patient will ambulate 50 feet w/ RW and supervision = not met        Goals remain appropriate. Continue with Physical therapy Plan of Care. More Sanchez, PT 2017

## 2017-12-08 NOTE — PLAN OF CARE
12/08/2017  10:27 AM     12/08/17 1027   Medicare Message   Important Message from Medicare regarding Discharge Appeal Rights Given to patient/caregiver;Explained to patient/caregiver;Signed/date by patient/caregiver   ROMÁN served NOMNC to patient notifying of discharge date of 12/12/17 and appeal right.  Patient expressed understanding and satisfaction regarding discharge date.  Patient signed NOMNC, and ROMÁN provided patient with copy.  ROMÁN faxed copy of signed NOMNC to [employer] (fx [phone]) and placed original in patient's blue chart in nurse's station.    Charanjit Oviedo LMSW  [id number]

## 2017-12-09 PROCEDURE — 97116 GAIT TRAINING THERAPY: CPT

## 2017-12-09 PROCEDURE — 11000004 HC SNF PRIVATE

## 2017-12-09 PROCEDURE — 25000003 PHARM REV CODE 250: Performed by: STUDENT IN AN ORGANIZED HEALTH CARE EDUCATION/TRAINING PROGRAM

## 2017-12-09 PROCEDURE — 63600175 PHARM REV CODE 636 W HCPCS: Performed by: HOSPITALIST

## 2017-12-09 PROCEDURE — 97110 THERAPEUTIC EXERCISES: CPT

## 2017-12-09 PROCEDURE — 25000003 PHARM REV CODE 250: Performed by: HOSPITALIST

## 2017-12-09 PROCEDURE — 25000003 PHARM REV CODE 250: Performed by: INTERNAL MEDICINE

## 2017-12-09 PROCEDURE — A4216 STERILE WATER/SALINE, 10 ML: HCPCS | Performed by: STUDENT IN AN ORGANIZED HEALTH CARE EDUCATION/TRAINING PROGRAM

## 2017-12-09 PROCEDURE — 25000003 PHARM REV CODE 250: Performed by: NURSE PRACTITIONER

## 2017-12-09 PROCEDURE — 97530 THERAPEUTIC ACTIVITIES: CPT

## 2017-12-09 RX ADMIN — MICONAZOLE NITRATE: 20 POWDER TOPICAL at 09:12

## 2017-12-09 RX ADMIN — FERROUS SULFATE TAB EC 325 MG (65 MG FE EQUIVALENT) 325 MG: 325 (65 FE) TABLET DELAYED RESPONSE at 08:12

## 2017-12-09 RX ADMIN — Medication 10 ML: at 11:12

## 2017-12-09 RX ADMIN — CALCIUM 500 MG: 500 TABLET ORAL at 08:12

## 2017-12-09 RX ADMIN — FINASTERIDE 5 MG: 5 TABLET, FILM COATED ORAL at 08:12

## 2017-12-09 RX ADMIN — TAMSULOSIN HYDROCHLORIDE 0.4 MG: 0.4 CAPSULE ORAL at 08:12

## 2017-12-09 RX ADMIN — RAMELTEON 8 MG: 8 TABLET, FILM COATED ORAL at 09:12

## 2017-12-09 RX ADMIN — CARVEDILOL 6.25 MG: 6.25 TABLET, FILM COATED ORAL at 09:12

## 2017-12-09 RX ADMIN — ACETAMINOPHEN 650 MG: 325 TABLET ORAL at 09:12

## 2017-12-09 RX ADMIN — LEVOTHYROXINE SODIUM 100 MCG: 100 TABLET ORAL at 05:12

## 2017-12-09 RX ADMIN — CARVEDILOL 6.25 MG: 6.25 TABLET, FILM COATED ORAL at 08:12

## 2017-12-09 RX ADMIN — Medication 10 ML: at 05:12

## 2017-12-09 RX ADMIN — ACETAMINOPHEN 500 MG: 500 TABLET ORAL at 09:12

## 2017-12-09 RX ADMIN — ACETAMINOPHEN 500 MG: 500 TABLET ORAL at 01:12

## 2017-12-09 RX ADMIN — VANCOMYCIN HYDROCHLORIDE 1000 MG: 1 INJECTION, POWDER, LYOPHILIZED, FOR SOLUTION INTRAVENOUS at 04:12

## 2017-12-09 RX ADMIN — DONEPEZIL HYDROCHLORIDE 10 MG: 10 TABLET, FILM COATED ORAL at 09:12

## 2017-12-09 RX ADMIN — DOCUSATE SODIUM 100 MG: 100 CAPSULE, LIQUID FILLED ORAL at 08:12

## 2017-12-09 RX ADMIN — OXYCODONE HYDROCHLORIDE AND ACETAMINOPHEN 500 MG: 500 TABLET ORAL at 08:12

## 2017-12-09 RX ADMIN — MICONAZOLE NITRATE: 20 POWDER TOPICAL at 08:12

## 2017-12-09 RX ADMIN — LISINOPRIL 20 MG: 20 TABLET ORAL at 08:12

## 2017-12-09 RX ADMIN — THERA TABS 1 TABLET: TAB at 08:12

## 2017-12-09 RX ADMIN — GABAPENTIN 300 MG: 100 CAPSULE ORAL at 09:12

## 2017-12-09 RX ADMIN — DOCUSATE SODIUM 100 MG: 100 CAPSULE, LIQUID FILLED ORAL at 09:12

## 2017-12-09 RX ADMIN — ACETAMINOPHEN 500 MG: 500 TABLET ORAL at 05:12

## 2017-12-09 RX ADMIN — Medication 10 ML: at 06:12

## 2017-12-09 RX ADMIN — CYANOCOBALAMIN TAB 1000 MCG 1000 MCG: 1000 TAB at 08:12

## 2017-12-09 RX ADMIN — ASPIRIN 81 MG: 81 TABLET, COATED ORAL at 08:12

## 2017-12-09 RX ADMIN — GABAPENTIN 100 MG: 100 CAPSULE ORAL at 08:12

## 2017-12-09 RX ADMIN — ATORVASTATIN CALCIUM 20 MG: 20 TABLET, FILM COATED ORAL at 09:12

## 2017-12-09 NOTE — PLAN OF CARE
Problem: Patient Care Overview  Goal: Plan of Care Review   12/09/17 1337   Coping/Psychosocial   Plan Of Care Reviewed With patient       Problem: Infection, Risk/Actual (Adult)  Goal: Infection Prevention/Resolution  Patient will demonstrate the desired outcomes by discharge/transition of care.   Outcome: Ongoing (interventions implemented as appropriate)   12/09/17 1337   Infection, Risk/Actual (Adult)   Infection Prevention/Resolution making progress toward outcome       Problem: Fall Risk (Adult)  Goal: Absence of Falls  Patient will demonstrate the desired outcomes by discharge/transition of care.   Outcome: Ongoing (interventions implemented as appropriate)   12/09/17 1337   Fall Risk (Adult)   Absence of Falls making progress toward outcome       Problem: Infection, Risk/Actual (Adult)  Goal: Infection Prevention/Resolution  Patient will demonstrate the desired outcomes by discharge/transition of care.   Outcome: Ongoing (interventions implemented as appropriate)   12/09/17 1337   Infection, Risk/Actual (Adult)   Infection Prevention/Resolution making progress toward outcome       Problem: Wound, Traumatic, Nonburn (Adult)  Goal: Signs and Symptoms of Listed Potential Problems Will be Absent, Minimized or Managed (Wound, Traumatic, Nonburn)  Signs and symptoms of listed potential problems will be absent, minimized or managed by discharge/transition of care (reference Wound, Traumatic, Nonburn (Adult) CPG).   Outcome: Ongoing (interventions implemented as appropriate)   12/09/17 1337   Wound, Traumatic, Nonburn   Problems Assessed (Wound) all   Problems Present (Wound) none       Comments: PATIENT MONITORED EVERY 1 TO 2 HOURS FOR PAIN AND SAFETY.  SAFETY  MAINTAINED.   Patient instructed to call for assistance.Call  Light and persoanl items in reach.

## 2017-12-09 NOTE — PLAN OF CARE
Problem: Occupational Therapy Goal  Goal: Occupational Therapy Goal  Goals to be met by: 14 days     Patient will increase functional independence with ADLs by performing:    Feeding with Modified [place]  UE Dressing with Modified [place].   REVISED to setup .   Met 12/8/17  LE Dressing with Modified [place] using AD.  REVISED to min A  Grooming while standing with Modified [place].  REVISED to seated with mod I   Met 12/8/17  Toileting from bedside commode with Modified [place] for hygiene and clothing management.   REVISED to MIN A.    Met  12/8/17  Bathing from  shower chair/bench with Supervision.  Toilet transfer to bedside commode with Supervision.  Perform functional activity in standing with S for 10' in preparation for safety during standing ADls. --MET          Outcome: Ongoing (interventions implemented as appropriate)  Progressing. DELON Pollock  12/9/2017

## 2017-12-09 NOTE — PLAN OF CARE
Problem: Physical Therapy Goal  Goal: Physical Therapy Goal  Goals to be met by: 14 DAYS     Patient will increase functional independence with mobility by performin. Supine to sit with Supervision = met 2017   2. Sit to supine with Supervision =met 2017  3. Sit to stand transfer with Stand-by Assistance with RW= met (2017)  4. Bed to chair transfer with Stand-by Assistance using Rolling Walker = met 2017  5. Gait  x 150 feet with Stand-by Assistance using Rolling Walker. =Met (2017)  6. Wheelchair propulsion x100 feet with Stand-by Assistance using bilateral upper extremities =met  7. Ascend/Descend 4 inch curb step with Contact Guard Assistance using Rolling Walker.= met 2017  8. Sitting at edge of bed x8 minutes with Stand-by Assistance and without UE support, and performing exercises or activity= discontinue  9. Stand for 5 minutes with Contact Guard Assistance using Rolling Walker and perform an activity= met 2017    10. Lower extremity exercise program x20 reps per handout, with assistance as needed and gym therex= not met   NEW GOAL 2017 11. Patient will ambulate 50 feet w/ RW and supervision = not met         Outcome: Ongoing (interventions implemented as appropriate)  Goals remain appropriate

## 2017-12-09 NOTE — PT/OT/SLP PROGRESS
"Occupational Therapy  Treatment    Zeyad Woodard   MRN: [id number]   Admitting Diagnosis: MRSA bacteremia    OT Date of Treatment: 12/09/17  Total Time (min): 32 min    Billable Minutes:  Therapeutic Activity 8 and Therapeutic Exercise 24    General Precautions: Standard, fall (standard)  Orthopedic Precautions: N/A  Braces: N/A         Subjective:  Communicated with pt prior to session.  "I didn't think I was gonna get to goto therapy today since nothing on the board"    Pain/Comfort  Pain Rating 1: 6/10  Location 1: hip  Pain Addressed 1: Pre-medicate for activity    Objective:  Patient found with:  (seated in w/c)    Functional Status:  MDS G  Transfer Functional Status: S-SBA          OT Exercises: UE Ergometer 15 min to increase functional endurance for ADLs  Rowing 20#  15 x 4  to increase UE strenght and posture for mobility    Additional Treatment:  Pt stood from w/c at table x 5 min while performing a erich activity to increase standing tolerance and balance    Patient left up in chair with call button in reach    ASSESSMENT:  Pt tolerated tx and demonsrated increased indep with standing task. Pt will cont to benefit from OT to address limitations and increase functional indep and safety    Rehab identified problem list/impairments: weakness, impaired endurance, impaired self care skills, impaired functional mobilty, gait instability, impaired balance, decreased lower extremity function, pain, impaired skin    Rehab potential is good    Activity tolerance: Good    Discharge recommendations:  (patient may need more assistance than previously at home)     Barriers to discharge: Inaccessible home environment, Decreased caregiver support     Equipment recommendations: wheelchair     GOALS:    Occupational Therapy Goals        Problem: Occupational Therapy Goal    Goal Priority Disciplines Outcome Interventions   Occupational Therapy Goal     OT, PT/OT Ongoing (interventions implemented as appropriate)  "   Description:  Goals to be met by: 14 days     Patient will increase functional independence with ADLs by performing:    Feeding with Modified [place]  UE Dressing with Modified [place].   REVISED to setup .   Met 12/8/17  LE Dressing with Modified [place] using AD.  REVISED to min A  Grooming while standing with Modified [place].  REVISED to seated with mod I   Met 12/8/17  Toileting from bedside commode with Modified [place] for hygiene and clothing management.   REVISED to MIN A.    Met  12/8/17  Bathing from  shower chair/bench with Supervision.  Toilet transfer to bedside commode with Supervision.  Perform functional activity in standing with S for 10' in preparation for safety during standing ADls. --MET                           Plan:  Patient to be seen 5 x/week to address the above listed problems via self-care/home management, therapeutic activities, therapeutic exercises  Plan of Care expires: 12/16/17  Plan of Care reviewed with: patient    DELON Pollock  12/09/2017

## 2017-12-09 NOTE — PT/OT/SLP PROGRESS
"Physical Therapy  Treatment    Zeyad Woodard   MRN: [id number]   Admitting Diagnosis: MRSA bacteremia    PT Received On: 12/09/17  Total Time (min): 34       Billable Minutes:34    Gait Training 20, Therapeutic Activity 9 and Therapeutic Exercise 15    Treatment Type: Treatment  PT/PTA: PTA     PTA Visit Number: 1       General Precautions: Standard, fall  Orthopedic Precautions: N/A   Braces: N/A         Subjective:  "OK"    Pain/Comfort  Pain Rating 1: 6/10  Location - Side 1: Right  Location 1: hip  Pain Addressed 1: Pre-medicate for activity, Reposition, Distraction, Cessation of Activity, Nurse notified, Other (see comments) (nsg present)  Pain Rating Post-Intervention 1:  (no further mention)    Objective:   Patient found with:  (in wc) A to rhonda CERRATO AFMAMADOU (plan to practice rhonda/zunilda AFO with OT)       Functional Status:  MDS G  Transfer Functional Status: S-SBA  Walk in Corridor Functional Status: S-SBA         Transfers:  Sit<>Stand: SBA with RW    Gait:  Amb with RW SBA vcs for erect posture ~ 150 ft to include 4" curb with RW CG/close SBA x 2 trials    Advanced Gait:  Curb Step: asc/sukumar 4" curb with RW CG/close SBA    Additional Treatment:  LBE x 15 min    Patient left up in chair with call button in reach and belongings in reach.    Assessment:  Zeyad Woodard is a 89 y.o. male with a medical diagnosis of MRSA bacteremia.  Pt tolerated well, pt would continue to benefit from skilled PT services to improve overall functional mobility, strength and endurance.  .    Rehab identified problem list/impairments: weakness, impaired endurance, impaired self care skills, impaired functional mobilty, gait instability, impaired balance, decreased lower extremity function, pain, impaired skin, other (comment) (contact prec/MRSA)    Rehab potential is good.    Activity tolerance: Fair    Discharge recommendations: home with home health (and likely family/caregiver assist)     Barriers to discharge: Decreased caregiver " support, Inaccessible home environment    Equipment recommendations: wheelchair     GOALS:    Physical Therapy Goals        Problem: Physical Therapy Goal    Goal Priority Disciplines Outcome Goal Variances Interventions   Physical Therapy Goal     PT/OT, PT Ongoing (interventions implemented as appropriate)     Description:  Goals to be met by: 14 DAYS     Patient will increase functional independence with mobility by performin. Supine to sit with Supervision = met 2017   2. Sit to supine with Supervision =met 2017  3. Sit to stand transfer with Stand-by Assistance with RW= met (2017)  4. Bed to chair transfer with Stand-by Assistance using Rolling Walker = met 2017  5. Gait  x 150 feet with Stand-by Assistance using Rolling Walker. =Met (2017)  6. Wheelchair propulsion x100 feet with Stand-by Assistance using bilateral upper extremities =met  7. Ascend/Descend 4 inch curb step with Contact Guard Assistance using Rolling Walker.= met 2017  8. Sitting at edge of bed x8 minutes with Stand-by Assistance and without UE support, and performing exercises or activity= discontinue  9. Stand for 5 minutes with Contact Guard Assistance using Rolling Walker and perform an activity= met 2017    10. Lower extremity exercise program x20 reps per handout, with assistance as needed and gym therex= not met   NEW GOAL 2017 11. Patient will ambulate 50 feet w/ RW and supervision = not met                          PLAN:    Patient to be seen 5 x/week  to address the above listed problems via gait training, therapeutic activities, therapeutic exercises, wheelchair management/training  Plan of Care expires: 17  Plan of Care reviewed with: patient    Mary Gilbetr, PTA  2017

## 2017-12-10 PROCEDURE — 25000003 PHARM REV CODE 250: Performed by: INTERNAL MEDICINE

## 2017-12-10 PROCEDURE — 25000003 PHARM REV CODE 250: Performed by: HOSPITALIST

## 2017-12-10 PROCEDURE — 25000003 PHARM REV CODE 250: Performed by: NURSE PRACTITIONER

## 2017-12-10 PROCEDURE — A4216 STERILE WATER/SALINE, 10 ML: HCPCS | Performed by: STUDENT IN AN ORGANIZED HEALTH CARE EDUCATION/TRAINING PROGRAM

## 2017-12-10 PROCEDURE — 63600175 PHARM REV CODE 636 W HCPCS: Performed by: HOSPITALIST

## 2017-12-10 PROCEDURE — 25000003 PHARM REV CODE 250: Performed by: STUDENT IN AN ORGANIZED HEALTH CARE EDUCATION/TRAINING PROGRAM

## 2017-12-10 PROCEDURE — 11000004 HC SNF PRIVATE

## 2017-12-10 RX ADMIN — Medication 10 ML: at 09:12

## 2017-12-10 RX ADMIN — OXYCODONE HYDROCHLORIDE AND ACETAMINOPHEN 500 MG: 500 TABLET ORAL at 08:12

## 2017-12-10 RX ADMIN — DOCUSATE SODIUM 100 MG: 100 CAPSULE, LIQUID FILLED ORAL at 08:12

## 2017-12-10 RX ADMIN — GUAIFENESIN 200 MG: 200 SOLUTION ORAL at 09:12

## 2017-12-10 RX ADMIN — GABAPENTIN 300 MG: 100 CAPSULE ORAL at 09:12

## 2017-12-10 RX ADMIN — ACETAMINOPHEN 500 MG: 500 TABLET ORAL at 05:12

## 2017-12-10 RX ADMIN — CARVEDILOL 6.25 MG: 6.25 TABLET, FILM COATED ORAL at 09:12

## 2017-12-10 RX ADMIN — ASPIRIN 81 MG: 81 TABLET, COATED ORAL at 08:12

## 2017-12-10 RX ADMIN — ACETAMINOPHEN 500 MG: 500 TABLET ORAL at 09:12

## 2017-12-10 RX ADMIN — FERROUS SULFATE TAB EC 325 MG (65 MG FE EQUIVALENT) 325 MG: 325 (65 FE) TABLET DELAYED RESPONSE at 08:12

## 2017-12-10 RX ADMIN — ATORVASTATIN CALCIUM 20 MG: 20 TABLET, FILM COATED ORAL at 09:12

## 2017-12-10 RX ADMIN — MICONAZOLE NITRATE: 20 POWDER TOPICAL at 08:12

## 2017-12-10 RX ADMIN — DONEPEZIL HYDROCHLORIDE 10 MG: 10 TABLET, FILM COATED ORAL at 09:12

## 2017-12-10 RX ADMIN — LISINOPRIL 20 MG: 20 TABLET ORAL at 08:12

## 2017-12-10 RX ADMIN — MICONAZOLE NITRATE: 20 POWDER TOPICAL at 09:12

## 2017-12-10 RX ADMIN — GABAPENTIN 100 MG: 100 CAPSULE ORAL at 08:12

## 2017-12-10 RX ADMIN — Medication 10 ML: at 01:12

## 2017-12-10 RX ADMIN — RAMELTEON 8 MG: 8 TABLET, FILM COATED ORAL at 09:12

## 2017-12-10 RX ADMIN — TAMSULOSIN HYDROCHLORIDE 0.4 MG: 0.4 CAPSULE ORAL at 08:12

## 2017-12-10 RX ADMIN — GUAIFENESIN 200 MG: 200 SOLUTION ORAL at 04:12

## 2017-12-10 RX ADMIN — CALCIUM 500 MG: 500 TABLET ORAL at 08:12

## 2017-12-10 RX ADMIN — ACETAMINOPHEN 500 MG: 500 TABLET ORAL at 01:12

## 2017-12-10 RX ADMIN — DOCUSATE SODIUM 100 MG: 100 CAPSULE, LIQUID FILLED ORAL at 09:12

## 2017-12-10 RX ADMIN — VANCOMYCIN HYDROCHLORIDE 1000 MG: 1 INJECTION, POWDER, LYOPHILIZED, FOR SOLUTION INTRAVENOUS at 04:12

## 2017-12-10 RX ADMIN — FINASTERIDE 5 MG: 5 TABLET, FILM COATED ORAL at 08:12

## 2017-12-10 RX ADMIN — CARVEDILOL 6.25 MG: 6.25 TABLET, FILM COATED ORAL at 08:12

## 2017-12-10 RX ADMIN — Medication 10 ML: at 06:12

## 2017-12-10 RX ADMIN — CYANOCOBALAMIN TAB 1000 MCG 1000 MCG: 1000 TAB at 08:12

## 2017-12-10 RX ADMIN — LEVOTHYROXINE SODIUM 100 MCG: 100 TABLET ORAL at 05:12

## 2017-12-10 RX ADMIN — THERA TABS 1 TABLET: TAB at 08:12

## 2017-12-10 RX ADMIN — Medication 10 ML: at 05:12

## 2017-12-10 NOTE — PLAN OF CARE
Problem: Patient Care Overview  Goal: Plan of Care Review  Outcome: Ongoing (interventions implemented as appropriate)   12/10/17 1223   Coping/Psychosocial   Plan Of Care Reviewed With patient       Problem: Infection, Risk/Actual (Adult)  Goal: Infection Prevention/Resolution  Patient will demonstrate the desired outcomes by discharge/transition of care.   Outcome: Ongoing (interventions implemented as appropriate)   12/10/17 1223   Infection, Risk/Actual (Adult)   Infection Prevention/Resolution making progress toward outcome       Problem: Fall Risk (Adult)  Goal: Absence of Falls  Patient will demonstrate the desired outcomes by discharge/transition of care.   Outcome: Ongoing (interventions implemented as appropriate)   12/10/17 1223   Fall Risk (Adult)   Absence of Falls making progress toward outcome       Problem: Infection, Risk/Actual (Adult)  Goal: Infection Prevention/Resolution  Patient will demonstrate the desired outcomes by discharge/transition of care.   Outcome: Ongoing (interventions implemented as appropriate)   12/10/17 1223   Infection, Risk/Actual (Adult)   Infection Prevention/Resolution making progress toward outcome       Problem: Wound, Traumatic, Nonburn (Adult)  Goal: Signs and Symptoms of Listed Potential Problems Will be Absent, Minimized or Managed (Wound, Traumatic, Nonburn)  Signs and symptoms of listed potential problems will be absent, minimized or managed by discharge/transition of care (reference Wound, Traumatic, Nonburn (Adult) CPG).   Outcome: Ongoing (interventions implemented as appropriate)   12/10/17 1223   Wound, Traumatic, Nonburn   Problems Assessed (Wound) all   Problems Present (Wound) none       Comments: Patient monitored every 1 to 2 days for pain and safety.  Safety maintained.  Patient instructed to call for assistance.Call  Light and persoanl items in reach.

## 2017-12-11 LAB
ALBUMIN SERPL BCP-MCNC: 3.2 G/DL
ALP SERPL-CCNC: 81 U/L
ALT SERPL W/O P-5'-P-CCNC: 18 U/L
ANION GAP SERPL CALC-SCNC: 7 MMOL/L
ANISOCYTOSIS BLD QL SMEAR: SLIGHT
AST SERPL-CCNC: 26 U/L
BASOPHILS # BLD AUTO: 0.03 K/UL
BASOPHILS NFR BLD: 0.4 %
BILIRUB SERPL-MCNC: 0.8 MG/DL
BUN SERPL-MCNC: 27 MG/DL
BURR CELLS BLD QL SMEAR: ABNORMAL
CALCIUM SERPL-MCNC: 8.8 MG/DL
CHLORIDE SERPL-SCNC: 99 MMOL/L
CO2 SERPL-SCNC: 28 MMOL/L
CREAT SERPL-MCNC: 1.1 MG/DL
CRP SERPL-MCNC: 10.3 MG/L
DIFFERENTIAL METHOD: ABNORMAL
EOSINOPHIL # BLD AUTO: 0.2 K/UL
EOSINOPHIL NFR BLD: 2.6 %
ERYTHROCYTE [DISTWIDTH] IN BLOOD BY AUTOMATED COUNT: 14.5 %
ERYTHROCYTE [SEDIMENTATION RATE] IN BLOOD BY WESTERGREN METHOD: 13 MM/HR
EST. GFR  (AFRICAN AMERICAN): >60 ML/MIN/1.73 M^2
EST. GFR  (NON AFRICAN AMERICAN): 58.8 ML/MIN/1.73 M^2
GLUCOSE SERPL-MCNC: 97 MG/DL
HCT VFR BLD AUTO: 23.9 %
HGB BLD-MCNC: 7.9 G/DL
HYPOCHROMIA BLD QL SMEAR: ABNORMAL
LYMPHOCYTES # BLD AUTO: 4.5 K/UL
LYMPHOCYTES NFR BLD: 54.6 %
MAGNESIUM SERPL-MCNC: 2 MG/DL
MCH RBC QN AUTO: 31.1 PG
MCHC RBC AUTO-ENTMCNC: 33.1 G/DL
MCV RBC AUTO: 94 FL
MONOCYTES # BLD AUTO: 0.7 K/UL
MONOCYTES NFR BLD: 8.2 %
NEUTROPHILS # BLD AUTO: 2.8 K/UL
NEUTROPHILS NFR BLD: 34.1 %
NRBC BLD-RTO: 0 /100 WBC
OVALOCYTES BLD QL SMEAR: ABNORMAL
PHOSPHATE SERPL-MCNC: 3.6 MG/DL
PLATELET # BLD AUTO: 101 K/UL
PLATELET BLD QL SMEAR: ABNORMAL
PMV BLD AUTO: 9.9 FL
POIKILOCYTOSIS BLD QL SMEAR: SLIGHT
POLYCHROMASIA BLD QL SMEAR: ABNORMAL
POTASSIUM SERPL-SCNC: 4.4 MMOL/L
PROT SERPL-MCNC: 6.1 G/DL
RBC # BLD AUTO: 2.54 M/UL
SCHISTOCYTES BLD QL SMEAR: PRESENT
SODIUM SERPL-SCNC: 134 MMOL/L
WBC # BLD AUTO: 8.21 K/UL

## 2017-12-11 PROCEDURE — 97535 SELF CARE MNGMENT TRAINING: CPT

## 2017-12-11 PROCEDURE — 97803 MED NUTRITION INDIV SUBSEQ: CPT

## 2017-12-11 PROCEDURE — 25000003 PHARM REV CODE 250: Performed by: STUDENT IN AN ORGANIZED HEALTH CARE EDUCATION/TRAINING PROGRAM

## 2017-12-11 PROCEDURE — 97530 THERAPEUTIC ACTIVITIES: CPT

## 2017-12-11 PROCEDURE — 25000003 PHARM REV CODE 250: Performed by: INTERNAL MEDICINE

## 2017-12-11 PROCEDURE — 25000003 PHARM REV CODE 250: Performed by: NURSE PRACTITIONER

## 2017-12-11 PROCEDURE — 80053 COMPREHEN METABOLIC PANEL: CPT

## 2017-12-11 PROCEDURE — 84100 ASSAY OF PHOSPHORUS: CPT

## 2017-12-11 PROCEDURE — 85651 RBC SED RATE NONAUTOMATED: CPT

## 2017-12-11 PROCEDURE — 97110 THERAPEUTIC EXERCISES: CPT

## 2017-12-11 PROCEDURE — 25000003 PHARM REV CODE 250: Performed by: HOSPITALIST

## 2017-12-11 PROCEDURE — 85025 COMPLETE CBC W/AUTO DIFF WBC: CPT

## 2017-12-11 PROCEDURE — 11000004 HC SNF PRIVATE

## 2017-12-11 PROCEDURE — 63600175 PHARM REV CODE 636 W HCPCS: Performed by: NURSE PRACTITIONER

## 2017-12-11 PROCEDURE — A4216 STERILE WATER/SALINE, 10 ML: HCPCS | Performed by: STUDENT IN AN ORGANIZED HEALTH CARE EDUCATION/TRAINING PROGRAM

## 2017-12-11 PROCEDURE — 83735 ASSAY OF MAGNESIUM: CPT

## 2017-12-11 PROCEDURE — 36415 COLL VENOUS BLD VENIPUNCTURE: CPT

## 2017-12-11 PROCEDURE — 97116 GAIT TRAINING THERAPY: CPT

## 2017-12-11 PROCEDURE — 86140 C-REACTIVE PROTEIN: CPT

## 2017-12-11 RX ORDER — ASPIRIN 81 MG/1
81 TABLET ORAL DAILY
Refills: 0 | Status: ON HOLD | COMMUNITY
Start: 2017-12-11 | End: 2018-04-19 | Stop reason: HOSPADM

## 2017-12-11 RX ORDER — FUROSEMIDE 20 MG/1
20 TABLET ORAL DAILY
Status: COMPLETED | OUTPATIENT
Start: 2017-12-11 | End: 2017-12-12

## 2017-12-11 RX ORDER — ATORVASTATIN CALCIUM 20 MG/1
20 TABLET, FILM COATED ORAL NIGHTLY
Qty: 90 TABLET | Refills: 3 | Status: SHIPPED | OUTPATIENT
Start: 2017-12-11 | End: 2017-12-14 | Stop reason: SDUPTHER

## 2017-12-11 RX ORDER — LISINOPRIL 20 MG/1
20 TABLET ORAL DAILY
Qty: 90 TABLET | Refills: 1 | Status: SHIPPED | OUTPATIENT
Start: 2017-12-12 | End: 2017-12-12

## 2017-12-11 RX ORDER — GABAPENTIN 100 MG/1
CAPSULE ORAL
Qty: 120 CAPSULE | Refills: 1 | Status: SHIPPED | OUTPATIENT
Start: 2017-12-11 | End: 2017-12-12

## 2017-12-11 RX ORDER — OXYCODONE AND ACETAMINOPHEN 7.5; 325 MG/1; MG/1
1 TABLET ORAL EVERY 8 HOURS PRN
Qty: 21 TABLET | Refills: 0 | Status: SHIPPED | OUTPATIENT
Start: 2017-12-11 | End: 2018-01-29

## 2017-12-11 RX ORDER — ACETAMINOPHEN 325 MG/1
650 TABLET ORAL EVERY 6 HOURS PRN
Refills: 0 | Status: ON HOLD | COMMUNITY
Start: 2017-12-11 | End: 2018-04-19

## 2017-12-11 RX ADMIN — CALCIUM 500 MG: 500 TABLET ORAL at 08:12

## 2017-12-11 RX ADMIN — DONEPEZIL HYDROCHLORIDE 10 MG: 10 TABLET, FILM COATED ORAL at 08:12

## 2017-12-11 RX ADMIN — TAMSULOSIN HYDROCHLORIDE 0.4 MG: 0.4 CAPSULE ORAL at 08:12

## 2017-12-11 RX ADMIN — ACETAMINOPHEN 500 MG: 500 TABLET ORAL at 08:12

## 2017-12-11 RX ADMIN — ASPIRIN 81 MG: 81 TABLET, COATED ORAL at 08:12

## 2017-12-11 RX ADMIN — OXYCODONE HYDROCHLORIDE AND ACETAMINOPHEN 500 MG: 500 TABLET ORAL at 08:12

## 2017-12-11 RX ADMIN — THERA TABS 1 TABLET: TAB at 08:12

## 2017-12-11 RX ADMIN — DOCUSATE SODIUM 100 MG: 100 CAPSULE, LIQUID FILLED ORAL at 08:12

## 2017-12-11 RX ADMIN — FINASTERIDE 5 MG: 5 TABLET, FILM COATED ORAL at 08:12

## 2017-12-11 RX ADMIN — MICONAZOLE NITRATE: 20 POWDER TOPICAL at 09:12

## 2017-12-11 RX ADMIN — FERROUS SULFATE TAB EC 325 MG (65 MG FE EQUIVALENT) 325 MG: 325 (65 FE) TABLET DELAYED RESPONSE at 08:12

## 2017-12-11 RX ADMIN — GUAIFENESIN 200 MG: 200 SOLUTION ORAL at 08:12

## 2017-12-11 RX ADMIN — LISINOPRIL 20 MG: 20 TABLET ORAL at 08:12

## 2017-12-11 RX ADMIN — MICONAZOLE NITRATE: 20 POWDER TOPICAL at 08:12

## 2017-12-11 RX ADMIN — VANCOMYCIN HYDROCHLORIDE 1000 MG: 1 INJECTION, POWDER, LYOPHILIZED, FOR SOLUTION INTRAVENOUS at 05:12

## 2017-12-11 RX ADMIN — FUROSEMIDE 20 MG: 20 TABLET ORAL at 12:12

## 2017-12-11 RX ADMIN — ATORVASTATIN CALCIUM 20 MG: 20 TABLET, FILM COATED ORAL at 08:12

## 2017-12-11 RX ADMIN — Medication 10 ML: at 05:12

## 2017-12-11 RX ADMIN — RAMELTEON 8 MG: 8 TABLET, FILM COATED ORAL at 08:12

## 2017-12-11 RX ADMIN — CARVEDILOL 6.25 MG: 6.25 TABLET, FILM COATED ORAL at 08:12

## 2017-12-11 RX ADMIN — ACETAMINOPHEN 500 MG: 500 TABLET ORAL at 02:12

## 2017-12-11 RX ADMIN — CYANOCOBALAMIN TAB 1000 MCG 1000 MCG: 1000 TAB at 08:12

## 2017-12-11 RX ADMIN — Medication 10 ML: at 12:12

## 2017-12-11 RX ADMIN — GABAPENTIN 300 MG: 100 CAPSULE ORAL at 08:12

## 2017-12-11 RX ADMIN — ACETAMINOPHEN 500 MG: 500 TABLET ORAL at 05:12

## 2017-12-11 RX ADMIN — LEVOTHYROXINE SODIUM 100 MCG: 100 TABLET ORAL at 05:12

## 2017-12-11 RX ADMIN — GABAPENTIN 100 MG: 100 CAPSULE ORAL at 08:12

## 2017-12-11 NOTE — PT/OT/SLP PROGRESS
Physical Therapy  Treatment    Zeyad Woodard   MRN: [id number]   Admitting Diagnosis: MRSA bacteremia    PT Received On: 12/11/17          Billable Minutes:  Gait Training 15, Therapeutic Activity 4 and Therapeutic Exercise 23=42    Treatment Type: Treatment  PT/PTA: PT     PTA Visit Number: 0       General Precautions: Standard, fall  Orthopedic Precautions: N/A   Braces: N/A    Subjective:  Communicated with patient prior to session.  Patient agreeable to session    Pain/Comfort  Pain Rating 1: 5/10  Location - Side 1: Right  Location - Orientation 1: lower  Location 1: back  Pain Addressed 1: Pre-medicate for activity, Reposition, Distraction    Objective:  Patient found seated in w/c      Functional Status:  MDS G  Transfer Functional Status: S-SBA  Walk in Room Functional Status: S-SBA  Walk in Corridor Functional Status: S-SBA  Locomotion on Unit Functional Status: S-SBA  Locomotion Off Unit Functional Status: S-SBA  Moving from seated to standing position: Not steady, but able to stabilize without staff assistance  Walking (with assistive device if used): Not steady, but able to stabilize without staff assistance  Turning around and facing the opposite direction while walking: Not steady, but able to stabilize without staff assistance  Surface-to-surface transfer (transfer between bed and chair or wheelchair): Not steady, but able to stabilize without staff assistance    Transfers:  Sit<>Stand: w/ RW and supervision to/from w/c x2 trial  Once w/ house slippers and once w/ B AFOs    Gait:  Amb 150 feet w/ RW and Supervision w/ patient wearing house slippers. Decreased floor clearance w/ forefoot  Amb 200 feet w/ RW and SBA/Supervision w/ B AFOs. Improved clearance. Amb slowly and w/ FFP     Advanced Gait:  Curb Step: up/down 4 inch curb step w/ RW and close SBA     Therex:  Quad sets,   Glute sets,   Ankle  Pumps, PF only  hip abduction/adduction,  LAQ   Hip flexion  x20 reps w/ assist as needed.  Use of  written copy for HEP      Additional Treatment:  Patient pedaled LBE (lower body ergometer) x15 minutes to increase strength and endurance.  Patient assisted w/ yared Carey for gait trail    Patient left up in chair with bed alarm on.    Assessment:  Zeyad Woodard is a 89 y.o. male with a medical diagnosis of MRSA bacteremia.  Patient has progressed well and met goals. Patient overall sBA to supervision w/ mobility w/ RW. Recommend assistance for safety at time of d/c home. Patient will benefit from HHPT to improve saftey and functional mobility in the home..    Rehab identified problem list/impairments: weakness, impaired endurance, impaired self care skills, impaired functional mobilty, gait instability, impaired balance, decreased lower extremity function, pain, impaired skin, other (comment) (contact prec/MRSA)    Rehab potential is good.    Activity tolerance: Good    Discharge recommendations: home with home health (and likely family/caregiver assist)     Barriers to discharge: Decreased caregiver support, Inaccessible home environment    Equipment recommendations: wheelchair     GOALS:    Physical Therapy Goals        Problem: Physical Therapy Goal    Goal Priority Disciplines Outcome Goal Variances Interventions   Physical Therapy Goal     PT/OT, PT Ongoing (interventions implemented as appropriate)     Description:  Goals to be met by: 14 DAYS     Patient will increase functional independence with mobility by performin. Supine to sit with Supervision = met 2017   2. Sit to supine with Supervision =met 2017  3. Sit to stand transfer with Stand-by Assistance with RW= met (2017)  4. Bed to chair transfer with Stand-by Assistance using Rolling Walker = met 2017  5. Gait  x 150 feet with Stand-by Assistance using Rolling Walker. =Met (2017)  6. Wheelchair propulsion x100 feet with Stand-by Assistance using bilateral upper extremities =met  7. Ascend/Descend 4 inch curb  step with Contact Guard Assistance using Rolling Walker.= met 12/6/2017  8. Sitting at edge of bed x8 minutes with Stand-by Assistance and without UE support, and performing exercises or activity= discontinue  9. Stand for 5 minutes with Contact Guard Assistance using Rolling Walker and perform an activity= met 12/6/2017    10. Lower extremity exercise program x20 reps per handout, with assistance as needed and gym therex= met 12/11/2017   NEW GOAL 12/8/2017 11. Patient will ambulate 50 feet w/ RW and supervision = met    12/11/2017                         PLAN:    Patient to be seen 5 x/week  to address the above listed problems via gait training, therapeutic activities, therapeutic exercises, wheelchair management/training  Plan of Care expires: 12/16/17  Plan of Care reviewed with: patient    More JUSTIN Sanchez, PT  12/11/2017

## 2017-12-11 NOTE — PLAN OF CARE
Problem: Patient Care Overview  Goal: Plan of Care Review  Outcome: Ongoing (interventions implemented as appropriate)   12/11/17 1127   Coping/Psychosocial   Plan Of Care Reviewed With patient       Problem: Fall Risk (Adult)  Goal: Absence of Falls  Patient will demonstrate the desired outcomes by discharge/transition of care.   Outcome: Ongoing (interventions implemented as appropriate)   12/11/17 1127   Fall Risk (Adult)   Absence of Falls making progress toward outcome       Problem: Infection, Risk/Actual (Adult)  Goal: Identify Related Risk Factors and Signs and Symptoms  Related risk factors and signs and symptoms are identified upon initiation of Human Response Clinical Practice Guideline (CPG)   Outcome: Ongoing (interventions implemented as appropriate)   12/11/17 1127   Infection, Risk/Actual   Related Risk Factors (Infection, Risk/Actual) age extremes       Problem: Wound, Traumatic, Nonburn (Adult)  Goal: Signs and Symptoms of Listed Potential Problems Will be Absent, Minimized or Managed (Wound, Traumatic, Nonburn)  Signs and symptoms of listed potential problems will be absent, minimized or managed by discharge/transition of care (reference Wound, Traumatic, Nonburn (Adult) CPG).   Outcome: Ongoing (interventions implemented as appropriate)   12/11/17 1127   Wound, Traumatic, Nonburn   Problems Assessed (Wound) all   Problems Present (Wound) none       Comments: Patient monitored every 1 to 2 hours for pain and safety.  Safety maintained  .   at bedside.  Patient instructed to call for assistance.Call  Light and persoanl items in reach.

## 2017-12-11 NOTE — PLAN OF CARE
[facility] HOME HEALTH ORDERS  FACE TO FACE ENCOUNTER    Patient Name: Zeyad Woodard  YOB: 1927    PCP: Booker Ricci MD   PCP Address: [address] / [place] [address]  PCP Phone Number: [phone]  PCP Fax: [phone]    Encounter Date: 12/11/2017    Admit to Home Health    Diagnoses:  Active Hospital Problems    Diagnosis  POA    *MRSA bacteremia [R78.81]  Yes     Priority: 1 - High    Cellulitis and abscess of buttock [L02.31, L03.317]  Yes     Priority: 2     Debility [R53.81]  Yes     Priority: 3     Takes dietary supplements [Z78.9]  Yes    HLD (hyperlipidemia) [E78.5]  Yes    Chronic pain [G89.29]  Yes    Dementia without behavioral disturbance [F03.90]  Yes    SSS (sick sinus syndrome) [I49.5]  Yes    CKD (chronic kidney disease), stage III [N18.3]  Yes    Iron deficiency anemia due to chronic blood loss [D50.0]  Yes    Hypothyroidism [E03.9]  Yes    Polyneuropathy [G62.9]  Yes     POLYNEUROPATHY WITH AXONAL AND DEMYELINATING FEATURES, 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         Chronic diastolic congestive heart failure [I50.32]  Yes    Benign prostatic hyperplasia [N40.0]  Yes    Renovascular hypertension [I15.0]  Yes    Chronic atrial fibrillation [I48.2]  Yes      Resolved Hospital Problems    Diagnosis Date Resolved POA   No resolved problems to display.       Future Appointments  Date Time Provider Department Center   12/14/2017 11:00 AM Booker Ricci MD [facility]   12/20/2017 2:00 PM Elsa Rahman MD [facility] WOUND [facility]   1/3/2018 1:00 PM INFECTIOUS DISEASE, [facility] INFECT [facility]     Follow-up Information     Booker Ricci MD. Go on 12/14/2017.    Specialty:  Family Medicine  Contact information:  [address]  [place] [address]  [phone]             Elsa Rahman MD. Go on 12/20/2017.    Specialties:  General Surgery, Surgery  Contact information:  [address]  [address]  [place] [address]  [phone]             INFECTIOUS DISEASE, [facility]. Go on 1/3/2018.             I have seen and examined this patient face to face today. My clinical findings that support the need for the home health skilled services and home bound status are the following:  Weakness/numbness causing balance and gait disturbance due to Infection and Surgery making it taxing to leave home.    Allergies:  Review of patient's allergies indicates:   Allergen Reactions    Penicillins Hives and Other (See Comments)     Fever       Diet: cardiac diet and diabetic diet: 2000 calorie    Activities: activity as tolerated and no lifting, Driving, or Strenuous exercise     Nursing:   SN to complete comprehensive assessment including routine vital signs. Instruct on disease process and s/s of complications to report to MD. Review/verify medication list sent home with the patient at time of discharge  and instruct patient/caregiver as needed. Frequency may be adjusted depending on start of care date.    Notify MD if SBP > 160 or < 90; DBP > 90 or < 50; HR > 120 or < 50; Temp > 101      CONSULTS:    Physical Therapy to evaluate and treat. Evaluate for home safety and equipment needs; Establish/upgrade home exercise program. Perform / instruct on therapeutic exercises, gait training, transfer training, and Range of Motion.  Occupational Therapy to evaluate and treat. Evaluate home environment for safety and equipment needs. Perform/Instruct on transfers, ADL training, ROM, and therapeutic exercises.  Aide to provide assistance with personal care, ADLs, and vital signs.    MISCELLANEOUS CARE:  Diabetic Care:   SN to perform and educate Diabetic management with blood glucose monitoring:, Fingerstick blood sugar AC and HS and Report CBG < 60 or > 350 to physician.    WOUND CARE ORDERS  Cleanse right medial knee with normal saline, apply neosporin to wound bed, cover with boarder dressing    Right buttock:    cleanse right  buttock  wound with sterile normal saline and apply xeroform to wound bed and cover with mepilex border    Medications: Review discharge medications with patient and family and provide education.      Current Discharge Medication List      START taking these medications    Details   acetaminophen (TYLENOL) 325 MG tablet Take 2 tablets (650 mg total) by mouth every 6 (six) hours as needed.  Refills: 0      lisinopril (PRINIVIL,ZESTRIL) 20 MG tablet Take 1 tablet (20 mg total) by mouth once daily.  Qty: 90 tablet, Refills: 1      neomycin-bacitracin-polymyxin (NEOSPORIN) ointment Apply topically Daily.  Refills: 0         CONTINUE these medications which have CHANGED    Details   aspirin (ECOTRIN) 81 MG EC tablet Take 1 tablet (81 mg total) by mouth once daily.  Refills: 0      atorvastatin (LIPITOR) 20 MG tablet Take 1 tablet (20 mg total) by mouth nightly.  Qty: 90 tablet, Refills: 3      gabapentin (NEURONTIN) 100 MG capsule Take 100mg in Morning and take 300mg nightly  Qty: 120 capsule, Refills: 1      oxyCODONE-acetaminophen (PERCOCET) 7.5-325 mg per tablet Take 1 tablet by mouth every 8 (eight) hours as needed for Pain.  Qty: 21 tablet, Refills: 0         CONTINUE these medications which have NOT CHANGED    Details   ASCORBATE CALCIUM (VITAMIN C ORAL) Take 1 tablet by mouth once daily.       calcium 500 mg Tab Take 1 tablet by mouth Daily.      carvedilol (COREG) 6.25 MG tablet Take 1 tablet (6.25 mg total) by mouth 2 (two) times daily.  Qty: 60 tablet, Refills: 11      clotrimazole-betamethasone (LOTRISONE) lotion Apply topically 2 (two) times daily.  Qty: 30 mL, Refills: 0    Associated Diagnoses: Angular cheilosis      cyanocobalamin (VITAMIN B-12) 1000 MCG tablet Take 100 mcg by mouth once daily.      donepezil (ARICEPT) 10 MG tablet Take 1 tablet (10 mg total) by mouth every evening.  Qty: 90 tablet, Refills: 3      ferrous sulfate 325 mg (65 mg iron) Tab tablet Take 325 mg by mouth once daily.       finasteride (PROSCAR) 5 mg tablet Take 1 tablet (5 mg total) by mouth once daily.  Qty: 90 tablet, Refills: 3      furosemide (LASIX) 20 MG tablet Take 20 mg by mouth 2 (two) times daily.      levothyroxine (SYNTHROID) 100 MCG tablet Take 1 tablet (100 mcg total) by mouth once daily.  Qty: 90 tablet, Refills: 3      MULTIVITS-MINERALS/FA/LYCOPENE (ONE-A-DAY MEN'S MULTIVITAMIN ORAL) Take 1 tablet by mouth once daily.      senna-docusate 8.6-50 mg (PERICOLACE) 8.6-50 mg per tablet Take 1 tablet by mouth 2 (two) times daily.      tamsulosin (FLOMAX) 0.4 mg Cp24 Take 1 capsule (0.4 mg total) by mouth once daily.  Qty: 90 capsule, Refills: 3    Associated Diagnoses: Benign non-nodular prostatic hyperplasia without lower urinary tract symptoms         STOP taking these medications       dextrose 5 % SolP 250 mL with vancomycin 1,000 mg SolR 1,250 mg Comments:   Reason for Stopping:         metoprolol succinate (TOPROL-XL) 25 MG 24 hr tablet Comments:   Reason for Stopping:               I certify that this patient is confined to his home and needs intermittent skilled nursing care, physical therapy and occupational therapy.

## 2017-12-11 NOTE — PT/OT/SLP PROGRESS
"Occupational Therapy  Treatment    Zeyad Woodard   MRN: [id number]   Admitting Diagnosis: MRSA bacteremia    OT Date of Treatment: 12/11/17  Total Time (min): 68 min    Billable Minutes:  Self Care/Home Management 40 and Therapeutic Activity 28    General Precautions: Standard, fall (standard)  Orthopedic Precautions: N/A  Braces: N/A         Subjective:  Communicated with pt prior to session.  "Its too cold for a shower"    Pain/Comfort  Pain Rating 1:  (no complaints of pain during session)    Objective:  Patient found with:  (supine in bed)    Functional Status:  MDS G  Bed Mobility Functional Status: mod(I) - supine to sit   Transfer Functional Status: Sup with sit to stand from bed, BSC over toilet and standard chair with RW  Walk in Room Functional Status: S-SBA--to walk from bed into bathroom and back to chair with RW--cues for safety and floor surfaces and safety turning  Dressing Functional Status: 1: S-SBA--pt donned shirt with setup and doffed; pt donned pants with sup; pt donned socks and shoes with AFOs seated in standard chair with sup  Eating Functional Status: mod(I) -  Toilet Use Functional Status:-Min (A)--BSC over toilet with grab bar and RW--A with complete BM cleaning  Personal Hygiene Functional Status: mod(I) - seated at sink--pt too weak to attempt standing   Bathing Functional Status:-Min (A)-- A needed with complete buttocks           Additional Treatment:  Pt educated on safety with standing tasks in the home; use of w/c at home for safety as needed vs. RW; LBD techniques; shower safety and to have daughter in with him initially to assist as needed  Pt requires increased time to perform all tasks     Patient left up in chair with call button in reach    ASSESSMENT:  Zeyad Woodard is a 89 y.o. male with a medical diagnosis of MRSA bacteremia and cont to progress toward goals. Pt with no LOB this session and able to perform all self care with min A as needed. Pt with sup to mod I with " functional mobility. Pt cont to benefit from OT to address limitations and increase functional indep and safety.    Rehab identified problem list/impairments: weakness, impaired endurance, impaired self care skills, impaired functional mobilty, gait instability, impaired balance, decreased lower extremity function, pain, impaired skin    Rehab potential is good    Activity tolerance: Good    Discharge recommendations:  (patient may need more assistance than previously at home)     Barriers to discharge: Inaccessible home environment, Decreased caregiver support     Equipment recommendations: wheelchair     GOALS:    Occupational Therapy Goals        Problem: Occupational Therapy Goal    Goal Priority Disciplines Outcome Interventions   Occupational Therapy Goal     OT, PT/OT Ongoing (interventions implemented as appropriate)    Description:  Goals to be met by: 14 days     Patient will increase functional independence with ADLs by performing:    Feeding with Modified [place]--met  UE Dressing with Modified [place].   REVISED to setup .   Met 12/8/17--met  LE Dressing with Modified [place] using AD.  REVISED to min A--PT met  Grooming while standing with Modified [place].  REVISED to seated with mod I   Met 12/8/17  Toileting from bedside commode with Modified [place] for hygiene and clothing management.   REVISED to MIN A.    Met  12/8/17  Bathing from  shower chair/bench with Supervision.--not met  Toilet transfer to bedside commode with Supervision.--met  Perform functional activity in standing with S for 10' in preparation for safety during standing ADls. --MET                            Plan:  Patient to be seen 5 x/week to address the above listed problems via self-care/home management, therapeutic activities, therapeutic exercises  Plan of Care expires: 12/16/17  Plan of Care reviewed with: patient    DELON Pollock  12/11/2017

## 2017-12-11 NOTE — PLAN OF CARE
Problem: Occupational Therapy Goal  Goal: Occupational Therapy Goal  Goals to be met by: 14 days     Patient will increase functional independence with ADLs by performing:    Feeding with Modified [place]--met  UE Dressing with [place].   REVISED to setup .   Met 12/8/17--met  LE Dressing with [place] using AD.  REVISED to min A--PT met  Grooming while standing with [place].  REVISED to seated with mod I   Met 12/8/17  Toileting from bedside commode with Modified [place] for hygiene and clothing management.   REVISED to MIN A.    Met  12/8/17  Bathing from  shower chair/bench with Supervision.--not met  Toilet transfer to bedside commode with Supervision.--met  Perform functional activity in standing with S for 10' in preparation for safety during standing ADls. --MET          Outcome: Ongoing (interventions implemented as appropriate)  Progressing. DELON Pollock  12/11/2017

## 2017-12-11 NOTE — PLAN OF CARE
Problem: Physical Therapy Goal  Goal: Physical Therapy Goal  Goals to be met by: 14 DAYS     Patient will increase functional independence with mobility by performin. Supine to sit with Supervision = met 2017   2. Sit to supine with Supervision =met 2017  3. Sit to stand transfer with Stand-by Assistance with RW= met (2017)  4. Bed to chair transfer with Stand-by Assistance using Rolling Walker = met 2017  5. Gait  x 150 feet with Stand-by Assistance using Rolling Walker. =Met (2017)  6. Wheelchair propulsion x100 feet with Stand-by Assistance using bilateral upper extremities =met  7. Ascend/Descend 4 inch curb step with Contact Guard Assistance using Rolling Walker.= met 2017  8. Sitting at edge of bed x8 minutes with Stand-by Assistance and without UE support, and performing exercises or activity= discontinue  9. Stand for 5 minutes with Contact Guard Assistance using Rolling Walker and perform an activity= met 2017    10. Lower extremity exercise program x20 reps per handout, with assistance as needed and gym therex= met 2017   NEW GOAL 2017 11. Patient will ambulate 50 feet w/ RW and supervision = met    2017       Goals met. D/c home  Tomorrow. More Sanchez, PT 2017

## 2017-12-12 VITALS
OXYGEN SATURATION: 98 % | BODY MASS INDEX: 26.12 KG/M2 | HEART RATE: 62 BPM | RESPIRATION RATE: 18 BRPM | WEIGHT: 176.38 LBS | HEIGHT: 69 IN | SYSTOLIC BLOOD PRESSURE: 148 MMHG | DIASTOLIC BLOOD PRESSURE: 62 MMHG | TEMPERATURE: 98 F

## 2017-12-12 PROCEDURE — A4216 STERILE WATER/SALINE, 10 ML: HCPCS | Performed by: STUDENT IN AN ORGANIZED HEALTH CARE EDUCATION/TRAINING PROGRAM

## 2017-12-12 PROCEDURE — 99239 HOSP IP/OBS DSCHRG MGMT >30: CPT | Mod: ,,, | Performed by: HOSPITALIST

## 2017-12-12 PROCEDURE — 25000003 PHARM REV CODE 250: Performed by: NURSE PRACTITIONER

## 2017-12-12 PROCEDURE — 97116 GAIT TRAINING THERAPY: CPT

## 2017-12-12 PROCEDURE — 25000003 PHARM REV CODE 250: Performed by: INTERNAL MEDICINE

## 2017-12-12 PROCEDURE — 97535 SELF CARE MNGMENT TRAINING: CPT

## 2017-12-12 PROCEDURE — 97530 THERAPEUTIC ACTIVITIES: CPT

## 2017-12-12 PROCEDURE — 25000003 PHARM REV CODE 250: Performed by: STUDENT IN AN ORGANIZED HEALTH CARE EDUCATION/TRAINING PROGRAM

## 2017-12-12 PROCEDURE — 97110 THERAPEUTIC EXERCISES: CPT

## 2017-12-12 RX ORDER — LISINOPRIL 20 MG/1
20 TABLET ORAL DAILY
Qty: 90 TABLET | Refills: 1 | Status: SHIPPED | OUTPATIENT
Start: 2017-12-12 | End: 2017-12-14 | Stop reason: SDUPTHER

## 2017-12-12 RX ORDER — GABAPENTIN 100 MG/1
CAPSULE ORAL
Qty: 120 CAPSULE | Refills: 1 | Status: SHIPPED | OUTPATIENT
Start: 2017-12-12 | End: 2017-12-14 | Stop reason: SDUPTHER

## 2017-12-12 RX ADMIN — Medication 10 ML: at 12:12

## 2017-12-12 RX ADMIN — CARVEDILOL 6.25 MG: 6.25 TABLET, FILM COATED ORAL at 09:12

## 2017-12-12 RX ADMIN — ACETAMINOPHEN 500 MG: 500 TABLET ORAL at 05:12

## 2017-12-12 RX ADMIN — LISINOPRIL 20 MG: 20 TABLET ORAL at 09:12

## 2017-12-12 RX ADMIN — THERA TABS 1 TABLET: TAB at 09:12

## 2017-12-12 RX ADMIN — FINASTERIDE 5 MG: 5 TABLET, FILM COATED ORAL at 09:12

## 2017-12-12 RX ADMIN — FUROSEMIDE 20 MG: 20 TABLET ORAL at 09:12

## 2017-12-12 RX ADMIN — GABAPENTIN 100 MG: 100 CAPSULE ORAL at 09:12

## 2017-12-12 RX ADMIN — ASPIRIN 81 MG: 81 TABLET, COATED ORAL at 09:12

## 2017-12-12 RX ADMIN — OXYCODONE HYDROCHLORIDE AND ACETAMINOPHEN 500 MG: 500 TABLET ORAL at 09:12

## 2017-12-12 RX ADMIN — Medication 10 ML: at 06:12

## 2017-12-12 RX ADMIN — TAMSULOSIN HYDROCHLORIDE 0.4 MG: 0.4 CAPSULE ORAL at 09:12

## 2017-12-12 RX ADMIN — LEVOTHYROXINE SODIUM 100 MCG: 100 TABLET ORAL at 05:12

## 2017-12-12 RX ADMIN — CYANOCOBALAMIN TAB 1000 MCG 1000 MCG: 1000 TAB at 09:12

## 2017-12-12 RX ADMIN — FERROUS SULFATE TAB EC 325 MG (65 MG FE EQUIVALENT) 325 MG: 325 (65 FE) TABLET DELAYED RESPONSE at 09:12

## 2017-12-12 RX ADMIN — CALCIUM 500 MG: 500 TABLET ORAL at 09:12

## 2017-12-12 NOTE — PT/OT/SLP PROGRESS
Physical Therapy  Treatment Discharge    Zeyad Woodard   MRN: [id number]   Admitting Diagnosis: MRSA bacteremia    PT Received On: 12/12/17          Billable Minutes:  Gait Training 15, Therapeutic Activity 10 and Therapeutic Exercise 19=44    Treatment Type: Treatment  PT/PTA: PT     PTA Visit Number: 0       General Precautions: Standard, fall  Orthopedic Precautions: N/A   Braces: N/A         Subjective:  Communicated with patient prior to session.  Patient reports he is prepared to discharge home.  Reports he wants to wear slippers today rather than AFOs.    Pain/Comfort  Pain Rating 1: 5/10  Location - Side 1: Right  Location 1:  (buttocks)  Pain Addressed 1: Distraction, Pre-medicate for activity    Objective:  Patient found supine in bed w/ HOB elevated    Functional Status:  MDS G  Bed Mobility Functional Status: mod(I) - (I)  Transfer Functional Status: S-SBA  Walk in Room Functional Status: S-SBA  Walk in Corridor Functional Status: S-SBA  Locomotion on Unit Functional Status: S-SBA  Locomotion Off Unit Functional Status: S-SBA  Moving from seated to standing position: Not steady, but able to stabilize without staff assistance  Walking (with assistive device if used): Not steady, but able to stabilize without staff assistance  Turning around and facing the opposite direction while walking: Not steady, but able to stabilize without staff assistance  Surface-to-surface transfer (transfer between bed and chair or wheelchair): Not steady, but able to stabilize without staff assistance    Bed Mobility:  Sit>Supine:Not performed   Supine>Sit: SBA w/ bed flat and w/o handrail. Extra time    Transfers:  Sit<>Stand: SBA from bed w/ RW; to/from w/c w/ RW and SBA  Stand Pivot Transfer: bed>w/c w/ RW and SBA  Cues to reach back for w/c    Gait:  Amb 200feet, 185  feet w/ RW and supervision./ SBA. Patient amb w/ forward flexed posture. Wearing house slippers and decreased foot clearance w/ forefoot sliding through.       Advanced Gait:  Curb Step: up/down 4 inch curb step w/ RW and SBA     Wheelchair Mobility:  Patient propels w/c w/ BUEs and mod(I) 50 feet.     Therex:  Quad sets,   Glute sets,   Ankle  Pumps,( heel lifts only )   hip abduction/adduction,  heelslides,   SAQ,   LAQ   Hip flexion  x30 reps w/ assist as needed.    Additional Treatment:  Patient pedaled LBE (lower body ergometer) x15 minutes to increase strength and endurance.    Patient left up in chair with call button in reach.    Assessment:  Zeyad Woodard is a 89 y.o. male with a medical diagnosis of MRSA bacteremia.  Patient has progressed well and has met all PT LTGs. Patient performs functional mobility w/ RW and SBA to supervision. Recommend assist at home for safety as patient does fluctuate some. Patient amb w/ house slippers primarily at home but does have AFOs which he wears primarily to go [employer]. He does exhibit improved clearance in swing w/ use of AFOs when walking. Patient will benefit from home health PT to improve safety and functional mobility in the home.    Rehab identified problem list/impairments: weakness, impaired endurance, impaired self care skills, impaired functional mobilty, gait instability, impaired balance, decreased lower extremity function, pain, impaired skin, other (comment) (contact prec/MRSA)    Rehab potential is good.    Activity tolerance: Good    Discharge recommendations: home with home health (and likely family/caregiver assist)     Barriers to discharge: Decreased caregiver support, Inaccessible home environment    Equipment recommendations: wheelchair     GOALS:    Physical Therapy Goals        Problem: Physical Therapy Goal    Goal Priority Disciplines Outcome Goal Variances Interventions   Physical Therapy Goal     PT/OT, PT Ongoing (interventions implemented as appropriate)     Description:  Goals to be met by: 14 DAYS     Patient will increase functional independence with mobility by performin. Supine  to sit with Supervision = met 12/8/2017   2. Sit to supine with Supervision =met 12/8/2017  3. Sit to stand transfer with Stand-by Assistance with RW= met (11/30/2017)  4. Bed to chair transfer with Stand-by Assistance using Rolling Walker = met 12/6/2017  5. Gait  x 150 feet with Stand-by Assistance using Rolling Walker. =Met (11/19/2017)  6. Wheelchair propulsion x100 feet with Stand-by Assistance using bilateral upper extremities =met  7. Ascend/Descend 4 inch curb step with Contact Guard Assistance using Rolling Walker.= met 12/6/2017  8. Sitting at edge of bed x8 minutes with Stand-by Assistance and without UE support, and performing exercises or activity= discontinue  9. Stand for 5 minutes with Contact Guard Assistance using Rolling Walker and perform an activity= met 12/6/2017    10. Lower extremity exercise program x20 reps per handout, with assistance as needed and gym therex= met 12/11/2017   NEW GOAL 12/8/2017 11. Patient will ambulate 50 feet w/ RW and supervision = met    12/11/2017                         PLAN:    Patient to be seen 5 x/week  to address the above listed problems via gait training, therapeutic activities, therapeutic exercises, wheelchair management/training  Plan of Care expires: 12/16/17  Plan of Care reviewed with: patient    More JUSTIN Sanchez, PT  12/12/2017

## 2017-12-12 NOTE — PROGRESS NOTES
ESTRADA picc line d/c. Pressuse applied to site for approximately 2 minutes, and then a pressure dressing was applied.Mr Woodard was informed that he can remove the dressing ini 24 hrs. Pt verbalized understanding  information.

## 2017-12-12 NOTE — PLAN OF CARE
Problem: Physical Therapy Goal  Goal: Physical Therapy Goal  Goals to be met by: 14 DAYS     Patient will increase functional independence with mobility by performin. Supine to sit with Supervision = met 2017   2. Sit to supine with Supervision =met 2017  3. Sit to stand transfer with Stand-by Assistance with RW= met (2017)  4. Bed to chair transfer with Stand-by Assistance using Rolling Walker = met 2017  5. Gait  x 150 feet with Stand-by Assistance using Rolling Walker. =Met (2017)  6. Wheelchair propulsion x100 feet with Stand-by Assistance using bilateral upper extremities =met  7. Ascend/Descend 4 inch curb step with Contact Guard Assistance using Rolling Walker.= met 2017  8. Sitting at edge of bed x8 minutes with Stand-by Assistance and without UE support, and performing exercises or activity= discontinue  9. Stand for 5 minutes with Contact Guard Assistance using Rolling Walker and perform an activity= met 2017    10. Lower extremity exercise program x20 reps per handout, with assistance as needed and gym therex= met 2017   NEW GOAL 2017 11. Patient will ambulate 50 feet w/ RW and supervision = met    2017        Goals met. D/c home today. More Sanchez, PT 2017

## 2017-12-12 NOTE — PROGRESS NOTES
"[facility]  Adult Nutrition  Progress Note    SUMMARY     Recommendations    Recommendation/Intervention: Continue cardiac, 2000 diabetic diet.  Goals: PO intake to meet 85% of EEN  Nutrition Goal Status: goal met  Communication of RD Recs: reviewed with RN             Reason for Assessment    Reason for Assessment: RD follow-up  Diagnosis:  (Sepsis, Debility)  Relevent Medical History: sp I & D 11/13/17 cellulitis of buttocks, sp lumbosacral radiculopathy 10/3/17, DDD, chronic pain, HTN,AFIB, CHF , anemia   Interdisciplinary Rounds: attended     General Information Comments: patient eating 100%    Nutrition Discharge Planning: DC on cardiac, diabetic diet 2000 calories    Nutrition Prescription Ordered    Current Diet Order: Cardiac, 2000 diabetic  Nutrition Order Comments: %           Oral Nutrition Supplement: Boost glucose control     Evaluation of Received Nutrients/Fluid Intake                                           Energy Calories Required: meeting needs                 Protein Required: meeting needs                              Fluid Required: meeting needs     Tolerance: tolerating  % Intake of Estimated Energy Needs: 75 - 100 %  % Meal Intake: 100%     Nutrition Risk Screen     Nutrition Risk Screen: no indicators present    Nutrition/Diet History    Patient Reported Diet/Restrictions/Preferences: general     Food Preferences: No cultural or Judaism food preferences        Factors Affecting Nutritional Intake: pain, decreased appetite, depression                Labs/Tests/Procedures/Meds       Pertinent Labs Reviewed: reviewed, pertinent  Pertinent Labs Comments: Na 126  Pertinent Medications Reviewed: reviewed, pertinent  Pertinent Medications Comments: IV Abx    Physical Findings    Overall Physical Appearance: weak, loss of muscle mass (pale)     Oral/Mouth Cavity:  (-)  Skin: incision, non-healing wound(s)    Anthropometrics    Temp: 97.5 °F (36.4 °C)     Height: 5' 9" " (175.3 cm)  Weight Method: Standard Scale  Weight: 80.2 kg (176 lb 12.9 oz)  Ideal Body Weight (IBW), Male: 160 lb     % Ideal Body Weight, Male (lb): 110.92 lb     BMI (Calculated): 26.3  BMI Grade: 18.5-24.9 - normal                            Estimated/Assessed Needs  Weight Used For Calorie Calculations: 76.3 kg (168 lb 3.4 oz)    Energy Need Method: [place]-St Jeor    RMR ([place]-St. Jeor Equation): 1413.38 x 1766 kcal      Weight Used For Protein Calculations: 76.3 kg (168 lb 3.4 oz)  Protein Requirements:  (1.3-1.5gm/kg)     Fluid Requirements (mL): 1ml/yolis or per MD 1800   CHO Requirement: 50% of calories     Assessment and Plan    Wound healing and infection     Signs and Symptoms (as evidenced by):   Non-healing wound, sp lumbar surgery     Interventions/Recommendations (treatment strategy):  Boost glucose control bid  Diabetic diet 2000 cardiac diet, RD to follow    Monitor and Evaluation    Food and Nutrient Intake: energy intake  Food and Nutrient Adminstration: diet order     Physical Activity and Function: nutrition-related ADLs and IADLs  Anthropometric Measurements: weight change  Biochemical Data, Medical Tests and Procedures: electrolyte and renal panel, glucose/endocrine profile  Nutrition-Focused Physical Findings: overall appearance    Nutrition Risk    Level of Risk:  (follow one time per week)    Nutrition Follow-Up    RD Follow-up?: Yes

## 2017-12-12 NOTE — NURSING
Pharmacist in room, reviewing home medications with pt and pt's daughter. Verbalized understanding.  Handed original of pain medication prescription.

## 2017-12-12 NOTE — PROGRESS NOTES
Wound care follow-up  The wound is pink/moist with 50% slough noted over the wound bed.  The jose maria-wound is denuded from skin stripping with tape- small amount of sanguineous drainage noted.   Recommendations:  Apply Triad ointment bid and prn cleaning, no dressing due to skin stripping.  No erythema noted, no drainage from I & D wound.  Plan of care discussed with patient who verbalized understanding.     12/12/17 0905       Incision/Site 11/13/17 0818 Right gluteal   Date First Assessed/Time First Assessed: 11/13/17 0818   Side: Right  Location: gluteal   Wound Image    Incision WDL ex   Dressing Appearance no dressing   Appearance moist;slough   Periwound Area redness;denuded  (blanches-- skin stripping noted from dressings)   Drainage Characteristics/Odor sanguineous   Drainage Amount small   Wound Length (cm) 0.7   Wound Width (cm) 0.7   Depth (cm) 0.2   Wound Edges open   Cleansed W/ soap and water   Interventions barrier applied  (Triad)   Dressing Dressing applied;hydrogel;telfa island dressing

## 2017-12-12 NOTE — PT/OT/SLP PROGRESS
Occupational Therapy  Treatment/ Discharge Summary    Zeyad Woodard   MRN: [id number]   Admitting Diagnosis: MRSA bacteremia            Billable Minutes:  Self Care/Home Management 15    General Precautions: Standard, fall (standard)  Orthopedic Precautions: N/A  Braces: N/A    Subjective:  Communicated with Lexie (nurse) prior to session.  Pt reports he has had high blood pressure for many years. He is ready for d/c this day.     Objective:   Pt was seen for ADL tasks of toileting, grooming in standing at sink and completion of education re: proper use of walker during all functional mobility.     At this time, patient has met all goals of treatment and deems no further need for skilled OT in this setting.     Patient left up in chair with call button in reach    ASSESSMENT:  Zeyad Woodard is a 89 y.o. male with a medical diagnosis of MRSA bacteremia who is transitioning to his home this day having met OT goals for this stay.     Rehab identified problem list/impairments: weakness, impaired endurance, impaired self care skills, impaired functional mobilty, gait instability, impaired balance, decreased lower extremity function, pain, impaired skin    Rehab potential is good    Activity tolerance: Good    Discharge recommendations:  (patient may need more assistance than previously at home)     Barriers to discharge: Inaccessible home environment, Decreased caregiver support     Equipment recommendations: wheelchair     GOALS:    Occupational Therapy Goals        Problem: Occupational Therapy Goal    Goal Priority Disciplines Outcome Interventions   Occupational Therapy Goal     OT, PT/OT Ongoing (interventions implemented as appropriate)    Description:  Goals to be met by: 14 days     Patient will increase functional independence with ADLs by performing:    Feeding with Modified [place]--met  UE Dressing with Modified [place].   REVISED to setup .   Met 12/8/17--met  LE Dressing with Modified  [place] using AD.  REVISED to min A--PT met  Grooming while standing with Modified [place].  REVISED to seated with mod I   Met 12/8/17  Toileting from bedside commode with Modified [place] for hygiene and clothing management.   REVISED to MIN A.    Met  12/8/17  Bathing from  shower chair/bench with Supervision.--not met  Toilet transfer to bedside commode with Supervision.--met  Perform functional activity in standing with S for 10' in preparation for safety during standing ADls. --MET                            Plan:  Patient to be seen 5 x/week to address the above listed problems via self-care/home management, therapeutic activities, therapeutic exercises  Plan of Care expires: 12/16/17  Plan of Care reviewed with: patient    Alyson Motta, OT  12/12/2017

## 2017-12-12 NOTE — PLAN OF CARE
Problem: Patient Care Overview  Goal: Plan of Care Review  Outcome: Ongoing (interventions implemented as appropriate)  Increased nutrient needs(protein) Related to (etiology):   Wound healing and infection   Signs and Symptoms (as evidenced by):   Non-healing wound, sp lumbar surgery  Recommendation/Intervention: Continue diabetic cardiac diet, continue boost glucose, RD to follow  Goals: PO intake to meet 85% of EEN  Nutrition Goal Status: goal met

## 2017-12-12 NOTE — PLAN OF CARE
Problem: Patient Care Overview  Goal: Plan of Care Review  Outcome: Revised  repositions independently, wound care nurse and changed drsg to buttock. Afebrile. Monitored for pain and safety. Safety maintained, denies pain

## 2017-12-12 NOTE — PLAN OF CARE
Problem: Fall Risk (Adult)  Goal: Identify Related Risk Factors and Signs and Symptoms  Related risk factors and signs and symptoms are identified upon initiation of Human Response Clinical Practice Guideline (CPG)   Outcome: Ongoing (interventions implemented as appropriate)   12/12/17 0522   Fall Risk   Related Risk Factors (Fall Risk) age-related changes;gait/mobility problems;fatigue/slow reaction

## 2017-12-12 NOTE — PLAN OF CARE
12/12/2017  1:20 PM    Patient to discharge home today with assist of family. Patient set up with [facility] per ROMÁN Garza.    Future Appointments  Date Time Provider Department Center   12/14/2017 11:00 AM Booker Ricci MD [facility]   12/20/2017 2:00 PM Elsa Rahman MD [facility]   1/3/2018 1:00 PM INFECTIOUS DISEASE, [facility] INFECT [facility]        12/12/17 1320   Final Note   Assessment Type Final Discharge Note   Discharge Disposition Home   What phone number can be called within the next 1-3 days to see how you are doing after discharge? [phone]   Hospital Follow Up  Appt(s) scheduled? Yes   Discharge plans and expectations educations in teach back method with documentation complete? Yes     Meilssa Wyatt RN, CM Skilled  [id number]

## 2017-12-13 ENCOUNTER — PATIENT OUTREACH (OUTPATIENT)
Dept: ADMINISTRATIVE | Facility: CLINIC | Age: 82
End: 2017-12-13

## 2017-12-13 RX ORDER — CARVEDILOL 6.25 MG/1
6.25 TABLET ORAL 2 TIMES DAILY
Qty: 60 TABLET | Refills: 11 | Status: SHIPPED | OUTPATIENT
Start: 2017-12-13 | End: 2017-12-14 | Stop reason: SDUPTHER

## 2017-12-13 RX ORDER — POLYETHYLENE GLYCOL 3350 17 G/17G
17 POWDER, FOR SOLUTION ORAL DAILY
COMMUNITY
End: 2018-01-29

## 2017-12-13 NOTE — PATIENT INSTRUCTIONS
Fall Prevention  Falls often occur due to slipping, tripping or losing your balance. Millions of people fall every year and injure themselves. Here are ways to reduce your risk of falling again.  · Think about your fall, was there anything that caused your fall that can be fixed, removed, or replaced?  · Make your home safe by keeping walkways clear of objects you may trip over.  · Use non-slip pads under rugs. Do not use area rugs or small throw rugs.  · Use non-slip mats in bathtubs and showers.  · Install handrails and lights on staircases.  · Do not walk in poorly lit areas.  · Do not stand on chairs or wobbly ladders.  · Use caution when reaching overhead or looking upward. This position can cause a loss of balance.  · Be sure your shoes fit properly, have non-slip bottoms and are in good condition.   · Wear shoes both inside and out. Avoid going barefoot or wearing slippers.  · Be cautious when going up and down stairs, curbs, and when walking on uneven sidewalks.  · If your balance is poor, consider using a cane or walker.  · If your fall was related to alcohol use, stop or limit alcohol intake.   · If your fall was related to use of sleeping medicines, talk to your doctor about this. You may need to reduce your dosage at bedtime if you awaken during the night to go to the bathroom.    · To reduce the need for nighttime bathroom trips:  ¨ Avoid drinking fluids for several hours before going to bed  ¨ Empty your bladder before going to bed  ¨ Men can keep a urinal at the bedside  · Stay as active as you can. Balance, flexibility, strength, and endurance all come from exercise. They all play a role in preventing falls. Ask your healthcare provider which types of activity are right for you.  · Get your vision checked on a regular basis.  · If you have pets, know where they are before you stand up or walk so you don't trip over them.  · Use night lights.  Date Last Reviewed: 11/5/2015  © 9572-5386 [employer]. [address] [place] [address], [place] [address]. All rights reserved. This information is not intended as a substitute for professional medical care. Always follow your healthcare professional's instructions.

## 2017-12-14 ENCOUNTER — OFFICE VISIT (OUTPATIENT)
Dept: FAMILY MEDICINE | Facility: CLINIC | Age: 82
End: 2017-12-14
Payer: MEDICARE

## 2017-12-14 ENCOUNTER — TELEPHONE (OUTPATIENT)
Dept: ADMINISTRATIVE | Facility: CLINIC | Age: 82
End: 2017-12-14

## 2017-12-14 VITALS
DIASTOLIC BLOOD PRESSURE: 74 MMHG | BODY MASS INDEX: 26.45 KG/M2 | OXYGEN SATURATION: 98 % | SYSTOLIC BLOOD PRESSURE: 170 MMHG | WEIGHT: 178.56 LBS | HEART RATE: 69 BPM | HEIGHT: 69 IN

## 2017-12-14 DIAGNOSIS — I10 ESSENTIAL HYPERTENSION: Primary | ICD-10-CM

## 2017-12-14 DIAGNOSIS — I50.22 CHRONIC SYSTOLIC CONGESTIVE HEART FAILURE: ICD-10-CM

## 2017-12-14 DIAGNOSIS — M79.89 LEG SWELLING: ICD-10-CM

## 2017-12-14 PROCEDURE — 99999 PR PBB SHADOW E&M-EST. PATIENT-LVL III: CPT | Mod: PBBFAC,,, | Performed by: FAMILY MEDICINE

## 2017-12-14 PROCEDURE — 99214 OFFICE O/P EST MOD 30 MIN: CPT | Mod: S$GLB,,, | Performed by: FAMILY MEDICINE

## 2017-12-14 PROCEDURE — 99499 UNLISTED E&M SERVICE: CPT | Mod: S$GLB,,, | Performed by: FAMILY MEDICINE

## 2017-12-14 RX ORDER — LISINOPRIL 20 MG/1
20 TABLET ORAL 2 TIMES DAILY
Qty: 180 TABLET | Refills: 3 | Status: ON HOLD | OUTPATIENT
Start: 2017-12-14 | End: 2017-12-30

## 2017-12-14 RX ORDER — GABAPENTIN 100 MG/1
CAPSULE ORAL
Qty: 360 CAPSULE | Refills: 3 | Status: ON HOLD | OUTPATIENT
Start: 2017-12-14 | End: 2018-04-19

## 2017-12-14 RX ORDER — ATORVASTATIN CALCIUM 20 MG/1
20 TABLET, FILM COATED ORAL NIGHTLY
Qty: 90 TABLET | Refills: 3 | Status: SHIPPED | OUTPATIENT
Start: 2017-12-14 | End: 2017-12-14 | Stop reason: SDUPTHER

## 2017-12-14 RX ORDER — CARVEDILOL 6.25 MG/1
6.25 TABLET ORAL 2 TIMES DAILY
Qty: 180 TABLET | Refills: 3 | Status: SHIPPED | OUTPATIENT
Start: 2017-12-14 | End: 2018-01-05 | Stop reason: SDUPTHER

## 2017-12-14 RX ORDER — ATORVASTATIN CALCIUM 20 MG/1
20 TABLET, FILM COATED ORAL NIGHTLY
Qty: 90 TABLET | Refills: 3 | Status: ON HOLD | OUTPATIENT
Start: 2017-12-14 | End: 2018-04-19

## 2017-12-14 NOTE — PROGRESS NOTES
Subjective:       Patient ID: Zeyad Woodard is a 89 y.o. male.    Chief Complaint: Hospital Follow Up and Hypertension    89 years old male who came to the clinic after recent hospitalization secondary to MRSA.  No fever or chills. Wound is better.  Blood pressure is elevated.  No chest pain palpitations orthopnea or PND.  No recent flareups of CHF.  Patient with leg swelling sometimes.      Hypertension   Pertinent negatives include no chest pain or palpitations.     Review of Systems   Constitutional: Negative.  Negative for fever.   HENT: Negative.    Eyes: Negative.    Respiratory: Negative.    Cardiovascular: Positive for leg swelling. Negative for chest pain and palpitations.   Gastrointestinal: Negative.    Genitourinary: Negative.    Musculoskeletal: Negative.    Skin: Negative.    Neurological: Negative.    Psychiatric/Behavioral: Negative.        Objective:      Physical Exam   Constitutional: He is oriented to person, place, and time. He appears well-developed and well-nourished. No distress.   HENT:   Head: Normocephalic and atraumatic.   Right Ear: External ear normal.   Left Ear: External ear normal.   Nose: Nose normal.   Mouth/Throat: Oropharynx is clear and moist. No oropharyngeal exudate.   Eyes: Conjunctivae and EOM are normal. Pupils are equal, round, and reactive to light. Right eye exhibits no discharge. Left eye exhibits no discharge. No scleral icterus.   Neck: Normal range of motion. Neck supple. No JVD present. No tracheal deviation present. No thyromegaly present.   Cardiovascular: Normal rate, regular rhythm, normal heart sounds and intact distal pulses.  Exam reveals no gallop and no friction rub.    No murmur heard.  Pulmonary/Chest: Effort normal and breath sounds normal. No stridor. No respiratory distress. He has no wheezes. He has no rales. He exhibits no tenderness.   Abdominal: Soft. Bowel sounds are normal. He exhibits no distension and no mass. There is no tenderness. There  is no rebound and no guarding.   Musculoskeletal: Normal range of motion. He exhibits edema (1/4 lower extremities.). He exhibits no tenderness.   Lymphadenopathy:     He has no cervical adenopathy.   Neurological: He is alert and oriented to person, place, and time. He has normal reflexes. He displays normal reflexes. No cranial nerve deficit. He exhibits normal muscle tone. Coordination and gait abnormal.   Skin: Skin is warm and dry. No rash noted. He is not diaphoretic. No erythema. No pallor.   Psychiatric: His behavior is normal. Judgment and thought content normal. His mood appears not anxious. His affect is not angry, not blunt, not labile and not inappropriate. He exhibits a depressed mood.   Nursing note and vitals reviewed.      Assessment:       1. Essential hypertension    2. Chronic systolic congestive heart failure    3. Leg swelling        Plan:         Zeyad was seen today for hospital follow up and hypertension.    Diagnoses and all orders for this visit:    Essential hypertension  -     Basic metabolic panel; Future    Chronic systolic congestive heart failure  -     Basic metabolic panel; Future    Leg swelling    Other orders  -     carvedilol (COREG) 6.25 MG tablet; Take 1 tablet (6.25 mg total) by mouth 2 (two) times daily.  -     atorvastatin (LIPITOR) 20 MG tablet; Take 1 tablet (20 mg total) by mouth nightly.  -     gabapentin (NEURONTIN) 100 MG capsule; Take 100mg in Morning and take 300mg nightly  -     lisinopril (PRINIVIL,ZESTRIL) 20 MG tablet; Take 1 tablet (20 mg total) by mouth 2 (two) times daily.    Continue monitoring blood pressure at home, low sodium diet.

## 2017-12-14 NOTE — PATIENT INSTRUCTIONS

## 2017-12-15 NOTE — ASSESSMENT & PLAN NOTE
-Continue therapy with Vanc and trough  -Change mepilex dressing TIW and as needed for drainage  -F/U with Surgery as scheduled  -Continue scheduled tylenol for pain control with percocet as needed for breakthrough pain  -Contact isolation discontinued since patient with 2 negative blood cultures

## 2017-12-15 NOTE — ASSESSMENT & PLAN NOTE
-Chronic and stable  -Continue therapy with atorvastatin 20mg nightly  -F/U with PCP for ongoing monitoring

## 2017-12-15 NOTE — ASSESSMENT & PLAN NOTE
-Patient calm and cooperative  -Continue therapy with donepezil 10mg nightly  -Delirium precautions  -Reorient as necessary

## 2017-12-15 NOTE — ASSESSMENT & PLAN NOTE
-No difficulty urinating  -Continue medical therapy with finasteride 5mg daily and tamsulosin 0.4mg daily  -Will continue to monitor

## 2017-12-15 NOTE — ASSESSMENT & PLAN NOTE
-Chronic and stable  -Continue medical therapy with levothyroxine 100mcg daily  -F/U with PCP for ongoing monitoring

## 2017-12-15 NOTE — ASSESSMENT & PLAN NOTE
-Chronic with improved control  -Continue therapy with coreg  -lisinopril was added while at [facility] for intermittent highs  -will continue to monitor and adjust regimen as necessary

## 2017-12-15 NOTE — ASSESSMENT & PLAN NOTE
-H/H stable with last iron panel WNL  -Continue iron therapy  -Will continue to monitor with BIW labs

## 2017-12-15 NOTE — ASSESSMENT & PLAN NOTE
-With source likely buttock abscess, seen in surgery clinic and sutures were removed   -Continue vanc IV with end date of 12/8 which is 4 weeks from negative culture on 11/10  -patient with 2 negative blood cultures, removed from isolation  -Picc line care with saline flushes and dressing changes

## 2017-12-15 NOTE — DISCHARGE SUMMARY
[facility]  Department of Hospital Medicine  Discharge Summary      Patient Name: Zeyad Woodard  MRN: [id number]  Admission Date: 11/15/2017  Hospital Length of Stay: 27 days  Discharge Date and Time: 12/12/2017  2:15 PM  Attending Physician: Tanner Bergman MD  Discharging Provider: Nimo Gonzalez NP  Primary Care Provider: Booker Ricci MD    Chief Complaint/Reason for Admission: MRSA bacteremia    History of Present Illness:  Patient is a 89 y.o. male with A. Fib, BPH, CHF, HTN who presents to SNF after hospitalization for MRSA bacteremia thought to be due to a rectal abscess with cellulitis.  He required I&D of left buttock (which grew MRSA on culture) on 11/13 by Dr. Rahman.    He had a JEVON which did not show signs of infection involving valves or pacemaker.  He is due to complete Vanc for 4 weeks.  He complains of mild discomfort to his buttocks currently which if exacerbated by prolonged sitting and relieved by percocet. The patient has been admitted to SNF for ongoing PT/OT due to insufficient progress to go home safely from the hospital.     Hospital Course:   The patient was admitted at [facility] from 11/10 to 11/15/2017.  11/15: Patient admitted to SNF for ongoing OT/PT following a hospitalization for MRSA bacteremia.  11/17: Patient seen at bedside, report pain to buttock, slept well  11/24: Patient seen at bedside, doing well, visualized buttocks removed dressing erythema improving  11/27: erythema much improved, denies pain, no complaints, reviewed labs, decreasing h/h with negative stool for occult blood, patient with moderate about of ecchymosis to ABD from heparin injections with hematoma, discussed with Dr. Bergman, stopped heparin injections and placed on Teds and SCDs  12/4: adjusted vanc repeat trough on 12/7, doing well, would like prune juice daily with breakfast, ordered cushion for wheelchair. Labs reviewed.  12/5: Patient seen at bedside, doing well, using  cushion, pain better controlled with cushion, no complaints.  12/12: Patient seen at bedside, doing well, discussed discharge, answered all questions, discharged home with home health services.     Significant Diagnostic Studies:     Recent Labs  Lab 12/11/17  0701   WBC 8.21   HGB 7.9*   HCT 23.9*   *       Recent Labs  Lab 12/11/17  0701   *   K 4.4   CL 99   CO2 28   BUN 27*   CREATININE 1.1   CALCIUM 8.8   PROT 6.1   BILITOT 0.8   ALKPHOS 81   ALT 18   AST 26     Lab Results   Component Value Date    LABPROT 11.5 11/07/2017    ALBUMIN 3.2 (L) 12/11/2017     Lab Results   Component Value Date    CALCIUM 8.8 12/11/2017    PHOS 3.6 12/11/2017     Results for EVERT ARAUJO (MRN [id number]) as of 12/15/2017 14:29   Ref. Range 12/7/2017 07:24 12/8/2017 16:36 12/11/2017 07:01   Magnesium Latest Ref Range: 1.6 - 2.6 mg/dL 1.9  2.0     Final Active Diagnoses:    Diagnosis Date Noted POA    PRINCIPAL PROBLEM:  MRSA bacteremia [R78.81] 11/10/2017 Yes    Cellulitis and abscess of buttock [L02.31, L03.317]  Yes    Debility [R53.81] 11/08/2017 Yes    Takes dietary supplements [Z78.9] 11/16/2017 Yes    HLD (hyperlipidemia) [E78.5] 11/07/2017 Yes    Chronic pain [G89.29] 10/03/2017 Yes    Dementia without behavioral disturbance [F03.90] 06/05/2017 Yes    SSS (sick sinus syndrome) [I49.5] 06/12/2015 Yes    CKD (chronic kidney disease), stage III [N18.3] 10/16/2013 Yes    Iron deficiency anemia due to chronic blood loss [D50.0] 04/12/2013 Yes    Hypothyroidism [E03.9]  Yes    Polyneuropathy [G62.9] 11/12/2012 Yes    Chronic diastolic congestive heart failure [I50.32] 08/23/2012 Yes    Benign prostatic hyperplasia [N40.0]  Yes    Renovascular hypertension [I15.0]  Yes    Chronic atrial fibrillation [I48.2]  Yes      Problems Resolved During this Admission:    Diagnosis Date Noted Date Resolved POA      * MRSA bacteremia    -With source likely buttock abscess, seen in surgery clinic and sutures were  removed   -Continue vanc IV with end date of 12/8 which is 4 weeks from negative culture on 11/10  -patient with 2 negative blood cultures, removed from isolation  -Picc line care with saline flushes and dressing changes        Cellulitis and abscess of buttock    -Continue therapy with Vanc and trough  -Change mepilex dressing TIW and as needed for drainage  -F/U with Surgery as scheduled  -Continue scheduled tylenol for pain control with percocet as needed for breakthrough pain  -Contact isolation discontinued since patient with 2 negative blood cultures         Debility    -continue PT/OT to increase ambulation, ADL performance and endurance  -continue Teds/SCDS for DVT prophylaxis, patient with moderate amount of ecchymosis (which is improving) to ABD from heparin injections and with decreased h/h, neg stool for occult blood, H/H is improving   -continue fall precautions  -continue docusate to prevent constipation; hold for frequent or loose stooling        Takes dietary supplements    -Continue dietary supplements with vit C, calcium, cyanocobalamin ( + hx of B12 def.), MVI        HLD (hyperlipidemia)    -Chronic and stable  -Continue therapy with atorvastatin 20mg nightly  -F/U with PCP for ongoing monitoring        Chronic pain    -Back pain with epidural injections in the past  -Continue percocet prn pain        Dementia without behavioral disturbance    -Patient calm and cooperative  -Continue therapy with donepezil 10mg nightly  -Delirium precautions  -Reorient as necessary        SSS (sick sinus syndrome)    + pacemaker in place with regular rhythm        CKD (chronic kidney disease), stage III    -Cr is at baseline of 1.1-1.4  -Renally dose medications and avoid nephrotoxins  -Continue to monitor  -Regular Vanc level monitoring        Iron deficiency anemia due to chronic blood loss    -H/H stable with last iron panel WNL  -Continue iron therapy  -Will continue to monitor with BIW labs         Hypothyroidism    -Chronic and stable  -Continue medical therapy with levothyroxine 100mcg daily  -F/U with PCP for ongoing monitoring        Polyneuropathy    -Continue medical therapy with gabapentin  -He denies LE pain currently        Chronic diastolic congestive heart failure    -Currently compensated  -Patient only takes lasix prn at home for edema  -Will monitor with daily weights and initiate lasix as needed for edema or weight gain, currently will hold for now due to hyponatremia, had increase in weight while at SNF, treated with lasix intermittent once sodium was stable        Benign prostatic hyperplasia    -No difficulty urinating  -Continue medical therapy with finasteride 5mg daily and tamsulosin 0.4mg daily  -Will continue to monitor        Chronic atrial fibrillation    -Rate controlled  -Warfarin discontinue due to GI bleed with angioectasia  -Continue coreg 6.25mg and ASA  81mg daily therapy  -Patient with consistent rate in 60's so likely paced        Renovascular hypertension    -Chronic with improved control  -Continue therapy with coreg  -lisinopril was added while at SNF for intermittent highs  -will continue to monitor and adjust regimen as necessary        PT note, TK Sanchez, PT 12/12/17  General Precautions: Standard, fall  Orthopedic Precautions: N/A   Braces: N/A  Functional Status:  MDS G  Bed Mobility Functional Status: mod(I) - (I)  Transfer Functional Status: S-SBA  Walk in Room Functional Status: S-SBA  Walk in Corridor Functional Status: S-SBA  Locomotion on Unit Functional Status: S-SBA  Locomotion Off Unit Functional Status: S-SBA  Moving from seated to standing position: Not steady, but able to stabilize without staff assistance  Walking (with assistive device if used): Not steady, but able to stabilize without staff assistance  Turning around and facing the opposite direction while walking: Not steady, but able to stabilize without staff assistance  Surface-to-surface  transfer (transfer between bed and chair or wheelchair): Not steady, but able to stabilize without staff assistance  Bed Mobility:  Sit>Supine:Not performed   Supine>Sit: SBA w/ bed flat and w/o handrail. Extra time  Transfers:  Sit<>Stand: SBA from bed w/ RW; to/from w/c w/ RW and SBA  Stand Pivot Transfer: bed>w/c w/ RW and SBA  Cues to reach back for w/c  Gait:  Amb 200feet, 185  feet w/ RW and supervision./ SBA. Patient amb w/ forward flexed posture. Wearing house slippers and decreased foot clearance w/ forefoot sliding through.    Advanced Gait:  Curb Step: up/down 4 inch curb step w/ RW and SBA   Wheelchair Mobility:  Patient propels w/c w/ BUEs and mod(I) 50 feet.   Discharge recommendations: home with home health (and likely family/caregiver assist)       OT note, DELON Dumont 12/11/17  General Precautions: Standard, fall (standard)  Orthopedic Precautions: N/A  Braces: N/A  Functional Status:  MDS G  Bed Mobility Functional Status: mod(I) - supine to sit   Transfer Functional Status: Sup with sit to stand from bed, BSC over toilet and standard chair with RW  Walk in Room Functional Status: S-SBA--to walk from bed into bathroom and back to chair with RW--cues for safety and floor surfaces and safety turning  Dressing Functional Status: 1: S-SBA--pt donned shirt with setup and doffed; pt donned pants with sup; pt donned socks and shoes with AFOs seated in standard chair with sup  Eating Functional Status: mod(I) -  Toilet Use Functional Status:-Min (A)--BSC over toilet with grab bar and RW--A with complete BM cleaning  Personal Hygiene Functional Status: mod(I) - seated at sink--pt too weak to attempt standing   Bathing Functional Status:-Min (A)-- A needed with complete buttocks   Discharge recommendations:  (patient may need more assistance than previously at home)       Discharged Condition: stable    Disposition: Home-Health Care c    Follow Up:  Follow-up Information     Booker Ricci MD.  "Go on 12/14/2017.    Specialty:  Family Medicine  Contact information:  [address]  [place] [address]  [phone]             Elsa Rahman MD. Go on 12/20/2017.    Specialties:  General Surgery, Surgery  Contact information:  [address]  [address]  [place] [address]  [phone]             INFECTIOUS DISEASE, [facility]. Go on 1/3/2018.           [facility].    Specialty:  Home Health Services  Why:  Agency will call pt to schedule a home health assessment.  Contact information:  [address]  [address]  [place] [address]  [phone]                 Future Appointments  Date Time Provider Department Center   12/20/2017 11:30 AM Elsa Rahman MD [facility] WOUND [facility]   12/20/2017 12:50 PM APPOINTMENT [facility] LAB [facility]   1/3/2018 1:00 PM INFECTIOUS DISEASE, [facility] INFECT [facility]     Patient Instructions:     WHEELCHAIR FOR HOME USE   Order Specific Question Answer Comments   Hours in W/C per day: 8    Type of Wheelchair: Lightweight    Patient unable to propel in Standard wheelchair? Yes    Size(Width): 18"(STD adult)    Leg Support: STD footrests heel loops   Leg Support: Swing Away    Arm Height: Swing away    Arm Height: Full length    Desired seat depth: standard    Back height: standard    Lap Belt: Velcro    Accessories: Heel loops    Cushion: Foam    Justification for cushion: Prevent pressure ulcers    Height: 5' 9" (1.753 m)    Weight: 80.2 kg (176 lb 12.9 oz)    Does patient have medical equipment at home? rollator    Does patient have medical equipment at home? shower chair    Does patient have medical equipment at home? walker, rolling    Does patient have medical equipment at home? grab bar    Length of need (1-99 months): 99    Please check all that apply: Caregiver is capable and willing to operate wheelchair safely.    Please check all that apply: Patient's upper body strength is sufficient for propulsion.    Please " "check all that apply: The patient requires the use of a w/c for activities of daily living within the Home.    Please check all that apply: Patient mobility limitations cannot be sufficiently resolved by the use of other ambulatory therapies.    Vendor: [facility]    Expected Date of Delivery: 12/13/2017      COMMODE FOR HOME USE   Order Specific Question Answer Comments   Type: Standard    Height: 5' 9" (1.753 m)    Weight: 80.2 kg (176 lb 12.9 oz)    Does patient have medical equipment at home? rollator    Does patient have medical equipment at home? shower chair    Does patient have medical equipment at home? walker, rolling    Does patient have medical equipment at home? grab bar    Length of need (1-99 months): 99    Vendor: [facility]    Expected Date of Delivery: 12/13/2017      Diet general   Order Specific Question Answer Comments   Additional restrictions: Cardiac (Low Na/Chol)    Additional restrictions: Diabetic 2000      Activity as tolerated     Call MD for:  temperature >100.4     Call MD for:  persistent nausea and vomiting or diarrhea     Call MD for:  severe uncontrolled pain     Call MD for:  redness, tenderness, or signs of infection (pain, swelling, redness, odor or green/yellow discharge around incision site)     Call MD for:  difficulty breathing or increased cough     Call MD for:  severe persistent headache     Call MD for:  persistent dizziness, light-headedness, or visual disturbances     Call MD for:  increased confusion or weakness     Change dressing (specify)   Order Comments: Right buttock:   cleanse right buttock  wound with sterile normal saline and apply xeroform to wound bed and cover with mepilex border    Cleanse right medial knee with normal saline, apply neosporin to wound bed, cover with boarder dressing       Medications:  Reconciled Home Medications:   Discharge Medication List as of 12/13/2017  8:16 AM      START taking these medications    Details   acetaminophen " (TYLENOL) 325 MG tablet Take 2 tablets (650 mg total) by mouth every 6 (six) hours as needed., Starting Mon 12/11/2017, OTC      neomycin-bacitracin-polymyxin (NEOSPORIN) ointment Apply topically Daily., Starting Mon 12/11/2017, OTC         CONTINUE these medications which have CHANGED    Details   aspirin (ECOTRIN) 81 MG EC tablet Take 1 tablet (81 mg total) by mouth once daily., Starting Mon 12/11/2017, Until Tue 12/11/2018, OTC      oxyCODONE-acetaminophen (PERCOCET) 7.5-325 mg per tablet Take 1 tablet by mouth every 8 (eight) hours as needed for Pain., Starting Mon 12/11/2017, Print      atorvastatin (LIPITOR) 20 MG tablet Take 1 tablet (20 mg total) by mouth nightly., Starting Mon 12/11/2017, Until Tue 12/11/2018, Print      gabapentin (NEURONTIN) 100 MG capsule Take 100mg in Morning and take 300mg nightly, Normal      lisinopril (PRINIVIL,ZESTRIL) 20 MG tablet Take 1 tablet (20 mg total) by mouth once daily., Starting Tue 12/12/2017, Until Wed 12/12/2018, Normal         CONTINUE these medications which have NOT CHANGED    Details   ASCORBATE CALCIUM (VITAMIN C ORAL) Take 1 tablet by mouth once daily. , Starting 7/3/2012, Until Discontinued, Historical Med      calcium 500 mg Tab Take 1 tablet by mouth Daily., Starting 7/3/2012, Until Discontinued, Historical Med      clotrimazole-betamethasone (LOTRISONE) lotion Apply topically 2 (two) times daily., Starting 5/28/2015, Until Discontinued, Print      cyanocobalamin (VITAMIN B-12) 1000 MCG tablet Take 100 mcg by mouth once daily., Until Discontinued, Historical Med      donepezil (ARICEPT) 10 MG tablet Take 1 tablet (10 mg total) by mouth every evening., Starting Thu 11/2/2017, Until Fri 11/2/2018, Normal      ferrous sulfate 325 mg (65 mg iron) Tab tablet Take 325 mg by mouth once daily., Until Discontinued, Historical Med      finasteride (PROSCAR) 5 mg tablet Take 1 tablet (5 mg total) by mouth once daily., Starting Thu 7/27/2017, Until Fri 7/27/2018, Normal       furosemide (LASIX) 20 MG tablet Take 20 mg by mouth 2 (two) times daily., Historical Med      levothyroxine (SYNTHROID) 100 MCG tablet Take 1 tablet (100 mcg total) by mouth once daily., Starting Mon 8/7/2017, Normal      MULTIVITS-MINERALS/FA/LYCOPENE (ONE-A-DAY MEN'S MULTIVITAMIN ORAL) Take 1 tablet by mouth once daily., Until Discontinued, Historical Med      senna-docusate 8.6-50 mg (PERICOLACE) 8.6-50 mg per tablet Take 1 tablet by mouth 2 (two) times daily., Starting 7/26/2016, Until Discontinued, OTC      tamsulosin (FLOMAX) 0.4 mg Cp24 Take 1 capsule (0.4 mg total) by mouth once daily., Starting Mon 8/7/2017, Normal      carvedilol (COREG) 6.25 MG tablet Take 1 tablet (6.25 mg total) by mouth 2 (two) times daily., Starting Wed 11/15/2017, Until Thu 11/15/2018, Normal         STOP taking these medications       dextrose 5 % SolP 250 mL with vancomycin 1,000 mg SolR 1,250 mg Comments:   Reason for Stopping:         metoprolol succinate (TOPROL-XL) 25 MG 24 hr tablet Comments:   Reason for Stopping:             Time spent on the discharge of patient: 30 minutes    Nimo Gonzalez NP  Department of Hospital Medicine  [facility]

## 2017-12-15 NOTE — ASSESSMENT & PLAN NOTE
-continue PT/OT to increase ambulation, ADL performance and endurance  -continue Teds/SCDS for DVT prophylaxis, patient with moderate amount of ecchymosis (which is improving) to ABD from heparin injections and with decreased h/h, neg stool for occult blood, H/H is improving   -continue fall precautions  -continue docusate to prevent constipation; hold for frequent or loose stooling

## 2017-12-15 NOTE — ASSESSMENT & PLAN NOTE
-Currently compensated  -Patient only takes lasix prn at home for edema  -Will monitor with daily weights and initiate lasix as needed for edema or weight gain, currently will hold for now due to hyponatremia, had increase in weight while at SNF, treated with lasix intermittent once sodium was stable

## 2017-12-15 NOTE — ASSESSMENT & PLAN NOTE
-Cr is at baseline of 1.1-1.4  -Renally dose medications and avoid nephrotoxins  -Continue to monitor  -Regular Vanc level monitoring

## 2017-12-15 NOTE — ASSESSMENT & PLAN NOTE
-Rate controlled  -Warfarin discontinue due to GI bleed with angioectasia  -Continue coreg 6.25mg and ASA  81mg daily therapy  -Patient with consistent rate in 60's so likely paced

## 2017-12-19 ENCOUNTER — TELEPHONE (OUTPATIENT)
Dept: FAMILY MEDICINE | Facility: CLINIC | Age: 82
End: 2017-12-19

## 2017-12-19 NOTE — TELEPHONE ENCOUNTER
----- Message from Tatianna Goins sent at 12/19/2017 10:00 AM CST -----  Contact: [phone] [facility]   [facility] to report that pt had blood pressure of 180/74 before taking his medicine on this morning

## 2017-12-20 ENCOUNTER — HOSPITAL ENCOUNTER (OUTPATIENT)
Dept: WOUND CARE | Facility: HOSPITAL | Age: 82
Discharge: HOME | End: 2017-12-20
Attending: SURGERY
Payer: MEDICARE

## 2017-12-20 ENCOUNTER — TELEPHONE (OUTPATIENT)
Dept: INTERNAL MEDICINE | Facility: CLINIC | Age: 82
End: 2017-12-20

## 2017-12-20 VITALS
TEMPERATURE: 99 F | SYSTOLIC BLOOD PRESSURE: 138 MMHG | WEIGHT: 178 LBS | HEIGHT: 69 IN | BODY MASS INDEX: 26.36 KG/M2 | DIASTOLIC BLOOD PRESSURE: 64 MMHG | HEART RATE: 68 BPM

## 2017-12-20 DIAGNOSIS — L02.31 CELLULITIS AND ABSCESS OF BUTTOCK: Primary | ICD-10-CM

## 2017-12-20 DIAGNOSIS — L03.317 CELLULITIS AND ABSCESS OF BUTTOCK: Primary | ICD-10-CM

## 2017-12-20 DIAGNOSIS — E87.5 HYPERKALEMIA: ICD-10-CM

## 2017-12-20 DIAGNOSIS — N28.9 RENAL INSUFFICIENCY: Primary | ICD-10-CM

## 2017-12-20 PROCEDURE — 99213 OFFICE O/P EST LOW 20 MIN: CPT

## 2017-12-20 NOTE — TELEPHONE ENCOUNTER
----- Message from Tatianna Goins sent at 12/19/2017 10:00 AM CST -----  Contact: [phone] [facility]   [facility] to report that pt had blood pressure of 180/74 before taking his medicine on this morning

## 2017-12-22 ENCOUNTER — TELEPHONE (OUTPATIENT)
Dept: FAMILY MEDICINE | Facility: CLINIC | Age: 82
End: 2017-12-22

## 2017-12-22 ENCOUNTER — TELEPHONE (OUTPATIENT)
Dept: INTERNAL MEDICINE | Facility: CLINIC | Age: 82
End: 2017-12-22

## 2017-12-22 NOTE — TELEPHONE ENCOUNTER
----- Message from Gena Mena sent at 12/21/2017  2:24 PM CST -----  Contact: [phone] Omid with [facility]  Patient fell last night and has 3 skin tears on right leg and right arm. Rep wanted to get an order for wound care. Please call and advise.

## 2017-12-24 ENCOUNTER — TELEPHONE (OUTPATIENT)
Dept: FAMILY MEDICINE | Facility: CLINIC | Age: 82
End: 2017-12-24

## 2017-12-24 DIAGNOSIS — N18.30 STAGE 3 CHRONIC KIDNEY DISEASE: Primary | ICD-10-CM

## 2017-12-24 NOTE — TELEPHONE ENCOUNTER
Decrease kidney function in comparison with previous reports.      Slightly elevated potassium.  Repeat testing tomorrow.      If still abnormal possible treatment for elevated potassium and nephrology evaluation.

## 2017-12-26 ENCOUNTER — HOSPITAL ENCOUNTER (EMERGENCY)
Facility: HOSPITAL | Age: 82
Discharge: HOME | End: 2017-12-26
Attending: EMERGENCY MEDICINE
Payer: MEDICARE

## 2017-12-26 VITALS
HEART RATE: 59 BPM | SYSTOLIC BLOOD PRESSURE: 115 MMHG | TEMPERATURE: 98 F | RESPIRATION RATE: 15 BRPM | HEIGHT: 68 IN | BODY MASS INDEX: 26.67 KG/M2 | WEIGHT: 176 LBS | OXYGEN SATURATION: 100 % | DIASTOLIC BLOOD PRESSURE: 85 MMHG

## 2017-12-26 DIAGNOSIS — R53.1 WEAKNESS: ICD-10-CM

## 2017-12-26 DIAGNOSIS — R53.1 WEAKNESS GENERALIZED: Primary | ICD-10-CM

## 2017-12-26 LAB
ALBUMIN SERPL BCP-MCNC: 3.4 G/DL
ALP SERPL-CCNC: 90 U/L
ALT SERPL W/O P-5'-P-CCNC: 20 U/L
ANION GAP SERPL CALC-SCNC: 7 MMOL/L
ANISOCYTOSIS BLD QL SMEAR: SLIGHT
AST SERPL-CCNC: 30 U/L
BACTERIA #/AREA URNS HPF: NORMAL /HPF
BASOPHILS # BLD AUTO: ABNORMAL K/UL
BASOPHILS NFR BLD: 0 %
BILIRUB SERPL-MCNC: 0.6 MG/DL
BILIRUB UR QL STRIP: NEGATIVE
BNP SERPL-MCNC: 319 PG/ML
BUN SERPL-MCNC: 37 MG/DL
CALCIUM SERPL-MCNC: 9.2 MG/DL
CHLORIDE SERPL-SCNC: 101 MMOL/L
CLARITY UR: CLEAR
CO2 SERPL-SCNC: 28 MMOL/L
COLOR UR: YELLOW
CREAT SERPL-MCNC: 1.4 MG/DL
DIFFERENTIAL METHOD: ABNORMAL
EOSINOPHIL # BLD AUTO: ABNORMAL K/UL
EOSINOPHIL NFR BLD: 1 %
ERYTHROCYTE [DISTWIDTH] IN BLOOD BY AUTOMATED COUNT: 13.9 %
EST. GFR  (AFRICAN AMERICAN): 51 ML/MIN/1.73 M^2
EST. GFR  (NON AFRICAN AMERICAN): 44 ML/MIN/1.73 M^2
GLUCOSE SERPL-MCNC: 109 MG/DL
GLUCOSE UR QL STRIP: NEGATIVE
HCT VFR BLD AUTO: 27.7 %
HGB BLD-MCNC: 9 G/DL
HGB UR QL STRIP: NEGATIVE
HYALINE CASTS #/AREA URNS LPF: 0 /LPF
HYPOCHROMIA BLD QL SMEAR: ABNORMAL
KETONES UR QL STRIP: NEGATIVE
LEUKOCYTE ESTERASE UR QL STRIP: NEGATIVE
LYMPHOCYTES # BLD AUTO: ABNORMAL K/UL
LYMPHOCYTES NFR BLD: 53 %
MCH RBC QN AUTO: 30.6 PG
MCHC RBC AUTO-ENTMCNC: 32.5 G/DL
MCV RBC AUTO: 94 FL
MICROSCOPIC COMMENT: NORMAL
MONOCYTES # BLD AUTO: ABNORMAL K/UL
MONOCYTES NFR BLD: 5 %
NEUTROPHILS NFR BLD: 39 %
NEUTS BAND NFR BLD MANUAL: 2 %
NITRITE UR QL STRIP: NEGATIVE
OVALOCYTES BLD QL SMEAR: ABNORMAL
PH UR STRIP: 7 [PH] (ref 5–8)
PLATELET # BLD AUTO: 120 K/UL
PLATELET BLD QL SMEAR: ABNORMAL
PMV BLD AUTO: 9.8 FL
POIKILOCYTOSIS BLD QL SMEAR: SLIGHT
POTASSIUM SERPL-SCNC: 5.2 MMOL/L
PROT SERPL-MCNC: 6.7 G/DL
PROT UR QL STRIP: ABNORMAL
RBC # BLD AUTO: 2.94 M/UL
RBC #/AREA URNS HPF: 0 /HPF (ref 0–4)
SODIUM SERPL-SCNC: 136 MMOL/L
SP GR UR STRIP: 1.01 (ref 1–1.03)
SQUAMOUS #/AREA URNS HPF: NORMAL /HPF
URN SPEC COLLECT METH UR: ABNORMAL
UROBILINOGEN UR STRIP-ACNC: NEGATIVE EU/DL
WBC # BLD AUTO: 9.07 K/UL
WBC #/AREA URNS HPF: 0 /HPF (ref 0–5)

## 2017-12-26 PROCEDURE — 81000 URINALYSIS NONAUTO W/SCOPE: CPT

## 2017-12-26 PROCEDURE — 93005 ELECTROCARDIOGRAM TRACING: CPT

## 2017-12-26 PROCEDURE — 85007 BL SMEAR W/DIFF WBC COUNT: CPT

## 2017-12-26 PROCEDURE — 93010 ELECTROCARDIOGRAM REPORT: CPT | Mod: ,,, | Performed by: INTERNAL MEDICINE

## 2017-12-26 PROCEDURE — 85027 COMPLETE CBC AUTOMATED: CPT

## 2017-12-26 PROCEDURE — 99284 EMERGENCY DEPT VISIT MOD MDM: CPT

## 2017-12-26 PROCEDURE — 83880 ASSAY OF NATRIURETIC PEPTIDE: CPT

## 2017-12-26 PROCEDURE — 80053 COMPREHEN METABOLIC PANEL: CPT

## 2017-12-26 RX ORDER — LISINOPRIL 10 MG/1
10 TABLET ORAL 2 TIMES DAILY
Qty: 20 TABLET | Refills: 0 | Status: ON HOLD | OUTPATIENT
Start: 2017-12-26 | End: 2017-12-30 | Stop reason: HOSPADM

## 2017-12-26 NOTE — TELEPHONE ENCOUNTER
Spoke with patient's daughter Mrs Betancourt about lab result.  As per Dr Diaz patient will benefit to get to a near ER for possible kidney problem.  Mrs Betancourt verbalized understanding.

## 2017-12-26 NOTE — ED NOTES
Daughter at bedside reports that pt has been having lower extremity weakness and fatigue over the past week. Had labs drawn on 12/20 and was notified today by PCP that pt needed to be evaluated in the ED for increased potassium. Pt was recently discharged from SNF for MRSA infection. +healing pressure ulcer noted to L buttocks, no s/s of infection or drainage. Daughter reports that pt does have dementia and is usually well oriented, but has been slightly confused lately. +fall a few days ago, healing skin tears noted to R FA and R knee. +1 pitting edema to BLE, daughter states it is improving from a few days ago.    APPEARANCE: Awake, alert, & oriented. No acute distress.  CARDIAC: Normal rate and rhythm. Denies chest pain.     RESPIRATORY:Normal rate and effort. Respirations are even and unlabored no obvious signs of distress.  PERIPHERAL VASCULAR: peripheral pulses present. Normal cap refill. +1 pitting edema BLE   GASTRO: soft, no tenderness, no abdominal distention.  MUSC: Full ROM. No bony tenderness or soft tissue tenderness. No obvious deformity.  SKIN: Skin is warm, dry, and intact. Normal skin turgor and color.  NEURO: 5/5 strength major flexors/extensors bilaterally. Belen coma scale: eyes open spontaneously-4, obeys commands-6, oriented-5. Total=15. Clear speech. No neurological abnormalities.   EENT: No c/o vision or hearing difficulties.

## 2017-12-26 NOTE — ED PROVIDER NOTES
Encounter Date: 12/26/2017    SCRIBE #1 NOTE: I, Wilder Srinivasan, am scribing for, and in the presence of, Dr. Yusuf.       History     Chief Complaint   Patient presents with    Abnormal Lab     Patient was sent to ED by PCP with abnormal labs.  Family members state he is dehydrated       12/26/2017 1:05 PM     Chief complaint: Weakness      Zeyad Woodard is a 89 y.o. male with HTN, hypothyroidism, Afib, and CHF who presents to the ED with weakness and associated confusion, nostalgic speech, and an abnormal lab finding. The patient's labs were drawn on the 20th of December at his primary care physician and the results were concerning. The patient endorses trouble standing for long periods, which caused him to fall on the 21st of December. He was in [facility] and he had HTN, his lisinopril was increased from 20 mg q day to 20 mg bid. He had peripheral edema so lasix was then added. Now, his SBP is running 120-110 and his DBP is as low as 50. He  Lives at home with family and has sitters. His daughter states he sleeps most of the time.       The history is provided by the patient and the spouse.     Review of patient's allergies indicates:   Allergen Reactions    Penicillins Hives and Other (See Comments)     Fever     Past Medical History:   Diagnosis Date    Anemia     Atrial fibrillation     BPH (benign prostatic hypertrophy)     Cancer     Cardiac pacemaker in situ 7/2/2015    CHF (congestive heart failure)     Coronary artery disease     Encounter for blood transfusion     GI bleed     cecum angiectasia s/p cautery    Hypertension     Hypothyroidism     Polyneuropathy     Posture imbalance     due to neuropathy    Right posterior capsular opacification 1/18/2017    SSS (sick sinus syndrome) 2015    s/p pacemaker     Past Surgical History:   Procedure Laterality Date    CARDIAC SURGERY      CATARACT EXTRACTION W/  INTRAOCULAR LENS IMPLANT Right 05/17/2010        CATARACT EXTRACTION W/   INTRAOCULAR LENS IMPLANT Left 2004        cataract surgery      COLONOSCOPY  04    COLONOSCOPY N/A 2/15/2016    Procedure: COLONOSCOPY;  Surgeon: Stanton Kirk MD;  Location: [address]);  Service: Endoscopy;  Laterality: N/A;    EYE SURGERY      HERNIA REPAIR      inguinal hernia repair      Open heart surgery for pericardiectomy      for calcific constrictive pericarditis    UMBILICAL HERNIA REPAIR      Yag      Left Eye     Family History   Problem Relation Age of Onset    Stroke Mother          Cancer Father      lung;     Diabetes Sister     Coronary artery disease Sister     Peripheral vascular disease Sister     Amblyopia Neg Hx     Blindness Neg Hx     Cataracts Neg Hx     Glaucoma Neg Hx     Hypertension Neg Hx     Macular degeneration Neg Hx     Retinal detachment Neg Hx     Strabismus Neg Hx     Thyroid disease Neg Hx     Prostate cancer Neg Hx     Kidney disease Neg Hx      Social History   Substance Use Topics    Smoking status: Passive Smoke Exposure - Never Smoker    Smokeless tobacco: Never Used    Alcohol use No     Review of Systems   Constitutional: Negative for fever.   HENT: Negative for sore throat.    Respiratory: Negative for cough and shortness of breath.    Cardiovascular: Negative for chest pain.   Gastrointestinal: Negative for abdominal pain, diarrhea, nausea and vomiting.   Genitourinary: Negative for dysuria and flank pain.   Musculoskeletal: Negative for back pain.   Skin: Negative for rash.   Neurological: Positive for weakness (trouble standing for long periods).        Confusion.  Abnormal nostalgic talk.   Hematological: Does not bruise/bleed easily.   Psychiatric/Behavioral: Negative.  Negative for confusion and hallucinations.       Physical Exam     Initial Vitals [17 1103]   BP Pulse Resp Temp SpO2   (!) 140/63 61 15 98.4 °F (36.9 °C) 98 %      MAP       88.67         Physical Exam    Nursing  note and vitals reviewed.  Constitutional: He appears well-developed and well-nourished.   HENT:   Head: Normocephalic and atraumatic.   Eyes: EOM are normal. Pupils are equal, round, and reactive to light.   Neck: Normal range of motion. Neck supple.   Cardiovascular: Normal rate, regular rhythm, normal heart sounds and intact distal pulses.   Pulmonary/Chest: Breath sounds normal.   Abdominal: Soft. Bowel sounds are normal. He exhibits no distension. There is no tenderness. There is no rebound and no guarding.   Musculoskeletal: Normal range of motion. He exhibits no edema.   Neurological: He is alert and oriented to person, place, and time.   Generalized weakness.   Skin: Skin is warm and dry.   Psychiatric: He has a normal mood and affect. His behavior is normal. Judgment and thought content normal.         ED Course   Procedures  Labs Reviewed   CBC W/ AUTO DIFFERENTIAL - Abnormal; Notable for the following:        Result Value    RBC 2.94 (*)     Hemoglobin 9.0 (*)     Hematocrit 27.7 (*)     Platelets 120 (*)     Lymph% 53.0 (*)     Platelet Estimate Decreased (*)     All other components within normal limits   COMPREHENSIVE METABOLIC PANEL - Abnormal; Notable for the following:     Potassium 5.2 (*)     BUN, Bld 37 (*)     Albumin 3.4 (*)     Anion Gap 7 (*)     eGFR if  51 (*)     eGFR if non  44 (*)     All other components within normal limits   B-TYPE NATRIURETIC PEPTIDE - Abnormal; Notable for the following:      (*)     All other components within normal limits   URINALYSIS - Abnormal; Notable for the following:     Protein, UA 1+ (*)     All other components within normal limits   URINALYSIS MICROSCOPIC     EKG Readings: (Independently Interpreted)   12/26/17   12:11:41  Electronic Ventricular Pacemaker  61 BPM          Medical Decision Making:   History:   Old Medical Records: I decided to obtain old medical records.  Clinical Tests:   Lab Tests: Ordered and  Reviewed  Medical Tests: Ordered and Reviewed  ED Management:  Labs have overall improved from his last blood draw 5 days ago.   BP are slightly low, prior peripheral edema has resolved.  I will recommend that we decrease his lisinopril to 10 mg bid and see if this improves his weakness. Dr. Izaguirre will be back next week.            Scribe Attestation:   Scribe #1: I performed the above scribed service and the documentation accurately describes the services I performed. I attest to the accuracy of the note.            ED Course      Clinical Impression:     1. Weakness generalized    2. Weakness          Disposition:   Disposition: Discharged  Condition: Stable                        Radha Yusuf MD  12/26/17 [id number]

## 2017-12-26 NOTE — DISCHARGE INSTRUCTIONS
STOP GIVING THE LISINOPRIL 20 MG TWICE A DAY   START GIVING LISINOPRIL 10 MG TWICE A DAY  CONTINUE ALL OTHER MEDICATIONS THAT HE IS CURRENTLY ON

## 2017-12-29 ENCOUNTER — HOSPITAL ENCOUNTER (OUTPATIENT)
Facility: HOSPITAL | Age: 82
Discharge: HOME | End: 2017-12-30
Attending: EMERGENCY MEDICINE | Admitting: INTERNAL MEDICINE
Payer: MEDICARE

## 2017-12-29 DIAGNOSIS — I50.32 CHRONIC DIASTOLIC CONGESTIVE HEART FAILURE: ICD-10-CM

## 2017-12-29 DIAGNOSIS — I10 ESSENTIAL HYPERTENSION: ICD-10-CM

## 2017-12-29 DIAGNOSIS — G30.1 LATE ONSET ALZHEIMER'S DISEASE WITH BEHAVIORAL DISTURBANCE: ICD-10-CM

## 2017-12-29 DIAGNOSIS — R55 VASOVAGAL SYNCOPE: ICD-10-CM

## 2017-12-29 DIAGNOSIS — I48.20 CHRONIC ATRIAL FIBRILLATION: ICD-10-CM

## 2017-12-29 DIAGNOSIS — N40.0 BENIGN PROSTATIC HYPERPLASIA WITHOUT LOWER URINARY TRACT SYMPTOMS: ICD-10-CM

## 2017-12-29 DIAGNOSIS — N18.30 CKD (CHRONIC KIDNEY DISEASE), STAGE III: ICD-10-CM

## 2017-12-29 DIAGNOSIS — Z95.0 S/P PLACEMENT OF CARDIAC PACEMAKER: ICD-10-CM

## 2017-12-29 DIAGNOSIS — I95.1 ORTHOSTATIC HYPOTENSION: ICD-10-CM

## 2017-12-29 DIAGNOSIS — R55 SYNCOPE: Primary | ICD-10-CM

## 2017-12-29 DIAGNOSIS — D50.0 IRON DEFICIENCY ANEMIA DUE TO CHRONIC BLOOD LOSS: ICD-10-CM

## 2017-12-29 DIAGNOSIS — F02.818 LATE ONSET ALZHEIMER'S DISEASE WITH BEHAVIORAL DISTURBANCE: ICD-10-CM

## 2017-12-29 DIAGNOSIS — I49.5 SICK SINUS SYNDROME: ICD-10-CM

## 2017-12-29 DIAGNOSIS — G62.9 POLYNEUROPATHY: ICD-10-CM

## 2017-12-29 LAB
ALBUMIN SERPL BCP-MCNC: 3.4 G/DL
ALP SERPL-CCNC: 89 U/L
ALT SERPL W/O P-5'-P-CCNC: 20 U/L
ANION GAP SERPL CALC-SCNC: 8 MMOL/L
ANISOCYTOSIS BLD QL SMEAR: SLIGHT
AST SERPL-CCNC: 31 U/L
BASOPHILS # BLD AUTO: 0.02 K/UL
BASOPHILS NFR BLD: 0.2 %
BILIRUB SERPL-MCNC: 0.6 MG/DL
BUN SERPL-MCNC: 36 MG/DL
CALCIUM SERPL-MCNC: 8.9 MG/DL
CHLORIDE SERPL-SCNC: 101 MMOL/L
CO2 SERPL-SCNC: 26 MMOL/L
CREAT SERPL-MCNC: 1.4 MG/DL
CRP SERPL-MCNC: 9.1 MG/L
DIFFERENTIAL METHOD: ABNORMAL
EOSINOPHIL # BLD AUTO: 0.1 K/UL
EOSINOPHIL NFR BLD: 1.3 %
ERYTHROCYTE [DISTWIDTH] IN BLOOD BY AUTOMATED COUNT: 13.8 %
ERYTHROCYTE [SEDIMENTATION RATE] IN BLOOD BY WESTERGREN METHOD: 37 MM/HR
EST. GFR  (AFRICAN AMERICAN): 51 ML/MIN/1.73 M^2
EST. GFR  (NON AFRICAN AMERICAN): 44 ML/MIN/1.73 M^2
ESTIMATED AVG GLUCOSE: 105 MG/DL
FLUAV AG SPEC QL IA: NEGATIVE
FLUBV AG SPEC QL IA: NEGATIVE
GLUCOSE SERPL-MCNC: 121 MG/DL
HBA1C MFR BLD HPLC: 5.3 %
HCT VFR BLD AUTO: 27.6 %
HGB BLD-MCNC: 9 G/DL
HYPOCHROMIA BLD QL SMEAR: ABNORMAL
LYMPHOCYTES # BLD AUTO: 5.7 K/UL
LYMPHOCYTES NFR BLD: 60.1 %
MCH RBC QN AUTO: 30.4 PG
MCHC RBC AUTO-ENTMCNC: 32.6 G/DL
MCV RBC AUTO: 93 FL
MONOCYTES # BLD AUTO: 0.6 K/UL
MONOCYTES NFR BLD: 6.8 %
NEUTROPHILS # BLD AUTO: 3 K/UL
NEUTROPHILS NFR BLD: 31.6 %
OVALOCYTES BLD QL SMEAR: ABNORMAL
PLATELET # BLD AUTO: 117 K/UL
PLATELET BLD QL SMEAR: ABNORMAL
PMV BLD AUTO: 9.5 FL
POIKILOCYTOSIS BLD QL SMEAR: SLIGHT
POLYCHROMASIA BLD QL SMEAR: ABNORMAL
POTASSIUM SERPL-SCNC: 5.2 MMOL/L
PROT SERPL-MCNC: 6.2 G/DL
RBC # BLD AUTO: 2.96 M/UL
SODIUM SERPL-SCNC: 135 MMOL/L
SPECIMEN SOURCE: NORMAL
TROPONIN I SERPL DL<=0.01 NG/ML-MCNC: 0.02 NG/ML
WBC # BLD AUTO: 9.43 K/UL

## 2017-12-29 PROCEDURE — 87040 BLOOD CULTURE FOR BACTERIA: CPT | Mod: 59

## 2017-12-29 PROCEDURE — 86140 C-REACTIVE PROTEIN: CPT

## 2017-12-29 PROCEDURE — 85025 COMPLETE CBC W/AUTO DIFF WBC: CPT

## 2017-12-29 PROCEDURE — 94761 N-INVAS EAR/PLS OXIMETRY MLT: CPT

## 2017-12-29 PROCEDURE — 25000003 PHARM REV CODE 250: Performed by: STUDENT IN AN ORGANIZED HEALTH CARE EDUCATION/TRAINING PROGRAM

## 2017-12-29 PROCEDURE — 80053 COMPREHEN METABOLIC PANEL: CPT

## 2017-12-29 PROCEDURE — 87400 INFLUENZA A/B EACH AG IA: CPT

## 2017-12-29 PROCEDURE — G0378 HOSPITAL OBSERVATION PER HR: HCPCS

## 2017-12-29 PROCEDURE — 96360 HYDRATION IV INFUSION INIT: CPT

## 2017-12-29 PROCEDURE — 85652 RBC SED RATE AUTOMATED: CPT

## 2017-12-29 PROCEDURE — 84484 ASSAY OF TROPONIN QUANT: CPT

## 2017-12-29 PROCEDURE — 93010 ELECTROCARDIOGRAM REPORT: CPT | Mod: ,,, | Performed by: INTERNAL MEDICINE

## 2017-12-29 PROCEDURE — 93005 ELECTROCARDIOGRAM TRACING: CPT

## 2017-12-29 PROCEDURE — 99285 EMERGENCY DEPT VISIT HI MDM: CPT | Mod: 25

## 2017-12-29 PROCEDURE — 25000003 PHARM REV CODE 250: Performed by: EMERGENCY MEDICINE

## 2017-12-29 PROCEDURE — 83036 HEMOGLOBIN GLYCOSYLATED A1C: CPT

## 2017-12-29 RX ORDER — TAMSULOSIN HYDROCHLORIDE 0.4 MG/1
0.4 CAPSULE ORAL DAILY
Status: DISCONTINUED | OUTPATIENT
Start: 2017-12-30 | End: 2017-12-30 | Stop reason: HOSPADM

## 2017-12-29 RX ORDER — IBUPROFEN 200 MG
16 TABLET ORAL
Status: DISCONTINUED | OUTPATIENT
Start: 2017-12-29 | End: 2017-12-30 | Stop reason: HOSPADM

## 2017-12-29 RX ORDER — FINASTERIDE 5 MG/1
5 TABLET, FILM COATED ORAL DAILY
Status: DISCONTINUED | OUTPATIENT
Start: 2017-12-30 | End: 2017-12-30 | Stop reason: HOSPADM

## 2017-12-29 RX ORDER — FERROUS SULFATE 325(65) MG
325 TABLET, DELAYED RELEASE (ENTERIC COATED) ORAL DAILY
Status: DISCONTINUED | OUTPATIENT
Start: 2017-12-30 | End: 2017-12-30 | Stop reason: HOSPADM

## 2017-12-29 RX ORDER — GABAPENTIN 100 MG/1
100 CAPSULE ORAL 2 TIMES DAILY
Status: DISCONTINUED | OUTPATIENT
Start: 2017-12-29 | End: 2017-12-30 | Stop reason: HOSPADM

## 2017-12-29 RX ORDER — DONEPEZIL HYDROCHLORIDE 5 MG/1
10 TABLET, FILM COATED ORAL NIGHTLY
Status: DISCONTINUED | OUTPATIENT
Start: 2017-12-29 | End: 2017-12-30 | Stop reason: HOSPADM

## 2017-12-29 RX ORDER — OXYCODONE AND ACETAMINOPHEN 7.5; 325 MG/1; MG/1
1 TABLET ORAL EVERY 8 HOURS PRN
Status: DISCONTINUED | OUTPATIENT
Start: 2017-12-29 | End: 2017-12-30 | Stop reason: HOSPADM

## 2017-12-29 RX ORDER — CARVEDILOL 6.25 MG/1
6.25 TABLET ORAL 2 TIMES DAILY
Status: DISCONTINUED | OUTPATIENT
Start: 2017-12-29 | End: 2017-12-30 | Stop reason: HOSPADM

## 2017-12-29 RX ORDER — LEVOTHYROXINE SODIUM 100 UG/1
100 TABLET ORAL DAILY
Status: DISCONTINUED | OUTPATIENT
Start: 2017-12-30 | End: 2017-12-30 | Stop reason: HOSPADM

## 2017-12-29 RX ORDER — POLYETHYLENE GLYCOL 3350 17 G/17G
17 POWDER, FOR SOLUTION ORAL DAILY
Status: DISCONTINUED | OUTPATIENT
Start: 2017-12-30 | End: 2017-12-30 | Stop reason: HOSPADM

## 2017-12-29 RX ORDER — IBUPROFEN 200 MG
24 TABLET ORAL
Status: DISCONTINUED | OUTPATIENT
Start: 2017-12-29 | End: 2017-12-30 | Stop reason: HOSPADM

## 2017-12-29 RX ORDER — ACETAMINOPHEN 325 MG/1
650 TABLET ORAL EVERY 6 HOURS PRN
Status: DISCONTINUED | OUTPATIENT
Start: 2017-12-29 | End: 2017-12-30 | Stop reason: HOSPADM

## 2017-12-29 RX ORDER — SODIUM CHLORIDE 0.9 % (FLUSH) 0.9 %
5 SYRINGE (ML) INJECTION
Status: DISCONTINUED | OUTPATIENT
Start: 2017-12-29 | End: 2017-12-30 | Stop reason: HOSPADM

## 2017-12-29 RX ORDER — ASPIRIN 81 MG/1
81 TABLET ORAL DAILY
Status: DISCONTINUED | OUTPATIENT
Start: 2017-12-30 | End: 2017-12-30 | Stop reason: HOSPADM

## 2017-12-29 RX ORDER — ATORVASTATIN CALCIUM 20 MG/1
20 TABLET, FILM COATED ORAL NIGHTLY
Status: DISCONTINUED | OUTPATIENT
Start: 2017-12-29 | End: 2017-12-30 | Stop reason: HOSPADM

## 2017-12-29 RX ORDER — GLUCAGON 1 MG
1 KIT INJECTION
Status: DISCONTINUED | OUTPATIENT
Start: 2017-12-29 | End: 2017-12-30 | Stop reason: HOSPADM

## 2017-12-29 RX ADMIN — DONEPEZIL HYDROCHLORIDE 10 MG: 5 TABLET, FILM COATED ORAL at 08:12

## 2017-12-29 RX ADMIN — ATORVASTATIN CALCIUM 20 MG: 20 TABLET, FILM COATED ORAL at 08:12

## 2017-12-29 RX ADMIN — SODIUM CHLORIDE 1000 ML: 0.9 INJECTION, SOLUTION INTRAVENOUS at 01:12

## 2017-12-29 RX ADMIN — OXYCODONE HYDROCHLORIDE AND ACETAMINOPHEN 1 TABLET: 7.5; 325 TABLET ORAL at 08:12

## 2017-12-29 RX ADMIN — GABAPENTIN 100 MG: 100 CAPSULE ORAL at 08:12

## 2017-12-29 RX ADMIN — CARVEDILOL 6.25 MG: 6.25 TABLET, FILM COATED ORAL at 08:12

## 2017-12-29 NOTE — H&P
"[facility] History and Physical - Resident Note    Admitting Team: Team A  Attending Physician: Dr. Calderon  Resident: James Greenwood   Interns:     Date of Admit: 12/29/2017    Chief Complaint     Loss of Consciousness (family called EMS for syncopal episode. per report pt had a brief moment of loc then woke up with slurred speech. all s/s lasted for a few minutes. on EMS arrival, pt is AAOx3 with clear speech.  denies complaints. states he just feels confused.  no c/c injury. )    Seated Syncope    Subjective:      History of Present Illness:  Zeyad Woodard is a 89 y.o. male with a history of Chronic diastolic dysfunction, chronic atrial fibrillation, CKD3, Dementia and BPH who presents to [facility] for syncope from the seated position.     The patient was in their usual state of health (lives alone, family across the street, with home PT/OT since SNF discharge) until about 1 week prior to admission. Family with the patient noted intermitted dry cough, generalized weakness and intermittent subjective fevers. On the day of admission, the family and the patient noted he seemed to be his usual self. He was reportedly eating breakfast in the seated position and did not respond to his daughter calling his name. He was reportedly unresponsive in the seated position, for an estimated 15 minutes. He returned to normal with "shaking". There was no loss of bowel/bladder, no chest pain/shortness of breath, and no trauma or fall noted. The family at the bedside note his lisinopril was recently increased to 20mg BID, and they were concerned he may not be eating/drinking enough.     Of note, the patient was discharged from SNF on 12/12 after treatment for MRSA bacteremia, and completed a course of IV vancomycin. JEVON was negative in November for vegetations or cardiac pathology.     Past Medical History:  -Chronic Atrial Fibrillation   -Chronic Diastolic Dysfunction  -History of GI bleeding with Angiectasia s/p " Cautery  -HTN  -Dementia  -Hypothyroidism  -CKD3  -Polyneuropathy  -BPH  -Sick Sinus Syndrome with pacemaker    Past Surgical History:  Past Surgical History:   Procedure Laterality Date    CARDIAC SURGERY      CATARACT EXTRACTION W/  INTRAOCULAR LENS IMPLANT Right 2010        CATARACT EXTRACTION W/  INTRAOCULAR LENS IMPLANT Left 2004        cataract surgery      COLONOSCOPY  04    COLONOSCOPY N/A 2/15/2016    Procedure: COLONOSCOPY;  Surgeon: Stanton Kirk MD;  Location: [facility] ([address] [place] [address]);  Service: Endoscopy;  Laterality: N/A;    EYE SURGERY      HERNIA REPAIR      inguinal hernia repair      Open heart surgery for pericardiectomy      for calcific constrictive pericarditis    UMBILICAL HERNIA REPAIR      Yag      Left Eye       Allergies:  Review of patient's allergies indicates:   Allergen Reactions    Penicillins Hives and Other (See Comments)     Fever       Home Medications:  -Aspirin 81  -Lipitor 20  -Coreg 6.25 BID  -Gabapentin 100mg AM and 300mg PM  -Lisinopril 20mg BID (recently changed by PCP)  -Lasix 20mg  -Finasteride 5  -Flomax 0.4  -Synthroid 100mg    Family History:  Family History   Problem Relation Age of Onset    Stroke Mother          Cancer Father      lung;     Diabetes Sister     Coronary artery disease Sister     Peripheral vascular disease Sister     Amblyopia Neg Hx     Blindness Neg Hx     Cataracts Neg Hx     Glaucoma Neg Hx     Hypertension Neg Hx     Macular degeneration Neg Hx     Retinal detachment Neg Hx     Strabismus Neg Hx     Thyroid disease Neg Hx     Prostate cancer Neg Hx     Kidney disease Neg Hx        Social History:  Social History   Substance Use Topics    Smoking status: Passive Smoke Exposure - Never Smoker    Smokeless tobacco: Never Used    Alcohol use No       Review of Systems:    General: As above  HEENT: Denies vision changes, double vision, runny nose, congestion, trouble  "swallowing.   Cardiac: Denies chest pain, palpitations, .   Pulmonary: Denies cough, shortness of breath.   Gastrointestinal: Denies abdominal pain, nausea, vomiting, constipation, diarrhea   Genitourinary: Denies dysuria, hematuria.   Musculoskeletal: Denies muscle pain, joint pain, back pain.   Skin: Denies itching, rashes.   Neurologic: As above,   All other systems are reviewed and are negative.    Health Maintaince :   Primary Care Physician: Anupam Pineda  Immunizations:   Currently on File with [facility]:   Most Recent Immunizations   Administered Date(s) Administered    Influenza - High Dose 2016    PPD Test 2017    Pneumococcal Conjugate - 13 Valent 2016    Pneumococcal Polysaccharide - 23 Valent 2014     TDap is up to date, .  Influenza unknown up to date, unknown.  Pneumovax is up to date, .  Cancer Screening:  Colonoscopy: is not up to date. Unknown to patient.      Objective:   Last 24 Hour Vital Signs:  BP  Min: 100/55  Max: 145/70  Temp  Av.6 °F (36.4 °C)  Min: 97.6 °F (36.4 °C)  Max: 97.6 °F (36.4 °C)  Pulse  Av.6  Min: 68  Max: 90  Resp  Av.5  Min: 18  Max: 20  SpO2  Av.8 %  Min: 95 %  Max: 96 %  Height  Av' 8" (172.7 cm)  Min: 5' 8" (172.7 cm)  Max: 5' 8" (172.7 cm)  Weight  Av.8 kg (176 lb)  Min: 79.8 kg (176 lb)  Max: 79.8 kg (176 lb)  No intake/output data recorded.    Physical Examination:  General: Patient is in no acute distress, no conversational dyspnea. Alert and family at bedside.   HEENT: Atraumatic, normocephalic. EOMI, no scleral icterus, conjunctiva clear. Oral cavity is moist. No oral or pharyngeal lesions noted.   Cardiac: Device in L chest. Regular rhythm, with rate in the 60s. No murmurs or rubs noted. S1 and S2, without S3 or S4. Radial pulses synchronous and symmetric bilaterally.   Pulmonary: Lungs are clear to auscultation bilaterally. No wheeze, rales or rhonchi noted. No clubbing.   Abdomen: Bowel sounds present. Soft, " non-tender to palpation. No rigidity or guarding.   Extremities: No pedal edema. No cyanosis. Capillary refill less than 2 seconds in fingertips.   Skin: No rashes noted.   Neurologic: CN ,3,4,5,6,7, 9,10,11,12 intact and non-focal. Strength to arm flexion/extension, hip flexion, knee flexion/extension, dorsiflexion/plantarflexion 5/5 and symmetric. Sensation to light touch over arms and legs intact and symmetric. Patient can carry on normal conversation.       Laboratory:  Most Recent Data:  CBC: Lab Results   Component Value Date    WBC 9.43 12/29/2017    HGB 9.0 (L) 12/29/2017    HCT 27.6 (L) 12/29/2017     (L) 12/29/2017    MCV 93 12/29/2017    RDW 13.8 12/29/2017     BMP: Lab Results   Component Value Date     (L) 12/29/2017    K 5.2 (H) 12/29/2017     12/29/2017    CO2 26 12/29/2017    BUN 36 (H) 12/29/2017     (H) 12/29/2017    CALCIUM 8.9 12/29/2017    MG 2.0 12/11/2017    PHOS 3.6 12/11/2017     LFTs: Lab Results   Component Value Date    PROT 6.2 12/29/2017    ALBUMIN 3.4 (L) 12/29/2017    BILITOT 0.6 12/29/2017    AST 31 12/29/2017    ALKPHOS 89 12/29/2017    ALT 20 12/29/2017     Coags:   Lab Results   Component Value Date    INR 1.1 11/07/2017     FLP: Lab Results   Component Value Date    CHOL 118 (L) 08/18/2015    HDL 38 (L) 08/18/2015    LDLCALC 67.2 08/18/2015    TRIG 64 08/18/2015    CHOLHDL 32.2 08/18/2015     DM: Lab Results   Component Value Date    HGBA1C 5.6 11/10/2017    HGBA1C 5.7 (H) 11/07/2017    HGBA1C 6.0 12/10/2012    LDLCALC 67.2 08/18/2015    CREATININE 1.4 12/29/2017     Thyroid: Lab Results   Component Value Date    TSH 0.600 11/07/2017    FREET4 1.10 07/03/2016    I1XOQRZ 6.6 12/10/2012     Anemia: Lab Results   Component Value Date    IRON 55 10/03/2017    TIBC 321 10/03/2017    FERRITIN 144 10/03/2017    TZPJVVNZ08 1340 (H) 05/12/2017    FOLATE 17.7 05/12/2017     Cardiac: Lab Results   Component Value Date    TROPONINI 0.022 12/29/2017     (H)  12/26/2017     Urinalysis: Lab Results   Component Value Date    LABURIN No growth 07/12/2017    COLORU Yellow 12/26/2017    SPECGRAV 1.010 12/26/2017    NITRITE Negative 12/26/2017    KETONESU Negative 12/26/2017    UROBILINOGEN Negative 12/26/2017       Trended Lab Data:    Recent Labs  Lab 12/26/17  1223 12/29/17  1320   WBC 9.07 9.43   HGB 9.0* 9.0*   HCT 27.7* 27.6*   * 117*   MCV 94 93   RDW 13.9 13.8    135*   K 5.2* 5.2*    101   CO2 28 26   BUN 37* 36*    121*   PROT 6.7 6.2   ALBUMIN 3.4* 3.4*   BILITOT 0.6 0.6   AST 30 31   ALKPHOS 90 89   ALT 20 20       Trended Cardiac Data:    Recent Labs  Lab 12/26/17  1223 12/29/17  1320   TROPONINI  --  0.022   *  --        Microbiology Data:  Blood Cultures Pending    Other Laboratory Data:  ESR: pending  CRP: pending    Other Results:  EKG (my interpretation): Ventricular pacing    Radiology:  Ct Head Without Contrast    Result Date: 12/29/2017  Comparison: 6/1/2017 Clinical history: Syncope Technique: Axial images of the brain were obtained at 5-mm intervals from the skull base to the vertex without the administration of contrast. Findings: There is generalized cerebral volume loss.  There is hypoattenuation in a periventricular fashion, likely sequela of chronic microvascular ischemic change.There is prominent extra-axial space about the posterior fossa, may reflect sequela of marah-cisterna magna or arachnoid cyst, unchanged.  There is no evidence of acute major vascular territory infarct, hemorrhage, or mass.  There is no hydrocephalus.  There are no abnormal extra-axial fluid collections.  The paranasal sinuses and mastoid air cells are clear, and there is no evidence of calvarial fracture.  The visualized soft tissues are unremarkable.    No acute intracranial abnormalities. Stable sequela of chronic microvascular ischemic change and senescent change. Electronically signed by: BERTRAND SOLANO MD Date:     12/29/17  Time:    12:13     X-ray Chest Ap Portable    Result Date: 12/29/2017  Comparison: 11/14/2017 Results: 2 frontal views. Postoperative changes are again identified in the thorax. Cardiac pacemaker remains in place with the tip of the lead in the right ventricle. Previously noted right-sided PICC has been removed. Cardiomegaly, unchanged. Tortuous thoracic aorta and brachiocephalic vessels with calcification in the wall of the aorta arch. Pulmonary vascular pattern is stable. Lungs are satisfactorily expanded. Continued accentuation of interstitial markings with mild blunting of the left costophrenic angle. Calcified granuloma again seen at the right lower lung zone. Once again, calcified pleural plaque versus pericardial calcification again noted.     Interval removal of right-sided PICC. No change in cardiopulmonary status. Electronically signed by: Dr. ELY FRANCISCO MD Date:     12/29/17 Time:    16:18          Assessment:     Zeyad Woodard is a 89 y.o. male with:  Patient Active Problem List    Diagnosis Date Noted    Syncope 12/29/2017    Takes dietary supplements 11/16/2017    Cellulitis and abscess of buttock     Pericardial calcification 11/12/2017    Mitral regurgitation 11/12/2017    Bacteremia 11/10/2017    MRSA bacteremia 11/10/2017    Debility 11/08/2017    Cellulitis of buttock 11/07/2017    HLD (hyperlipidemia) 11/07/2017    Abnormal blood smear 10/05/2017    Chronic pain 10/03/2017    Chronic bilateral low back pain with bilateral sciatica 09/19/2017    Use of opiates for therapeutic purposes 09/19/2017    Dementia without behavioral disturbance 06/05/2017    Post-operative state 02/01/2017    Pseudophakia 01/18/2017    Essential hypertension 01/18/2017    Right posterior capsular opacification 01/18/2017    Refractive error 01/18/2017    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    Hypothyroidism due to acquired atrophy of thyroid 10/02/2016    Right hip pain 10/01/2016    Contusion  of right hip 10/01/2016    Elevated troponin 09/30/2016    Anemia of chronic renal failure, stage 3 (moderate) 09/01/2016    Chronic constipation 07/06/2016    Thrombocytopenia 07/03/2016    Venous insufficiency of both lower extremities 10/26/2015    Frequent falls 08/28/2015    Opioid dependence with intoxication with complication 08/28/2015    Cardiac pacemaker in situ 07/02/2015    SSS (sick sinus syndrome) 06/12/2015    Bradycardia 06/12/2015    Chronic back pain 09/12/2014    Lumbar radiculopathy 09/12/2014    CKD (chronic kidney disease), stage III 10/16/2013    Iron deficiency anemia due to chronic blood loss 04/12/2013    Hypothyroidism     Polyneuropathy 11/12/2012    Chronic diastolic congestive heart failure 08/23/2012    Renovascular hypertension     Chronic atrial fibrillation     Benign prostatic hyperplasia         Plan:     Syncope  -Patient with reported syncope / LOC from seated position this morning while eating breakfast. There was no prodrome noted and no seizure activity noted.   -Patient is without focal deficits on this exam, and CT head negative.   -EKG with paced rhythm. Troponin normal. CXR without acute findings.   -With history of afib/sick sinus, will have pace maker interrogated.   -Admit to telemetry.   -Repeat echocardiogram. Patient with recent discharge from SNF for MRSA bacteremia, where JEVON and TTE were negative for vegetations. Will repeat blood cultures today.   -Etiology unclear. I do not suspect this is a stroke or seizure. Will workup for syncope as above. Possible etiology is volume depletion with increase in Lisinopril from 20 to 20 BID.    -PT/OT for inpatient, with his recent discharge from SNF and home PT/OT being utilized.     Chronic Atrial Fibrillation   -Per chart review. Continue Coreg at this time.   -Per chart review, patient not on anticoagulation due to history of GI bleeding.     History of Sick Sinus Syndrome s/p Pacemaker  -Per chart  review. Patient is unclear of his cardiac history.   -EKG v-paced as above.   -Pacemaker being interrogated after admission.     CKD3  -Per chart review, baseline Cr 1.1-1.4. On admit, Cr 1.4  -Will hold Lisinopril and home Lasix.   -Anticipate restarting lisinopril at 20mg daily.     Chronic Diastolic Heart Failure  -Per chart review, but last Echo 11/11/17, with biatrial enlargement and normal EF.   -Patient does not appear volume overloaded now.   -Continue BB. Holding Lisinopril and Lasix as above for possible volume depletion.     BPH  -Continue home Finasteride and Flomax. Low threshold to hold at discharge if orthostatic.     Hypothyroidism  -Per chart review. Continue home Synthroid 100mcg  -Last TSH 11/7/17 was normal at 0.6.     CHRISTIANO with History of GI bleed  -Per chart review, patient had an angioectasia of the cecum clipped, date is unknown.   -He currently is maintained on Iron once daily. No subjective complaints of bleeding, and patient has stable normocytic anemia at 9/27 compared to prior studies.   -Previously per chart review, patient had been iron deficient.   -Will continue iron at this time.     Dementia  -Continue Aricept    Polyneuropathy  -Continue home Gabapentin.         Admit to: Tele  DVT Prophylaxis: SCD  GI Prophylaxis: None  Diet: Cardiac  Code Status:     Disposition: Tele for obs overnight, repeat echo and blood cultures. PT/OT    ED Throughput:       James Greenwood  [facility] Internal Medicine HO-3    [facility] Hospitalist Medicine Pager numbers:   [facility] Medicine Team A (Kinsey/Yumiko): [phone]  [facility] Medicine Team B (Tyrese/Bibi):  [phone]

## 2017-12-29 NOTE — ED PROVIDER NOTES
Encounter Date: 12/29/2017    SCRIBE #1 NOTE: I, Sully Coombs, am scribing for, and in the presence of,  Dr. Underwood. I have scribed the entire note.       History     Chief Complaint   Patient presents with    Loss of Consciousness     family called EMS for syncopal episode. per report pt had a brief moment of loc then woke up with slurred speech. all s/s lasted for a few minutes. on EMS arrival, pt is AAOx3 with clear speech.  denies complaints. states he just feels confused.  no c/c injury.      This is a 89 y.o. male who has a past medical history of Anemia; Atrial fibrillation; BPH (benign prostatic hypertrophy); Cancer; Cardiac pacemaker in situ (7/2/2015); CHF (congestive heart failure); Coronary artery disease; Encounter for blood transfusion; GI bleed; Hypertension; Hypothyroidism; Polyneuropathy; Posture imbalance; Right posterior capsular opacification (1/18/2017); and SSS (sick sinus syndrome) (2015).   The patient presents to the Emergency Department with complaint of loss of consciousness. The patient states he is uncertain of what happened but he was told he passed out. He notes he lives at home and his daughters were visiting. As per family the patient passed out for a few seconds and woke with slurred speech. The family then called the ambulance. As per EMS the patient was found to be alert with clear speech. The patient states he feels fine.   Pt denies dizziness, light headedness, nausea, vomiting, chest pain, shortness of breath, fever or chills. .   There is no change in symptoms with movement or rest  Patient has 2 year prior history of syncope.   Pt has a past surgical history that includes Open heart surgery for pericardiectomy; Umbilical hernia repair; inguinal hernia repair; cataract surgery; Colonoscopy (6/30/04); Yag; Hernia repair; Eye surgery; Colonoscopy (N/A, 2/15/2016); Cardiac surgery; Cataract extraction w/  intraocular lens implant (Right, 05/17/2010); and Cataract extraction  w/  intraocular lens implant (Left, 2004).        The history is provided by the patient, the EMS personnel and a relative.     Review of patient's allergies indicates:   Allergen Reactions    Penicillins Hives and Other (See Comments)     Fever     Past Medical History:   Diagnosis Date    Anemia     Atrial fibrillation     BPH (benign prostatic hypertrophy)     Cancer     Cardiac pacemaker in situ 2015    CHF (congestive heart failure)     Coronary artery disease     Encounter for blood transfusion     GI bleed     cecum angiectasia s/p cautery    Hypertension     Hypothyroidism     Polyneuropathy     Posture imbalance     due to neuropathy    Right posterior capsular opacification 2017    SSS (sick sinus syndrome) 2015    s/p pacemaker     Past Surgical History:   Procedure Laterality Date    CARDIAC SURGERY      CATARACT EXTRACTION W/  INTRAOCULAR LENS IMPLANT Right 2010        CATARACT EXTRACTION W/  INTRAOCULAR LENS IMPLANT Left 2004        cataract surgery      COLONOSCOPY  04    COLONOSCOPY N/A 2/15/2016    Procedure: COLONOSCOPY;  Surgeon: Stanton Kirk MD;  Location: [address]);  Service: Endoscopy;  Laterality: N/A;    EYE SURGERY      HERNIA REPAIR      inguinal hernia repair      Open heart surgery for pericardiectomy      for calcific constrictive pericarditis    UMBILICAL HERNIA REPAIR      Yag      Left Eye     Family History   Problem Relation Age of Onset    Stroke Mother          Cancer Father      lung;     Diabetes Sister     Coronary artery disease Sister     Peripheral vascular disease Sister     Amblyopia Neg Hx     Blindness Neg Hx     Cataracts Neg Hx     Glaucoma Neg Hx     Hypertension Neg Hx     Macular degeneration Neg Hx     Retinal detachment Neg Hx     Strabismus Neg Hx     Thyroid disease Neg Hx     Prostate cancer Neg Hx     Kidney disease Neg Hx      Social  History   Substance Use Topics    Smoking status: Passive Smoke Exposure - Never Smoker    Smokeless tobacco: Never Used    Alcohol use No     Review of Systems   Constitutional: Negative for chills and fever.   HENT: Negative for congestion and rhinorrhea.    Eyes: Negative for visual disturbance.   Respiratory: Negative for cough and shortness of breath.    Cardiovascular: Negative for chest pain.   Gastrointestinal: Negative for abdominal pain, constipation, diarrhea and vomiting.   Genitourinary: Negative for dysuria, flank pain and hematuria.   Musculoskeletal: Negative for back pain, neck pain and neck stiffness.   Skin: Negative for pallor.   Neurological: Positive for syncope. Negative for dizziness, weakness and headaches.       Physical Exam     Initial Vitals [12/29/17 1133]   BP Pulse Resp Temp SpO2   (!) 145/70 68 18 97.6 °F (36.4 °C) 96 %      MAP       95         Physical Exam    Nursing note and vitals reviewed.  Constitutional: He appears well-developed and well-nourished. He is not diaphoretic. No distress.   HENT:   Head: Normocephalic and atraumatic.   Right Ear: External ear normal.   Left Ear: External ear normal.   Eyes: Conjunctivae and EOM are normal. Pupils are equal, round, and reactive to light.   Neck: Normal range of motion. Neck supple.   Cardiovascular: Normal rate, regular rhythm and normal heart sounds. Exam reveals no gallop and no friction rub.    No murmur heard.  Pulmonary/Chest: Breath sounds normal. He has no wheezes. He has no rhonchi. He has no rales.   Pacemaker to left upper chest   Abdominal: Soft. Bowel sounds are normal. There is no tenderness. There is no rebound and no guarding.   Musculoskeletal: Normal range of motion. He exhibits edema. He exhibits no tenderness.   1+pitting edema bilaterally to ankles   Lymphadenopathy:     He has no cervical adenopathy.   Neurological: He is alert and oriented to person, place, and time. He has normal strength.   Skin: Skin is  warm and dry. No rash noted.   Old bruising to RUE near wrist. Scattered small areas of ecchymosis of various stages to bilateral extremities         ED Course   Procedures  Labs Reviewed   CBC W/ AUTO DIFFERENTIAL - Abnormal; Notable for the following:        Result Value    RBC 2.96 (*)     Hemoglobin 9.0 (*)     Hematocrit 27.6 (*)     Platelets 117 (*)     Lymph # 5.7 (*)     Gran% 31.6 (*)     Lymph% 60.1 (*)     Platelet Estimate Decreased (*)     All other components within normal limits   COMPREHENSIVE METABOLIC PANEL - Abnormal; Notable for the following:     Sodium 135 (*)     Potassium 5.2 (*)     Glucose 121 (*)     BUN, Bld 36 (*)     Albumin 3.4 (*)     eGFR if  51 (*)     eGFR if non  44 (*)     All other components within normal limits   C-REACTIVE PROTEIN - Abnormal; Notable for the following:     CRP 9.1 (*)     All other components within normal limits   TROPONIN I     EKG Readings: (Independently Interpreted)   EKG: ventricular paced rhtyhm at 66 bpm, nl axis, nl intervals, no hypertrophy, no ST-T changes as read by me (Dr. Underwood).           Medical Decision Making:   Initial Assessment:   Emergent eval of a 89yoM who presents with LOC without pre-syncopal symptoms  Differential Diagnosis:   Orthostatic, vasovagal, anemia, arrhythmia, aortic stenosis, GI bleed, aortic dissection  Independently Interpreted Test(s):   I have ordered and independently interpreted EKG Reading(s) - see prior notes  Clinical Tests:   Lab Tests: Ordered and Reviewed  Radiological Study: Ordered and Reviewed  Medical Tests: Ordered and Reviewed  ED Management:  Will obtain head CT, EKG, labs, orthostatics and give IV fluids          4:05 PM As per family the patient was sitting in a chair when he became unresponsive. She states her sibling was at home with the patient while he was eating when the patient passed out.  The family states she tries to get the patient to eat but the patient  requests to be left alone. Today, after the patient became conscious the patient seemed to have slurred speech, though EMS noted the patient to have clear speech on arrival.  Slurring likely due to the syncopal episode which resolved.  The family does note the patient was seen here a few days ago due to weakness. She states the doctor attributed the symptoms to not eating and drinking. The daughter also notes the patient was recently started on Lisinopril in addition to Coreg. She states both medications were increased to BID as the patient was still hypertensive. However, at home the family states the patient has been having pressure in low 100's systolic.     4:05 PM Paged [facility]    4:08 PM Consulted and discussed case with Dr. Mtz,, the patient will be admitted to the hospital for for management and evaluation                   ED Course      Clinical Impression:     1. Syncope      Disposition:   Disposition: Placed in Observation  Condition: Stable    I, Dr. Hunter Underwood, personally performed the services described in this documentation.   All medical record entries made by the scribe were at my direction and in my presence.   I have reviewed the chart and agree that the record is accurate and complete.   Hunter Underwood MD.  12:51 PM 01/05/2018                Hunter Underwood MD  01/05/18 [id number]

## 2017-12-29 NOTE — ED TRIAGE NOTES
Pt states feels confused. Family reported to ems that pt had syncopal episode prior to arrival to er. Pt denies pain at this time. No acute distress noted. Pt alert and oriented to person, place, and situation

## 2017-12-30 VITALS
HEART RATE: 60 BPM | DIASTOLIC BLOOD PRESSURE: 75 MMHG | TEMPERATURE: 98 F | SYSTOLIC BLOOD PRESSURE: 180 MMHG | HEIGHT: 68 IN | BODY MASS INDEX: 25.76 KG/M2 | OXYGEN SATURATION: 97 % | RESPIRATION RATE: 16 BRPM | WEIGHT: 170 LBS

## 2017-12-30 LAB
ALBUMIN SERPL BCP-MCNC: 3.2 G/DL
ALP SERPL-CCNC: 83 U/L
ALT SERPL W/O P-5'-P-CCNC: 18 U/L
ANION GAP SERPL CALC-SCNC: 6 MMOL/L
ANISOCYTOSIS BLD QL SMEAR: SLIGHT
AST SERPL-CCNC: 27 U/L
BASOPHILS # BLD AUTO: ABNORMAL K/UL
BASOPHILS NFR BLD: 0 %
BILIRUB SERPL-MCNC: 0.7 MG/DL
BUN SERPL-MCNC: 32 MG/DL
CALCIUM SERPL-MCNC: 8.8 MG/DL
CHLORIDE SERPL-SCNC: 104 MMOL/L
CO2 SERPL-SCNC: 26 MMOL/L
CREAT SERPL-MCNC: 1.3 MG/DL
DIFFERENTIAL METHOD: ABNORMAL
EOSINOPHIL # BLD AUTO: ABNORMAL K/UL
EOSINOPHIL NFR BLD: 1 %
ERYTHROCYTE [DISTWIDTH] IN BLOOD BY AUTOMATED COUNT: 13.7 %
EST. GFR  (AFRICAN AMERICAN): 56 ML/MIN/1.73 M^2
EST. GFR  (NON AFRICAN AMERICAN): 48 ML/MIN/1.73 M^2
GLUCOSE SERPL-MCNC: 95 MG/DL
HCT VFR BLD AUTO: 26.4 %
HGB BLD-MCNC: 8.7 G/DL
HYPOCHROMIA BLD QL SMEAR: ABNORMAL
LYMPHOCYTES # BLD AUTO: ABNORMAL K/UL
LYMPHOCYTES NFR BLD: 59 %
MCH RBC QN AUTO: 30.5 PG
MCHC RBC AUTO-ENTMCNC: 33 G/DL
MCV RBC AUTO: 93 FL
MONOCYTES # BLD AUTO: ABNORMAL K/UL
MONOCYTES NFR BLD: 3 %
NEUTROPHILS NFR BLD: 37 %
PLATELET # BLD AUTO: 113 K/UL
PLATELET BLD QL SMEAR: ABNORMAL
PMV BLD AUTO: 9.8 FL
POIKILOCYTOSIS BLD QL SMEAR: SLIGHT
POTASSIUM SERPL-SCNC: 5.3 MMOL/L
PROT SERPL-MCNC: 6.1 G/DL
RBC # BLD AUTO: 2.85 M/UL
SODIUM SERPL-SCNC: 136 MMOL/L
WBC # BLD AUTO: 10.54 K/UL

## 2017-12-30 PROCEDURE — 97530 THERAPEUTIC ACTIVITIES: CPT

## 2017-12-30 PROCEDURE — G8978 MOBILITY CURRENT STATUS: HCPCS | Mod: CK

## 2017-12-30 PROCEDURE — 93306 TTE W/DOPPLER COMPLETE: CPT

## 2017-12-30 PROCEDURE — 85027 COMPLETE CBC AUTOMATED: CPT

## 2017-12-30 PROCEDURE — G0378 HOSPITAL OBSERVATION PER HR: HCPCS

## 2017-12-30 PROCEDURE — 25000003 PHARM REV CODE 250: Performed by: STUDENT IN AN ORGANIZED HEALTH CARE EDUCATION/TRAINING PROGRAM

## 2017-12-30 PROCEDURE — 80053 COMPREHEN METABOLIC PANEL: CPT

## 2017-12-30 PROCEDURE — 85007 BL SMEAR W/DIFF WBC COUNT: CPT | Mod: NCS

## 2017-12-30 PROCEDURE — 97161 PT EVAL LOW COMPLEX 20 MIN: CPT

## 2017-12-30 PROCEDURE — G8979 MOBILITY GOAL STATUS: HCPCS | Mod: CJ

## 2017-12-30 PROCEDURE — 36415 COLL VENOUS BLD VENIPUNCTURE: CPT

## 2017-12-30 RX ORDER — LISINOPRIL 10 MG/1
10 TABLET ORAL DAILY
Qty: 30 TABLET | Refills: 11 | Status: SHIPPED | OUTPATIENT
Start: 2017-12-30 | End: 2018-01-05

## 2017-12-30 RX ORDER — FUROSEMIDE 20 MG/1
20 TABLET ORAL DAILY
Qty: 30 TABLET | Refills: 1 | Status: SHIPPED | OUTPATIENT
Start: 2017-12-30 | End: 2018-01-05

## 2017-12-30 RX ORDER — LISINOPRIL 20 MG/1
20 TABLET ORAL DAILY
Qty: 90 TABLET | Refills: 3 | Status: SHIPPED | OUTPATIENT
Start: 2017-12-30 | End: 2017-12-30

## 2017-12-30 RX ADMIN — FERROUS SULFATE TAB EC 325 MG (65 MG FE EQUIVALENT) 325 MG: 325 (65 FE) TABLET DELAYED RESPONSE at 09:12

## 2017-12-30 RX ADMIN — TAMSULOSIN HYDROCHLORIDE 0.4 MG: 0.4 CAPSULE ORAL at 09:12

## 2017-12-30 RX ADMIN — LEVOTHYROXINE SODIUM 100 MCG: 100 TABLET ORAL at 09:12

## 2017-12-30 RX ADMIN — FINASTERIDE 5 MG: 5 TABLET, FILM COATED ORAL at 09:12

## 2017-12-30 RX ADMIN — ASPIRIN 81 MG: 81 TABLET, COATED ORAL at 09:12

## 2017-12-30 RX ADMIN — GABAPENTIN 100 MG: 100 CAPSULE ORAL at 09:12

## 2017-12-30 RX ADMIN — CARVEDILOL 6.25 MG: 6.25 TABLET, FILM COATED ORAL at 09:12

## 2017-12-30 NOTE — PLAN OF CARE
TN sent Subsequent HH orders to [facility] via [facility], Haylee  Called back stating that she is booked until Wednesday (three days later than pt's are normally seen after discharge) Haylee will call TN back if she can arrange something earlier. TN verified that this was OK with Dr Smith.     No DME ordered    Future Appointments  Date Time Provider Department Center   1/3/2018 1:00 PM INFECTIOUS DISEASE, [facility] INFECT [facility]   1/3/2018 1:30 PM Elsa Rahman MD [facility] WOUND [facility]   1/5/2018 3:40 PM Booker Ricci MD [facility]       Pt's nurse will go over medications/signs and symptoms prior to discharge       12/30/17 1335   Final Note   Assessment Type Final Discharge Note   Discharge Disposition Home-Health  ([facility])   What phone number can be called within the next 1-3 days to see how you are doing after discharge? [phone]   Hospital Follow Up  Appt(s) scheduled? No  (offices closed, TN to follow up)   Right Care Referral Info   Post Acute Recommendation Home-care  ([facility])     Micheline Soler, RN Transitional Navigator  [phone]

## 2017-12-30 NOTE — NURSING
IV site removed and was intact. No active bleeding noted. Discharge instructions, rxs and educational printouts given to pt and discussed thoroughly. Patient verbalized clear understanding of all discussed. Patient d/c'd via w/c with escort service and family with all of patient's belongings. At present no distress noted.

## 2017-12-30 NOTE — PLAN OF CARE
Problem: Patient Care Overview  Goal: Plan of Care Review  Outcome: Ongoing (interventions implemented as appropriate)  Pt on RA with sats of 99%.. Will continue to monitor.

## 2017-12-30 NOTE — PLAN OF CARE
Problem: Physical Therapy Goal  Goal: Physical Therapy Goal  Goals to be met by: 18     Patient will increase functional independence with mobility by performin) sit <>stand mod I with RW  2) pt ambulate 200 feet with RW and Mod I  3) pt perform LE exercise X10 independently.       Outcome: Ongoing (interventions implemented as appropriate)  Initial evaluation complete, PT to follow.

## 2017-12-30 NOTE — PROGRESS NOTES
"[facility] Resident HO-3 Progress Note    Subjective:      Zeyad Woodard is a 89 y.o.  male  With a history of Chronic Diastolic Dysfunction, Chronic Afib and CKD3 who is being followed by the [facility] service at [facility] for Syncope.     Patient monitored overnight without acute issues. Patient notes no complaints this morning.      Objective:   Last 24 Hour Vital Signs:  BP  Min: 100/55  Max: 170/78  Temp  Av.3 °F (36.8 °C)  Min: 97.6 °F (36.4 °C)  Max: 98.6 °F (37 °C)  Pulse  Av.7  Min: 60  Max: 90  Resp  Av.8  Min: 12  Max: 20  SpO2  Av.3 %  Min: 95 %  Max: 100 %  Height  Av' 8" (172.7 cm)  Min: 5' 8" (172.7 cm)  Max: 5' 8" (172.7 cm)  Weight  Av.5 kg (173 lb)  Min: 77.1 kg (170 lb)  Max: 79.8 kg (176 lb)  No intake/output data recorded.    Physical Examination:  General: Patient is in no acute distress, no conversational dyspnea. Alert.  HEENT: Atraumatic, normocephalic. conjunctiva clear. Oral cavity is moist. No oral or pharyngeal lesions noted.   Cardiac: Device in L chest. Regular rhythm, with rate is regular. No murmurs or rubs noted. S1 and S2, without S3 or S4. Radial pulses synchronous and symmetric bilaterally.   Pulmonary: Lungs are clear to auscultation bilaterally. No wheeze, rales or rhonchi noted. No clubbing.   Abdomen: Bowel sounds present. Soft, non-tender to palpation. No rigidity or guarding.   Extremities: No pedal edema.    Skin: No rashes noted.   Neurologic: Moves all extremities and carries on conversation.          Laboratory:  Laboratory Data Reviewed: Yes  Pertinent Findings:  Influenza negative.       Other Results:  EKG (my interpretation):      Radiology Data Reviewed: Yes  Pertinent Findings:  Echo pending.     Current Medications:     Infusions:       Scheduled:   aspirin  81 mg Oral Daily    atorvastatin  20 mg Oral Nightly    carvedilol  6.25 mg Oral BID    donepezil  10 mg Oral QHS    ferrous " sulfate  325 mg Oral Daily    finasteride  5 mg Oral Daily    gabapentin  100 mg Oral BID    levothyroxine  100 mcg Oral Daily    polyethylene glycol  17 g Oral Daily    tamsulosin  0.4 mg Oral Daily        PRN:  acetaminophen, dextrose 50%, dextrose 50%, glucagon (human recombinant), glucose, glucose, oxyCODONE-acetaminophen, sodium chloride 0.9%        Assessment:     Zeyad Woodard is a 89 y.o.male with Zeyad Woodard is a 89 y.o. male with a history of Chronic diastolic dysfunction, chronic atrial fibrillation, CKD3, Dementia and BPH who presents to [facility] for syncope from the seated position.       Plan:     Syncope  -Patient with reported syncope / LOC from seated position this morning while eating breakfast. There was no prodrome noted and no seizure activity noted.   -Patient is without focal deficits on this exam, and CT head negative.   -EKG with paced rhythm. Troponin normal. CXR without acute findings.   -With history of afib/sick sinus, will have pace maker interrogated.   -Admitted to telemetry without acute events overnight.   -Repeat echocardiogram. Patient with recent discharge from SNF for MRSA bacteremia, where JEVON and TTE were negative for vegetations. Repeat blood cultures pending.   -Etiology unclear. I do not suspect this is a stroke or seizure. Will workup for syncope as above. Possible etiology is volume depletion with increase in Lisinopril from 20 to 20 BID.    -PT/OT for inpatient, with his recent discharge from SNF and home PT/OT being utilized.      Chronic Atrial Fibrillation   -Per chart review. Continue Coreg at this time.   -Per chart review, patient not on anticoagulation due to history of GI bleeding.      History of Sick Sinus Syndrome s/p Pacemaker  -Per chart review. Patient is unclear of his cardiac history.   -EKG v-paced as above.   -Pacemaker to be interrogated.      CKD3  -Per chart review, baseline Cr 1.1-1.4. On admit, Cr 1.4  -Will hold Lisinopril and home  Lasix.   -Anticipate restarting lisinopril at 20mg daily.      Chronic Diastolic Heart Failure  -Per chart review, but last Echo 11/11/17, with biatrial enlargement and normal EF.   -Patient does not appear volume overloaded now.   -Continue BB. Holding Lisinopril and Lasix as above for possible volume depletion.      BPH  -Continue home Finasteride and Flomax. Low threshold to hold at discharge if orthostatic.      Hypothyroidism  -Per chart review. Continue home Synthroid 100mcg  -Last TSH 11/7/17 was normal at 0.6.      CHRISTIANO with History of GI bleed  -Per chart review, patient had an angioectasia of the cecum clipped, date is unknown.   -He currently is maintained on Iron once daily. No subjective complaints of bleeding, and patient has stable normocytic anemia at 9/27 compared to prior studies.   -Previously per chart review, patient had been iron deficient.   -Will continue iron at this time.      Dementia  -Continue Aricept     Polyneuropathy  -Continue home Gabapentin.     Disposition: Pending Echo and PT/OT recs.     James Greenwood  [facility] HO-3  [facility] Internal [facility] Service Team A    [facility] Medicine Hospitalist Pager numbers:   [facility] Hospitalist Medicine Team A (Kinsey/Yumiko): [phone]  [facility] Hospitalist Medicine Team B (Tyrese/Bibi):  [phone]

## 2017-12-30 NOTE — PLAN OF CARE
Problem: Fall Risk (Adult)  Goal: Identify Related Risk Factors and Signs and Symptoms  Related risk factors and signs and symptoms are identified upon initiation of Human Response Clinical Practice Guideline (CPG)   Outcome: Ongoing (interventions implemented as appropriate)  Bed in low position and locked. Alarms on and audible. Call light within reach. Education given on preventing falls and using call light. Fall contract signed. Undersatnding verbalized. Yellow socks and armband placed    Problem: Syncope (Adult)  Goal: Identify Related Risk Factors and Signs and Symptoms  Related risk factors and signs and symptoms are identified upon initiation of Human Response Clinical Practice Guideline (CPG)  Outcome: Ongoing (interventions implemented as appropriate)  All safety measures maintained.

## 2017-12-30 NOTE — DISCHARGE SUMMARY
[facility] Discharge Summary    Primary Team: [facility]  Attending Physician: Alexandra Calderon MD  Resident: Timo  Intern:     Date of Admit: 12/29/2017  Date of Discharge: 12/30/2017    Discharge to: Home  Condition: Stable    Discharge Diagnoses     Patient Active Problem List   Diagnosis    Renovascular hypertension    Chronic atrial fibrillation    Benign prostatic hyperplasia    Chronic diastolic congestive heart failure    Polyneuropathy    Hypothyroidism    Orthostatic hypotension    Iron deficiency anemia due to chronic blood loss    CKD (chronic kidney disease), stage III    Chronic back pain    Lumbar radiculopathy    Sick sinus syndrome    Bradycardia    Frequent falls    Opioid dependence with intoxication with complication    Venous insufficiency of both lower extremities    Thrombocytopenia    Chronic constipation    Anemia of chronic renal failure, stage 3 (moderate)    Elevated troponin    Right hip pain    Contusion of right hip    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    Hypothyroidism due to acquired atrophy of thyroid    Pseudophakia    Essential hypertension    Right posterior capsular opacification    Refractive error    Post-operative state    Dementia without behavioral disturbance    Chronic bilateral low back pain with bilateral sciatica    Use of opiates for therapeutic purposes    Chronic pain    Abnormal blood smear    Cellulitis of buttock    HLD (hyperlipidemia)    Debility    Bacteremia    MRSA bacteremia    Cardiac pacemaker in situ    Pericardial calcification    Mitral regurgitation    Cellulitis and abscess of buttock    Takes dietary supplements    Syncope    Late onset Alzheimer's disease with behavioral disturbance    S/P placement of cardiac pacemaker       Consultants and Procedures     Consultants:  None    Procedures:   None    Brief History of Present Illness      Zeyad Woodard is a 89 y.o. male with a history of Chronic  "diastolic dysfunction, chronic atrial fibrillation, CKD3, Dementia and BPH who presents to [facility] for syncope from the seated position.      The patient was in their usual state of health (lives alone, family across the street, with home PT/OT since SNF discharge) until about 1 week prior to admission. Family with the patient noted intermitted dry cough, generalized weakness and intermittent subjective fevers. On the day of admission, the family and the patient noted he seemed to be his usual self. He was reportedly eating breakfast in the seated position and did not respond to his daughter calling his name. He was reportedly unresponsive in the seated position, for an estimated 15 minutes. He returned to normal with "shaking". There was no loss of bowel/bladder, no chest pain/shortness of breath, and no trauma or fall noted. The family at the bedside note his lisinopril was recently increased to 20mg BID, and they were concerned he may not be eating/drinking enough.        For the full HPI please refer to the History & Physical from this admission.    Hospital Course By Problem with Pertinent Findings   Syncope  -Patient with reported syncope / LOC from seated position the morning of admission while eating breakfast. There was no prodrome noted and no seizure activity noted.   -Patient was without focal deficits on this exam, and CT head negative.   -EKG with paced rhythm. Troponin normal. CXR without acute findings.   -With history of afib/sick sinus, pacemaker was interrogated with no acute findings.   -Admitted to telemetry without acute events overnight.   -Repeated echocardiogram largely unchanged from prior studies. Patient with recent discharge from SNF for MRSA bacteremia, where JEVON and TTE were negative for vegetations. Repeat blood cultures no growth to date.   Possible etiology was volume depletion with increase in Lisinopril from 20 to 20 BID. IV hydration given in the ED and discharge Lisinopril changed " to 10 daily.   -PT/OT for inpatient, with his recent discharge from SNF and home PT/OT being utilized.      Chronic Atrial Fibrillation   -Per chart review. Continued Coreg.  -Per chart review, patient not on anticoagulation due to history of GI bleeding.      History of Sick Sinus Syndrome s/p Pacemaker  -Per chart review. Patient is unclear of his cardiac history.   -EKG v-paced as above.   -Pacemaker interrogated as above.      CKD3  -Per chart review, baseline Cr 1.1-1.4. On admit, Cr 1.4  -Held Lisinopril and home Lasix on admission. Cr normalized.   -Restarted lisinopril at 10mg daily at discharge.      Chronic Diastolic Heart Failure  -Per chart review, but last Echo 11/11/17, with biatrial enlargement and normal EF.   -Patient does not appear volume overloaded now.   -Continued BB. Restarted Lisionpril 10 daily at discharge. Continue Lasix 20mg.      BPH  -Continued home Finasteride and Flomax.      Hypothyroidism  -Per chart review. Continued home Synthroid 100mcg  -Last TSH 11/7/17 was normal at 0.6.      CHRISTIANO with History of GI bleed  -Per chart review, patient had an angioectasia of the cecum clipped, date is unknown.   -He currently is maintained on Iron once daily. No subjective complaints of bleeding, and patient has stable normocytic anemia at 9/27 compared to prior studies.   -Previously per chart review, patient had been iron deficient. Iron continued.      Dementia  -Continued Aricept     Polyneuropathy  -Continued home Gabapentin.     Discharge Medications      Zeyad Woodard   Home Medication Instructions MISAEL:[id number]    Printed on:12/31/17 7818   Medication Information                      acetaminophen (TYLENOL) 325 MG tablet  Take 2 tablets (650 mg total) by mouth every 6 (six) hours as needed.             ASCORBATE CALCIUM (VITAMIN C ORAL)  Take 1 tablet by mouth once daily.              aspirin (ECOTRIN) 81 MG EC tablet  Take 1 tablet (81 mg total) by mouth once daily.              atorvastatin (LIPITOR) 20 MG tablet  Take 1 tablet (20 mg total) by mouth nightly.             calcium 500 mg Tab  Take 1 tablet by mouth Daily.             carvedilol (COREG) 6.25 MG tablet  Take 1 tablet (6.25 mg total) by mouth 2 (two) times daily.             clotrimazole-betamethasone (LOTRISONE) lotion  Apply topically 2 (two) times daily.             cyanocobalamin (VITAMIN B-12) 1000 MCG tablet  Take 100 mcg by mouth once daily.             donepezil (ARICEPT) 10 MG tablet  Take 1 tablet (10 mg total) by mouth every evening.             ferrous sulfate 325 mg (65 mg iron) Tab tablet  Take 325 mg by mouth once daily.             finasteride (PROSCAR) 5 mg tablet  Take 1 tablet (5 mg total) by mouth once daily.             furosemide (LASIX) 20 MG tablet  Take 1 tablet (20 mg total) by mouth once daily.             gabapentin (NEURONTIN) 100 MG capsule  Take 100mg in Morning and take 300mg nightly             levothyroxine (SYNTHROID) 100 MCG tablet  Take 1 tablet (100 mcg total) by mouth once daily.             lisinopril 10 MG tablet  Take 1 tablet (10 mg total) by mouth once daily.             MULTIVITS-MINERALS/FA/LYCOPENE (ONE-A-DAY MEN'S MULTIVITAMIN ORAL)  Take 1 tablet by mouth once daily.             neomycin-bacitracin-polymyxin (NEOSPORIN) ointment  Apply topically Daily.             oxyCODONE-acetaminophen (PERCOCET) 7.5-325 mg per tablet  Take 1 tablet by mouth every 8 (eight) hours as needed for Pain.             polyethylene glycol (GLYCOLAX) 17 gram/dose powder  Take 17 g by mouth once daily.             senna-docusate 8.6-50 mg (PERICOLACE) 8.6-50 mg per tablet  Take 1 tablet by mouth 2 (two) times daily.             tamsulosin (FLOMAX) 0.4 mg Cp24  Take 1 capsule (0.4 mg total) by mouth once daily.                   Discharge Information:   No discharge procedures on file.    James Greenwood  [facility] Medicine, -3

## 2017-12-30 NOTE — PT/OT/SLP EVAL
Physical Therapy Evaluation/Treatment     Patient Name:  Zeyad Woodard   MRN:  [id number]    Recommendations:     Discharge Recommendations:  home health PT, home health OT (Continue HHPT/OT)   Discharge Equipment Recommendations: none   Barriers to discharge: None    Assessment:     Zeyad Woodard is a 89 y.o. male admitted with a medical diagnosis of Syncope.  He presents with the following impairments/functional limitations:  weakness, impaired endurance, impaired balance, gait instability.    Rehab Prognosis:  good; patient would benefit from acute skilled PT services to address these deficits and reach maximum level of function.      Recent Surgery: * No surgery found *      Plan:     During this hospitalization, patient to be seen 5 x/week to address the above listed problems via gait training, therapeutic activities, therapeutic exercises  · Plan of Care Expires:  01/30/18   Plan of Care Reviewed with: patient    Subjective     Communicated with DINA Sam prior to session.  Patient found resting supine upon PT entry to room, agreeable to evaluation.      Chief Complaint: R sided neuropathy.   Patient comments/goals: go home.   Pain/Comfort:  · Pain Rating 1: 6/10  · Location - Side 1: Right  · Location - Orientation 1: generalized  · Location 1:  (entire R side, neruopathy. )  · Pain Rating Post-Intervention 1:  (none reported. )    Patients cultural, spiritual, Buddhist conflicts given the current situation: None verbalized to PT    Living Environment:  Pt lives alone in [facility] with TTE, WIS with shower chair. Pt was mod I with ADLs using Rw, pt denies any recent falls other than the on related to this admission. Pt states he does not remember anything prior to the fall. Pt reports his daughter lives very close by and checks on him.   Patient has the following equipment: walker, rolling, bedside commode, grab bar, shower chair (bed rails. ).  DME owned (not currently used): none.  Upon discharge,  patient will have assistance from family who lives very close by.    Objective:     Patient found with: bed alarm, SCD, telemetry     General Precautions: Standard, fall   Orthopedic Precautions:N/A   Braces: N/A     Exams:  · Cognitive Exam:  Patient is oriented to Person, Place, Time and Situation and follows 100% of multi-step commands   · Gross Motor Coordination:  WFL  · RLE ROM: WFL  · RLE Strength: Grossly 4/5  · LLE ROM: WFL  · LLE Strength: Grossly 4/5    Functional Mobility:  · Bed Mobility:     · Rolling Right: stand by assistance  · Bridging: stand by assistance  · Supine to Sit: stand by assistance  · Transfers:     · Sit to Stand:  contact guard assistance and minimum assistance with rolling walker  · Bed to Chair: contact guard assistance with  rolling walker  using  ambulated  · Gait: 60 feet with RW and CGA.    AM-PAC 6 CLICK MOBILITY  Total Score:19       Therapeutic Activities and Exercises:   -Bed mobility as listed above.   -Pt initially reported dizziness when sitting EOB. Pt sat EOB for 5 minutes and denied any further dizziness.   -Pt ambulated 60 feet with RW and CGA, pt demonstrated good balance and had no episodes of LOB.      Patient left up in chair with all lines intact, call button in reach and DINA Hernandez notified.    GOALS:    Physical Therapy Goals        Problem: Physical Therapy Goal    Goal Priority Disciplines Outcome Goal Variances Interventions   Physical Therapy Goal     PT/OT, PT Ongoing (interventions implemented as appropriate)     Description:  Goals to be met by: 18     Patient will increase functional independence with mobility by performin) sit <>stand mod I with RW  2) pt ambulate 200 feet with RW and Mod I  3) pt perform LE exercise X10 independently.                         History:     Past Medical History:   Diagnosis Date    Anemia     Atrial fibrillation     BPH (benign prostatic hypertrophy)     Cancer     Cardiac pacemaker in situ 2015     CHF (congestive heart failure)     Coronary artery disease     Encounter for blood transfusion     GI bleed     cecum angiectasia s/p cautery    Hypertension     Hypothyroidism     Polyneuropathy     Posture imbalance     due to neuropathy    Right posterior capsular opacification 1/18/2017    SSS (sick sinus syndrome) 2015    s/p pacemaker       Past Surgical History:   Procedure Laterality Date    CARDIAC SURGERY      CATARACT EXTRACTION W/  INTRAOCULAR LENS IMPLANT Right 05/17/2010        CATARACT EXTRACTION W/  INTRAOCULAR LENS IMPLANT Left 02/16/2004        cataract surgery      COLONOSCOPY  6/30/04    COLONOSCOPY N/A 2/15/2016    Procedure: COLONOSCOPY;  Surgeon: Stanton Kirk MD;  Location: [facility] ([address] [place] [address]);  Service: Endoscopy;  Laterality: N/A;    EYE SURGERY      HERNIA REPAIR      inguinal hernia repair      Open heart surgery for pericardiectomy      for calcific constrictive pericarditis    UMBILICAL HERNIA REPAIR      Yag      Left Eye       Clinical Decision Making:     History  Co-morbidities and personal factors that may impact the plan of care Examination  Body Structures and Functions, activity limitations and participation restrictions that may impact the plan of care Clinical Presentation   Decision Making/ Complexity Score   Co-morbidities:   [] Time since onset of injury / illness / exacerbation  [] Status of current condition  []Patient's cognitive status and safety concerns    [] Multiple Medical Problems (see med hx)  Personal Factors:   [] Patient's age  [] Prior Level of function   [] Patient's home situation (environment and family support)  [] Patient's level of motivation  [] Expected progression of patient      HISTORY:(criteria)    [] 56844 - no personal factors/history    [] 01037 - has 1-2 personal factor/comorbidity     [] 95074 - has >3 personal factor/comorbidity     Body Regions:  [] Objective examination findings  [] Head     []   Neck  [] Trunk   [] Upper Extremity  [] Lower Extremity    Body Systems:  [] For communication ability, affect, cognition, language, and learning style: the assessment of the ability to make needs known, consciousness, orientation (person, place, and time), expected emotional /behavioral responses, and learning preferences (eg, learning barriers, education  needs)  [] For the neuromuscular system: a general assessment of gross coordinated movement (eg, balance, gait, locomotion, transfers, and transitions) and motor function  (motor control and motor learning)  [] For the musculoskeletal system: the assessment of gross symmetry, gross range of motion, gross strength, height, and weight  [] For the integumentary system: the assessment of pliability(texture), presence of scar formation, skin color, and skin integrity  [] For cardiovascular/pulmonary system: the assessment of heart rate, respiratory rate, blood pressure, and edema     Activity limitations:    [] Patient's cognitive status and saf ety concerns          [] Status of current condition      [] Weight bearing restriction  [] Cardiopulmunary Restriction    Participation Restrictions:   [] Goals and goal agreement with the patient     [] Rehab potential (prognosis) and probable outcome      Examination of Body System: (criteria)    [] 26323 - addressing 1-2 elements    [] 77481 - addressing a total of 3 or more elements     [] 55296 -  Addressing a total of 4 or more elements         Clinical Presentation: (criteria)  Choose one     On examination of body system using standardized tests and measures patient presents with (CHOOSE ONE) elements from any of the following: body structures and functions, activity limitations, and/or participation restrictions.  Leading to a clinical presentation that is considered (CHOOSE ONE)                              Clinical Decision Making  (Eval Complexity):  Choose One     Time Tracking:     PT Received On: 12/30/17  PT  Start Time: 1046     PT Stop Time: 1112  PT Total Time (min): 26 min     Billable Minutes: Evaluation 16 and Therapeutic Activity 10      Nathaniel Mina PT, DPT  12/30/2017

## 2017-12-31 LAB
AORTIC VALVE REGURGITATION: ABNORMAL
AORTIC VALVE STENOSIS: ABNORMAL
DIASTOLIC DYSFUNCTION: YES
ESTIMATED PA SYSTOLIC PRESSURE: 34.83
MITRAL VALVE REGURGITATION: ABNORMAL
RETIRED EF AND QEF - SEE NOTES: 55 (ref 55–65)
TRICUSPID VALVE REGURGITATION: ABNORMAL

## 2017-12-31 NOTE — PT/OT/SLP PROGRESS
Occupational Therapy Discharge Note      Patient Name:  Zeyad Woodard   MRN:  [id number]    Patient not seen today secondary to Other (Comment) (pt discharge from hospital prior to OT visit).     Glendy Ortega OT  12/30/2017

## 2018-01-02 ENCOUNTER — OUTPATIENT CASE MANAGEMENT (OUTPATIENT)
Dept: ADMINISTRATIVE | Facility: OTHER | Age: 83
End: 2018-01-02

## 2018-01-02 NOTE — PROGRESS NOTES
Thank you for the referral. The following patient has been assigned to Anabell Hernandez RN with Outpatient [facility] for high risk screening.    Reason for referral: Syncope    Please contact [facility] at [phone] with any questions.    Thank you,  Bre Lerma

## 2018-01-03 ENCOUNTER — HOSPITAL ENCOUNTER (OUTPATIENT)
Dept: WOUND CARE | Facility: HOSPITAL | Age: 83
Discharge: HOME | End: 2018-01-03
Attending: SURGERY
Payer: MEDICARE

## 2018-01-03 ENCOUNTER — OFFICE VISIT (OUTPATIENT)
Dept: INFECTIOUS DISEASES | Facility: CLINIC | Age: 83
End: 2018-01-03
Payer: MEDICARE

## 2018-01-03 VITALS — HEART RATE: 73 BPM | DIASTOLIC BLOOD PRESSURE: 65 MMHG | SYSTOLIC BLOOD PRESSURE: 145 MMHG | TEMPERATURE: 99 F

## 2018-01-03 VITALS — DIASTOLIC BLOOD PRESSURE: 80 MMHG | SYSTOLIC BLOOD PRESSURE: 140 MMHG

## 2018-01-03 DIAGNOSIS — I87.2 VENOUS INSUFFICIENCY OF BOTH LOWER EXTREMITIES: ICD-10-CM

## 2018-01-03 DIAGNOSIS — B95.62 MRSA BACTEREMIA: ICD-10-CM

## 2018-01-03 DIAGNOSIS — L03.317 CELLULITIS OF BUTTOCK: ICD-10-CM

## 2018-01-03 DIAGNOSIS — R78.81 MRSA BACTEREMIA: ICD-10-CM

## 2018-01-03 DIAGNOSIS — R29.6 FREQUENT FALLS: ICD-10-CM

## 2018-01-03 DIAGNOSIS — L02.31 CELLULITIS AND ABSCESS OF BUTTOCK: ICD-10-CM

## 2018-01-03 DIAGNOSIS — L03.317 CELLULITIS AND ABSCESS OF BUTTOCK: ICD-10-CM

## 2018-01-03 DIAGNOSIS — G30.1 LATE ONSET ALZHEIMER'S DISEASE WITH BEHAVIORAL DISTURBANCE: ICD-10-CM

## 2018-01-03 DIAGNOSIS — R55 SYNCOPE, UNSPECIFIED SYNCOPE TYPE: Primary | ICD-10-CM

## 2018-01-03 DIAGNOSIS — F02.818 LATE ONSET ALZHEIMER'S DISEASE WITH BEHAVIORAL DISTURBANCE: ICD-10-CM

## 2018-01-03 LAB
BACTERIA BLD CULT: NORMAL
BACTERIA BLD CULT: NORMAL

## 2018-01-03 PROCEDURE — 99211 OFF/OP EST MAY X REQ PHY/QHP: CPT

## 2018-01-03 PROCEDURE — 99214 OFFICE O/P EST MOD 30 MIN: CPT | Mod: S$GLB,,, | Performed by: INTERNAL MEDICINE

## 2018-01-03 PROCEDURE — 99999 PR PBB SHADOW E&M-EST. PATIENT-LVL I: CPT | Mod: PBBFAC,,,

## 2018-01-03 PROCEDURE — 99499 UNLISTED E&M SERVICE: CPT | Mod: S$GLB,,, | Performed by: INTERNAL MEDICINE

## 2018-01-03 NOTE — PROGRESS NOTES
Patient admitted over the weekend for syncope. Fell this am - no significant trauma. Blood cultures performed - still no growth. Daughters notice loss of appetite, forcing him to eat and drink, difficulty with using bathroom.  Also note weight loss, dehydrated. No edema. Daughter stopped lisinopril and lasix for the last two days due to poor oral intake. /80 here, off two BP meds. Daughters looking at group home today.    Physical Exam   HENT:   Head: Normocephalic and atraumatic.   Eyes: Conjunctivae and EOM are normal. Pupils are equal, round, and reactive to light.   Cardiovascular: Normal rate and regular rhythm.    Murmur heard.   Systolic murmur is present with a grade of 3/6   Pulmonary/Chest: Effort normal and breath sounds normal. He has no rales.   Abdominal: Soft. Bowel sounds are normal.   Skin: No erythema.        Nursing note and vitals reviewed.    1. Syncope, unspecified syncope type    2. Frequent falls    3. MRSA bacteremia    4. Cellulitis of buttock    5. Cellulitis and abscess of buttock    6. Venous insufficiency of both lower extremities    7. Late onset Alzheimer's disease with behavioral disturbance      Plan:  Hold Lisinopril and lasix  - patient may have syncope from low BP and poor oral intake.  Discussed goals of care with end-stage Alzheimer's with two daughters. Recommended group home or nursing home, and possibly hospice.  Awaiting blood cultures. ESR and CRP are mildly elevated - this may not be unusual with syncope.  Follow up in 1 month.

## 2018-01-04 ENCOUNTER — OUTPATIENT CASE MANAGEMENT (OUTPATIENT)
Dept: ADMINISTRATIVE | Facility: OTHER | Age: 83
End: 2018-01-04

## 2018-01-04 NOTE — PROGRESS NOTES
Talked to daughter Asia to attempt to perform initial assessment.  Asia stated that currently all of her father's needs are met at this time and has declined OPCM services at this time.  Encouraged Asia to call this RN if having any questions/concerns.  Will dis-enroll patient at this time and case closed.  BEBA Hernandez, OPCM-RN

## 2018-01-04 NOTE — PROGRESS NOTES
First attempt to perform initial assessment for OCPM; left voice message with daughter Heather for Asia (daughter) to return call.  BEBA Hernandez OPCM-RN

## 2018-01-05 ENCOUNTER — TELEPHONE (OUTPATIENT)
Dept: ADMINISTRATIVE | Facility: CLINIC | Age: 83
End: 2018-01-05

## 2018-01-05 ENCOUNTER — OFFICE VISIT (OUTPATIENT)
Dept: FAMILY MEDICINE | Facility: CLINIC | Age: 83
End: 2018-01-05
Payer: MEDICARE

## 2018-01-05 VITALS
HEIGHT: 68 IN | DIASTOLIC BLOOD PRESSURE: 60 MMHG | WEIGHT: 167 LBS | SYSTOLIC BLOOD PRESSURE: 127 MMHG | OXYGEN SATURATION: 96 % | BODY MASS INDEX: 25.31 KG/M2 | HEART RATE: 67 BPM

## 2018-01-05 DIAGNOSIS — N18.30 STAGE 3 CHRONIC KIDNEY DISEASE: ICD-10-CM

## 2018-01-05 DIAGNOSIS — I67.82 BRAIN ISCHEMIA: Primary | ICD-10-CM

## 2018-01-05 DIAGNOSIS — R63.0 LOSS OF APPETITE: ICD-10-CM

## 2018-01-05 DIAGNOSIS — I50.22 CHRONIC SYSTOLIC CONGESTIVE HEART FAILURE: ICD-10-CM

## 2018-01-05 DIAGNOSIS — R26.9 ABNORMALITY OF GAIT: ICD-10-CM

## 2018-01-05 DIAGNOSIS — F03.90 DEMENTIA WITHOUT BEHAVIORAL DISTURBANCE, UNSPECIFIED DEMENTIA TYPE: ICD-10-CM

## 2018-01-05 DIAGNOSIS — D63.8 CHRONIC DISEASE ANEMIA: ICD-10-CM

## 2018-01-05 DIAGNOSIS — I10 ESSENTIAL HYPERTENSION: ICD-10-CM

## 2018-01-05 PROCEDURE — 99499 UNLISTED E&M SERVICE: CPT | Mod: S$GLB,,, | Performed by: FAMILY MEDICINE

## 2018-01-05 PROCEDURE — 99214 OFFICE O/P EST MOD 30 MIN: CPT | Mod: S$GLB,,, | Performed by: FAMILY MEDICINE

## 2018-01-05 PROCEDURE — 99999 PR PBB SHADOW E&M-EST. PATIENT-LVL IV: CPT | Mod: PBBFAC,,, | Performed by: FAMILY MEDICINE

## 2018-01-05 RX ORDER — MIRTAZAPINE 7.5 MG/1
7.5 TABLET, FILM COATED ORAL NIGHTLY
Qty: 90 TABLET | Refills: 3 | Status: SHIPPED | OUTPATIENT
Start: 2018-01-05 | End: 2018-03-05 | Stop reason: SDUPTHER

## 2018-01-05 RX ORDER — CARVEDILOL 3.12 MG/1
3.12 TABLET ORAL 2 TIMES DAILY WITH MEALS
Qty: 180 TABLET | Refills: 3 | Status: SHIPPED | OUTPATIENT
Start: 2018-01-05 | End: 2018-03-05 | Stop reason: SDUPTHER

## 2018-01-05 NOTE — PROGRESS NOTES
Subjective:       Patient ID: Zeyad Woodard is a 89 y.o. male.    Chief Complaint: No chief complaint on file.    89 years old male who came to the clinic with worsening of memory problems associated with loss of appetite and low energy.  Blood pressure today is in the low side.  No chest pain palpitations orthopnea or PND.  Patient with abnormal gait and congestive heart failure.  Family requests mainly a hospital bed.  Patient with limited mobility currently in the wheelchair.      Review of Systems   Constitutional: Positive for fatigue.   HENT: Negative.    Eyes: Negative.    Respiratory: Negative.    Cardiovascular: Negative.  Negative for chest pain, palpitations and leg swelling.   Gastrointestinal: Negative.    Genitourinary: Negative.    Musculoskeletal: Negative.    Skin: Negative.    Neurological: Negative.    Psychiatric/Behavioral: Positive for confusion and decreased concentration.       Objective:      Physical Exam   Constitutional: He is oriented to person, place, and time. He appears well-developed and well-nourished. No distress.   HENT:   Head: Normocephalic and atraumatic.   Right Ear: External ear normal.   Left Ear: External ear normal.   Nose: Nose normal.   Mouth/Throat: Oropharynx is clear and moist. No oropharyngeal exudate.   Eyes: Conjunctivae and EOM are normal. Pupils are equal, round, and reactive to light. Right eye exhibits no discharge. Left eye exhibits no discharge. No scleral icterus.   Neck: Normal range of motion. Neck supple. No JVD present. No tracheal deviation present. No thyromegaly present.   Cardiovascular: Normal rate, regular rhythm, normal heart sounds and intact distal pulses.  Exam reveals no gallop and no friction rub.    No murmur heard.  Pulmonary/Chest: Effort normal and breath sounds normal. No stridor. No respiratory distress. He has no wheezes. He has no rales. He exhibits no tenderness.   Abdominal: Soft. Bowel sounds are normal. He exhibits no  distension and no mass. There is no tenderness. There is no rebound and no guarding.   Musculoskeletal: Normal range of motion. He exhibits no edema or tenderness.   Lymphadenopathy:     He has no cervical adenopathy.   Neurological: He is alert and oriented to person, place, and time. He has normal reflexes. He displays normal reflexes. No cranial nerve deficit. He exhibits normal muscle tone. Coordination and gait abnormal.   Skin: Skin is warm and dry. No rash noted. He is not diaphoretic. No erythema. No pallor.   Psychiatric: Judgment and thought content normal. His affect is not angry, not blunt, not labile and not inappropriate. His speech is delayed. He is slowed. Cognition and memory are impaired. He exhibits a depressed mood. He exhibits abnormal recent memory. He exhibits normal remote memory.   Nursing note and vitals reviewed.      Assessment:       1. Brain ischemia    2. Dementia without behavioral disturbance, unspecified dementia type    3. Stage 3 chronic kidney disease    4. Chronic disease anemia    5. Loss of appetite    6. Abnormality of gait    7. Chronic systolic congestive heart failure    8. Essential hypertension        Plan:         Diagnoses and all orders for this visit:    Brain ischemia  -     HOSPITAL BED FOR HOME USE    Dementia without behavioral disturbance, unspecified dementia type  -     HOSPITAL BED FOR HOME USE    Stage 3 chronic kidney disease  -     Comprehensive metabolic panel; Future  -     CBC auto differential; Future    Chronic disease anemia  -     CBC auto differential; Future    Loss of appetite  -     mirtazapine (REMERON) 7.5 MG Tab; Take 1 tablet (7.5 mg total) by mouth every evening.    Abnormality of gait  -     HOSPITAL BED FOR HOME USE    Chronic systolic congestive heart failure  -     HOSPITAL BED FOR HOME USE    Essential hypertension  -     carvedilol (COREG) 3.125 MG tablet; Take 1 tablet (3.125 mg total) by mouth 2 (two) times daily with meals.  -      Comprehensive metabolic panel; Future  -     CBC auto differential; Future  -     TSH; Future

## 2018-01-05 NOTE — PATIENT INSTRUCTIONS

## 2018-01-06 ENCOUNTER — HOSPITAL ENCOUNTER (OUTPATIENT)
Facility: HOSPITAL | Age: 83
Discharge: HOME | End: 2018-01-10
Attending: EMERGENCY MEDICINE | Admitting: INTERNAL MEDICINE
Payer: MEDICARE

## 2018-01-06 DIAGNOSIS — G89.29 CHRONIC LOW BACK PAIN WITH SCIATICA, SCIATICA LATERALITY UNSPECIFIED, UNSPECIFIED BACK PAIN LATERALITY: ICD-10-CM

## 2018-01-06 DIAGNOSIS — H52.7 REFRACTIVE ERROR: ICD-10-CM

## 2018-01-06 DIAGNOSIS — D63.1 ANEMIA OF CHRONIC RENAL FAILURE, STAGE 3 (MODERATE): ICD-10-CM

## 2018-01-06 DIAGNOSIS — I50.32 CHRONIC DIASTOLIC CONGESTIVE HEART FAILURE: ICD-10-CM

## 2018-01-06 DIAGNOSIS — I48.20 CHRONIC ATRIAL FIBRILLATION: ICD-10-CM

## 2018-01-06 DIAGNOSIS — N17.9 AKI (ACUTE KIDNEY INJURY): Primary | ICD-10-CM

## 2018-01-06 DIAGNOSIS — A49.8 CLOSTRIDIUM DIFFICILE INFECTION: ICD-10-CM

## 2018-01-06 DIAGNOSIS — I15.0 RENOVASCULAR HYPERTENSION: ICD-10-CM

## 2018-01-06 DIAGNOSIS — N18.30 ANEMIA OF CHRONIC RENAL FAILURE, STAGE 3 (MODERATE): ICD-10-CM

## 2018-01-06 DIAGNOSIS — Z98.890 POST-OPERATIVE STATE: ICD-10-CM

## 2018-01-06 DIAGNOSIS — I34.0 MITRAL VALVE INSUFFICIENCY, UNSPECIFIED ETIOLOGY: ICD-10-CM

## 2018-01-06 DIAGNOSIS — M54.16 LUMBAR RADICULOPATHY: ICD-10-CM

## 2018-01-06 DIAGNOSIS — L03.317 CELLULITIS AND ABSCESS OF BUTTOCK: ICD-10-CM

## 2018-01-06 DIAGNOSIS — G89.4 CHRONIC PAIN SYNDROME: ICD-10-CM

## 2018-01-06 DIAGNOSIS — D69.6 THROMBOCYTOPENIA: ICD-10-CM

## 2018-01-06 DIAGNOSIS — L02.31 CELLULITIS AND ABSCESS OF BUTTOCK: ICD-10-CM

## 2018-01-06 DIAGNOSIS — M54.42 CHRONIC BILATERAL LOW BACK PAIN WITH BILATERAL SCIATICA: ICD-10-CM

## 2018-01-06 DIAGNOSIS — R79.9 ABNORMAL BLOOD SMEAR: ICD-10-CM

## 2018-01-06 DIAGNOSIS — I87.2 VENOUS INSUFFICIENCY OF BOTH LOWER EXTREMITIES: ICD-10-CM

## 2018-01-06 DIAGNOSIS — Z78.9 TAKES DIETARY SUPPLEMENTS: ICD-10-CM

## 2018-01-06 DIAGNOSIS — M54.40 CHRONIC LOW BACK PAIN WITH SCIATICA, SCIATICA LATERALITY UNSPECIFIED, UNSPECIFIED BACK PAIN LATERALITY: ICD-10-CM

## 2018-01-06 DIAGNOSIS — N18.30 CKD (CHRONIC KIDNEY DISEASE), STAGE III: ICD-10-CM

## 2018-01-06 DIAGNOSIS — L03.317 CELLULITIS OF BUTTOCK: ICD-10-CM

## 2018-01-06 DIAGNOSIS — R78.81 MRSA BACTEREMIA: ICD-10-CM

## 2018-01-06 DIAGNOSIS — F02.818 LATE ONSET ALZHEIMER'S DISEASE WITH BEHAVIORAL DISTURBANCE: ICD-10-CM

## 2018-01-06 DIAGNOSIS — D50.0 IRON DEFICIENCY ANEMIA DUE TO CHRONIC BLOOD LOSS: ICD-10-CM

## 2018-01-06 DIAGNOSIS — S70.01XA CONTUSION OF RIGHT HIP, INITIAL ENCOUNTER: ICD-10-CM

## 2018-01-06 DIAGNOSIS — M54.41 CHRONIC BILATERAL LOW BACK PAIN WITH BILATERAL SCIATICA: ICD-10-CM

## 2018-01-06 DIAGNOSIS — R29.6 FREQUENT FALLS: ICD-10-CM

## 2018-01-06 DIAGNOSIS — R55 SYNCOPE, UNSPECIFIED SYNCOPE TYPE: ICD-10-CM

## 2018-01-06 DIAGNOSIS — I95.1 ORTHOSTATIC HYPOTENSION: ICD-10-CM

## 2018-01-06 DIAGNOSIS — R53.1 WEAKNESS GENERALIZED: ICD-10-CM

## 2018-01-06 DIAGNOSIS — E03.4 HYPOTHYROIDISM DUE TO ACQUIRED ATROPHY OF THYROID: ICD-10-CM

## 2018-01-06 DIAGNOSIS — G62.9 POLYNEUROPATHY: ICD-10-CM

## 2018-01-06 DIAGNOSIS — E78.5 HYPERLIPIDEMIA, UNSPECIFIED HYPERLIPIDEMIA TYPE: ICD-10-CM

## 2018-01-06 DIAGNOSIS — I31.8 PERICARDIAL CALCIFICATION: ICD-10-CM

## 2018-01-06 DIAGNOSIS — I10 ESSENTIAL HYPERTENSION: ICD-10-CM

## 2018-01-06 DIAGNOSIS — N40.0 BENIGN PROSTATIC HYPERPLASIA WITHOUT LOWER URINARY TRACT SYMPTOMS: ICD-10-CM

## 2018-01-06 DIAGNOSIS — Z95.0 S/P PLACEMENT OF CARDIAC PACEMAKER: ICD-10-CM

## 2018-01-06 DIAGNOSIS — G89.29 CHRONIC BILATERAL LOW BACK PAIN WITH BILATERAL SCIATICA: ICD-10-CM

## 2018-01-06 DIAGNOSIS — F11.229 OPIOID DEPENDENCE WITH INTOXICATION WITH COMPLICATION: ICD-10-CM

## 2018-01-06 DIAGNOSIS — Z96.1 PSEUDOPHAKIA: ICD-10-CM

## 2018-01-06 DIAGNOSIS — E03.9 HYPOTHYROIDISM, UNSPECIFIED TYPE: ICD-10-CM

## 2018-01-06 DIAGNOSIS — R50.83 POST-VACCINATION FEVER: ICD-10-CM

## 2018-01-06 DIAGNOSIS — Z95.0 CARDIAC PACEMAKER IN SITU: ICD-10-CM

## 2018-01-06 DIAGNOSIS — G30.1 LATE ONSET ALZHEIMER'S DISEASE WITH BEHAVIORAL DISTURBANCE: ICD-10-CM

## 2018-01-06 DIAGNOSIS — I49.5 SICK SINUS SYNDROME: ICD-10-CM

## 2018-01-06 DIAGNOSIS — R79.89 ELEVATED TROPONIN: ICD-10-CM

## 2018-01-06 DIAGNOSIS — R79.89 TROPONIN I ABOVE REFERENCE RANGE: ICD-10-CM

## 2018-01-06 DIAGNOSIS — R00.1 BRADYCARDIA: ICD-10-CM

## 2018-01-06 DIAGNOSIS — B95.62 MRSA BACTEREMIA: ICD-10-CM

## 2018-01-06 DIAGNOSIS — Z79.891 USE OF OPIATES FOR THERAPEUTIC PURPOSES: ICD-10-CM

## 2018-01-06 DIAGNOSIS — K59.09 CHRONIC CONSTIPATION: ICD-10-CM

## 2018-01-06 DIAGNOSIS — M25.551 RIGHT HIP PAIN: ICD-10-CM

## 2018-01-06 DIAGNOSIS — R53.81 DEBILITY: ICD-10-CM

## 2018-01-06 DIAGNOSIS — F03.90 DEMENTIA WITHOUT BEHAVIORAL DISTURBANCE, UNSPECIFIED DEMENTIA TYPE: ICD-10-CM

## 2018-01-06 DIAGNOSIS — H26.491 RIGHT POSTERIOR CAPSULAR OPACIFICATION: ICD-10-CM

## 2018-01-06 LAB
ALBUMIN SERPL BCP-MCNC: 3.4 G/DL
ALP SERPL-CCNC: 93 U/L
ALT SERPL W/O P-5'-P-CCNC: 21 U/L
AMMONIA PLAS-SCNC: 16 UMOL/L
AMPHET+METHAMPHET UR QL: NEGATIVE
ANION GAP SERPL CALC-SCNC: 7 MMOL/L
AST SERPL-CCNC: 30 U/L
BACTERIA #/AREA URNS HPF: NORMAL /HPF
BARBITURATES UR QL SCN>200 NG/ML: NEGATIVE
BENZODIAZ UR QL SCN>200 NG/ML: NEGATIVE
BILIRUB SERPL-MCNC: 0.7 MG/DL
BILIRUB UR QL STRIP: NEGATIVE
BUN SERPL-MCNC: 46 MG/DL
BZE UR QL SCN: NEGATIVE
CALCIUM SERPL-MCNC: 9.5 MG/DL
CANNABINOIDS UR QL SCN: NEGATIVE
CHLORIDE SERPL-SCNC: 100 MMOL/L
CLARITY UR: CLEAR
CO2 SERPL-SCNC: 27 MMOL/L
COLOR UR: YELLOW
CREAT SERPL-MCNC: 1.7 MG/DL
CREAT UR-MCNC: 32.2 MG/DL
ERYTHROCYTE [DISTWIDTH] IN BLOOD BY AUTOMATED COUNT: 13.7 %
EST. GFR  (AFRICAN AMERICAN): 40 ML/MIN/1.73 M^2
EST. GFR  (NON AFRICAN AMERICAN): 35 ML/MIN/1.73 M^2
ETHANOL SERPL-MCNC: <10 MG/DL
FERRITIN SERPL-MCNC: 164 NG/ML
GLUCOSE SERPL-MCNC: 140 MG/DL
GLUCOSE UR QL STRIP: NEGATIVE
HCT VFR BLD AUTO: 28.7 %
HGB BLD-MCNC: 9.5 G/DL
HGB UR QL STRIP: NEGATIVE
HYALINE CASTS #/AREA URNS LPF: 0 /LPF
KETONES UR QL STRIP: NEGATIVE
LACTATE SERPL-SCNC: 0.6 MMOL/L
LACTATE SERPL-SCNC: 1.2 MMOL/L
LEUKOCYTE ESTERASE UR QL STRIP: NEGATIVE
LIPASE SERPL-CCNC: 40 U/L
MCH RBC QN AUTO: 30.4 PG
MCHC RBC AUTO-ENTMCNC: 33.1 G/DL
MCV RBC AUTO: 92 FL
METHADONE UR QL SCN>300 NG/ML: NEGATIVE
MICROSCOPIC COMMENT: NORMAL
NITRITE UR QL STRIP: NEGATIVE
OPIATES UR QL SCN: NEGATIVE
PCP UR QL SCN>25 NG/ML: NEGATIVE
PH UR STRIP: 7 [PH] (ref 5–8)
PLATELET # BLD AUTO: 122 K/UL
PMV BLD AUTO: 9.8 FL
POTASSIUM SERPL-SCNC: 5.4 MMOL/L
PROT SERPL-MCNC: 6.7 G/DL
PROT UR QL STRIP: ABNORMAL
RBC # BLD AUTO: 3.12 M/UL
RBC #/AREA URNS HPF: 0 /HPF (ref 0–4)
SODIUM SERPL-SCNC: 134 MMOL/L
SP GR UR STRIP: 1.01 (ref 1–1.03)
TOXICOLOGY INFORMATION: NORMAL
TROPONIN I SERPL DL<=0.01 NG/ML-MCNC: 0.02 NG/ML
TROPONIN I SERPL DL<=0.01 NG/ML-MCNC: 0.04 NG/ML
TSH SERPL DL<=0.005 MIU/L-ACNC: 0.84 UIU/ML
URN SPEC COLLECT METH UR: ABNORMAL
UROBILINOGEN UR STRIP-ACNC: NEGATIVE EU/DL
WBC # BLD AUTO: 10.36 K/UL
WBC #/AREA URNS HPF: 0 /HPF (ref 0–5)

## 2018-01-06 PROCEDURE — 84300 ASSAY OF URINE SODIUM: CPT

## 2018-01-06 PROCEDURE — 99285 EMERGENCY DEPT VISIT HI MDM: CPT | Mod: 25

## 2018-01-06 PROCEDURE — 96360 HYDRATION IV INFUSION INIT: CPT

## 2018-01-06 PROCEDURE — 80307 DRUG TEST PRSMV CHEM ANLYZR: CPT

## 2018-01-06 PROCEDURE — 93005 ELECTROCARDIOGRAM TRACING: CPT

## 2018-01-06 PROCEDURE — 84484 ASSAY OF TROPONIN QUANT: CPT

## 2018-01-06 PROCEDURE — 84484 ASSAY OF TROPONIN QUANT: CPT | Mod: 91

## 2018-01-06 PROCEDURE — G0378 HOSPITAL OBSERVATION PER HR: HCPCS

## 2018-01-06 PROCEDURE — 93010 ELECTROCARDIOGRAM REPORT: CPT | Mod: ,,, | Performed by: INTERNAL MEDICINE

## 2018-01-06 PROCEDURE — 82140 ASSAY OF AMMONIA: CPT

## 2018-01-06 PROCEDURE — 80053 COMPREHEN METABOLIC PANEL: CPT

## 2018-01-06 PROCEDURE — 25000003 PHARM REV CODE 250: Performed by: EMERGENCY MEDICINE

## 2018-01-06 PROCEDURE — 83540 ASSAY OF IRON: CPT

## 2018-01-06 PROCEDURE — 83605 ASSAY OF LACTIC ACID: CPT | Mod: 91

## 2018-01-06 PROCEDURE — P9612 CATHETERIZE FOR URINE SPEC: HCPCS

## 2018-01-06 PROCEDURE — 84443 ASSAY THYROID STIM HORMONE: CPT

## 2018-01-06 PROCEDURE — 82746 ASSAY OF FOLIC ACID SERUM: CPT

## 2018-01-06 PROCEDURE — 82728 ASSAY OF FERRITIN: CPT

## 2018-01-06 PROCEDURE — 82607 VITAMIN B-12: CPT

## 2018-01-06 PROCEDURE — 83690 ASSAY OF LIPASE: CPT

## 2018-01-06 PROCEDURE — 25000003 PHARM REV CODE 250: Performed by: STUDENT IN AN ORGANIZED HEALTH CARE EDUCATION/TRAINING PROGRAM

## 2018-01-06 PROCEDURE — 83605 ASSAY OF LACTIC ACID: CPT

## 2018-01-06 PROCEDURE — 80320 DRUG SCREEN QUANTALCOHOLS: CPT

## 2018-01-06 PROCEDURE — 93010 ELECTROCARDIOGRAM REPORT: CPT | Mod: 76,,, | Performed by: INTERNAL MEDICINE

## 2018-01-06 PROCEDURE — 96361 HYDRATE IV INFUSION ADD-ON: CPT

## 2018-01-06 PROCEDURE — 85027 COMPLETE CBC AUTOMATED: CPT

## 2018-01-06 PROCEDURE — 81000 URINALYSIS NONAUTO W/SCOPE: CPT

## 2018-01-06 PROCEDURE — 63600175 PHARM REV CODE 636 W HCPCS: Performed by: STUDENT IN AN ORGANIZED HEALTH CARE EDUCATION/TRAINING PROGRAM

## 2018-01-06 RX ORDER — AMOXICILLIN 250 MG
1 CAPSULE ORAL 2 TIMES DAILY
Status: DISCONTINUED | OUTPATIENT
Start: 2018-01-06 | End: 2018-01-10 | Stop reason: HOSPADM

## 2018-01-06 RX ORDER — IBUPROFEN 200 MG
24 TABLET ORAL
Status: DISCONTINUED | OUTPATIENT
Start: 2018-01-06 | End: 2018-01-10 | Stop reason: HOSPADM

## 2018-01-06 RX ORDER — MIRTAZAPINE 7.5 MG/1
7.5 TABLET, FILM COATED ORAL NIGHTLY
Status: DISCONTINUED | OUTPATIENT
Start: 2018-01-06 | End: 2018-01-10 | Stop reason: HOSPADM

## 2018-01-06 RX ORDER — PNV NO.95/FERROUS FUM/FOLIC AC 28MG-0.8MG
100 TABLET ORAL DAILY
Status: DISCONTINUED | OUTPATIENT
Start: 2018-01-07 | End: 2018-01-10 | Stop reason: HOSPADM

## 2018-01-06 RX ORDER — GLUCAGON 1 MG
1 KIT INJECTION
Status: DISCONTINUED | OUTPATIENT
Start: 2018-01-06 | End: 2018-01-10 | Stop reason: HOSPADM

## 2018-01-06 RX ORDER — ASPIRIN 81 MG/1
81 TABLET ORAL DAILY
Status: DISCONTINUED | OUTPATIENT
Start: 2018-01-07 | End: 2018-01-07

## 2018-01-06 RX ORDER — ATORVASTATIN CALCIUM 20 MG/1
20 TABLET, FILM COATED ORAL NIGHTLY
Status: DISCONTINUED | OUTPATIENT
Start: 2018-01-06 | End: 2018-01-10 | Stop reason: HOSPADM

## 2018-01-06 RX ORDER — DONEPEZIL HYDROCHLORIDE 5 MG/1
10 TABLET, FILM COATED ORAL NIGHTLY
Status: DISCONTINUED | OUTPATIENT
Start: 2018-01-06 | End: 2018-01-10 | Stop reason: HOSPADM

## 2018-01-06 RX ORDER — LEVOTHYROXINE SODIUM 100 UG/1
100 TABLET ORAL DAILY
Status: DISCONTINUED | OUTPATIENT
Start: 2018-01-07 | End: 2018-01-10 | Stop reason: HOSPADM

## 2018-01-06 RX ORDER — HEPARIN SODIUM 5000 [USP'U]/ML
5000 INJECTION, SOLUTION INTRAVENOUS; SUBCUTANEOUS EVERY 12 HOURS
Status: DISCONTINUED | OUTPATIENT
Start: 2018-01-06 | End: 2018-01-10 | Stop reason: HOSPADM

## 2018-01-06 RX ORDER — FUROSEMIDE 20 MG/1
20 TABLET ORAL DAILY PRN
Status: ON HOLD | COMMUNITY
End: 2018-04-18 | Stop reason: HOSPADM

## 2018-01-06 RX ORDER — IBUPROFEN 200 MG
16 TABLET ORAL
Status: DISCONTINUED | OUTPATIENT
Start: 2018-01-06 | End: 2018-01-10 | Stop reason: HOSPADM

## 2018-01-06 RX ORDER — CARVEDILOL 3.12 MG/1
3.12 TABLET ORAL 2 TIMES DAILY WITH MEALS
Status: DISCONTINUED | OUTPATIENT
Start: 2018-01-06 | End: 2018-01-08

## 2018-01-06 RX ORDER — FINASTERIDE 5 MG/1
5 TABLET, FILM COATED ORAL DAILY
Status: DISCONTINUED | OUTPATIENT
Start: 2018-01-07 | End: 2018-01-10 | Stop reason: HOSPADM

## 2018-01-06 RX ORDER — TAMSULOSIN HYDROCHLORIDE 0.4 MG/1
0.4 CAPSULE ORAL DAILY
Status: DISCONTINUED | OUTPATIENT
Start: 2018-01-07 | End: 2018-01-10 | Stop reason: HOSPADM

## 2018-01-06 RX ORDER — SODIUM CHLORIDE 0.9 % (FLUSH) 0.9 %
5 SYRINGE (ML) INJECTION
Status: DISCONTINUED | OUTPATIENT
Start: 2018-01-06 | End: 2018-01-10 | Stop reason: HOSPADM

## 2018-01-06 RX ORDER — SODIUM CHLORIDE 9 MG/ML
1000 INJECTION, SOLUTION INTRAVENOUS
Status: COMPLETED | OUTPATIENT
Start: 2018-01-06 | End: 2018-01-06

## 2018-01-06 RX ORDER — POLYETHYLENE GLYCOL 3350 17 G/17G
17 POWDER, FOR SOLUTION ORAL DAILY
Status: DISCONTINUED | OUTPATIENT
Start: 2018-01-07 | End: 2018-01-10 | Stop reason: HOSPADM

## 2018-01-06 RX ADMIN — STANDARDIZED SENNA CONCENTRATE AND DOCUSATE SODIUM 1 TABLET: 8.6; 5 TABLET, FILM COATED ORAL at 09:01

## 2018-01-06 RX ADMIN — ATORVASTATIN CALCIUM 20 MG: 20 TABLET, FILM COATED ORAL at 09:01

## 2018-01-06 RX ADMIN — MIRTAZAPINE 7.5 MG: 7.5 TABLET ORAL at 09:01

## 2018-01-06 RX ADMIN — DONEPEZIL HYDROCHLORIDE 10 MG: 5 TABLET, FILM COATED ORAL at 09:01

## 2018-01-06 RX ADMIN — CARVEDILOL 3.12 MG: 3.12 TABLET, FILM COATED ORAL at 08:01

## 2018-01-06 RX ADMIN — SODIUM CHLORIDE 1000 ML: 0.9 INJECTION, SOLUTION INTRAVENOUS at 02:01

## 2018-01-06 RX ADMIN — HEPARIN SODIUM 5000 UNITS: 5000 INJECTION, SOLUTION INTRAVENOUS; SUBCUTANEOUS at 09:01

## 2018-01-06 NOTE — ED NOTES
Pt tolerated straight cath well. Pt is resting quietly. Pt denies any pain at this time. Will continue to monitor

## 2018-01-06 NOTE — ED NOTES
Pt presents to the ED w/ c/o weakness.. Pt does not know when the weakness began. Pt denies chest pain. Pt denies abd pain on palpation. Pt denies sob. Pt denies feeling nauseous at this time. Pt reports that he does walk at home but has been feeling weak.

## 2018-01-06 NOTE — ED PROVIDER NOTES
Encounter Date: 1/6/2018    SCRIBE #1 NOTE: I, Ambar Ferraro, am scribing for, and in the presence of,  Dr. Arora. I have scribed the entire note.       History     Chief Complaint   Patient presents with    Weakness     with tremors since this am.     Time patient was seen by the provider: 1:34 PM      The patient is a 89 y.o. male with hx of: A-fib, CHF, cardiac pacemaker in situ, CAD, SSS and HTN who presents to the ED via EMS with a complaint of shortness of breath that began a few days ago. He endorse fatigue and generalized weakness with tremors since this morning.  Pt denies any pain and states he have been eating drinking ok. Pt denies any nausea, vomiting, diarrhea or fever. He denies any chest pain. Pt is somnolent and has difficulty telling stories. Pt states that he lives by himself.       The history is provided by the patient.     Review of patient's allergies indicates:   Allergen Reactions    Penicillins Hives and Other (See Comments)     Fever     Past Medical History:   Diagnosis Date    Anemia     Atrial fibrillation     BPH (benign prostatic hypertrophy)     Cancer     Cardiac pacemaker in situ 7/2/2015    CHF (congestive heart failure)     Coronary artery disease     Encounter for blood transfusion     GI bleed     cecum angiectasia s/p cautery    Hypertension     Hypothyroidism     Polyneuropathy     Posture imbalance     due to neuropathy    Right posterior capsular opacification 1/18/2017    SSS (sick sinus syndrome) 2015    s/p pacemaker     Past Surgical History:   Procedure Laterality Date    CARDIAC SURGERY      CATARACT EXTRACTION W/  INTRAOCULAR LENS IMPLANT Right 05/17/2010        CATARACT EXTRACTION W/  INTRAOCULAR LENS IMPLANT Left 02/16/2004        cataract surgery      COLONOSCOPY  6/30/04    COLONOSCOPY N/A 2/15/2016    Procedure: COLONOSCOPY;  Surgeon: Stanton Kirk MD;  Location: [address]);  Service: Endoscopy;  Laterality: N/A;     EYE SURGERY      HERNIA REPAIR      inguinal hernia repair      Open heart surgery for pericardiectomy      for calcific constrictive pericarditis    UMBILICAL HERNIA REPAIR      Yag      Left Eye     Family History   Problem Relation Age of Onset    Stroke Mother          Cancer Father      lung;     Diabetes Sister     Coronary artery disease Sister     Peripheral vascular disease Sister     Amblyopia Neg Hx     Blindness Neg Hx     Cataracts Neg Hx     Glaucoma Neg Hx     Hypertension Neg Hx     Macular degeneration Neg Hx     Retinal detachment Neg Hx     Strabismus Neg Hx     Thyroid disease Neg Hx     Prostate cancer Neg Hx     Kidney disease Neg Hx      Social History   Substance Use Topics    Smoking status: Passive Smoke Exposure - Never Smoker    Smokeless tobacco: Never Used    Alcohol use No     Review of Systems   Constitutional: Positive for fatigue. Negative for chills, diaphoresis and fever.   HENT: Negative for congestion, rhinorrhea, sneezing, sore throat, trouble swallowing and voice change.    Eyes: Negative for pain and visual disturbance.   Respiratory: Positive for shortness of breath. Negative for cough and choking.    Cardiovascular: Negative for chest pain, palpitations and leg swelling.   Gastrointestinal: Negative for abdominal distention, abdominal pain, constipation, diarrhea, nausea and vomiting.   Genitourinary: Negative for difficulty urinating, dysuria, frequency and hematuria.   Musculoskeletal: Negative for arthralgias, back pain, gait problem, myalgias, neck pain and neck stiffness.   Skin: Negative for color change, pallor and rash.   Neurological: Positive for tremors and weakness. Negative for dizziness, seizures, speech difficulty, numbness and headaches.   Hematological: Does not bruise/bleed easily.   Psychiatric/Behavioral: Negative for confusion.   All other systems reviewed and are negative.      Physical Exam     Initial  Vitals [01/06/18 1236]   BP Pulse Resp Temp SpO2   134/68 74 16 -- 97 %      MAP       90         Physical Exam    Nursing note and vitals reviewed.  Constitutional: He appears well-developed and well-nourished. No distress.   HENT:   Head: Normocephalic and atraumatic.   Mouth/Throat: Oropharynx is clear and moist.   Eyes: EOM are normal. Pupils are equal, round, and reactive to light.   Neck: Normal range of motion. Neck supple. No tracheal deviation present.   Cardiovascular: Normal rate, regular rhythm, normal heart sounds and intact distal pulses.   Pulmonary/Chest: Breath sounds normal. No stridor. No respiratory distress. He has no wheezes.   Abdominal: Soft. Bowel sounds are normal. He exhibits no distension and no mass. There is no tenderness.   Musculoskeletal: Normal range of motion. He exhibits no edema.   Neurological: No cranial nerve deficit or sensory deficit.   He does have bilateral upper extremities tremors. He is somewhat arousal. He is orient to person only. He has generalized weakness. No focal deficit.       Skin: Skin is warm and dry. No rash noted.   Psychiatric: He has a normal mood and affect. His behavior is normal.         ED Course   Procedures  Labs Reviewed   CBC WITHOUT DIFFERENTIAL - Abnormal; Notable for the following:        Result Value    RBC 3.12 (*)     Hemoglobin 9.5 (*)     Hematocrit 28.7 (*)     Platelets 122 (*)     All other components within normal limits   COMPREHENSIVE METABOLIC PANEL - Abnormal; Notable for the following:     Sodium 134 (*)     Potassium 5.4 (*)     Glucose 140 (*)     BUN, Bld 46 (*)     Creatinine 1.7 (*)     Albumin 3.4 (*)     Anion Gap 7 (*)     eGFR if  40 (*)     eGFR if non  35 (*)     All other components within normal limits   URINALYSIS - Abnormal; Notable for the following:     Protein, UA 1+ (*)     All other components within normal limits   TROPONIN I - Abnormal; Notable for the following:     Troponin I  0.037 (*)     All other components within normal limits   LACTIC ACID, PLASMA   AMMONIA   LIPASE   DRUG SCREEN PANEL, URINE EMERGENCY   ALCOHOL,MEDICAL (ETHANOL)   URINALYSIS MICROSCOPIC   TSH   LACTIC ACID, PLASMA   TROPONIN I   VITAMIN B12   FOLATE   FERRITIN   IRON AND TIBC        Imaging Results          X-Ray Chest AP Portable (Final result)  Result time 01/06/18 14:25:40    Final result by Edilson Santillan MD (01/06/18 14:25:40)                 Impression:        No detrimental change or radiographic acute intrathoracic process seen on this single view.      Electronically signed by: EDILSON SANTILLAN MD, MD  Date:     01/06/18  Time:    14:25              Narrative:    COMPARISON: Chest radiograph 12/29/17    FINDINGS: AP portable view of the chest. Monitoring leads overlie the chest. No detrimental change. Cardiomediastinal silhouette is stable without evidence of failure. Left upper chest single lead cardiac device is unchanged. The lungs are symmetrically well inflated without large consolidation or new focal opacity. Grossly stable pericardial calcifications and bilateral pulmonary calcified granulomas. No large pneumothorax. Chronic blunting left costophrenic angle unchanged. No acute osseous process seen. Sternotomy wires again noted.                             CT Head Without Contrast (Final result)  Result time 01/06/18 13:55:43    Final result by Radha Malagon MD (01/06/18 13:55:43)                 Impression:     Findings suggestive of mild chronic progressive ischemic white matter change.  No acute findings on this exam.      Electronically signed by: RADHA MALAGON MD  Date:     01/06/18  Time:    13:55              Narrative:    Comparison: 12/29/17.    Technique: Contiguous 5 mm axial images were obtained from the vertex to the skull base without the administration of contrast.  Sagittal and coronal reformats were also submitted.      Findings: Global cerebral volume loss is present.  There is  compensatory ex axial dilation of the lateral ventricles associated with this finding.  A mild degree of stable, predominantly periventricular hypodense foci are present.  There is a stable extra-axial collection centered in the vermis of the cerebellum which likely represents an arachnoid cyst.  Otherwise, there is normal brain parenchymal attenuation with no intra-axial or extra-axial mass or hemorrhage.  There is no evidence for acute ischemia or infarct.  The gray-white matter differentiation is preserved.  The CSF containing spaces maintained normal symmetry.  The pneumatized aspect of the skull and skull base show no abnormalities.  The osseous and soft tissue structures are normal.                                 Medical Decision Making:   History:   Old Medical Records: I decided to obtain old medical records.  Initial Assessment:   89 y.o. Male presents with shortness of breath and fatigue. Pt is somnolent and has difficulty telling stories. Plan to obtain basic labs, cardiac workup as well as lipase, lactate, UA, CXR and CT head. Will reassess pending results.   Differential Diagnosis:   Differential Diagnosis includes, but is not limited to:  PE, MI/ACS, pneumothorax, pericardial effusion/tamonade, pneumonia, lung abscess, pericarditis/myocarditis, pleural effusion, lung mass, CHF exacerbation, asthma exacerbation, COPD exacerbation, aspirated/ingested foreign body, airway obstruction, CO poisoning, anemia, metabolic derangement, allergy/atopy, influenza, viral URI, viral syndrome.    Clinical Tests:   Lab Tests: Ordered and Reviewed  Radiological Study: Ordered and Reviewed  ED Management:  5:27 PM   CT head and CXR with no acute findings. No white counts. Slight bump in creatinine 1.7 otherwise labs are unremarkable. Discussed with pt's family. He is typically self sufficient at home and able to perform most of his ADL. However, he has been increasingly fatigue, somnolent and has been refusing to take  his medications. Family reports they have been attempting to find a nursing home or long term placement for him.     Other:   I have discussed this case with another health care provider.       <> Summary of the Discussion: 5:27 PM consulted [facility] medicine to admit pt for further evaluation.    [facility] medicine will admit patient for observation.              Attending Attestation:           Physician Attestation for Scribe:      Comments: I, Dr. Vargas, personally performed the services described in this documentation. All medical record entries made by the scribe were at my direction and in my presence.  I have reviewed the chart and agree that the record reflects my personal performance and is accurate and complete.              ED Course      Clinical Impression:   The primary encounter diagnosis was ELIZABET (acute kidney injury). A diagnosis of Weakness generalized was also pertinent to this visit.    Disposition:   Disposition: Admitted  Condition: Stable                        Claudia Vargas MD  01/06/18 5635

## 2018-01-07 LAB
ALBUMIN SERPL BCP-MCNC: 3.5 G/DL
ALP SERPL-CCNC: 95 U/L
ALT SERPL W/O P-5'-P-CCNC: 23 U/L
ANION GAP SERPL CALC-SCNC: 6 MMOL/L
AST SERPL-CCNC: 34 U/L
BILIRUB SERPL-MCNC: 0.9 MG/DL
BUN SERPL-MCNC: 37 MG/DL
CALCIUM SERPL-MCNC: 9.7 MG/DL
CHLORIDE SERPL-SCNC: 103 MMOL/L
CO2 SERPL-SCNC: 29 MMOL/L
CREAT SERPL-MCNC: 1.4 MG/DL
EST. GFR  (AFRICAN AMERICAN): 51 ML/MIN/1.73 M^2
EST. GFR  (NON AFRICAN AMERICAN): 44 ML/MIN/1.73 M^2
FOLATE SERPL-MCNC: 18.4 NG/ML
GLUCOSE SERPL-MCNC: 105 MG/DL
IRON SERPL-MCNC: 48 UG/DL
MAGNESIUM SERPL-MCNC: 2.1 MG/DL
POTASSIUM SERPL-SCNC: 5.1 MMOL/L
PROT SERPL-MCNC: 6.8 G/DL
SATURATED IRON: 16 %
SODIUM SERPL-SCNC: 138 MMOL/L
SODIUM UR-SCNC: 58 MMOL/L
TOTAL IRON BINDING CAPACITY: 305 UG/DL
TRANSFERRIN SERPL-MCNC: 206 MG/DL
VIT B12 SERPL-MCNC: 1667 PG/ML

## 2018-01-07 PROCEDURE — 92610 EVALUATE SWALLOWING FUNCTION: CPT

## 2018-01-07 PROCEDURE — 36415 COLL VENOUS BLD VENIPUNCTURE: CPT

## 2018-01-07 PROCEDURE — G0378 HOSPITAL OBSERVATION PER HR: HCPCS

## 2018-01-07 PROCEDURE — G8996 SWALLOW CURRENT STATUS: HCPCS | Mod: CK

## 2018-01-07 PROCEDURE — 63600175 PHARM REV CODE 636 W HCPCS: Performed by: STUDENT IN AN ORGANIZED HEALTH CARE EDUCATION/TRAINING PROGRAM

## 2018-01-07 PROCEDURE — 83735 ASSAY OF MAGNESIUM: CPT

## 2018-01-07 PROCEDURE — 94761 N-INVAS EAR/PLS OXIMETRY MLT: CPT

## 2018-01-07 PROCEDURE — 97116 GAIT TRAINING THERAPY: CPT

## 2018-01-07 PROCEDURE — G8978 MOBILITY CURRENT STATUS: HCPCS | Mod: CK

## 2018-01-07 PROCEDURE — G8979 MOBILITY GOAL STATUS: HCPCS | Mod: CJ

## 2018-01-07 PROCEDURE — 97535 SELF CARE MNGMENT TRAINING: CPT

## 2018-01-07 PROCEDURE — 80053 COMPREHEN METABOLIC PANEL: CPT

## 2018-01-07 PROCEDURE — G8988 SELF CARE GOAL STATUS: HCPCS | Mod: CJ

## 2018-01-07 PROCEDURE — G8987 SELF CARE CURRENT STATUS: HCPCS | Mod: CK

## 2018-01-07 PROCEDURE — 25000003 PHARM REV CODE 250: Performed by: STUDENT IN AN ORGANIZED HEALTH CARE EDUCATION/TRAINING PROGRAM

## 2018-01-07 PROCEDURE — 97165 OT EVAL LOW COMPLEX 30 MIN: CPT

## 2018-01-07 PROCEDURE — G8997 SWALLOW GOAL STATUS: HCPCS | Mod: CJ

## 2018-01-07 PROCEDURE — 97161 PT EVAL LOW COMPLEX 20 MIN: CPT

## 2018-01-07 RX ORDER — ASPIRIN 325 MG
325 TABLET ORAL DAILY
Status: DISCONTINUED | OUTPATIENT
Start: 2018-01-07 | End: 2018-01-07

## 2018-01-07 RX ORDER — SODIUM CHLORIDE 9 MG/ML
INJECTION, SOLUTION INTRAVENOUS CONTINUOUS
Status: ACTIVE | OUTPATIENT
Start: 2018-01-07 | End: 2018-01-07

## 2018-01-07 RX ORDER — ASPIRIN 81 MG/1
81 TABLET ORAL DAILY
Status: DISCONTINUED | OUTPATIENT
Start: 2018-01-08 | End: 2018-01-10 | Stop reason: HOSPADM

## 2018-01-07 RX ORDER — HYDRALAZINE HYDROCHLORIDE 20 MG/ML
10 INJECTION INTRAMUSCULAR; INTRAVENOUS EVERY 8 HOURS PRN
Status: DISCONTINUED | OUTPATIENT
Start: 2018-01-07 | End: 2018-01-07

## 2018-01-07 RX ORDER — HYDRALAZINE HYDROCHLORIDE 20 MG/ML
5 INJECTION INTRAMUSCULAR; INTRAVENOUS EVERY 8 HOURS PRN
Status: DISCONTINUED | OUTPATIENT
Start: 2018-01-07 | End: 2018-01-10 | Stop reason: HOSPADM

## 2018-01-07 RX ADMIN — CARVEDILOL 3.12 MG: 3.12 TABLET, FILM COATED ORAL at 08:01

## 2018-01-07 RX ADMIN — ASPIRIN 325 MG ORAL TABLET 325 MG: 325 PILL ORAL at 08:01

## 2018-01-07 RX ADMIN — POLYETHYLENE GLYCOL 3350 17 G: 17 POWDER, FOR SOLUTION ORAL at 09:01

## 2018-01-07 RX ADMIN — STANDARDIZED SENNA CONCENTRATE AND DOCUSATE SODIUM 1 TABLET: 8.6; 5 TABLET, FILM COATED ORAL at 09:01

## 2018-01-07 RX ADMIN — TAMSULOSIN HYDROCHLORIDE 0.4 MG: 0.4 CAPSULE ORAL at 09:01

## 2018-01-07 RX ADMIN — HEPARIN SODIUM 5000 UNITS: 5000 INJECTION, SOLUTION INTRAVENOUS; SUBCUTANEOUS at 09:01

## 2018-01-07 RX ADMIN — FINASTERIDE 5 MG: 5 TABLET, FILM COATED ORAL at 09:01

## 2018-01-07 RX ADMIN — DONEPEZIL HYDROCHLORIDE 10 MG: 5 TABLET, FILM COATED ORAL at 09:01

## 2018-01-07 RX ADMIN — MIRTAZAPINE 7.5 MG: 7.5 TABLET ORAL at 09:01

## 2018-01-07 RX ADMIN — CARVEDILOL 3.12 MG: 3.12 TABLET, FILM COATED ORAL at 06:01

## 2018-01-07 RX ADMIN — LEVOTHYROXINE SODIUM 100 MCG: 100 TABLET ORAL at 09:01

## 2018-01-07 RX ADMIN — SODIUM POLYSTYRENE SULFONATE 15 G: 15 SUSPENSION ORAL; RECTAL at 04:01

## 2018-01-07 RX ADMIN — VITAM B12 100 MCG: 100 TAB at 09:01

## 2018-01-07 RX ADMIN — SODIUM CHLORIDE: 0.9 INJECTION, SOLUTION INTRAVENOUS at 03:01

## 2018-01-07 RX ADMIN — HYDRALAZINE HYDROCHLORIDE 10 MG: 20 INJECTION INTRAMUSCULAR; INTRAVENOUS at 06:01

## 2018-01-07 RX ADMIN — ATORVASTATIN CALCIUM 20 MG: 20 TABLET, FILM COATED ORAL at 09:01

## 2018-01-07 NOTE — ED NOTES
Report received from DINA Joy. Pt resting in bed, aaox4. Vss. rr even and unlabored on ra. Nadn. Pt denies sob/cp at this time. Pt connected to monitor. Daughters at bedside. Call light within reach. Will continue to monitor.

## 2018-01-07 NOTE — PLAN OF CARE
Problem: Patient Care Overview  Goal: Plan of Care Review  Outcome: Ongoing (interventions implemented as appropriate)  Care plan reviewed with patient.  Patient has history of Alzheimer, forget at times with confusion on and off.  Patient on continuous tele monitoring, Afib with pace rhythm.  Patient on room air.  Sacral nonblanchable redness, foam protective dressing applied.  Encourage every 2 hours turning.  Call lights within reach, bed alarm on, bed in lowest setting.  Staff will continue to monitor.

## 2018-01-07 NOTE — PLAN OF CARE
Problem: SLP Goal  Goal: SLP Goal  Outcome: Ongoing (interventions implemented as appropriate)    Bedside swallow study completed this afternoon. Pt demonstrated s/s of aspiration to thin. Recommend: 1) dental soft/finely chopped meat; 2) nectar thickened liquids; 3) crush oral meds and present in pudding/applesauce; 4) MBSS on 1/8/18 to r/o aspiration.    GOALS    1) Pt will utilize compensatory strategies for safe swallow for PO intake of dental soft/finely chopped/nectar thick without demonstrating overt s/s of aspiration at 2/2 observed meals with mod assist.    2) MBSS to rule out aspiration of thin.    3) Further ST goals pending MBSS results.

## 2018-01-07 NOTE — CONSULTS
Cardiology Consult [facility]        89 year old male with a history of Afib s/p cardiac pacemaker, hypertension with failure to thrive, complaints of generalized weakness and inability to complete ADL's. He denies chest pain, palpitations, shortness of breath, new orthopnea/PND or visible lower extremity edema. He states he has had mechanical falls but no syncopal episodes.      On arrival he was hypertension with SBP of 173 mm Hg but otherwise afebrile with demand pacing on EKG. Incidental troponin elevation seen, at his baseline levels.       Family is requesting evaluation for SNF placement. Cardiology was consulted for abnormal EKG.       Of note he has had several echos over the past year results from the latest on 12/30:     EF 50-60%, Mild AS,AR and MR,  grade II diastolic dysfunction, mildly elevated PA pressure.     Social: No EtoH or tobacco use, remote history   PSH: as listed in chart   Fam history: Unable to elicit from patient.       ROS: Complete 14 system review of systems is negative except as mentioned above.       Physical Exam  Vitals:    01/07/18 0716   BP: (!) 166/79   Pulse: 62   Resp:    Temp:         General: Alert and awake in no apparent distress.  Oriented to person and year and is aware that [facility]   HEENT: Atraumatic, normocephalic. EOMI, no scleral icterus, conjunctiva clear. Oral cavity is dry. No oral or pharyngeal lesions noted.   Cardiac: Device in L chest, no fluctuance or erythema overlying pocket. Regular rhythm, with rate in the 60s. Early peaking 3/6 systolic murmur with preserved S2.  Pulmonary: Lungs are clear to auscultation bilaterally. No wheeze, rales or rhonchi noted.   Abdomen: Bowel sounds present. Soft, non-tender to palpation. No rigidity or guarding.   Extremities: No pedal edema. No cyanosis. Capillary refill less than 2 seconds in fingertips.   Skin: No rashes noted. Sacral ulcer well healed without erythema or drainage per nursing report, no stasis  dermatitis     EKG ventricular paced rhythm at a rate of 60, unchanged from prior EKGs.     A/P      89 year old gentleman with afib s/p pacemaker presenting with progressive dementia and failure to thrive, pending evaluation for appropriate placement.   - No clinical correlates of active ischemia, heart failure or sustained arrhythmias. Pacemaker function is in tact.   - Would not repeat troponin and does not require further echos for medical management.   - Unclear indication for Asa 325 mg, can continue with 81 mg for CAD as needed.     Will be available PRN.   Thank you for this consult.     Velia Grant MD   [facility]iology - PGY VI

## 2018-01-07 NOTE — ED NOTES
Pt urinated in diaper. Pt cleaned and changed. Pt with slight redness to his bottom, no breakdown noted.

## 2018-01-07 NOTE — PT/OT/SLP EVAL
Physical Therapy Evaluation    Patient Name:  Zeyad Woodard   MRN:  [id number]    Recommendations:     Discharge Recommendations:  nursing facility, skilled   Discharge Equipment Recommendations: bedside commode, wheelchair   Barriers to discharge: Decreased caregiver support    Assessment:     Zeyad Woodard is a 89 y.o. male admitted with a medical diagnosis of ELIZABET  He presents with the following impairments/functional limitations:  weakness, impaired endurance, gait instability, impaired functional mobilty, impaired self care skills, impaired balance, impaired cognition, decreased lower extremity function, decreased upper extremity function, decreased coordination, decreased safety awareness, pain, impaired skin .  Recommend continued skilled therapy services at this time and SNF upon discharge from hospital to address pt's decline and deficits with endurance, balance, high fall risk and safety w functional mobility and ADL.  Recommend 24 hr supervision/assistance at this time.    Rehab Prognosis:  Excellent; patient would benefit from acute skilled PT services to address these deficits and reach maximum level of function.      Recent Surgery: * No surgery found *      Plan:     During this hospitalization, patient to be seen 5 x/week to address the above listed problems via gait training, therapeutic activities, therapeutic exercises  · Plan of Care Expires:  02/07/18   Plan of Care Reviewed with: patient, caregiver, daughter    Subjective     Attempted to see pt on 2 earlier occassions this AM but pt was unavailable w [facility] care and then eating. Upon 3rd attempt was able to perform eval and treatment.    Communicated with [facility] prior to session.  Patient found supine upon PT entry to room, agreeable to evaluation.      Chief Complaint:  Reports no pain or dizziness initially,  Upon further questioning reports general R hip pain but no c/o during mobility; during gait after toileting reported dizziness when  "questioned but later stated none - inconsistent w info at times  Patient comments/goals: PLOF per pt and family  Pain/Comfort:  · Pain Rating 1:  (reports pain in R hip region - unable to describe or provide numeric value)  · Pain Addressed 1: Reposition    Patients cultural, spiritual, Orthodox conflicts given the current situation: none reported    Living Environment:  Living Environment: Pt lives alone in [facility], ramp to enter. Dghtrs have recently been providing 24/7 care and assist   Previous level of function: was d/c from SNF in Dec at Mod I level; dghtrs report recently rapid decline since home and pt has required assist all skills 2/2 falls   Equipment Owned:  walker, rolling, shower chair, AFOs for B feet   Assistance upon Discharge: limited by family; dghtr from out of town who has been providing 24/7 care will soon be leaving and other dghtr works daily         Objective:     Patient found with: telemetry     General Precautions: Standard, fall, aspiration, pacemaker, +MRSA  Orthopedic Precautions:N/A   Braces: N/A     Exams:  · Cognitive:  A&O x person and partial place and situation;  Pt follows single step commands; Safety awareness deficits -  Conversational but many answers to questions and remarks are incorrect per 2 daughters present and providing accurate hx and prior status info  · Sensory:  Unable to accurately assess due to cog deficits  · Skin:  Bruising and fragile skin noted extremities and blanchable erythema on sacral region and mepilex applied w nsg informed  · BUE ROM and Strength:  WFL   See OT eval for details  · BLE AROM :  WFL   Decreased flexibility       B DF to neutral only w PROM  · RLE Strength:  Grossly 4-/5      LLE Strength:  Grossly 4-/5      BDF Poor -  B foot drop hx per daughters' report.  State pt has B AFO at home which he wears when going someplace nice but not while in home during ambulation - then wears slippers which he "shuffles" as he walks per their " description    Functional Mobility:  · Bed mobility:  Rolling min/CGA    Seated scoot: SBA and VC      · Txfs sup>sit min assist       Sit>sup w CGA      Sit<>stand min assist and VC technique  Toilet<>stand mod assist and req'd assist w self care/cleaning  · Static sit balance Good-/Fair+       Dynamic sit Fair- w post lean w decreased self awareness for correction until cued    Static and Dynamic bal Poor+ req AD and assist  · Gait:  Pt amb w RW ~15' with CGA and ~ 15' w RW w CGA but then req'd mod assist for LOB correction during change in direction w lack of insight/awareness of LOB demo'd by pt, pt w flexed posture at trunk, hips and knees, B foot drop w compensation to clear surface and toe drag and/or toe strike then foot flat w each step, decreased roberto and step length, fatigue demo'd after activity w pt quickly closing eyes to sleep once return to bed    AM-PAC 6 CLICK MOBILITY  Total Score:16       Therapeutic Activities and Exercises:   bed mobility training, txf training, sit EOB bal and endurance work, HHA static stance bal and endurance for pt care needs, toileting/ADL training needs met, gait training, safety ed for mobility and importance of AFO use (along w monitoring for skin pressure points and breakdown) during gait for improved safety and fall prevention provided to pt and daughters/caregivers,     Patient left right sidelying with all lines intact, call button in reach, bed alarm on, nsg notified and family present.    GOALS:    Physical Therapy Goals        Problem: Physical Therapy Goal    Goal Priority Disciplines Outcome Goal Variances Interventions   Physical Therapy Goal     PT/OT, PT Ongoing (interventions implemented as appropriate)     Description:  Goals to be met by: 18     Patient will increase functional independence with mobility by performin. Supine to sit with Modified [place]  2. Sit to supine with Modified [place]  3. Sit to stand transfer with  Stand-by Assistance  4. Gait  x 100 feet with Contact Guard Assistance using Rolling Walker.   5. Lower extremity exercise program x15 reps                     History:     Past Medical History:   Diagnosis Date    Anemia     Atrial fibrillation     BPH (benign prostatic hypertrophy)     Cancer     Cardiac pacemaker in situ 7/2/2015    CHF (congestive heart failure)     Coronary artery disease     Encounter for blood transfusion     GI bleed     cecum angiectasia s/p cautery    Hypertension     Hypothyroidism     Polyneuropathy     Posture imbalance     due to neuropathy    Right posterior capsular opacification 1/18/2017    SSS (sick sinus syndrome) 2015    s/p pacemaker       Past Surgical History:   Procedure Laterality Date    CARDIAC SURGERY      CATARACT EXTRACTION W/  INTRAOCULAR LENS IMPLANT Right 05/17/2010        CATARACT EXTRACTION W/  INTRAOCULAR LENS IMPLANT Left 02/16/2004        cataract surgery      COLONOSCOPY  6/30/04    COLONOSCOPY N/A 2/15/2016    Procedure: COLONOSCOPY;  Surgeon: Stanton Kirk MD;  Location: [address];  Service: Endoscopy;  Laterality: N/A;    EYE SURGERY      HERNIA REPAIR      inguinal hernia repair      Open heart surgery for pericardiectomy      for calcific constrictive pericarditis    UMBILICAL HERNIA REPAIR      Yag      Left Eye       Clinical Decision Making:     History  Co-morbidities and personal factors that may impact the plan of care Examination  Body Structures and Functions, activity limitations and participation restrictions that may impact the plan of care Clinical Presentation   Decision Making/ Complexity Score   Co-morbidities:   [] Time since onset of injury / illness / exacerbation  [x] Status of current condition  [x]Patient's cognitive status and safety concerns    [x] Multiple Medical Problems (see med hx)  Personal Factors:   [x] Patient's age  [] Prior Level of function   [x] Patient's home  situation (environment and family support)  [] Patient's level of motivation  [] Expected progression of patient      HISTORY:(criteria)    [] 85401 - no personal factors/history    [] 28917 - has 1-2 personal factor/comorbidity     [x] 58883 - has >3 personal factor/comorbidity     Body Regions:  [] Objective examination findings  [] Head     []  Neck  [] Trunk   [] Upper Extremity  [x] Lower Extremity    Body Systems:  [x] For communication ability, affect, cognition, language, and learning style: the assessment of the ability to make needs known, consciousness, orientation (person, place, and time), expected emotional /behavioral responses, and learning preferences (eg, learning barriers, education  needs)  [x] For the neuromuscular system: a general assessment of gross coordinated movement (eg, balance, gait, locomotion, transfers, and transitions) and motor function  (motor control and motor learning)  [x] For the musculoskeletal system: the assessment of gross symmetry, gross range of motion, gross strength, height, and weight  [x] For the integumentary system: the assessment of pliability(texture), presence of scar formation, skin color, and skin integrity  [] For cardiovascular/pulmonary system: the assessment of heart rate, respiratory rate, blood pressure, and edema     Activity limitations:    [x] Patient's cognitive status and saf ety concerns          [] Status of current condition      [] Weight bearing restriction  [] Cardiopulmunary Restriction    Participation Restrictions:   [] Goals and goal agreement with the patient     [] Rehab potential (prognosis) and probable outcome      Examination of Body System: (criteria)    [] 62131 - addressing 1-2 elements    [] 79994 - addressing a total of 3 or more elements     [x] 00885 -  Addressing a total of 4 or more elements         Clinical Presentation: (criteria)  Stable - 99021     On examination of body system using standardized tests and measures  patient presents with 4 or more elements from any of the following: body structures and functions, activity limitations, and/or participation restrictions.  Leading to a clinical presentation that is considered stable and/or uncomplicated                              Clinical Decision Making  (Eval Complexity):  Low- 83053     Time Tracking:     PT Received On: 01/07/18  PT Start Time: 1329     PT Stop Time: 1408  PT Total Time (min): 39 min     Billable Minutes: Evaluation 15 and Gait Training 14  (+ co w OT)      Ana Lilia Gallagher, PT  01/07/2018

## 2018-01-07 NOTE — H&P
[facility] Internal Medicine History and Physical - Resident Note    Admitting Team: [facility] Internal Medicine Team B  Attending Physician: Dr. Xiong  Resident: Dr. Pang  Interns: Dr. Hanna    Date of Admit: 1/6/2018    Chief Complaint     Weakness (with tremors since this am.)   for one day.      Subjective:      History of Present Illness:  Zeyad Woodard is a 89 y.o. male who  has a past medical history of Anemia; Atrial fibrillation; BPH (benign prostatic hypertrophy); Cancer; Cardiac pacemaker in situ (7/2/2015); CHF (congestive heart failure); Coronary artery disease; Encounter for blood transfusion; GI bleed; Hypertension; Hypothyroidism; Polyneuropathy; Posture imbalance; Right posterior capsular opacification (1/18/2017); and SSS (sick sinus syndrome) (2015).. The patient presented to [facility] on 1/6/2018 with a primary complaint of Weakness (with tremors since this am.)    History was provided mostly by his two daughters, who are his primary care givers and assist with ADLs.  Per them, he has baseline dementia, but has had a fairly precipitous decline in mental status since November. He was previously living alone with independent ADLs.  In mid November was diagnosed with a sacral ulcer that required IV abx and 1 month at SNF facility.  He was discharged in mid December with continued home health care.  He had a prior admission on 12/29 for a syncopal episode.  He has had several falls as well since.  The family has been working on re-placement in a SNF as they feel they can no longer care for him.      Today, the patient refused to eat breakfast, and then was witnessed to have shaking hand tremors, which is new for him; he has no known hx of seizures. He has been eating and drinking less than normal for last two days. He uses a walker for ambulation, but could not get up or walk today, which is also new.  The home health nurse came to the house, and based on her evaluation said that  patient needed to go to the hospital via ambulance.  Daughters deny any foul smelling urine or incontinence; last BM was 2 days ago and he normally has a daily BM. Daughters also say patient appears more flushed than normal today. States he refused his medications yesterday, this is also new for him.    Patient denies any new pain other than his chronic back, left leg and left hip pain.  He also denies any unilateral weakness or facial droop.  He states he just feels weak and not himself, but has been feeling that way for awhile.  Denies chest pain, SOB, abdominal pain.     Past Medical History:  Past Medical History:   Diagnosis Date    Anemia     Atrial fibrillation     BPH (benign prostatic hypertrophy)     Cancer     Cardiac pacemaker in situ 7/2/2015    CHF (congestive heart failure)     Coronary artery disease     Encounter for blood transfusion     GI bleed     cecum angiectasia s/p cautery    Hypertension     Hypothyroidism     Polyneuropathy     Posture imbalance     due to neuropathy    Right posterior capsular opacification 1/18/2017    SSS (sick sinus syndrome) 2015    s/p pacemaker       Past Surgical History:  Past Surgical History:   Procedure Laterality Date    CARDIAC SURGERY      CATARACT EXTRACTION W/  INTRAOCULAR LENS IMPLANT Right 05/17/2010        CATARACT EXTRACTION W/  INTRAOCULAR LENS IMPLANT Left 02/16/2004        cataract surgery      COLONOSCOPY  6/30/04    COLONOSCOPY N/A 2/15/2016    Procedure: COLONOSCOPY;  Surgeon: Stanton Kirk MD;  Location: [facility] ([address] [place] [address]);  Service: Endoscopy;  Laterality: N/A;    EYE SURGERY      HERNIA REPAIR      inguinal hernia repair      Open heart surgery for pericardiectomy      for calcific constrictive pericarditis    UMBILICAL HERNIA REPAIR      Yag      Left Eye       Allergies:  Review of patient's allergies indicates:   Allergen Reactions    Penicillins Hives and Other (See Comments)     Fever        Home Medications:  Prior to Admission medications    Medication Sig Start Date End Date Taking? Authorizing Provider   acetaminophen (TYLENOL) 325 MG tablet Take 2 tablets (650 mg total) by mouth every 6 (six) hours as needed. 12/11/17   Nimo Gonzalez NP   ASCORBATE CALCIUM (VITAMIN C ORAL) Take 1 tablet by mouth once daily.  7/3/12   Historical Provider, MD   aspirin (ECOTRIN) 81 MG EC tablet Take 1 tablet (81 mg total) by mouth once daily. 12/11/17 12/11/18  Nimo Gonzalez NP   atorvastatin (LIPITOR) 20 MG tablet Take 1 tablet (20 mg total) by mouth nightly. 12/14/17 12/14/18  Booker Ricci MD   calcium 500 mg Tab Take 1 tablet by mouth Daily. 7/3/12   Historical Provider, MD   carvedilol (COREG) 3.125 MG tablet Take 1 tablet (3.125 mg total) by mouth 2 (two) times daily with meals. 1/5/18 1/5/19  Booker Ricci MD   clotrimazole-betamethasone (LOTRISONE) lotion Apply topically 2 (two) times daily.  Patient taking differently: Apply topically as needed.  5/28/15   Booker Ricci MD   cyanocobalamin (VITAMIN B-12) 1000 MCG tablet Take 100 mcg by mouth once daily.    Historical Provider, MD   donepezil (ARICEPT) 10 MG tablet Take 1 tablet (10 mg total) by mouth every evening. 11/2/17 11/2/18  Chele Juárez MD   ferrous sulfate 325 mg (65 mg iron) Tab tablet Take 325 mg by mouth once daily.    Historical Provider, MD   finasteride (PROSCAR) 5 mg tablet Take 1 tablet (5 mg total) by mouth once daily. 7/27/17 7/27/18  Booker Ricci MD   gabapentin (NEURONTIN) 100 MG capsule Take 100mg in Morning and take 300mg nightly 12/14/17   Booker Ricci MD   levothyroxine (SYNTHROID) 100 MCG tablet Take 1 tablet (100 mcg total) by mouth once daily. 8/7/17   Booker Ricci MD   mirtazapine (REMERON) 7.5 MG Tab Take 1 tablet (7.5 mg total) by mouth every evening. 1/5/18 1/5/19  Booker Ricci MD   MULTIVITS-MINERALS/FA/LYCOPENE (ONE-A-DAY MEN'S  MULTIVITAMIN ORAL) Take 1 tablet by mouth once daily.    Historical Provider, MD   neomycin-bacitracin-polymyxin (NEOSPORIN) ointment Apply topically Daily. 17   Nimo Gonzalez NP   oxyCODONE-acetaminophen (PERCOCET) 7.5-325 mg per tablet Take 1 tablet by mouth every 8 (eight) hours as needed for Pain. 17   Nimo Gonzalez NP   polyethylene glycol (GLYCOLAX) 17 gram/dose powder Take 17 g by mouth once daily.    Historical Provider, MD   senna-docusate 8.6-50 mg (PERICOLACE) 8.6-50 mg per tablet Take 1 tablet by mouth 2 (two) times daily. 16   CINDY Reeves   tamsulosin (FLOMAX) 0.4 mg Cp24 Take 1 capsule (0.4 mg total) by mouth once daily. 17   Booker Ricci MD       Family History:  Family History   Problem Relation Age of Onset    Stroke Mother          Cancer Father      lung;     Diabetes Sister     Coronary artery disease Sister     Peripheral vascular disease Sister     Amblyopia Neg Hx     Blindness Neg Hx     Cataracts Neg Hx     Glaucoma Neg Hx     Hypertension Neg Hx     Macular degeneration Neg Hx     Retinal detachment Neg Hx     Strabismus Neg Hx     Thyroid disease Neg Hx     Prostate cancer Neg Hx     Kidney disease Neg Hx        Social History:  Social History   Substance Use Topics    Smoking status: Passive Smoke Exposure - Never Smoker    Smokeless tobacco: Never Used    Alcohol use No       Review of Systems:  Pertinent positives and negatives listed in HPI. All other systems are reviewed and are negative.    Health Maintaince :   Primary Care Physician: Anupam Pineda  Immunizations:   TDap is up to date.  Influenza is not up to date, 16  Pneumovax is up to date.   Pneumococcal Conjugate - 13 Valent 2016    Pneumococcal Polysaccharide - 23 Valent 2014       Cancer Screening:    Colonoscopy: is up to date. Last was 2 years ago after GI bleed.     Objective:   Last 24 Hour Vital Signs:  BP  Min:  "134/68  Max: 173/67  Temp  Av.6 °F (37 °C)  Min: 98.6 °F (37 °C)  Max: 98.6 °F (37 °C)  Pulse  Av  Min: 63  Max: 74  Resp  Av.7  Min: 16  Max: 21  SpO2  Av.5 %  Min: 97 %  Max: 100 %  Height  Av' 10" (177.8 cm)  Min: 5' 10" (177.8 cm)  Max: 5' 10" (177.8 cm)  Weight  Av.8 kg (167 lb)  Min: 75.8 kg (167 lb)  Max: 75.8 kg (167 lb)  Body mass index is 23.96 kg/m².  I/O last 3 completed shifts:  In: -   Out: 150 [Urine:150]    Physical Examination:  General: Alert and awake in no apparent distress.  Oriented to person and year only (not place or president)  HEENT: Atraumatic, normocephalic. EOMI, no scleral icterus, conjunctiva clear. Oral cavity is dry. No oral or pharyngeal lesions noted.   Cardiac: Device in L chest. Regular rhythm, with rate in the 60s. Systolic murmur. Radial pulses synchronous and symmetric bilaterally.   Pulmonary: Lungs are clear to auscultation bilaterally. No wheeze, rales or rhonchi noted. No clubbing.   Abdomen: Bowel sounds present. Soft, non-tender to palpation. No rigidity or guarding.   Extremities: No pedal edema. No cyanosis. Capillary refill less than 2 seconds in fingertips.   Skin: No rashes noted. Sacral ulcer well healed without erythema or drainage.   Neurologic: CN ,3,4,5,6,7, 9,10,11,12 intact and non-focal. Strength to arm flexion/extension, hip flexion, 5/5; knee flexion/extension, dorsiflexion/plantarflexion 4/5 and symmetric. Sensation to light touch over arms intact; decreased sensation to bottom of feet bilaterally. Patient can carry on normal conversation.      Laboratory:  Most Recent Data:  CBC:   Lab Results   Component Value Date    WBC 10.36 2018    HGB 9.5 (L) 2018    HCT 28.7 (L) 2018     (L) 2018    MCV 92 2018    RDW 13.7 2018     BMP:   Lab Results   Component Value Date     (L) 2018    K 5.4 (H) 2018     2018    CO2 27 2018    BUN 46 (H) 2018    " CREATININE 1.7 (H) 01/06/2018     (H) 01/06/2018    CALCIUM 9.5 01/06/2018    MG 2.0 12/11/2017    PHOS 3.6 12/11/2017     LFTs:   Lab Results   Component Value Date    PROT 6.7 01/06/2018    ALBUMIN 3.4 (L) 01/06/2018    BILITOT 0.7 01/06/2018    AST 30 01/06/2018    ALKPHOS 93 01/06/2018    ALT 21 01/06/2018     Coags:   Lab Results   Component Value Date    INR 1.1 11/07/2017     FLP:   Lab Results   Component Value Date    CHOL 118 (L) 08/18/2015    HDL 38 (L) 08/18/2015    LDLCALC 67.2 08/18/2015    TRIG 64 08/18/2015    CHOLHDL 32.2 08/18/2015     DM:   Lab Results   Component Value Date    HGBA1C 5.3 12/29/2017    HGBA1C 5.6 11/10/2017    HGBA1C 5.7 (H) 11/07/2017    LDLCALC 67.2 08/18/2015    CREATININE 1.7 (H) 01/06/2018     Thyroid:   Lab Results   Component Value Date    TSH 0.600 11/07/2017    FREET4 1.10 07/03/2016    W1DRUDG 6.6 12/10/2012     Anemia:   Lab Results   Component Value Date    IRON 55 10/03/2017    TIBC 321 10/03/2017    FERRITIN 144 10/03/2017    OSWXRHNZ22 1340 (H) 05/12/2017    FOLATE 17.7 05/12/2017     Cardiac:   Lab Results   Component Value Date    TROPONINI 0.037 (H) 01/06/2018     (H) 12/26/2017     Urinalysis:   Lab Results   Component Value Date    LABURIN No growth 07/12/2017    COLORU Yellow 01/06/2018    SPECGRAV 1.010 01/06/2018    NITRITE Negative 01/06/2018    KETONESU Negative 01/06/2018    UROBILINOGEN Negative 01/06/2018    WBCUA 0 01/06/2018       Trended Cardiac Data:    Recent Labs  Lab 01/06/18  1417   TROPONINI 0.037*     Lactate: 1.2  Lipase: 40  Tox: neg  Ammonia: 16    Microbiology Data: none    Other Results:  EKG (my interpretation):     Radiology:  Imaging Results          X-Ray Chest AP Portable (Final result)  Result time 01/06/18 14:25:40    Final result by Porter Santillan MD (01/06/18 14:25:40)                 Impression:        No detrimental change or radiographic acute intrathoracic process seen on this single view.      Electronically  signed by: EDILSON ANDRE MD, MD  Date:     01/06/18  Time:    14:25              Narrative:    COMPARISON: Chest radiograph 12/29/17    FINDINGS: AP portable view of the chest. Monitoring leads overlie the chest. No detrimental change. Cardiomediastinal silhouette is stable without evidence of failure. Left upper chest single lead cardiac device is unchanged. The lungs are symmetrically well inflated without large consolidation or new focal opacity. Grossly stable pericardial calcifications and bilateral pulmonary calcified granulomas. No large pneumothorax. Chronic blunting left costophrenic angle unchanged. No acute osseous process seen. Sternotomy wires again noted.                             CT Head Without Contrast (Final result)  Result time 01/06/18 13:55:43    Final result by Radha Malagon MD (01/06/18 13:55:43)                 Impression:     Findings suggestive of mild chronic progressive ischemic white matter change.  No acute findings on this exam.      Electronically signed by: RADHA MALAGON MD  Date:     01/06/18  Time:    13:55              Narrative:    Comparison: 12/29/17.    Technique: Contiguous 5 mm axial images were obtained from the vertex to the skull base without the administration of contrast.  Sagittal and coronal reformats were also submitted.      Findings: Global cerebral volume loss is present.  There is compensatory ex axial dilation of the lateral ventricles associated with this finding.  A mild degree of stable, predominantly periventricular hypodense foci are present.  There is a stable extra-axial collection centered in the vermis of the cerebellum which likely represents an arachnoid cyst.  Otherwise, there is normal brain parenchymal attenuation with no intra-axial or extra-axial mass or hemorrhage.  There is no evidence for acute ischemia or infarct.  The gray-white matter differentiation is preserved.  The CSF containing spaces maintained normal symmetry.  The  pneumatized aspect of the skull and skull base show no abnormalities.  The osseous and soft tissue structures are normal.                                 Assessment:     Zeyad Woodard is a 89 y.o. male with:  Patient Active Problem List    Diagnosis Date Noted    ELIZABET (acute kidney injury) 01/06/2018    Late onset Alzheimer's disease with behavioral disturbance     S/P placement of cardiac pacemaker     Syncope 12/29/2017    Takes dietary supplements 11/16/2017    Cellulitis and abscess of buttock     Pericardial calcification 11/12/2017    Mitral regurgitation 11/12/2017    MRSA bacteremia 11/10/2017    Debility 11/08/2017    Cellulitis of buttock 11/07/2017    HLD (hyperlipidemia) 11/07/2017    Abnormal blood smear 10/05/2017    Chronic pain 10/03/2017    Chronic bilateral low back pain with bilateral sciatica 09/19/2017    Use of opiates for therapeutic purposes 09/19/2017    Dementia without behavioral disturbance 06/05/2017    Post-operative state 02/01/2017    Pseudophakia 01/18/2017    Essential hypertension 01/18/2017    Right posterior capsular opacification 01/18/2017    Refractive error 01/18/2017    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    Hypothyroidism due to acquired atrophy of thyroid 10/02/2016    Right hip pain 10/01/2016    Contusion of right hip 10/01/2016    Elevated troponin 09/30/2016    Anemia of chronic renal failure, stage 3 (moderate) 09/01/2016    Chronic constipation 07/06/2016    Thrombocytopenia 07/03/2016    Venous insufficiency of both lower extremities 10/26/2015    Frequent falls 08/28/2015    Opioid dependence with intoxication with complication 08/28/2015    Cardiac pacemaker in situ 07/02/2015    Sick sinus syndrome 06/12/2015    Bradycardia 06/12/2015    Chronic back pain 09/12/2014    Lumbar radiculopathy 09/12/2014    CKD (chronic kidney disease), stage III 10/16/2013    Iron deficiency anemia due to chronic blood loss 04/12/2013     Orthostatic hypotension 04/08/2013    Hypothyroidism     Polyneuropathy 11/12/2012    Chronic diastolic congestive heart failure 08/23/2012    Renovascular hypertension     Chronic atrial fibrillation     Benign prostatic hyperplasia         Plan:     ELIZABET on CKD:   -Cr 1.7 (increased from 1.3 on 12/30/17)  -1L NS bolus given in ED with improvement in BP  -IVF  -repeat CMP in AM    AMS vs dementia:  -acute on chronic change likely 2/2 dehydration and deconditioning  -CT head neg for acute process, CXR WNL  -afebrile, no leukocytosis  -UA without infection  -no visible sores or skin breakdown  -will check TSH, B12, folate, mag, phos, iron panel    Elevated Troponin:  -0.037  -patient is not complaining of chest pain currently  -will trend q6 hours with repeat EKG    Deconditioning:  -PT/OT evaluation and treat  -Social work consult to assist family with SNF placement      Code Status:     DNR    Jyotsna Hanna  [facility] Internal Medicine HO-I  [facility] Internal Medicine Service    [facility] Medicine Hospitalist Pager numbers:   [facility] Hospitalist Medicine Team A (Kinsey/Yumiko): [phone]  [facility] Hospitalist Medicine Team B (Tyrese/Bibi):  [phone]

## 2018-01-07 NOTE — PLAN OF CARE
Problem: Patient Care Overview  Goal: Plan of Care Review  Outcome: Ongoing (interventions implemented as appropriate)    No distress noted. Will continue to monitor

## 2018-01-07 NOTE — PLAN OF CARE
Problem: Physical Therapy Goal  Goal: Physical Therapy Goal  Goals to be met by: 18     Patient will increase functional independence with mobility by performin. Supine to sit with Modified [place]  2. Sit to supine with Modified [place]  3. Sit to stand transfer with Stand-by Assistance  4. Gait  x 100 feet with Contact Guard Assistance using Rolling Walker.   5. Lower extremity exercise program x15 reps   Outcome: Ongoing (interventions implemented as appropriate)  Recommend continued skilled therapy services at this time and SNF upon discharge from hospital to address pt's decline and deficits with endurance, balance, high fall risk and safety w functional mobility and ADL.  Recommend 24 hr supervision/assistance at this time.

## 2018-01-07 NOTE — PT/OT/SLP EVAL
Occupational Therapy   Evaluation    Name: Zeyad Woodard  MRN: [id number]  Admitting Diagnosis:  <principal problem not specified>      Recommendations:     Discharge Recommendations: nursing facility, skilled  Discharge Equipment Recommendations:  bedside commode, wheelchair  Barriers to discharge:  Decreased caregiver support    History:     Occupational Profile:  Living Environment: Pt lives alone in [facility], ramp to enter. Dghtrs have recently been providing 24/7 care and assist   Previous level of function: was d/c from SNF in Dec at Mod I level; dghtrs report recently rapid decline since home and pt has required assist all skills 2/2 falls   Equipment Owned:  walker, rolling, shower chair  Assistance upon Discharge: limited by family; dghtr from out of town who has been providing 24/7 care will soon be leaving and other dghtr works daily    Past Medical History:   Diagnosis Date    Anemia     Atrial fibrillation     BPH (benign prostatic hypertrophy)     Cancer     Cardiac pacemaker in situ 7/2/2015    CHF (congestive heart failure)     Coronary artery disease     Encounter for blood transfusion     GI bleed     cecum angiectasia s/p cautery    Hypertension     Hypothyroidism     Polyneuropathy     Posture imbalance     due to neuropathy    Right posterior capsular opacification 1/18/2017    SSS (sick sinus syndrome) 2015    s/p pacemaker       Past Surgical History:   Procedure Laterality Date    CARDIAC SURGERY      CATARACT EXTRACTION W/  INTRAOCULAR LENS IMPLANT Right 05/17/2010        CATARACT EXTRACTION W/  INTRAOCULAR LENS IMPLANT Left 02/16/2004        cataract surgery      COLONOSCOPY  6/30/04    COLONOSCOPY N/A 2/15/2016    Procedure: COLONOSCOPY;  Surgeon: Stanton Kirk MD;  Location: [address];  Service: Endoscopy;  Laterality: N/A;    EYE SURGERY      HERNIA REPAIR      inguinal hernia repair      Open heart surgery for pericardiectomy      for  calcific constrictive pericarditis    UMBILICAL HERNIA REPAIR      Yag      Left Eye       Subjective     Chief Complaint: falls, rapid decline in function   Patient/Family stated goals: increase functional status   Communicated with: nsg prior to session.  Pain/Comfort:  · Pain Rating 1:  (reports pain in r hip; does not rate )    Objective:     Patient found with:      General Precautions: Standard, fall, aspiration   Orthopedic Precautions:N/A   Braces:       Occupational Performance:    Bed Mobility:    · Patient completed Scooting/Bridging with contact guard assistance  · Patient completed Supine to Sit with minimum assistance  · Patient completed Sit to Supine with contact guard assistance    Functional Mobility/Transfers:  · Patient completed Sit <> Stand Transfer with minimum assistance  with  rolling walker   · Patient completed Toilet Transfer Step Transfer technique with moderate assistance with  rolling walker    Activities of Daily Living:  · UB Dressing: minimum assistance    · LB Dressing: total assistance    · Toileting: maximal assistance      Cognitive/Visual Perceptual:  Cognitive/Psychosocial Skills:     -       Oriented to: Person and Place   -       Follows Commands/attention:Follows multistep  commands  -       Communication: clear/fluent  -       Memory: Impaired LTM  -       Safety awareness/insight to disability: impaired   -       Mood/Affect/Coping skills/emotional control: Appropriate to situation    Physical Exam:  Balance:  CGA/SBA siting balance EOB; Michael/CGA standing balance; LOB with max A to correct self during ambulation   Postural examination/scapula alignment:    -       Rounded shoulders  -       Forward head  -       Kyphosis  Skin integrity: blanchable reddness sacrum; mepilex applied and nursing notified to check placement   Dominant hand:    -       right  Upper Extremity Range of Motion:  Limited at shoulders based on function   Upper Extremity Strength:  grossly 4-/5  "based on function    Strength:  good   Fine Motor Coordination:    -       Intact    Patient left supine with all lines intact, call button in reach, bed alarm on and nsg notified and dghtrs present     [facility] 6 Click:  [facility] Total Score: 15    Treatment & Education:  Increased time spent educating dghtrs on home safety, impt of wearing AFOs, recommendations of placement, causes/signs of increased falls at home; performed axs as listed above; functional mobility in room and hallway for participation in ADLs; mepilex applied to sacrum 2/2 reddness with nursing notified to check placement and area   Education:    Assessment:     Zeyad Woodard is a 89 y.o. male with a medical diagnosis of <principal problem not specified>.  He presents with deconditioning.  Performance deficits affecting function are weakness, gait instability, impaired balance, impaired endurance, impaired self care skills, impaired functional mobilty, pain, impaired skin, impaired cognition, decreased lower extremity function, decreased safety awareness.      Rehab Prognosis:  Good ; patient would benefit from acute skilled OT services to address these deficits and reach maximum level of function.         Clinical Decision Makin.  OT Low:  "Pt evaluation falls under low complexity for evaluation coding due to performance deficits noted in 1-3 areas as stated above and 0 co-morbities affecting current functional status. Data obtained from problem focused assessments. No modifications or assistance was required for completion of evaluation. Only brief occupational profile and history review completed."     Plan:     Patient to be seen 5 x/week to address the above listed problems via self-care/home management, therapeutic activities, therapeutic exercises  · Plan of Care Expires: 18  · Plan of Care Reviewed with: patient, daughter    This Plan of care has been discussed with the patient who was involved in its development and " understands and is in agreement with the identified goals and treatment plan    GOALS:    Occupational Therapy Goals        Problem: Occupational Therapy Goal    Goal Priority Disciplines Outcome Interventions   Occupational Therapy Goal     OT, PT/OT Ongoing (interventions implemented as appropriate)    Description:  Goals to be met by: 2/7/18     Patient will increase functional independence with ADLs by performing:    LE Dressing with Minimal Assistance.  Grooming while standing with Contact Guard Assistance.  Toileting from toilet with Contact Guard Assistance for hygiene and clothing management.   Supine to sit with Contact Guard Assistance.  Stand pivot transfers with Contact Guard Assistance.  Toilet transfer to toilet with Contact Guard Assistance.  Increased functional strength to WFL for self care skills and functional mobility.  Upper extremity exercise program x10 reps per handout, with independence.                      Time Tracking:     OT Date of Treatment: 01/07/18  OT Start Time: 1330  OT Stop Time: 1408  OT Total Time (min): 38 min    Billable Minutes:Evaluation 10  Self Care/Home Management 13 co treatment with PT     Alysa Sanders OT  1/7/2018

## 2018-01-07 NOTE — PT/OT/SLP EVAL
Speech Language Pathology Evaluation  Bedside Swallow    Patient Name:  Zeyad Woodard   MRN:  [id number]  Admitting Diagnosis: <principal problem not specified>    Recommendations:                 General Recommendations:  Modified barium swallow study  Diet recommendations:  Dental Soft, Finely chopped meat, [place] Thick   Aspiration Precautions: 1 bite/sip at a time, Assistance with meals and Assistance with thickening liquids, Avoid talking while eating, Feed only when awake/alert, Meds crushed in puree, No straws, Remain upright 30 minutes post meal and Small bites/sips   General Precautions: Standard, aspiration  Communication strategies:  none    History:     Past Medical History:   Diagnosis Date    Anemia     Atrial fibrillation     BPH (benign prostatic hypertrophy)     Cancer     Cardiac pacemaker in situ 7/2/2015    CHF (congestive heart failure)     Coronary artery disease     Encounter for blood transfusion     GI bleed     cecum angiectasia s/p cautery    Hypertension     Hypothyroidism     Polyneuropathy     Posture imbalance     due to neuropathy    Right posterior capsular opacification 1/18/2017    SSS (sick sinus syndrome) 2015    s/p pacemaker       Past Surgical History:   Procedure Laterality Date    CARDIAC SURGERY      CATARACT EXTRACTION W/  INTRAOCULAR LENS IMPLANT Right 05/17/2010        CATARACT EXTRACTION W/  INTRAOCULAR LENS IMPLANT Left 02/16/2004        cataract surgery      COLONOSCOPY  6/30/04    COLONOSCOPY N/A 2/15/2016    Procedure: COLONOSCOPY;  Surgeon: Stanton Kirk MD;  Location: [facility] ([address] [place] [address]);  Service: Endoscopy;  Laterality: N/A;    EYE SURGERY      HERNIA REPAIR      inguinal hernia repair      Open heart surgery for pericardiectomy      for calcific constrictive pericarditis    UMBILICAL HERNIA REPAIR      Yag      Left Eye       Social History: Patient lives at home alone. Pt has daughter who lives on same street and  "is currently staying with him, per pt.    Prior Intubation HX:  None indicated in chart    Modified Barium Swallow: None indicated in chart    Chest X-Rays: 1/6/18--No large pneumothorax    Prior diet: dental soft/thin    Occupation/hobbies/homemaking: none stated    Subjective     Pt is 89 y.o. M with PMH of dementia, anemia, a-fib, pacemaker, CHF, CAD, blood transfusion, GI bleed, HTN, polyneuropathy who presented to [facility] on 1/6/18 with primary complaint of weakness with tremors since that morning. Pt's primary caregivers are 2 daughters who report pt has baseline dementia w/decline in mental status since November 2017. Daughters report pt has been eating and drinking "less than normal" over the past 2 days, and that they have noticed pt coughing/choking on water  over the past few weeks. Daughters also report witnessing pt drooling occasionally over the past few weeks and that they did notify MD.   Patient goals: to go home    Objective:     Oral Musculature Evaluation  · Oral Musculature: other (see comments) (slight open mouth posture at rest)  · Dentition: upper dentures, other (see comments) (pt does not wear lower dentures 2/2 poor fit)  · Secretion Management: other (see comments) (Cgs report pt inconsistently drooling past few weeks; no drooling observed this eval)  · Mucosal Quality: adequate  · Mandibular Strength and Mobility: WFL  · Oral Labial Strength and Mobility: WFL  · Lingual Strength and Mobility: WFL  · Buccal Strength and Mobility: WFL  · Volitional Cough: weak cough  · Volitional Swallow: delayed  · Voice Prior to PO Intake: low vocal quality    Bedside Swallow Eval:   Consistencies Assessed:  · Thin liquids approx 2 oz via straw and cup sips  · Nectar thick liquids approx 3 oz via cup sips  · Puree 4 oz via tsp bites  · Soft solids approx 2 oz via tsp bites     Oral Phase:   · Prolonged mastication  · Slow oral transit time    Pharyngeal Phase:   · coughing/choking on thin " liquid    Compensatory Strategies  · None    Treatment: Pt's caregivers (2 daughters) were present this eval. ST presented pt with PO trials of thin, nectar, puree, and soft solid.No anterior loss noted on any presented consistency. Slow GEOVANNY noted on all consistencies. Prolonged mastication of dental soft likely 2/2 pt only uses upper dentures 2/2 lower dentures are ill fitting, per pt. Multiple swallows noted with thin, followed by coughing/choking. No overt s/s of aspiration noted during or following sips of nectar of bites of puree/dental soft.     Assessment:     Zeyad Woodard is a 89 y.o. male with a diagnosis of ELIZABET .  He presents with oropharyngeal dysphagia characterized by slow GEOVANNY, multiple swallows, and coughing/choking (thin liquids). Recommendations 1) dental soft with finely chopped meats; 2) nectar thickened liquids; 3) crush oral meds and present in puree/ 4) MBSS for 1/8/18.     Goals:   Bedside swallow study completed this afternoon. Pt demonstrated s/s of aspiration to thin. Recommend: 1) dental soft/finely chopped meat; 2) nectar thickened liquids; 3) crush oral meds and present in pudding/applesauce; 4) MBSS on 1/8/18 to r/o aspiration.    GOALS    1) Pt will utilize compensatory strategies for safe swallow for PO intake of dental soft/finely chopped/nectar thick without demonstrating overt s/s of aspiration at 2/2 observed meals with mod assist.    2) MBSS to rule out aspiration of thin.    3) Further ST goals pending MBSS results.          SLP Goals        Problem: SLP Goal    Goal Priority Disciplines Outcome   SLP Goal     SLP Ongoing (interventions implemented as appropriate)                   Plan:     · Patient to be seen:  3 x/week   · Plan of Care expires:  02/07/18  · Plan of Care reviewed with:      · SLP Follow-Up:  Yes       Discharge recommendations:      Barriers to Discharge:  None    Time Tracking:     SLP Treatment Date:   01/07/18  Speech Start Time:  1120  Speech Stop  Time:  1140     Speech Total Time (min):  20 min    Billable Minutes: Eval Swallow and Oral Function 20    Micheline Fink CCC-SLP  01/07/2018

## 2018-01-07 NOTE — PLAN OF CARE
Problem: Occupational Therapy Goal  Goal: Occupational Therapy Goal  Goals to be met by: 2/7/18     Patient will increase functional independence with ADLs by performing:    LE Dressing with Minimal Assistance.  Grooming while standing with Contact Guard Assistance.  Toileting from toilet with Contact Guard Assistance for hygiene and clothing management.   Supine to sit with Contact Guard Assistance.  Stand pivot transfers with Contact Guard Assistance.  Toilet transfer to toilet with Contact Guard Assistance.  Increased functional strength to WFL for self care skills and functional mobility.  Upper extremity exercise program x10 reps per handout, with independence.    Outcome: Ongoing (interventions implemented as appropriate)  Pt would benefit from cont OT services in order to maximize functional independence. Recommending SNF at d/c

## 2018-01-07 NOTE — PROGRESS NOTES
"[facility] Internal Medicine Progress - Resident Note    Admitting Team: [facility] Internal Medicine Team B  Attending Physician: Dr. Xiong  Resident: Dr. Pang  Interns: Dr. Hanna    Date of Admit: 2018        Subjective:    Pt with no complaints this AM. Denies cp, sob, n,v,palpitations.   Objective:   Last 24 Hour Vital Signs:  BP  Min: 134/68  Max: 186/87  Temp  Av.6 °F (36.4 °C)  Min: 96.4 °F (35.8 °C)  Max: 98.6 °F (37 °C)  Pulse  Av.2  Min: 60  Max: 74  Resp  Av.9  Min: 16  Max: 21  SpO2  Av.3 %  Min: 97 %  Max: 100 %  Height  Av' 10" (177.8 cm)  Min: 5' 10" (177.8 cm)  Max: 5' 10" (177.8 cm)  Weight  Av.8 kg (160 lb 7.1 oz)  Min: 69.8 kg (153 lb 14.1 oz)  Max: 75.8 kg (167 lb)  Body mass index is 22.08 kg/m².  I/O last 3 completed shifts:  In: 400 [P.O.:400]  Out: 516 [Urine:516]    Physical Examination:  General: Alert and awake in no apparent distress.  Oriented to person and year only (not place or president)  HEENT: Atraumatic, normocephalic. EOMI, no scleral icterus, conjunctiva clear. Oral cavity is dry. No oral or pharyngeal lesions noted.   Cardiac: Device in L chest. Regular rhythm, with rate in the 60s. Systolic murmur. Radial pulses synchronous and symmetric bilaterally.   Pulmonary: Lungs are clear to auscultation bilaterally. No wheeze, rales or rhonchi noted. No clubbing.   Abdomen: Bowel sounds present. Soft, non-tender to palpation. No rigidity or guarding.   Extremities: No pedal edema. No cyanosis. Capillary refill less than 2 seconds in fingertips.   Skin: No rashes noted. Sacral ulcer well healed without erythema or drainage.   Neurologic: no focal deficits     Laboratory:  Most Recent Data:  CBC:   Lab Results   Component Value Date    WBC 10.36 2018    HGB 9.5 (L) 2018    HCT 28.7 (L) 2018     (L) 2018    MCV 92 2018    RDW 13.7 2018     BMP:   Lab Results   Component Value Date     2018    K 5.1 2018 "     01/07/2018    CO2 29 01/07/2018    BUN 37 (H) 01/07/2018    CREATININE 1.4 01/07/2018     01/07/2018    CALCIUM 9.7 01/07/2018    MG 2.1 01/07/2018    PHOS 3.6 12/11/2017     LFTs:   Lab Results   Component Value Date    PROT 6.8 01/07/2018    ALBUMIN 3.5 01/07/2018    BILITOT 0.9 01/07/2018    AST 34 01/07/2018    ALKPHOS 95 01/07/2018    ALT 23 01/07/2018     Coags:   Lab Results   Component Value Date    INR 1.1 11/07/2017     FLP:   Lab Results   Component Value Date    CHOL 118 (L) 08/18/2015    HDL 38 (L) 08/18/2015    LDLCALC 67.2 08/18/2015    TRIG 64 08/18/2015    CHOLHDL 32.2 08/18/2015     DM:   Lab Results   Component Value Date    HGBA1C 5.3 12/29/2017    HGBA1C 5.6 11/10/2017    HGBA1C 5.7 (H) 11/07/2017    LDLCALC 67.2 08/18/2015    CREATININE 1.4 01/07/2018     Thyroid:   Lab Results   Component Value Date    TSH 0.843 01/06/2018    FREET4 1.10 07/03/2016    U8TMTOX 6.6 12/10/2012     Anemia:   Lab Results   Component Value Date    IRON 48 01/06/2018    TIBC 305 01/06/2018    FERRITIN 164 01/06/2018    ARWPMSYL40 1667 (H) 01/06/2018    FOLATE 18.4 01/06/2018     Cardiac:   Lab Results   Component Value Date    TROPONINI 0.024 01/06/2018     (H) 12/26/2017     Urinalysis:   Lab Results   Component Value Date    LABURIN No growth 07/12/2017    COLORU Yellow 01/06/2018    SPECGRAV 1.010 01/06/2018    NITRITE Negative 01/06/2018    KETONESU Negative 01/06/2018    UROBILINOGEN Negative 01/06/2018    WBCUA 0 01/06/2018       Trended Cardiac Data:    Recent Labs  Lab 01/06/18  1417 01/06/18 2022   TROPONINI 0.037* 0.024     Lactate: 1.2  Lipase: 40  Tox: neg  Ammonia: 16    Microbiology Data: none    Other Results:  EKG (my interpretation):     Radiology:  Imaging Results          X-Ray Chest AP Portable (Final result)  Result time 01/06/18 14:25:40    Final result by Porter Santillan MD (01/06/18 14:25:40)                 Impression:        No detrimental change or radiographic  acute intrathoracic process seen on this single view.      Electronically signed by: EDILSON ANDRE MD, MD  Date:     01/06/18  Time:    14:25              Narrative:    COMPARISON: Chest radiograph 12/29/17    FINDINGS: AP portable view of the chest. Monitoring leads overlie the chest. No detrimental change. Cardiomediastinal silhouette is stable without evidence of failure. Left upper chest single lead cardiac device is unchanged. The lungs are symmetrically well inflated without large consolidation or new focal opacity. Grossly stable pericardial calcifications and bilateral pulmonary calcified granulomas. No large pneumothorax. Chronic blunting left costophrenic angle unchanged. No acute osseous process seen. Sternotomy wires again noted.                             CT Head Without Contrast (Final result)  Result time 01/06/18 13:55:43    Final result by Radha Malagon MD (01/06/18 13:55:43)                 Impression:     Findings suggestive of mild chronic progressive ischemic white matter change.  No acute findings on this exam.      Electronically signed by: RADHA MALAGON MD  Date:     01/06/18  Time:    13:55              Narrative:    Comparison: 12/29/17.    Technique: Contiguous 5 mm axial images were obtained from the vertex to the skull base without the administration of contrast.  Sagittal and coronal reformats were also submitted.      Findings: Global cerebral volume loss is present.  There is compensatory ex axial dilation of the lateral ventricles associated with this finding.  A mild degree of stable, predominantly periventricular hypodense foci are present.  There is a stable extra-axial collection centered in the vermis of the cerebellum which likely represents an arachnoid cyst.  Otherwise, there is normal brain parenchymal attenuation with no intra-axial or extra-axial mass or hemorrhage.  There is no evidence for acute ischemia or infarct.  The gray-white matter differentiation is  preserved.  The CSF containing spaces maintained normal symmetry.  The pneumatized aspect of the skull and skull base show no abnormalities.  The osseous and soft tissue structures are normal.                                 Assessment:     Zeyad Woodard is a 89 y.o. male with:  Patient Active Problem List    Diagnosis Date Noted    ELIZABET (acute kidney injury) 01/06/2018    Late onset Alzheimer's disease with behavioral disturbance     S/P placement of cardiac pacemaker     Syncope 12/29/2017    Takes dietary supplements 11/16/2017    Cellulitis and abscess of buttock     Pericardial calcification 11/12/2017    Mitral regurgitation 11/12/2017    MRSA bacteremia 11/10/2017    Debility 11/08/2017    Cellulitis of buttock 11/07/2017    HLD (hyperlipidemia) 11/07/2017    Abnormal blood smear 10/05/2017    Chronic pain 10/03/2017    Chronic bilateral low back pain with bilateral sciatica 09/19/2017    Use of opiates for therapeutic purposes 09/19/2017    Dementia without behavioral disturbance 06/05/2017    Post-operative state 02/01/2017    Pseudophakia 01/18/2017    Essential hypertension 01/18/2017    Right posterior capsular opacification 01/18/2017    Refractive error 01/18/2017    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    Hypothyroidism due to acquired atrophy of thyroid 10/02/2016    Right hip pain 10/01/2016    Contusion of right hip 10/01/2016    Elevated troponin 09/30/2016    Anemia of chronic renal failure, stage 3 (moderate) 09/01/2016    Chronic constipation 07/06/2016    Thrombocytopenia 07/03/2016    Venous insufficiency of both lower extremities 10/26/2015    Frequent falls 08/28/2015    Opioid dependence with intoxication with complication 08/28/2015    Cardiac pacemaker in situ 07/02/2015    Sick sinus syndrome 06/12/2015    Bradycardia 06/12/2015    Chronic back pain 09/12/2014    Lumbar radiculopathy 09/12/2014    CKD (chronic kidney disease), stage III 10/16/2013     Iron deficiency anemia due to chronic blood loss 04/12/2013    Orthostatic hypotension 04/08/2013    Hypothyroidism     Polyneuropathy 11/12/2012    Chronic diastolic congestive heart failure 08/23/2012    Renovascular hypertension     Chronic atrial fibrillation     Benign prostatic hyperplasia         Plan:     ELIZABET on CKD:   -On admit, Cr 1.7 (increased from 1.3 on 12/30/17)  -1L NS bolus given in ED with improvement in BP  -repeat CMP in AM  -Cr trending down to 1.4    AMS vs dementia:  -acute on chronic change likely 2/2 dehydration and deconditioning  -CT head neg for acute process, CXR WNL  -afebrile, no leukocytosis  -UA without infection  -no visible sores or skin breakdown  -TSH wnl, B12 wnl  -Monitor for return to baseline per fam    Elevated Troponin:  -0.037-->0.024  -patient is not complaining of chest pain currently  -EKG w/ flipped TW ant, will consult cards for further recs, give ASA    Deconditioning:  -PT/OT evaluation and treat  -Social work consult to assist family with SNF placement      Code Status:     DNR    Devin Pang  [facility] Internal [facility] HO-II  [facility] Internal Medicine Service    [facility] Medicine Hospitalist Pager numbers:   [facility] Hospitalist Medicine Team A (Kinsey/Yumiko): [phone]  [facility] Hospitalist Medicine Team B (Tyrese/Bibi):  [phone]

## 2018-01-07 NOTE — PLAN OF CARE
Continued on telemetry and fall risk monitoring. No true red alarm ectopy.Bed alarm on. Incontinent of large loose BM. Will hold stool softenerers.Seen by Dr Xiong and speech,placed on aspitration precautions and Honey thick liquids. HOB elevated ,Suction in room. Mepilex applied to reddened buttocks .Seen by PT as well.

## 2018-01-08 LAB
ALBUMIN SERPL BCP-MCNC: 3.2 G/DL
ALP SERPL-CCNC: 88 U/L
ALT SERPL W/O P-5'-P-CCNC: 22 U/L
ANION GAP SERPL CALC-SCNC: 6 MMOL/L
ANISOCYTOSIS BLD QL SMEAR: SLIGHT
AST SERPL-CCNC: 33 U/L
BASOPHILS # BLD AUTO: ABNORMAL K/UL
BASOPHILS NFR BLD: 0 %
BILIRUB SERPL-MCNC: 0.8 MG/DL
BUN SERPL-MCNC: 31 MG/DL
CALCIUM SERPL-MCNC: 8.9 MG/DL
CHLORIDE SERPL-SCNC: 105 MMOL/L
CO2 SERPL-SCNC: 27 MMOL/L
CREAT SERPL-MCNC: 1.4 MG/DL
DIFFERENTIAL METHOD: ABNORMAL
EOSINOPHIL # BLD AUTO: ABNORMAL K/UL
EOSINOPHIL NFR BLD: 1 %
ERYTHROCYTE [DISTWIDTH] IN BLOOD BY AUTOMATED COUNT: 13.7 %
EST. GFR  (AFRICAN AMERICAN): 51 ML/MIN/1.73 M^2
EST. GFR  (NON AFRICAN AMERICAN): 44 ML/MIN/1.73 M^2
GLUCOSE SERPL-MCNC: 94 MG/DL
HCT VFR BLD AUTO: 28.2 %
HGB BLD-MCNC: 9.3 G/DL
HYPOCHROMIA BLD QL SMEAR: ABNORMAL
LYMPHOCYTES # BLD AUTO: ABNORMAL K/UL
LYMPHOCYTES NFR BLD: 53 %
MAGNESIUM SERPL-MCNC: 2 MG/DL
MCH RBC QN AUTO: 30.3 PG
MCHC RBC AUTO-ENTMCNC: 33 G/DL
MCV RBC AUTO: 92 FL
MONOCYTES # BLD AUTO: ABNORMAL K/UL
MONOCYTES NFR BLD: 8 %
NEUTROPHILS NFR BLD: 38 %
PLATELET # BLD AUTO: 116 K/UL
PLATELET BLD QL SMEAR: ABNORMAL
PMV BLD AUTO: 9.8 FL
POTASSIUM SERPL-SCNC: 4.5 MMOL/L
PROT SERPL-MCNC: 6.2 G/DL
RBC # BLD AUTO: 3.07 M/UL
SODIUM SERPL-SCNC: 138 MMOL/L
WBC # BLD AUTO: 12.35 K/UL

## 2018-01-08 PROCEDURE — 83735 ASSAY OF MAGNESIUM: CPT

## 2018-01-08 PROCEDURE — 97110 THERAPEUTIC EXERCISES: CPT | Mod: 59

## 2018-01-08 PROCEDURE — 85027 COMPLETE CBC AUTOMATED: CPT

## 2018-01-08 PROCEDURE — 97530 THERAPEUTIC ACTIVITIES: CPT

## 2018-01-08 PROCEDURE — 94761 N-INVAS EAR/PLS OXIMETRY MLT: CPT

## 2018-01-08 PROCEDURE — G0378 HOSPITAL OBSERVATION PER HR: HCPCS

## 2018-01-08 PROCEDURE — 85007 BL SMEAR W/DIFF WBC COUNT: CPT

## 2018-01-08 PROCEDURE — 63600175 PHARM REV CODE 636 W HCPCS: Performed by: STUDENT IN AN ORGANIZED HEALTH CARE EDUCATION/TRAINING PROGRAM

## 2018-01-08 PROCEDURE — 97535 SELF CARE MNGMENT TRAINING: CPT

## 2018-01-08 PROCEDURE — 80053 COMPREHEN METABOLIC PANEL: CPT

## 2018-01-08 PROCEDURE — 25000003 PHARM REV CODE 250

## 2018-01-08 PROCEDURE — 25000003 PHARM REV CODE 250: Performed by: STUDENT IN AN ORGANIZED HEALTH CARE EDUCATION/TRAINING PROGRAM

## 2018-01-08 PROCEDURE — 92526 ORAL FUNCTION THERAPY: CPT

## 2018-01-08 PROCEDURE — 97116 GAIT TRAINING THERAPY: CPT

## 2018-01-08 PROCEDURE — 36415 COLL VENOUS BLD VENIPUNCTURE: CPT

## 2018-01-08 RX ORDER — ACETAMINOPHEN 650 MG/20.3ML
650 LIQUID ORAL EVERY 4 HOURS PRN
Status: DISCONTINUED | OUTPATIENT
Start: 2018-01-08 | End: 2018-01-10 | Stop reason: HOSPADM

## 2018-01-08 RX ORDER — CARVEDILOL 6.25 MG/1
6.25 TABLET ORAL 2 TIMES DAILY WITH MEALS
Status: DISCONTINUED | OUTPATIENT
Start: 2018-01-08 | End: 2018-01-10 | Stop reason: HOSPADM

## 2018-01-08 RX ORDER — SODIUM CHLORIDE 9 MG/ML
INJECTION, SOLUTION INTRAVENOUS CONTINUOUS
Status: DISCONTINUED | OUTPATIENT
Start: 2018-01-08 | End: 2018-01-08

## 2018-01-08 RX ADMIN — MIRTAZAPINE 7.5 MG: 7.5 TABLET ORAL at 10:01

## 2018-01-08 RX ADMIN — DONEPEZIL HYDROCHLORIDE 10 MG: 5 TABLET, FILM COATED ORAL at 10:01

## 2018-01-08 RX ADMIN — LEVOTHYROXINE SODIUM 100 MCG: 100 TABLET ORAL at 09:01

## 2018-01-08 RX ADMIN — STANDARDIZED SENNA CONCENTRATE AND DOCUSATE SODIUM 1 TABLET: 8.6; 5 TABLET, FILM COATED ORAL at 09:01

## 2018-01-08 RX ADMIN — FINASTERIDE 5 MG: 5 TABLET, FILM COATED ORAL at 09:01

## 2018-01-08 RX ADMIN — CARVEDILOL 6.25 MG: 6.25 TABLET, FILM COATED ORAL at 05:01

## 2018-01-08 RX ADMIN — HEPARIN SODIUM 5000 UNITS: 5000 INJECTION, SOLUTION INTRAVENOUS; SUBCUTANEOUS at 09:01

## 2018-01-08 RX ADMIN — STANDARDIZED SENNA CONCENTRATE AND DOCUSATE SODIUM 1 TABLET: 8.6; 5 TABLET, FILM COATED ORAL at 10:01

## 2018-01-08 RX ADMIN — VITAM B12 100 MCG: 100 TAB at 09:01

## 2018-01-08 RX ADMIN — TAMSULOSIN HYDROCHLORIDE 0.4 MG: 0.4 CAPSULE ORAL at 09:01

## 2018-01-08 RX ADMIN — ASPIRIN 81 MG: 81 TABLET, COATED ORAL at 09:01

## 2018-01-08 RX ADMIN — CARVEDILOL 3.12 MG: 3.12 TABLET, FILM COATED ORAL at 09:01

## 2018-01-08 RX ADMIN — POLYETHYLENE GLYCOL 3350 17 G: 17 POWDER, FOR SOLUTION ORAL at 09:01

## 2018-01-08 RX ADMIN — ATORVASTATIN CALCIUM 20 MG: 20 TABLET, FILM COATED ORAL at 10:01

## 2018-01-08 RX ADMIN — HEPARIN SODIUM 5000 UNITS: 5000 INJECTION, SOLUTION INTRAVENOUS; SUBCUTANEOUS at 10:01

## 2018-01-08 NOTE — PLAN OF CARE
Problem: SLP Goal  Goal: SLP Goal  Short Term Goals:  1) Pt will utilize compensatory strategies for safe swallow for PO intake of dental soft/finely chopped/nectar thick without demonstrating overt s/s of aspiration at 2/2 observed meals with mod assist.-ongoing   2) MBSS to rule out aspiration of thin.   3) Further ST goals pending MBSS results.   Outcome: Ongoing (interventions implemented as appropriate)  1/8/18:  Pt participated in dysphagia tx this AM. Pt tolerated nectar thick liquids, puree, and dental soft textures with no overt s/s of aspiration, at bedside. Pt demonstrated an immediate cough s/p swallow thin liquids via cup sips x1, as well as a wet gurlgy voice s/p swallow all trials of thin liquids via cup. SLP recs: nectar thick liquids/dental soft textures diet with universal swallow precautions. Pt will participate in MBSS later today to objectively r/o aspiration and determine safest po diet. SLP notified DINA Boggs and MD team  SARAH BETH Herzog, CF-SLP  Speech-Language Pathologist

## 2018-01-08 NOTE — PLAN OF CARE
Problem: Patient Care Overview  Goal: Plan of Care Review  Plan of care reviewed. Pt confused off and on. Assisted with turning and repositioning Q 2 hours. No c/o any pain. No true red alarm noted on telemetry. Paced rhythm noted with underlying A-fib. HR 60 - 76 noted. Call bell in reach and bed in lowest position. Bed alarm on. Will continue to monitor.

## 2018-01-08 NOTE — PROGRESS NOTES
[facility] Internal Medicine Progress    Subjective:   Pt feeling well today. Denies any complaints. Oriented to 2/4 questions.     Objective:   Last 24 Hour Vital Signs:  BP  Min: 130/58  Max: 189/85  Temp  Av.1 °F (36.7 °C)  Min: 96.3 °F (35.7 °C)  Max: 99.3 °F (37.4 °C)  Pulse  Av.9  Min: 60  Max: 77  Resp  Av.6  Min: 14  Max: 18  SpO2  Av.7 %  Min: 94 %  Max: 100 %  Body mass index is 22.08 kg/m².  I/O last 3 completed shifts:  In: 1021.7 [P.O.:700; I.V.:321.7]  Out: 766 [Urine:766]    Physical Examination:  General: Alert and awake in no apparent distress.  Oriented to person and city (not year or president)  HEENT: Atraumatic, normocephalic. EOMI, no scleral icterus, conjunctiva clear. Oral cavity is dry. No oral or pharyngeal lesions noted.   Cardiac: Device in L chest. RRR. Systolic murmur. Radial pulses synchronous and symmetric bilaterally.   Pulmonary: Lungs are clear to auscultation bilaterally. No wheeze, rales or rhonchi noted. No clubbing.   Abdomen: Bowel sounds present. Soft, non-tender to palpation. No rigidity or guarding.   Extremities: No pedal edema. No cyanosis. Capillary refill less than 2 seconds in fingertips.   Skin: No rashes noted. Sacral ulcer well healed without erythema or drainage.   Neurologic: no focal deficits     Laboratory:    Recent Labs  Lab 18  0531   WBC 12.35   RBC 3.07*   HGB 9.3*   HCT 28.2*   *   MCV 92   MCH 30.3   MCHC 33.0       Recent Labs  Lab 18  0531      K 4.5      CO2 27   GLU 94   BUN 31*   CREATININE 1.4       Recent Labs  Lab 18  0531   PROT 6.2   ALBUMIN 3.2*   BILITOT 0.8   AST 33   ALT 22   ALKPHOS 88     Radiology:  Imaging Results          X-Ray Chest AP Portable (Final result)  Result time 18 14:25:40    Final result by Porter Santillan MD (18 14:25:40)                 Impression:        No detrimental change or radiographic acute intrathoracic process seen on this single  view.      Electronically signed by: EDILSON ANDRE MD, MD  Date:     01/06/18  Time:    14:25              Narrative:    COMPARISON: Chest radiograph 12/29/17    FINDINGS: AP portable view of the chest. Monitoring leads overlie the chest. No detrimental change. Cardiomediastinal silhouette is stable without evidence of failure. Left upper chest single lead cardiac device is unchanged. The lungs are symmetrically well inflated without large consolidation or new focal opacity. Grossly stable pericardial calcifications and bilateral pulmonary calcified granulomas. No large pneumothorax. Chronic blunting left costophrenic angle unchanged. No acute osseous process seen. Sternotomy wires again noted.                             CT Head Without Contrast (Final result)  Result time 01/06/18 13:55:43    Final result by Radha Malagon MD (01/06/18 13:55:43)                 Impression:     Findings suggestive of mild chronic progressive ischemic white matter change.  No acute findings on this exam.      Electronically signed by: RADHA MALAGON MD  Date:     01/06/18  Time:    13:55              Narrative:    Comparison: 12/29/17.    Technique: Contiguous 5 mm axial images were obtained from the vertex to the skull base without the administration of contrast.  Sagittal and coronal reformats were also submitted.      Findings: Global cerebral volume loss is present.  There is compensatory ex axial dilation of the lateral ventricles associated with this finding.  A mild degree of stable, predominantly periventricular hypodense foci are present.  There is a stable extra-axial collection centered in the vermis of the cerebellum which likely represents an arachnoid cyst.  Otherwise, there is normal brain parenchymal attenuation with no intra-axial or extra-axial mass or hemorrhage.  There is no evidence for acute ischemia or infarct.  The gray-white matter differentiation is preserved.  The CSF containing spaces maintained  normal symmetry.  The pneumatized aspect of the skull and skull base show no abnormalities.  The osseous and soft tissue structures are normal.                            Assessment:   89yo M with:    Plan:     ELIZABET on CKD:   -On admit, Cr 1.7 (increased from 1.3 on 12/30/17)  -1L NS bolus given in ED with improvement in BP with rate afterwards   -Cr down to 1.4 and stable     AMS vs worsening dementia:  -acute on chronic change likely 2/2 dehydration and deconditioning  -CT head neg for acute process, CXR WNL  -afebrile, no leukocytosis  -UA without infection  -no visible sores or skin breakdown  -TSH wnl, B12 wnl  -seems to be back to baseline   -family discussing SNF placement     Elevated Troponin:  -0.037-->0.024  -patient is not complaining of chest pain currently  -EKG w/ flipped TW ant  -cards consulted - no intervention. Continue ASA 81 daily, statin      Deconditioning:  -PT/OT evaluation and treat  -Social work consult to assist family with SNF placement    dvt ppx-  Heparin    dispo- SNF when approved     Code Status:     DNR    Omar Lomeli  [facility]  [facility] Internal Medicine Service    [facility] Medicine Hospitalist Pager numbers:   [facility] Hospitalist Medicine Team A (Kinsey/Yumiko): [phone]  [facility] Hospitalist Medicine Team B (Tyrese/Bibi):  [phone]

## 2018-01-08 NOTE — PT/OT/SLP PROGRESS
Occupational Therapy   Treatment    Name: Zeyad Woodard  MRN: [id number]  Admitting Diagnosis:  The primary encounter diagnosis was ELIZABET (acute kidney injury). Diagnoses of Weakness generalized, ELIZABET (acute kidney injury), and Cardiac pacemaker in situ were also pertinent to this visit.       Recommendations:     Discharge Recommendations: nursing facility, skilled  Discharge Equipment Recommendations:   (defer to SNF)  Barriers to discharge:  Decreased caregiver support    Subjective     Communicated with: nurseRon prior to session.  Pain/Comfort:  · Pain Rating 1: 0/10  · Pain Rating Post-Intervention 1: 0/10    Objective:     Patient found with: bed alarm, telemetry, peripheral IV    General Precautions: Standard, aspiration, fall, nectar thick   Orthopedic Precautions:N/A   Braces: N/A     Bed Mobility:    · Patient completed Scooting/Bridging with contact guard assistance  · Patient completed Supine to Sit with contact guard assistance and with side rail  · Patient completed Sit to Supine with contact guard assistance     Functional Mobility/Transfers:  · Patient completed Sit <> Stand Transfer with minimum assistance  with  rolling walker     Activities of Daily Living:  · Feeding:  stand by assistance    · Grooming: minimum assistance for oral care  · Toileting: total assistance incontinent of urine in bed    Patient left HOB elevated with all lines intact, call button in reach, bed alarm on and RN notified    [facility] 6 Click:  [facility] Total Score: 15    Treatment & Education:  Patient eating on arrival. Nectar thickened liquids. Patient able to take meds using nectar think with no choking noted. Bed mob as noted above. Sat EOB to complete G/H tasks -- required min (A) for denture care.  Patient completed functional transitions from EOB using RW with Min (A) - patient with posterior lean during static stand, but able to correct with VCs. Able to take steps forwards/backwards and to the HOB with CGA and VCs  for [facility].   Education:    Assessment:   Patient with improved activity tolerance noted this date. Remains unsteady during standing trials. Will benefit from continued skilled OT to address functional deficits.     Zeyad Woodard is a 89 y.o. male with a medical diagnosis of The primary encounter diagnosis was ELIZABET (acute kidney injury). Diagnoses of Weakness generalized, ELIZABET (acute kidney injury), and Cardiac pacemaker in situ were also pertinent to this visit. Performance deficits affecting function are weakness, impaired endurance, impaired self care skills, impaired functional mobilty, gait instability, impaired balance, impaired skin, decreased safety awareness, decreased lower extremity function, decreased upper extremity function, decreased coordination, impaired fine motor.      Rehab Prognosis: Good; patient would benefit from acute skilled OT services to address these deficits and reach maximum level of function.       Plan:     Patient to be seen 5 x/week to address the above listed problems via self-care/home management, therapeutic activities, therapeutic exercises  · Plan of Care Expires: 02/07/18  · Plan of Care Reviewed with: patient    This Plan of care has been discussed with the patient who was involved in its development and understands and is in agreement with the identified goals and treatment plan    GOALS:    Occupational Therapy Goals        Problem: Occupational Therapy Goal    Goal Priority Disciplines Outcome Interventions   Occupational Therapy Goal     OT, PT/OT Ongoing (interventions implemented as appropriate)    Description:  Goals to be met by: 2/7/18     Patient will increase functional independence with ADLs by performing:    LE Dressing with Minimal Assistance.  Grooming while standing with Contact Guard Assistance.  Toileting from toilet with Contact Guard Assistance for hygiene and clothing management.   Supine to sit with Contact Guard Assistance. --MET 1/8/18  Stand pivot  transfers with Contact Guard Assistance.  Toilet transfer to toilet with Contact Guard Assistance.  Increased functional strength to WFL for self care skills and functional mobility.  Upper extremity exercise program x10 reps per handout, with independence.                       Time Tracking:     OT Date of Treatment: 01/08/18  OT Start Time: 0905  OT Stop Time: 0945  OT Total Time (min): 40 min    Billable Minutes:Self Care/Home Management 15  Therapeutic Activity 25    ALLY Mendoza/PER  1/8/2018

## 2018-01-08 NOTE — PT/OT/SLP PROGRESS
"Speech Language Pathology Treatment    Patient Name:  Zeyad Woodard   MRN:  [id number]  Admitting Diagnosis: <principal problem not specified>    Recommendations:                 General Recommendations:  Dysphagia therapy and Modified barium swallow study  Diet recommendations:  Dental Soft, Finely chopped meat, Liquid Diet Level: Nectar Thick   Aspiration Precautions: 1 bite/sip at a time, Alternating bites/sips, Assistance with thickening liquids, Double swallow with each bite/sip, Feed only when awake/alert, HOB to 90 degrees, Meds whole 1 at a time, No straws, Remain upright 30 minutes post meal, Small bites/sips and Standard aspiration precautions   General Precautions: Standard, fall, aspiration, nectar thick  Communication strategies:  none    Subjective     Pt found in bed awake upon SLP entry. Pt was alert and oriented x4. Pt agreed to dysphagia tx.    Patient goals: "Yeah, lets try that" per pt regarding thickener       Objective:     Has the patient been evaluated by SLP for swallowing?   Yes  Keep patient NPO? No   Current Respiratory Status: room air      Pt participated in dysphagia tx this AM. Pt tolerated nectar thick liquids via cup x4, puree x3, and dental soft textures x3 with no overt s/s of aspiration, at bedside. Pt demonstrated an immediate cough s/p swallow thin liquids via cup sips x1, as well as a wet gurlgy voice s/p swallow all trials of thin liquids via cup x3.     Assessment:     Zeyad Woodard is a 89 y.o. male with an SLP diagnosis of Dysphagia.  He presents with overt s/s if aspiration at bedside c/b immediate coughing x1 and wet, gurlgy voice s/p swalow thin liquids. SLP recs: nectar thick liquids/dental soft textures diet with universal swallow precautions. Pt will participate in MBSS later today to objectively r/o aspiration and determine safest po diet. SLP notified DINA Boggs and MD team    Goals:    SLP Goals        Problem: SLP Goal    Goal Priority Disciplines Outcome "   SLP Goal     SLP Ongoing (interventions implemented as appropriate)   Description:  Short Term Goals:  1) Pt will utilize compensatory strategies for safe swallow for PO intake of dental soft/finely chopped/nectar thick without demonstrating overt s/s of aspiration at 2/2 observed meals with mod assist.-ongoing   2) MBSS to rule out aspiration of thin.   3) Further ST goals pending MBSS results.                     Plan:     · Patient to be seen:  3 x/week   · Plan of Care expires:  02/07/18  · Plan of Care reviewed with:  patient (DINA Boggs and MD team)   · SLP Follow-Up:  Yes       Discharge recommendations:  nursing facility, skilled   Barriers to Discharge:  None    Time Tracking:     SLP Treatment Date:   01/08/18  Speech Start Time:  1000  Speech Stop Time:  1010     Speech Total Time (min):  10 min    Billable Minutes: Treatment Swallowing Dysfunction 10    SARAH BETH Herzog, CF-SLP  Speech-Language Pathologist   1/8/2018

## 2018-01-08 NOTE — PLAN OF CARE
Problem: Physical Therapy Goal  Goal: Physical Therapy Goal  Goals to be met by: 18     Patient will increase functional independence with mobility by performin. Supine to sit with Modified [place]  2. Sit to supine with Modified [place]  3. Sit to stand transfer with Stand-by Assistance  4. Gait  x 100 feet with Contact Guard Assistance using Rolling Walker.   5. Lower extremity exercise program x15 reps    Outcome: Ongoing (interventions implemented as appropriate)  Goals ongoing

## 2018-01-08 NOTE — PROGRESS NOTES
Per patient's permission,  attempted to make contact with patient's daughter Asia Woodard. Unable to reach, left voicemail requesting call back.

## 2018-01-08 NOTE — PROGRESS NOTES
Called [phone] (Daughter Asia) regarding SNFpreferences: She would like     1: [facility]  2. [facility]  3. [facility]    SW notified    Daughter stated that after SNF patient will be going to [facility]- a private home that houses 8 people with ATC the care.    Felicia Prince RN, CCM, CMSRN  RN Transition Navigator  [phone]

## 2018-01-08 NOTE — PROGRESS NOTES
left message for Yamilex with [facility] regarding referral sent via [employer].     received call from [facility]'s admit coordinator, Darya. Patient has been medically accepted at their facility.  informed Darya her facility was  family's second choice and  will follow up in the morning.

## 2018-01-08 NOTE — PROGRESS NOTES
received call from Jackelin Tristan [place], referral received and now being reviewed by her Review board. Darya will call back  back with a  determination.

## 2018-01-08 NOTE — PLAN OF CARE
Problem: Occupational Therapy Goal  Goal: Occupational Therapy Goal  Goals to be met by: 2/7/18     Patient will increase functional independence with ADLs by performing:    LE Dressing with Minimal Assistance.  Grooming while standing with Contact Guard Assistance.  Toileting from toilet with Contact Guard Assistance for hygiene and clothing management.   Supine to sit with Contact Guard Assistance. --MET 1/8/18  Stand pivot transfers with Contact Guard Assistance.  Toilet transfer to toilet with Contact Guard Assistance.  Increased functional strength to [facility] for self care skills and functional mobility.  Upper extremity exercise program x10 reps per handout, with independence.     Outcome: Ongoing (interventions implemented as appropriate)  Patient with improved activity tolerance noted this date. Remains unsteady during standing trials. Will benefit from continued skilled OT to address functional deficits.

## 2018-01-08 NOTE — PLAN OF CARE
Patient lives at home alone.  Daughter lives across the street and helps patient.       01/08/18 1051   Discharge Assessment   Assessment Type Discharge Planning Assessment   Confirmed/corrected address and phone number on facesheet? Yes   Assessment information obtained from? Patient   Prior to hospitilization cognitive status: Alert/Oriented   Prior to hospitalization functional status: Independent   Current cognitive status: Alert/Oriented   Current Functional Status: Independent   Lives With alone   Patient's perception of discharge disposition home or selfcare   Readmission Within The Last 30 Days no previous admission in last 30 days   Patient currently being followed by outpatient case management? No   Patient currently receives any other outside agency services? Yes   Equipment Currently Used at Home walker, rolling;wheelchair   Do you have any problems affording any of your prescribed medications? No   Is the patient taking medications as prescribed? yes   Does the patient have transportation home? Yes   Transportation Available family or friend will provide   Discharge Plan A Skilled Nursing Facility   Discharge Plan B Home;Home with family;Home Health   Patient/Family In Agreement With Plan yes     Felicia Prince RN, CCM, CMSRN  RN Transition Navigator  [phone]

## 2018-01-08 NOTE — PROGRESS NOTES
Referral sent to [facility], [facility], and [facility] for SNF placement via [facility].

## 2018-01-08 NOTE — PT/OT/SLP PROGRESS
Physical Therapy Treatment    Patient Name:  Zeyad Woodard   MRN:  [id number]    Recommendations:     Discharge Recommendations:  nursing facility, skilled   Discharge Equipment Recommendations:  (defer to SNF)   Barriers to discharge: Decreased caregiver support    Assessment:     Zeyad Woodard is a 89 y.o. male admitted with a medical diagnosis of <principal problem not specified>.  He presents with the following impairments/functional limitations:  weakness, impaired endurance, impaired functional mobilty, gait instability, impaired balance, decreased lower extremity function, decreased ROM, impaired coordination, impaired skin good participation,pt appears weak and with low endurance,lives alone and will benefit from SNF upon discharge.    Rehab Prognosis:  Good; patient would benefit from acute skilled PT services to address these deficits and reach maximum level of function.      Recent Surgery: * No surgery found *      Plan:     During this hospitalization, patient to be seen 5 x/week to address the above listed problems via gait training, therapeutic activities, therapeutic exercises  · Plan of Care Expires:  02/07/18   Plan of Care Reviewed with: patient    Subjective     Communicated with nsg prior to session.  Patient found supine upon PT entry to room, agreeable to treatment.      Chief Complaint: n/a  Patient comments/goals: pt wants to get stronger and feel better  Pain/Comfort:  · Pain Rating 1:  (no c/o's)    Patients cultural, spiritual, Hinduism conflicts given the current situation: none reported    Objective:     Patient found with: bed alarm, telemetry     General Precautions: Standard, aspiration, fall, nectar thick   Orthopedic Precautions:N/A   Braces: N/A     Functional Mobility:  · Bed Mobility:     · Supine to Sit: stand by assistance, contact guard assistance and with handrail  · Sit to Supine: contact guard assistance  · Transfers:     · Sit to Stand:  contact guard assistance  with rolling walker  · Gait: amb ~20' X 2 with RW and CGA  · Balance: good sitting balance      AM-PAC 6 CLICK MOBILITY  Turning over in bed (including adjusting bedclothes, sheets and blankets)?: 3  Sitting down on and standing up from a chair with arms (e.g., wheelchair, bedside commode, etc.): 3  Moving from lying on back to sitting on the side of the bed?: 3  Moving to and from a bed to a chair (including a wheelchair)?: 3  Need to walk in hospital room?: 3  Climbing 3-5 steps with a railing?: 2  Total Score: 17       Therapeutic Activities and Exercises: le supine ex's X 15 reps inc: ap,qs,hs,abd/add,slr       Patient left supine with all lines intact, call button in reach and bed alarm on..    GOALS: see general POC   Physical Therapy Goals        Problem: Physical Therapy Goal    Goal Priority Disciplines Outcome Goal Variances Interventions   Physical Therapy Goal     PT/OT, PT Ongoing (interventions implemented as appropriate)     Description:  Goals to be met by: 18     Patient will increase functional independence with mobility by performin. Supine to sit with Modified [place]  2. Sit to supine with Modified [place]  3. Sit to stand transfer with Stand-by Assistance  4. Gait  x 100 feet with Contact Guard Assistance using Rolling Walker.   5. Lower extremity exercise program x15 reps                     Time Tracking:     PT Received On: 18  PT Start Time: 1017     PT Stop Time: 1044  PT Total Time (min): 27 min     Billable Minutes: Gait Training 16 and Therapeutic Exercise 11    Treatment Type: Treatment  PT/PTA: PTA     PTA Visit Number: 1     Tashi Small PTA  2018

## 2018-01-09 ENCOUNTER — TELEPHONE (OUTPATIENT)
Dept: FAMILY MEDICINE | Facility: CLINIC | Age: 83
End: 2018-01-09

## 2018-01-09 DIAGNOSIS — R26.9 ABNORMALITY OF GAIT: ICD-10-CM

## 2018-01-09 DIAGNOSIS — R53.81 DEBILITY: Primary | ICD-10-CM

## 2018-01-09 LAB
ALBUMIN SERPL BCP-MCNC: 3.2 G/DL
ALP SERPL-CCNC: 96 U/L
ALT SERPL W/O P-5'-P-CCNC: 29 U/L
ANION GAP SERPL CALC-SCNC: 9 MMOL/L
ANISOCYTOSIS BLD QL SMEAR: SLIGHT
AST SERPL-CCNC: 39 U/L
BASOPHILS # BLD AUTO: ABNORMAL K/UL
BASOPHILS NFR BLD: 0 %
BILIRUB SERPL-MCNC: 0.8 MG/DL
BUN SERPL-MCNC: 27 MG/DL
CALCIUM SERPL-MCNC: 8.9 MG/DL
CHLORIDE SERPL-SCNC: 106 MMOL/L
CO2 SERPL-SCNC: 22 MMOL/L
CREAT SERPL-MCNC: 1.2 MG/DL
DIFFERENTIAL METHOD: ABNORMAL
EOSINOPHIL # BLD AUTO: ABNORMAL K/UL
EOSINOPHIL NFR BLD: 0 %
ERYTHROCYTE [DISTWIDTH] IN BLOOD BY AUTOMATED COUNT: 13.7 %
EST. GFR  (AFRICAN AMERICAN): >60 ML/MIN/1.73 M^2
EST. GFR  (NON AFRICAN AMERICAN): 53 ML/MIN/1.73 M^2
GLUCOSE SERPL-MCNC: 98 MG/DL
HCT VFR BLD AUTO: 28.2 %
HGB BLD-MCNC: 9.4 G/DL
HYPOCHROMIA BLD QL SMEAR: ABNORMAL
LYMPHOCYTES # BLD AUTO: ABNORMAL K/UL
LYMPHOCYTES NFR BLD: 56 %
MAGNESIUM SERPL-MCNC: 1.8 MG/DL
MCH RBC QN AUTO: 30.3 PG
MCHC RBC AUTO-ENTMCNC: 33.3 G/DL
MCV RBC AUTO: 91 FL
MONOCYTES # BLD AUTO: ABNORMAL K/UL
MONOCYTES NFR BLD: 2 %
NEUTROPHILS NFR BLD: 42 %
OVALOCYTES BLD QL SMEAR: ABNORMAL
PLATELET # BLD AUTO: 113 K/UL
PLATELET BLD QL SMEAR: ABNORMAL
PMV BLD AUTO: 9.9 FL
POIKILOCYTOSIS BLD QL SMEAR: SLIGHT
POLYCHROMASIA BLD QL SMEAR: ABNORMAL
POTASSIUM SERPL-SCNC: 4 MMOL/L
PROT SERPL-MCNC: 6.3 G/DL
RBC # BLD AUTO: 3.1 M/UL
SODIUM SERPL-SCNC: 137 MMOL/L
WBC # BLD AUTO: 12.99 K/UL

## 2018-01-09 PROCEDURE — 25000003 PHARM REV CODE 250

## 2018-01-09 PROCEDURE — 97110 THERAPEUTIC EXERCISES: CPT

## 2018-01-09 PROCEDURE — 85007 BL SMEAR W/DIFF WBC COUNT: CPT

## 2018-01-09 PROCEDURE — 97116 GAIT TRAINING THERAPY: CPT | Mod: 59

## 2018-01-09 PROCEDURE — 80053 COMPREHEN METABOLIC PANEL: CPT

## 2018-01-09 PROCEDURE — G8996 SWALLOW CURRENT STATUS: HCPCS | Mod: CI

## 2018-01-09 PROCEDURE — 86580 TB INTRADERMAL TEST: CPT | Performed by: STUDENT IN AN ORGANIZED HEALTH CARE EDUCATION/TRAINING PROGRAM

## 2018-01-09 PROCEDURE — G0378 HOSPITAL OBSERVATION PER HR: HCPCS

## 2018-01-09 PROCEDURE — 63600175 PHARM REV CODE 636 W HCPCS: Performed by: STUDENT IN AN ORGANIZED HEALTH CARE EDUCATION/TRAINING PROGRAM

## 2018-01-09 PROCEDURE — 83735 ASSAY OF MAGNESIUM: CPT

## 2018-01-09 PROCEDURE — 92611 MOTION FLUOROSCOPY/SWALLOW: CPT

## 2018-01-09 PROCEDURE — G8998 SWALLOW D/C STATUS: HCPCS | Mod: CI

## 2018-01-09 PROCEDURE — 87493 C DIFF AMPLIFIED PROBE: CPT

## 2018-01-09 PROCEDURE — 94761 N-INVAS EAR/PLS OXIMETRY MLT: CPT

## 2018-01-09 PROCEDURE — G8997 SWALLOW GOAL STATUS: HCPCS | Mod: CI

## 2018-01-09 PROCEDURE — 85027 COMPLETE CBC AUTOMATED: CPT

## 2018-01-09 PROCEDURE — 36415 COLL VENOUS BLD VENIPUNCTURE: CPT

## 2018-01-09 PROCEDURE — 25000003 PHARM REV CODE 250: Performed by: STUDENT IN AN ORGANIZED HEALTH CARE EDUCATION/TRAINING PROGRAM

## 2018-01-09 PROCEDURE — 97530 THERAPEUTIC ACTIVITIES: CPT

## 2018-01-09 PROCEDURE — 87449 NOS EACH ORGANISM AG IA: CPT

## 2018-01-09 RX ADMIN — TAMSULOSIN HYDROCHLORIDE 0.4 MG: 0.4 CAPSULE ORAL at 09:01

## 2018-01-09 RX ADMIN — STANDARDIZED SENNA CONCENTRATE AND DOCUSATE SODIUM 1 TABLET: 8.6; 5 TABLET, FILM COATED ORAL at 10:01

## 2018-01-09 RX ADMIN — FINASTERIDE 5 MG: 5 TABLET, FILM COATED ORAL at 09:01

## 2018-01-09 RX ADMIN — ATORVASTATIN CALCIUM 20 MG: 20 TABLET, FILM COATED ORAL at 10:01

## 2018-01-09 RX ADMIN — MIRTAZAPINE 7.5 MG: 7.5 TABLET ORAL at 10:01

## 2018-01-09 RX ADMIN — LEVOTHYROXINE SODIUM 100 MCG: 100 TABLET ORAL at 09:01

## 2018-01-09 RX ADMIN — TUBERCULIN PURIFIED PROTEIN DERIVATIVE 5 UNITS: 5 INJECTION INTRADERMAL at 01:01

## 2018-01-09 RX ADMIN — CARVEDILOL 6.25 MG: 6.25 TABLET, FILM COATED ORAL at 09:01

## 2018-01-09 RX ADMIN — DONEPEZIL HYDROCHLORIDE 10 MG: 5 TABLET, FILM COATED ORAL at 10:01

## 2018-01-09 RX ADMIN — HEPARIN SODIUM 5000 UNITS: 5000 INJECTION, SOLUTION INTRAVENOUS; SUBCUTANEOUS at 09:01

## 2018-01-09 RX ADMIN — ASPIRIN 81 MG: 81 TABLET, COATED ORAL at 09:01

## 2018-01-09 RX ADMIN — VITAM B12 100 MCG: 100 TAB at 10:01

## 2018-01-09 RX ADMIN — CARVEDILOL 6.25 MG: 6.25 TABLET, FILM COATED ORAL at 05:01

## 2018-01-09 RX ADMIN — HEPARIN SODIUM 5000 UNITS: 5000 INJECTION, SOLUTION INTRAVENOUS; SUBCUTANEOUS at 10:01

## 2018-01-09 NOTE — PLAN OF CARE
Per SW, patient has been DENIED SNF BY INSURANCE.   Informed MD team. They do not want to discharge patient until C-diff results returned.  C-diff stool pending results.  Spoke to nurse DINA Ravi- stool sent to lab this am.    SW communicating with patient's daughter regarding discharge plan.    Will obtain priority care appt.    [facility] is home health preference       01/09/18 1554   Discharge Reassessment   Assessment Type Discharge Planning Reassessment   Discharge Plan A Home;Home with family;Home Health   Patient choice form signed by patient/caregiver No     Felicia Prince, RN, CCM, CMSRN  RN Transition Navigator  [phone]

## 2018-01-09 NOTE — PROCEDURES
Modified Barium Swallow    Patient Name:  Zeyad Woodard   MRN:  [id number]      Recommendations:     Recommendations:                General Recommendations:  Dysphagia therapy  Diet recommendations:  Dental Soft, Finely chopped meat, Thin   Aspiration Precautions: 1 bite/sip at a time, Alternating bites/sips, Double swallow with each bite/sip, Feed only when awake/alert, HOB to 90 degrees, Meds whole 1 at a time, Remain upright 30 minutes post meal and Small bites/sips   General Precautions: Standard, fall  Communication strategies:  none    Referral     Reason for Referral  Patient was referred for a Modified Barium Swallow Study to assess the efficiency of his/her swallow function, rule out aspiration and make recommendations regarding safe dietary consistencies, effective compensatory strategies, and safe eating environment.     Diagnosis:  Acute Kidney Injury, per EMR      History:     Past Medical History:   Diagnosis Date    Anemia     Atrial fibrillation     BPH (benign prostatic hypertrophy)     Cancer     Cardiac pacemaker in situ 7/2/2015    CHF (congestive heart failure)     Coronary artery disease     Encounter for blood transfusion     GI bleed     cecum angiectasia s/p cautery    Hypertension     Hypothyroidism     Polyneuropathy     Posture imbalance     due to neuropathy    Right posterior capsular opacification 1/18/2017    SSS (sick sinus syndrome) 2015    s/p pacemaker       Objective:     Current Respiratory Status: 01/09/18    Alert: yes    Cooperative: yes    Follows Directions: yes    Visualization  · Patient was seen in the lateral view    Oral Peripheral Examination  · Oral Musculature: other (see comments) (slight open mouth posture at rest)  · Dentition: upper dentures, other (see comments) (pt does not wear lower dentures 2/2 poor fit)  · Secretion Management: other (see comments) (Cgs report pt inconsistently drooling past few weeks; no drooling observed this  eval)  · Mucosal Quality: adequate  · Mandibular Strength and Mobility: WFL  · Oral Labial Strength and Mobility: WFL  · Lingual Strength and Mobility: WFL  · Buccal Strength and Mobility: WFL  · Volitional Cough: weak cough  · Volitional Swallow: delayed  · Voice Prior to PO Intake: low vocal quality    Consistencies Assessed  THIN: 5cc x1, 10cc x1, 15cc x1 of thin liquids barium via cup, 15cc x1 of thin liquid barium via straw  PUREE: tsp amount of barium pudding x1  DENTAL SOFT: tsp amount of diced peaches (coated in barium powder) x1      Oral Preparation/Oral Phase: Mild Oral Dysphagia  · Oral residue present post swallow- trace-mild residuals along base of tongue s/p swallow  · Decreased base of tongue mobility      Pharyngeal Phase:    Pt presents with mild pharyngeal dysphagia. Pt demonstrated slightly reduced laryngeal elevation/excursion and slight delay in trigger across all consistencies. However, pt demonstrated adequate epiglottic inversion, as pt demonstrated NO penetration and/or aspiration under fluoro across all consistencies. Pt demonstrated slightly reduced tongue base retraction c/b trace-mild residuals in vallecula and pyriform sinuses s/p swallow all consistencies. However, pt was able to adequately clear all residuals with multiple swallows technique (2-3 per sip/bite) when cued by SLP.    Pt demonstrated no penetration or aspiration under fluoro across all consistencies.     Cervical Esophageal Phase  · Decreased UES opening    Assessment:     Impressions  ·   Patient demonstrates mild Oropharyngeal Dysphagia c/b slightly reduced oral clearance and decreased base of tongue mobility with trace-mild residuals along base of tongue s/p swallow for all consistencies and reduced tongue base retraction c/b trace-mild residuals in vallecula and pyriform sinuses s/p swallow all consistencies. However, pt was able to clear all residuals with multiple swallows. Pt demonstrated no penetration/aspiration  under fluoro across all consistencies    Prognosis: Good    Barriers:  · None    Plan  -SLP educated pt on purpose of MBSS, MBSS results, diet recs/swallow precautions and POC. Pt acknowledged and confirmed understanding. SLP also notified MD Smith of pt's MBSS results, diet recs/swallow precautions and POC.  -SLP will f/u next date to ensure safety and compliance with rec'd diet and swallow precautions.     Education  Results were discussed with patient. Results were discussed with Medical Team who was in agreement with plan.     Goals:    SLP Goals        Problem: SLP Goal    Goal Priority Disciplines Outcome   SLP Goal     SLP Ongoing (interventions implemented as appropriate)   Description:  Short Term Goals:  1) Pt will utilize compensatory strategies for safe swallow for PO intake of dental soft/finely chopped/nectar thick without demonstrating overt s/s of aspiration at 2/2 observed meals with mod assist.-MET 1/9   2) MBSS to rule out aspiration of thin.- MET 1/9   3) Pt will tolerate dental soft/thin liquids po diet with no overt s/s of aspiration.                    Plan:   · Patient to be seen:  Therapy Frequency: 3 x/week   · Plan of Care expires:  02/07/18  · Plan of Care reviewed with:  patient (DINA Ravi and MD Smith )        Discharge recommendations:  nursing facility, skilled   Barriers to Discharge:  None    Time Tracking:   SLP Treatment Date:   01/09/18  Speech Start Time:  0930  Speech Stop Time:  0946     Speech Total Time (min):  16 min    SANDRINE Herzog., CF-SLP  Speech-Language Pathologist   1/9/2018

## 2018-01-09 NOTE — PLAN OF CARE
Problem: SLP Goal  Goal: SLP Goal  Short Term Goals:  1) Pt will utilize compensatory strategies for safe swallow for PO intake of dental soft/finely chopped/nectar thick without demonstrating overt s/s of aspiration at 2/2 observed meals with mod assist.-MET 1/9   2) MBSS to rule out aspiration of thin.- MET 1/9   3) Pt will tolerate dental soft/thin liquids po diet with no overt s/s of aspiration.  Outcome: Ongoing (interventions implemented as appropriate)  1/9/18: Pt participated in MBSS this AM. Pt presents with mild oropharyngeal dysphagia c/b slightly reduced oral clearance with trace-mild residuals along base of tongue s/p swallow for all consistencies and reduced tongue base retraction c/b trace-mild residuals in vallecula and pyriform sinuses s/p swallow all consistencies. However, pt was able to clear all residuals with multiple swallows. Pt demonstrated no penetration/aspiration under fluoro across all consistencies. See report for full details. SLP recs: dental soft/thin liquids po diet with utilization of multiple swallows (2-3) technique per sip/bite and other universal swallow precautions.  SANDRINE Herzog., CF-SLP  Speech-Language Pathologist

## 2018-01-09 NOTE — PROGRESS NOTES
Per Yamilex with [facility], patient has been declined. Patient needs more long term placement.     made phone call to patient's daughter, Asia to inform her patient has been denied by her [facility] and medically accepted at [facility] and [facility].   Asia expressed her disappointment with denial from [facility]. acknowledged daughter's feelings, but  informed her that when he goes to a nursing home, he can transition from SNF to long term placement there.  also encouraged to make a decision promptly to avoid losing bed at either facilities.    Asia informed  she needs to discuss this with her sisters and call  back.

## 2018-01-09 NOTE — PROGRESS NOTES
Patient has been denied for Skilled level of care by [facility].  made phone contact with patient's daughter and informed her of denial.      OSVALDO Duarte informed of denial.    Pt current with [facility] prior to admission per daughter.

## 2018-01-09 NOTE — PLAN OF CARE
Problem: Physical Therapy Goal  Goal: Physical Therapy Goal  Goals to be met by: 18     Patient will increase functional independence with mobility by performin. Supine to sit with Modified [place]  2. Sit to supine with Modified [place]  3. Sit to stand transfer with Stand-by Assistance  4. Gait  x 100 feet with Contact Guard Assistance using Rolling Walker.   5. Lower extremity exercise program x15 reps    Outcome: Ongoing (interventions implemented as appropriate)  Goals ongoing

## 2018-01-09 NOTE — PROGRESS NOTES
made phone contact with Yamilex at [facility]. She confirmed she received referral and does have beds. She will review the packet and follow up with  if the patient looks appropriate. However, she has to review it with her medical director.

## 2018-01-09 NOTE — PLAN OF CARE
Problem: Patient Care Overview  Goal: Plan of Care Review  Outcome: Ongoing (interventions implemented as appropriate)  Pt is in bed resting with no S/S of pain or distress. He is afib paced on tele, Pt is on fall precautions. He worked with PT/OT today. He will have a modified barium swallow study tomorrow. He is currently stable and will continue to monitor. Will give report to oncoming nurse.

## 2018-01-09 NOTE — PT/OT/SLP PROGRESS
Occupational Therapy   Treatment    Name: Zeyad Woodard  MRN: [id number]  Admitting Diagnosis:  The primary encounter diagnosis was ELIZABET (acute kidney injury). Diagnoses of Weakness generalized, ELIZABET (acute kidney injury), and Cardiac pacemaker in situ were also pertinent to this visit.    Recommendations:     Discharge Recommendations: nursing facility, skilled  Discharge Equipment Recommendations:   (Defer to SNF)  Barriers to discharge:  Decreased caregiver support    Subjective     Communicated with: Trav london prior to session.  Pain/Comfort:  · Pain Rating 1: 0/10  · Pain Rating Post-Intervention 1: 0/10    Objective:     Patient found with: bed alarm, telemetry, peripheral IV    General Precautions: Standard, fall   Orthopedic Precautions:N/A   Braces: N/A     Bed Mobility:    · Patient completed Scooting/Bridging with stand by assistance  · Patient completed Supine to Sit with stand by assistance and with side rail  · Patient completed Sit to Supine with stand by assistance     Functional Mobility/Transfers:  · Patient completed Sit <> Stand Transfer with minimum assistance  with  rolling walker      Activities of Daily Living:  · N/A    Patient left HOB elevated with all lines intact, call button in reach, bed alarm on and RN notified    [facility] 6 Click:  [facility] Total Score: 15    Treatment & Education:  Patient with bed mob as noted above. Patient completed sit <> stand using RW, x 3 trials using RW - min (A) to CGA for each stand. On last stand, patient able to ambulate to door and then around EOB using RW with CGA., Patient did c/o slight dizziness and required sitting rest break. Returned supine and left with BA in place.   Education:    Assessment:   Patient with improved bed mobility and functional mobility this date. Will benefit from continued skilled OT to address functional deficits.     Zeyad Woodard is a 89 y.o. male with a medical diagnosis of The primary encounter diagnosis was ELIZABET (acute  kidney injury). Diagnoses of Weakness generalized, ELIZABET (acute kidney injury), and Cardiac pacemaker in situ were also pertinent to this visit.  Performance deficits affecting function are weakness, impaired endurance, impaired self care skills, impaired functional mobilty, gait instability, impaired balance, decreased upper extremity function, decreased lower extremity function, decreased safety awareness, impaired skin.      Rehab Prognosis:  Good; patient would benefit from acute skilled OT services to address these deficits and reach maximum level of function.       Plan:     Patient to be seen 5 x/week to address the above listed problems via self-care/home management, therapeutic activities, therapeutic exercises  · Plan of Care Expires: 02/07/18  · Plan of Care Reviewed with: patient    This Plan of care has been discussed with the patient who was involved in its development and understands and is in agreement with the identified goals and treatment plan    GOALS:    Occupational Therapy Goals        Problem: Occupational Therapy Goal    Goal Priority Disciplines Outcome Interventions   Occupational Therapy Goal     OT, PT/OT Ongoing (interventions implemented as appropriate)    Description:  Goals to be met by: 2/7/18     Patient will increase functional independence with ADLs by performing:    LE Dressing with Minimal Assistance.  Grooming while standing with Contact Guard Assistance.  Toileting from toilet with Contact Guard Assistance for hygiene and clothing management.   Supine to sit with Contact Guard Assistance. --MET 1/8/18  Stand pivot transfers with Contact Guard Assistance.  Toilet transfer to toilet with Contact Guard Assistance.  Increased functional strength to WFL for self care skills and functional mobility.  Upper extremity exercise program x10 reps per handout, with independence.                       Time Tracking:     OT Date of Treatment: 01/09/18  OT Start Time: 1145  OT Stop Time:  1212  OT Total Time (min): 27 min    Billable Minutes:Therapeutic Activity 27    ALLY Mendoza/PER  1/9/2018

## 2018-01-09 NOTE — PT/OT/SLP PROGRESS
Physical Therapy Treatment    Patient Name:  Zeyad Woodard   MRN:  [id number]    Recommendations:     Discharge Recommendations:  nursing facility, skilled   Discharge Equipment Recommendations:  (defer to SNF)   Barriers to discharge: Decreased caregiver support    Assessment:     Zeyad Woodard is a 89 y.o. male admitted with a medical diagnosis of <principal problem not specified>.  He presents with the following impairments/functional limitations:  weakness, impaired endurance, impaired functional mobilty, gait instability, impaired balance, decreased lower extremity function, decreased ROM, impaired coordination good participation,remains weak and with low endurance,will benefit from SNF.    Rehab Prognosis:  Good; patient would benefit from acute skilled PT services to address these deficits and reach maximum level of function.      Recent Surgery: * No surgery found *      Plan:     During this hospitalization, patient to be seen 5 x/week to address the above listed problems via gait training, therapeutic activities, therapeutic exercises  · Plan of Care Expires:  02/07/18   Plan of Care Reviewed with: patient    Subjective     Communicated with nsg prior to session.  Patient found supine upon PT entry to room, agreeable to treatment.      Chief Complaint: ot started with diarrhea  Patient comments/goals: pt may be transferred today  Pain/Comfort:  · Pain Rating 1: 0/10    Patients cultural, spiritual, Anabaptism conflicts given the current situation: none reported    Objective:     Patient found with: bed alarm, telemetry, peripheral IV     General Precautions: Standard, fall   Orthopedic Precautions:N/A   Braces: N/A     Functional Mobility:  · Bed Mobility:     · Supine to Sit: contact guard assistance and with handrail  · Sit to Supine: contact guard assistance and minimum assistance  · Transfers:     · Sit to Stand:  minimum assistance with rolling walker  · Gait: amb ~20' X 2 with RW and CGA/Min  A  · Balance: good sitting balance      AM-PAC 6 CLICK MOBILITY  Turning over in bed (including adjusting bedclothes, sheets and blankets)?: 3  Sitting down on and standing up from a chair with arms (e.g., wheelchair, bedside commode, etc.): 3  Moving from lying on back to sitting on the side of the bed?: 3  Moving to and from a bed to a chair (including a wheelchair)?: 3  Need to walk in hospital room?: 3  Climbing 3-5 steps with a railing?: 2  Total Score: 17       Therapeutic Activities and Exercises: le supine ex's X 15 reps inc: ap,qs,hs,abd/add,slr       Patient left supine with all lines intact, call button in reach, bed alarm on and nsg notified..    GOALS: see general POC   Physical Therapy Goals        Problem: Physical Therapy Goal    Goal Priority Disciplines Outcome Goal Variances Interventions   Physical Therapy Goal     PT/OT, PT Ongoing (interventions implemented as appropriate)     Description:  Goals to be met by: 18     Patient will increase functional independence with mobility by performin. Supine to sit with Modified [place]  2. Sit to supine with Modified [place]  3. Sit to stand transfer with Stand-by Assistance  4. Gait  x 100 feet with Contact Guard Assistance using Rolling Walker.   5. Lower extremity exercise program x15 reps                     Time Tracking:     PT Received On: 18  PT Start Time: 1418     PT Stop Time: 1445  PT Total Time (min): 27 min     Billable Minutes: Gait Training 16 and Therapeutic Exercise 11    Treatment Type: Treatment  PT/PTA: PTA     PTA Visit Number: 2     Tashi Small, PTA  2018

## 2018-01-09 NOTE — PROGRESS NOTES
Patient's daughter has chosen [facility] facility.  placed call to Admit coordinator Darya informing her that daughter has chosen their facility. She informed  she submitted for authorization from [facility] and will contact the daughter to arrange a time she can sign paperwork. Darya is pending 142, D/C orders,and PPD.     made contact with Mayda to inform patient's family has chosen [facility] for SNF  and needs insurance authorization. She told  she was reviewing another case and will review this case next and follow up with .     sent 142 to [facility] via [employer] then placed call to bedside nurse Trav, PPD not done. Team paged team to request PPD.

## 2018-01-09 NOTE — PROGRESS NOTES
U Internal Medicine Progress    Subjective:     Pt with 3 loose watery voluminous stools. Patient denies n/v/abd pain. Endorses loose stools. Will get MBS this am.  Awaiting SNF at [facility]      Objective:   Last 24 Hour Vital Signs:  BP  Min: 131/63  Max: 168/73  Temp  Av.4 °F (36.9 °C)  Min: 97.5 °F (36.4 °C)  Max: 99.2 °F (37.3 °C)  Pulse  Av.7  Min: 60  Max: 81  Resp  Av.7  Min: 14  Max: 20  SpO2  Av.9 %  Min: 96 %  Max: 98 %  Body mass index is 22.08 kg/m².  I/O last 3 completed shifts:  In: 360 [P.O.:360]  Out: 500 [Urine:500]    HTN    Physical Examination:  General: Alert and awake in no apparent distress.  Oriented to person and city (not year or president)  HEENT: Atraumatic, normocephalic. EOMI, no scleral icterus, conjunctiva clear. Oral cavity is dry. No oral or pharyngeal lesions noted.   Cardiac: Device in L chest. RRR. Systolic murmur. Radial pulses synchronous and symmetric bilaterally.   Pulmonary: Lungs are clear to auscultation bilaterally. No wheeze, rales or rhonchi noted. No clubbing.   Abdomen: Bowel sounds present. Soft, non-tender to palpation. No rigidity or guarding.   Extremities: No pedal edema. No cyanosis. Capillary refill less than 2 seconds in fingertips.   Skin: No rashes noted. Sacral ulcer well healed without erythema or drainage.   Neurologic: no focal deficits     Laboratory:    Recent Labs  Lab 18  0541   WBC 12.99*   RBC 3.10*   HGB 9.4*   HCT 28.2*   *   MCV 91   MCH 30.3   MCHC 33.3       Recent Labs  Lab 18  0541      K 4.0      CO2 22*   GLU 98   BUN 27*   CREATININE 1.2       Recent Labs  Lab 18  0541   PROT 6.3   ALBUMIN 3.2*   BILITOT 0.8   AST 39   ALT 29   ALKPHOS 96     Radiology:  Imaging Results          X-Ray Chest AP Portable (Final result)  Result time 18 14:25:40    Final result by Porter A. Jacque, MD (18 14:25:40)                 Impression:        No detrimental change or  radiographic acute intrathoracic process seen on this single view.      Electronically signed by: EDILSON ANDRE MD, MD  Date:     01/06/18  Time:    14:25              Narrative:    COMPARISON: Chest radiograph 12/29/17    FINDINGS: AP portable view of the chest. Monitoring leads overlie the chest. No detrimental change. Cardiomediastinal silhouette is stable without evidence of failure. Left upper chest single lead cardiac device is unchanged. The lungs are symmetrically well inflated without large consolidation or new focal opacity. Grossly stable pericardial calcifications and bilateral pulmonary calcified granulomas. No large pneumothorax. Chronic blunting left costophrenic angle unchanged. No acute osseous process seen. Sternotomy wires again noted.                             CT Head Without Contrast (Final result)  Result time 01/06/18 13:55:43    Final result by Radha Malagon MD (01/06/18 13:55:43)                 Impression:     Findings suggestive of mild chronic progressive ischemic white matter change.  No acute findings on this exam.      Electronically signed by: RADHA MALAGON MD  Date:     01/06/18  Time:    13:55              Narrative:    Comparison: 12/29/17.    Technique: Contiguous 5 mm axial images were obtained from the vertex to the skull base without the administration of contrast.  Sagittal and coronal reformats were also submitted.      Findings: Global cerebral volume loss is present.  There is compensatory ex axial dilation of the lateral ventricles associated with this finding.  A mild degree of stable, predominantly periventricular hypodense foci are present.  There is a stable extra-axial collection centered in the vermis of the cerebellum which likely represents an arachnoid cyst.  Otherwise, there is normal brain parenchymal attenuation with no intra-axial or extra-axial mass or hemorrhage.  There is no evidence for acute ischemia or infarct.  The gray-white matter  differentiation is preserved.  The CSF containing spaces maintained normal symmetry.  The pneumatized aspect of the skull and skull base show no abnormalities.  The osseous and soft tissue structures are normal.                            Assessment:   89yo M with:    Plan:     Diarrhea  -E-lytes this am WNL  -Will get c. Dif stool studies   -Received many laxatives  -Per nurses, 4 episodes of voluminous, watery, stools with infectious odor       ELIZABET on CKD:   -On admit, Cr 1.7 (increased from 1.3 on 12/30/17)  -1L NS bolus given in ED with improvement in BP with rate afterwards   -Cr down to 1.4 and stable  now 1.2    AMS vs worsening dementia:  -acute on chronic change likely 2/2 dehydration and deconditioning  -CT head neg for acute process, CXR WNL  -afebrile, no leukocytosis  -UA without infection  -no visible sores or skin breakdown  -TSH wnl, B12 wnl  -seems to be back to baseline   -Awaiting SNF placement   -MBS this am     Elevated Troponin:  -0.037-->0.024  -patient is not complaining of chest pain currently  -EKG w/ flipped TW ant  -cards consulted - no intervention. Continue ASA 81 daily, statin , coreg 6.25 BID    Deconditioning:  -PT/OT evaluation and treat  -Social work consult to assist family with SNF placement      dvt ppx-  Heparin    dispo- SNF when approved     Code Status:     DNR    Fay Rose  [facility] Medicine  [facility] Internal Medicine Service    [facility] Medicine Hospitalist Pager numbers:   [facility] Hospitalist Medicine Team A (Kinsey/Yumiko): [phone]  [facility] Hospitalist Medicine Team B (Tyrese/Bibi):  [phone]

## 2018-01-09 NOTE — PLAN OF CARE
Problem: Occupational Therapy Goal  Goal: Occupational Therapy Goal  Goals to be met by: 2/7/18     Patient will increase functional independence with ADLs by performing:    LE Dressing with Minimal Assistance.  Grooming while standing with Contact Guard Assistance.  Toileting from toilet with Contact Guard Assistance for hygiene and clothing management.   Supine to sit with Contact Guard Assistance. --MET 1/8/18  Stand pivot transfers with Contact Guard Assistance.  Toilet transfer to toilet with Contact Guard Assistance.  Increased functional strength to [facility] for self care skills and functional mobility.  Upper extremity exercise program x10 reps per handout, with independence.      Outcome: Ongoing (interventions implemented as appropriate)  Patient with improved activity tolerance this date. Will benefit from continued skilled OT to address functional deficits.

## 2018-01-10 VITALS
HEART RATE: 83 BPM | HEIGHT: 70 IN | BODY MASS INDEX: 22.03 KG/M2 | TEMPERATURE: 100 F | SYSTOLIC BLOOD PRESSURE: 131 MMHG | RESPIRATION RATE: 18 BRPM | WEIGHT: 153.88 LBS | OXYGEN SATURATION: 97 % | DIASTOLIC BLOOD PRESSURE: 61 MMHG

## 2018-01-10 LAB
ALBUMIN SERPL BCP-MCNC: 3.2 G/DL
ALP SERPL-CCNC: 95 U/L
ALT SERPL W/O P-5'-P-CCNC: 28 U/L
ANION GAP SERPL CALC-SCNC: 7 MMOL/L
ANISOCYTOSIS BLD QL SMEAR: SLIGHT
AST SERPL-CCNC: 37 U/L
BASOPHILS # BLD AUTO: 0.01 K/UL
BASOPHILS NFR BLD: 0.1 %
BILIRUB SERPL-MCNC: 0.9 MG/DL
BUN SERPL-MCNC: 26 MG/DL
C DIFF GDH STL QL: POSITIVE
C DIFF TOX A+B STL QL IA: NEGATIVE
C DIFF TOX GENS STL QL NAA+PROBE: POSITIVE
CALCIUM SERPL-MCNC: 8.9 MG/DL
CHLORIDE SERPL-SCNC: 105 MMOL/L
CO2 SERPL-SCNC: 24 MMOL/L
CREAT SERPL-MCNC: 1.2 MG/DL
DIFFERENTIAL METHOD: ABNORMAL
EOSINOPHIL # BLD AUTO: 0.1 K/UL
EOSINOPHIL NFR BLD: 1.2 %
ERYTHROCYTE [DISTWIDTH] IN BLOOD BY AUTOMATED COUNT: 13.7 %
EST. GFR  (AFRICAN AMERICAN): >60 ML/MIN/1.73 M^2
EST. GFR  (NON AFRICAN AMERICAN): 53 ML/MIN/1.73 M^2
GLUCOSE SERPL-MCNC: 95 MG/DL
HCT VFR BLD AUTO: 27.7 %
HGB BLD-MCNC: 9.2 G/DL
HYPOCHROMIA BLD QL SMEAR: ABNORMAL
LYMPHOCYTES # BLD AUTO: 4.3 K/UL
LYMPHOCYTES NFR BLD: 50.8 %
MAGNESIUM SERPL-MCNC: 1.8 MG/DL
MCH RBC QN AUTO: 30 PG
MCHC RBC AUTO-ENTMCNC: 33.2 G/DL
MCV RBC AUTO: 90 FL
MONOCYTES # BLD AUTO: 0.8 K/UL
MONOCYTES NFR BLD: 9.3 %
NEUTROPHILS # BLD AUTO: 3.2 K/UL
NEUTROPHILS NFR BLD: 38.6 %
OVALOCYTES BLD QL SMEAR: ABNORMAL
PLATELET # BLD AUTO: 99 K/UL
PLATELET BLD QL SMEAR: ABNORMAL
PMV BLD AUTO: 9.8 FL
POIKILOCYTOSIS BLD QL SMEAR: SLIGHT
POLYCHROMASIA BLD QL SMEAR: ABNORMAL
POTASSIUM SERPL-SCNC: 4.1 MMOL/L
PROT SERPL-MCNC: 6.2 G/DL
RBC # BLD AUTO: 3.07 M/UL
SODIUM SERPL-SCNC: 136 MMOL/L
WBC # BLD AUTO: 8.45 K/UL

## 2018-01-10 PROCEDURE — G8996 SWALLOW CURRENT STATUS: HCPCS | Mod: CH

## 2018-01-10 PROCEDURE — 63600175 PHARM REV CODE 636 W HCPCS: Performed by: STUDENT IN AN ORGANIZED HEALTH CARE EDUCATION/TRAINING PROGRAM

## 2018-01-10 PROCEDURE — G8998 SWALLOW D/C STATUS: HCPCS | Mod: CH

## 2018-01-10 PROCEDURE — 92526 ORAL FUNCTION THERAPY: CPT

## 2018-01-10 PROCEDURE — 25000003 PHARM REV CODE 250: Performed by: STUDENT IN AN ORGANIZED HEALTH CARE EDUCATION/TRAINING PROGRAM

## 2018-01-10 PROCEDURE — 25000003 PHARM REV CODE 250

## 2018-01-10 PROCEDURE — 94761 N-INVAS EAR/PLS OXIMETRY MLT: CPT

## 2018-01-10 PROCEDURE — 36415 COLL VENOUS BLD VENIPUNCTURE: CPT

## 2018-01-10 PROCEDURE — 97116 GAIT TRAINING THERAPY: CPT

## 2018-01-10 PROCEDURE — 97110 THERAPEUTIC EXERCISES: CPT

## 2018-01-10 PROCEDURE — 80053 COMPREHEN METABOLIC PANEL: CPT

## 2018-01-10 PROCEDURE — G0378 HOSPITAL OBSERVATION PER HR: HCPCS

## 2018-01-10 PROCEDURE — 83735 ASSAY OF MAGNESIUM: CPT

## 2018-01-10 PROCEDURE — 85025 COMPLETE CBC W/AUTO DIFF WBC: CPT

## 2018-01-10 PROCEDURE — 97535 SELF CARE MNGMENT TRAINING: CPT

## 2018-01-10 RX ORDER — METRONIDAZOLE 500 MG/1
500 TABLET ORAL EVERY 8 HOURS
Qty: 30 TABLET | Refills: 0 | Status: ON HOLD | OUTPATIENT
Start: 2018-01-10 | End: 2018-01-15

## 2018-01-10 RX ORDER — METRONIDAZOLE 500 MG/100ML
500 INJECTION, SOLUTION INTRAVENOUS
Status: DISCONTINUED | OUTPATIENT
Start: 2018-01-10 | End: 2018-01-10

## 2018-01-10 RX ORDER — METRONIDAZOLE 500 MG/1
500 TABLET ORAL EVERY 8 HOURS
Status: DISCONTINUED | OUTPATIENT
Start: 2018-01-10 | End: 2018-01-10 | Stop reason: HOSPADM

## 2018-01-10 RX ADMIN — VITAM B12 100 MCG: 100 TAB at 09:01

## 2018-01-10 RX ADMIN — METRONIDAZOLE 500 MG: 500 TABLET ORAL at 09:01

## 2018-01-10 RX ADMIN — ASPIRIN 81 MG: 81 TABLET, COATED ORAL at 09:01

## 2018-01-10 RX ADMIN — FINASTERIDE 5 MG: 5 TABLET, FILM COATED ORAL at 09:01

## 2018-01-10 RX ADMIN — LEVOTHYROXINE SODIUM 100 MCG: 100 TABLET ORAL at 09:01

## 2018-01-10 RX ADMIN — CARVEDILOL 6.25 MG: 6.25 TABLET, FILM COATED ORAL at 09:01

## 2018-01-10 RX ADMIN — TAMSULOSIN HYDROCHLORIDE 0.4 MG: 0.4 CAPSULE ORAL at 09:01

## 2018-01-10 RX ADMIN — HEPARIN SODIUM 5000 UNITS: 5000 INJECTION, SOLUTION INTRAVENOUS; SUBCUTANEOUS at 09:01

## 2018-01-10 NOTE — PLAN OF CARE
Problem: SLP Goal  Goal: SLP Goal  Short Term Goals:  1) Pt will utilize compensatory strategies for safe swallow for PO intake of dental soft/finely chopped/nectar thick without demonstrating overt s/s of aspiration at 2/2 observed meals with mod assist.-MET 1/9   2) MBSS to rule out aspiration of thin.- MET 1/9   3) Pt will tolerate dental soft/thin liquids po diet with no overt s/s of aspiration. MET 1/10  Outcome: Outcome(s) achieved Date Met: 01/10/18  1/10/18: Pt seen this AM. Pt continues to tolerate thin liquids, puree, and dental soft textures no overt s/s of aspiration at bedside. SLP recs: con't dental soft/thin liquids po diet with utilization of multiple swallows (2-3) technique per sip/bite and other universal swallow precautions. ST services no longer required as pt has met ST goals.  SANDRINE Herzog., CF-SLP  Speech-Language Pathologist

## 2018-01-10 NOTE — PLAN OF CARE
Problem: Occupational Therapy Goal  Goal: Occupational Therapy Goal  Goals to be met by: 2/7/18     Patient will increase functional independence with ADLs by performing:    LE Dressing with Minimal Assistance.  Grooming while standing with Contact Guard Assistance.  Toileting from toilet with Contact Guard Assistance for hygiene and clothing management.   Supine to sit with Contact Guard Assistance. --MET 1/8/18  Stand pivot transfers with Contact Guard Assistance.  Toilet transfer to toilet with Contact Guard Assistance.  Increased functional strength to WFL for self care skills and functional mobility.  Upper extremity exercise program x10 reps per handout, with independence.      Outcome: Ongoing (interventions implemented as appropriate)  Patient with fair activity tolerance. Therapy still recommending SNF, as patient a fall risk and requires increased (A). Cont OT services.

## 2018-01-10 NOTE — PLAN OF CARE
[employer] denied SNF yesterday. Stool was sent for c-diff- toxin negative, antigen postive.    Future Appointments  Date Time Provider Department Center   1/16/2018 1:00 PM Tawana Trujillo MD [facility] RONNY [place] sent home health referral to [facility].  Referral sent to [facility] (from medical record- was just recently discharged from Outpatient [facility])    Called and spoke to patient's daughter to let her know patient is ready for discharge. She said she will pick him up later this evening.       01/10/18 1420   Final Note   Assessment Type Final Discharge Note   [facility]   Hospital Follow Up  Appt(s) scheduled? Yes   Discharge plans and expectations educations in teach back method with documentation complete? Yes   Right Care Referral Info   Post Acute Recommendation Home-care   Referral Type home health   Facility Name [facility]     Felicia Prince RN, CCM, CMSRN  RN Transition Navigator  [phone]

## 2018-01-10 NOTE — PT/OT/SLP PROGRESS
"Speech Language Pathology Treatment    Patient Name:  Zeyad Woodard   MRN:  [id number]  Admitting Diagnosis: Acute Kidney Injury    Recommendations:                 General Recommendations:  Follow-up not indicated  Diet recommendations:  Dental Soft, Finely chopped meat, Liquid Diet Level: Thin   Aspiration Precautions: 1 bite/sip at a time, Alternating bites/sips, Avoid talking while eating, Feed only when awake/alert, HOB to 90 degrees, Meds whole 1 at a time, Remain upright 30 minutes post meal and Small bites/sips   General Precautions: Standard, fall  Communication strategies:  none    Subjective     Pt was found in bed upon SLP entry. Pt was awake, alert, and oriented to name and . Pt was repositioned to 90 degree angle in bed. Pt agreed to participate in dysphagia tx.    Patient goals: "Yeah, I've been doing fine with my meals" per pt         Objective:     Has the patient been evaluated by SLP for swallowing?   Yes  Keep patient NPO? No   Current Respiratory Status: room air      Pt seen this AM for direct dysphagia tx. Pt continues to tolerate thin liquids via cup x4 and straw x3, puree x3, and dental soft textures x3 no overt s/s of aspiration at bedside. Pt continues to demonstrated timely mastication, timely AP transfer and good oral clearance, too.    Assessment:     Zeyad Woodard is a 89 y.o. male admitted with Acute Kidney Injury  He presents with no oropharyngeal dysphagia. SLP recs: con't dental soft/thin liquids po diet with utilization of multiple swallows (2-3) technique per sip/bite and other universal swallow precautions. ST services no longer required as pt has met ST goals. Please re-consult should pt experience any change in status.    Goals:    SLP Goals     Not on file          Multidisciplinary Problems (Resolved)        Problem: SLP Goal    Goal Priority Disciplines Outcome   SLP Goal   (Resolved)     SLP Outcome(s) achieved   Description:  Short Term Goals:  1) Pt will utilize " compensatory strategies for safe swallow for PO intake of dental soft/finely chopped/nectar thick without demonstrating overt s/s of aspiration at 2/2 observed meals with mod assist.-MET 1/9   2) MBSS to rule out aspiration of thin.- MET 1/9   3) Pt will tolerate dental soft/thin liquids po diet with no overt s/s of aspiration. MET 1/10                    Plan:     · Patient to be seen:  3 x/week   · Plan of Care expires:  02/07/18  · Plan of Care reviewed with:  patient   · SLP Follow-Up:  No       Discharge recommendations:  home with home health   Barriers to Discharge:  None    Time Tracking:     SLP Treatment Date:   01/10/18  Speech Start Time:  1003  Speech Stop Time:  1015     Speech Total Time (min):  12 min    Billable Minutes: Treatment Swallowing Dysfunction 12    SARAH BETH Herzog, CF-SLP  Speech-Language Pathologist   1/10/2018

## 2018-01-10 NOTE — PT/OT/SLP PROGRESS
Physical Therapy Treatment    Patient Name:  Zeyad Woodard   MRN:  [id number]    Recommendations:     Discharge Recommendations:  nursing facility, skilled   Discharge Equipment Recommendations:  (defer to SNF)   Barriers to discharge: Decreased caregiver support    Assessment:     Zeyad Woodard is a 89 y.o. male admitted with a medical diagnosis of <principal problem not specified>.  He presents with the following impairments/functional limitations:  weakness, impaired endurance, impaired functional mobilty, gait instability, impaired balance, decreased lower extremity function, decreased ROM, impaired coordination pt w/o change and good participation,will benefit from SNF.    Rehab Prognosis:  Good; patient would benefit from acute skilled PT services to address these deficits and reach maximum level of function.      Recent Surgery: * No surgery found *      Plan:     During this hospitalization, patient to be seen 5 x/week to address the above listed problems via gait training, therapeutic activities, therapeutic exercises  · Plan of Care Expires:  02/07/18   Plan of Care Reviewed with: patient    Subjective     Communicated with nsg prior to session.  Patient found supine upon PT entry to room, agreeable to treatment.      Chief Complaint: loose bowels  Patient comments/goals: pt is anxious to get better  Pain/Comfort:  · Pain Rating 1: 0/10    Patients cultural, spiritual, Scientologist conflicts given the current situation: none reported    Objective:     Patient found with: bed alarm, telemetry     General Precautions: Standard, fall   Orthopedic Precautions:N/A   Braces: N/A     Functional Mobility:  · Bed Mobility:     · Supine to Sit: stand by assistance and with handrail  · Sit to Supine: contact guard assistance and minimum assistance  · Transfers:     · Sit to Stand:  contact guard assistance and minimum assistance with rolling walker  · Gait: amb ~ 25' X 2 with RW and Min A  · Balance: Good sitting  balance      AM-PAC 6 CLICK MOBILITY  Turning over in bed (including adjusting bedclothes, sheets and blankets)?: 3  Sitting down on and standing up from a chair with arms (e.g., wheelchair, bedside commode, etc.): 3  Moving from lying on back to sitting on the side of the bed?: 3  Moving to and from a bed to a chair (including a wheelchair)?: 3  Need to walk in hospital room?: 3  Climbing 3-5 steps with a railing?: 2  Total Score: 17       Therapeutic Activities and Exercises: le supine ex's X 15 reps inc: ap,qs,hs,abd/add,slr       Patient left supine with all lines intact, call button in reach, bed alarm on and nsg notified..    GOALS: see general POC   Physical Therapy Goals        Problem: Physical Therapy Goal    Goal Priority Disciplines Outcome Goal Variances Interventions   Physical Therapy Goal     PT/OT, PT Ongoing (interventions implemented as appropriate)     Description:  Goals to be met by: 18     Patient will increase functional independence with mobility by performin. Supine to sit with Modified [place]  2. Sit to supine with Modified [place]  3. Sit to stand transfer with Stand-by Assistance  4. Gait  x 100 feet with Contact Guard Assistance using Rolling Walker.   5. Lower extremity exercise program x15 reps  MET 1/10/18                    Time Tracking:     PT Received On: 01/10/18  PT Start Time: 1057     PT Stop Time: 1124  PT Total Time (min): 27 min     Billable Minutes: Gait Training 16 and Therapeutic Exercise 11    Treatment Type: Treatment  PT/PTA: PTA     PTA Visit Number: 3     Tashi Small, SOTO  01/10/2018

## 2018-01-10 NOTE — PLAN OF CARE
Problem: Patient Care Overview  Goal: Plan of Care Review  Outcome: Ongoing (interventions implemented as appropriate)  Plan of care reviewed with patient. Patient verbalized complete understanding. Fall precautions maintained. Bed in lowest position, locked, call light within reach and bed alarm is on. Side rails up x's 2 with slip resistant socks on. Nurse instructed patient to notify staff for any assistance and the patient verbalized complete understanding. Patient on telemetry throughout shift with no ectopy noted. Will continue to monitor.

## 2018-01-10 NOTE — DISCHARGE INSTRUCTIONS
Metronidazole tablets or capsules (English) View Edit Remove  Weakness (Uncertain Cause) (English) View Edit Remove

## 2018-01-10 NOTE — PROGRESS NOTES
Spoke to MD, awaiting MD to write dc orders with home health.    Felicia Prince, RN, CCM, CMSRN  RN Transition Navigator  [phone]

## 2018-01-10 NOTE — PLAN OF CARE
Problem: Physical Therapy Goal  Goal: Physical Therapy Goal  Goals to be met by: 18     Patient will increase functional independence with mobility by performin. Supine to sit with Modified [place]  2. Sit to supine with Modified [place]  3. Sit to stand transfer with Stand-by Assistance  4. Gait  x 100 feet with Contact Guard Assistance using Rolling Walker.   5. Lower extremity exercise program x15 reps  MET 1/10/18  Outcome: Ongoing (interventions implemented as appropriate)  Goal 5 met

## 2018-01-10 NOTE — PT/OT/SLP PROGRESS
Occupational Therapy   Treatment    Name: Zeyad Woodard  MRN: [id number]  Admitting Diagnosis:  ELIZABET (acute kidney injury)       Recommendations:     Discharge Recommendations: nursing facility, skilled  Discharge Equipment Recommendations:  none  Barriers to discharge:  Decreased caregiver support    Subjective     Communicated with: Trav london prior to session.  Pain/Comfort:  · Pain Rating 1: 0/10  · Pain Rating Post-Intervention 1: 0/10    Objective:     Patient found with: bed alarm, telemetry    General Precautions: Standard, fall; Contact  Orthopedic Precautions:N/A   Braces: N/A     Bed Mobility:    · Patient completed Scooting/Bridging with stand by assistance  · Patient completed Supine to Sit with stand by assistance and with side rail  · Patient completed Sit to Supine with stand by assistance     Functional Mobility/Transfers:  · Patient completed Sit <> Stand Transfer with minimum assistance  with  rolling walker     Activities of Daily Living:  · Grooming: minimum assistance to rinse dentures at sink  · Toileting: maximal assistance      Patient left supine with all lines intact, call button in reach, bed alarm on and RN notified    [facility] 6 Click:  [facility] Total Score: 15    Treatment & Education:  On arrival, patient attempting to use HH urinal from bed level - requires increased time and still unable to urinate. Bed mob as above. Completed oral care and facial hygiene with min (A), but required min-mod (A) for sitting balance. Patient noted to have difficulty maintaining balance when using BUEs for tasks. Patient stood from EOB using RW with min (A) and stepped to R with CGA. Pad on bed noted to be saturated with urine. Upon returning supine, patient again attempted to use HH urinal.   Education:    Assessment:   Patient with fair activity tolerance. Therapy still recommending SNF, as patient a fall risk and requires increased (A). Cont OT services.    Zeyad Woodard is a 89 y.o. male with a  medical diagnosis of ELIZABET (acute kidney injury).  Performance deficits affecting function are weakness, impaired endurance, impaired self care skills, impaired functional mobilty, gait instability, impaired balance, decreased upper extremity function, decreased lower extremity function, impaired cognition, decreased safety awareness, decreased ROM, impaired coordination, impaired fine motor.      Rehab Prognosis:  Fair; patient would benefit from acute skilled OT services to address these deficits and reach maximum level of function.       Plan:     Patient to be seen 5 x/week to address the above listed problems via self-care/home management, therapeutic activities, therapeutic exercises  · Plan of Care Expires: 02/07/18  · Plan of Care Reviewed with: patient    This Plan of care has been discussed with the patient who was involved in its development and understands and is in agreement with the identified goals and treatment plan    GOALS:    Occupational Therapy Goals        Problem: Occupational Therapy Goal    Goal Priority Disciplines Outcome Interventions   Occupational Therapy Goal     OT, PT/OT Ongoing (interventions implemented as appropriate)    Description:  Goals to be met by: 2/7/18     Patient will increase functional independence with ADLs by performing:    LE Dressing with Minimal Assistance.  Grooming while standing with Contact Guard Assistance.  Toileting from toilet with Contact Guard Assistance for hygiene and clothing management.   Supine to sit with Contact Guard Assistance. --MET 1/8/18  Stand pivot transfers with Contact Guard Assistance.  Toilet transfer to toilet with Contact Guard Assistance.  Increased functional strength to WFL for self care skills and functional mobility.  Upper extremity exercise program x10 reps per handout, with independence.                       Time Tracking:     OT Date of Treatment: 01/10/18  OT Start Time: 1444  OT Stop Time: 1517  OT Total Time (min): 33  min    Billable Minutes:Self Care/Home Management 33    ALLY Mendoza/PER  1/10/2018

## 2018-01-10 NOTE — PROGRESS NOTES
U Internal Medicine Progress    Subjective:     Patient denies n/v/abd pain. Endorses loose stools. Will get MBS: no aspiration, dental soft. PT/OT: skilled.  Denied SNF at [facility], will go home with [facility].   C.dif ag pos      Objective:   Last 24 Hour Vital Signs:  BP  Min: 132/63  Max: 144/61  Temp  Av.6 °F (37 °C)  Min: 97.6 °F (36.4 °C)  Max: 99.3 °F (37.4 °C)  Pulse  Av.4  Min: 72  Max: 89  Resp  Av.8  Min: 12  Max: 20  SpO2  Av.4 %  Min: 95 %  Max: 98 %  Body mass index is 22.08 kg/m².  I/O last 3 completed shifts:  In: 240 [P.O.:240]  Out: -     HTN    Physical Examination:  General: Alert and awake in no apparent distress.  Oriented to person and city (not year or president)  HEENT: Atraumatic, normocephalic. EOMI, no scleral icterus, conjunctiva clear. Oral cavity is dry. No oral or pharyngeal lesions noted.   Cardiac: Device in L chest. RRR. Systolic murmur. Radial pulses synchronous and symmetric bilaterally.   Pulmonary: Lungs are clear to auscultation bilaterally. No wheeze, rales or rhonchi noted. No clubbing.   Abdomen: Bowel sounds present. Soft, non-tender to palpation. No rigidity or guarding.   Extremities: No pedal edema. No cyanosis. Capillary refill less than 2 seconds in fingertips.   Skin: No rashes noted. Sacral ulcer well healed without erythema or drainage.   Neurologic: no focal deficits     Laboratory:    Recent Labs  Lab 01/10/18  0519   WBC 8.45   RBC 3.07*   HGB 9.2*   HCT 27.7*   PLT 99*   MCV 90   MCH 30.0   MCHC 33.2       Recent Labs  Lab 01/10/18  0519      K 4.1      CO2 24   GLU 95   BUN 26*   CREATININE 1.2       Recent Labs  Lab 01/10/18  0519   PROT 6.2   ALBUMIN 3.2*   BILITOT 0.9   AST 37   ALT 28   ALKPHOS 95     Radiology:  Imaging Results          Fl Modified Barium Swallow Speech (Final result)  Result time 18 10:38:32    Final result by Hunter A. Austen, DO (18 10:38:32)                 Impression:     No  evidence for laryngeal penetration or tracheal aspiration of orally ingested barium consistencies. Please see the official speech pathologist report for further details.      Electronically signed by: RYANN GRECO DO  Date:     01/09/18  Time:    10:38              Narrative:    Procedure: Fluoroscopic guided oropharyngeal swallow    Technique: Fluoroscopic assistance provided for speech pathologist during oropharyngeal swallow.Due to technical difficulties videotaping was unable to be performed.    Fluoroscopy Time: 3.47 minutes         Comparison: None        Findings: No evidence for laryngeal penetration or tracheal aspiration of orally ingested barium consistencies.                             X-Ray Chest AP Portable (Final result)  Result time 01/06/18 14:25:40    Final result by Edilson Santillan MD (01/06/18 14:25:40)                 Impression:        No detrimental change or radiographic acute intrathoracic process seen on this single view.      Electronically signed by: EDILSON SANTILLAN MD, MD  Date:     01/06/18  Time:    14:25              Narrative:    COMPARISON: Chest radiograph 12/29/17    FINDINGS: AP portable view of the chest. Monitoring leads overlie the chest. No detrimental change. Cardiomediastinal silhouette is stable without evidence of failure. Left upper chest single lead cardiac device is unchanged. The lungs are symmetrically well inflated without large consolidation or new focal opacity. Grossly stable pericardial calcifications and bilateral pulmonary calcified granulomas. No large pneumothorax. Chronic blunting left costophrenic angle unchanged. No acute osseous process seen. Sternotomy wires again noted.                             CT Head Without Contrast (Final result)  Result time 01/06/18 13:55:43    Final result by Radha Malagon MD (01/06/18 13:55:43)                 Impression:     Findings suggestive of mild chronic progressive ischemic white matter change.  No acute findings  on this exam.      Electronically signed by: BOBBI HICKMAN MD  Date:     01/06/18  Time:    13:55              Narrative:    Comparison: 12/29/17.    Technique: Contiguous 5 mm axial images were obtained from the vertex to the skull base without the administration of contrast.  Sagittal and coronal reformats were also submitted.      Findings: Global cerebral volume loss is present.  There is compensatory ex axial dilation of the lateral ventricles associated with this finding.  A mild degree of stable, predominantly periventricular hypodense foci are present.  There is a stable extra-axial collection centered in the vermis of the cerebellum which likely represents an arachnoid cyst.  Otherwise, there is normal brain parenchymal attenuation with no intra-axial or extra-axial mass or hemorrhage.  There is no evidence for acute ischemia or infarct.  The gray-white matter differentiation is preserved.  The CSF containing spaces maintained normal symmetry.  The pneumatized aspect of the skull and skull base show no abnormalities.  The osseous and soft tissue structures are normal.                            Assessment:   89yo M with:    Plan:     C. Dif Colitis   -E-lytes this am WNL  -C. Dif antigen positive, PCR pending   -Per nurses, 4 episodes of voluminous, watery, stools with infectious odor   -Flagyl 500 IV TID       ELIZABET on CKD:   -On admit, Cr 1.7 (increased from 1.3 on 12/30/17)  -1L NS bolus given in ED with improvement in BP with rate afterwards   -Cr down to 1.4 and stable  now 1.2 again today     AMS vs worsening dementia:  -acute on chronic change likely 2/2 dehydration and deconditioning  -CT head neg for acute process, CXR WNL  -afebrile, no leukocytosis  -UA without infection  -no visible sores or skin breakdown  -TSH wnl, B12 wnl  -seems to be back to baseline   -Awaiting SNF placement   -MBS: no aspiration. Dental soft diet.    Elevated Troponin:  -0.037-->0.024  -patient is not complaining of  chest pain currently  -EKG w/ flipped TW ant  -cards consulted - no intervention. Continue ASA 81 daily, statin , coreg 6.25 BID    Deconditioning:  -PT/OT evaluation and treat  -Social work consult to assist family with SNF placement    Thrombocytopenia  -113 on admit to   -Will monitor, on heparin 5000 units DVT ppx    -Likely chronic       dvt ppx-  Heparin  dispo- HH    Code Status:     DNR    Fay Rose  [facility]  [facility] Service    [facility] Hospitalist Pager numbers:   [facility] Hospitalist Medicine Team A (Kinsey/Yumiko): [phone]  [facility] Hospitalist Medicine Team B (Tyrese/Bibi):  [phone]

## 2018-01-11 ENCOUNTER — HOSPITAL ENCOUNTER (INPATIENT)
Facility: HOSPITAL | Age: 83
LOS: 3 days | Discharge: HOME | DRG: 865 | End: 2018-01-15
Attending: EMERGENCY MEDICINE | Admitting: INTERNAL MEDICINE
Payer: MEDICARE

## 2018-01-11 ENCOUNTER — OUTPATIENT CASE MANAGEMENT (OUTPATIENT)
Dept: ADMINISTRATIVE | Facility: OTHER | Age: 83
End: 2018-01-11

## 2018-01-11 ENCOUNTER — LAB VISIT (OUTPATIENT)
Dept: LAB | Facility: HOSPITAL | Age: 83
End: 2018-01-11
Attending: INTERNAL MEDICINE
Payer: MEDICARE

## 2018-01-11 DIAGNOSIS — R41.82 ALTERED MENTAL STATUS, UNSPECIFIED ALTERED MENTAL STATUS TYPE: Primary | ICD-10-CM

## 2018-01-11 DIAGNOSIS — A04.72 C. DIFFICILE COLITIS: ICD-10-CM

## 2018-01-11 DIAGNOSIS — I48.20 CHRONIC ATRIAL FIBRILLATION: ICD-10-CM

## 2018-01-11 DIAGNOSIS — G93.40 ACUTE ENCEPHALOPATHY: ICD-10-CM

## 2018-01-11 DIAGNOSIS — I63.9 STROKE: ICD-10-CM

## 2018-01-11 DIAGNOSIS — J11.1 FLU: ICD-10-CM

## 2018-01-11 DIAGNOSIS — I50.32 CHRONIC DIASTOLIC HEART FAILURE: Primary | ICD-10-CM

## 2018-01-11 DIAGNOSIS — N17.9 AKI (ACUTE KIDNEY INJURY): ICD-10-CM

## 2018-01-11 LAB
ALBUMIN SERPL BCP-MCNC: 3.3 G/DL
ALP SERPL-CCNC: 136 U/L
ALT SERPL W/O P-5'-P-CCNC: 57 U/L
ANION GAP SERPL CALC-SCNC: 11 MMOL/L
ANION GAP SERPL CALC-SCNC: 8 MMOL/L
ANISOCYTOSIS BLD QL SMEAR: SLIGHT
AST SERPL-CCNC: 84 U/L
BASOPHILS # BLD AUTO: 0.01 K/UL
BASOPHILS # BLD AUTO: 0.02 K/UL
BASOPHILS NFR BLD: 0.1 %
BASOPHILS NFR BLD: 0.3 %
BILIRUB SERPL-MCNC: 0.5 MG/DL
BUN SERPL-MCNC: 35 MG/DL
BUN SERPL-MCNC: 44 MG/DL
CALCIUM SERPL-MCNC: 8.8 MG/DL
CALCIUM SERPL-MCNC: 9.2 MG/DL
CHLORIDE SERPL-SCNC: 104 MMOL/L
CHLORIDE SERPL-SCNC: 106 MMOL/L
CHOLEST SERPL-MCNC: 57 MG/DL
CHOLEST/HDLC SERPL: 2 {RATIO}
CO2 SERPL-SCNC: 24 MMOL/L
CO2 SERPL-SCNC: 25 MMOL/L
CREAT SERPL-MCNC: 1.6 MG/DL
CREAT SERPL-MCNC: 1.8 MG/DL (ref 0.5–1.4)
CREAT SERPL-MCNC: 1.9 MG/DL
DIFFERENTIAL METHOD: ABNORMAL
DIFFERENTIAL METHOD: ABNORMAL
EOSINOPHIL # BLD AUTO: 0 K/UL
EOSINOPHIL # BLD AUTO: 0 K/UL
EOSINOPHIL NFR BLD: 0.1 %
EOSINOPHIL NFR BLD: 0.3 %
ERYTHROCYTE [DISTWIDTH] IN BLOOD BY AUTOMATED COUNT: 13.5 %
ERYTHROCYTE [DISTWIDTH] IN BLOOD BY AUTOMATED COUNT: 13.9 %
EST. GFR  (AFRICAN AMERICAN): 35 ML/MIN/1.73 M^2
EST. GFR  (AFRICAN AMERICAN): 43.2 ML/MIN/1.73 M^2
EST. GFR  (NON AFRICAN AMERICAN): 30 ML/MIN/1.73 M^2
EST. GFR  (NON AFRICAN AMERICAN): 37.4 ML/MIN/1.73 M^2
ETHANOL SERPL-MCNC: <10 MG/DL
GLUCOSE SERPL-MCNC: 118 MG/DL
GLUCOSE SERPL-MCNC: 156 MG/DL
HCT VFR BLD AUTO: 29.5 %
HCT VFR BLD AUTO: 31.1 %
HDLC SERPL-MCNC: 29 MG/DL
HDLC SERPL: 50.9 %
HGB BLD-MCNC: 10.1 G/DL
HGB BLD-MCNC: 9.6 G/DL
IMM GRANULOCYTES # BLD AUTO: 0.02 K/UL
IMM GRANULOCYTES NFR BLD AUTO: 0.3 %
INR PPP: 1.1
LDLC SERPL CALC-MCNC: 10 MG/DL
LYMPHOCYTES # BLD AUTO: 3.8 K/UL
LYMPHOCYTES # BLD AUTO: 6.2 K/UL
LYMPHOCYTES NFR BLD: 50.5 %
LYMPHOCYTES NFR BLD: 61.5 %
MCH RBC QN AUTO: 29.9 PG
MCH RBC QN AUTO: 30.1 PG
MCHC RBC AUTO-ENTMCNC: 32.5 G/DL
MCHC RBC AUTO-ENTMCNC: 32.5 G/DL
MCV RBC AUTO: 92 FL
MCV RBC AUTO: 93 FL
MONOCYTES # BLD AUTO: 0.9 K/UL
MONOCYTES # BLD AUTO: 0.9 K/UL
MONOCYTES NFR BLD: 11.8 %
MONOCYTES NFR BLD: 8.7 %
NEUTROPHILS # BLD AUTO: 2.8 K/UL
NEUTROPHILS # BLD AUTO: 3 K/UL
NEUTROPHILS NFR BLD: 29.6 %
NEUTROPHILS NFR BLD: 36.8 %
NONHDLC SERPL-MCNC: 28 MG/DL
NRBC BLD-RTO: 0 /100 WBC
OVALOCYTES BLD QL SMEAR: ABNORMAL
PLATELET # BLD AUTO: 111 K/UL
PLATELET # BLD AUTO: 113 K/UL
PLATELET BLD QL SMEAR: ABNORMAL
PMV BLD AUTO: 10.2 FL
PMV BLD AUTO: 11 FL
POC PTINR: 1.3 (ref 0.9–1.2)
POC PTWBT: 16 SEC (ref 9.7–14.3)
POCT GLUCOSE: 126 MG/DL (ref 70–110)
POIKILOCYTOSIS BLD QL SMEAR: SLIGHT
POLYCHROMASIA BLD QL SMEAR: ABNORMAL
POTASSIUM SERPL-SCNC: 4.1 MMOL/L
POTASSIUM SERPL-SCNC: 4.1 MMOL/L
PROT SERPL-MCNC: 6.8 G/DL
PROTHROMBIN TIME: 11.5 SEC
RBC # BLD AUTO: 3.21 M/UL
RBC # BLD AUTO: 3.36 M/UL
SAMPLE: ABNORMAL
SAMPLE: ABNORMAL
SODIUM SERPL-SCNC: 139 MMOL/L
SODIUM SERPL-SCNC: 139 MMOL/L
TRIGL SERPL-MCNC: 90 MG/DL
TSH SERPL DL<=0.005 MIU/L-ACNC: 0.4 UIU/ML
WBC # BLD AUTO: 10.04 K/UL
WBC # BLD AUTO: 7.53 K/UL

## 2018-01-11 PROCEDURE — 85610 PROTHROMBIN TIME: CPT

## 2018-01-11 PROCEDURE — 80053 COMPREHEN METABOLIC PANEL: CPT

## 2018-01-11 PROCEDURE — 82962 GLUCOSE BLOOD TEST: CPT

## 2018-01-11 PROCEDURE — 80048 BASIC METABOLIC PNL TOTAL CA: CPT

## 2018-01-11 PROCEDURE — 63600175 PHARM REV CODE 636 W HCPCS: Performed by: EMERGENCY MEDICINE

## 2018-01-11 PROCEDURE — 11000001 HC ACUTE MED/SURG PRIVATE ROOM

## 2018-01-11 PROCEDURE — 96361 HYDRATE IV INFUSION ADD-ON: CPT

## 2018-01-11 PROCEDURE — 99285 EMERGENCY DEPT VISIT HI MDM: CPT | Mod: 25

## 2018-01-11 PROCEDURE — 96376 TX/PRO/DX INJ SAME DRUG ADON: CPT

## 2018-01-11 PROCEDURE — 96374 THER/PROPH/DIAG INJ IV PUSH: CPT

## 2018-01-11 PROCEDURE — 82565 ASSAY OF CREATININE: CPT

## 2018-01-11 PROCEDURE — 80320 DRUG SCREEN QUANTALCOHOLS: CPT

## 2018-01-11 PROCEDURE — G0425 INPT/ED TELECONSULT30: HCPCS | Mod: GT,,, | Performed by: PSYCHIATRY & NEUROLOGY

## 2018-01-11 PROCEDURE — 93005 ELECTROCARDIOGRAM TRACING: CPT

## 2018-01-11 PROCEDURE — G0378 HOSPITAL OBSERVATION PER HR: HCPCS

## 2018-01-11 PROCEDURE — 25000003 PHARM REV CODE 250: Performed by: EMERGENCY MEDICINE

## 2018-01-11 PROCEDURE — 80061 LIPID PANEL: CPT

## 2018-01-11 PROCEDURE — 63600175 PHARM REV CODE 636 W HCPCS

## 2018-01-11 PROCEDURE — 85025 COMPLETE CBC W/AUTO DIFF WBC: CPT | Mod: 91

## 2018-01-11 PROCEDURE — 84443 ASSAY THYROID STIM HORMONE: CPT

## 2018-01-11 PROCEDURE — 85025 COMPLETE CBC W/AUTO DIFF WBC: CPT

## 2018-01-11 PROCEDURE — 87040 BLOOD CULTURE FOR BACTERIA: CPT

## 2018-01-11 RX ORDER — ASPIRIN 300 MG/1
300 SUPPOSITORY RECTAL
Status: DISCONTINUED | OUTPATIENT
Start: 2018-01-11 | End: 2018-01-11

## 2018-01-11 RX ORDER — ASPIRIN 325 MG
325 TABLET, DELAYED RELEASE (ENTERIC COATED) ORAL
Status: COMPLETED | OUTPATIENT
Start: 2018-01-11 | End: 2018-01-11

## 2018-01-11 RX ORDER — NALOXONE HCL 0.4 MG/ML
0.4 VIAL (ML) INJECTION
Status: COMPLETED | OUTPATIENT
Start: 2018-01-11 | End: 2018-01-11

## 2018-01-11 RX ORDER — NALOXONE HCL 0.4 MG/ML
2 VIAL (ML) INJECTION
Status: COMPLETED | OUTPATIENT
Start: 2018-01-11 | End: 2018-01-11

## 2018-01-11 RX ORDER — NALOXONE HCL 0.4 MG/ML
VIAL (ML) INJECTION
Status: COMPLETED
Start: 2018-01-11 | End: 2018-01-11

## 2018-01-11 RX ADMIN — NALOXONE HYDROCHLORIDE 2 MG: 0.4 INJECTION, SOLUTION INTRAMUSCULAR; INTRAVENOUS; SUBCUTANEOUS at 10:01

## 2018-01-11 RX ADMIN — Medication 2 MG: at 10:01

## 2018-01-11 RX ADMIN — NALOXONE HYDROCHLORIDE 0.4 MG: 0.4 INJECTION, SOLUTION INTRAMUSCULAR; INTRAVENOUS; SUBCUTANEOUS at 09:01

## 2018-01-11 RX ADMIN — SODIUM CHLORIDE 500 ML: 900 INJECTION, SOLUTION INTRAVENOUS at 10:01

## 2018-01-11 RX ADMIN — REGULAR STRENGTH 325 MG: 325 TABLET ORAL at 11:01

## 2018-01-11 NOTE — PROGRESS NOTES
Please note this patient was not enrolled into [facility] at this time. Patient recently/1-4-18 declined OPCM services.    Please contact [facility] at ext. [phone] with any questions.    Thank you,    Bre Lerma

## 2018-01-12 ENCOUNTER — TELEPHONE (OUTPATIENT)
Dept: FAMILY MEDICINE | Facility: CLINIC | Age: 83
End: 2018-01-12

## 2018-01-12 PROBLEM — R41.82 ALTERED MENTAL STATUS: Status: ACTIVE | Noted: 2018-01-12

## 2018-01-12 PROBLEM — A04.72 C. DIFFICILE COLITIS: Status: ACTIVE | Noted: 2018-01-12

## 2018-01-12 LAB
ALBUMIN SERPL BCP-MCNC: 3.2 G/DL
ALP SERPL-CCNC: 134 U/L
ALT SERPL W/O P-5'-P-CCNC: 57 U/L
ANION GAP SERPL CALC-SCNC: 10 MMOL/L
ANISOCYTOSIS BLD QL SMEAR: SLIGHT
AST SERPL-CCNC: 87 U/L
BACTERIA #/AREA URNS HPF: NORMAL /HPF
BASOPHILS # BLD AUTO: 0.01 K/UL
BASOPHILS NFR BLD: 0.1 %
BILIRUB SERPL-MCNC: 0.5 MG/DL
BILIRUB UR QL STRIP: NEGATIVE
BUN SERPL-MCNC: 44 MG/DL
CALCIUM SERPL-MCNC: 8.6 MG/DL
CHLORIDE SERPL-SCNC: 106 MMOL/L
CLARITY UR: CLEAR
CO2 SERPL-SCNC: 24 MMOL/L
COLOR UR: ABNORMAL
CREAT SERPL-MCNC: 1.7 MG/DL
DIFFERENTIAL METHOD: ABNORMAL
EOSINOPHIL # BLD AUTO: 0 K/UL
EOSINOPHIL NFR BLD: 0.1 %
ERYTHROCYTE [DISTWIDTH] IN BLOOD BY AUTOMATED COUNT: 13.8 %
EST. GFR  (AFRICAN AMERICAN): 40 ML/MIN/1.73 M^2
EST. GFR  (NON AFRICAN AMERICAN): 35 ML/MIN/1.73 M^2
FLUAV AG SPEC QL IA: NEGATIVE
FLUBV AG SPEC QL IA: POSITIVE
GLUCOSE SERPL-MCNC: 112 MG/DL
GLUCOSE UR QL STRIP: NEGATIVE
HCT VFR BLD AUTO: 30.3 %
HGB BLD-MCNC: 9.7 G/DL
HGB UR QL STRIP: ABNORMAL
HYALINE CASTS #/AREA URNS LPF: 0 /LPF
KETONES UR QL STRIP: NEGATIVE
LEUKOCYTE ESTERASE UR QL STRIP: NEGATIVE
LYMPHOCYTES # BLD AUTO: 4.7 K/UL
LYMPHOCYTES NFR BLD: 46.3 %
MCH RBC QN AUTO: 29.5 PG
MCHC RBC AUTO-ENTMCNC: 32 G/DL
MCV RBC AUTO: 92 FL
MICROSCOPIC COMMENT: NORMAL
MONOCYTES # BLD AUTO: 1 K/UL
MONOCYTES NFR BLD: 9.7 %
NEUTROPHILS # BLD AUTO: 4.4 K/UL
NEUTROPHILS NFR BLD: 43.8 %
NITRITE UR QL STRIP: NEGATIVE
OVALOCYTES BLD QL SMEAR: ABNORMAL
PH UR STRIP: 6 [PH] (ref 5–8)
PLATELET # BLD AUTO: 101 K/UL
PLATELET BLD QL SMEAR: ABNORMAL
PMV BLD AUTO: 9.7 FL
POIKILOCYTOSIS BLD QL SMEAR: SLIGHT
POLYCHROMASIA BLD QL SMEAR: ABNORMAL
POTASSIUM SERPL-SCNC: 3.9 MMOL/L
PROT SERPL-MCNC: 6.6 G/DL
PROT UR QL STRIP: ABNORMAL
RBC # BLD AUTO: 3.29 M/UL
RBC #/AREA URNS HPF: 1 /HPF (ref 0–4)
SODIUM SERPL-SCNC: 140 MMOL/L
SP GR UR STRIP: 1.02 (ref 1–1.03)
SPECIMEN SOURCE: ABNORMAL
URN SPEC COLLECT METH UR: ABNORMAL
UROBILINOGEN UR STRIP-ACNC: NEGATIVE EU/DL
VANCOMYCIN TROUGH SERPL-MCNC: <1.1 UG/ML
WBC # BLD AUTO: 10.04 K/UL
WBC #/AREA URNS HPF: 1 /HPF (ref 0–5)

## 2018-01-12 PROCEDURE — S0073 INJECTION, AZTREONAM, 500 MG: HCPCS | Performed by: INTERNAL MEDICINE

## 2018-01-12 PROCEDURE — 97530 THERAPEUTIC ACTIVITIES: CPT

## 2018-01-12 PROCEDURE — 81000 URINALYSIS NONAUTO W/SCOPE: CPT

## 2018-01-12 PROCEDURE — G8997 SWALLOW GOAL STATUS: HCPCS | Mod: CI

## 2018-01-12 PROCEDURE — 63600175 PHARM REV CODE 636 W HCPCS: Performed by: INTERNAL MEDICINE

## 2018-01-12 PROCEDURE — 85025 COMPLETE CBC W/AUTO DIFF WBC: CPT

## 2018-01-12 PROCEDURE — 97161 PT EVAL LOW COMPLEX 20 MIN: CPT

## 2018-01-12 PROCEDURE — G8996 SWALLOW CURRENT STATUS: HCPCS | Mod: CJ

## 2018-01-12 PROCEDURE — G8979 MOBILITY GOAL STATUS: HCPCS | Mod: CJ

## 2018-01-12 PROCEDURE — 80202 ASSAY OF VANCOMYCIN: CPT

## 2018-01-12 PROCEDURE — G8978 MOBILITY CURRENT STATUS: HCPCS | Mod: CK

## 2018-01-12 PROCEDURE — 94761 N-INVAS EAR/PLS OXIMETRY MLT: CPT

## 2018-01-12 PROCEDURE — 36415 COLL VENOUS BLD VENIPUNCTURE: CPT

## 2018-01-12 PROCEDURE — 25000003 PHARM REV CODE 250: Performed by: STUDENT IN AN ORGANIZED HEALTH CARE EDUCATION/TRAINING PROGRAM

## 2018-01-12 PROCEDURE — 87086 URINE CULTURE/COLONY COUNT: CPT

## 2018-01-12 PROCEDURE — 25000003 PHARM REV CODE 250: Performed by: INTERNAL MEDICINE

## 2018-01-12 PROCEDURE — 87400 INFLUENZA A/B EACH AG IA: CPT

## 2018-01-12 PROCEDURE — G8987 SELF CARE CURRENT STATUS: HCPCS | Mod: CL

## 2018-01-12 PROCEDURE — 80053 COMPREHEN METABOLIC PANEL: CPT

## 2018-01-12 PROCEDURE — 92610 EVALUATE SWALLOWING FUNCTION: CPT

## 2018-01-12 PROCEDURE — G8988 SELF CARE GOAL STATUS: HCPCS | Mod: CJ

## 2018-01-12 PROCEDURE — 97165 OT EVAL LOW COMPLEX 30 MIN: CPT

## 2018-01-12 PROCEDURE — 87040 BLOOD CULTURE FOR BACTERIA: CPT

## 2018-01-12 PROCEDURE — 11000001 HC ACUTE MED/SURG PRIVATE ROOM

## 2018-01-12 RX ORDER — OSELTAMIVIR PHOSPHATE 30 MG/1
30 CAPSULE ORAL DAILY
Status: DISCONTINUED | OUTPATIENT
Start: 2018-01-12 | End: 2018-01-15 | Stop reason: HOSPADM

## 2018-01-12 RX ORDER — OSELTAMIVIR PHOSPHATE 75 MG/1
75 CAPSULE ORAL 2 TIMES DAILY
Status: DISCONTINUED | OUTPATIENT
Start: 2018-01-12 | End: 2018-01-12

## 2018-01-12 RX ORDER — FERROUS SULFATE 325(65) MG
325 TABLET, DELAYED RELEASE (ENTERIC COATED) ORAL EVERY OTHER DAY
Status: DISCONTINUED | OUTPATIENT
Start: 2018-01-13 | End: 2018-01-15 | Stop reason: HOSPADM

## 2018-01-12 RX ORDER — HEPARIN SODIUM 5000 [USP'U]/ML
5000 INJECTION, SOLUTION INTRAVENOUS; SUBCUTANEOUS EVERY 8 HOURS
Status: DISCONTINUED | OUTPATIENT
Start: 2018-01-12 | End: 2018-01-15 | Stop reason: HOSPADM

## 2018-01-12 RX ORDER — METRONIDAZOLE 500 MG/1
500 TABLET ORAL EVERY 8 HOURS
Status: DISCONTINUED | OUTPATIENT
Start: 2018-01-12 | End: 2018-01-15 | Stop reason: HOSPADM

## 2018-01-12 RX ORDER — SODIUM CHLORIDE 0.9 % (FLUSH) 0.9 %
5 SYRINGE (ML) INJECTION
Status: DISCONTINUED | OUTPATIENT
Start: 2018-01-12 | End: 2018-01-15 | Stop reason: HOSPADM

## 2018-01-12 RX ORDER — ACETAMINOPHEN 325 MG/1
650 TABLET ORAL EVERY 4 HOURS PRN
Status: DISCONTINUED | OUTPATIENT
Start: 2018-01-12 | End: 2018-01-15 | Stop reason: HOSPADM

## 2018-01-12 RX ADMIN — METRONIDAZOLE 500 MG: 500 TABLET ORAL at 06:01

## 2018-01-12 RX ADMIN — OSELTAMIVIR PHOSPHATE 30 MG: 30 CAPSULE ORAL at 09:01

## 2018-01-12 RX ADMIN — VANCOMYCIN HYDROCHLORIDE: 10 INJECTION, POWDER, LYOPHILIZED, FOR SOLUTION INTRAVENOUS at 03:01

## 2018-01-12 RX ADMIN — METRONIDAZOLE 500 MG: 500 TABLET ORAL at 09:01

## 2018-01-12 RX ADMIN — AZTREONAM 1000 MG: 1 INJECTION, POWDER, LYOPHILIZED, FOR SOLUTION INTRAMUSCULAR; INTRAVENOUS at 02:01

## 2018-01-12 RX ADMIN — AZTREONAM 1000 MG: 1 INJECTION, POWDER, LYOPHILIZED, FOR SOLUTION INTRAMUSCULAR; INTRAVENOUS at 10:01

## 2018-01-12 RX ADMIN — HEPARIN SODIUM 5000 UNITS: 5000 INJECTION, SOLUTION INTRAVENOUS; SUBCUTANEOUS at 06:01

## 2018-01-12 RX ADMIN — METRONIDAZOLE 500 MG: 500 TABLET ORAL at 02:01

## 2018-01-12 RX ADMIN — HEPARIN SODIUM 5000 UNITS: 5000 INJECTION, SOLUTION INTRAVENOUS; SUBCUTANEOUS at 02:01

## 2018-01-12 RX ADMIN — HEPARIN SODIUM 5000 UNITS: 5000 INJECTION, SOLUTION INTRAVENOUS; SUBCUTANEOUS at 09:01

## 2018-01-12 RX ADMIN — AZTREONAM 1000 MG: 1 INJECTION, POWDER, LYOPHILIZED, FOR SOLUTION INTRAMUSCULAR; INTRAVENOUS at 06:01

## 2018-01-12 NOTE — ED NOTES
Pt became awake, responsive. States name, place and age when asked. Talking with family. Able to follow commands lifting both hands

## 2018-01-12 NOTE — PT/OT/SLP EVAL
Occupational Therapy   Evaluation    Name: Zeyad Woodard  MRN: [id number]  Admitting Diagnosis:  Altered mental status      Recommendations:     Discharge Recommendations:  (TBD)  Discharge Equipment Recommendations:   (TBD)  Barriers to discharge:  Decreased caregiver support    History:     Occupational Profile:  Living Environment: Alone n SSH w/ ramp;   Previous level of function: mod (I)   Roles and Routines: .  Equipment Owned:  walker, rolling, wheelchair (TBD)  Assistance upon Discharge: 24hr S/A    Past Medical History:   Diagnosis Date    Anemia     Atrial fibrillation     BPH (benign prostatic hypertrophy)     Cancer     Cardiac pacemaker in situ 7/2/2015    CHF (congestive heart failure)     Coronary artery disease     Encounter for blood transfusion     GI bleed     cecum angiectasia s/p cautery    Hypertension     Hypothyroidism     Polyneuropathy     Posture imbalance     due to neuropathy    Right posterior capsular opacification 1/18/2017    SSS (sick sinus syndrome) 2015    s/p pacemaker    Stroke 1/11/2018       Past Surgical History:   Procedure Laterality Date    CARDIAC SURGERY      CATARACT EXTRACTION W/  INTRAOCULAR LENS IMPLANT Right 05/17/2010        CATARACT EXTRACTION W/  INTRAOCULAR LENS IMPLANT Left 02/16/2004        cataract surgery      COLONOSCOPY  6/30/04    COLONOSCOPY N/A 2/15/2016    Procedure: COLONOSCOPY;  Surgeon: Stanton Kirk MD;  Location: [address];  Service: Endoscopy;  Laterality: N/A;    EYE SURGERY      HERNIA REPAIR      inguinal hernia repair      Open heart surgery for pericardiectomy      for calcific constrictive pericarditis    UMBILICAL HERNIA REPAIR      Yag      Left Eye       Subjective     Chief Complaint: n/a  Patient/Family stated goals: unstated  Communicated with: nsg prior to session.  Pain/Comfort:  · Pain Rating 1: 0/10  · Pain Rating Post-Intervention 1: 0/10    Objective:     Patient found  with: bed alarm, telemetry    General Precautions: Standard, fall, aspiration   Orthopedic Precautions:    Braces:       Occupational Performance:    Bed Mobility:    · Patient completed Supine to Sit with moderate assistance and maximal assistance  · Patient completed Sit to Supine with moderate assistance and maximal assistance    Functional Mobility/Transfers:  · Patient completed Sit <> Stand Transfer with minimum assistance  with  rolling walker  & bed elevated    Activities of Daily Living:  · LB Dressing: total assistance supine w/ socks  · Toileting: total assistance supine   · Self feed w/ Mod A    Cognitive/Visual Perceptual:  Confusion & lethargy    Physical Exam:  Sit balance: Mod>SBA  L shldr ~120*; PROM WFL  R shldr ~60* scaption; PROM WFL  R hand atrophy    Patient left HOB elevated with all lines intact, call button in reach and bed alarm on    [facility] 6 Click:  [facility] Total Score: 10    Treatment & Education:  Pt agreeable to OT eval & tx this date, however req'ed max v/tc's for arousal & eye opening throughout. Req'ed mod-max A for sup<-->EOB & Mod>SBA to maint static sitting balance using B UEs for support w/ signif lean to L, forward head & trunk flex. EOB elev for Min A stand via RW for sidestepping L. Christopher'd ~15 min EOB for ST w/ Mod-Max A for unilateral UE drinking/eating. Further assessment when pt w/ increased alertness.      Education:    Assessment:     Pt recently d/c'ed from SNF in Dec, 2017 at Mod (I) w/ fxnl tasks. Currently presents w/ decreased overall endurance/conditioning, balance/mobility & coordination w/ subsequent decline in (I)/safety w/ BADLs, fxnl mobility & fxnl t/f's. OT 5x/wk to increase phys/fxnl status & maximize potential to achieve established goals for d/c-->TBD.    Zeyad Woodard is a 89 y.o. male with a medical diagnosis of Altered mental status.  He presents with ..  Performance deficits affecting function are weakness, impaired endurance, impaired sensation, gait  "instability, impaired functional mobilty, impaired self care skills, impaired balance, impaired cognition, decreased lower extremity function, decreased upper extremity function, decreased coordination, decreased safety awareness, decreased ROM.      Rehab Prognosis:  good; patient would benefit from acute skilled OT services to address these deficits and reach maximum level of function.         Clinical Decision Makin.  OT Low:  "Pt evaluation falls under low complexity for evaluation coding due to performance deficits noted in 1-3 areas as stated above and 0 co-morbities affecting current functional status. Data obtained from problem focused assessments. No modifications or assistance was required for completion of evaluation. Only brief occupational profile and history review completed."     Plan:     Patient to be seen 5 x/week to address the above listed problems via self-care/home management, therapeutic activities, therapeutic exercises  · Plan of Care Expires: 18  · Plan of Care Reviewed with: patient    This Plan of care has been discussed with the patient who was involved in its development and understands and is in agreement with the identified goals and treatment plan    GOALS:    Occupational Therapy Goals        Problem: Occupational Therapy Goal    Goal Priority Disciplines Outcome Interventions   Occupational Therapy Goal     OT, PT/OT Ongoing (interventions implemented as appropriate)    Description:  Goals to be met by: 18     Patient will increase functional independence with ADLs by performing:    Grooming while standing at sink with Supervision.  Toileting from toilet with Supervision for hygiene and clothing management.   Toilet transfer to toilet with Stand-by Assistance.  Increased functional strength to WFL for ADLs.                      Time Tracking:     OT Date of Treatment: 18  OT Start Time: 926  OT Stop Time: 100  OT Total Time (min): 38 min    Billable " Minutes:Evaluation 15  Therapeutic Activity 17  Total Time 38    DELON Walsh  1/12/2018

## 2018-01-12 NOTE — PLAN OF CARE
Problem: Occupational Therapy Goal  Goal: Occupational Therapy Goal  Goals to be met by: 02/12/18     Patient will increase functional independence with ADLs by performing:    Grooming while standing at sink with Supervision.  Toileting from toilet with Supervision for hygiene and clothing management.   Toilet transfer to toilet with Stand-by Assistance.  Increased functional strength to WFL for ADLs.    Outcome: Ongoing (interventions implemented as appropriate)  Pt agreeable to OT eval & tx this date, however req'ed max v/tc's for arousal & eye opening throughout. Req'ed mod-max A for sup<-->EOB & Mod>SBA to maint static sitting balance using B UEs for support w/ signif lean to L, forward head & trunk flex. EOB elev for Min A stand via RW for sidestepping L. Christopher'd ~15 min EOB for ST w/ Mod-Max A for unilateral UE drinking/eating. Further assessment when pt w/ increased alertness.  Pt recently d/c'ed from SNF in Dec, 2017 at Mod (I) w/ fxnl tasks. Currently presents w/ decreased overall endurance/conditioning, balance/mobility & coordination w/ subsequent decline in (I)/safety w/ BADLs, fxnl mobility & fxnl t/f's. OT 5x/wk to increase phys/fxnl status & maximize potential to achieve established goals for d/c-->TBD.

## 2018-01-12 NOTE — ED PROVIDER NOTES
Encounter Date: 1/11/2018    SCRIBE #1 NOTE: I, Sully Coombs, am scribing for, and in the presence of,  Dr. Byrd. I have scribed the entire note.       History     Chief Complaint   Patient presents with    Altered Mental Status     decreased LOC; reported last seen normal was one hour ago; per EMS pt is usually talkative; pt is only responsive to painful stimuli; pinpoint puls noted     Time seen by provider: 9:20 PM    This is a 89 y.o. male who presents with altered mental status. As per EMS the patient's symptoms began about 1 hr ago. EMS note the patient was at nursing home when the patient became altered. Per family the patient is usually talking and active. Per EMS the patient takes Oxycodone. The patient was not given any medications en route. The patient arouses to painful stimuli as noted by EMS. Per EMS there was no signs of trauma      The history is provided by the EMS personnel. The history is limited by the condition of the patient and the absence of a caregiver.     Review of patient's allergies indicates:   Allergen Reactions    Penicillins Hives and Other (See Comments)     Fever     Past Medical History:   Diagnosis Date    Anemia     Atrial fibrillation     BPH (benign prostatic hypertrophy)     Cancer     Cardiac pacemaker in situ 7/2/2015    CHF (congestive heart failure)     Coronary artery disease     Encounter for blood transfusion     GI bleed     cecum angiectasia s/p cautery    Hypertension     Hypothyroidism     Polyneuropathy     Posture imbalance     due to neuropathy    Right posterior capsular opacification 1/18/2017    SSS (sick sinus syndrome) 2015    s/p pacemaker    Stroke 1/11/2018     Past Surgical History:   Procedure Laterality Date    CARDIAC SURGERY      CATARACT EXTRACTION W/  INTRAOCULAR LENS IMPLANT Right 05/17/2010        CATARACT EXTRACTION W/  INTRAOCULAR LENS IMPLANT Left 02/16/2004        cataract surgery       COLONOSCOPY  04    COLONOSCOPY N/A 2/15/2016    Procedure: COLONOSCOPY;  Surgeon: Stanton Kirk MD;  Location: [facility] ([address] [place] [address]);  Service: Endoscopy;  Laterality: N/A;    EYE SURGERY      HERNIA REPAIR      inguinal hernia repair      Open heart surgery for pericardiectomy      for calcific constrictive pericarditis    UMBILICAL HERNIA REPAIR      Yag      Left Eye     Family History   Problem Relation Age of Onset    Stroke Mother          Cancer Father      lung;     Diabetes Sister     Coronary artery disease Sister     Peripheral vascular disease Sister     Amblyopia Neg Hx     Blindness Neg Hx     Cataracts Neg Hx     Glaucoma Neg Hx     Hypertension Neg Hx     Macular degeneration Neg Hx     Retinal detachment Neg Hx     Strabismus Neg Hx     Thyroid disease Neg Hx     Prostate cancer Neg Hx     Kidney disease Neg Hx      Social History   Substance Use Topics    Smoking status: Passive Smoke Exposure - Never Smoker    Smokeless tobacco: Never Used    Alcohol use No     Review of Systems   Unable to perform ROS: Mental status change       Physical Exam     Vitals:    18 2127   BP: (!) 144/74   Pulse: 89   Resp: 14     Physical Exam    Nursing note and vitals reviewed.  Constitutional: He appears well-developed and well-nourished. He is not diaphoretic. No distress.   HENT:   Head: Normocephalic and atraumatic.   Right Ear: External ear normal.   Left Ear: External ear normal.   Dry mucus membranes   Eyes:   Pinpoint pupils   Neck: Normal range of motion. Neck supple.   Cardiovascular: Normal rate, regular rhythm and normal heart sounds. Exam reveals no gallop and no friction rub.    No murmur heard.  Pulmonary/Chest: Breath sounds normal. He has no wheezes. He has no rhonchi. He has no rales.   Abdominal: Soft. Bowel sounds are normal. He exhibits no distension. There is no tenderness. There is no rebound and no guarding.   Musculoskeletal:   No  peripheral edema   Neurological: GCS eye subscore is 2. GCS verbal subscore is 1. GCS motor subscore is 5.   Not following commands   Skin: Skin is warm and dry. Capillary refill takes less than 2 seconds. No rash noted.         ED Course   Critical Care  Date/Time: 1/11/2018 10:14 PM  Performed by: HUMBERTO TORRES  Authorized by: HUMBERTO TORRES   Direct patient critical care time: 10 minutes  Additional history critical care time: 5 minutes  Ordering / reviewing critical care time: 5 minutes  Documentation critical care time: 5 minutes  Consulting other physicians critical care time: 6 minutes  Total critical care time (exclusive of procedural time) : 31 minutes  Critical care time was exclusive of separately billable procedures and treating other patients and teaching time.  Critical care was necessary to treat or prevent imminent or life-threatening deterioration of the following conditions: CNS failure or compromise.  Critical care was time spent personally by me on the following activities: discussions with consultants, interpretation of cardiac output measurements, evaluation of patient's response to treatment, examination of patient, obtaining history from patient or surrogate, ordering and review of laboratory studies, ordering and review of radiographic studies, re-evaluation of patient's condition and review of old charts.        Labs Reviewed   CBC W/ AUTO DIFFERENTIAL - Abnormal; Notable for the following:        Result Value    RBC 3.36 (*)     Hemoglobin 10.1 (*)     Hematocrit 31.1 (*)     Platelets 111 (*)     Lymph # 6.2 (*)     Gran% 29.6 (*)     Lymph% 61.5 (*)     Platelet Estimate Decreased (*)     All other components within normal limits   COMPREHENSIVE METABOLIC PANEL - Abnormal; Notable for the following:     Glucose 156 (*)     BUN, Bld 44 (*)     Creatinine 1.9 (*)     Albumin 3.3 (*)     Alkaline Phosphatase 136 (*)     AST 84 (*)     ALT 57 (*)     eGFR if  35 (*)      eGFR if non  30 (*)     All other components within normal limits   LIPID PANEL - Abnormal; Notable for the following:     Cholesterol 57 (*)     HDL 29 (*)     LDL Cholesterol 10.0 (*)     HDL/Chol Ratio 50.9 (*)     All other components within normal limits   ISTAT PROCEDURE - Abnormal; Notable for the following:     POC PTWBT 16.0 (*)     POC PTINR 1.3 (*)     All other components within normal limits   ISTAT CREATININE - Abnormal; Notable for the following:     POC Creatinine 1.8 (*)     All other components within normal limits   POCT GLUCOSE - Abnormal; Notable for the following:     POCT Glucose 126 (*)     All other components within normal limits   PROTIME-INR   TSH   ALCOHOL,MEDICAL (ETHANOL)   DRUG SCREEN PANEL, URINE EMERGENCY   POCT GLUCOSE     EKG Readings: (Independently Interpreted)   Initial Reading: No STEMI. Previous EKG: Compared with most recent EKG Heart Rate: 78.   Electronically paced rhythm at 78 bpm. No STEMI       Imaging Results          X-Ray Chest AP Portable (Final result)  Result time 01/11/18 21:54:49    Final result by Bertrand Gonzáles MD (01/11/18 21:54:49)                 Impression:      As above        Electronically signed by: BERTRAND GONZÁLES MD  Date:     01/11/18  Time:    21:54              Narrative:    Chest AP portable    Indication:Stroke    Comparison:1/6/2016    Findings: Left chest wall pacer stable. The cardiomediastinal silhouette is enlarged, similar to the previous exam noting calcification of the aortic arch..  There is no pleural effusion.  The trachea is midline.  The lungs are symmetrically expanded bilaterally with coarse interstitial attenuation in a perihilar distribution.  A calcific focus projects over the lateral aspect of the right mid to lower lung zone, stable.  There is left basilar atelectasis and/or scarring, similar.  There is probable atelectasis or scarring projected over the left midlung zone. No large focal consolidation  seen.  There is no pneumothorax.  The osseous structures are unchanged.                             CT Head Without Contrast (Final result)  Result time 01/11/18 21:46:04    Final result by Bertrand Gonzáles MD (01/11/18 21:46:04)                 Impression:        No acute intracranial abnormalities.    Stable sequela of chronic microvascular ischemic change and volume loss, noting stable ventricular prominence.            Electronically signed by: BERTRAND GONZÁLES MD  Date:     01/11/18  Time:    21:46              Narrative:    Comparison: 1/6/2018    Clinical history: Stroke    Technique:    Axial images of the brain were obtained at 5-mm intervals from the skull base to the vertex without the administration of contrast.    Findings:    Examination is limited secondary to patient motion.    There is generalized cerebral volume loss.  There is hypoattenuation in a periventricular fashion, likely sequela of chronic microvascular ischemic change.  There is no evidence of acute major vascular territory infarct, hemorrhage, or mass.  The ventricular system is prominent, unchanged since the previous exam.  There is marah-cisterna magna versus arachnoid cyst.  There are no abnormal extra-axial fluid collections.  The paranasal sinuses and mastoid air cells are clear, and there is no evidence of calvarial fracture.  The visualized soft tissues are unremarkable.                                 Medical Decision Making:   History:   I obtained history from: someone other than patient.       <> Summary of History: History construct from EMS. patient was found with decreased responsiveness.    Initial Assessment:   Patient with decreased LOC.  Awakens to painful stimuli.  Last seen normal one hour ago.  Differential Diagnosis:   Intoxication, stroke, sepsis, UTI, pneumonia  Independently Interpreted Test(s):   I have ordered and independently interpreted EKG Reading(s) - see prior notes  Clinical Tests:   Lab Tests: Ordered  and Reviewed  Radiological Study: Ordered and Reviewed  Medical Tests: Ordered and Reviewed  ED Management:  During examination patient given large dose of Narcan and became more alert.  He was then able to follow commands.  Neurologist decided against TPA given patient's rapid improvement.  Recommends admission for further workup.        9:30 PM Code stroke called    10: 00 PM Stroke team is performing assessment    10:08 PM Neuro stroke team recommend the patient be admitted for further evaluation and treatment.     10:12 PM After 2 mg of Narcan the patient is awake and oriented.                    ED Course as of Jan 11 2358   Thu Jan 11, 2018   5222 Dr. Javed, neurology recommends admission as well as carotid ultrasound, interrogation of pacemaker, echo, bedrest, IV fluids, EEG, blood pressure greater than 120/80 but less than 200 systolic.  If creatinine improves he recommends intramucomyst 600 mg Q6H.    [LD]   7640 Spoke with [facility] medicine who will admit patient.   [LD]      ED Course User Index  [LD] Andressa Byrd MD     Clinical Impression:     1. Altered mental status, unspecified altered mental status type    2. Stroke         I, Andressa Byrd,  personally performed the services described in this documentation. All medical record entries made by the scribe were at my direction and in my presence.  I have reviewed the chart and agree that the record reflects my personal performance and is accurate and complete. Andressa Byrd M.D. 11:58 PM01/11/2018                     Andressa Byrd MD  01/11/18 4110

## 2018-01-12 NOTE — PROGRESS NOTES
"Vancomycin Dosing and Monitoring Pharmacy Protocol    Zeyad Woodard is a 89 y.o. male    Height: 5' 8" (1.727 m)   Wt Readings from Last 1 Encounters:   01/12/18 72.8 kg (160 lb 7.9 oz)     Ideal body weight: 68.4 kg (150 lb 12.7 oz)  Adjusted ideal body weight: 70.2 kg (154 lb 10.8 oz)    Temp Readings from Last 3 Encounters:   01/12/18 (!) 101.1 °F (38.4 °C) (Oral)   01/10/18 100.3 °F (37.9 °C) (Oral)   01/03/18 98.5 °F (36.9 °C)      Lab Results   Component Value Date/Time    WBC 10.04 01/11/2018 10:28 PM    WBC 7.53 01/11/2018 10:48 AM    WBC 8.45 01/10/2018 05:19 AM      Lab Results   Component Value Date/Time    CREATININE 1.9 (H) 01/11/2018 10:28 PM    CREATININE 1.6 (H) 01/11/2018 10:48 AM    CREATININE 1.2 01/10/2018 05:19 AM        Serum creatinine: 1.9 mg/dL High 01/11/18 2228  Estimated creatinine clearance: 25 mL/min    Antibiotics       Start     Stop Route Frequency Ordered    01/14/18 0215  vancomycin 1 g in 0.9% sodium chloride 250 mL IVPB (ready to mix system)      -- IV Every 48 hours (non-standard times) 01/12/18 0152    01/12/18 0600  metroNIDAZOLE tablet 500 mg      -- Oral Every 8 hours 01/12/18 0128    01/12/18 0215  aztreonam 1 g in dextrose 5 % 50 mL IVPB (ready to mix system)      -- IV Every 8 hours (non-standard times) 01/12/18 0143    01/12/18 0215  vancomycin 1,500 mg in sodium chloride 0.9% 250 mL      -- IV Once 01/12/18 0147          Antifungals       None            Microbiology Results (last 7 days)       Procedure Component Value Units Date/Time    Urine culture [[id number]]     Order Status:  No result Specimen:  Urine     Blood culture [[id number]]     Order Status:  Sent Specimen:  Blood     Blood culture [[id number]]     Order Status:  Sent Specimen:  Blood             Indication:   Pneumonia    Target Level: 15-20 mcg/ml    Hemodialysis:   No on N/A    Dosing Weight:   ABW--actual body weight  If ABW is greater than or equal to 30% over Ideal Body Weight, AdjBW will be used " to calculate vancomycin dose.    Last Vancomycin dose: N/A   Date/Time given: N/A         Vancomycin level:  No results for input(s): VANCOMYCIN-TROUGH in the last 72 hours.  No results for input(s): VANCOMYCIN, RANDOM in the last 72 hours.    Per Protocol Initial/Adjustments Dosin. Initial/Adjustment Dose: Vancomycin dosage adjusted from 1000 mg q12h to 1500 mg X1, then 1000 mg q48h.   2. Vancomycin Trough Level will be drawn on 18 at 0145 date/time    Pharmacy will continue to follow.    Please contact if you have any further questions. Thank you.    Chapincito Lui, PharmD  [phone]

## 2018-01-12 NOTE — PLAN OF CARE
Patient was just discharged 2 days ago. SNF was denied by [employer]. 2 daughters at bedside. PEr MD, patient Flu +    Daughter secured patient and placed patient into a private group home (has 8 residents there- nurses that give patients their meds/aids that bath patients)- called [facility] ([address] [place]  [address]).    Just got a hospital bed delivered. Has all DME equipment.    DC plan back to [facility] with [facility].    Future Appointments  Date Time Provider Department Center   1/16/2018 1:00 PM Tawana Trujillo MD [facility]        01/12/18 5272   Discharge Assessment   Assessment Type Discharge Planning Assessment   Confirmed/corrected address and phone number on facesheet? Yes   Assessment information obtained from? Caregiver;Patient   Prior to hospitilization cognitive status: Alert/Oriented   Prior to hospitalization functional status: Independent;Assistive Equipment   Current cognitive status: Alert/Oriented   Current Functional Status: Assistive Equipment;Independent;Needs Assistance   Lives With facility resident   Able to Return to Prior Arrangements yes   Is patient able to care for self after discharge? No   Patient's perception of discharge disposition group home   Readmission Within The Last 30 Days other (see comments)  (altered mental status)   Patient currently being followed by outpatient case management? No  (daughter declined OPCM)   Patient currently receives any other outside agency services? Yes   Name and contact number of agency or person providing outside services [facility]- SN/PT/OT/Speech   Is it the patient/care giver preference to resume care with the current outside agency? Yes   Equipment Currently Used at Home walker, rolling;wheelchair;hospital bed   Do you have any problems affording any of your prescribed medications? No   Is the patient taking medications as prescribed? yes   Does the patient have transportation  home? Yes   Transportation Available family or friend will provide   Does the patient receive services at the [facility]? No   Discharge Plan A Group Home;Home Health   Patient/Family In Agreement With Plan yes     Felicia Prince RN, CCM, CMSRN  RN Transition Navigator  [phone]

## 2018-01-12 NOTE — NURSING
Dr. Vogt notified of family request to speak to physician. Stated the team will be rounding to room.

## 2018-01-12 NOTE — PT/OT/SLP EVAL
Physical Therapy Evaluation    Patient Name:  Zeyad Woodard   MRN:  [id number]    Recommendations:     Discharge Recommendations:  nursing facility, skilled   Discharge Equipment Recommendations: none   Barriers to discharge: Decreased caregiver support and daughter comes by in the afternoons; however, pt is at home during the day.     Assessment:     Zeyad Woodard is a 89 y.o. male admitted with a medical diagnosis of Altered mental status.  He presents with the following impairments/functional limitations:  weakness, impaired endurance, impaired self care skills, impaired functional mobilty, gait instability, impaired balance, decreased lower extremity function, decreased upper extremity function, decreased safety awareness, impaired fine motor, decreased ROM. Impaired functional mobility, pt would benefit from continued PT services as well as SNF placement to improve current impairments and return to a more independent functional mobility level.     Rehab Prognosis:  good; patient would benefit from acute skilled PT services to address these deficits and reach maximum level of function.      Recent Surgery: * No surgery found *      Plan:     During this hospitalization, patient to be seen 5 x/week to address the above listed problems via gait training, therapeutic activities, therapeutic exercises  · Plan of Care Expires:  02/12/18   Plan of Care Reviewed with: patient    Subjective     Communicated with DINA Yusuf prior to session.  Patient found supine resting upon PT entry to room, agreeable to evaluation.      Chief Complaint: weakness  Patient comments/goals: improve functional   Pain/Comfort:  · Pain Rating 1: 0/10  · Pain Rating Post-Intervention 1: 0/10    Patients cultural, spiritual, Orthodox conflicts given the current situation: None verbalized to PT    Living Environment:  Pt reports he lives at home alone with his daughter living across the street, they provide assistance; however there are  times he is home alone.   Prior to admission, patients level of function required assistance for food prep and occassionally for dressing.  Patient has the following equipment: walker, rolling, wheelchair.  DME owned (not currently used): none.  Upon discharge, patient will have assistance from daughter, who works.    Objective:     Patient found with: bed alarm, telemetry     General Precautions: Standard, fall   Orthopedic Precautions:N/A   Braces: N/A     Exams:  · RLE ROM: WFL  · RLE Strength: Grossly 4/5  · LLE ROM: WFL  · LLE Strength: Grossly 4/5    Functional Mobility:  · Bed Mobility:     · Rolling Left:  stand by assistance  · Rolling Right: stand by assistance  · Scooting: contact guard assistance and minimum assistance  · Supine to Sit: contact guard assistance  · Sit to Supine: contact guard assistance  · Transfers:     · Sit to Stand:  minimum assistance with rolling walker  · Gait: did not occur 2* pt reported increased dizziness.   · Balance: Sitting: Fair, pt with left lateral lean, able to self correct with cueing. Standing Fair- with RW    AM-PAC 6 CLICK MOBILITY  Total Score:18       Therapeutic Activities and Exercises:  -Bed mobility as listed above.  -Sup<>Sit X2, pt returned to supine to use urinal. Pt was SBA for placement of urinal and hygiene.   -Attempted to ambulate pt reported increased dizziness, ambulation held at this time.      Patient left HOB elevated with all lines intact, call button in reach, bed alarm on and DINA Yusuf notified.    GOALS:    Physical Therapy Goals     Not on file                History:     Past Medical History:   Diagnosis Date    Anemia     Atrial fibrillation     BPH (benign prostatic hypertrophy)     Cancer     Cardiac pacemaker in situ 7/2/2015    CHF (congestive heart failure)     Coronary artery disease     Encounter for blood transfusion     GI bleed     cecum angiectasia s/p cautery    Hypertension     Hypothyroidism     Polyneuropathy      Posture imbalance     due to neuropathy    Right posterior capsular opacification 1/18/2017    SSS (sick sinus syndrome) 2015    s/p pacemaker    Stroke 1/11/2018       Past Surgical History:   Procedure Laterality Date    CARDIAC SURGERY      CATARACT EXTRACTION W/  INTRAOCULAR LENS IMPLANT Right 05/17/2010        CATARACT EXTRACTION W/  INTRAOCULAR LENS IMPLANT Left 02/16/2004        cataract surgery      COLONOSCOPY  6/30/04    COLONOSCOPY N/A 2/15/2016    Procedure: COLONOSCOPY;  Surgeon: Satnton Kirk MD;  Location: [address]);  Service: Endoscopy;  Laterality: N/A;    EYE SURGERY      HERNIA REPAIR      inguinal hernia repair      Open heart surgery for pericardiectomy      for calcific constrictive pericarditis    UMBILICAL HERNIA REPAIR      Yag      Left Eye       Clinical Decision Making:     History  Co-morbidities and personal factors that may impact the plan of care Examination  Body Structures and Functions, activity limitations and participation restrictions that may impact the plan of care Clinical Presentation   Decision Making/ Complexity Score   Co-morbidities:   [x] Time since onset of injury / illness / exacerbation  [] Status of current condition  []Patient's cognitive status and safety concerns    [] Multiple Medical Problems (see med hx)  Personal Factors:   [] Patient's age  [] Prior Level of function   [] Patient's home situation (environment and family support)  [] Patient's level of motivation  [] Expected progression of patient      HISTORY:(criteria)    [] 05350 - no personal factors/history    [x] 17600 - has 1-2 personal factor/comorbidity     [] 32802 - has >3 personal factor/comorbidity     Body Regions:  [x] Objective examination findings  [] Head     []  Neck  [] Trunk   [] Upper Extremity  [] Lower Extremity    Body Systems:  [] For communication ability, affect, cognition, language, and learning style: the assessment of the ability  to make needs known, consciousness, orientation (person, place, and time), expected emotional /behavioral responses, and learning preferences (eg, learning barriers, education  needs)  [] For the neuromuscular system: a general assessment of gross coordinated movement (eg, balance, gait, locomotion, transfers, and transitions) and motor function  (motor control and motor learning)  [] For the musculoskeletal system: the assessment of gross symmetry, gross range of motion, gross strength, height, and weight  [] For the integumentary system: the assessment of pliability(texture), presence of scar formation, skin color, and skin integrity  [] For cardiovascular/pulmonary system: the assessment of heart rate, respiratory rate, blood pressure, and edema     Activity limitations:    [] Patient's cognitive status and saf ety concerns          [] Status of current condition      [] Weight bearing restriction  [] Cardiopulmunary Restriction    Participation Restrictions:   [] Goals and goal agreement with the patient     [] Rehab potential (prognosis) and probable outcome      Examination of Body System: (criteria)    [x] 42142 - addressing 1-2 elements    [] 79576 - addressing a total of 3 or more elements     [] 32119 -  Addressing a total of 4 or more elements         Clinical Presentation: (criteria)  Stable - 38037     On examination of body system using standardized tests and measures patient presents with 3 or more elements from any of the following: body structures and functions, activity limitations, and/or participation restrictions.  Leading to a clinical presentation that is considered stable and/or uncomplicated                              Clinical Decision Making  (Eval Complexity):  Low- 58575     Time Tracking:     PT Received On: 01/12/18  PT Start Time: 1020     PT Stop Time: 1051  PT Total Time (min): 31 min     Billable Minutes: Evaluation 21 and Therapeutic Activity 10      Nathaniel Mina, PT,  DPT  01/12/2018

## 2018-01-12 NOTE — ED NOTES
Patient has verified the spelling of their name and  on armbandLOC: responds only to painful stimuli  APPEARANCE: Patient resting comfortably and in no acute distress, patient is clean and well groomed, patient's clothing is properly fastened.   SKIN: some skin tears noted,  bruised .   : due to void  MUSCULOSKELETAL: Patient not moving extrem at this time  RESPIRATORY: Airway is open and patent, respirations are spontaneous, patient has a normal effort and rate, no accessory muscle use noted, bilateral breath sounds clear,  ABDOMEN: Soft and non tender to palpation, no distention noted, normoactive bowel sounds present in all four quadrants.   CARDIAC pacemaker  COMPLAINT: pt arrived via EMS. Only responds a little to painful stimuli pt not opening eyes at this time. resp appear unlabored. Pt nonverbal. Last well known at 1900 per family

## 2018-01-12 NOTE — PLAN OF CARE
Plan of care reviewed with patient and daughters at the bedside. All verbalized complete understanding. Fall precautions maintained. Bed in lowest position, locked, call light within reach and bed alarm is on. Side rails up x's 2 with slip resistant socks on. Nurse sat patient at a 90 degree angle while he napped to prevent aspiration of saliva. Nurse performed Aristeo assessment on patient while awake, patient passed. Nurse instructed patient to notify staff for any assistance and the patient verbalized complete understanding. Patient on telemetry throughout shift with no ectopy noted. Will continue to monitor.

## 2018-01-12 NOTE — PLAN OF CARE
Problem: SLP Goal  Goal: SLP Goal  Short Term Goals:  1. Pt will participate in ongoing swallow assessment to determine least restrictive diet.   2. Patient will tolerate dental soft consistency/thin liquids po diet with no overt s/s of aspiration.     Outcome: Ongoing (interventions implemented as appropriate)  SLP consulted for swallow eval, completed at bedside. Pt may continue with dental soft textures and thin liquids.

## 2018-01-12 NOTE — TELEPHONE ENCOUNTER
----- Message from Mel Francis sent at 1/11/2018 10:49 AM CST -----  [facility]: [phone][facility]  Patient is requesting a call back regarding a verbal order for barrier cream. Please call and advise. [phone][facility]

## 2018-01-12 NOTE — ED NOTES
Awake and alert.  Respirations even and unlabored.  Moving all extremities.  Family member at bedside.

## 2018-01-12 NOTE — CONSULTS
[facility]  Vascular Neurology  Comprehensive Stroke Center  Tele-Consultation Note      Consults    Consulting Provider: Spoke Physician:: Dr. enrrique Hernandez    Patient Location: [facility]  Spoke hospital nurse at bedside with patient assisting consultant.     Patient information was obtained from patient and spouse/SO.     Subjective:     History of Present Illness:  89 yo WM PMHX: Anemia, HTN, HLP, R-carotid occlusion.  GI bleeding 2 years ago. Fell 2 weeks ago and hit his head but was no assessed. Fainted on 12/29th.  Had a low grade fever yesterday. Had MRSA infection and cellulitis 11 /17 and got full treatment with Vanc. Has hx of  Neuropathy and chronic back pain. Has an hx of CHF, Afib, SSS and  implated pacemaker. Hx of Cancer, BPH, HTN, hypothyroidism, polyneuropathy, had pericardial effusion and pericardial surgery.    Allergies: PCN  Medications:  Statins  ECASA  LSN: Was at a goup home doing exercises Placed to bed at 7:00PM and at 7:45PM unresponsive.. NIHSS 23 but at 10:15 he woke up and started following commands and all his deficits resolved. NIHSS 0.  CT scan of the head  done at 21:21: showed diffuse atrophy, evidence of macrovascular disease but no acute ischemic or hemorrhagic strokes.  R x S:      CMP:     Recent Labs  Lab 01/11/18  1048   CALCIUM 9.2      K 4.1   CO2 25      BUN 35*   CREATININE 1.6*     BMP:     Recent Labs  Lab 01/11/18  1048      K 4.1      CO2 25   BUN 35*   CREATININE 1.6*   CALCIUM 9.2     CBC:     Recent Labs  Lab 01/11/18  1048   WBC 7.53   RBC 3.21*   HGB 9.6*   HCT 29.5*   *   MCV 92   MCH 29.9   MCHC 32.5     Lipid Panel: No results for input(s): CHOL, LDLCALC, HDL, TRIG in the last 168 hours.  Coagulation: No results for input(s): PT, INR, APTT in the last 168 hours.  Platelet Aggregation Study: No results for input(s): PLTAGG, PLTAGINTERP, PLTAGREGLACO, ADPPLTAGGREG  in the last 168 hours.  Hgb A1C: No results for input(s): HGBA1C in the last 168 hours.  TSH:     Recent Labs  Lab 18   TSH 0.843       Stroke Team Times:   Date and Time Taken: 2018 9:40 PM  Last Known Normal Date and Time : 201819:00  Symptom Onset Date and Time:201819:30  Stroke Team Called Date and Time: 201821:33  Stroke Team Arrived Date and Time: 201821:38  CT Interpretation Time: 21:31    NIH Stroke Scale:  Interval: baseline (upon arrival/admit)  Level of Consciousness: 0 - alert  LOC Questions: 0 - answers both correctly  LOC Commands: 0 - performs both correctly  Best Gaze: 0 - normal  Visual: 0 - no visual loss  Facial Palsy: 0 - normal  Motor Left Arm: 0 - no drift  Motor Right Arm: 0 - no drift  Motor Left Le - no drift  Motor Right Le - no drift  Limb Ataxia: 0 - absent  Sensory: 0 - normal  Best Language: 0 - no aphasia  Dysarthria: 0 - normal articulation  Extinction and Inattention: 0 - no neglect  NIH Stroke Scale Total: 0  Davis Coma Scale:  Best Eye Response: 4 - spontaneous  Best Motor Response: 6 - obeys commands  Best Verbal Response: 5 - oriented  Belen Coma Scale Total: 15  Modified Hitchcock Scale:   Timeline: Prior to symptoms onset  Modified Hitchcock Score: 0 - no symptoms          Pulse:  [89] 89  Resp:  [14] 14  SpO2:  [99 %] 99 %  BP: (144)/(74) 144/74    Blood pressure (!) 144/74, pulse 89, resp. rate 14, weight 69.4 kg (153 lb), SpO2 99 %.    BS:  155  mg/dl        Exam:  General   HEENT: Limited assessment  Chest Heart Limited assessment / Lungs Limited assessment  Adbomen: Limited assessment  Extremities: OK distal pulses.  Skin: Limited assessment  Neurological Exam:  MS; Was initially unresponsive and suddenly woke up without a post event confusional state.  CN: II-XII Eye in neutral position, mouth open. Patient had no deficits after the end of the spell.  Motor: LUE  5 /5 / RUE 5 /5               LLE  5 /5 /  RLE 5 /5    Sensory:  LT/PP/T/ Vibration                  Complex sensory modalities: not tested  DTR:  Not tested  Coordination /Fine motor:   Gait: Not tested.  Meningeal signs: Absent    Assessment/Plan:     Assessment::  89 yo WM PMHX: Anemia, HTN, HLP, R-carotid occlusion.  GI bleeding 2 years ago. Fell 2 weeks ago and hit his head but was no assessed. Fainted on 12/29th.  Had a low grade fever yesterday. Had MRSA infection and cellulitis 11 /17 and got full treatment with Vanc. Has hx of  Neuropathy and chronic back pain. Has an hx of CHF, Afib, SSS and  implated pacemaker. Hx of Cancer, BPH, HTN, hypothyroidism, polyneuropathy, had pericardial effusion and pericardial surgery.  The patient lives in a group home and was LSN at 7:00 PM and at 7: 45PM he was unresponsive.NIHSS 23 but at 10:15 he woke up and started following commands and all his deficits resolved. NIHSS 0. CT scan of the head  done at 21:21: showed diffuse atrophy, evidence of macrovascular disease but no acute ischemic or hemorrhagic strokes.  The event could be a transient basilar TIA vs ictal event.  Nevertheless, the patient did not show any postictal state. No rTPA was recommended and the patient will start the work-up at [facility].     Diagnoses:   1. Basilar TIA vs ictal event.  2. Cardiopath: CHF, SSS s/p Pacer,  Afib, prior pericardial effusion  3. HTN  4. HLP  5. Hx cancer  6.CRF  7. BPH  8. GI bleeding.  9. Hx Anemia  10. Recent Falls  11. Dysautonomia   12. Polyneuropathy      Alteplase Eligible?: No  Alteplase Recommendation: Alteplase not recommended due to Symptoms resolved    Possible Interventional Revascularization Candidate? No; No large vessel occlusion and No; No significant neurological deficit    Possible Interventional Revascularization Candidate? _No__    Recommendation:  -Keep in bedrest until vascular imaging is completed.  - NPO until after pass dysphagia screen.   -Keep normothermic and normoglycemic  -Neurochecks  Hourly.  -VS  assessment hourly.  -Place on telemetry  -Assess bedside swallowing and consider speech pathology swallowing assessment prior to allowing PO diet or PO medications.  -Restart home statin and antiplatelet therapy.  -Start hydration IV  -Start oral mucomyst 1.2gr PO or enteral q 12hrs x 48 hrs.in case he gets contrast.  -Consider starting antiplatelet agent if follow-up brain  imaging without evidence of brain.  -Start SC Heparin or alternative anticoagulant for DVT prophylaxis  if follow-up brain  imaging without evidence of brain hemorrhage or any other contraindication.  -PT/OT and speech pathology assessment.  -Compare blood pressure in both arms.  -Admit for work-up.  -SZ precautions.  -Assess for urinary retention and consider bladder scanning post voidal and renal ultrasounds if not done recently.  -Diagnostic studies:              -Carotid ultrasound to assess vasculature, Include assessment of vertebral arteries.             - CTA multiphase head and neck.  Consider doing it if renal function improves.              - Trans-thoracic cardiac echo with bubble to assess cardiac function and                          cardio embolic risks            -Consider getting an outpatient EEG but if further spells transfer to [facility] for EEG monitoring.             Labs:   - HgbA1C to assess blood glucose levels,  - Lipid Profile to assess cholesterol levels,to assess cardiac function/status  -CBC, coagulation times, fibrinogen, basic chemestry  -Check a prolactin level, SZ marker.  -Check TSH/Free T4.  -Check drug and tox screen.     Disposition Recommendation: do not transfer   Address advance directives with family.  Consider re-consulting stroke specialist with the work-up results.  Get cardiology to interrogate his pacer.                               The attending portion of this evaluation, treatment, and documentation was performed per Jarvis Javed MD, MPH, FA  via  audiovisual.    Billing code:   (non-intervention mild to moderate stroke, TIA, some mimics)    · This patient has a critical neurological condition/illness, with some potential for high morbidity and mortality.  · There is a moderate probability for acute neurological change leading to clinical and possibly life-threatening deterioration requiring highest level of physician preparedness for urgent intervention.  · Care was coordinated with other physicians involved in the patient's care.  · Radiologic studies and laboratory data were reviewed and interpreted, and plan of care was re-assessed based on the results.  · Diagnosis, treatment options and prognosis may have been discussed with the patient and/or family members or caregiver.        Jarvis Javed MD  Comprehensive Stroke Center  Vascular Neurology   [facility]

## 2018-01-12 NOTE — PROGRESS NOTES
"[facility] Resident HO-1 Progress Note    Subjective:      Patient feels well and oriented. Patient is on contact and droplet precautions. Family at bedside. Patient is coming from group home and wishes to stay.      Objective:   Last 24 Hour Vital Signs:  BP  Min: 144/74  Max: 182/78  Temp  Av.1 °F (37.8 °C)  Min: 99.6 °F (37.6 °C)  Max: 101.1 °F (38.4 °C)  Pulse  Av.2  Min: 70  Max: 89  Resp  Av.4  Min: 14  Max: 20  SpO2  Av.8 %  Min: 95 %  Max: 99 %  Height  Av' 8" (172.7 cm)  Min: 5' 8" (172.7 cm)  Max: 5' 8" (172.7 cm)  Weight  Av.1 kg (156 lb 12 oz)  Min: 69.4 kg (153 lb)  Max: 72.8 kg (160 lb 7.9 oz)  No intake/output data recorded.    Physical Examination:  General:          Alert and awake  Head:               Normocephalic and atraumatic  Eyes:               Pupils still small but reactive to light; EOMI with anicteric sclera and clear conjunctivae  Mouth:             Oropharynx clear and without exudate; moist mucous membranes. Tongue purple and red but daughters report this is chronic and he did not bite tongue today  Cardio:             Systolic murmur III/VI, RRR  Resp:               CTAB and unlabored; no wheezes, crackles or rhonchi, cough present  Abdom:            Soft, NTND with normoactive bowel sounds  Extrem:            WWP with no clubbing, cyanosis or edema  Skin:                No rashes, lesions, or color changes  Pulses:            2+ and symmetric distally  Neuro:             AAOx3; cooperative and pleasant with no focal deficits    Laboratory:  Laboratory Data Reviewed: yes  Pertinent Findings:  INR 1.3  Cr 1.8  Alcohol serum WNL   LP WNL LDL 10  Flu B positive   CBC: no white count 10, Hgb 9.7 normocytic stable, plts 101 stable  CMP: Cr 1.7 stable, transaminitis 87/57, hypoal 3.2       Microbiology Data Reviewed: yes  Pertinent Findings:  Blood Clx pending  Urine Clx pending  UA pending  Flu B positive     Other Results:  EKG (my interpretation): " No new, Paced in 70s, wide QRS, inverted T waves diffusely     Radiology Data Reviewed: yes  Pertinent Findings:  CXR 1/11/2017: Findings: Left chest wall pacer stable. The cardiomediastinal silhouette is enlarged, similar to the previous exam noting calcification of the aortic arch..  There is no pleural effusion.  The trachea is midline.  The lungs are symmetrically expanded bilaterally with coarse interstitial attenuation in a perihilar distribution.  A calcific focus projects over the lateral aspect of the right mid to lower lung zone, stable.  There is left basilar atelectasis and/or scarring, similar.  There is probable atelectasis or scarring projected over the left midlung zone. No large focal consolidation seen.  There is no pneumothorax.  The osseous structures are unchanged.    CT head w/o con: No acute intracranial abnormalities, Stable sequela of chronic microvascular ischemic change and volume loss, noting stable ventricular prominence.    Current Medications:     Infusions:       Scheduled:   aztreonam  1,000 mg Intravenous Q8H    heparin (porcine)  5,000 Units Subcutaneous Q8H    metroNIDAZOLE  500 mg Oral Q8H    oseltamivir  30 mg Oral Daily    [START ON 1/14/2018] vancomycin (VANCOCIN) IVPB  1,000 mg Intravenous Q48H        PRN:  acetaminophen, sodium chloride 0.9%    Antibiotics and Day Number of Therapy:  Aztreonam 1g TID IV began 1/12/2018  Flagyl 500mg po ID began 1/12/2018  Vancomycin 1g IV q48 h began 1/12/2018  Tamiflu 30po BID began 1/12/2018    Lines and Day Number of Therapy:  PIV    Assessment:     Zeyad Woodard is a 89 y.o.male with  Patient Active Problem List    Diagnosis Date Noted    Altered mental status 01/12/2018    C. difficile colitis 01/12/2018    Stroke 01/11/2018    ELIZABET (acute kidney injury) 01/06/2018    Late onset Alzheimer's disease with behavioral disturbance     S/P placement of cardiac pacemaker     Syncope 12/29/2017    Takes dietary supplements  "11/16/2017    Cellulitis and abscess of buttock     Pericardial calcification 11/12/2017    Mitral regurgitation 11/12/2017    MRSA bacteremia 11/10/2017    Debility 11/08/2017    Cellulitis of buttock 11/07/2017    HLD (hyperlipidemia) 11/07/2017    Abnormal blood smear 10/05/2017    Chronic pain 10/03/2017    Chronic bilateral low back pain with bilateral sciatica 09/19/2017    Use of opiates for therapeutic purposes 09/19/2017    Dementia without behavioral disturbance 06/05/2017    Post-operative state 02/01/2017    Pseudophakia 01/18/2017    Essential hypertension 01/18/2017    Right posterior capsular opacification 01/18/2017    Refractive error 01/18/2017    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    Hypothyroidism due to acquired atrophy of thyroid 10/02/2016    Right hip pain 10/01/2016    Contusion of right hip 10/01/2016    Elevated troponin 09/30/2016    Anemia of chronic renal failure, stage 3 (moderate) 09/01/2016    Chronic constipation 07/06/2016    Thrombocytopenia 07/03/2016    Venous insufficiency of both lower extremities 10/26/2015    Frequent falls 08/28/2015    Opioid dependence with intoxication with complication 08/28/2015    Cardiac pacemaker in situ 07/02/2015    Sick sinus syndrome 06/12/2015    Bradycardia 06/12/2015    Chronic back pain 09/12/2014    Lumbar radiculopathy 09/12/2014    CKD (chronic kidney disease), stage III 10/16/2013    Iron deficiency anemia due to chronic blood loss 04/12/2013    Orthostatic hypotension 04/08/2013    Hypothyroidism     Polyneuropathy 11/12/2012    Chronic diastolic congestive heart failure 08/23/2012    Renovascular hypertension     Chronic atrial fibrillation     Benign prostatic hyperplasia         Plan:     Acute Encephalopathy  -causes include seizure vs syncope vs aspiration vs opioid overdose  -patient witnessed getting into bed at 7:30, found unresponsive at 8  patient "down" for about 1 hour with " spontaneous return to consciousness and immediately alert and conversant  -had not received any meds since that am, daughters report no longer on opioids but patient given narcan in ED with response   -has had an episode of syncope in past with noted jerking of arms, not noted this time   -has been having new cough with sputum and was laying flat, good chance this was aspiration event   -stroke activated in Ed. Teleneurology recs CTA head and neck if renal function improves, carotid Us, TTE, EEG, interrogation of pacemaker.   - Aztreonam 1g and vancomycin 1g IV for aspiration pna coverage   -NPO originally, however cleared by speech  dental soft thin now  MBS 1/8 without aspiration   -PT and Ot: SNF  -q4 neuro checks   -Vascular Neurology Consulted: TIA vs ictal  NPO  seizure precautions  IV fluids  antiplatelet agent  statin  oral mucomyst 1.2gr PO or enteral q 12hrs x 48 hrs. in case he gets contrast  Carotid ultrasound  CTA multiphase head and neck  echo 12/2017 grade 2 diastolic dysfunction with aflutter  outpatient EEG  a1c  lipid profile WNL  prolactin  tsh/t4  drug screen   -CT head w/o con: No acute intracranial abnormalities, Stable sequela of chronic microvascular ischemic change and volume loss, noting stable ventricular prominence.   -Will not pursue stroke workup      Influenza B  -Patient positive for influenza B with temp to 101.1  -tamiflu 30mg daily x 5 days for renal dosing   -droplet precautions     C. Dif Colitis  -C dif positive 01/09/2017 per PCR  -discharged with po flagyl, continue 500mg TID   -c.dif precautions      ELIZABET  -Creatinine 1.9 today, was 1.2 at baseline  -likely 2/2 volume down from c dif diarrhea  -adequate hydration  -close monitoring      Anemia of Chronic Disease  -H/H 10.1/31.1, MCV 93  -workup 1/6 consistent with AOCD  -no signs of bleeding. c-scope UTD  -Cont home medication ferrous sulfate 325 every other day      Transaminitis  -AST 84, ALT 57, alk phos  136  -no abdominal pain  -close monitoring      History of A fib and SSS s/p pacemaker  -pacemaker interrogated recently per family, will get again with new AMS  -rate in 80s now, sinus rhythm      Chronic Diastolic Heart Failure  -Echo 12/2017 with EF 55-60, grade 2 diastolic dysfunction, mild As, biatrial enlargement, PA pressure 35  -no signs of volume overload   -continue coreg, lasix  -not currently on an ACE  -no current issues     HTN  -BP on admit 158/64  -continue home coreg, lasix  -close monitoring      Hypothyroidism  -continue home synthroid 100mcg  -no current issues     BPH  -continue home flomax and finasteride  -incontinent at baseline     Dementia  -oriented on exam  -continue home aricept   -continue mirtazipine for help with appetite     Physical Deconditioning  -PT and Ot: SNF  -mirtazipine for appetite stimulant      Code: Full for now but has been DNR before, will discuss more with family  Pt: SNF  Diet: Dental Soft Thing  Ppx: Heparin  Dispo: pending clinical improvement         Fay Rose  [facility] HO-1  [facility] Service Team B    [facility] Hospitalist Pager numbers:   [facility] Hospitalist Medicine Team A (Kinsey/Yumiko): [phone]  [facility] Hospitalist Medicine Team B (Tyrese/Bibi):  [phone]

## 2018-01-12 NOTE — H&P
[facility] History and Physical - Resident Note    Admitting Team: Medicine Team B  Attending Physician: Tyrese  Resident: Yani  Interns: Milton    Date of Admit: 1/11/2018    Chief Complaint     AMS x 1 hour     Subjective:      History of Present Illness:  Zeyad Woodard is a 89 y.o. male who  has a past medical history of Anemia; Atrial fibrillation; BPH (benign prostatic hypertrophy); Cancer; Cardiac pacemaker in situ (7/2/2015); CHF (congestive heart failure); Coronary artery disease; Encounter for blood transfusion; GI bleed; Hypertension; Hypothyroidism; Polyneuropathy; Posture imbalance; Right posterior capsular opacification (1/18/2017); SSS (sick sinus syndrome) (2015); and Stroke (1/11/2018).. The patient presented to [facility] on 1/11/2018 with a primary complaint of Altered Mental Status (decreased LOC; reported last seen normal was one hour ago; per EMS pt is usually talkative; pt is only responsive to painful stimuli; pinpoint puls noted)      Ms. Woodard is a 89 year old man who was just admitted and discharged on 1/10/18. He was not eligible for SNF and was discharged to a home with 8 residents and aids and nurses on call. Per his daughters, he was fine this morning and home health was set up for him today. He was told upon discharge to stop his opioids so his daughters never sent the medication with him to this home so they know he did not have any opioids today.     At 7:30 pm tonight Mr. Woodard was put to bed and noted at that time to be doing well. At 8pm the aid went to bring him some night medications and noticed he was unresponsive and only responded to deeply painful stimuli. EMS was called, he was brought in, and he was stroke activated. The daughters say he never had any seizure like movements this time but had some jerking once before when he passed out. EMS noted pinpoint pupils and he was given narcan here. They also say he is incontinent at  "baseline. During teleneurology consult the patient "came back" and was immediately alert and oriented according to his daughters. His daughters are adamant that he did not get any opioids despite responding to narcan.     His daughters report a new cough for the last day with some yellow and green sputum production. They say he had a temp of 100.3 the day of his discharge but were told that is not a fever.     He endorses syncope 2 weeks ago (one episode before today), incontinence, fatigue, cough, sputum production, chills. Denies headache, nausea, vomiting, bowel changes, chest pain, SOB.     Past Medical History:  Past Medical History:   Diagnosis Date    Anemia     Atrial fibrillation     BPH (benign prostatic hypertrophy)     Cancer     Cardiac pacemaker in situ 7/2/2015    CHF (congestive heart failure)     Coronary artery disease     Encounter for blood transfusion     GI bleed     cecum angiectasia s/p cautery    Hypertension     Hypothyroidism     Polyneuropathy     Posture imbalance     due to neuropathy    Right posterior capsular opacification 1/18/2017    SSS (sick sinus syndrome) 2015    s/p pacemaker    Stroke 1/11/2018       Past Surgical History:  Past Surgical History:   Procedure Laterality Date    CARDIAC SURGERY      CATARACT EXTRACTION W/  INTRAOCULAR LENS IMPLANT Right 05/17/2010        CATARACT EXTRACTION W/  INTRAOCULAR LENS IMPLANT Left 02/16/2004        cataract surgery      COLONOSCOPY  6/30/04    COLONOSCOPY N/A 2/15/2016    Procedure: COLONOSCOPY;  Surgeon: Stanton Kirk MD;  Location: [address];  Service: Endoscopy;  Laterality: N/A;    EYE SURGERY      HERNIA REPAIR      inguinal hernia repair      Open heart surgery for pericardiectomy      for calcific constrictive pericarditis    UMBILICAL HERNIA REPAIR      Yag      Left Eye       Allergies:  Review of patient's allergies indicates:   Allergen Reactions    Penicillins " Hives and Other (See Comments)     Fever       Home Medications:  Prior to Admission medications    Medication Sig Start Date End Date Taking? Authorizing Provider   acetaminophen (TYLENOL) 325 MG tablet Take 2 tablets (650 mg total) by mouth every 6 (six) hours as needed. 12/11/17   Nimo Gonzalez NP   ASCORBATE CALCIUM (VITAMIN C ORAL) Take 1 tablet by mouth once daily.  7/3/12   Historical Provider, MD   aspirin (ECOTRIN) 81 MG EC tablet Take 1 tablet (81 mg total) by mouth once daily. 12/11/17 12/11/18  Nimo Gonzalez NP   atorvastatin (LIPITOR) 20 MG tablet Take 1 tablet (20 mg total) by mouth nightly. 12/14/17 12/14/18  Booker Ricci MD   calcium 500 mg Tab Take 1 tablet by mouth Daily. 7/3/12   Historical Provider, MD   carvedilol (COREG) 3.125 MG tablet Take 1 tablet (3.125 mg total) by mouth 2 (two) times daily with meals. 1/5/18 1/5/19  Booker Ricci MD   clotrimazole-betamethasone (LOTRISONE) lotion Apply topically 2 (two) times daily.  Patient taking differently: Apply topically as needed.  5/28/15   Booker Ricci MD   cyanocobalamin (VITAMIN B-12) 1000 MCG tablet Take 100 mcg by mouth once daily.    Historical Provider, MD   donepezil (ARICEPT) 10 MG tablet Take 1 tablet (10 mg total) by mouth every evening. 11/2/17 11/2/18  Chele Juárez MD   ferrous sulfate 325 mg (65 mg iron) Tab tablet Take 325 mg by mouth every other day.     Historical Provider, MD   finasteride (PROSCAR) 5 mg tablet Take 1 tablet (5 mg total) by mouth once daily. 7/27/17 7/27/18  Booker Ricci MD   furosemide (LASIX) 20 MG tablet Take 20 mg by mouth daily as needed.    Historical Provider, MD   gabapentin (NEURONTIN) 100 MG capsule Take 100mg in Morning and take 300mg nightly 12/14/17   Booker Ricci MD   levothyroxine (SYNTHROID) 100 MCG tablet Take 1 tablet (100 mcg total) by mouth once daily. 8/7/17   Booker Ricci MD   metroNIDAZOLE (FLAGYL) 500 MG tablet  Take 1 tablet (500 mg total) by mouth every 8 (eight) hours. 1/10/18 1/20/18  Fay Rose MD   mirtazapine (REMERON) 7.5 MG Tab Take 1 tablet (7.5 mg total) by mouth every evening. 18  Booker Ricci MD   MULTIVITS-MINERALS/FA/LYCOPENE (ONE-A-DAY MEN'S MULTIVITAMIN ORAL) Take 1 tablet by mouth once daily.    Historical Provider, MD   neomycin-bacitracin-polymyxin (NEOSPORIN) ointment Apply topically Daily. 17   Nimo Gonzalez NP   oxyCODONE-acetaminophen (PERCOCET) 7.5-325 mg per tablet Take 1 tablet by mouth every 8 (eight) hours as needed for Pain. 17   Nimo Gonzalez NP   polyethylene glycol (GLYCOLAX) 17 gram/dose powder Take 17 g by mouth once daily.    Historical Provider, MD   senna-docusate 8.6-50 mg (PERICOLACE) 8.6-50 mg per tablet Take 1 tablet by mouth 2 (two) times daily. 16   Radha Luna, CINDY   tamsulosin (FLOMAX) 0.4 mg Cp24 Take 1 capsule (0.4 mg total) by mouth once daily. 17   Booker Ricci MD       Family History:  Family History   Problem Relation Age of Onset    Stroke Mother          Cancer Father      lung;     Diabetes Sister     Coronary artery disease Sister     Peripheral vascular disease Sister     Amblyopia Neg Hx     Blindness Neg Hx     Cataracts Neg Hx     Glaucoma Neg Hx     Hypertension Neg Hx     Macular degeneration Neg Hx     Retinal detachment Neg Hx     Strabismus Neg Hx     Thyroid disease Neg Hx     Prostate cancer Neg Hx     Kidney disease Neg Hx        Social History:  Social History   Substance Use Topics    Smoking status: Passive Smoke Exposure - Never Smoker    Smokeless tobacco: Never Used    Alcohol use No       Review of Systems:  Pertinent positives and negatives are listed in HPI.  All other systems are reviewed and are negative.     Health Maintaince :   Primary Care Physician: Dmitriy Pineda  Immunizations:   TDap is up to date.  Influenza is not  up to date.  Pneumovax is up to date.  Cancer Screening:  Colonoscopy: is up to date.      Objective:   Last 24 Hour Vital Signs:  BP  Min: 144/74  Max: 148/86  Temp  Av.9 °F (37.7 °C)  Min: 99.9 °F (37.7 °C)  Max: 99.9 °F (37.7 °C)  Pulse  Av.5  Min: 82  Max: 89  Resp  Av  Min: 14  Max: 18  SpO2  Av %  Min: 97 %  Max: 99 %  Weight  Av.4 kg (153 lb)  Min: 69.4 kg (153 lb)  Max: 69.4 kg (153 lb)  Body mass index is 21.95 kg/m².  No intake/output data recorded.    Physical Examination:  General: Alert and awake, conversant, daughters at bedside  Head:  Normocephalic and atraumatic  Eyes:  Pupils still small but reactive to light; EOMI with anicteric sclera and clear conjunctivae  Mouth:  Oropharynx clear and without exudate; moist mucous membranes. Tongue purple and red but daughters report this is chronic and he did not bite tongue today  Cardio:  Systolic murmur III/VI,   Resp:  CTAB and unlabored; no wheezes, crackles or rhonchi  Abdom: Soft, NTND with normoactive bowel sounds  Extrem: WWP with no clubbing, cyanosis or edema  Skin:  No rashes, lesions, or color changes  Pulses: 2+ and symmetric distally  Neuro:  AAOx3; cooperative and pleasant with no focal deficits    Laboratory:  Most Recent Data:  CBC: Lab Results   Component Value Date    WBC 10.04 2018    HGB 10.1 (L) 2018    HCT 31.1 (L) 2018     (L) 2018    MCV 93 2018    RDW 13.9 2018     BMP: Lab Results   Component Value Date     2018    K 4.1 2018     2018    CO2 24 2018    BUN 44 (H) 2018    CREATININE 1.9 (H) 2018     (H) 2018    CALCIUM 8.8 2018    MG 1.8 01/10/2018    PHOS 3.6 2017     LFTs: Lab Results   Component Value Date    PROT 6.8 2018    ALBUMIN 3.3 (L) 2018    BILITOT 0.5 2018    AST 84 (H) 2018    ALKPHOS 136 (H) 2018    ALT 57 (H) 2018     Coags:   Lab Results    Component Value Date    INR 1.1 01/11/2018     Urinalysis: Lab Results   Component Value Date    LABURIN No growth 07/12/2017    COLORU Yellow 01/06/2018    SPECGRAV 1.010 01/06/2018    NITRITE Negative 01/06/2018    KETONESU Negative 01/06/2018    UROBILINOGEN Negative 01/06/2018    WBCUA 0 01/06/2018       Trended Lab Data:    Recent Labs  Lab 01/09/18  0541 01/10/18  0519 01/11/18  1048 01/11/18  2228   WBC 12.99* 8.45 7.53 10.04   HGB 9.4* 9.2* 9.6* 10.1*   HCT 28.2* 27.7* 29.5* 31.1*   * 99* 113* 111*   MCV 91 90 92 93   RDW 13.7 13.7 13.5 13.9    136 139 139   K 4.0 4.1 4.1 4.1    105 106 104   CO2 22* 24 25 24   BUN 27* 26* 35* 44*   CREATININE 1.2 1.2 1.6* 1.9*   GLU 98 95 118* 156*   PROT 6.3 6.2  --  6.8   ALBUMIN 3.2* 3.2*  --  3.3*   BILITOT 0.8 0.9  --  0.5   AST 39 37  --  84*   ALKPHOS 96 95  --  136*   ALT 29 28  --  57*       Trended Cardiac Data:    Recent Labs  Lab 01/06/18  1417 01/06/18 2022   TROPONINI 0.037* 0.024         Other Results:  EKG (my interpretation):   Paced in 70s, wide QRS, inverted T waves diffusely     Radiology:  Imaging Results          X-Ray Chest AP Portable (Final result)  Result time 01/11/18 21:54:49    Final result by Bertrand Gonzáles MD (01/11/18 21:54:49)                 Impression:      As above        Electronically signed by: BERTRAND GONZÁLES MD  Date:     01/11/18  Time:    21:54              Narrative:    Chest AP portable    Indication:Stroke    Comparison:1/6/2016    Findings: Left chest wall pacer stable. The cardiomediastinal silhouette is enlarged, similar to the previous exam noting calcification of the aortic arch..  There is no pleural effusion.  The trachea is midline.  The lungs are symmetrically expanded bilaterally with coarse interstitial attenuation in a perihilar distribution.  A calcific focus projects over the lateral aspect of the right mid to lower lung zone, stable.  There is left basilar atelectasis and/or scarring,  "similar.  There is probable atelectasis or scarring projected over the left midlung zone. No large focal consolidation seen.  There is no pneumothorax.  The osseous structures are unchanged.                             CT Head Without Contrast (Final result)  Result time 01/11/18 21:46:04    Final result by Bertrand Gonzáles MD (01/11/18 21:46:04)                 Impression:        No acute intracranial abnormalities.    Stable sequela of chronic microvascular ischemic change and volume loss, noting stable ventricular prominence.            Electronically signed by: BERTRAND GONZÁLES MD  Date:     01/11/18  Time:    21:46              Narrative:    Comparison: 1/6/2018    Clinical history: Stroke    Technique:    Axial images of the brain were obtained at 5-mm intervals from the skull base to the vertex without the administration of contrast.    Findings:    Examination is limited secondary to patient motion.    There is generalized cerebral volume loss.  There is hypoattenuation in a periventricular fashion, likely sequela of chronic microvascular ischemic change.  There is no evidence of acute major vascular territory infarct, hemorrhage, or mass.  The ventricular system is prominent, unchanged since the previous exam.  There is marah-cisterna magna versus arachnoid cyst.  There are no abnormal extra-axial fluid collections.  The paranasal sinuses and mastoid air cells are clear, and there is no evidence of calvarial fracture.  The visualized soft tissues are unremarkable.                                 Assessment:     Zeyad Woodard is a 89 y.o. male with:     Plan:     Acute Encephalopathy  -causes include seizure vs syncope vs aspiration vs opioid overdose  -patient witnessed getting into bed at 7:30, found unresponsive at 8  patient "down" for about 1 hour with spontaneous return to consciousness and immediately alert and conversant  -had not received any meds since that am, daughters report no longer on " opioids but patient given narcan in ED with response   -has had an episode of syncope in past with noted jerking of arms, not noted this time   -has been having new cough with sputum and was laying flat, good chance this was aspiration event   -stroke activated in Ed. Teleneurology recs CTA head and neck if renal function improves, carotid Us, TTE, EEG, interrogation of pacemaker. Lipid panel wnl.   -cover with aztreonam and vancomycin for aspiration pna coverage   -NPO until cleared by speech   -PT and OT  -q4 neuro checks     Influenza B  -Patient positive for influenza B with temp to 101.1  -tamiflu 30mg daily x 5 days for renal dosing   -droplet precautions    C. 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 c-scope UTD    Transaminitis  -AST 84, ALT 57, alk phos 136  -no abdominal pain  -close monitoring     History of A fib and SSS s/p pacemaker  -pacemaker interrogated recently per family, will get again with new AMS  -rate in 80s now, sinus rhythm     Chronic Diastolic Heart Failure  -Echo 12/2017 with EF 55-60, grade 2 diastolic dysfunction, mild As, biatrial enlargement, PA pressure 35  -no signs of volume overload   -continue coreg, lasix  -not currently on an ACE  -no current issues    HTN  -BP on admit 158/64  -continue home coreg, lasix  -close monitoring     Hypothyroidism  -continue home synthroid 100mcg  -no current issues    BPH  -continue home flomax and finasteride  -incontinent at baseline    Dementia  -oriented on exam  -continue home aricept   -continue mirtazipine for help with appetite    Physical Deconditioning  -PT and OT consulted  -mirtazipine for appetite stimulant     Code: full for now but  has been DNR before, will discuss more with family  Diet: NPO until slp clearance  Ppx: Heparin  Dispo: pending clinical improvement     Code Status:     Full, has been DNR in past, will discuss more with patient and family     Fay Calderon  [facility] HO-I  [facility] Medicine Service    [facility] Medicine Hospitalist Pager numbers:   [facility] Hospitalist Medicine Team A (Kinsey/Yumiko): [phone]  [facility] Hospitalist Medicine Team B (Tyrese/Bibi):  [phone]

## 2018-01-12 NOTE — PLAN OF CARE
Problem: Physical Therapy Goal  Goal: Physical Therapy Goal  Goals to be met by: 18     Patient will increase functional independence with mobility by performin)Sup<>sit Mod I   2)Sit<>stand Mod I with RW  3)Ambulate 75' Mod I with RW  4)Independent with LE therex X10 BLE    Outcome: Ongoing (interventions implemented as appropriate)  Initial evaluation complete PT to follow.

## 2018-01-13 LAB
ALBUMIN SERPL BCP-MCNC: 2.8 G/DL
ALP SERPL-CCNC: 117 U/L
ALT SERPL W/O P-5'-P-CCNC: 49 U/L
ANION GAP SERPL CALC-SCNC: 8 MMOL/L
ANISOCYTOSIS BLD QL SMEAR: SLIGHT
AST SERPL-CCNC: 74 U/L
BASOPHILS # BLD AUTO: 0.01 K/UL
BASOPHILS NFR BLD: 0.1 %
BILIRUB SERPL-MCNC: 0.4 MG/DL
BUN SERPL-MCNC: 41 MG/DL
CALCIUM SERPL-MCNC: 8.3 MG/DL
CHLORIDE SERPL-SCNC: 107 MMOL/L
CO2 SERPL-SCNC: 23 MMOL/L
CREAT SERPL-MCNC: 1.3 MG/DL
DIFFERENTIAL METHOD: ABNORMAL
EOSINOPHIL # BLD AUTO: 0 K/UL
EOSINOPHIL NFR BLD: 0.2 %
ERYTHROCYTE [DISTWIDTH] IN BLOOD BY AUTOMATED COUNT: 14 %
EST. GFR  (AFRICAN AMERICAN): 56 ML/MIN/1.73 M^2
EST. GFR  (NON AFRICAN AMERICAN): 48 ML/MIN/1.73 M^2
GLUCOSE SERPL-MCNC: 102 MG/DL
HCT VFR BLD AUTO: 26.8 %
HGB BLD-MCNC: 8.8 G/DL
LYMPHOCYTES # BLD AUTO: 4.9 K/UL
LYMPHOCYTES NFR BLD: 56.2 %
MCH RBC QN AUTO: 30.1 PG
MCHC RBC AUTO-ENTMCNC: 32.8 G/DL
MCV RBC AUTO: 92 FL
MONOCYTES # BLD AUTO: 1.2 K/UL
MONOCYTES NFR BLD: 13.2 %
NEUTROPHILS # BLD AUTO: 2.6 K/UL
NEUTROPHILS NFR BLD: 30.3 %
PLATELET # BLD AUTO: 93 K/UL
PLATELET BLD QL SMEAR: ABNORMAL
PMV BLD AUTO: 10.3 FL
POCT GLUCOSE: 105 MG/DL (ref 70–110)
POCT GLUCOSE: 168 MG/DL (ref 70–110)
POIKILOCYTOSIS BLD QL SMEAR: SLIGHT
POTASSIUM SERPL-SCNC: 4.2 MMOL/L
PROT SERPL-MCNC: 5.8 G/DL
RBC # BLD AUTO: 2.92 M/UL
SODIUM SERPL-SCNC: 138 MMOL/L
WBC # BLD AUTO: 8.69 K/UL

## 2018-01-13 PROCEDURE — 94761 N-INVAS EAR/PLS OXIMETRY MLT: CPT

## 2018-01-13 PROCEDURE — 25000003 PHARM REV CODE 250: Performed by: STUDENT IN AN ORGANIZED HEALTH CARE EDUCATION/TRAINING PROGRAM

## 2018-01-13 PROCEDURE — 80053 COMPREHEN METABOLIC PANEL: CPT

## 2018-01-13 PROCEDURE — S0073 INJECTION, AZTREONAM, 500 MG: HCPCS | Performed by: INTERNAL MEDICINE

## 2018-01-13 PROCEDURE — 63600175 PHARM REV CODE 636 W HCPCS: Performed by: INTERNAL MEDICINE

## 2018-01-13 PROCEDURE — 97530 THERAPEUTIC ACTIVITIES: CPT

## 2018-01-13 PROCEDURE — 36415 COLL VENOUS BLD VENIPUNCTURE: CPT

## 2018-01-13 PROCEDURE — 85025 COMPLETE CBC W/AUTO DIFF WBC: CPT

## 2018-01-13 PROCEDURE — 97110 THERAPEUTIC EXERCISES: CPT

## 2018-01-13 PROCEDURE — 11000001 HC ACUTE MED/SURG PRIVATE ROOM

## 2018-01-13 PROCEDURE — 25000003 PHARM REV CODE 250: Performed by: INTERNAL MEDICINE

## 2018-01-13 RX ADMIN — METRONIDAZOLE 500 MG: 500 TABLET ORAL at 09:01

## 2018-01-13 RX ADMIN — AZTREONAM 1000 MG: 1 INJECTION, POWDER, LYOPHILIZED, FOR SOLUTION INTRAMUSCULAR; INTRAVENOUS at 09:01

## 2018-01-13 RX ADMIN — METRONIDAZOLE 500 MG: 500 TABLET ORAL at 01:01

## 2018-01-13 RX ADMIN — OSELTAMIVIR PHOSPHATE 30 MG: 30 CAPSULE ORAL at 09:01

## 2018-01-13 RX ADMIN — METRONIDAZOLE 500 MG: 500 TABLET ORAL at 05:01

## 2018-01-13 RX ADMIN — FERROUS SULFATE TAB EC 325 MG (65 MG FE EQUIVALENT) 325 MG: 325 (65 FE) TABLET DELAYED RESPONSE at 09:01

## 2018-01-13 RX ADMIN — HEPARIN SODIUM 5000 UNITS: 5000 INJECTION, SOLUTION INTRAVENOUS; SUBCUTANEOUS at 09:01

## 2018-01-13 RX ADMIN — AZTREONAM 1000 MG: 1 INJECTION, POWDER, LYOPHILIZED, FOR SOLUTION INTRAMUSCULAR; INTRAVENOUS at 05:01

## 2018-01-13 RX ADMIN — HEPARIN SODIUM 5000 UNITS: 5000 INJECTION, SOLUTION INTRAVENOUS; SUBCUTANEOUS at 01:01

## 2018-01-13 RX ADMIN — AZTREONAM 1000 MG: 1 INJECTION, POWDER, LYOPHILIZED, FOR SOLUTION INTRAMUSCULAR; INTRAVENOUS at 02:01

## 2018-01-13 RX ADMIN — HEPARIN SODIUM 5000 UNITS: 5000 INJECTION, SOLUTION INTRAVENOUS; SUBCUTANEOUS at 05:01

## 2018-01-13 NOTE — PLAN OF CARE
Problem: Patient Care Overview  Goal: Plan of Care Review  Outcome: Ongoing (interventions implemented as appropriate)  Plan of care reviewed with patient. Patient verbalized complete understanding. Fall/safety/seizure/aspiration/droplet&special contact precautions maintained. Side rails padded. Pt on special contact for C. Diff and droplet due to positive influenza swab. All staff and family educated on proper PPE application, removal, and disposal. Slip resistant socks on. Bed in lowest position, locked, call light within reach. Bed alarm on, side rails up x's 2. Nurse instructed patient to notify staff for any assistance and pt verbalized complete understanding. Assisted pt with frequent weight shift. I&O's done. Pt Neuro checks done Q4h. Pt disorientated to time. Pt received IV antibiotics during shift. Urine sample collected and sent to lab, aseptic technique maintained. No acute distress, will continue to monitor.   Pt on telemetry, no ectopy or true red alarms noted. HR 70-80's

## 2018-01-13 NOTE — PLAN OF CARE
Problem: Physical Therapy Goal  Goal: Physical Therapy Goal  Goals to be met by: 18     Patient will increase functional independence with mobility by performin)Sup<>sit Mod I   2)Sit<>stand Mod I with RW  3)Ambulate 75' Mod I with RW  4)Independent with LE therex X10 BLE     Outcome: Ongoing (interventions implemented as appropriate)  Cont to progress standing activities as tolerated. Advance towards pt established PT goals.

## 2018-01-13 NOTE — PLAN OF CARE
Problem: Patient Care Overview  Goal: Plan of Care Review  Outcome: Ongoing (interventions implemented as appropriate)  Plan of care reviewed with patient. Patient verbalized understanding. Neuro checks performed Q4H. Patient remains disoriented to time, situation. Patients daughters visited with him today. IV and PO antibiotics maintained. Patient shows increased appetite, able to eat most of breakfast and lunch. Sacral SI mepalex clean, dry and intact. Patient a-fib on telemetry monitoring, HR 70's-80's, with no true red alarms noted. Bed is low and locked, bed alarm is activated, call light is within reach. Patient has been instructed to call if in need of assistance. Verbalized understanding.

## 2018-01-13 NOTE — PROGRESS NOTES
[facility] Resident HO-1 Progress Note    Subjective:      Patient states overall feels ok but does feel ill.      Objective:   Last 24 Hour Vital Signs:  BP  Min: 122/60  Max: 142/65  Temp  Av.8 °F (37.1 °C)  Min: 98.4 °F (36.9 °C)  Max: 99.3 °F (37.4 °C)  Pulse  Av.3  Min: 69  Max: 86  Resp  Av.8  Min: 16  Max: 18  SpO2  Av %  Min: 94 %  Max: 98 %  I/O last 3 completed shifts:  In: 705 [P.O.:505; IV Piggyback:200]  Out: 620 [Urine:620]    Physical Examination:  General:          Alert and awake  Head:               Normocephalic and atraumatic  Eyes:               Pupils still small but reactive to light; EOMI with anicteric sclera and clear conjunctivae  Mouth:             Oropharynx clear and without exudate; moist mucous membranes.   Cardio:             Systolic murmur III/VI, RRR  Resp:               CTAB and unlabored; no wheezes, crackles or rhonchi, cough present  Abdom:            Soft, NTND with normoactive bowel sounds  Extrem:            WWP with no clubbing, cyanosis or edema  Skin:                No rashes, lesions, or color changes  Pulses:            2+ and symmetric distally  Neuro:             AAOx3; cooperative and pleasant with no focal deficits    Laboratory:  Laboratory Data Reviewed: yes  Pertinent Findings:  None new  Microbiology Data Reviewed: yes  Pertinent Findings:  Blood Clx NGTD  Urine Clx pending  UA pending  Flu B positive     Other Results:  EKG (my interpretation): No new, Paced in 70s, wide QRS, inverted T waves diffusely     Radiology Data Reviewed: yes  Pertinent Findings:  CXR 2017: Findings: Left chest wall pacer stable. The cardiomediastinal silhouette is enlarged, similar to the previous exam noting calcification of the aortic arch..  There is no pleural effusion.  The trachea is midline.  The lungs are symmetrically expanded bilaterally with coarse interstitial attenuation in a perihilar distribution.  A calcific focus projects over the  lateral aspect of the right mid to lower lung zone, stable.  There is left basilar atelectasis and/or scarring, similar.  There is probable atelectasis or scarring projected over the left midlung zone. No large focal consolidation seen.  There is no pneumothorax.  The osseous structures are unchanged.    CT head w/o con: No acute intracranial abnormalities, Stable sequela of chronic microvascular ischemic change and volume loss, noting stable ventricular prominence.    Current Medications:     Infusions:       Scheduled:   aztreonam  1,000 mg Intravenous Q8H    ferrous sulfate  325 mg Oral Every other day    heparin (porcine)  5,000 Units Subcutaneous Q8H    metroNIDAZOLE  500 mg Oral Q8H    oseltamivir  30 mg Oral Daily        PRN:  acetaminophen, sodium chloride 0.9%    Antibiotics and Day Number of Therapy:  Aztreonam 1g TID IV began 1/12/2018  Flagyl 500mg po ID began 1/12/2018  Vancomycin 1g IV q48 h began 1/12/2018  Tamiflu 30po BID began 1/12/2018    Lines and Day Number of Therapy:  PIV    Assessment:     Zeyad Woodard is a 89 y.o.male with  Patient Active Problem List    Diagnosis Date Noted    Altered mental status 01/12/2018    C. difficile colitis 01/12/2018    Stroke 01/11/2018    ELIZABET (acute kidney injury) 01/06/2018    Late onset Alzheimer's disease with behavioral disturbance     S/P placement of cardiac pacemaker     Syncope 12/29/2017    Takes dietary supplements 11/16/2017    Cellulitis and abscess of buttock     Pericardial calcification 11/12/2017    Mitral regurgitation 11/12/2017    MRSA bacteremia 11/10/2017    Debility 11/08/2017    Cellulitis of buttock 11/07/2017    HLD (hyperlipidemia) 11/07/2017    Abnormal blood smear 10/05/2017    Chronic pain 10/03/2017    Chronic bilateral low back pain with bilateral sciatica 09/19/2017    Use of opiates for therapeutic purposes 09/19/2017    Dementia without behavioral disturbance 06/05/2017    Post-operative state  "02/01/2017    Pseudophakia 01/18/2017    Essential hypertension 01/18/2017    Right posterior capsular opacification 01/18/2017    Refractive error 01/18/2017    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    Hypothyroidism due to acquired atrophy of thyroid 10/02/2016    Right hip pain 10/01/2016    Contusion of right hip 10/01/2016    Elevated troponin 09/30/2016    Anemia of chronic renal failure, stage 3 (moderate) 09/01/2016    Chronic constipation 07/06/2016    Thrombocytopenia 07/03/2016    Venous insufficiency of both lower extremities 10/26/2015    Frequent falls 08/28/2015    Opioid dependence with intoxication with complication 08/28/2015    Cardiac pacemaker in situ 07/02/2015    Sick sinus syndrome 06/12/2015    Bradycardia 06/12/2015    Chronic back pain 09/12/2014    Lumbar radiculopathy 09/12/2014    CKD (chronic kidney disease), stage III 10/16/2013    Iron deficiency anemia due to chronic blood loss 04/12/2013    Orthostatic hypotension 04/08/2013    Hypothyroidism     Polyneuropathy 11/12/2012    Chronic diastolic congestive heart failure 08/23/2012    Renovascular hypertension     Chronic atrial fibrillation     Benign prostatic hyperplasia         Plan:       Influenza B  -Patient positive for influenza B with temp to 101.1  -tamiflu 30mg daily x 5 days for renal dosing   -droplet precautions    Acute Encephalopathy (resolved)  -causes include seizure vs syncope vs aspiration vs opioid overdose  -patient witnessed getting into bed at 7:30, found unresponsive at 8  patient "down" for about 1 hour with spontaneous return to consciousness and immediately alert and conversant  -had not received any meds since that am, daughters report no longer on opioids but patient given narcan in ED with response   -has had an episode of syncope in past with noted jerking of arms, not noted this time   -has been having new cough with sputum and was laying flat, good chance this was aspiration " event   -stroke activated in Ed. Teleneurology recs CTA head and neck if renal function improves, carotid Us, TTE, EEG, interrogation of pacemaker.   - Aztreonam 1g and vancomycin 1g IV for aspiration pna coverage   -NPO originally, however cleared by speech  dental soft thin now  MBS 1/8 without aspiration   -PT and Ot: SNF  -q4 neuro checks   -Vascular Neurology Consulted: TIA vs ictal  NPO  seizure precautions  IV fluids  antiplatelet agent  statin  oral mucomyst 1.2gr PO or enteral q 12hrs x 48 hrs. in case he gets contrast  Carotid ultrasound  CTA multiphase head and neck  echo 12/2017 grade 2 diastolic dysfunction with aflutter  outpatient EEG  a1c  lipid profile WNL  prolactin  tsh/t4  drug screen   -CT head w/o con: No acute intracranial abnormalities, Stable sequela of chronic microvascular ischemic change and volume loss, noting stable ventricular prominence.   -Will not pursue stroke workup, discontinue vanc and continue aztreonam .      C. Dif Colitis  -C dif positive 01/09/2017 per PCR  -discharged with po flagyl, continue 500mg TID   -c.dif precautions      ELIZABET (resolved)  -Creatinine 1.9 at presentation, was 1.2 at baseline  -likely 2/2 volume down from c dif diarrhea  -adequate hydration  -close monitoring, improved      Anemia of Chronic Disease  -H/H 10.1/31.1, MCV 93  -workup 1/6 consistent with AOCD  -no signs of bleeding. c-scope UTD  -Cont home medication ferrous sulfate 325 every other day      Transaminitis  -AST 84, ALT 57, alk phos 136  -no abdominal pain  -close monitoring      History of A fib and SSS s/p pacemaker  -pacemaker interrogated recently per family, will get again with new AMS  -rate in 80s now, sinus rhythm      Chronic Diastolic Heart Failure  -Echo 12/2017 with EF 55-60, grade 2 diastolic dysfunction, mild As, biatrial enlargement, PA pressure 35  -no signs of volume overload   -continue coreg, lasix  -not currently on an ACE  -no current issues     HTN  -BP  on admit 158/64  -continue home coreg, lasix  -close monitoring      Hypothyroidism  -continue home synthroid 100mcg  -no current issues     BPH  -continue home flomax and finasteride  -incontinent at baseline     Dementia  -oriented on exam  -continue home aricept   -continue mirtazipine for help with appetite     Physical Deconditioning  -PT and Ot: SNF  -mirtazipine for appetite stimulant      Code: Full for now but has been DNR before, will discuss more with family  Pt: SNF  Diet: Dental Soft Thing  Ppx: Heparin  Dispo: pending clinical improvement, d/c back to [facility] with [facility].       Evonne Smith  [facility] HO-1  [facility] Service Team B    [facility] Medicine Hospitalist Pager numbers:   [facility] Hospitalist Medicine Team A (Kinsey/Yumiko): [phone]  [facility] Hospitalist Medicine Team B (Tyrese/Bibi):  [phone]

## 2018-01-13 NOTE — PT/OT/SLP PROGRESS
"Physical Therapy Treatment    Patient Name:  Zeyad Woodard   MRN:  [id number]    Recommendations:     Discharge Recommendations:      Discharge Equipment Recommendations:     Barriers to discharge: Decreased caregiver support    Assessment:     Zeyad Woodard is a 89 y.o. male admitted with a medical diagnosis of Altered mental status.  He presents with the following impairments/functional limitations:  gait instability, weakness, impaired endurance, impaired balance, impaired sensation, impaired self care skills, impaired functional mobilty, decreased coordination, pain, decreased safety awareness, decreased lower extremity function .    Rehab Prognosis:  good; patient would benefit from acute skilled PT services to address these deficits and reach maximum level of function.      Recent Surgery: * No surgery found *      Plan:     During this hospitalization, patient to be seen 5 x/week to address the above listed problems via gait training, therapeutic activities, therapeutic exercises  · Plan of Care Expires:  12/12/18   Plan of Care Reviewed with: patient    Subjective     Communicated with NSG prior to session.  Patient found supine upon PT entry to room, agreeable to treatment.      Chief Complaint: pt reports he feels "weak"  Patient comments/goals: "get stronger"  Pain/Comfort:  · Pain Rating 1: 0/10    Patients cultural, spiritual, [employer] conflicts given the current situation: None verbalized to PT    Objective:     Patient found with: telemetry     General Precautions: Standard, fall, airborne   Orthopedic Precautions:N/A   Braces:       Functional Mobility:  · Bed Mobility:     · Rolling Right: contact guard assistance and minimum assistance  · Supine to Sit: minimum assistance  · Sit to Supine: minimum assistance  · Transfers:     · Sit to Stand:  moderate assistance and maximal assistance with no AD  · Balance: Lateral weight shifting R ./ L in standing       AM-PAC 6 CLICK MOBILITY  Turning " over in bed (including adjusting bedclothes, sheets and blankets)?: 3  Sitting down on and standing up from a chair with arms (e.g., wheelchair, bedside commode, etc.): 3  Moving from lying on back to sitting on the side of the bed?: 3  Moving to and from a bed to a chair (including a wheelchair)?: 3  Need to walk in hospital room?: 3  Climbing 3-5 steps with a railing?: 3  Total Score: 18       Therapeutic Activities and Exercises:   pt completed seated B LE AROM: LAQ, HIP FLEx, HIP ABD/ ADD, AP's 2x10 at edge of bed. Standing: mini squats, hip flex alt x10 reps, lateral steps 3 to right side .     Patient left supine with all lines intact and call button in reach..    GOALS:    Physical Therapy Goals        Problem: Physical Therapy Goal    Goal Priority Disciplines Outcome Goal Variances Interventions   Physical Therapy Goal     PT/OT, PT Ongoing (interventions implemented as appropriate)     Description:  Goals to be met by: 18     Patient will increase functional independence with mobility by performin)Sup<>sit Mod I   2)Sit<>stand Mod I with RW  3)Ambulate 75' Mod I with RW  4)Independent with LE therex X10 BLE                      Time Tracking:     PT Received On: 18  PT Start Time: 1050     PT Stop Time: 1115  PT Total Time (min): 25 min     Billable Minutes: Therapeutic Activity 10 and Therapeutic Exercise 15       PT/PTA: PTA     PTA Visit Number: 4     Km Navarro, PTA  2018

## 2018-01-14 LAB
ALBUMIN SERPL BCP-MCNC: 2.8 G/DL
ALP SERPL-CCNC: 126 U/L
ALT SERPL W/O P-5'-P-CCNC: 46 U/L
ANION GAP SERPL CALC-SCNC: 7 MMOL/L
AST SERPL-CCNC: 65 U/L
BACTERIA UR CULT: NO GROWTH
BASOPHILS # BLD AUTO: 0.01 K/UL
BASOPHILS NFR BLD: 0.1 %
BILIRUB SERPL-MCNC: 0.3 MG/DL
BUN SERPL-MCNC: 34 MG/DL
CALCIUM SERPL-MCNC: 8.4 MG/DL
CHLORIDE SERPL-SCNC: 107 MMOL/L
CO2 SERPL-SCNC: 24 MMOL/L
CREAT SERPL-MCNC: 1 MG/DL
DIFFERENTIAL METHOD: ABNORMAL
EOSINOPHIL # BLD AUTO: 0 K/UL
EOSINOPHIL NFR BLD: 0.3 %
ERYTHROCYTE [DISTWIDTH] IN BLOOD BY AUTOMATED COUNT: 13.8 %
EST. GFR  (AFRICAN AMERICAN): >60 ML/MIN/1.73 M^2
EST. GFR  (NON AFRICAN AMERICAN): >60 ML/MIN/1.73 M^2
GLUCOSE SERPL-MCNC: 106 MG/DL
HCT VFR BLD AUTO: 27.6 %
HGB BLD-MCNC: 9 G/DL
LYMPHOCYTES # BLD AUTO: 5.9 K/UL
LYMPHOCYTES NFR BLD: 64.8 %
MCH RBC QN AUTO: 29.8 PG
MCHC RBC AUTO-ENTMCNC: 32.6 G/DL
MCV RBC AUTO: 91 FL
MONOCYTES # BLD AUTO: 1.3 K/UL
MONOCYTES NFR BLD: 14.6 %
NEUTROPHILS # BLD AUTO: 1.8 K/UL
NEUTROPHILS NFR BLD: 20.2 %
PLATELET # BLD AUTO: 91 K/UL
PLATELET BLD QL SMEAR: ABNORMAL
PMV BLD AUTO: 9.8 FL
POTASSIUM SERPL-SCNC: 4.1 MMOL/L
PROT SERPL-MCNC: 6 G/DL
RBC # BLD AUTO: 3.02 M/UL
SODIUM SERPL-SCNC: 138 MMOL/L
WBC # BLD AUTO: 9.06 K/UL

## 2018-01-14 PROCEDURE — 25000003 PHARM REV CODE 250: Performed by: STUDENT IN AN ORGANIZED HEALTH CARE EDUCATION/TRAINING PROGRAM

## 2018-01-14 PROCEDURE — S0073 INJECTION, AZTREONAM, 500 MG: HCPCS | Performed by: INTERNAL MEDICINE

## 2018-01-14 PROCEDURE — 63600175 PHARM REV CODE 636 W HCPCS: Performed by: INTERNAL MEDICINE

## 2018-01-14 PROCEDURE — 36415 COLL VENOUS BLD VENIPUNCTURE: CPT

## 2018-01-14 PROCEDURE — 85025 COMPLETE CBC W/AUTO DIFF WBC: CPT

## 2018-01-14 PROCEDURE — 11000001 HC ACUTE MED/SURG PRIVATE ROOM

## 2018-01-14 PROCEDURE — 80053 COMPREHEN METABOLIC PANEL: CPT

## 2018-01-14 PROCEDURE — 94761 N-INVAS EAR/PLS OXIMETRY MLT: CPT

## 2018-01-14 PROCEDURE — 25000003 PHARM REV CODE 250: Performed by: INTERNAL MEDICINE

## 2018-01-14 RX ORDER — NAPROXEN SODIUM 220 MG/1
81 TABLET, FILM COATED ORAL DAILY
Status: DISCONTINUED | OUTPATIENT
Start: 2018-01-14 | End: 2018-01-15 | Stop reason: HOSPADM

## 2018-01-14 RX ADMIN — AZTREONAM 1000 MG: 1 INJECTION, POWDER, LYOPHILIZED, FOR SOLUTION INTRAMUSCULAR; INTRAVENOUS at 05:01

## 2018-01-14 RX ADMIN — METRONIDAZOLE 500 MG: 500 TABLET ORAL at 09:01

## 2018-01-14 RX ADMIN — OSELTAMIVIR PHOSPHATE 30 MG: 30 CAPSULE ORAL at 09:01

## 2018-01-14 RX ADMIN — HEPARIN SODIUM 5000 UNITS: 5000 INJECTION, SOLUTION INTRAVENOUS; SUBCUTANEOUS at 01:01

## 2018-01-14 RX ADMIN — HEPARIN SODIUM 5000 UNITS: 5000 INJECTION, SOLUTION INTRAVENOUS; SUBCUTANEOUS at 09:01

## 2018-01-14 RX ADMIN — METRONIDAZOLE 500 MG: 500 TABLET ORAL at 05:01

## 2018-01-14 RX ADMIN — METRONIDAZOLE 500 MG: 500 TABLET ORAL at 01:01

## 2018-01-14 RX ADMIN — ASPIRIN 81 MG 81 MG: 81 TABLET ORAL at 01:01

## 2018-01-14 RX ADMIN — AZTREONAM 1000 MG: 1 INJECTION, POWDER, LYOPHILIZED, FOR SOLUTION INTRAMUSCULAR; INTRAVENOUS at 09:01

## 2018-01-14 RX ADMIN — AZTREONAM 1000 MG: 1 INJECTION, POWDER, LYOPHILIZED, FOR SOLUTION INTRAMUSCULAR; INTRAVENOUS at 01:01

## 2018-01-14 RX ADMIN — HEPARIN SODIUM 5000 UNITS: 5000 INJECTION, SOLUTION INTRAVENOUS; SUBCUTANEOUS at 05:01

## 2018-01-14 NOTE — DISCHARGE SUMMARY
[facility] Discharge Summary    Primary Team: [facility] Internal [facility] Team B   Attending Physician: Dr. Xiong  Resident: Dr. Lomeli  Intern: Dr. Rose    Date of Admit: 1/6/2018  Date of Discharge: 1/10/2018    [facility] to: [facility]  Condition: Stable     Discharge Diagnoses     Patient Active Problem List   Diagnosis    Renovascular hypertension    Chronic atrial fibrillation    Benign prostatic hyperplasia    Chronic diastolic congestive heart failure    Polyneuropathy    Hypothyroidism    Orthostatic hypotension    Iron deficiency anemia due to chronic blood loss    CKD (chronic kidney disease), stage III    Chronic back pain    Lumbar radiculopathy    Sick sinus syndrome    Bradycardia    Frequent falls    Opioid dependence with intoxication with complication    Venous insufficiency of both lower extremities    Thrombocytopenia    Chronic constipation    Anemia of chronic renal failure, stage 3 (moderate)    Elevated troponin    Right hip pain    Contusion of right hip    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    Hypothyroidism due to acquired atrophy of thyroid    Pseudophakia    Essential hypertension    Right posterior capsular opacification    Refractive error    Post-operative state    Dementia without behavioral disturbance    Chronic bilateral low back pain with bilateral sciatica    Use of opiates for therapeutic purposes    Chronic pain    Abnormal blood smear    Cellulitis of buttock    HLD (hyperlipidemia)    Debility    MRSA bacteremia    Cardiac pacemaker in situ    Pericardial calcification    Mitral regurgitation    Cellulitis and abscess of buttock    Takes dietary supplements    Syncope    Late onset Alzheimer's disease with behavioral disturbance    S/P placement of cardiac pacemaker    ELIZABET (acute kidney injury)    Stroke    Altered mental status    C. difficile colitis       Consultants and Procedures      Consultants:  Cardiology    Procedures:   None     Brief History of Present Illness      Zeyad Woodard is a 89 y.o. male who  has a past medical history of Anemia; Atrial fibrillation; BPH (benign prostatic hypertrophy); Cancer; Cardiac pacemaker in situ (7/2/2015); CHF (congestive heart failure); Coronary artery disease; Hx of GI bleed; Hypertension; Hypothyroidism; Polyneuropathy; Posture imbalance; Right posterior capsular opacification (1/18/2017); and SSS (sick sinus syndrome) (2015).. The patient presented to [facility] on 1/6/2018 with a primary complaint of Weakness (with tremors since this am.)     History was provided mostly by his two daughters, who are his primary care givers and assist with ADLs.  Per them, he has baseline dementia, but has had a fairly precipitous decline in mental status since November. He was previously living alone with independent ADLs.  In mid November was diagnosed with a sacral ulcer that required IV abx and 1 month at SNF facility.  He was discharged in mid December with continued home health care.  He had a prior admission on 12/29 for a syncopal episode.  He has had several falls as well since.  The family has been working on re-placement in a SNF as they feel they can no longer care for him.       Today, the patient refused to eat breakfast, and then was witnessed to have shaking hand tremors, which is new for him; he has no known hx of seizures. He has been eating and drinking less than normal for last two days. He uses a walker for ambulation, but could not get up or walk today, which is also new.  The home health nurse came to the house, and based on her evaluation said that patient needed to go to the hospital via ambulance.  Daughters deny any foul smelling urine or incontinence; last BM was 2 days ago and he normally has a daily BM. Daughters also say patient appears more flushed than normal today. States he refused his medications yesterday,  this is also new for him.     Patient denies any new pain other than his chronic back, left leg and left hip pain.  He also denies any unilateral weakness or facial droop.  He states he just feels weak and not himself, but has been feeling that way for awhile.  Denies chest pain, SOB, abdominal pain.     Hospital Course By Problem with Pertinent Findings        C. Dif Colitis   -C. Dif antigen positive, PCR positive  -Per nurses, 4 episodes of voluminous, watery, stools with infectious odor   -Flagyl 500 IV TID  will discharge with flagyl  -F/u with PCP      ELIZABET on CKD, resolved  -On admit, Cr 1.7 (increased from 1.3 on 12/30/17)  -1L NS bolus given in ED with improvement in BP with rate afterwards   -Cr downtrended to 1.2 and back to baseline  -F/u with PCP      AMS vs Worsening Dementia, resolved   -Acute on chronic change likely 2/2 dehydration and deconditioning  -CT head neg for acute process, CXR WNL, c.dif PCR positive  on Flagyl   -Afebrile and lack leukocytosis  -UA without infection  -No visible sores or skin breakdown  -TSH wnl, B12 wnl  -Speech therapy was consulted and performed MBS with no s/s of aspiration, yet recommended dental soft diet  -Patient back to baseline the day of discharge  -F/u with PCP      Elevated Troponin:  -0.037-->0.024  -Patient did not complain of chest pain   -EKG w/ flipped TW anteriorly   -Cards consulted and stated that no intervention was necessary and to continue ASA 81 daily, statin , coreg 6.25 BID  will discharge with this regimen  -F/u with PCP      Deconditioning:  -PT/OT evaluation and treatment during hospitalization, recommended SNF  -Social work consulted  -Discharged with [facility]       Thrombocytopenia  -113 on admit to 99  -Monitored   -Likely chronic   -F/u with PCP        Discharge Medications        Medication List      START taking these medications    metroNIDAZOLE 500 MG tablet  Commonly known as:  FLAGYL  Take 1 tablet (500 mg total) by mouth every 8  (eight) hours.        CHANGE how you take these medications    clotrimazole-betamethasone lotion  Commonly known as:  LOTRISONE  Apply topically 2 (two) times daily.  What changed:  · when to take this  · reasons to take this        CONTINUE taking these medications    acetaminophen 325 MG tablet  Commonly known as:  TYLENOL  Take 2 tablets (650 mg total) by mouth every 6 (six) hours as needed.     aspirin 81 MG EC tablet  Commonly known as:  ECOTRIN  Take 1 tablet (81 mg total) by mouth once daily.     atorvastatin 20 MG tablet  Commonly known as:  LIPITOR  Take 1 tablet (20 mg total) by mouth nightly.     calcium 500 mg Tab     carvedilol 3.125 MG tablet  Commonly known as:  COREG  Take 1 tablet (3.125 mg total) by mouth 2 (two) times daily with meals.     donepezil 10 MG tablet  Commonly known as:  ARICEPT  Take 1 tablet (10 mg total) by mouth every evening.     ferrous sulfate 325 mg (65 mg iron) Tab tablet     finasteride 5 mg tablet  Commonly known as:  PROSCAR  Take 1 tablet (5 mg total) by mouth once daily.     furosemide 20 MG tablet  Commonly known as:  LASIX     gabapentin 100 MG capsule  Commonly known as:  NEURONTIN  Take 100mg in Morning and take 300mg nightly     levothyroxine 100 MCG tablet  Commonly known as:  SYNTHROID  Take 1 tablet (100 mcg total) by mouth once daily.     mirtazapine 7.5 MG Tab  Commonly known as:  REMERON  Take 1 tablet (7.5 mg total) by mouth every evening.     neomycin-bacitracin-polymyxin ointment  Commonly known as:  NEOSPORIN  Apply topically Daily.     ONE-A-DAY MEN'S MULTIVITAMIN ORAL     oxyCODONE-acetaminophen 7.5-325 mg per tablet  Commonly known as:  PERCOCET  Take 1 tablet by mouth every 8 (eight) hours as needed for Pain.     polyethylene glycol 17 gram/dose powder  Commonly known as:  GLYCOLAX     senna-docusate 8.6-50 mg 8.6-50 mg per tablet  Commonly known as:  PERICOLACE  Take 1 tablet by mouth 2 (two) times daily.     tamsulosin 0.4 mg Cp24  Commonly known as:   FLOMAX  Take 1 capsule (0.4 mg total) by mouth once daily.     VITAMIN B-12 1000 MCG tablet  Generic drug:  cyanocobalamin     VITAMIN C ORAL           Where to Get Your Medications      You can get these medications from any pharmacy    Bring a paper prescription for each of these medications  · metroNIDAZOLE 500 MG tablet         Discharge Information:   Diet:  Dental soft/finely chopped/nectar thick    Physical Activity:  As tolerated, [facility]    Instructions:  1. Take all medications as prescribed  2. Keep all follow-up appointments  3. Return to the hospital or call your primary care physicians if any worsening symptoms such as fever, SOB, chest pain, dizziness, confusion from baseline, or syncope occur.      Follow-Up Appointments:  PCP Dr. Drake Trujillo on 1/16/2018    Fay Rose  [facility] Internal Medicine, -

## 2018-01-14 NOTE — PLAN OF CARE
Problem: Patient Care Overview  Goal: Plan of Care Review  Outcome: Ongoing (interventions implemented as appropriate)  Reviewed plan of care with pt. Patient verbalized understanding.

## 2018-01-14 NOTE — PROGRESS NOTES
[facility] Resident HO-1 Progress Note    Subjective:      Pt: acute skilled PT services. Neuro checks performed Q4H. Overnight, a-fib on telemetry monitoring, HR 70's-80's    Patient endorses 1 episode of loose stool this am.  Tolerating diet. Oriented. Denies other complaints.      Objective:   Last 24 Hour Vital Signs:  BP  Min: 130/62  Max: 161/73  Temp  Av.3 °F (36.8 °C)  Min: 96.1 °F (35.6 °C)  Max: 100.3 °F (37.9 °C)  Pulse  Av.9  Min: 75  Max: 89  Resp  Av.9  Min: 16  Max: 18  SpO2  Av.4 %  Min: 96 %  Max: 99 %  I/O last 3 completed shifts:  In: 950 [P.O.:700; IV Piggyback:250]  Out:  [Urine:]    Physical Examination:  General:          Alert and awake, orientedx3  Head:               Normocephalic and atraumatic  Eyes:               Pupils still small but reactive to light; EOMI with anicteric sclera and clear conjunctivae  Mouth:             Oropharynx clear and without exudate; moist mucous membranes.   Cardio:             Systolic murmur III/VI, RRR  Resp:               CTAB and unlabored; no wheezes, crackles or rhonchi, cough present  Abdom:            Soft, NTND with normoactive bowel sounds  Extrem:            WWP with no clubbing, cyanosis or edema  Skin:                No rashes, lesions, or color changes  Pulses:            2+ and symmetric distally  Neuro:             AAOx3; cooperative and pleasant with no focal deficits    Laboratory:  Laboratory Data Reviewed: yes  Pertinent Findings:  thrombocytopenia , anemia    Microbiology Data Reviewed: yes  Pertinent Findings:  Blood Clx NGTD x2  Urine Clx pending  UA 2+ protein, 1 occult blood   Flu B positive     Other Results:  EKG (my interpretation): No new, Paced in 70s, wide QRS, inverted T waves diffusely     Radiology Data Reviewed: yes  Pertinent Findings:  CXR 2017: Findings: Left chest wall pacer stable. The cardiomediastinal silhouette is enlarged, similar to the previous exam noting calcification  of the aortic arch..  There is no pleural effusion.  The trachea is midline.  The lungs are symmetrically expanded bilaterally with coarse interstitial attenuation in a perihilar distribution.  A calcific focus projects over the lateral aspect of the right mid to lower lung zone, stable.  There is left basilar atelectasis and/or scarring, similar.  There is probable atelectasis or scarring projected over the left midlung zone. No large focal consolidation seen.  There is no pneumothorax.  The osseous structures are unchanged.    CT head w/o con: No acute intracranial abnormalities, Stable sequela of chronic microvascular ischemic change and volume loss, noting stable ventricular prominence.    Current Medications:     Infusions:       Scheduled:   aztreonam  1,000 mg Intravenous Q8H    ferrous sulfate  325 mg Oral Every other day    heparin (porcine)  5,000 Units Subcutaneous Q8H    metroNIDAZOLE  500 mg Oral Q8H    oseltamivir  30 mg Oral Daily        PRN:  acetaminophen, sodium chloride 0.9%    Antibiotics and Day Number of Therapy:  Aztreonam 1g TID IV began 1/12/2018  Flagyl 500mg po ID began 1/12/2018  Vancomycin 1g IV q48 h began 1/12/2018. D/c'd.  Tamiflu 30po BID began 1/12/2018    Lines and Day Number of Therapy:  PIV    Assessment:     Zeyad Woodard is a 89 y.o.male with  Patient Active Problem List    Diagnosis Date Noted    Altered mental status 01/12/2018    C. difficile colitis 01/12/2018    Stroke 01/11/2018    ELIZABET (acute kidney injury) 01/06/2018    Late onset Alzheimer's disease with behavioral disturbance     S/P placement of cardiac pacemaker     Syncope 12/29/2017    Takes dietary supplements 11/16/2017    Cellulitis and abscess of buttock     Pericardial calcification 11/12/2017    Mitral regurgitation 11/12/2017    MRSA bacteremia 11/10/2017    Debility 11/08/2017    Cellulitis of buttock 11/07/2017    HLD (hyperlipidemia) 11/07/2017    Abnormal blood smear 10/05/2017  "   Chronic pain 10/03/2017    Chronic bilateral low back pain with bilateral sciatica 09/19/2017    Use of opiates for therapeutic purposes 09/19/2017    Dementia without behavioral disturbance 06/05/2017    Post-operative state 02/01/2017    Pseudophakia 01/18/2017    Essential hypertension 01/18/2017    Right posterior capsular opacification 01/18/2017    Refractive error 01/18/2017    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    Hypothyroidism due to acquired atrophy of thyroid 10/02/2016    Right hip pain 10/01/2016    Contusion of right hip 10/01/2016    Elevated troponin 09/30/2016    Anemia of chronic renal failure, stage 3 (moderate) 09/01/2016    Chronic constipation 07/06/2016    Thrombocytopenia 07/03/2016    Venous insufficiency of both lower extremities 10/26/2015    Frequent falls 08/28/2015    Opioid dependence with intoxication with complication 08/28/2015    Cardiac pacemaker in situ 07/02/2015    Sick sinus syndrome 06/12/2015    Bradycardia 06/12/2015    Chronic back pain 09/12/2014    Lumbar radiculopathy 09/12/2014    CKD (chronic kidney disease), stage III 10/16/2013    Iron deficiency anemia due to chronic blood loss 04/12/2013    Orthostatic hypotension 04/08/2013    Hypothyroidism     Polyneuropathy 11/12/2012    Chronic diastolic congestive heart failure 08/23/2012    Renovascular hypertension     Chronic atrial fibrillation     Benign prostatic hyperplasia         Plan:     Influenza B  -Patient positive for influenza B with temp to 101.1  -tamiflu 30mg daily x 5 days for renal dosing   -droplet precautions    Acute Encephalopathy (resolved)  -causes include seizure vs syncope vs aspiration vs opioid overdose  -patient witnessed getting into bed at 7:30, found unresponsive at 8  patient "down" for about 1 hour with spontaneous return to consciousness and immediately alert and conversant  -had not received any meds since that am, daughters report no longer on " opioids but patient given narcan in ED with response   -has had an episode of syncope in past with noted jerking of arms, not noted this time   -has been having new cough with sputum and was laying flat, good chance this was aspiration event   -stroke activated in Ed. Teleneurology recs CTA head and neck if renal function improves, carotid Us, TTE, EEG, interrogation of pacemaker.   - Aztreonam 1g and vancomycin 1g IV for broad spectrum. D/c'd Vanc.   -NPO originally, however cleared by speech  dental soft thin now  MBS 1/8 without aspiration   -PT and Ot: SNF  -q4 neuro checks   -Vascular Neurology Consulted: TIA vs ictal  NPO  seizure precautions  IV fluids  antiplatelet agent  statin  oral mucomyst 1.2gr PO or enteral q 12hrs x 48 hrs. in case he gets contrast  Carotid ultrasound  CTA multiphase head and neck  echo 12/2017 grade 2 diastolic dysfunction with aflutter  outpatient EEG  a1c  lipid profile WNL  prolactin  tsh/t4  drug screen   -CT head w/o con: No acute intracranial abnormalities, Stable sequela of chronic microvascular ischemic change and volume loss, noting stable ventricular prominence.   -Will not pursue stroke workup, discontinue vanc and continue aztreonam .      C. Dif Colitis  -C dif positive 01/09/2017 per PCR  -discharged with po flagyl, continue 500mg TID   -c.dif precautions      ELIZABET (resolved)  -Creatinine 1.9 at presentation, was 1.2 at baseline  -likely 2/2 volume down from c dif diarrhea  -adequate hydration  -close monitoring, improved      Anemia of Chronic Disease  -H/H 10.1/31.1, MCV 93  -workup 1/6 consistent with AOCD  -no signs of bleeding. c-scope UTD  -Cont home medication ferrous sulfate 325 every other day      Transaminitis  -AST 84, ALT 57, alk phos 136  -no abdominal pain  -close monitoring      History of A fib and SSS s/p pacemaker  -pacemaker interrogated recently per family, will get again with new AMS  -rate in 80s  afib overnight   -Patient not on  rate control agent or AC  on home coreg 2.125 BID  history of GI bleeds preclude AC  -Asa 81 daily      Chronic Diastolic Heart Failure  -Echo 12/2017 with EF 55-60, grade 2 diastolic dysfunction, mild As, biatrial enlargement, PA pressure 35  -no signs of volume overload   -continue coreg, lasix  -not currently on an ACE  -no current issues     HTN  -BP on admit 158/64  -continue home coreg, lasix  -close monitoring      Hypothyroidism  -continue home synthroid 100mcg  -no current issues     BPH  -continue home flomax and finasteride  -incontinent at baseline     Dementia  -oriented on exam  -continue home aricept   -continue mirtazipine for help with appetite    Chronic Thrombocytopenia  -Asxs, plts 91     Physical Deconditioning  -PT and Ot: SNF  -mirtazipine for appetite stimulant        Code: Full for now but has been DNR before, will discuss more with family  Pt: SNF  Diet: Dental Soft Thin  Ppx: Heparin  Dispo: pending clinical improvement, d/c back to [facility] with O-home health. Pt: skilled       Fay Rose  [facility] HO-1  [facility] Service Team B    [facility] Medicine Hospitalist Pager numbers:   [facility] Hospitalist Medicine Team A (Kinsey/Yumiko): [phone]  [facility] Hospitalist Medicine Team B (Tyrese/Bibi):  [phone]

## 2018-01-14 NOTE — PLAN OF CARE
Problem: Patient Care Overview  Goal: Plan of Care Review  Outcome: Ongoing (interventions implemented as appropriate)  Plan of care reviewed with patient. Patient verbalized understanding. Neuro checks performed Q4H. Patient remains disoriented to time, situation, with no new neurological deficits. IV and PO antibiotics maintained. SI pressure injury to coccyx is covered with mepalex that is clean, dry and intact. Patient assisted to turn frequently to prevent further skin breakdown. Patient NSR on telemetry monitoring, HR 80's, with no true red alarms noted. Seizure precautions maintained. Bed is low and locked, bed alarm is activated, call light is within reach. Patient has been instructed to call if in need of assistance. Verbalized understanding.

## 2018-01-15 ENCOUNTER — PES CALL (OUTPATIENT)
Dept: ADMINISTRATIVE | Facility: CLINIC | Age: 83
End: 2018-01-15

## 2018-01-15 VITALS
BODY MASS INDEX: 24.32 KG/M2 | WEIGHT: 160.5 LBS | HEART RATE: 87 BPM | RESPIRATION RATE: 18 BRPM | HEIGHT: 68 IN | DIASTOLIC BLOOD PRESSURE: 86 MMHG | OXYGEN SATURATION: 97 % | TEMPERATURE: 98 F | SYSTOLIC BLOOD PRESSURE: 185 MMHG

## 2018-01-15 LAB
ALBUMIN SERPL BCP-MCNC: 2.8 G/DL
ALP SERPL-CCNC: 149 U/L
ALT SERPL W/O P-5'-P-CCNC: 54 U/L
ANION GAP SERPL CALC-SCNC: 6 MMOL/L
AST SERPL-CCNC: 84 U/L
BASOPHILS # BLD AUTO: 0.02 K/UL
BASOPHILS NFR BLD: 0.2 %
BILIRUB SERPL-MCNC: 0.2 MG/DL
BUN SERPL-MCNC: 30 MG/DL
CALCIUM SERPL-MCNC: 8.4 MG/DL
CHLORIDE SERPL-SCNC: 108 MMOL/L
CO2 SERPL-SCNC: 26 MMOL/L
CREAT SERPL-MCNC: 1.1 MG/DL
DIFFERENTIAL METHOD: ABNORMAL
EOSINOPHIL # BLD AUTO: 0.1 K/UL
EOSINOPHIL NFR BLD: 0.6 %
ERYTHROCYTE [DISTWIDTH] IN BLOOD BY AUTOMATED COUNT: 13.8 %
EST. GFR  (AFRICAN AMERICAN): >60 ML/MIN/1.73 M^2
EST. GFR  (NON AFRICAN AMERICAN): 59 ML/MIN/1.73 M^2
GLUCOSE SERPL-MCNC: 120 MG/DL
HCT VFR BLD AUTO: 27.8 %
HGB BLD-MCNC: 9.2 G/DL
LYMPHOCYTES # BLD AUTO: 6.1 K/UL
LYMPHOCYTES NFR BLD: 67.2 %
MCH RBC QN AUTO: 30 PG
MCHC RBC AUTO-ENTMCNC: 33.1 G/DL
MCV RBC AUTO: 91 FL
MONOCYTES # BLD AUTO: 1.1 K/UL
MONOCYTES NFR BLD: 11.6 %
NEUTROPHILS # BLD AUTO: 1.8 K/UL
NEUTROPHILS NFR BLD: 20.4 %
PLATELET # BLD AUTO: 93 K/UL
PMV BLD AUTO: 9.6 FL
POTASSIUM SERPL-SCNC: 4.4 MMOL/L
PROT SERPL-MCNC: 6.1 G/DL
RBC # BLD AUTO: 3.07 M/UL
SODIUM SERPL-SCNC: 140 MMOL/L
WBC # BLD AUTO: 9.02 K/UL

## 2018-01-15 PROCEDURE — 25000003 PHARM REV CODE 250: Performed by: STUDENT IN AN ORGANIZED HEALTH CARE EDUCATION/TRAINING PROGRAM

## 2018-01-15 PROCEDURE — 36415 COLL VENOUS BLD VENIPUNCTURE: CPT

## 2018-01-15 PROCEDURE — 97116 GAIT TRAINING THERAPY: CPT

## 2018-01-15 PROCEDURE — 85025 COMPLETE CBC W/AUTO DIFF WBC: CPT

## 2018-01-15 PROCEDURE — 97110 THERAPEUTIC EXERCISES: CPT

## 2018-01-15 PROCEDURE — 25000003 PHARM REV CODE 250: Performed by: INTERNAL MEDICINE

## 2018-01-15 PROCEDURE — 63600175 PHARM REV CODE 636 W HCPCS: Performed by: INTERNAL MEDICINE

## 2018-01-15 PROCEDURE — 94761 N-INVAS EAR/PLS OXIMETRY MLT: CPT

## 2018-01-15 PROCEDURE — 80053 COMPREHEN METABOLIC PANEL: CPT

## 2018-01-15 RX ORDER — CARVEDILOL 3.12 MG/1
3.12 TABLET ORAL 2 TIMES DAILY
Status: DISCONTINUED | OUTPATIENT
Start: 2018-01-15 | End: 2018-01-15 | Stop reason: HOSPADM

## 2018-01-15 RX ORDER — OSELTAMIVIR PHOSPHATE 30 MG/1
30 CAPSULE ORAL DAILY
Qty: 1 CAPSULE | Refills: 0 | Status: SHIPPED | OUTPATIENT
Start: 2018-01-16 | End: 2018-01-17

## 2018-01-15 RX ORDER — METRONIDAZOLE 500 MG/1
500 TABLET ORAL EVERY 8 HOURS
Qty: 23 TABLET | Refills: 0 | Status: SHIPPED | OUTPATIENT
Start: 2018-01-15 | End: 2018-01-23

## 2018-01-15 RX ADMIN — METRONIDAZOLE 500 MG: 500 TABLET ORAL at 05:01

## 2018-01-15 RX ADMIN — FERROUS SULFATE TAB EC 325 MG (65 MG FE EQUIVALENT) 325 MG: 325 (65 FE) TABLET DELAYED RESPONSE at 09:01

## 2018-01-15 RX ADMIN — ASPIRIN 81 MG 81 MG: 81 TABLET ORAL at 09:01

## 2018-01-15 RX ADMIN — AZTREONAM 1000 MG: 1 INJECTION, POWDER, LYOPHILIZED, FOR SOLUTION INTRAMUSCULAR; INTRAVENOUS at 11:01

## 2018-01-15 RX ADMIN — HEPARIN SODIUM 5000 UNITS: 5000 INJECTION, SOLUTION INTRAVENOUS; SUBCUTANEOUS at 05:01

## 2018-01-15 RX ADMIN — AZTREONAM 1000 MG: 1 INJECTION, POWDER, LYOPHILIZED, FOR SOLUTION INTRAMUSCULAR; INTRAVENOUS at 02:01

## 2018-01-15 RX ADMIN — OSELTAMIVIR PHOSPHATE 30 MG: 30 CAPSULE ORAL at 09:01

## 2018-01-15 RX ADMIN — CARVEDILOL 3.12 MG: 3.12 TABLET, FILM COATED ORAL at 09:01

## 2018-01-15 NOTE — PLAN OF CARE
Problem: Patient Care Overview  Goal: Plan of Care Review  Outcome: Outcome(s) achieved Date Met: 01/15/18  Patient to be discharged back to group home today. Asia, patient's daughter, notified. PIV removed, catheter tip intact. Telemetry removed and returned to nurse's station. Patient to be discharged with daughter.

## 2018-01-15 NOTE — DISCHARGE SUMMARY
[facility] Discharge Summary    Primary Team: [facility] Internal [facility] Team B   Attending Physician: Dr. Xiong  Resident: Dr. Lomeli  Intern: Dr. Rose    Date of Admit: 1/11/2018  Date of Discharge: 1/15/2018    Discharge to: [facility]  Condition: Stable    Discharge Diagnoses     Patient Active Problem List   Diagnosis    Renovascular hypertension    Chronic atrial fibrillation    Benign prostatic hyperplasia    Chronic diastolic congestive heart failure    Polyneuropathy    Hypothyroidism    Orthostatic hypotension    Iron deficiency anemia due to chronic blood loss    CKD (chronic kidney disease), stage III    Chronic back pain    Lumbar radiculopathy    Sick sinus syndrome    Bradycardia    Frequent falls    Opioid dependence with intoxication with complication    Venous insufficiency of both lower extremities    Thrombocytopenia    Chronic constipation    Anemia of chronic renal failure, stage 3 (moderate)    Elevated troponin    Right hip pain    Contusion of right hip    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    Hypothyroidism due to acquired atrophy of thyroid    Pseudophakia    Essential hypertension    Right posterior capsular opacification    Refractive error    Post-operative state    Dementia without behavioral disturbance    Chronic bilateral low back pain with bilateral sciatica    Use of opiates for therapeutic purposes    Chronic pain    Abnormal blood smear    Cellulitis of buttock    HLD (hyperlipidemia)    Debility    MRSA bacteremia    Cardiac pacemaker in situ    Pericardial calcification    Mitral regurgitation    Cellulitis and abscess of buttock    Takes dietary supplements    Syncope    Late onset Alzheimer's disease with behavioral disturbance    S/P placement of cardiac pacemaker    ELIZABET (acute kidney injury)    Stroke    Altered mental status    C. difficile colitis       Consultants and Procedures     Consultants:  Vascular  "Neurology     Procedures:   None     Brief History of Present Illness      Zeyad Woodard is a 89 y.o. male who  has a past medical history of Anemia; Atrial fibrillation; BPH (benign prostatic hypertrophy); Cancer; Cardiac pacemaker in situ (7/2/2015); CHF (congestive heart failure); Coronary artery disease; Encounter for blood transfusion; GI bleed; Hypertension; Hypothyroidism; Polyneuropathy; Posture imbalance; Right posterior capsular opacification (1/18/2017); SSS (sick sinus syndrome) (2015); and Stroke (1/11/2018).. The patient presented to [facility] on 1/11/2018 with a primary complaint of Altered Mental Status (decreased LOC; reported last seen normal was one hour ago; per EMS pt is usually talkative; pt is only responsive to painful stimuli; pinpoint puls noted)        Ms. Woodard is a 89 year old man who was just admitted and discharged on 1/10/18. He was not eligible for SNF and was discharged to a home with 8 residents and aids and nurses on call. Per his daughters, he was fine this morning and home health was set up for him today. He was told upon discharge to stop his opioids so his daughters never sent the medication with him to this home so they know he did not have any opioids today.      At 7:30 pm tonight Mr. Woodard was put to bed and noted at that time to be doing well. At 8pm the aid went to bring him some night medications and noticed he was unresponsive and only responded to deeply painful stimuli. EMS was called, he was brought in, and he was stroke activated. The daughters say he never had any seizure like movements this time but had some jerking once before when he passed out. EMS noted pinpoint pupils and he was given narcan here. They also say he is incontinent at baseline. During teleneurology consult the patient "came back" and was immediately alert and oriented according to his daughters. His daughters are adamant that he did not get any opioids despite " "responding to narcan.      His daughters report a new cough for the last day with some yellow and green sputum production. They say he had a temp of 100.3 the day of his discharge but were told that is not a fever.      He endorses syncope 2 weeks ago (one episode before today), incontinence, fatigue, cough, sputum production, chills. Denies headache, nausea, vomiting, bowel changes, chest pain, SOB.     Hospital Course By Problem with Pertinent Findings      Influenza B  -Patient positive for influenza B with temp to 101.1  -tamiflu 30mg daily x 5 days for renal dosing   -droplet precautions     Acute Encephalopathy (resolved)  -causes include seizure vs syncope vs aspiration vs opioid overdose  -patient witnessed getting into bed at 7:30, found unresponsive at 8  patient "down" for about 1 hour with spontaneous return to consciousness and immediately alert and conversant  -had not received any meds since that am, daughters report no longer on opioids but patient given narcan in ED with response   -has had an episode of syncope in past with noted jerking of arms, not noted this time   -has been having new cough with sputum and was laying flat, therefore an aspiration event was likely. Covered with Flagyl. Given course of Vancomycin and Aztreonam during hospitalization.  -Stroke activated in ED. Teleneurology was consulted and recommended CTA head and neck, carotid Us, TTE, EEG, interrogation of pacemaker.  -CT head w/o con: No acute intracranial abnormalities, Stable sequela of chronic microvascular ischemic change and volume loss, noting stable ventricular prominence.    -NPO originally, however cleared by speech  dental soft thin now  MBS 1/8 without aspiration   -PT and Ot consulted and recommended SNF  -q4 neuro checks   -Echo 12/2017 grade 2 diastolic dysfunction with aflutter  -Outpatient EEG if PCP or family desires   -Lipid profile WNL   -Patient did not have continued episodes during hospitalization, " therefore felt stable for discharge   -PCP follow up      C. Dif Colitis  -C dif positive 01/09/2017 per PCR  -discharged with po flagyl, continue 500mg TID for 10 days   -c.dif precautions      ELIZABET (resolved)  -Creatinine 1.9 at presentation, was 1.2 at baseline  -likely 2/2 volume down from c dif diarrhea  -adequate hydration  -improved      Anemia of Chronic Disease  -H/H 10.1/31.1, MCV 93  -workup 1/6 consistent with AOCD  -no signs of bleeding. c-scope UTD  -Cont home medication ferrous sulfate 325 every other day   -F/u with PCP     Transaminitis  -AST 84, ALT 57, alk phos 136  -no abdominal pain  -close monitoring   -F/u with PCP     History of A fib and SSS s/p pacemaker  -pacemaker interrogated recently per family, will get again with new AMS  -rate in 80s  afib overnight   -Patient not on rate control agent or AC  on home coreg 2.125 BID  history of GI bleeds preclude AC  -Asa 81 daily   -F/u with PCP     Chronic Diastolic Heart Failure  -Echo 12/2017 with EF 55-60, grade 2 diastolic dysfunction, mild As, biatrial enlargement, PA pressure 35  -no signs of volume overload   -continue coreg, lasix  -not currently on an ACE  -no issues  -F/u with PCP     HTN  -BP on admit 158/64  -continue home coreg 3.125BID, lasix 20  -F/u with PCP     Hypothyroidism  -continue home synthroid 100mcg  -no current issues  -F/u with PCP     BPH  -continue home flomax and finasteride  -incontinent at baseline  -F/u with PCP     Dementia  -oriented on exam  -continue home aricept   -continue mirtazipine for help with appetite  -F/u with PCP     Chronic Thrombocytopenia  -Asxs, plts 91  -F/u with PCP     Physical Deconditioning  -PT and Ot: Skilled Nursing Facility, however family prefers [facility]  -Home Health PT/OT on discharge     Discharge Medications        Medication List      START taking these medications    oseltamivir 30 MG capsule  Commonly known as:  TAMIFLU  Take 1 capsule (30 mg total) by mouth once  daily.  Start taking on:  1/16/2018        CHANGE how you take these medications    clotrimazole-betamethasone lotion  Commonly known as:  LOTRISONE  Apply topically 2 (two) times daily.  What changed:  · when to take this  · reasons to take this        CONTINUE taking these medications    acetaminophen 325 MG tablet  Commonly known as:  TYLENOL  Take 2 tablets (650 mg total) by mouth every 6 (six) hours as needed.     aspirin 81 MG EC tablet  Commonly known as:  ECOTRIN  Take 1 tablet (81 mg total) by mouth once daily.     atorvastatin 20 MG tablet  Commonly known as:  LIPITOR  Take 1 tablet (20 mg total) by mouth nightly.     calcium 500 mg Tab     carvedilol 3.125 MG tablet  Commonly known as:  COREG  Take 1 tablet (3.125 mg total) by mouth 2 (two) times daily with meals.     donepezil 10 MG tablet  Commonly known as:  ARICEPT  Take 1 tablet (10 mg total) by mouth every evening.     ferrous sulfate 325 mg (65 mg iron) Tab tablet     finasteride 5 mg tablet  Commonly known as:  PROSCAR  Take 1 tablet (5 mg total) by mouth once daily.     furosemide 20 MG tablet  Commonly known as:  LASIX     gabapentin 100 MG capsule  Commonly known as:  NEURONTIN  Take 100mg in Morning and take 300mg nightly     levothyroxine 100 MCG tablet  Commonly known as:  SYNTHROID  Take 1 tablet (100 mcg total) by mouth once daily.     metroNIDAZOLE 500 MG tablet  Commonly known as:  FLAGYL  Take 1 tablet (500 mg total) by mouth every 8 (eight) hours.     mirtazapine 7.5 MG Tab  Commonly known as:  REMERON  Take 1 tablet (7.5 mg total) by mouth every evening.     neomycin-bacitracin-polymyxin ointment  Commonly known as:  NEOSPORIN  Apply topically Daily.     ONE-A-DAY MEN'S MULTIVITAMIN ORAL     oxyCODONE-acetaminophen 7.5-325 mg per tablet  Commonly known as:  PERCOCET  Take 1 tablet by mouth every 8 (eight) hours as needed for Pain.     polyethylene glycol 17 gram/dose powder  Commonly known as:  GLYCOLAX     senna-docusate 8.6-50 mg  8.6-50 mg per tablet  Commonly known as:  PERICOLACE  Take 1 tablet by mouth 2 (two) times daily.     tamsulosin 0.4 mg Cp24  Commonly known as:  FLOMAX  Take 1 capsule (0.4 mg total) by mouth once daily.     VITAMIN B-12 1000 MCG tablet  Generic drug:  cyanocobalamin     VITAMIN C ORAL           Where to Get Your Medications      You can get these medications from any pharmacy    Bring a paper prescription for each of these medications  · metroNIDAZOLE 500 MG tablet  · oseltamivir 30 MG capsule         Discharge Information:   Diet:  Dental Soft Thin     Physical Activity:  As tolerated. [facility] -PT    Instructions:  1. Take all medications as prescribed  2. Keep all follow-up appointments  3. Return to the hospital or call your primary care physicians if any worsening symptoms such as confusion, dizziness, syncope, or fever occur     Follow-Up Appointments:  PCP Dr. Izaguirre in 1 week   MD at [facility]    Fay Rose  [facility] Internal Medicine, -

## 2018-01-15 NOTE — PLAN OF CARE
Patient to discharge to private group home.  Sent home health referral to [facility] per wilfredougher request.  Patient has all DME request.    Future Appointments  Date Time Provider Department Center   1/23/2018 11:00 AM Tawana Trujillo MD [facility] RONNY Deb Kinza        01/15/18 1226   Final Note   Assessment Type Final Discharge Note   Discharge Disposition Home   Hospital Follow Up  Appt(s) scheduled? Yes   Discharge plans and expectations educations in teach back method with documentation complete? Yes   Right Care Referral Info   Post Acute Recommendation Home-care   Referral Type home health   Facility Name [facility]     Felicia Prince, RN, CCM, CMSRN  RN Transition Navigator  [phone]

## 2018-01-15 NOTE — PLAN OF CARE
Problem: Patient Care Overview  Goal: Plan of Care Review  Outcome: Ongoing (interventions implemented as appropriate)  No acute distress, VSS. No true red alarms on tele. A-fib on monitor. Contact and droplet precautions maintained. Neuro checks every 4 hours. No changes noted. Room near nursing station, bed alarm set, bed in low and locked position, side rails up x 3, call light within reach. Continue to monitor

## 2018-01-15 NOTE — NURSING
Discharge instructions reviewed with [place] at [facility] and with patient's daughters. Verbalized understanding.

## 2018-01-15 NOTE — PROGRESS NOTES
[facility] Internal Medicine Resident HO-1 Progress Note    Subjective:      Tolerating diet, still endorses loose stools and a cough. Oriented. Denies other complaints such as chest pain, SOB.      Objective:   Last 24 Hour Vital Signs:  BP  Min: 135/63  Max: 185/86  Temp  Av.9 °F (36.6 °C)  Min: 97.3 °F (36.3 °C)  Max: 98.2 °F (36.8 °C)  Pulse  Av.5  Min: 76  Max: 91  Resp  Av.2  Min: 16  Max: 18  SpO2  Av.8 %  Min: 96 %  Max: 97 %  I/O last 3 completed shifts:  In: 1240 [P.O.:1040; IV Piggyback:200]  Out: 2544 [Urine:2544]     VS HTN, HR 83    Physical Examination:  General:          Alert and awake, orientedx3  Head:               Normocephalic and atraumatic  Eyes:               Pupils still small but reactive to light; EOMI with anicteric sclera and clear conjunctivae  Mouth:             Oropharynx clear and without exudate; moist mucous membranes.   Cardio:             Systolic murmur III/VI, RRR  Resp:               CTAB and unlabored; no wheezes, crackles or rhonchi, cough present  Abdom:            Soft, NTND with normoactive bowel sounds  Extrem:            WWP with no clubbing, cyanosis or edema  Skin:                No rashes, lesions, or color changes  Pulses:            2+ and symmetric distally  Neuro:             AAOx3; cooperative and pleasant with no focal deficits    Laboratory:  Laboratory Data Reviewed: yes  Pertinent Findings:  thrombocytopenia , anemia    Microbiology Data Reviewed: yes  Pertinent Findings:  Blood Clx NGTD x2  Urine Clx pending  UA 2+ protein, 1 occult blood   Flu B positive     Other Results:  EKG (my interpretation): No new, Paced in 70s, wide QRS, inverted T waves diffusely     Radiology Data Reviewed: yes  Pertinent Findings:  CXR 2017: Findings: Left chest wall pacer stable. The cardiomediastinal silhouette is enlarged, similar to the previous exam noting calcification of the aortic arch..  There is no pleural effusion.  The trachea is midline.  The  lungs are symmetrically expanded bilaterally with coarse interstitial attenuation in a perihilar distribution.  A calcific focus projects over the lateral aspect of the right mid to lower lung zone, stable.  There is left basilar atelectasis and/or scarring, similar.  There is probable atelectasis or scarring projected over the left midlung zone. No large focal consolidation seen.  There is no pneumothorax.  The osseous structures are unchanged.    CT head w/o con: No acute intracranial abnormalities, Stable sequela of chronic microvascular ischemic change and volume loss, noting stable ventricular prominence.    Current Medications:     Infusions:       Scheduled:   aspirin  81 mg Oral Daily    aztreonam  1,000 mg Intravenous Q8H    ferrous sulfate  325 mg Oral Every other day    heparin (porcine)  5,000 Units Subcutaneous Q8H    metroNIDAZOLE  500 mg Oral Q8H    oseltamivir  30 mg Oral Daily        PRN:  acetaminophen, sodium chloride 0.9%    Antibiotics and Day Number of Therapy:  Aztreonam 1g TID IV began 1/12/2018  Flagyl 500mg po ID began 1/12/2018  Vancomycin 1g IV q48 h began 1/12/2018. D/c'd.  Tamiflu 30po BID began 1/12/2018    Lines and Day Number of Therapy:  PIV    Assessment:     Zeyad Woodard is a 89 y.o.male with  Patient Active Problem List    Diagnosis Date Noted    Altered mental status 01/12/2018    C. difficile colitis 01/12/2018    Stroke 01/11/2018    ELIZABET (acute kidney injury) 01/06/2018    Late onset Alzheimer's disease with behavioral disturbance     S/P placement of cardiac pacemaker     Syncope 12/29/2017    Takes dietary supplements 11/16/2017    Cellulitis and abscess of buttock     Pericardial calcification 11/12/2017    Mitral regurgitation 11/12/2017    MRSA bacteremia 11/10/2017    Debility 11/08/2017    Cellulitis of buttock 11/07/2017    HLD (hyperlipidemia) 11/07/2017    Abnormal blood smear 10/05/2017    Chronic pain 10/03/2017    Chronic bilateral low  "back pain with bilateral sciatica 09/19/2017    Use of opiates for therapeutic purposes 09/19/2017    Dementia without behavioral disturbance 06/05/2017    Post-operative state 02/01/2017    Pseudophakia 01/18/2017    Essential hypertension 01/18/2017    Right posterior capsular opacification 01/18/2017    Refractive error 01/18/2017    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    Hypothyroidism due to acquired atrophy of thyroid 10/02/2016    Right hip pain 10/01/2016    Contusion of right hip 10/01/2016    Elevated troponin 09/30/2016    Anemia of chronic renal failure, stage 3 (moderate) 09/01/2016    Chronic constipation 07/06/2016    Thrombocytopenia 07/03/2016    Venous insufficiency of both lower extremities 10/26/2015    Frequent falls 08/28/2015    Opioid dependence with intoxication with complication 08/28/2015    Cardiac pacemaker in situ 07/02/2015    Sick sinus syndrome 06/12/2015    Bradycardia 06/12/2015    Chronic back pain 09/12/2014    Lumbar radiculopathy 09/12/2014    CKD (chronic kidney disease), stage III 10/16/2013    Iron deficiency anemia due to chronic blood loss 04/12/2013    Orthostatic hypotension 04/08/2013    Hypothyroidism     Polyneuropathy 11/12/2012    Chronic diastolic congestive heart failure 08/23/2012    Renovascular hypertension     Chronic atrial fibrillation     Benign prostatic hyperplasia         Plan:     Influenza B  -Patient positive for influenza B with temp to 101.1  -tamiflu 30mg daily x 5 days for renal dosing   -droplet precautions    Acute Encephalopathy (resolved)  -causes include seizure vs syncope vs aspiration vs opioid overdose  -patient witnessed getting into bed at 7:30, found unresponsive at 8  patient "down" for about 1 hour with spontaneous return to consciousness and immediately alert and conversant  -had not received any meds since that am, daughters report no longer on opioids but patient given narcan in ED with response "   -has had an episode of syncope in past with noted jerking of arms, not noted this time   -has been having new cough with sputum and was laying flat, good chance this was aspiration event   -stroke activated in Ed. Teleneurology recs CTA head and neck if renal function improves, carotid Us, TTE, EEG, interrogation of pacemaker.   - Aztreonam 1g and vancomycin 1g IV for broad spectrum. D/c'd Vanc.   -NPO originally, however cleared by speech  dental soft thin now  MBS 1/8 without aspiration   -PT and Ot: SNF  -q4 neuro checks   -Vascular Neurology Consulted: TIA vs ictal  NPO  seizure precautions  IV fluids  antiplatelet agent  statin  oral mucomyst 1.2gr PO or enteral q 12hrs x 48 hrs. in case he gets contrast  Carotid ultrasound  CTA multiphase head and neck  echo 12/2017 grade 2 diastolic dysfunction with aflutter  outpatient EEG  a1c  lipid profile WNL  prolactin  tsh/t4  drug screen   -CT head w/o con: No acute intracranial abnormalities, Stable sequela of chronic microvascular ischemic change and volume loss, noting stable ventricular prominence.   -Will not pursue stroke workup, discontinue vanc and continue aztreonam .      C. Dif Colitis  -C dif positive 01/09/2017 per PCR  -discharged with po flagyl, continue 500mg TID   -c.dif precautions      ELIZABET (resolved)  -Creatinine 1.9 at presentation, was 1.2 at baseline  -likely 2/2 volume down from c dif diarrhea  -adequate hydration  -close monitoring, improved      Anemia of Chronic Disease  -H/H 10.1/31.1, MCV 93  -workup 1/6 consistent with AOCD  -no signs of bleeding. c-scope UTD  -Cont home medication ferrous sulfate 325 every other day      Transaminitis  -AST 84, ALT 57, alk phos 136  -no abdominal pain  -close monitoring      History of A fib and SSS s/p pacemaker  -pacemaker interrogated recently per family, will get again with new AMS  -rate in 80s  afib overnight   -Patient not on rate control agent or AC  on home coreg 2.125 BID   history of GI bleeds preclude AC  -Asa 81 daily      Chronic Diastolic Heart Failure  -Echo 12/2017 with EF 55-60, grade 2 diastolic dysfunction, mild As, biatrial enlargement, PA pressure 35  -no signs of volume overload   -continue coreg, lasix  -not currently on an ACE  -no current issues     HTN  -BP on admit 158/64  -continue home coreg 3.125BID, lasix 20  -close monitoring      Hypothyroidism  -continue home synthroid 100mcg  -no current issues     BPH  -continue home flomax and finasteride  -incontinent at baseline     Dementia  -oriented on exam  -continue home aricept   -continue mirtazipine for help with appetite    Chronic Thrombocytopenia  -Asxs, plts 91     Physical Deconditioning  -PT and Ot: SNF  -mirtazipine for appetite stimulant        Code: Full for now but has been DNR before, will discuss more with family  Pt: SNF  Diet: Dental Soft Thin  Ppx: Heparin  Dispo: pending clinical improvement, d/c back to [facility] with O-home health. Pt: skilled       Fay Rose  [facility] Internal Medicine HO-1  [facility] Internal Medicine Service Team B    [facility] Medicine Hospitalist Pager numbers:   [facility] Hospitalist Medicine Team A (Kinsey/Yumiko): [phone]  [facility] Hospitalist Medicine Team B (Tyrese/Bibi):  [phone]

## 2018-01-15 NOTE — PLAN OF CARE
Problem: Physical Therapy Goal  Goal: Physical Therapy Goal  Goals to be met by: 18     Patient will increase functional independence with mobility by performin)Sup<>sit Mod I   2)Sit<>stand Mod I with RW  3)Ambulate 75' Mod I with RW  4)Independent with LE therex X10 BLE     Outcome: Ongoing (interventions implemented as appropriate)  Worked on bed mobility min to mod assist needed sit <> supine  Stood with RW min assist able to take steps side to side poor posture flexed knees and trunk.  Low endurance

## 2018-01-15 NOTE — PT/OT/SLP PROGRESS
Physical Therapy Treatment    Patient Name:  Zeyad Woodard   MRN:  [id number]    Recommendations:     Discharge Recommendations:  other (see comments) (group home)   Discharge Equipment Recommendations: none   Barriers to discharge: None    Assessment:     Zeyad Woodard is a 89 y.o. male admitted with a medical diagnosis of Altered mental status.  He presents with the following impairments/functional limitations:  weakness, impaired self care skills, impaired functional mobilty, impaired cognition, impaired balance, gait instability, decreased safety awareness, decreased lower extremity function, decreased upper extremity function, impaired endurance Patient with low endurance tolerated working with PT however slow and needed rest breaks.    Rehab Prognosis:  Good; patient would benefit from acute skilled PT services to address these deficits and reach maximum level of function.      Recent Surgery: * No surgery found *      Plan:     During this hospitalization, patient to be seen 5 x/week to address the above listed problems via gait training, therapeutic activities, therapeutic exercises  · Plan of Care Expires:  12/12/18   Plan of Care Reviewed with: patient    Subjective     Communicated with primary nurse prior to session.  Patient found supine  upon PT entry to room, agreeable to treatment.      Chief Complaint: weakness  Patient comments/goals: go home  Pain/Comfort:  · Pain Rating 1: 0/10  · Pain Rating Post-Intervention 1: 0/10    Patients cultural, spiritual, Uatsdin conflicts given the current situation: None verbalized to PT    Objective:     Patient found with: telemetry     General Precautions: Standard, fall, droplet   Orthopedic Precautions:N/A   Braces: N/A     Functional Mobility:  · Bed Mobility:     · Supine to Sit: minimum assistance  · Sit to Supine: minimum assistance  · Transfers:     · Sit to Stand:  minimum assistance with rolling walker  · Gait: Able to take steps side ways with  min assist RW placement  · Balance: Poor + Fair with RW      AM-PAC 6 CLICK MOBILITY  Turning over in bed (including adjusting bedclothes, sheets and blankets)?: 3  Sitting down on and standing up from a chair with arms (e.g., wheelchair, bedside commode, etc.): 3  Moving from lying on back to sitting on the side of the bed?: 3  Moving to and from a bed to a chair (including a wheelchair)?: 3  Need to walk in hospital room?: 2  Climbing 3-5 steps with a railing?: 2  Total Score: 16       Therapeutic Activities and Exercises:   Seated AROM ex BLE ankle pumps LAQ and marching 15 reps each     Patient left HOB elevated with all lines intact, call button in reach and bed alarm on..    GOALS:    Physical Therapy Goals        Problem: Physical Therapy Goal    Goal Priority Disciplines Outcome Goal Variances Interventions   Physical Therapy Goal     PT/OT, PT Ongoing (interventions implemented as appropriate)     Description:  Goals to be met by: 18     Patient will increase functional independence with mobility by performin)Sup<>sit Mod I   2)Sit<>stand Mod I with RW  3)Ambulate 75' Mod I with RW  4)Independent with LE therex X10 BLE                      Time Tracking:     PT Received On: 01/15/18  PT Start Time: 1035     PT Stop Time: 1100  PT Total Time (min): 25 min     Billable Minutes: Gait Training 10 and Therapeutic Exercise 15    Treatment Type: Treatment  PT/PTA: PT     PTA Visit Number: 4     Kade Celaya, PT  01/15/2018

## 2018-01-16 ENCOUNTER — TELEPHONE (OUTPATIENT)
Dept: INTERNAL MEDICINE | Facility: CLINIC | Age: 83
End: 2018-01-16

## 2018-01-16 NOTE — PT/OT/SLP DISCHARGE
Physical Therapy Discharge Summary    Name: Zeyad Woodard  MRN: [id number]   Principal Problem: Altered mental status     Patient Discharged from acute Physical Therapy on 1/15/2018.  Please refer to prior PT noted date on 1/15/2018 for functional status.     Assessment:     Patient appropriate for care in another setting.    Objective:     GOALS:    Physical Therapy Goals        Problem: Physical Therapy Goal    Goal Priority Disciplines Outcome Goal Variances Interventions   Physical Therapy Goal     PT/OT, PT Ongoing (interventions implemented as appropriate)     Description:  Goals to be met by: 18     Patient will increase functional independence with mobility by performin)Sup<>sit Mod I   2)Sit<>stand Mod I with RW  3)Ambulate 75' Mod I with RW  4)Independent with LE therex X10 BLE                      Reasons for Discontinuation of Therapy Services  Transfer to alternate level of care.      Plan:     Patient Discharged to: private group home with CECILIO Celaya, PT  2018

## 2018-01-16 NOTE — TELEPHONE ENCOUNTER
Spoke with Omid and informed can place skin barrier for stage one decubitus  and send order to office.

## 2018-01-16 NOTE — TELEPHONE ENCOUNTER
----- Message from Fernanda Quiles sent at 1/16/2018  2:41 PM CST -----  Contact: Omid/[facility]/[phone]/  [facility] he has a Stage two pressure ulcer; can she apply a barrier cream?

## 2018-01-16 NOTE — PHYSICIAN QUERY
"PT Name: Zeyad Woodard  MR #: [id number]    Physician Query Form - Neurological Condition Clarification      CDS: Jeana Jane RN, CCDS         Contact information :ext [phone])  [email or web address]       This form is a permanent document in the medical record.     Query Date: January 16, 2018    By submitting this query, we are merely seeking further clarification of documentation. Please utilize your independent clinical judgment when addressing the question(s) below.    The Medical record contains the following:   Indicators   Supporting Clinical Findings Location in Medical Record   x AMS, Confusion, LOC, etc. "found unresponsive at 8  patient "down" for about 1 hour with spontaneous return to consciousness and immediately alert and conversant  -had not received any meds since that am, daughters report no longer on opioids but patient given narcan in ED with response " H&P 1/12/18   x Acute / Chronic Illness "Influenza B  -Patient positive for influenza B with temp to 101.1"  "C. Dif Colitis"  "ELIZABET  -Creatinine 1.9 today, was 1.2 at baseline  -likely 2/2 volume down from c dif diarrhea"  "Anemia of Chronic Disease  -H/H 10.1/31.1, MCV 93  -workup 1/6 consistent with AOCD"  "Transaminitis  -AST 84, ALT 57, alk phos 136"   H&P 1/12/18   x Radiology Findings "No acute intracranial abnormalities." CT head 1/11/18    Electrolyte Imbalance     x Medication sodium chloride 0.9% bolus 500 mL   vancomycin 1,500 mg in sodium chloride 0.9% 250 mL      MAR 1/11/18  MAR 1/12/18    Treatment     x Other "Acute Encephalopathy  -causes include seizure vs syncope vs aspiration vs opioid overdose"  "The event could be a transient basilar TIA vs ictal event.  Nevertheless, the patient did not show any postictal state." H&P 1/12/18      Vascular neurological consult 1/11/18     Provider, please specify the diagnosis or diagnoses associated with above clinical findings.  Please document type of acute " encephalopathy.        [  ] Metabolic Encephalopathy    [  ] Toxic Encephalopathy    [  ] Other Encephalopathy, ______    [ x ] Unspecified Encephalopathy    [  ] Other (please specify): _____________________________________    [  ] Clinically Undetermined      Please document in your progress notes daily for the duration of treatment until resolved, and  include in your discharge summary.

## 2018-01-17 LAB
BACTERIA BLD CULT: NORMAL
BACTERIA BLD CULT: NORMAL

## 2018-01-22 ENCOUNTER — TELEPHONE (OUTPATIENT)
Dept: ELECTROPHYSIOLOGY | Facility: CLINIC | Age: 83
End: 2018-01-22

## 2018-01-22 ENCOUNTER — LAB VISIT (OUTPATIENT)
Dept: LAB | Facility: HOSPITAL | Age: 83
End: 2018-01-22
Attending: FAMILY MEDICINE
Payer: MEDICARE

## 2018-01-22 DIAGNOSIS — Z95.0 CARDIAC PACEMAKER IN SITU: ICD-10-CM

## 2018-01-22 DIAGNOSIS — I48.0 PAF (PAROXYSMAL ATRIAL FIBRILLATION): Primary | ICD-10-CM

## 2018-01-22 DIAGNOSIS — I63.9 IMPENDING CEREBROVASCULAR ACCIDENT: Primary | ICD-10-CM

## 2018-01-22 LAB
ANION GAP SERPL CALC-SCNC: 5 MMOL/L
BUN SERPL-MCNC: 22 MG/DL
CALCIUM SERPL-MCNC: 8.6 MG/DL
CHLORIDE SERPL-SCNC: 105 MMOL/L
CO2 SERPL-SCNC: 28 MMOL/L
CREAT SERPL-MCNC: 1.2 MG/DL
EST. GFR  (AFRICAN AMERICAN): >60 ML/MIN/1.73 M^2
EST. GFR  (NON AFRICAN AMERICAN): 52.9 ML/MIN/1.73 M^2
GLUCOSE SERPL-MCNC: 101 MG/DL
POTASSIUM SERPL-SCNC: 4.8 MMOL/L
SODIUM SERPL-SCNC: 138 MMOL/L

## 2018-01-22 PROCEDURE — 80048 BASIC METABOLIC PNL TOTAL CA: CPT

## 2018-01-22 NOTE — TELEPHONE ENCOUNTER
----- Message from Carlita Amaya sent at 1/22/2018  2:33 PM CST -----  Contact: pt's daughter-Asia  Pt's daughter called to schedule the pt's recall with all necessary testing.  She will need to tbe called to schedule because she will have to take the day off to do everything.  She can be reached @ [phone].  Thanks!!

## 2018-01-25 ENCOUNTER — TELEPHONE (OUTPATIENT)
Dept: ADMINISTRATIVE | Facility: CLINIC | Age: 83
End: 2018-01-25

## 2018-01-26 ENCOUNTER — TELEPHONE (OUTPATIENT)
Dept: FAMILY MEDICINE | Facility: CLINIC | Age: 83
End: 2018-01-26

## 2018-01-26 DIAGNOSIS — F03.90 DEMENTIA WITHOUT BEHAVIORAL DISTURBANCE, UNSPECIFIED DEMENTIA TYPE: ICD-10-CM

## 2018-01-26 DIAGNOSIS — R53.81 DEBILITY: Primary | ICD-10-CM

## 2018-01-26 NOTE — PATIENT INSTRUCTIONS
2nd Requestt      Home Health request:      REQUESTING ORDER FOR SPEECH THERAPY EVALUATION.      Please reply with written approval for order.      Thanks

## 2018-01-29 ENCOUNTER — OFFICE VISIT (OUTPATIENT)
Dept: PRIMARY CARE CLINIC | Facility: CLINIC | Age: 83
End: 2018-01-29
Payer: MEDICARE

## 2018-01-29 ENCOUNTER — LAB VISIT (OUTPATIENT)
Dept: LAB | Facility: HOSPITAL | Age: 83
End: 2018-01-29
Attending: INTERNAL MEDICINE
Payer: MEDICARE

## 2018-01-29 VITALS
DIASTOLIC BLOOD PRESSURE: 61 MMHG | HEART RATE: 75 BPM | OXYGEN SATURATION: 99 % | BODY MASS INDEX: 25.79 KG/M2 | WEIGHT: 170.19 LBS | HEIGHT: 68 IN | TEMPERATURE: 99 F | SYSTOLIC BLOOD PRESSURE: 129 MMHG

## 2018-01-29 DIAGNOSIS — A04.72 C. DIFFICILE COLITIS: ICD-10-CM

## 2018-01-29 DIAGNOSIS — J10.1 INFLUENZA B: Primary | ICD-10-CM

## 2018-01-29 DIAGNOSIS — R29.6 FREQUENT FALLS: ICD-10-CM

## 2018-01-29 DIAGNOSIS — R53.81 DEBILITY: ICD-10-CM

## 2018-01-29 DIAGNOSIS — I48.20 CHRONIC ATRIAL FIBRILLATION: ICD-10-CM

## 2018-01-29 PROBLEM — R78.81 MRSA BACTEREMIA: Status: RESOLVED | Noted: 2017-11-10 | Resolved: 2018-01-29

## 2018-01-29 PROBLEM — L03.317 CELLULITIS OF BUTTOCK: Status: RESOLVED | Noted: 2017-11-07 | Resolved: 2018-01-29

## 2018-01-29 PROBLEM — B95.62 MRSA BACTEREMIA: Status: RESOLVED | Noted: 2017-11-10 | Resolved: 2018-01-29

## 2018-01-29 LAB
ALBUMIN SERPL BCP-MCNC: 3.1 G/DL
ALP SERPL-CCNC: 121 U/L
ALT SERPL W/O P-5'-P-CCNC: 31 U/L
ANION GAP SERPL CALC-SCNC: 10 MMOL/L
ANISOCYTOSIS BLD QL SMEAR: SLIGHT
AST SERPL-CCNC: 45 U/L
BASOPHILS # BLD AUTO: ABNORMAL K/UL
BASOPHILS NFR BLD: 0 %
BILIRUB SERPL-MCNC: 0.5 MG/DL
BUN SERPL-MCNC: 19 MG/DL
BURR CELLS BLD QL SMEAR: ABNORMAL
CALCIUM SERPL-MCNC: 9.3 MG/DL
CHLORIDE SERPL-SCNC: 102 MMOL/L
CO2 SERPL-SCNC: 26 MMOL/L
CREAT SERPL-MCNC: 1.2 MG/DL
DACRYOCYTES BLD QL SMEAR: ABNORMAL
DIFFERENTIAL METHOD: ABNORMAL
EOSINOPHIL # BLD AUTO: ABNORMAL K/UL
EOSINOPHIL NFR BLD: 1 %
ERYTHROCYTE [DISTWIDTH] IN BLOOD BY AUTOMATED COUNT: 15.2 %
EST. GFR  (AFRICAN AMERICAN): >60 ML/MIN/1.73 M^2
EST. GFR  (NON AFRICAN AMERICAN): 53 ML/MIN/1.73 M^2
GLUCOSE SERPL-MCNC: 114 MG/DL
HCT VFR BLD AUTO: 31.5 %
HGB BLD-MCNC: 9.8 G/DL
HYPOCHROMIA BLD QL SMEAR: ABNORMAL
LYMPHOCYTES # BLD AUTO: ABNORMAL K/UL
LYMPHOCYTES NFR BLD: 55 %
MCH RBC QN AUTO: 30.2 PG
MCHC RBC AUTO-ENTMCNC: 31.1 G/DL
MCV RBC AUTO: 97 FL
MONOCYTES # BLD AUTO: ABNORMAL K/UL
MONOCYTES NFR BLD: 8 %
NEUTROPHILS NFR BLD: 36 %
OVALOCYTES BLD QL SMEAR: ABNORMAL
PATH REV BLD -IMP: NORMAL
PLATELET # BLD AUTO: 147 K/UL
PLATELET BLD QL SMEAR: ABNORMAL
PMV BLD AUTO: 10.1 FL
POIKILOCYTOSIS BLD QL SMEAR: SLIGHT
POTASSIUM SERPL-SCNC: 4.6 MMOL/L
PROT SERPL-MCNC: 6.7 G/DL
RBC # BLD AUTO: 3.25 M/UL
SODIUM SERPL-SCNC: 138 MMOL/L
TARGETS BLD QL SMEAR: ABNORMAL
WBC # BLD AUTO: 11.41 K/UL

## 2018-01-29 PROCEDURE — 36415 COLL VENOUS BLD VENIPUNCTURE: CPT

## 2018-01-29 PROCEDURE — 80053 COMPREHEN METABOLIC PANEL: CPT

## 2018-01-29 PROCEDURE — 1125F AMNT PAIN NOTED PAIN PRSNT: CPT | Mod: S$GLB,,, | Performed by: INTERNAL MEDICINE

## 2018-01-29 PROCEDURE — 85007 BL SMEAR W/DIFF WBC COUNT: CPT

## 2018-01-29 PROCEDURE — 99999 PR PBB SHADOW E&M-EST. PATIENT-LVL V: CPT | Mod: PBBFAC,,, | Performed by: INTERNAL MEDICINE

## 2018-01-29 PROCEDURE — 99215 OFFICE O/P EST HI 40 MIN: CPT | Mod: S$GLB,,, | Performed by: INTERNAL MEDICINE

## 2018-01-29 PROCEDURE — 85027 COMPLETE CBC AUTOMATED: CPT

## 2018-01-29 PROCEDURE — 1159F MED LIST DOCD IN RCRD: CPT | Mod: S$GLB,,, | Performed by: INTERNAL MEDICINE

## 2018-01-29 PROCEDURE — 3008F BODY MASS INDEX DOCD: CPT | Mod: S$GLB,,, | Performed by: INTERNAL MEDICINE

## 2018-01-29 PROCEDURE — 99499 UNLISTED E&M SERVICE: CPT | Mod: S$GLB,,, | Performed by: INTERNAL MEDICINE

## 2018-01-29 PROCEDURE — 85060 BLOOD SMEAR INTERPRETATION: CPT | Mod: ,,, | Performed by: PATHOLOGY

## 2018-01-29 NOTE — PHYSICIAN QUERY
"PT Name: Zeyad Woodard  MR #: [id number]    Physician Query Form - Cause and Effect Relationship Clarification      CDS: Jeana Jane RN, CCDS         Contact information :ext [phone])  [email or web address]       This form is a permanent document in the medical record.     Query Date: January 29, 2018    By submitting this query, we are merely seeking further clarification of documentation. Please utilize your independent clinical judgment when addressing the question(s) below.    The Medical record contains the following:  Supporting Clinical Findings   Location in record        "Acute encephalopathy"                                                          "Influenza B  -Patient positive for influenza B with temp to 101.1  -tamiflu 30mg daily x 5 days for renal dosing "      " C. Dif Colitis  -C dif positive 01/09/2017  -discharged with po flagyl, continue  -c.dif precautions "   " ELIZABET  -Creatinine 1.9 today, was 1.2 at baseline  -likely 2/2 volume down from c dif diarrhea  -adequate hydration"       "Unspecified Encephalopathy"      "Influenza B"  "Acute Encephalopathy (resolved)  -causes include seizure vs syncope vs aspiration vs opioid overdose                                                                                                         H&P 1/12/18      H&P 1/12/18                      Physician Query response 1/22/18    Discharge summary 1/15/18             Doctor, please clarify if there is any correlation between Acute Encephalopathy (resolved) and  Influenza B  _.           Are the conditions:     [x  ] Due to or associated with each other     [  ] Unrelated to each other     [  ] Other (Please Specify): _________________________     [  ] Clinically Undetermined                                                                                                                                                                                              "

## 2018-01-29 NOTE — PROGRESS NOTES
[facility]  New Visit Progress Note   Recent Hospital Discharge     PRESENTING HISTORY     Chief Complaint/Reason for Admission:  Follow up Hospital Discharge     PCP: Booker Ricci MD    History of Present Illness:  Mr. Zeyad Woodard is a 89 y.o. male who was recently admitted to the hospital.    ___________________________________________________________________    Today:  [facility].  Recently hospitalized for C diff colitis.  Discharged to [facility] with assistance of Home Health.  Discharged to complete a course of Flagyl for treatment of C diff infection.  Returned to hospital one day later after he was found obtunded, with pin point pupils, at his group home.  EMS administered Narcan on the scene and he responded favorably to this, immediately regaining consciousness.   During work up at hospital, patient was found to be febrile and POS for Influenza B.  A toxicology screen was not collected during that admission.  He was subsequently discharged to the group home on a course of Tamiflu, also continuing the previously prescribed 10 day course of Flagyl.     Accompanied by his daughter.  Patient in a wheelchair presently.  Is able to ambulate short distances at home with assistance.  Wears an adult diaper for occasional incontinence but is generally able to toilet with assistance.  Reports compliance with all medication- finished courses of both Tamiflu and Flagyl.  Receiving home health at the group [facility]- has skilled nursing services as well as therapy.    Diarrhea has resolved and patient reports formed stool with regular bowel movements.  Cough has improved but not resolved- passed swallow study in hospital.  No fever at home, but noted to have low grade temp today and appears flushed.  Patient tells me he remains weak beyond his baseline.    Review of Systems  General ROS: negative for chills, fever or weight loss   +easily fatigued    Psychological ROS: negative for hallucination, depression or suicidal ideation  Ophthalmic ROS: negative for blurry vision, photophobia or eye pain  ENT ROS: negative for epistaxis, sore throat or rhinorrhea  Respiratory ROS: no cough, shortness of breath, or wheezing  Cardiovascular ROS: no chest pain or dyspnea on exertion  Gastrointestinal ROS: no abdominal pain, change in bowel habits, or black/ bloody stools  Genito-Urinary ROS: no dysuria, trouble voiding, or hematuria  Musculoskeletal ROS: negative for gait disturbance or muscular weakness  Neurological ROS: no syncope or seizures; no ataxia  Dermatological ROS: negative for pruritis, rash and jaundice      PAST HISTORY:     Past Medical History:   Diagnosis Date    Anemia     Atrial fibrillation     BPH (benign prostatic hypertrophy)     Cancer     Cardiac pacemaker in situ 7/2/2015    CHF (congestive heart failure)     Coronary artery disease     Encounter for blood transfusion     GI bleed     cecum angiectasia s/p cautery    Hypertension     Hypothyroidism     Polyneuropathy     Posture imbalance     due to neuropathy    Right posterior capsular opacification 1/18/2017    SSS (sick sinus syndrome) 2015    s/p pacemaker    Stroke 1/11/2018       Past Surgical History:   Procedure Laterality Date    CARDIAC SURGERY      CATARACT EXTRACTION W/  INTRAOCULAR LENS IMPLANT Right 05/17/2010        CATARACT EXTRACTION W/  INTRAOCULAR LENS IMPLANT Left 02/16/2004        cataract surgery      COLONOSCOPY  6/30/04    COLONOSCOPY N/A 2/15/2016    Procedure: COLONOSCOPY;  Surgeon: Stanton Kirk MD;  Location: [address];  Service: Endoscopy;  Laterality: N/A;    EYE SURGERY      HERNIA REPAIR      inguinal hernia repair      Open heart surgery for pericardiectomy      for calcific constrictive pericarditis    UMBILICAL HERNIA REPAIR      Yag      Left Eye       Family History   Problem Relation Age of Onset     Stroke Mother          Cancer Father      lung;     Diabetes Sister     Coronary artery disease Sister     Peripheral vascular disease Sister        Social History     Social History    Marital status:      Spouse name: N/A    Number of children: N/A    Years of education: N/A     Social History Main Topics    Smoking status: Passive Smoke Exposure - Never Smoker    Smokeless tobacco: Never Used    Alcohol use No    Drug use: No    Sexual activity: No     Other Topics Concern    Not on file     Social History Narrative    No narrative on file       MEDICATIONS & ALLERGIES:     Current Outpatient Prescriptions on File Prior to Visit   Medication Sig Dispense Refill    acetaminophen (TYLENOL) 325 MG tablet Take 2 tablets (650 mg total) by mouth every 6 (six) hours as needed.  0    ASCORBATE CALCIUM (VITAMIN C ORAL) Take 1 tablet by mouth once daily.       aspirin (ECOTRIN) 81 MG EC tablet Take 1 tablet (81 mg total) by mouth once daily.  0    atorvastatin (LIPITOR) 20 MG tablet Take 1 tablet (20 mg total) by mouth nightly. 90 tablet 3    calcium 500 mg Tab Take 1 tablet by mouth Daily.      carvedilol (COREG) 3.125 MG tablet Take 1 tablet (3.125 mg total) by mouth 2 (two) times daily with meals. 180 tablet 3    clotrimazole-betamethasone (LOTRISONE) lotion Apply topically 2 (two) times daily. (Patient taking differently: Apply topically as needed. ) 30 mL 0    cyanocobalamin (VITAMIN B-12) 1000 MCG tablet Take 100 mcg by mouth once daily.      donepezil (ARICEPT) 10 MG tablet Take 1 tablet (10 mg total) by mouth every evening. 90 tablet 3    ferrous sulfate 325 mg (65 mg iron) Tab tablet Take 325 mg by mouth every other day.       finasteride (PROSCAR) 5 mg tablet Take 1 tablet (5 mg total) by mouth once daily. 90 tablet 3    furosemide (LASIX) 20 MG tablet Take 20 mg by mouth daily as needed.      gabapentin (NEURONTIN) 100 MG capsule Take 100mg in Morning and take  "300mg nightly 360 capsule 3    levothyroxine (SYNTHROID) 100 MCG tablet Take 1 tablet (100 mcg total) by mouth once daily. 90 tablet 3    mirtazapine (REMERON) 7.5 MG Tab Take 1 tablet (7.5 mg total) by mouth every evening. 90 tablet 3    MULTIVITS-MINERALS/FA/LYCOPENE (ONE-A-DAY MEN'S MULTIVITAMIN ORAL) Take 1 tablet by mouth once daily.      neomycin-bacitracin-polymyxin (NEOSPORIN) ointment Apply topically Daily.  0    oxyCODONE-acetaminophen (PERCOCET) 7.5-325 mg per tablet Take 1 tablet by mouth every 8 (eight) hours as needed for Pain. 21 tablet 0    polyethylene glycol (GLYCOLAX) 17 gram/dose powder Take 17 g by mouth once daily.      senna-docusate 8.6-50 mg (PERICOLACE) 8.6-50 mg per tablet Take 1 tablet by mouth 2 (two) times daily.      tamsulosin (FLOMAX) 0.4 mg Cp24 Take 1 capsule (0.4 mg total) by mouth once daily. 90 capsule 3     No current facility-administered medications on file prior to visit.         Review of patient's allergies indicates:   Allergen Reactions    Penicillins Hives and Other (See Comments)     Fever       OBJECTIVE:     Vital Signs:  Vitals:    01/29/18 1433   BP:    Pulse:    Temp: 98.6 °F (37 °C)     Wt Readings from Last 1 Encounters:   01/12/18 0100 72.8 kg (160 lb 7.9 oz)   01/11/18 2127 69.4 kg (153 lb)     Body mass index is 25.88 kg/m².        Physical Exam:  /61 (BP Location: Right arm, Patient Position: Sitting, BP Method: Large (Automatic))   Pulse 75   Temp 98.6 °F (37 °C) (Oral)   Ht 5' 8" (1.727 m)   Wt 77.2 kg (170 lb 3.1 oz)   SpO2 99%   BMI 25.88 kg/m²   General appearance: alert, cooperative, no distress  Constitutional:Oriented to person, place, and time  + appears frail, chronically ill    HEENT: Normocephalic, atraumatic, neck symmetrical, no nasal discharge   Eyes: conjunctivae/corneas clear, PERRL, EOM's intact  Lungs: clear to auscultation bilaterally, no dullness to percussion bilaterally  Heart: regular rate and rhythm without rub; " no displacement of the PMI   Abdomen: soft, non-tender; bowel sounds normoactive; no organomegaly  Extremities: extremities symmetric; no clubbing, cyanosis, or edema  Integument: Skin color, texture, turgor normal; no rashes; hair distrubution normal  Neurologic: Alert and oriented X 3, normal strength, normal coordination and gait  Psychiatric: no pressured speech; normal affect; no evidence of impaired cognition     Laboratory  Lab Results   Component Value Date    WBC 9.02 01/15/2018    HGB 9.2 (L) 01/15/2018    HCT 27.8 (L) 01/15/2018    MCV 91 01/15/2018    PLT 93 (L) 01/15/2018     BMP  Lab Results   Component Value Date     01/22/2018    K 4.8 01/22/2018     01/22/2018    CO2 28 01/22/2018    BUN 22 01/22/2018    CREATININE 1.2 01/22/2018    CALCIUM 8.6 (L) 01/22/2018    ANIONGAP 5 (L) 01/22/2018    ESTGFRAFRICA >60.0 01/22/2018    EGFRNONAA 52.9 (A) 01/22/2018     Lab Results   Component Value Date    ALT 54 (H) 01/15/2018    AST 84 (H) 01/15/2018    ALKPHOS 149 (H) 01/15/2018    BILITOT 0.2 01/15/2018     Lab Results   Component Value Date    INR 1.1 01/11/2018    INR 1.1 11/07/2017    INR 1.0 07/17/2016     Lab Results   Component Value Date    HGBA1C 5.3 12/29/2017     No results for input(s): POCTGLUCOSE in the last 72 hours.      ASSESSMENT & PLAN:   Influenza B  - recent hospitalization for Influenza B  - patient has completed prescribed course of Tamiflu    C. difficile colitis  - hospitalization for C difficile colitis just prior to Influenza related hospitalization  - completed course of Flagyl; symptoms have resolved and bowel habits have returned to normal   - patient with ongoing weakness beyond baseline, will further assess with labs today  -     CBC auto differential; Future; Expected date: 01/29/2018  -     Comprehensive metabolic panel; Future; Expected date: 01/29/2018    Chronic atrial fibrillation  - rate controlled on Blocker  - unable to tolerate anticoagulation due to  frequent falls     Frequent falls  Debility  - requires around the clock supervision and assistance with all ADL's  - lives in small group home    [facility], sooner if needed.   Instructions for the patient:      Scheduled Follow-up :  Future Appointments  Date Time Provider Department Center   2/12/2018 11:00 AM Tawana Trujillo MD [facility] IM [facility]   4/27/2018 7:45 AM EKG, APPT [facility] EKG [facility]   4/27/2018 8:00 AM ECHO, [facility] NOM ECHOLAB [facility]   4/27/2018 9:00 AM COORDINATED DEVICE CHECK [facility] AR[facility]HYTH [facility]   4/27/2018 9:20 AM Kade Callejas MD [facility] AR[facility]HYTH [facility]       Post Visit Medication List:     Medication List          Accurate as of 1/29/18  1:10 PM. If you have any questions, ask your nurse or doctor.               CHANGE how you take these medications    clotrimazole-betamethasone lotion  Commonly known as:  LOTRISONE  Apply topically 2 (two) times daily.  What changed:  · when to take this  · reasons to take this        CONTINUE taking these medications    acetaminophen 325 MG tablet  Commonly known as:  TYLENOL  Take 2 tablets (650 mg total) by mouth every 6 (six) hours as needed.     aspirin 81 MG EC tablet  Commonly known as:  ECOTRIN  Take 1 tablet (81 mg total) by mouth once daily.     atorvastatin 20 MG tablet  Commonly known as:  LIPITOR  Take 1 tablet (20 mg total) by mouth nightly.     calcium 500 mg Tab     carvedilol 3.125 MG tablet  Commonly known as:  COREG  Take 1 tablet (3.125 mg total) by mouth 2 (two) times daily with meals.     donepezil 10 MG tablet  Commonly known as:  ARICEPT  Take 1 tablet (10 mg total) by mouth every evening.     ferrous sulfate 325 mg (65 mg iron) Tab tablet     finasteride 5 mg tablet  Commonly known as:  PROSCAR  Take 1 tablet (5 mg total) by mouth once daily.     furosemide 20 MG tablet  Commonly known as:  LASIX     gabapentin 100 MG capsule  Commonly known as:  NEURONTIN  Take 100mg in  Morning and take 300mg nightly     levothyroxine 100 MCG tablet  Commonly known as:  SYNTHROID  Take 1 tablet (100 mcg total) by mouth once daily.     mirtazapine 7.5 MG Tab  Commonly known as:  REMERON  Take 1 tablet (7.5 mg total) by mouth every evening.     neomycin-bacitracin-polymyxin ointment  Commonly known as:  NEOSPORIN  Apply topically Daily.     ONE-A-DAY MEN'S MULTIVITAMIN ORAL     oxyCODONE-acetaminophen 7.5-325 mg per tablet  Commonly known as:  PERCOCET  Take 1 tablet by mouth every 8 (eight) hours as needed for Pain.     polyethylene glycol 17 gram/dose powder  Commonly known as:  GLYCOLAX     senna-docusate 8.6-50 mg 8.6-50 mg per tablet  Commonly known as:  PERICOLACE  Take 1 tablet by mouth 2 (two) times daily.     tamsulosin 0.4 mg Cp24  Commonly known as:  FLOMAX  Take 1 capsule (0.4 mg total) by mouth once daily.     VITAMIN B-12 1000 MCG tablet  Generic drug:  cyanocobalamin     VITAMIN C ORAL            Signing Physician:  Tawana Trujillo MD

## 2018-01-31 LAB — PATH REV BLD -IMP: NORMAL

## 2018-02-05 ENCOUNTER — TELEPHONE (OUTPATIENT)
Dept: ADMINISTRATIVE | Facility: CLINIC | Age: 83
End: 2018-02-05

## 2018-02-05 PROBLEM — N17.9 AKI (ACUTE KIDNEY INJURY): Status: RESOLVED | Noted: 2018-01-06 | Resolved: 2018-02-05

## 2018-02-05 PROBLEM — J10.1 INFLUENZA B: Status: ACTIVE | Noted: 2018-02-05

## 2018-02-05 PROBLEM — R55 SYNCOPE: Status: RESOLVED | Noted: 2017-12-29 | Resolved: 2018-02-05

## 2018-02-05 PROBLEM — Z98.890 POST-OPERATIVE STATE: Status: RESOLVED | Noted: 2017-02-01 | Resolved: 2018-02-05

## 2018-02-05 NOTE — PATIENT INSTRUCTIONS
Good afternoon,        Home Health is requesting clarification :    1) Requesting clarification of Gabapentin frequency and dosage. Bottle shows 100 mg 1cap 3 x daily which is different from med list    2) Patient has 500 MG Acetaminophen in med box, med list shows patient may take 325 mg    3) Patient taking loperamide 2 mg as directed on bottle prn diarrhea, not on med list. Please verify if ok for patient to take this med    4) Patient taking Mucinex 600 mg as directed on box prn cough, not on med list. Please verify if ok for patient to take this med    Please inform Benny Curran RN, or  Gabriella Mitchell RN of results. [phone]    Thank you,

## 2018-02-09 PROBLEM — A49.8 CLOSTRIDIUM DIFFICILE INFECTION: Status: ACTIVE | Noted: 2018-01-12

## 2018-02-09 PROBLEM — J11.1 FLU: Status: ACTIVE | Noted: 2018-02-05

## 2018-02-12 ENCOUNTER — OFFICE VISIT (OUTPATIENT)
Dept: PRIMARY CARE CLINIC | Facility: CLINIC | Age: 83
End: 2018-02-12
Payer: MEDICARE

## 2018-02-12 VITALS
OXYGEN SATURATION: 98 % | TEMPERATURE: 99 F | HEIGHT: 68 IN | BODY MASS INDEX: 25.76 KG/M2 | SYSTOLIC BLOOD PRESSURE: 132 MMHG | HEART RATE: 58 BPM | WEIGHT: 170 LBS | DIASTOLIC BLOOD PRESSURE: 58 MMHG

## 2018-02-12 DIAGNOSIS — J10.1 INFLUENZA B: Primary | ICD-10-CM

## 2018-02-12 DIAGNOSIS — A04.72 C. DIFFICILE COLITIS: ICD-10-CM

## 2018-02-12 DIAGNOSIS — R29.6 FREQUENT FALLS: ICD-10-CM

## 2018-02-12 DIAGNOSIS — I48.20 CHRONIC ATRIAL FIBRILLATION: ICD-10-CM

## 2018-02-12 PROBLEM — G89.29 CHRONIC PAIN: Status: RESOLVED | Noted: 2017-10-03 | Resolved: 2018-02-12

## 2018-02-12 PROBLEM — R41.82 ALTERED MENTAL STATUS: Status: RESOLVED | Noted: 2018-01-12 | Resolved: 2018-02-12

## 2018-02-12 PROBLEM — R53.1 WEAKNESS GENERALIZED: Status: RESOLVED | Noted: 2017-11-08 | Resolved: 2018-02-12

## 2018-02-12 PROBLEM — Z79.891 USE OF OPIATES FOR THERAPEUTIC PURPOSES: Status: RESOLVED | Noted: 2017-09-19 | Resolved: 2018-02-12

## 2018-02-12 PROCEDURE — 1125F AMNT PAIN NOTED PAIN PRSNT: CPT | Mod: S$GLB,,, | Performed by: INTERNAL MEDICINE

## 2018-02-12 PROCEDURE — 3008F BODY MASS INDEX DOCD: CPT | Mod: S$GLB,,, | Performed by: INTERNAL MEDICINE

## 2018-02-12 PROCEDURE — 99999 PR PBB SHADOW E&M-EST. PATIENT-LVL V: CPT | Mod: PBBFAC,,, | Performed by: INTERNAL MEDICINE

## 2018-02-12 PROCEDURE — 99499 UNLISTED E&M SERVICE: CPT | Mod: S$GLB,,, | Performed by: INTERNAL MEDICINE

## 2018-02-12 PROCEDURE — 99212 OFFICE O/P EST SF 10 MIN: CPT | Mod: S$GLB,,, | Performed by: INTERNAL MEDICINE

## 2018-02-12 PROCEDURE — 1159F MED LIST DOCD IN RCRD: CPT | Mod: S$GLB,,, | Performed by: INTERNAL MEDICINE

## 2018-02-12 NOTE — PROGRESS NOTES
Subjective:       Patient ID: Zeyad Woodard is a 89 y.o. male.    Chief Complaint: Hospital Follow Up    HPI:  Presents to [facility] for hospital follow up.  Recently hospitalized for C diff colitis.  Discharged to [facility] with assistance of Home Health.  Discharged to complete a course of Flagyl for treatment of C diff infection.  Returned to hospital one day later after he was found obtunded, with pin point pupils, at his group home.  EMS administered Narcan on the scene and he responded favorably to this, immediately regaining consciousness.   During work up at hospital, patient was found to be febrile and POS for Influenza B.  A toxicology screen was not collected during that admission.  He was subsequently discharged to the group home on a course of Tamiflu, also continuing the previously prescribed 10 day course of Flagyl.     Patient in wheelchair today.  Accompanied by his daughter.  Still living in group home. Receiving nursing visits at group home from Home Health, but therapy services are complete.  He is able to ambulate short distances with assistance, but does not get much assistance doing this at his group home.  His daughter helps him ambulate when she visits.    Tolerating oral intake.  Having regular, formed bowel movements.  Still with cough productive of sputum and complaints of post nasal drip.  However, overall feeling better than he was last office visit.  Strength has improved.     Review of Systems  General ROS: negative for chills, fever or weight loss  Psychological ROS: negative for hallucination, depression or suicidal ideation  Ophthalmic ROS: negative for blurry vision, photophobia or eye pain  ENT ROS: negative for epistaxis, sore throat or rhinorrhea  + post nasal drip  Respiratory ROS: no shortness of breath, or wheezing  + cough productive of sputum, worse at night  Cardiovascular ROS: no chest pain or dyspnea on exertion  Gastrointestinal ROS: no abdominal  "pain, change in bowel habits, or black/ bloody stools  Genito-Urinary ROS: no dysuria, trouble voiding, or hematuria  Musculoskeletal ROS: negative for gait disturbance or muscular weakness  Neurological ROS: no syncope or seizures; no ataxia  Dermatological ROS: negative for pruritis, rash and jaundice        Objective:      Vital Signs:  Vitals:    02/12/18 1100   BP: (!) 132/58   Pulse: (!) 58   Temp: 98.8 °F (37.1 °C)     Wt Readings from Last 1 Encounters:   02/12/18 1100 77.1 kg (169 lb 15.6 oz)     Body mass index is 25.84 kg/m².   SpO2 Readings from Last 1 Encounters:   02/12/18 98%       Physical Exam:  BP (!) 132/58 (BP Location: Right arm, Patient Position: Sitting, BP Method: Large (Automatic))   Pulse (!) 58   Temp 98.8 °F (37.1 °C) (Oral)   Ht 5' 8" (1.727 m)   Wt 77.1 kg (169 lb 15.6 oz)   SpO2 98%   BMI 25.84 kg/m²   General appearance: alert, cooperative, no distress  Constitutional:Oriented to person, place, and time.appears well-developed and well-nourished.  + appears frail, chronically ill    HEENT: Normocephalic, atraumatic, neck symmetrical, no nasal discharge   Eyes: conjunctivae/corneas clear, PERRL, EOM's intact  Lungs: clear to auscultation bilaterally, no dullness to percussion bilaterally  Heart: regular rate and rhythm without rub; no displacement of the PMI   Abdomen: soft, non-tender; bowel sounds normoactive; no organomegaly  Extremities: extremities symmetric; no clubbing, cyanosis, or edema  Integument: Skin color, texture, turgor normal; no rashes; hair distrubution normal  Neurologic: Alert and oriented X 3, normal strength, normal coordination and gait  Psychiatric: no pressured speech; normal affect; no evidence of impaired cognition    Assessment:       1. Influenza B    2. C. difficile colitis    3. Frequent falls    4. Chronic atrial fibrillation        Plan:       Diagnoses and all orders for this visit:    Influenza B  - recent hospitalization for Influenza B  - " patient has completed prescribed course of Tamiflu    C. difficile colitis  - hospitalization for C difficile colitis just prior to Influenza related hospitalization  - completed course of Flagyl; symptoms have resolved and bowel habits have returned to normal     Frequent falls  - requires around the clock supervision and assistance with all ADL's  - lives in small group home    Chronic atrial fibrillation  - rate controlled on Blocker  - unable to tolerate anticoagulation due to frequent falls         [facility].  Has follow up with PCP, Dr Izaguirre,, 4/10/18.     Scheduled Follow-up :  Future Appointments  Date Time Provider Department Center   4/10/2018 9:30 AM LAB, [facility]   4/10/2018 1:00 PM Booker Ricci MD [facility] MED [facility]   4/11/2018 11:00 AM Justin Jeff MD [facility] HEM [facility]C [facility]   4/27/2018 7:45 AM EKG, APPT [facility] EKG [facility]   4/27/2018 8:00 AM ECHO, [facility] ECHOLAB [facility]   4/27/2018 9:00 AM COORDINATED DEVICE CHECK [facility]TH [facility]   4/27/2018 9:20 AM Kade Callejas MD [facility]       Post Visit Medication List:     Medication List          Accurate as of 2/12/18  1:07 PM. If you have any questions, ask your nurse or doctor.               CHANGE how you take these medications    clotrimazole-betamethasone lotion  Commonly known as:  LOTRISONE  Apply topically 2 (two) times daily.  What changed:  · when to take this  · reasons to take this        CONTINUE taking these medications    acetaminophen 325 MG tablet  Commonly known as:  TYLENOL  Take 2 tablets (650 mg total) by mouth every 6 (six) hours as needed.     aspirin 81 MG EC tablet  Commonly known as:  ECOTRIN  Take 1 tablet (81 mg total) by mouth once daily.     atorvastatin 20 MG tablet  Commonly known as:  LIPITOR  Take 1 tablet (20 mg total) by mouth nightly.     calcium 500 mg Tab     carvedilol 3.125 MG tablet  Commonly known as:   COREG  Take 1 tablet (3.125 mg total) by mouth 2 (two) times daily with meals.     donepezil 10 MG tablet  Commonly known as:  ARICEPT  Take 1 tablet (10 mg total) by mouth every evening.     ferrous sulfate 325 mg (65 mg iron) Tab tablet     finasteride 5 mg tablet  Commonly known as:  PROSCAR  Take 1 tablet (5 mg total) by mouth once daily.     furosemide 20 MG tablet  Commonly known as:  LASIX     gabapentin 100 MG capsule  Commonly known as:  NEURONTIN  Take 100mg in Morning and take 300mg nightly     levothyroxine 100 MCG tablet  Commonly known as:  SYNTHROID  Take 1 tablet (100 mcg total) by mouth once daily.     mirtazapine 7.5 MG Tab  Commonly known as:  REMERON  Take 1 tablet (7.5 mg total) by mouth every evening.     neomycin-bacitracin-polymyxin ointment  Commonly known as:  NEOSPORIN  Apply topically Daily.     ONE-A-DAY MEN'S MULTIVITAMIN ORAL     senna-docusate 8.6-50 mg 8.6-50 mg per tablet  Commonly known as:  PERICOLACE  Take 1 tablet by mouth 2 (two) times daily.     tamsulosin 0.4 mg Cp24  Commonly known as:  FLOMAX  Take 1 capsule (0.4 mg total) by mouth once daily.     VITAMIN B-12 1000 MCG tablet  Generic drug:  cyanocobalamin     VITAMIN C ORAL

## 2018-03-01 DIAGNOSIS — I48.20 CHRONIC ATRIAL FIBRILLATION: ICD-10-CM

## 2018-03-01 DIAGNOSIS — I49.5 SSS (SICK SINUS SYNDROME): ICD-10-CM

## 2018-03-01 DIAGNOSIS — Z95.0 PACEMAKER: Primary | ICD-10-CM

## 2018-03-01 DIAGNOSIS — R00.1 BRADYCARDIA: ICD-10-CM

## 2018-03-04 ENCOUNTER — PATIENT MESSAGE (OUTPATIENT)
Dept: FAMILY MEDICINE | Facility: CLINIC | Age: 83
End: 2018-03-04

## 2018-03-05 DIAGNOSIS — R63.0 LOSS OF APPETITE: ICD-10-CM

## 2018-03-05 DIAGNOSIS — I10 ESSENTIAL HYPERTENSION: ICD-10-CM

## 2018-03-05 RX ORDER — MIRTAZAPINE 7.5 MG/1
7.5 TABLET, FILM COATED ORAL NIGHTLY
Qty: 90 TABLET | Refills: 3 | Status: ON HOLD | OUTPATIENT
Start: 2018-03-05 | End: 2018-04-19

## 2018-03-05 RX ORDER — CARVEDILOL 3.12 MG/1
3.12 TABLET ORAL 2 TIMES DAILY WITH MEALS
Qty: 180 TABLET | Refills: 3 | Status: ON HOLD | OUTPATIENT
Start: 2018-03-05 | End: 2018-04-19 | Stop reason: HOSPADM

## 2018-04-02 ENCOUNTER — OFFICE VISIT (OUTPATIENT)
Dept: FAMILY MEDICINE | Facility: CLINIC | Age: 83
End: 2018-04-02
Payer: MEDICARE

## 2018-04-02 VITALS
OXYGEN SATURATION: 96 % | HEIGHT: 68 IN | DIASTOLIC BLOOD PRESSURE: 80 MMHG | SYSTOLIC BLOOD PRESSURE: 126 MMHG | WEIGHT: 170 LBS | HEART RATE: 67 BPM | BODY MASS INDEX: 25.76 KG/M2 | TEMPERATURE: 99 F

## 2018-04-02 DIAGNOSIS — J40 BRONCHITIS: Primary | ICD-10-CM

## 2018-04-02 DIAGNOSIS — N18.30 CKD (CHRONIC KIDNEY DISEASE), STAGE III: ICD-10-CM

## 2018-04-02 PROCEDURE — 99499 UNLISTED E&M SERVICE: CPT | Mod: S$GLB,,, | Performed by: NURSE PRACTITIONER

## 2018-04-02 PROCEDURE — 99213 OFFICE O/P EST LOW 20 MIN: CPT | Mod: S$GLB,,, | Performed by: NURSE PRACTITIONER

## 2018-04-02 PROCEDURE — 99999 PR PBB SHADOW E&M-EST. PATIENT-LVL V: CPT | Mod: PBBFAC,,, | Performed by: NURSE PRACTITIONER

## 2018-04-02 RX ORDER — DOXYCYCLINE 100 MG/1
100 CAPSULE ORAL EVERY 12 HOURS
Qty: 20 CAPSULE | Refills: 0 | Status: ON HOLD | OUTPATIENT
Start: 2018-04-02 | End: 2018-04-19 | Stop reason: HOSPADM

## 2018-04-02 RX ORDER — BENZONATATE 100 MG/1
100 CAPSULE ORAL 3 TIMES DAILY PRN
Qty: 30 CAPSULE | Refills: 0 | Status: ON HOLD | OUTPATIENT
Start: 2018-04-02 | End: 2018-04-19

## 2018-04-02 NOTE — PROGRESS NOTES
"Subjective:       Patient ID: Zeyad Woodard is a 89 y.o. male.    Chief Complaint: Cough and Fever    URI    This is a new problem. The current episode started in the past 7 days. The problem has been gradually worsening. Maximum temperature: low grade fever 99.2 f. Associated symptoms include congestion, coughing and wheezing. Pertinent negatives include no dysuria or nausea. Treatments tried: mucinex. The treatment provided no relief.     Review of Systems   Constitutional: Positive for fatigue and fever.   HENT: Positive for congestion.    Respiratory: Positive for cough and wheezing.    Cardiovascular: Positive for leg swelling.   Gastrointestinal: Negative for nausea.   Genitourinary: Negative for dysuria.   Musculoskeletal: Positive for gait problem.   Neurological: Positive for weakness.       Past Medical History:   Diagnosis Date    Anemia     Atrial fibrillation     BPH (benign prostatic hypertrophy)     Cancer     Cardiac pacemaker in situ 7/2/2015    CHF (congestive heart failure)     Coronary artery disease     Encounter for blood transfusion     GI bleed     cecum angiectasia s/p cautery    Hypertension     Hypothyroidism     Polyneuropathy     Posture imbalance     due to neuropathy    Right posterior capsular opacification 1/18/2017    SSS (sick sinus syndrome) 2015    s/p pacemaker    Stroke 1/11/2018     Lab Results   Component Value Date    WBC 11.41 01/29/2018    HGB 9.8 (L) 01/29/2018    HCT 31.5 (L) 01/29/2018    MCV 97 01/29/2018     (L) 01/29/2018     Lab Results   Component Value Date    CREATININE 1.2 01/29/2018    BUN 19 01/29/2018     01/29/2018    K 4.6 01/29/2018     01/29/2018    CO2 26 01/29/2018       Objective:       /80 (BP Location: Right arm, Patient Position: Sitting, BP Method: Medium (Manual))   Pulse 67   Temp 99.2 °F (37.3 °C) (Oral)   Ht 5' 8" (1.727 m)   Wt 77.1 kg (169 lb 15.6 oz)   SpO2 96%   BMI 25.84 kg/m²     Physical " Exam   Constitutional: He is oriented to person, place, and time. He appears well-developed and well-nourished.   HENT:   Head: Normocephalic.   Mouth/Throat: Posterior oropharyngeal erythema present. No oropharyngeal exudate.   Eyes: Conjunctivae and EOM are normal. Pupils are equal, round, and reactive to light. No scleral icterus.   Neck: Normal range of motion. Neck supple.   Cardiovascular: Normal rate and regular rhythm.    Murmur heard.   Systolic murmur is present with a grade of 2/6   Pulmonary/Chest: Effort normal. He has wheezes in the right upper field and the right middle field.   Abdominal: Soft. Normal appearance and bowel sounds are normal. He exhibits no distension and no mass. There is no splenomegaly or hepatomegaly. There is no tenderness. There is no rebound, no guarding, no CVA tenderness, no tenderness at McBurney's point and negative Killian's sign.   Musculoskeletal: Normal range of motion. He exhibits edema.   Bilateral peripheral edema +1   Lymphadenopathy:     He has no cervical adenopathy.   Neurological: He is alert and oriented to person, place, and time. He exhibits normal muscle tone. Coordination normal.   Skin: Skin is warm and dry.   Psychiatric: He has a normal mood and affect. His behavior is normal.   Vitals reviewed.      Assessment:       1. CKD (chronic kidney disease), stage III    2. Bronchitis        Plan:        Zeyad was seen today for cough and fever.    Diagnoses and all orders for this visit:    Bronchitis  -     doxycycline (MONODOX) 100 MG capsule; Take 1 capsule (100 mg total) by mouth every 12 (twelve) hours.  -     benzonatate (TESSALON) 100 MG capsule; Take 1 capsule (100 mg total) by mouth 3 (three) times daily as needed for Cough.  Mucinex (plain) 600 mg (1 tab) every 12 hours for congestion    CKD (chronic kidney disease), stage III      F/U with Dr. Izaguirre if symptoms persist or worsen and on 4/10 as scheduled

## 2018-04-05 ENCOUNTER — HOSPITAL ENCOUNTER (INPATIENT)
Facility: HOSPITAL | Age: 83
LOS: 15 days | Discharge: HOME | DRG: 871 | End: 2018-04-20
Attending: EMERGENCY MEDICINE | Admitting: INTERNAL MEDICINE
Payer: MEDICARE

## 2018-04-05 DIAGNOSIS — Z51.5 PALLIATIVE CARE ENCOUNTER: ICD-10-CM

## 2018-04-05 DIAGNOSIS — R13.12 OROPHARYNGEAL DYSPHAGIA: ICD-10-CM

## 2018-04-05 DIAGNOSIS — G93.40 ACUTE ENCEPHALOPATHY: ICD-10-CM

## 2018-04-05 DIAGNOSIS — R53.81 PHYSICAL DECONDITIONING: ICD-10-CM

## 2018-04-05 DIAGNOSIS — R06.02 SHORTNESS OF BREATH: ICD-10-CM

## 2018-04-05 DIAGNOSIS — G93.40 ENCEPHALOPATHY: ICD-10-CM

## 2018-04-05 DIAGNOSIS — Z93.1 S/P PERCUTANEOUS ENDOSCOPIC GASTROSTOMY (PEG) TUBE PLACEMENT: ICD-10-CM

## 2018-04-05 DIAGNOSIS — J18.9 HCAP (HEALTHCARE-ASSOCIATED PNEUMONIA): ICD-10-CM

## 2018-04-05 DIAGNOSIS — Z71.89 COUNSELING REGARDING ADVANCED CARE PLANNING AND GOALS OF CARE: ICD-10-CM

## 2018-04-05 DIAGNOSIS — N18.30 CKD (CHRONIC KIDNEY DISEASE), STAGE III: Primary | ICD-10-CM

## 2018-04-05 DIAGNOSIS — R29.818 NEUROLOGICAL DEFICIT PRESENT: ICD-10-CM

## 2018-04-05 DIAGNOSIS — J18.9 PNEUMONIA OF RIGHT LOWER LOBE DUE TO INFECTIOUS ORGANISM: ICD-10-CM

## 2018-04-05 DIAGNOSIS — D69.3 CHRONIC IDIOPATHIC THROMBOCYTOPENIA: ICD-10-CM

## 2018-04-05 DIAGNOSIS — I48.20 CHRONIC ATRIAL FIBRILLATION: ICD-10-CM

## 2018-04-05 DIAGNOSIS — S90.222A SUBUNGUAL HEMATOMA OF TOE OF LEFT FOOT, INITIAL ENCOUNTER: ICD-10-CM

## 2018-04-05 DIAGNOSIS — Z71.89 GOALS OF CARE, COUNSELING/DISCUSSION: ICD-10-CM

## 2018-04-05 DIAGNOSIS — E03.4 HYPOTHYROIDISM DUE TO ACQUIRED ATROPHY OF THYROID: ICD-10-CM

## 2018-04-05 DIAGNOSIS — F03.90 DEMENTIA WITHOUT BEHAVIORAL DISTURBANCE, UNSPECIFIED DEMENTIA TYPE: ICD-10-CM

## 2018-04-05 DIAGNOSIS — R63.0 LOSS OF APPETITE: ICD-10-CM

## 2018-04-05 DIAGNOSIS — M86.9 OSTEOMYELITIS OF TOE: ICD-10-CM

## 2018-04-05 DIAGNOSIS — R13.10 DYSPHAGIA, UNSPECIFIED TYPE: ICD-10-CM

## 2018-04-05 DIAGNOSIS — E03.9 HYPOTHYROIDISM, UNSPECIFIED TYPE: ICD-10-CM

## 2018-04-05 DIAGNOSIS — I10 ESSENTIAL HYPERTENSION: ICD-10-CM

## 2018-04-05 DIAGNOSIS — L89.152 DECUBITUS ULCER OF SACRAL REGION, STAGE 2: ICD-10-CM

## 2018-04-05 DIAGNOSIS — I50.32 CHRONIC DIASTOLIC CONGESTIVE HEART FAILURE: ICD-10-CM

## 2018-04-05 DIAGNOSIS — J40 BRONCHITIS: ICD-10-CM

## 2018-04-05 DIAGNOSIS — N40.0 BENIGN PROSTATIC HYPERPLASIA WITHOUT LOWER URINARY TRACT SYMPTOMS: ICD-10-CM

## 2018-04-05 DIAGNOSIS — L03.032 CELLULITIS OF GREAT TOE, LEFT: ICD-10-CM

## 2018-04-05 DIAGNOSIS — Z95.0 CARDIAC PACEMAKER IN SITU: ICD-10-CM

## 2018-04-05 DIAGNOSIS — A41.9 SEPSIS, DUE TO UNSPECIFIED ORGANISM: ICD-10-CM

## 2018-04-05 DIAGNOSIS — I15.0 RENOVASCULAR HYPERTENSION: ICD-10-CM

## 2018-04-05 LAB
ALBUMIN SERPL BCP-MCNC: 3.2 G/DL
ALP SERPL-CCNC: 164 U/L
ALT SERPL W/O P-5'-P-CCNC: 50 U/L
ANION GAP SERPL CALC-SCNC: 12 MMOL/L
AST SERPL-CCNC: 61 U/L
BASOPHILS # BLD AUTO: 0.03 K/UL
BASOPHILS NFR BLD: 0.3 %
BILIRUB SERPL-MCNC: 0.8 MG/DL
BNP SERPL-MCNC: 158 PG/ML
BUN SERPL-MCNC: 34 MG/DL
CALCIUM SERPL-MCNC: 9.2 MG/DL
CHLORIDE SERPL-SCNC: 101 MMOL/L
CO2 SERPL-SCNC: 26 MMOL/L
CREAT SERPL-MCNC: 1.5 MG/DL
DIFFERENTIAL METHOD: ABNORMAL
EOSINOPHIL # BLD AUTO: 0.1 K/UL
EOSINOPHIL NFR BLD: 0.5 %
ERYTHROCYTE [DISTWIDTH] IN BLOOD BY AUTOMATED COUNT: 15.1 %
EST. GFR  (AFRICAN AMERICAN): 47 ML/MIN/1.73 M^2
EST. GFR  (NON AFRICAN AMERICAN): 40 ML/MIN/1.73 M^2
FLUAV AG SPEC QL IA: NEGATIVE
FLUBV AG SPEC QL IA: NEGATIVE
GLUCOSE SERPL-MCNC: 107 MG/DL
GLUCOSE SERPL-MCNC: 128 MG/DL (ref 70–110)
HCT VFR BLD AUTO: 29.2 %
HGB BLD-MCNC: 9.4 G/DL
INR PPP: 1.1
LACTATE SERPL-SCNC: 1.6 MMOL/L
LACTATE SERPL-SCNC: 2.5 MMOL/L
LYMPHOCYTES # BLD AUTO: 4.2 K/UL
LYMPHOCYTES NFR BLD: 35.2 %
MCH RBC QN AUTO: 29.6 PG
MCHC RBC AUTO-ENTMCNC: 32.2 G/DL
MCV RBC AUTO: 92 FL
MONOCYTES # BLD AUTO: 0.5 K/UL
MONOCYTES NFR BLD: 4.5 %
NEUTROPHILS # BLD AUTO: 7.1 K/UL
NEUTROPHILS NFR BLD: 59.2 %
PLATELET # BLD AUTO: 44 K/UL
PMV BLD AUTO: 10.3 FL
POCT GLUCOSE: 128 MG/DL (ref 70–110)
POTASSIUM SERPL-SCNC: 5.3 MMOL/L
PROT SERPL-MCNC: 6.7 G/DL
PROTHROMBIN TIME: 11.4 SEC
RBC # BLD AUTO: 3.18 M/UL
SODIUM SERPL-SCNC: 139 MMOL/L
SPECIMEN SOURCE: NORMAL
TROPONIN I SERPL DL<=0.01 NG/ML-MCNC: 0.05 NG/ML
TROPONIN I SERPL DL<=0.01 NG/ML-MCNC: 0.13 NG/ML
WBC # BLD AUTO: 11.88 K/UL

## 2018-04-05 PROCEDURE — 25000003 PHARM REV CODE 250: Performed by: STUDENT IN AN ORGANIZED HEALTH CARE EDUCATION/TRAINING PROGRAM

## 2018-04-05 PROCEDURE — 96365 THER/PROPH/DIAG IV INF INIT: CPT

## 2018-04-05 PROCEDURE — 84484 ASSAY OF TROPONIN QUANT: CPT | Mod: 91

## 2018-04-05 PROCEDURE — 25000242 PHARM REV CODE 250 ALT 637 W/ HCPCS: Performed by: EMERGENCY MEDICINE

## 2018-04-05 PROCEDURE — 93005 ELECTROCARDIOGRAM TRACING: CPT

## 2018-04-05 PROCEDURE — 87040 BLOOD CULTURE FOR BACTERIA: CPT | Mod: 59

## 2018-04-05 PROCEDURE — 83605 ASSAY OF LACTIC ACID: CPT | Mod: 91

## 2018-04-05 PROCEDURE — 84484 ASSAY OF TROPONIN QUANT: CPT

## 2018-04-05 PROCEDURE — 87070 CULTURE OTHR SPECIMN AEROBIC: CPT

## 2018-04-05 PROCEDURE — 83880 ASSAY OF NATRIURETIC PEPTIDE: CPT

## 2018-04-05 PROCEDURE — 36415 COLL VENOUS BLD VENIPUNCTURE: CPT

## 2018-04-05 PROCEDURE — 25000242 PHARM REV CODE 250 ALT 637 W/ HCPCS: Performed by: STUDENT IN AN ORGANIZED HEALTH CARE EDUCATION/TRAINING PROGRAM

## 2018-04-05 PROCEDURE — 85025 COMPLETE CBC W/AUTO DIFF WBC: CPT

## 2018-04-05 PROCEDURE — 99285 EMERGENCY DEPT VISIT HI MDM: CPT | Mod: 25

## 2018-04-05 PROCEDURE — 80053 COMPREHEN METABOLIC PANEL: CPT

## 2018-04-05 PROCEDURE — 87205 SMEAR GRAM STAIN: CPT

## 2018-04-05 PROCEDURE — 94640 AIRWAY INHALATION TREATMENT: CPT

## 2018-04-05 PROCEDURE — 25000003 PHARM REV CODE 250: Performed by: EMERGENCY MEDICINE

## 2018-04-05 PROCEDURE — 84145 PROCALCITONIN (PCT): CPT | Mod: 91

## 2018-04-05 PROCEDURE — 84145 PROCALCITONIN (PCT): CPT

## 2018-04-05 PROCEDURE — 11000001 HC ACUTE MED/SURG PRIVATE ROOM

## 2018-04-05 PROCEDURE — 87400 INFLUENZA A/B EACH AG IA: CPT | Mod: 59

## 2018-04-05 PROCEDURE — 93010 ELECTROCARDIOGRAM REPORT: CPT | Mod: ,,, | Performed by: INTERNAL MEDICINE

## 2018-04-05 PROCEDURE — 96366 THER/PROPH/DIAG IV INF ADDON: CPT

## 2018-04-05 PROCEDURE — 85610 PROTHROMBIN TIME: CPT

## 2018-04-05 PROCEDURE — 82962 GLUCOSE BLOOD TEST: CPT

## 2018-04-05 PROCEDURE — 96367 TX/PROPH/DG ADDL SEQ IV INF: CPT

## 2018-04-05 PROCEDURE — 63600175 PHARM REV CODE 636 W HCPCS: Performed by: EMERGENCY MEDICINE

## 2018-04-05 PROCEDURE — 96375 TX/PRO/DX INJ NEW DRUG ADDON: CPT

## 2018-04-05 PROCEDURE — 63600175 PHARM REV CODE 636 W HCPCS: Performed by: STUDENT IN AN ORGANIZED HEALTH CARE EDUCATION/TRAINING PROGRAM

## 2018-04-05 RX ORDER — CEFEPIME HYDROCHLORIDE 1 G/1
1 INJECTION, POWDER, FOR SOLUTION INTRAMUSCULAR; INTRAVENOUS
Status: DISCONTINUED | OUTPATIENT
Start: 2018-04-05 | End: 2018-04-05

## 2018-04-05 RX ORDER — ACETAMINOPHEN 650 MG/1
650 SUPPOSITORY RECTAL
Status: COMPLETED | OUTPATIENT
Start: 2018-04-05 | End: 2018-04-05

## 2018-04-05 RX ORDER — TAMSULOSIN HYDROCHLORIDE 0.4 MG/1
0.4 CAPSULE ORAL DAILY
Status: DISCONTINUED | OUTPATIENT
Start: 2018-04-06 | End: 2018-04-10

## 2018-04-05 RX ORDER — IPRATROPIUM BROMIDE AND ALBUTEROL SULFATE 2.5; .5 MG/3ML; MG/3ML
3 SOLUTION RESPIRATORY (INHALATION)
Status: COMPLETED | OUTPATIENT
Start: 2018-04-05 | End: 2018-04-05

## 2018-04-05 RX ORDER — CEFEPIME HYDROCHLORIDE 2 G/1
2 INJECTION, POWDER, FOR SOLUTION INTRAVENOUS
Status: DISCONTINUED | OUTPATIENT
Start: 2018-04-05 | End: 2018-04-05

## 2018-04-05 RX ORDER — MIRTAZAPINE 7.5 MG/1
7.5 TABLET, FILM COATED ORAL NIGHTLY
Status: DISCONTINUED | OUTPATIENT
Start: 2018-04-05 | End: 2018-04-10

## 2018-04-05 RX ORDER — SODIUM CHLORIDE 9 MG/ML
INJECTION, SOLUTION INTRAVENOUS CONTINUOUS
Status: DISCONTINUED | OUTPATIENT
Start: 2018-04-05 | End: 2018-04-06

## 2018-04-05 RX ORDER — IPRATROPIUM BROMIDE AND ALBUTEROL SULFATE 2.5; .5 MG/3ML; MG/3ML
3 SOLUTION RESPIRATORY (INHALATION)
Status: DISCONTINUED | OUTPATIENT
Start: 2018-04-05 | End: 2018-04-20 | Stop reason: HOSPADM

## 2018-04-05 RX ORDER — LEVOTHYROXINE SODIUM 100 UG/1
100 TABLET ORAL
Status: DISCONTINUED | OUTPATIENT
Start: 2018-04-06 | End: 2018-04-10

## 2018-04-05 RX ORDER — IPRATROPIUM BROMIDE AND ALBUTEROL SULFATE 2.5; .5 MG/3ML; MG/3ML
3 SOLUTION RESPIRATORY (INHALATION) EVERY 4 HOURS
Status: DISCONTINUED | OUTPATIENT
Start: 2018-04-05 | End: 2018-04-20 | Stop reason: HOSPADM

## 2018-04-05 RX ORDER — SODIUM CHLORIDE 0.9 % (FLUSH) 0.9 %
3 SYRINGE (ML) INJECTION EVERY 8 HOURS
Status: DISCONTINUED | OUTPATIENT
Start: 2018-04-05 | End: 2018-04-20 | Stop reason: HOSPADM

## 2018-04-05 RX ORDER — FINASTERIDE 5 MG/1
5 TABLET, FILM COATED ORAL DAILY
Status: DISCONTINUED | OUTPATIENT
Start: 2018-04-06 | End: 2018-04-10

## 2018-04-05 RX ORDER — DONEPEZIL HYDROCHLORIDE 5 MG/1
10 TABLET, FILM COATED ORAL NIGHTLY
Status: DISCONTINUED | OUTPATIENT
Start: 2018-04-05 | End: 2018-04-10

## 2018-04-05 RX ORDER — CIPROFLOXACIN 2 MG/ML
400 INJECTION, SOLUTION INTRAVENOUS EVERY 8 HOURS
Status: DISCONTINUED | OUTPATIENT
Start: 2018-04-05 | End: 2018-04-07

## 2018-04-05 RX ADMIN — IPRATROPIUM BROMIDE AND ALBUTEROL SULFATE 3 ML: .5; 3 SOLUTION RESPIRATORY (INHALATION) at 07:04

## 2018-04-05 RX ADMIN — DONEPEZIL HYDROCHLORIDE 10 MG: 5 TABLET, FILM COATED ORAL at 09:04

## 2018-04-05 RX ADMIN — SODIUM CHLORIDE 2301 ML: 0.9 INJECTION, SOLUTION INTRAVENOUS at 05:04

## 2018-04-05 RX ADMIN — VANCOMYCIN HYDROCHLORIDE 1500 MG: 10 INJECTION, POWDER, LYOPHILIZED, FOR SOLUTION INTRAVENOUS at 05:04

## 2018-04-05 RX ADMIN — SODIUM CHLORIDE: 0.9 INJECTION, SOLUTION INTRAVENOUS at 09:04

## 2018-04-05 RX ADMIN — ACETAMINOPHEN 650 MG: 650 SUPPOSITORY RECTAL at 04:04

## 2018-04-05 RX ADMIN — MIRTAZAPINE 7.5 MG: 7.5 TABLET ORAL at 09:04

## 2018-04-05 RX ADMIN — CIPROFLOXACIN 400 MG: 2 INJECTION, SOLUTION INTRAVENOUS at 07:04

## 2018-04-05 RX ADMIN — IPRATROPIUM BROMIDE AND ALBUTEROL SULFATE 3 ML: .5; 3 SOLUTION RESPIRATORY (INHALATION) at 05:04

## 2018-04-05 RX ADMIN — CEFEPIME 2 G: 2 INJECTION, POWDER, FOR SOLUTION INTRAMUSCULAR; INTRAVENOUS at 09:04

## 2018-04-05 NOTE — ED PROVIDER NOTES
Encounter Date: 4/5/2018    SCRIBE #1 NOTE: I, Kaitlin De, am scribing for, and in the presence of,  Dr. Cintron. I have scribed the entire note.       History     Chief Complaint   Patient presents with    Shortness of Breath     C/o cough and SOB since Monday. Went to a doctor and was started on an antibiotic. SOB worsened at about 1300 today at a group home. EMS report O2 sat. 75% on room air when they arrived. Placed on cpap pta. O2 sat. 100% on cpap on arrival.      Time seen by provider: 4:11 PM     This is a 89 y.o. male with PMHx of HTN, A Fib, and CA who presents to the ED via EMS with a cc of SOB which began x 3 days ago. Pt was seen and prescribed antibiotics by a doctor, but the SOB worsened 3 hours ago. He reports associated fever and cough productive of yellow sputum. He denies chest pain, nausea, and vomiting. Per EMS his 02 SAT was at 75% at the group home on arrival. Pt denies being in any pain. He reports an allergy to Penicillin.        The history is provided by the patient and the EMS personnel. The history is limited by the condition of the patient.     Review of patient's allergies indicates:   Allergen Reactions    Penicillins Hives and Other (See Comments)     Fever     Past Medical History:   Diagnosis Date    Anemia     Atrial fibrillation     BPH (benign prostatic hypertrophy)     Cancer     Cardiac pacemaker in situ 7/2/2015    CHF (congestive heart failure)     Coronary artery disease     Encounter for blood transfusion     GI bleed     cecum angiectasia s/p cautery    Hypertension     Hypothyroidism     Polyneuropathy     Posture imbalance     due to neuropathy    Right posterior capsular opacification 1/18/2017    SSS (sick sinus syndrome) 2015    s/p pacemaker    Stroke 1/11/2018     Past Surgical History:   Procedure Laterality Date    CARDIAC SURGERY      CATARACT EXTRACTION W/  INTRAOCULAR LENS IMPLANT Right 05/17/2010        CATARACT EXTRACTION W/   INTRAOCULAR LENS IMPLANT Left 2004        cataract surgery      COLONOSCOPY  04    COLONOSCOPY N/A 2/15/2016    Procedure: COLONOSCOPY;  Surgeon: Stanton Kirk MD;  Location: [address]);  Service: Endoscopy;  Laterality: N/A;    EYE SURGERY      HERNIA REPAIR      inguinal hernia repair      Open heart surgery for pericardiectomy      for calcific constrictive pericarditis    UMBILICAL HERNIA REPAIR      Yag      Left Eye     Family History   Problem Relation Age of Onset    Stroke Mother          Cancer Father      lung;     Diabetes Sister     Coronary artery disease Sister     Peripheral vascular disease Sister      Social History   Substance Use Topics    Smoking status: Passive Smoke Exposure - Never Smoker    Smokeless tobacco: Never Used    Alcohol use No     Review of Systems   Constitutional: Positive for fever.   HENT: Negative for sore throat.    Respiratory: Positive for cough and shortness of breath.    Cardiovascular: Negative for chest pain.   Gastrointestinal: Negative for abdominal pain, diarrhea, nausea and vomiting.   Genitourinary: Negative for dysuria and flank pain.   Musculoskeletal: Negative for back pain.   Skin: Negative for rash.   Neurological: Negative for weakness.   Hematological: Does not bruise/bleed easily.   Psychiatric/Behavioral: Negative.  Negative for confusion and hallucinations.       Physical Exam     Initial Vitals [18 1542]   BP Pulse Resp Temp SpO2   (!) 137/55 69 (!) 28 (!) 102.9 °F (39.4 °C) 95 %      MAP       82.33         Physical Exam    Nursing note and vitals reviewed.  Constitutional: He appears well-developed and well-nourished. He appears distressed.   HENT:   Head: Normocephalic and atraumatic.   Mouth/Throat: Oropharynx is clear and moist.   Eyes: Conjunctivae and EOM are normal. Pupils are equal, round, and reactive to light.   Neck: Normal range of motion. No tracheal deviation  present.   Cardiovascular: Regular rhythm and normal heart sounds. Tachycardia present.    No murmur heard.  Pulmonary/Chest: No stridor. He is in respiratory distress. He has wheezes (expiratory).   Course breath sounds bilaterally.   Abdominal: Soft. He exhibits no distension. There is no tenderness.   Musculoskeletal: Normal range of motion. He exhibits no edema or tenderness.   Neurological: He is alert.   Skin: Skin is warm and dry.   Psychiatric: He has a normal mood and affect. His behavior is normal.         ED Course   Critical Care  Date/Time: 4/5/2018 6:09 PM  Performed by: CORIE MAHAN  Authorized by: CORIE MAHAN   Direct patient critical care time: 12 minutes  Ordering / reviewing critical care time: 12 minutes  Documentation critical care time: 12 minutes  Consulting other physicians critical care time: 3 minutes  Total critical care time (exclusive of procedural time) : 39 minutes  Critical care was time spent personally by me on the following activities: examination of patient, pulse oximetry, re-evaluation of patient's condition, ordering and performing treatments and interventions, ordering and review of radiographic studies, ordering and review of laboratory studies, obtaining history from patient or surrogate and evaluation of patient's response to treatment.        Labs Reviewed   CBC W/ AUTO DIFFERENTIAL - Abnormal; Notable for the following:        Result Value    RBC 3.18 (*)     Hemoglobin 9.4 (*)     Hematocrit 29.2 (*)     RDW 15.1 (*)     Platelets 44 (*)     All other components within normal limits   COMPREHENSIVE METABOLIC PANEL - Abnormal; Notable for the following:     Potassium 5.3 (*)     BUN, Bld 34 (*)     Creatinine 1.5 (*)     Albumin 3.2 (*)     Alkaline Phosphatase 164 (*)     AST 61 (*)     ALT 50 (*)     eGFR if  47 (*)     eGFR if non  40 (*)     All other components within normal limits   LACTIC ACID, PLASMA -  Abnormal; Notable for the following:     Lactate (Lactic Acid) 2.5 (*)     All other components within normal limits   B-TYPE NATRIURETIC PEPTIDE - Abnormal; Notable for the following:      (*)     All other components within normal limits   TROPONIN I - Abnormal; Notable for the following:     Troponin I 0.047 (*)     All other components within normal limits   CULTURE, BLOOD   CULTURE, BLOOD   CULTURE, URINE   CULTURE, RESPIRATORY   INFLUENZA A AND B ANTIGEN   PROTIME-INR   LACTIC ACID, PLASMA   URINALYSIS   PROCALCITONIN   POCT GLUCOSE MONITORING CONTINUOUS        Imaging Results          X-Ray Toe 2 or More Views Left (In process)                X-Ray Chest AP Portable (Final result)  Result time 04/05/18 16:33:19    Final result by Mis Mccray MD (04/05/18 16:33:19)                 Impression:      Bibasilar patchy areas of increased attenuation concerning for subsegmental areas of airspace disease considering pneumonia or aspiration.    Cardiomegaly.      Electronically signed by: Mis Mccray MD  Date:    04/05/2018  Time:    16:33             Narrative:    EXAMINATION:  XR CHEST AP PORTABLE    CLINICAL HISTORY:  Sepsis;    TECHNIQUE:  Single frontal view of the chest was performed.    COMPARISON:  01/11/2018    FINDINGS:  There is a cardio stimulator device in the left upper chest with a single lead the.  Sternotomy wires.  The cardiac silhouette is enlarged.  Patchy areas of increased attenuation at the bilateral lung bases concerning for areas of possible pneumonia or aspiration, it is new compared to the prior exam.  No pleural effusion.  No pneumothorax.  Atherosclerotic changes of the aorta.                                   Medical Decision Making:   Clinical Tests:   Lab Tests: Ordered and Reviewed  Radiological Study: Ordered and Reviewed  Medical Tests: Ordered and Reviewed  ED Management:  89-year-old male from a group home with shortness of breath.  X-ray shows evidence of  pneumonia.  A septic workup has been ordered and the patient will be admitted by [facility].                      Clinical Impression:     1. Pneumonia of right lower lobe due to infectious organism    2. Shortness of breath    3. Sepsis, due to unspecified organism             I, Dr. Baldomero Cintron, personally performed the services described in this documentation. All medical record entries made by the scribe were at my direction and in my presence. I have reviewed the chart and agree that the record reflects my personal performance and is accurate and complete. Baldomero Cintron MD.  6:08 PM 04/05/2018                     Baldomero Cintron MD  04/05/18 [id number]

## 2018-04-05 NOTE — H&P
U Internal Medicine History and Physical - Resident Note    Admitting Team: Medicine Team A  Attending Physician: Kinsey  Resident: Martina Herron  Interns: Chuckie Isaacs and JUAN Carty    Date of Admit: 4/5/2018    Chief Complaint     SOB for 3 days    Subjective:      History of Present Illness:  Zeyad Woodard is a 89 y.o. male who has PMHx HFpEF, afib and SSS s/p pacer, HTN, hypothyroidism, CKD3A, BPH, dementia. The patient presented to [facility] on 4/5/2018 with a primary complaint of SOB.    He was in his USOH, which is dependent for ADLS, ambulatory with wheelchair, until 1 week ago when he developed worsening of chronic dry cough. 3 days ago, he devleoped fevers and saw PCP, who prescribed doxycycline. At that time, he began having progressive SOB, fatigue, weakness and his cough became productive of yellow/green sputum. He denies chest pain, orthopnea. Most history is provided by his daughters.    Per EMS, he was found to be satting 75% at group home. They placed him on CPAP. He was weaned to NC in ED.    The patient has two recent hospitalizations 1/18 for c.diff and influenza B.    Past Medical History:  Past Medical History:   Diagnosis Date    Anemia     Atrial fibrillation     BPH (benign prostatic hypertrophy)     Cancer     Cardiac pacemaker in situ 7/2/2015    CHF (congestive heart failure)     Coronary artery disease     Encounter for blood transfusion     GI bleed     cecum angiectasia s/p cautery    Hypertension     Hypothyroidism     Polyneuropathy     Posture imbalance     due to neuropathy    Right posterior capsular opacification 1/18/2017    SSS (sick sinus syndrome) 2015    s/p pacemaker    Stroke 1/11/2018       Past Surgical History:  Past Surgical History:   Procedure Laterality Date    CARDIAC SURGERY      CATARACT EXTRACTION W/  INTRAOCULAR LENS IMPLANT Right 05/17/2010        CATARACT EXTRACTION W/  INTRAOCULAR LENS IMPLANT Left  02/16/2004        cataract surgery      COLONOSCOPY  6/30/04    COLONOSCOPY N/A 2/15/2016    Procedure: COLONOSCOPY;  Surgeon: Stanton Kirk MD;  Location: [address]);  Service: Endoscopy;  Laterality: N/A;    EYE SURGERY      HERNIA REPAIR      inguinal hernia repair      Open heart surgery for pericardiectomy      for calcific constrictive pericarditis    UMBILICAL HERNIA REPAIR      Yag      Left Eye       Allergies:  Review of patient's allergies indicates:   Allergen Reactions    Penicillins Hives and Other (See Comments)     Fever       Home Medications:  Prior to Admission medications    Medication Sig Start Date End Date Taking? Authorizing Provider   acetaminophen (TYLENOL) 325 MG tablet Take 2 tablets (650 mg total) by mouth every 6 (six) hours as needed. 12/11/17   Nimo Gonzalez NP   ASCORBATE CALCIUM (VITAMIN C ORAL) Take 1 tablet by mouth once daily.  7/3/12   Historical Provider, MD   aspirin (ECOTRIN) 81 MG EC tablet Take 1 tablet (81 mg total) by mouth once daily. 12/11/17 12/11/18  Nimo Gonzalez NP   atorvastatin (LIPITOR) 20 MG tablet Take 1 tablet (20 mg total) by mouth nightly. 12/14/17 12/14/18  Booker Ricci MD   benzonatate (TESSALON) 100 MG capsule Take 1 capsule (100 mg total) by mouth 3 (three) times daily as needed for Cough. 4/2/18 5/2/18  Belle Denson NP   calcium 500 mg Tab Take 1 tablet by mouth Daily. 7/3/12   Historical Provider, MD   carvedilol (COREG) 3.125 MG tablet Take 1 tablet (3.125 mg total) by mouth 2 (two) times daily with meals. 3/5/18 3/5/19  Booker Ricci MD   clotrimazole-betamethasone (LOTRISONE) lotion Apply topically 2 (two) times daily.  Patient taking differently: Apply topically as needed.  5/28/15   Booker Ricci MD   cyanocobalamin (VITAMIN B-12) 1000 MCG tablet Take 100 mcg by mouth once daily.    Historical Provider, MD   donepezil (ARICEPT) 10 MG tablet Take 1 tablet (10 mg total) by  mouth every evening. 17  Chele Juárez MD   doxycycline (MONODOX) 100 MG capsule Take 1 capsule (100 mg total) by mouth every 12 (twelve) hours. 18  Belle Denson NP   ferrous sulfate 325 mg (65 mg iron) Tab tablet Take 325 mg by mouth every other day.     Historical Provider, MD   finasteride (PROSCAR) 5 mg tablet Take 1 tablet (5 mg total) by mouth once daily. 17  Booker Ricci MD   furosemide (LASIX) 20 MG tablet Take 20 mg by mouth daily as needed.    Historical Provider, MD   gabapentin (NEURONTIN) 100 MG capsule Take 100mg in Morning and take 300mg nightly 17   Booker Ricci MD   levothyroxine (SYNTHROID) 100 MCG tablet Take 1 tablet (100 mcg total) by mouth once daily. 17   Booker Ricci MD   mirtazapine (REMERON) 7.5 MG Tab Take 1 tablet (7.5 mg total) by mouth every evening. 3/5/18 3/5/19  Booker Ricci MD   MULTIVITS-MINERALS/FA/LYCOPENE (ONE-A-DAY MEN'S MULTIVITAMIN ORAL) Take 1 tablet by mouth once daily.    Historical Provider, MD   neomycin-bacitracin-polymyxin (NEOSPORIN) ointment Apply topically Daily. 17   Nimo Gonzalez NP   senna-docusate 8.6-50 mg (PERICOLACE) 8.6-50 mg per tablet Take 1 tablet by mouth 2 (two) times daily. 16   Radha Luna, CINDY   tamsulosin (FLOMAX) 0.4 mg Cp24 Take 1 capsule (0.4 mg total) by mouth once daily. 17   Booker Rcici MD       Family History:  Family History   Problem Relation Age of Onset    Stroke Mother          Cancer Father      lung;     Diabetes Sister     Coronary artery disease Sister     Peripheral vascular disease Sister        Social History:  Social History   Substance Use Topics    Smoking status: Passive Smoke Exposure - Never Smoker    Smokeless tobacco: Never Used    Alcohol use No       Review of Systems:  Pertinent positives and negatives are listed in HPI.  All other systems are reviewed and  "are negative.    Health Maintaince :   Primary Care Physician: Keiry  Immunizations:   TDap unsure  Influenza unsure  Pneumovax unsure  Cancer Screening:  Colonoscopy:unsure     Objective:   Last 24 Hour Vital Signs:  BP  Min: 110/38  Max: 137/55  Temp  Av.1 °F (38.9 °C)  Min: 99.8 °F (37.7 °C)  Max: 103.7 °F (39.8 °C)  Pulse  Av  Min: 61  Max: 91  Resp  Av.5  Min: 19  Max: 28  SpO2  Av.8 %  Min: 95 %  Max: 100 %  Height  Av' 8" (172.7 cm)  Min: 5' 8" (172.7 cm)  Max: 5' 8" (172.7 cm)  Weight  Av.7 kg (169 lb)  Min: 76.7 kg (169 lb)  Max: 76.7 kg (169 lb)  Body mass index is 25.7 kg/m².  No intake/output data recorded.    Physical Examination:  General: Alert and awake in NAD  Head:  Normocephalic and atraumatic  Eyes:  PERRL; EOMi with anicteric sclera and clear conjunctivae  Mouth:  Oropharynx clear and without exudate; moist mucous membranes  Cardio:  Irregular rhythm; Regular rate with normal S1 and S2; no murmurs or rubs--distant heart sounds  Resp:  Bibasilar crackles; diffuse rhonchi/wheezing  Abdom: Soft, NTND with normoactive bowel sounds  Extrem: Left 1st toe with erythema, bruising, swelling; Trace b/l edema; WWP with no clubbing, cyanosis  Skin:  Left knee abrasion; diffuse countusions and scatter purpura on extremities and chest  Pulses: 2+ and symmetric distally  Neuro:  AAOx person, place, city, year/month, not president; cooperative and pleasant with no focal deficits    Laboratory:  Most Recent Data:  CBC: Lab Results   Component Value Date    WBC 11.88 2018    HGB 9.4 (L) 2018    HCT 29.2 (L) 2018    PLT 44 (L) 2018    MCV 92 2018    RDW 15.1 (H) 2018     BMP: Lab Results   Component Value Date     2018    K 5.3 (H) 2018     2018    CO2 26 2018    BUN 34 (H) 2018    CREATININE 1.5 (H) 2018     2018    CALCIUM 9.2 2018    MG 1.8 01/10/2018    PHOS 3.6 2017 "     LFTs: Lab Results   Component Value Date    PROT 6.7 04/05/2018    ALBUMIN 3.2 (L) 04/05/2018    BILITOT 0.8 04/05/2018    AST 61 (H) 04/05/2018    ALKPHOS 164 (H) 04/05/2018    ALT 50 (H) 04/05/2018     Coags:   Lab Results   Component Value Date    INR 1.1 04/05/2018     Urinalysis: Lab Results   Component Value Date    LABURIN No growth 01/12/2018    COLORU Red (A) 01/12/2018    SPECGRAV 1.025 01/12/2018    NITRITE Negative 01/12/2018    KETONESU Negative 01/12/2018    UROBILINOGEN Negative 01/12/2018    WBCUA 1 01/12/2018       Trended Lab Data:    Recent Labs  Lab 04/05/18  1622   WBC 11.88   HGB 9.4*   HCT 29.2*   PLT 44*   MCV 92   RDW 15.1*      K 5.3*      CO2 26   BUN 34*   CREATININE 1.5*      PROT 6.7   ALBUMIN 3.2*   BILITOT 0.8   AST 61*   ALKPHOS 164*   ALT 50*       Trended Cardiac Data:    Recent Labs  Lab 04/05/18  1622   TROPONINI 0.047*   *       Microbiology Data:  Blood, resp, urine cx pending    Other Results:  EKG (my interpretation):   ventricularly paced    Radiology:  Imaging Results          X-Ray Toe 2 or More Views Left (In process)                X-Ray Chest AP Portable (Final result)  Result time 04/05/18 16:33:19    Final result by Mis Mccray MD (04/05/18 16:33:19)                 Impression:      Bibasilar patchy areas of increased attenuation concerning for subsegmental areas of airspace disease considering pneumonia or aspiration.    Cardiomegaly.      Electronically signed by: Mis Mccray MD  Date:    04/05/2018  Time:    16:33             Narrative:    EXAMINATION:  XR CHEST AP PORTABLE    CLINICAL HISTORY:  Sepsis;    TECHNIQUE:  Single frontal view of the chest was performed.    COMPARISON:  01/11/2018    FINDINGS:  There is a cardio stimulator device in the left upper chest with a single lead the.  Sternotomy wires.  The cardiac silhouette is enlarged.  Patchy areas of increased attenuation at the bilateral lung bases  concerning for areas of possible pneumonia or aspiration, it is new compared to the prior exam.  No pleural effusion.  No pneumothorax.  Atherosclerotic changes of the aorta.                              Assessment:     Zeyad Woodard is a 89 y.o. male with:     Plan:     Acute hypoxic respiratory failure and severe sepsis 2/2 community acquired pneumonia  -1 week progressive productive cough, fever, SOB  On admit, satting well on 5L O2 via NC, temp 103, CXR with bibasilar consolidations, lactate 2.5  BP stable  Modified barium swallow study 1/18 w/o aspiration  -In ED, given 2L NS, started vanc, cipro, cefepime (4/5)--penicillin allergy was remote and only hives  -Duonebs, CPT, Trending lactates  Blood, respiratory, urine cx, procal pending    Left 1st toe osteomyelitis  -Bruising, erythema, swelling 2/2 recent trauma  H/o neuropathy, follows with podiatry  Xray suggestive of early osteo  -Continue vanc, cefepime, cipro (4/5)    ELIZABET on CKD 3A  -On admit, Cr 1.5, GFR 40  Baseline Cr 1.2, GFR 60  -Likely prerenal in setting of sepsis  Hydrate and monitor    Intermediate troponin  -On admit, trop 0.047  Chronically elevated trop to 0.03-0.06. Also, possibly 2/2 demand in setting of sepsis and CKD  EKG ventricularly paced  -Trending troponins    Chronic thrombocytopenia  -Baseline platelets ~ 90  Follows with Dr. Jeff as outpt  -On admit, platelets 44-likely exacerbated by sepsis  Scatter petechiae, contusions in setting of recent fall but no active bleeding  -Consider consulting Dr. Jeff or platelet transfusion if worsens    Chronic HFpEF  -TTE 12/17-EF 55%, grade 2 diastolic dysfunction, PA pressure 35  -On admit, Trop 0.047,   CXR with b/l consolidations 2/2 PNA rather than volume overload--given fluids per sepsis protocol  Monitor closely for volume overload    A fib and SSS s/p pacemaker  -On admit, in ventricularly paced rhythm  Not on home anticoagulation 2/2 h/o GI bleeds. Also falls  frequently  -No acute issues    Anemia of chronic disease, h/o CHRISTIANO and B12 deficiency  -On admit, Hbg 9.4--at baseline  1/18--w/u c/w AoCD--ferritin, B12 wnl  -Follows with Dr. Jeff--continue home iron, B12    Transaminitis   -On admit, , AST 61, ALT 50  Consistent with baseline  -Negative Hep C Ab 2012--defer repeating as patient would be poor treatment candidate given age/comorbidities    HTN  -On admit, BP low/normal  Held home coreg in setting of sepsis    Hypothyroidism  -No acute issues  Continue home synthroid    BPH  -No acute issues  Continue home flomax, finasteride    Frequent mechanical falls  -Per chart review  Fell 3 days PTA  -Per family, previously had HH PT/OT that has been discontinued/completed  Only mobile via wheelchair  -Consulted PT/OT    Dementia  -Baseline--Depending for ADLs, lives at [facility] and has supervision via aide and nurse  -A&O to person, place, date; not situation  Continue home remeron, aricept    Code Status:     Full    Dispo: pending clinical improvement of pneumonia    Fluids: s/p 2L NS bolus, 75 cc/h NS  Diet: NPO  Ppx: SCDs; holding chemical ppx in setting of thrombocytopenia    Chuckie Isaacs  [facility] Internal Medicine HO-I  U Medicine Service    [facility] Medicine Hospitalist Pager numbers:   [facility] Hospitalist Medicine Team A (Kinsey/Yumiko): [phone]  [facility] Hospitalist Medicine Team B (Tyrese/Bibi):  [phone]

## 2018-04-05 NOTE — ED NOTES
Patient was brought in from a home with coughing that started one week ago and being treated with antibiotics.  Today he began having fever and thick yellow-green sputum.

## 2018-04-05 NOTE — PROGRESS NOTES
Pharmacist Renal Dose Adjustment Note    Zeyad Woodard is a 89 y.o. male being treated with the medication Cefepime      Patient Data:    Vital Signs (Most Recent):  Temp: 99.8 °F (37.7 °C) (04/05/18 1730)  Pulse: 91 (04/05/18 1730)  Resp: 19 (04/05/18 1730)  BP: (!) 110/38 (04/05/18 1730)  SpO2: 100 % (04/05/18 1730)   Vital Signs (72h Range):  Temp:  [99.8 °F (37.7 °C)-103.7 °F (39.8 °C)]   Pulse:  [61-91]   Resp:  [19-28]   BP: (110-137)/(38-55)   SpO2:  [95 %-100 %]        Recent Labs     Lab 04/05/18  1622   CREATININE 1.5*     Serum creatinine: 1.5 mg/dL (H) 04/05/18 1622  Estimated creatinine clearance: 31.7 mL/min (A)    Medication Cefepime  dose: 2 gm frequency q 8 hrs will be changed to medication:cefepime dose:2 gm frequency: q 12 hrs     Pharmacist's Name: Alma Stephens  Pharmacist's Extension: [phone]

## 2018-04-06 PROBLEM — S90.229A SUBUNGUAL HEMATOMA OF TOE: Status: ACTIVE | Noted: 2018-04-06

## 2018-04-06 PROBLEM — R06.02 SHORTNESS OF BREATH: Status: ACTIVE | Noted: 2018-04-06

## 2018-04-06 PROBLEM — A41.9 SEPSIS: Status: ACTIVE | Noted: 2018-04-06

## 2018-04-06 LAB
ALBUMIN SERPL BCP-MCNC: 2.5 G/DL
ALP SERPL-CCNC: 112 U/L
ALT SERPL W/O P-5'-P-CCNC: 36 U/L
AMORPH CRY URNS QL MICRO: NORMAL
ANION GAP SERPL CALC-SCNC: 9 MMOL/L
ANISOCYTOSIS BLD QL SMEAR: SLIGHT
AST SERPL-CCNC: 39 U/L
BACTERIA #/AREA URNS HPF: NORMAL /HPF
BASOPHILS # BLD AUTO: ABNORMAL K/UL
BASOPHILS NFR BLD: 0 %
BILIRUB SERPL-MCNC: 0.8 MG/DL
BILIRUB UR QL STRIP: NEGATIVE
BUN SERPL-MCNC: 38 MG/DL
CALCIUM SERPL-MCNC: 8.1 MG/DL
CHLORIDE SERPL-SCNC: 104 MMOL/L
CLARITY UR: CLEAR
CO2 SERPL-SCNC: 24 MMOL/L
COLOR UR: YELLOW
CREAT SERPL-MCNC: 1.7 MG/DL
CREAT UR-MCNC: 142.6 MG/DL
DIFFERENTIAL METHOD: ABNORMAL
EOSINOPHIL # BLD AUTO: ABNORMAL K/UL
EOSINOPHIL NFR BLD: 0 %
EOSINOPHIL URNS QL WRIGHT STN: NORMAL
ERYTHROCYTE [DISTWIDTH] IN BLOOD BY AUTOMATED COUNT: 15.7 %
EST. GFR  (AFRICAN AMERICAN): 40 ML/MIN/1.73 M^2
EST. GFR  (NON AFRICAN AMERICAN): 35 ML/MIN/1.73 M^2
GLUCOSE SERPL-MCNC: 111 MG/DL
GLUCOSE UR QL STRIP: NEGATIVE
HCT VFR BLD AUTO: 24.9 %
HGB BLD-MCNC: 7.9 G/DL
HGB UR QL STRIP: NEGATIVE
HYALINE CASTS #/AREA URNS LPF: 0 /LPF
HYPOCHROMIA BLD QL SMEAR: ABNORMAL
KETONES UR QL STRIP: NEGATIVE
LACTATE SERPL-SCNC: 1.8 MMOL/L
LACTATE SERPL-SCNC: 2.3 MMOL/L
LEUKOCYTE ESTERASE UR QL STRIP: NEGATIVE
LYMPHOCYTES # BLD AUTO: ABNORMAL K/UL
LYMPHOCYTES NFR BLD: 30 %
MCH RBC QN AUTO: 29.3 PG
MCHC RBC AUTO-ENTMCNC: 31.7 G/DL
MCV RBC AUTO: 92 FL
METAMYELOCYTES NFR BLD MANUAL: 1 %
MICROSCOPIC COMMENT: NORMAL
MONOCYTES # BLD AUTO: ABNORMAL K/UL
MONOCYTES NFR BLD: 15 %
NEUTROPHILS # BLD AUTO: ABNORMAL K/UL
NEUTROPHILS NFR BLD: 34 %
NEUTS BAND NFR BLD MANUAL: 20 %
NITRITE UR QL STRIP: NEGATIVE
PH UR STRIP: 5 [PH] (ref 5–8)
PLATELET # BLD AUTO: 58 K/UL
PLATELET BLD QL SMEAR: ABNORMAL
PMV BLD AUTO: 10.5 FL
POTASSIUM SERPL-SCNC: 5.1 MMOL/L
PROCALCITONIN SERPL IA-MCNC: 0.3 NG/ML
PROCALCITONIN SERPL IA-MCNC: 21.7 NG/ML
PROT SERPL-MCNC: 5.2 G/DL
PROT UR QL STRIP: ABNORMAL
RBC # BLD AUTO: 2.7 M/UL
RBC #/AREA URNS HPF: 1 /HPF (ref 0–4)
SODIUM SERPL-SCNC: 137 MMOL/L
SODIUM UR-SCNC: <20 MMOL/L
SP GR UR STRIP: 1.02 (ref 1–1.03)
TROPONIN I SERPL DL<=0.01 NG/ML-MCNC: 0.12 NG/ML
URN SPEC COLLECT METH UR: ABNORMAL
UROBILINOGEN UR STRIP-ACNC: NEGATIVE EU/DL
WBC # BLD AUTO: 18.79 K/UL
WBC #/AREA URNS HPF: 0 /HPF (ref 0–5)

## 2018-04-06 PROCEDURE — 97530 THERAPEUTIC ACTIVITIES: CPT

## 2018-04-06 PROCEDURE — G8998 SWALLOW D/C STATUS: HCPCS | Mod: CL

## 2018-04-06 PROCEDURE — 80053 COMPREHEN METABOLIC PANEL: CPT

## 2018-04-06 PROCEDURE — S0077 INJECTION, CLINDAMYCIN PHOSP: HCPCS | Performed by: STUDENT IN AN ORGANIZED HEALTH CARE EDUCATION/TRAINING PROGRAM

## 2018-04-06 PROCEDURE — 11730 AVULSION NAIL PLATE SIMPLE 1: CPT | Mod: TA,,, | Performed by: PODIATRIST

## 2018-04-06 PROCEDURE — 63600175 PHARM REV CODE 636 W HCPCS: Performed by: STUDENT IN AN ORGANIZED HEALTH CARE EDUCATION/TRAINING PROGRAM

## 2018-04-06 PROCEDURE — 63600175 PHARM REV CODE 636 W HCPCS: Performed by: INTERNAL MEDICINE

## 2018-04-06 PROCEDURE — 86580 TB INTRADERMAL TEST: CPT | Performed by: STUDENT IN AN ORGANIZED HEALTH CARE EDUCATION/TRAINING PROGRAM

## 2018-04-06 PROCEDURE — 85007 BL SMEAR W/DIFF WBC COUNT: CPT

## 2018-04-06 PROCEDURE — G8987 SELF CARE CURRENT STATUS: HCPCS | Mod: CK

## 2018-04-06 PROCEDURE — 82570 ASSAY OF URINE CREATININE: CPT

## 2018-04-06 PROCEDURE — 87086 URINE CULTURE/COLONY COUNT: CPT

## 2018-04-06 PROCEDURE — 83605 ASSAY OF LACTIC ACID: CPT

## 2018-04-06 PROCEDURE — 97165 OT EVAL LOW COMPLEX 30 MIN: CPT

## 2018-04-06 PROCEDURE — 97161 PT EVAL LOW COMPLEX 20 MIN: CPT

## 2018-04-06 PROCEDURE — 94761 N-INVAS EAR/PLS OXIMETRY MLT: CPT

## 2018-04-06 PROCEDURE — 99222 1ST HOSP IP/OBS MODERATE 55: CPT | Mod: 25,,, | Performed by: PODIATRIST

## 2018-04-06 PROCEDURE — 92611 MOTION FLUOROSCOPY/SWALLOW: CPT

## 2018-04-06 PROCEDURE — G8997 SWALLOW GOAL STATUS: HCPCS | Mod: CK

## 2018-04-06 PROCEDURE — G8996 SWALLOW CURRENT STATUS: HCPCS | Mod: CM

## 2018-04-06 PROCEDURE — A4216 STERILE WATER/SALINE, 10 ML: HCPCS | Performed by: STUDENT IN AN ORGANIZED HEALTH CARE EDUCATION/TRAINING PROGRAM

## 2018-04-06 PROCEDURE — 25000003 PHARM REV CODE 250: Performed by: STUDENT IN AN ORGANIZED HEALTH CARE EDUCATION/TRAINING PROGRAM

## 2018-04-06 PROCEDURE — 81000 URINALYSIS NONAUTO W/SCOPE: CPT

## 2018-04-06 PROCEDURE — 97535 SELF CARE MNGMENT TRAINING: CPT

## 2018-04-06 PROCEDURE — G8982 BODY POS GOAL STATUS: HCPCS | Mod: CJ

## 2018-04-06 PROCEDURE — 99900035 HC TECH TIME PER 15 MIN (STAT)

## 2018-04-06 PROCEDURE — 27000221 HC OXYGEN, UP TO 24 HOURS

## 2018-04-06 PROCEDURE — 85027 COMPLETE CBC AUTOMATED: CPT

## 2018-04-06 PROCEDURE — 92610 EVALUATE SWALLOWING FUNCTION: CPT

## 2018-04-06 PROCEDURE — G8988 SELF CARE GOAL STATUS: HCPCS | Mod: CJ

## 2018-04-06 PROCEDURE — 94640 AIRWAY INHALATION TREATMENT: CPT

## 2018-04-06 PROCEDURE — 25000003 PHARM REV CODE 250: Performed by: INTERNAL MEDICINE

## 2018-04-06 PROCEDURE — 0HDRXZZ EXTRACTION OF TOE NAIL, EXTERNAL APPROACH: ICD-10-PCS | Performed by: PODIATRIST

## 2018-04-06 PROCEDURE — 87070 CULTURE OTHR SPECIMN AEROBIC: CPT

## 2018-04-06 PROCEDURE — 94664 DEMO&/EVAL PT USE INHALER: CPT

## 2018-04-06 PROCEDURE — 25000242 PHARM REV CODE 250 ALT 637 W/ HCPCS: Performed by: STUDENT IN AN ORGANIZED HEALTH CARE EDUCATION/TRAINING PROGRAM

## 2018-04-06 PROCEDURE — G8981 BODY POS CURRENT STATUS: HCPCS | Mod: CL

## 2018-04-06 PROCEDURE — 87075 CULTR BACTERIA EXCEPT BLOOD: CPT

## 2018-04-06 PROCEDURE — 11000001 HC ACUTE MED/SURG PRIVATE ROOM

## 2018-04-06 PROCEDURE — 25000003 PHARM REV CODE 250: Performed by: PODIATRIST

## 2018-04-06 PROCEDURE — 87205 SMEAR GRAM STAIN: CPT

## 2018-04-06 PROCEDURE — 84300 ASSAY OF URINE SODIUM: CPT

## 2018-04-06 PROCEDURE — 36415 COLL VENOUS BLD VENIPUNCTURE: CPT

## 2018-04-06 RX ORDER — LIDOCAINE HYDROCHLORIDE 10 MG/ML
10 INJECTION INFILTRATION; PERINEURAL ONCE
Status: COMPLETED | OUTPATIENT
Start: 2018-04-06 | End: 2018-04-06

## 2018-04-06 RX ORDER — MUPIROCIN 20 MG/G
OINTMENT TOPICAL DAILY
Status: DISCONTINUED | OUTPATIENT
Start: 2018-04-07 | End: 2018-04-20 | Stop reason: HOSPADM

## 2018-04-06 RX ORDER — SODIUM CHLORIDE 9 MG/ML
INJECTION, SOLUTION INTRAVENOUS CONTINUOUS
Status: ACTIVE | OUTPATIENT
Start: 2018-04-06 | End: 2018-04-07

## 2018-04-06 RX ORDER — CLINDAMYCIN PHOSPHATE 900 MG/50ML
900 INJECTION, SOLUTION INTRAVENOUS
Status: DISCONTINUED | OUTPATIENT
Start: 2018-04-06 | End: 2018-04-16

## 2018-04-06 RX ADMIN — SODIUM CHLORIDE, PRESERVATIVE FREE 3 ML: 5 INJECTION INTRAVENOUS at 09:04

## 2018-04-06 RX ADMIN — CLINDAMYCIN IN 5 PERCENT DEXTROSE 900 MG: 18 INJECTION, SOLUTION INTRAVENOUS at 01:04

## 2018-04-06 RX ADMIN — VANCOMYCIN HYDROCHLORIDE 1000 MG: 1 INJECTION, POWDER, LYOPHILIZED, FOR SOLUTION INTRAVENOUS at 05:04

## 2018-04-06 RX ADMIN — IPRATROPIUM BROMIDE AND ALBUTEROL SULFATE 3 ML: .5; 3 SOLUTION RESPIRATORY (INHALATION) at 07:04

## 2018-04-06 RX ADMIN — SODIUM CHLORIDE: 0.9 INJECTION, SOLUTION INTRAVENOUS at 01:04

## 2018-04-06 RX ADMIN — CIPROFLOXACIN 400 MG: 2 INJECTION, SOLUTION INTRAVENOUS at 01:04

## 2018-04-06 RX ADMIN — CEFEPIME 2 G: 2 INJECTION, POWDER, FOR SOLUTION INTRAMUSCULAR; INTRAVENOUS at 09:04

## 2018-04-06 RX ADMIN — CIPROFLOXACIN 400 MG: 2 INJECTION, SOLUTION INTRAVENOUS at 05:04

## 2018-04-06 RX ADMIN — IPRATROPIUM BROMIDE AND ALBUTEROL SULFATE 3 ML: .5; 3 SOLUTION RESPIRATORY (INHALATION) at 12:04

## 2018-04-06 RX ADMIN — FINASTERIDE 5 MG: 5 TABLET, FILM COATED ORAL at 09:04

## 2018-04-06 RX ADMIN — IPRATROPIUM BROMIDE AND ALBUTEROL SULFATE 3 ML: .5; 3 SOLUTION RESPIRATORY (INHALATION) at 11:04

## 2018-04-06 RX ADMIN — IPRATROPIUM BROMIDE AND ALBUTEROL SULFATE 3 ML: .5; 3 SOLUTION RESPIRATORY (INHALATION) at 04:04

## 2018-04-06 RX ADMIN — CLINDAMYCIN IN 5 PERCENT DEXTROSE 900 MG: 18 INJECTION, SOLUTION INTRAVENOUS at 09:04

## 2018-04-06 RX ADMIN — IPRATROPIUM BROMIDE AND ALBUTEROL SULFATE 3 ML: .5; 3 SOLUTION RESPIRATORY (INHALATION) at 03:04

## 2018-04-06 RX ADMIN — DONEPEZIL HYDROCHLORIDE 10 MG: 5 TABLET, FILM COATED ORAL at 09:04

## 2018-04-06 RX ADMIN — TUBERCULIN PURIFIED PROTEIN DERIVATIVE 5 UNITS: 5 INJECTION, SOLUTION INTRADERMAL at 03:04

## 2018-04-06 RX ADMIN — SODIUM CHLORIDE, PRESERVATIVE FREE 3 ML: 5 INJECTION INTRAVENOUS at 02:04

## 2018-04-06 RX ADMIN — TAMSULOSIN HYDROCHLORIDE 0.4 MG: 0.4 CAPSULE ORAL at 09:04

## 2018-04-06 RX ADMIN — MIRTAZAPINE 7.5 MG: 7.5 TABLET ORAL at 09:04

## 2018-04-06 RX ADMIN — CIPROFLOXACIN 400 MG: 2 INJECTION, SOLUTION INTRAVENOUS at 09:04

## 2018-04-06 RX ADMIN — SODIUM CHLORIDE, PRESERVATIVE FREE 3 ML: 5 INJECTION INTRAVENOUS at 05:04

## 2018-04-06 RX ADMIN — LIDOCAINE HYDROCHLORIDE 10 ML: 10 INJECTION, SOLUTION INFILTRATION; PERINEURAL at 01:04

## 2018-04-06 NOTE — SUBJECTIVE & OBJECTIVE
Scheduled Meds:   albuterol-ipratropium 2.5mg-0.5mg/3mL  3 mL Nebulization Q4H    ceFEPime (MAXIPIME) IVPB  2 g Intravenous Q12H    ciprofloxacin  400 mg Intravenous Q8H    donepezil  10 mg Oral QHS    finasteride  5 mg Oral Daily    levothyroxine  100 mcg Oral Before breakfast    mirtazapine  7.5 mg Oral QHS    sodium chloride 0.9%  3 mL Intravenous Q8H    tamsulosin  0.4 mg Oral Daily    vancomycin (VANCOCIN) IVPB  1,000 mg Intravenous Q24H     Continuous Infusions:   sodium chloride 0.9%       PRN Meds:albuterol-ipratropium 2.5mg-0.5mg/3mL    Review of patient's allergies indicates:   Allergen Reactions    Penicillins Hives and Other (See Comments)     Fever        Past Medical History:   Diagnosis Date    Anemia     Atrial fibrillation     BPH (benign prostatic hypertrophy)     Cancer     Cardiac pacemaker in situ 7/2/2015    CHF (congestive heart failure)     Coronary artery disease     Encounter for blood transfusion     GI bleed     cecum angiectasia s/p cautery    Hypertension     Hypothyroidism     Polyneuropathy     Posture imbalance     due to neuropathy    Right posterior capsular opacification 1/18/2017    SSS (sick sinus syndrome) 2015    s/p pacemaker    Stroke 1/11/2018     Past Surgical History:   Procedure Laterality Date    CARDIAC SURGERY      CATARACT EXTRACTION W/  INTRAOCULAR LENS IMPLANT Right 05/17/2010        CATARACT EXTRACTION W/  INTRAOCULAR LENS IMPLANT Left 02/16/2004        cataract surgery      COLONOSCOPY  6/30/04    COLONOSCOPY N/A 2/15/2016    Procedure: COLONOSCOPY;  Surgeon: Stanton Kirk MD;  Location: [address];  Service: Endoscopy;  Laterality: N/A;    EYE SURGERY      HERNIA REPAIR      inguinal hernia repair      Open heart surgery for pericardiectomy      for calcific constrictive pericarditis    UMBILICAL HERNIA REPAIR      Yag      Left Eye       Family History     Problem Relation (Age of Onset)     Cancer Father    Coronary artery disease Sister    Diabetes Sister    Peripheral vascular disease Sister    Stroke Mother        Social History Main Topics    Smoking status: Passive Smoke Exposure - Never Smoker    Smokeless tobacco: Never Used    Alcohol use No    Drug use: No    Sexual activity: No     Review of Systems   Skin: Positive for color change and wound.     Objective:     Vital Signs (Most Recent):  Temp: 97.5 °F (36.4 °C) (04/06/18 1150)  Pulse: 97 (04/06/18 1150)  Resp: 17 (04/06/18 1150)  BP: (!) 144/65 (04/06/18 1150)  SpO2: 97 % (04/06/18 1123) Vital Signs (24h Range):  Temp:  [97.5 °F (36.4 °C)-103.7 °F (39.8 °C)] 97.5 °F (36.4 °C)  Pulse:  [61-97] 97  Resp:  [12-28] 17  SpO2:  [94 %-100 %] 97 %  BP: (104-154)/(38-91) 144/65     Weight: 84.7 kg (186 lb 11.7 oz)  Body mass index is 28.39 kg/m².    Foot Exam    General  General Appearance: appears stated age and healthy   Orientation: alert and oriented to person, place, and time   Affect: appropriate       Right Foot/Ankle     Inspection and Palpation  Ecchymosis: none  Tenderness: none   Swelling: none   Skin Exam: skin intact;     Neurovascular  Dorsalis pedis: 1+  Posterior tibial: 1+  Saphenous nerve sensation: diminished  Tibial nerve sensation: diminished  Superficial peroneal nerve sensation: diminished  Deep peroneal nerve sensation: diminished  Sural nerve sensation: diminished      Left Foot/Ankle      Inspection and Palpation  Ecchymosis: none  Tenderness: great toe interphalangeal joint and great toe metatarsophalangeal joint   Swelling: none   Skin Exam: drainage, maceration, cellulitis, skin changes, abnormal color, ulcer and erythema;     Neurovascular  Dorsalis pedis: 1+  Posterior tibial: 1+  Saphenous nerve sensation: diminished  Tibial nerve sensation: diminished  Superficial peroneal nerve sensation: diminished  Deep peroneal nerve sensation: diminished  Sural nerve sensation: diminished          Laboratory:  A1C:   Recent  Labs  Lab 11/07/17  1705 11/10/17  1625 12/29/17  1650   HGBA1C 5.7* 5.6 5.3     Blood Cultures:   Recent Labs  Lab 04/05/18  1621 04/05/18  1623   LABBLOO No Growth to date No Growth to date     BMP:   Recent Labs  Lab 04/06/18  0637   *      K 5.1      CO2 24   BUN 38*   CREATININE 1.7*   CALCIUM 8.1*     CBC:   Recent Labs  Lab 04/06/18  0637   WBC 18.79*   RBC 2.70*   HGB 7.9*   HCT 24.9*   PLT 58*   MCV 92   MCH 29.3   MCHC 31.7*     CMP:   Recent Labs  Lab 04/06/18  0637   *   CALCIUM 8.1*   ALBUMIN 2.5*   PROT 5.2*      K 5.1   CO2 24      BUN 38*   CREATININE 1.7*   ALKPHOS 112   ALT 36   AST 39   BILITOT 0.8     Coagulation:   Recent Labs  Lab 04/05/18  1622   INR 1.1     CRP: No results for input(s): CRP in the last 168 hours.  ESR: No results for input(s): SEDRATE in the last 168 hours.  Prealbumin: No results for input(s): PREALBUMIN in the last 48 hours.  Wound Cultures:   Recent Labs  Lab 11/13/17  0906   LABAERO METHICILLIN RESISTANT STAPHYLOCOCCUS AUREUSFewSkin catrachita also present       Diagnostic Results:  X-Ray: I have reviewed all pertinent results/findings within the past 24 hours.  Deformity to 1st MTP     Clinical Findings:

## 2018-04-06 NOTE — PLAN OF CARE
Problem: SLP Goal  Goal: SLP Goal  Short Term Goals:  1. Pt will participate in BSS to determine least restrictive diet.- MET 4/6  2. Pt will successfully participate in Modified Barium Swallow study to objectively rule out aspiration and determine safest/least restrictive diet.- MET 4/6  3. Pt will tolerate honey thick liquids/puree textures po diet with chin tuck without overt s/s of aspiration  4. Pt will tolerate advanced diet trials of nectar thick liquids and dental soft textures without overt s/s of aspiration.  5. Pt will participate in indirect dysphagia exercises to strengthen musculature for swallowing mechanism with mod-max effort.        Outcome: Ongoing (interventions implemented as appropriate)  4/6/18: Pt participated in MBS this PM. Pt demonstrates moderate-severe oropharyngeal dysphagia, as pt was observed with episodes of penetration and aspiration during and after the swallow (see note for full details).   However, pt was observed to be lethargic during PM session, as compared to AM session. Pt was more lucid, alert and able to follow commands during AM session. Pt demonstrated difficulty performing simple compensatory techniques during MBS this PM, which impacts safety swallowing. SLP notified MD Barnes and DINA Bonner of this event, as well as results/recs.  SLP recs: Honey thick liquids/Puree textures with chin tuck technique during the swallow for liquids, meds crushed and buried in puree textures, and all other universal swallow precautions. Pt will also participate in indirect dysphagia tx and advanced diet trials of nectar thick liquids/dental soft textures, as tolerated by pt. Pt should receive post acute care ST services for indirect and direct dysphagia tx.  SANDRINE Herzog., CF-SLP  Speech-Language Pathologist

## 2018-04-06 NOTE — PROGRESS NOTES
The Sw spoke to Cole who stated the pt may possibly require snf at d/c. The Sw called [employer] [facility] [employer] to complete the Locet but due to high call volume the Sw was placed on a list to receive a call back.     1:24pm The Sw received a call from [employer] [facility] [employer] and completed the pt's Locet for nh admission.     1:29pm The Sw faxed the pt's info to [facility] sn[facility] via [facility].    2:08pm The Sw faxed the pt's PASRR to [facility].

## 2018-04-06 NOTE — CONSULTS
"[facility]  Podiatry  Consult Note    Patient Name: Zeyad Woodard  MRN: [id number]  Admission Date: 4/5/2018  Hospital Length of Stay: 1 days  Attending Physician: Fer Euceda MD  Primary Care Provider: Booker Ricci MD     Inpatient consult to Podiatry  Consult performed by: SHELIA AVELAR  Consult ordered by: MATEUS HACKETT  Reason for consult: wound 1st toe  Assessment/Recommendations: See a/p        Subjective:     History of Present Illness:  Pt is a 89 y/o male  has a past medical history of Anemia; Atrial fibrillation; BPH (benign prostatic hypertrophy); Cancer; Cardiac pacemaker in situ; CHF (congestive heart failure); Coronary artery disease; Encounter for blood transfusion; GI bleed; Hypertension; Hypothyroidism; Polyneuropathy; Posture imbalance; Right posterior capsular opacification; SSS (sick sinus syndrome); and Stroke. Admitted for pneumonia and consulted to podiatry for "toe osteomyelitis". Pt seen at bedside today, relates his toe nail of his left foot is coming off. States he has had a wound to the dorsal surface of the big toe for "about a week now". No other pedal complaints at this time.     Scheduled Meds:   albuterol-ipratropium 2.5mg-0.5mg/3mL  3 mL Nebulization Q4H    ceFEPime (MAXIPIME) IVPB  2 g Intravenous Q12H    ciprofloxacin  400 mg Intravenous Q8H    donepezil  10 mg Oral QHS    finasteride  5 mg Oral Daily    levothyroxine  100 mcg Oral Before breakfast    mirtazapine  7.5 mg Oral QHS    sodium chloride 0.9%  3 mL Intravenous Q8H    tamsulosin  0.4 mg Oral Daily    vancomycin (VANCOCIN) IVPB  1,000 mg Intravenous Q24H     Continuous Infusions:   sodium chloride 0.9%       PRN Meds:albuterol-ipratropium 2.5mg-0.5mg/3mL    Review of patient's allergies indicates:   Allergen Reactions    Penicillins Hives and Other (See Comments)     Fever        Past Medical History:   Diagnosis Date    Anemia     Atrial fibrillation     " BPH (benign prostatic hypertrophy)     Cancer     Cardiac pacemaker in situ 7/2/2015    CHF (congestive heart failure)     Coronary artery disease     Encounter for blood transfusion     GI bleed     cecum angiectasia s/p cautery    Hypertension     Hypothyroidism     Polyneuropathy     Posture imbalance     due to neuropathy    Right posterior capsular opacification 1/18/2017    SSS (sick sinus syndrome) 2015    s/p pacemaker    Stroke 1/11/2018     Past Surgical History:   Procedure Laterality Date    CARDIAC SURGERY      CATARACT EXTRACTION W/  INTRAOCULAR LENS IMPLANT Right 05/17/2010        CATARACT EXTRACTION W/  INTRAOCULAR LENS IMPLANT Left 02/16/2004        cataract surgery      COLONOSCOPY  6/30/04    COLONOSCOPY N/A 2/15/2016    Procedure: COLONOSCOPY;  Surgeon: Stanton Kirk MD;  Location: [facility] ([address] [place] [address]);  Service: Endoscopy;  Laterality: N/A;    EYE SURGERY      HERNIA REPAIR      inguinal hernia repair      Open heart surgery for pericardiectomy      for calcific constrictive pericarditis    UMBILICAL HERNIA REPAIR      Yag      Left Eye       Family History     Problem Relation (Age of Onset)    Cancer Father    Coronary artery disease Sister    Diabetes Sister    Peripheral vascular disease Sister    Stroke Mother        Social History Main Topics    Smoking status: Passive Smoke Exposure - Never Smoker    Smokeless tobacco: Never Used    Alcohol use No    Drug use: No    Sexual activity: No     Review of Systems   Skin: Positive for color change and wound.     Objective:     Vital Signs (Most Recent):  Temp: 97.5 °F (36.4 °C) (04/06/18 1150)  Pulse: 97 (04/06/18 1150)  Resp: 17 (04/06/18 1150)  BP: (!) 144/65 (04/06/18 1150)  SpO2: 97 % (04/06/18 1123) Vital Signs (24h Range):  Temp:  [97.5 °F (36.4 °C)-103.7 °F (39.8 °C)] 97.5 °F (36.4 °C)  Pulse:  [61-97] 97  Resp:  [12-28] 17  SpO2:  [94 %-100 %] 97 %  BP: (104-154)/(38-91) 144/65     Weight:  84.7 kg (186 lb 11.7 oz)  Body mass index is 28.39 kg/m².    Foot Exam    General  General Appearance: appears stated age and healthy   Orientation: alert and oriented to person, place, and time   Affect: appropriate       Right Foot/Ankle     Inspection and Palpation  Ecchymosis: none  Tenderness: none   Swelling: none   Skin Exam: skin intact;     Neurovascular  Dorsalis pedis: 1+  Posterior tibial: 1+  Saphenous nerve sensation: diminished  Tibial nerve sensation: diminished  Superficial peroneal nerve sensation: diminished  Deep peroneal nerve sensation: diminished  Sural nerve sensation: diminished      Left Foot/Ankle      Inspection and Palpation  Ecchymosis: none  Tenderness: great toe interphalangeal joint and great toe metatarsophalangeal joint   Swelling: none   Skin Exam: drainage, maceration, cellulitis, skin changes, abnormal color, ulcer and erythema;     Neurovascular  Dorsalis pedis: 1+  Posterior tibial: 1+  Saphenous nerve sensation: diminished  Tibial nerve sensation: diminished  Superficial peroneal nerve sensation: diminished  Deep peroneal nerve sensation: diminished  Sural nerve sensation: diminished          Laboratory:  A1C:   Recent Labs  Lab 11/07/17  1705 11/10/17  1625 12/29/17  1650   HGBA1C 5.7* 5.6 5.3     Blood Cultures:   Recent Labs  Lab 04/05/18  1621 04/05/18  1623   LABBLOO No Growth to date No Growth to date     BMP:   Recent Labs  Lab 04/06/18  0637   *      K 5.1      CO2 24   BUN 38*   CREATININE 1.7*   CALCIUM 8.1*     CBC:   Recent Labs  Lab 04/06/18  0637   WBC 18.79*   RBC 2.70*   HGB 7.9*   HCT 24.9*   PLT 58*   MCV 92   MCH 29.3   MCHC 31.7*     CMP:   Recent Labs  Lab 04/06/18  0637   *   CALCIUM 8.1*   ALBUMIN 2.5*   PROT 5.2*      K 5.1   CO2 24      BUN 38*   CREATININE 1.7*   ALKPHOS 112   ALT 36   AST 39   BILITOT 0.8     Coagulation:   Recent Labs  Lab 04/05/18  1622   INR 1.1     CRP: No results for input(s): CRP in the  last 168 hours.  ESR: No results for input(s): SEDRATE in the last 168 hours.  Prealbumin: No results for input(s): PREALBUMIN in the last 48 hours.  Wound Cultures:   Recent Labs  Lab 11/13/17  0906   LABAERO METHICILLIN RESISTANT STAPHYLOCOCCUS AUREUSFewSkin catrachita also present       Diagnostic Results:  X-Ray: I have reviewed all pertinent results/findings within the past 24 hours.  Deformity to 1st MTP     Clinical Findings:          Assessment/Plan:     * HCAP (healthcare-associated pneumonia)    Per primary        Subungual hematoma of toe    -Pt with wound and subungual hematoma noted to hallux, with erythema noted.  -Nail removed, see procedure note below.   -Upon removal, nail bed without wound or laceration noted, no evidence of OM clinically.   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    POSTOPERATIVE DIAGNOSIS: Subungual hematoma,  of the Left 1st.     NAME OF THE PROCEDURE: total nail avulsion of left hallux toenail.    SURGEON: Dr. Blade Emmanuel     ASSIST: Dr. Radha Villar    ESTIMATED BLOOD LOSS: Minimal, being less than 1 mL.     HEMOSTASIS: Anatomic dissection, direct pressure manually.     ANESTHESIA: 5 cc of 1% lidocaine plain injected as a hallux block    PROCEDURE IN DETAIL: Using sterile forceps, the left hallux nail was freed from all soft tissue attachments and removed in total.  Hemostasis was easily affected with direct pressure. No purulence noted. Cultures were taken. The wound was rinsed with sterile normal saline, blotted dry and dressed with  betadine soaked adaptic, dressing of 4 x 4s, Casey and light compression with Coban.    Thank you for your consult. I will follow-up with patient. Please contact us if you have any additional questions.    Radha Villar MD  Podiatry  [facility]

## 2018-04-06 NOTE — PROGRESS NOTES
"Vancomycin Dosing and Monitoring Pharmacy Protocol    Zeyad Woodard is a 89 y.o. male    Height: 5' 8" (1.727 m)   Wt Readings from Last 1 Encounters:   04/05/18 76.7 kg (169 lb)     Ideal body weight: 68.4 kg (150 lb 12.7 oz)  Adjusted ideal body weight: 71.7 kg (158 lb 1.2 oz)    Temp Readings from Last 3 Encounters:   04/05/18 99.8 °F (37.7 °C)   04/02/18 99.2 °F (37.3 °C) (Oral)   02/12/18 98.8 °F (37.1 °C) (Oral)      Lab Results   Component Value Date/Time    WBC 11.88 04/05/2018 04:22 PM    WBC 11.41 01/29/2018 03:10 PM    WBC 9.02 01/15/2018 04:57 AM      Lab Results   Component Value Date/Time    CREATININE 1.5 (H) 04/05/2018 04:22 PM    CREATININE 1.2 01/29/2018 03:10 PM    CREATININE 1.2 01/22/2018 08:34 AM        Serum creatinine: 1.5 mg/dL (H) 04/05/18 1622  Estimated creatinine clearance: 31.7 mL/min (A)    Antibiotics       Start     Stop Route Frequency Ordered    04/06/18 1730  vancomycin 1 g in 0.9% sodium chloride 250 mL IVPB (ready to mix system)      -- IV Every 24 hours (non-standard times) 04/05/18 1930    04/05/18 1945  ceFEPIme injection 2 g      -- IV Every 12 hours (non-standard times) 04/05/18 1932    04/05/18 1900  ciprofloxacin (CIPRO)400mg/200ml D5W IVPB 400 mg      -- IV Every 8 hours 04/05/18 1854          Antifungals       None            Microbiology Results (last 7 days)       Procedure Component Value Units Date/Time    Culture, Respiratory with Gram Stain [[id number]] Collected:  04/05/18 1726    Order Status:  Sent Specimen:  Respiratory from Sputum Updated:  04/05/18 1726    Blood culture x two cultures. Draw prior to antibiotics. [[id number]] Collected:  04/05/18 1623    Order Status:  Sent Specimen:  Blood from Peripheral, Antecubital, Right Updated:  04/05/18 1623    Blood culture x two cultures. Draw prior to antibiotics. [[id number]] Collected:  04/05/18 1621    Order Status:  Sent Specimen:  Blood from Peripheral, Forearm, Left Updated:  04/05/18 1622    Urine culture " [[id number]]     Order Status:  No result Specimen:  Urine             Indication:   Pneumonia    Target Level: 15-20 mcg/ml    Hemodialysis:   No on N/A    Dosing Weight:   ABW--actual body weight  If ABW is greater than or equal to 30% over Ideal Body Weight, AdjBW will be used to calculate vancomycin dose.    Last Vancomycin dose: 1500 mg   Date/Time given: 18 at 1721          Vancomycin level:  No results for input(s): VANCOMYCIN-TROUGH in the last 72 hours.  No results for input(s): VANCOMYCIN, RANDOM in the last 72 hours.    Per Protocol Initial/Adjustments Dosin. Initial/Adjustment Dose: DECREASE Vancomycin will be adjusted from 1150.5 mg q8hr to 1000mg q24hr  2. Vancomycin Trough Level will be drawn on 18 at 1700 date/time    Pharmacy will continue to follow.    Please contact if you have any further questions. Thank you.    Chapincito Lui, PharmD  [phone]

## 2018-04-06 NOTE — PLAN OF CARE
Problem: Occupational Therapy Goal  Goal: Occupational Therapy Goal  Goals to be met by: 05/06/18     Patient will increase functional independence with ADLs by performing:    Grooming while seated with Set-up Assistance.  Toileting from bedside commode with Moderate Assistance for hygiene and clothing management.   Supine to sit with Stand-by Assistance and use of bedrail as needed.  Toilet transfer to bedside commode with Contact Guard Assistance.  Increased functional strength to WFL for ADLs.    Outcome: Ongoing (interventions implemented as appropriate)  Pt agreeable to OT eval this date. Reports living in a group home; dep for WC propulsion; Min-SBA for bed mob; MI after S/U w/ self feed & G/H at WC/sitting level & Min A for SPT w/o AE<-->WC & BSC over toilet & urinal use. Otherwise, req's Mod-max A w/ all other ADLs. Currently pt at 1LNC & sat 97% at rest in supine. Perf sup-->EOB w/ Mod A, Min-mod A for scooting to EOB, total A to don socks; S/U for face washing at EOb. Christopher'd sitting EOB x ~15min w/ SB>Min A for balance. Perf SPT EOB-->b/s chair w/ Mod-Max A w/o AE. Pt unable to achieve fully erect standing posture demo'ing signif trunk flex,  inability to achieve full knee ext & difficulty unloading for LE WS. Noted SOB after t/f (max exertion task), but no change in O2sats from rest. Pt left up in b/s chair per nsg approval & edu re: use of call button. Pt verbalized understanding.    Pt presents w/ decreased overall endurance/conditioning, balance/mobility & coordination w/ subsequent decline in (I)/safety w/ BADLs, fxnl mobility & fxnl t/f's. OT 5x/wk to increase phys/fxnl status & maximize potential to achieve established goals for d/c-->SNF w/ DME deferred.

## 2018-04-06 NOTE — PT/OT/SLP EVAL
Speech Language Pathology Evaluation  Bedside Swallow    Patient Name:  Zeyad Woodard   MRN:  [id number]  Admitting Diagnosis: HCAP (healthcare-associated pneumonia)    Recommendations:                 General Recommendations:  Modified barium swallow study  Diet recommendations:   (Pending MBSS results),     Aspiration Precautions: essential PO meds only, buried in puree textures   General Precautions: Standard, fall, aspiration, NPO  Communication strategies:  none    History:     Past Medical History:   Diagnosis Date    Anemia     Atrial fibrillation     BPH (benign prostatic hypertrophy)     Cancer     Cardiac pacemaker in situ 7/2/2015    CHF (congestive heart failure)     Coronary artery disease     Encounter for blood transfusion     GI bleed     cecum angiectasia s/p cautery    Hypertension     Hypothyroidism     Polyneuropathy     Posture imbalance     due to neuropathy    Right posterior capsular opacification 1/18/2017    SSS (sick sinus syndrome) 2015    s/p pacemaker    Stroke 1/11/2018       Past Surgical History:   Procedure Laterality Date    CARDIAC SURGERY      CATARACT EXTRACTION W/  INTRAOCULAR LENS IMPLANT Right 05/17/2010        CATARACT EXTRACTION W/  INTRAOCULAR LENS IMPLANT Left 02/16/2004        cataract surgery      COLONOSCOPY  6/30/04    COLONOSCOPY N/A 2/15/2016    Procedure: COLONOSCOPY;  Surgeon: Stanton Kirk MD;  Location: [address];  Service: Endoscopy;  Laterality: N/A;    EYE SURGERY      HERNIA REPAIR      inguinal hernia repair      Open heart surgery for pericardiectomy      for calcific constrictive pericarditis    UMBILICAL HERNIA REPAIR      Yag      Left Eye     History of Present Illness:  Zeyad Woodard is a 89 y.o. male who has PMHx HFpEF, afib and SSS s/p pacer, HTN, hypothyroidism, CKD3A, BPH, dementia. The patient presented to [facility] on 4/5/2018 with a primary complaint of  "SOB.     He was in his USOH, which is dependent for ADLS, ambulatory with wheelchair, until 1 week ago when he developed worsening of chronic dry cough. 3 days ago, he devleoped fevers and saw PCP, who prescribed doxycycline. At that time, he began having progressive SOB, fatigue, weakness and his cough became productive of yellow/green sputum. He denies chest pain, orthopnea. Most history is provided by his daughters.     Per EMS, he was found to be satting 75% at group home. They placed him on CPAP. He was weaned to NC in ED.     The patient has two recent hospitalizations 1/18 for c.diff and influenza B.       Social History: Patient lives at a group home    Prior Intubation HX:  N/A    Modified Barium Swallow:   MBSS from 1/9/18:   Impressions: Patient demonstrates mild Oropharyngeal Dysphagia c/b slightly reduced oral clearance and decreased base of tongue mobility with trace-mild residuals along base of tongue s/p swallow for all consistencies and reduced tongue base retraction c/b trace-mild residuals in vallecula and pyriform sinuses s/p swallow all consistencies. However, pt was able to clear all residuals with multiple swallows. Pt demonstrated no penetration/aspiration under fluoro across all consistencies     Chest X-Rays: Bibasilar patchy areas of increased attenuation concerning for subsegmental areas of airspace disease considering pneumonia or aspiration.  Cardiomegaly.    Prior diet: Per pt, regular/thin liquids po diet.      Subjective     SLP consulted for clinical swallow eval. SLP confirmed with RN prior to entry. Pt found in chair next to bed upon SLP and RN entry. Pt was awake, alert and oriented x4. Pt agreed to participate in swallow eval.    Patient goals: "I want ice chips or something" per pt.      Objective:     Oral Musculature Evaluation  · Oral Musculature: general weakness  · Dentition: present and adequate  · Secretion Management: coughing on secretions  · Mucosal Quality: " adequate  · Mandibular Strength and Mobility:  (reduced strength)  · Oral Labial Strength and Mobility: WFL  · Lingual Strength and Mobility:  (reduced strength)  · Buccal Strength and Mobility:  (general weakness)  · Volitional Swallow:  (delayed upon palpation)  · Voice Prior to PO Intake:  (raspy vocal quality)    Bedside Swallow Eval:   Pt participated in clinical swallow eval this AM. Pt demonstrated intermittent immediate coughing and observed to become red in the face while coughing throughout trials of thin liquids and puree textures s/p swallow. Pt observed to cough up sputum s/p swallow thin liquids x1. Pt demonstrated coughing throughout session. SLP cannot objectively r/o aspiration at bedside.     Consistencies Assessed:  · Thin liquids -via cup sips x4  · Puree -(pudding) x2     Oral Phase:   · Slow oral transit time    Pharyngeal Phase:   · Coughing/choking-- immediate s/p swallow thin liquids x2, and puree x1  · decreased hyolaryngeal excursion to palpation- across all consistencies  · delayed swallow initiation- across all consistencies   · throat clearing-- intermittent s/p swallow thin liquids and puree textures.    Compensatory Strategies  · Volitional cough/throat clear    Treatment: SLP educated pt on role of SLP, BSS, diet recs/swallow precautions and POC, including plan for pt to participate in MBSS later this date. Pt acknowledged and verbally confirmed understanding.     Assessment:     eZyad Woodard is a 89 y.o. male admitted with HCAP (healthcare-associated pneumonia).  He presents with intermittent immediate coughing s/p swallow PO trials. SLP cannot object r/o aspiration at bedside.  SLP recs: Pt will participate in MBSS later today to objectively r/o aspiration and to determine safest po diet. Further goals pending MBSS results. SLP notified DINA Bonner and MD team of results/recs.    Goals:    SLP Goals        Problem: SLP Goal    Goal Priority Disciplines Outcome   SLP Goal     SLP  Ongoing (interventions implemented as appropriate)   Description:  Short Term Goals:  1. Pt will participate in BSS to determine least restrictive diet.  2. Pt will successfully participate in Modified Barium Swallow study to objectively rule out aspiration and determine safest/least restrictive diet.  * Further goals pending MBSS results*                      Plan:     · Patient to be seen:  3 x/week   · Plan of Care expires:  05/06/18  · Plan of Care reviewed with:  patient (DINA Bonner and MD team)   · SLP Follow-Up:  Yes       Discharge recommendations:   (TBD )   Barriers to Discharge:  None    Time Tracking:     SLP Treatment Date:   04/06/18  Speech Start Time:  0933  Speech Stop Time:  0943     Speech Total Time (min):  10 min    Billable Minutes: Eval Swallow and Oral Function 10    SANDRINE Herzog., CF-SLP  Speech-Language Pathologist   4/6/2018

## 2018-04-06 NOTE — PLAN OF CARE
Problem: SLP Goal  Goal: SLP Goal  Short Term Goals:  1. Pt will participate in BSS to determine least restrictive diet.  2. Pt will successfully participate in Modified Barium Swallow study to objectively rule out aspiration and determine safest/least restrictive diet.  * Further goals pending MBSS results*    Outcome: Ongoing (interventions implemented as appropriate)  4/6/18:  Pt participated in clinical swallow eval this AM. Pt demonstrated intermittent immediate coughing and observed to become red in the face while coughing throughout trials of thin liquids and puree textures s/p swallow. Pt observed to cough up sputum s/p swallow thin liquids x1. Pt demonstrated coughing throughout session. SLP cannot objectively r/o aspiration at bedside. SLP recs: Pt will participate in MBSS later today to objectively r/o aspiration and to determine safest po diet. Further goals pending MBSS results. SLP notified DINA Bonner and MD team of results/recs.  SANDRINE Herzog., CF-SLP  Speech-Language Pathologist

## 2018-04-06 NOTE — PLAN OF CARE
Problem: Physical Therapy Goal  Goal: Physical Therapy Goal  Goals to be met by: 2018     Patient will increase functional independence with mobility by performin. Supine to sit with Stand-by Assistance  2. Sit to stand transfer with Stand-by Assistance  3. Bed to chair transfer with Stand-by Assistance using Rolling Walker  4. Gait  x 20 feet with Minimal Assistance using Rolling Walker.     Outcome: Ongoing (interventions implemented as appropriate)  Recommend Group Home with Home Health vs SNF  Required total assist with transfer bed to chair

## 2018-04-06 NOTE — PLAN OF CARE
Case Management recommends Palliative Care Consult.  Patient from [facility] (Shayna gambino).  Patient unaccompanied, [place] called his daughter to complete dc assessment:  Asia Woodard Daughter [phone] INVOLVEMENT IN CARE     Asia states he has been at [facility] but when he was c-diff positive and had the flu, they tried to get him SNF with [facility] and both of these facilities denied him, further [employer] stated he was not a SNF candidate.  Patient is up in chair at this time.      IF     SNF is recommended:  1. [facility]  2. [facility]   These are the top two choices.  Patient will require 3 MN stay.    This is the address for [facility]         04/06/18 0919   Discharge Assessment   Assessment Type Discharge Planning Assessment   Confirmed/corrected address and phone number on facesheet? Yes   Assessment information obtained from? Patient   Prior to hospitilization cognitive status: Alert/Oriented   Prior to hospitalization functional status: Assistive Equipment;Independent   Current cognitive status: Alert/Oriented   Current Functional Status: Assistive Equipment;Independent   Lives With alone   Able to Return to Prior Arrangements unable to determine at this time (comments)   Is patient able to care for self after discharge? Unable to determine at this time (comments)   Who are your caregiver(s) and their phone number(s)? Asia Woodard [phone] INVOLVEMENT IN CARE    Patient's perception of discharge disposition home or selfcare   Readmission Within The Last 30 Days no previous admission in last 30 days   Patient currently being followed by outpatient case management? No  (entered consult with [facility])   Patient currently receives any other outside agency services? Yes   Name and contact number of agency or person providing outside services Recent Patient of [facility]   Is it the patient/care  giver preference to resume care with the current outside agency? No   Equipment Currently Used at Home walker, rolling;wheelchair   Do you have any problems affording any of your prescribed medications? No   Is the patient taking medications as prescribed? yes   Does the patient have transportation home? Yes   Transportation Available family or friend will provide   Discharge Plan A Home Health;Group Home  ([facility])   Discharge Plan B Skilled Nursing Facility   Patient/Family In Agreement With Plan yes

## 2018-04-06 NOTE — PROGRESS NOTES
"[facility] IM Resident Progress Note    Subjective:      He feels a little better than yesterday. His main complaint yesterday was fatigue which is slightly improved. His productive cough has also improved. He denies SOB, chest pain, bleeding.    Objective:   Last 24 Hour Vital Signs:  BP  Min: 104/91  Max: 154/78  Temp  Av.9 °F (38.3 °C)  Min: 98.8 °F (37.1 °C)  Max: 103.7 °F (39.8 °C)  Pulse  Av.1  Min: 61  Max: 94  Resp  Av.2  Min: 12  Max: 28  SpO2  Av.8 %  Min: 94 %  Max: 100 %  Height  Av' 8" (172.7 cm)  Min: 5' 8" (172.7 cm)  Max: 5' 8" (172.7 cm)  Weight  Av.7 kg (177 lb 13.8 oz)  Min: 76.7 kg (169 lb)  Max: 84.7 kg (186 lb 11.7 oz)  I/O last 3 completed shifts:  In: 3558.5 [I.V.:607.5; IV Piggyback:2951]  Out: 50 [Urine:50]    Physical Examination:  General:          Alert and awake in NAD  Head:               Normocephalic and atraumatic  Eyes:               PERRL; EOMi with anicteric sclera and clear conjunctivae  Mouth:             Oropharynx clear and without exudate; moist mucous membranes  Cardio:             Irregular rhythm; Regular rate with normal S1 and S2; no murmurs or rubs--distant heart sounds  Resp:               Bibasilar crackles; diffuse rhonchi/wheezing  Abdom:            Soft, NTND with normoactive bowel sounds  Extrem:            Left 1st toe with erythema, bruising, swelling; Trace b/l edema; WWP with no clubbing, cyanosis  Skin:                Left knee abrasion; diffuse countusions and scatter purpura on extremities and chest  Pulses:            2+ and symmetric distally  Neuro:             AAOx person, place, city, year/month, not president; cooperative and pleasant with no focal deficits    Laboratory:  Laboratory Data Reviewed: yes  Pertinent Findings:    Recent Labs  Lab 18  1622 18  0637   WBC 11.88 18.79*   HGB 9.4* 7.9*   HCT 29.2* 24.9*   PLT 44* 58*   MCV 92 92   RDW 15.1* 15.7*     --    K 5.3*  --      --    CO2 26  --    BUN " 34*  --    CREATININE 1.5*  --      --    PROT 6.7  --    ALBUMIN 3.2*  --    BILITOT 0.8  --    AST 61*  --    ALKPHOS 164*  --    ALT 50*  --      Trop 0.047->0.133->0.125  Lactate2.5->1.8  Procal 21    Microbiology Data Reviewed: yes  Pertinent Findings:  Blood cx NGTD  Urine cx pending  Respiratory cx: stain-G+ cocci, cx pending    Other Results:  EKG (my interpretation):none new    Radiology Data Reviewed: yes  Pertinent Findings:  Xray, toe:    Erosive changes involving the left 1st metacarpal head and base of the 1st proximal phalanx.  The findings suggestive of early osteomyelitis.  Contrast enhanced MRI may be obtained for further evaluation.    Subtle erosive changes involving the head of the left 5th metacarpal.  This can also be assessed on the MRI examination.    Current Medications:     Infusions:       Scheduled:   albuterol-ipratropium 2.5mg-0.5mg/3mL  3 mL Nebulization Q4H    ceFEPime (MAXIPIME) IVPB  2 g Intravenous Q12H    ciprofloxacin  400 mg Intravenous Q8H    donepezil  10 mg Oral QHS    finasteride  5 mg Oral Daily    levothyroxine  100 mcg Oral Before breakfast    mirtazapine  7.5 mg Oral QHS    sodium chloride 0.9%  3 mL Intravenous Q8H    tamsulosin  0.4 mg Oral Daily    vancomycin (VANCOCIN) IVPB  1,000 mg Intravenous Q24H        PRN:  albuterol-ipratropium 2.5mg-0.5mg/3mL    Antibiotics and Day Number of Therapy:  Vanc, cefepime, cipro--4/5-present    Assessment:     Zeyad Woodard is a 89 y.o.male with     Plan:     Acute hypoxic respiratory failure and severe sepsis 2/2 community acquired pneumonia  -1 week progressive productive cough, fever, SOB  On admit, satting well on 5L O2 via NC, temp 103, CXR with bibasilar consolidations, lactate 2.5  BP stable  Modified barium swallow study 1/18 w/o aspiration  -In ED, given 2L NS, started vanc, cipro, cefepime (4/5)--penicillin allergy was remote and only hives  -Lactate normalized, procal 21  Duonebs, CPT  Blood,  respiratory, urine cx, pending     Left 1st toe osteomyelitis  -Bruising, erythema, swelling 2/2 recent trauma  H/o neuropathy, follows with podiatry  Xray suggestive of early osteo  -Continue vanc, cefepime, cipro (4/5)  Consult general surgery vs podiatry     ELIZABET on CKD 3A  -On admit, Cr 1.5, GFR 40  Baseline Cr 1.2, GFR 60  -Likely prerenal in setting of sepsis  Cr trended up to 1.7--continue to hydrate and monitor  Urine electrolytes pending     Elevated troponin  -On admit, trop 0.047  Chronically elevated trop to 0.03-0.06. Also, possibly 2/2 demand in setting of sepsis and CKD  EKG ventricularly paced  -Trending troponins--peaked 0.133->0.125     Chronic thrombocytopenia  -Baseline platelets ~ 90  Follows with Dr. Jeff as outpt  -On admit, platelets 44-likely exacerbated by sepsis  Scatter petechiae, contusions in setting of recent fall but no active bleeding  -Consider consulting Dr. Jeff or platelet transfusion if worsens     Chronic HFpEF  -TTE 12/17-EF 55%, grade 2 diastolic dysfunction, PA pressure 35  -On admit, Trop 0.047,   CXR with b/l consolidations 2/2 PNA rather than volume overload--given fluids per sepsis protocol  Monitor closely for volume overload     A fib and SSS s/p pacemaker  -On admit, in ventricularly paced rhythm  Not on home anticoagulation 2/2 h/o GI bleeds. Also falls frequently  -No acute issues     Anemia of chronic disease, h/o CHRISTIANO and B12 deficiency  -On admit, Hbg 9.4--at baseline  1/18--w/u c/w AoCD--ferritin, B12 wnl  -Follows with Dr. Jeff--continue home iron, B12     Transaminitis   -On admit, , AST 61, ALT 50  Consistent with baseline  -Negative Hep C Ab 2012--defer repeating as patient would be poor treatment candidate given age/comorbidities     HTN  -On admit, BP low/normal  Held home coreg in setting of sepsis     Hypothyroidism  -No acute issues  Continue home synthroid     BPH  -No acute issues  Continue home flomax,  finasteride     Frequent mechanical falls  -Per chart review  Fell 3 days PTA  -Per family, previously had HH PT/OT that has been discontinued/completed  Only mobile via wheelchair  -Consulted PT/OT     Dementia  -Baseline--Depending for ADLs, lives at [facility] and has supervision via aide and nurse  -A&O to person, place, date; not situation  Continue home remeron, aricept     Code Status:      Full     Dispo: pending clinical improvement of pneumonia     Fluids: s/p 2L NS bolus, 75 cc/h NS  Diet: NPO pending SLP  Ppx: SCDs; holding chemical ppx in setting of thrombocytopenia      Chuckie Isaacs  [facility] Medicine HO-I  U IM Service Team A    [facility] Medicine Hospitalist Pager numbers:   [facility] Hospitalist Medicine Team A (Kinsey/Yumiko): [phone]  [facility] Hospitalist Medicine Team B (Tyrese/Bibi):  [phone]

## 2018-04-06 NOTE — PT/OT/SLP EVAL
Physical Therapy Evaluation    Patient Name:  Zeyad Woodard   MRN:  [id number]    Recommendations:     Discharge Recommendations:  other (see comments) (back to group home with HH PT/OT vs SNF)   Discharge Equipment Recommendations: none   Barriers to discharge: None    Assessment:     Zeyad Woodard is a 89 y.o. male admitted with a medical diagnosis of HCAP (healthcare-associated pneumonia).  He presents with the following impairments/functional limitations:  weakness, gait instability, impaired balance, impaired endurance, impaired self care skills, impaired functional mobilty, decreased lower extremity function, decreased ROM, impaired skin, edema, impaired cardiopulmonary response to activity Patient total assist transfer from bedside chair to bed, unable to use RW with SPT     Rehab Prognosis:  fair; patient would benefit from acute skilled PT services to address these deficits and reach maximum level of function.      Recent Surgery: * No surgery found *      Plan:     During this hospitalization, patient to be seen 6 x/week to address the above listed problems via therapeutic activities, therapeutic exercises, gait training  · Plan of Care Expires:  05/06/18   Plan of Care Reviewed with: patient    Subjective     Communicated with primary nurse prior to session.  Patient found up in chair at bedside upon PT entry to room, agreeable to evaluation.      Chief Complaint: weakness  Patient comments/goals: get well and go home  Pain/Comfort:  · Pain Rating 1: 0/10  · Pain Rating Post-Intervention 1: 0/10    Patients cultural, spiritual, Rastafarian conflicts given the current situation:      Living Environment:  Lives in group home   Prior to admission, patients level of function was needed assistance.  Patient has the following equipment:  .  DME owned (not currently used): none.  Upon discharge, patient will have assistance from staff/family .    Objective:     Patient found with: telemetry, oxygen      General Precautions: Standard, fall   Orthopedic Precautions:N/A   Braces: N/A     Exams:  · RLE ROM: WFL  · RLE Strength: isolated mvts intact   · LLE ROM: WFL  · LLE Strength: isolated mvts intact    Functional Mobility:  · Bed Mobility:     · Sit to Supine: maximal assistance and total assistance  · Transfers:     · Sit to Stand:  total assistance with no AD  · Chair to mat: total assistance with  no AD  using  Squat Pivot    AM-PAC 6 CLICK MOBILITY  Total Score:10       Therapeutic Activities and Exercises:   Worked with patient on scooting to edge of chair stood with max assist and RW however unable to take steps leaning posterior excessively.     Patient left HOB elevated with all lines intact, call button in reach and bed alarm on.    GOALS:    Physical Therapy Goals        Problem: Physical Therapy Goal    Goal Priority Disciplines Outcome Goal Variances Interventions   Physical Therapy Goal     PT/OT, PT Ongoing (interventions implemented as appropriate)     Description:  Goals to be met by: 2018     Patient will increase functional independence with mobility by performin. Supine to sit with Stand-by Assistance  2. Sit to stand transfer with Stand-by Assistance  3. Bed to chair transfer with Stand-by Assistance using Rolling Walker  4. Gait  x 20 feet with Minimal Assistance using Rolling Walker.                       History:     Past Medical History:   Diagnosis Date    Anemia     Atrial fibrillation     BPH (benign prostatic hypertrophy)     Cancer     Cardiac pacemaker in situ 2015    CHF (congestive heart failure)     Coronary artery disease     Encounter for blood transfusion     GI bleed     cecum angiectasia s/p cautery    Hypertension     Hypothyroidism     Polyneuropathy     Posture imbalance     due to neuropathy    Right posterior capsular opacification 2017    SSS (sick sinus syndrome) 2015    s/p pacemaker    Stroke 2018       Past Surgical History:    Procedure Laterality Date    CARDIAC SURGERY      CATARACT EXTRACTION W/  INTRAOCULAR LENS IMPLANT Right 05/17/2010        CATARACT EXTRACTION W/  INTRAOCULAR LENS IMPLANT Left 02/16/2004        cataract surgery      COLONOSCOPY  6/30/04    COLONOSCOPY N/A 2/15/2016    Procedure: COLONOSCOPY;  Surgeon: Stanton Kirk MD;  Location: [facility] ([address] [place] [address]);  Service: Endoscopy;  Laterality: N/A;    EYE SURGERY      HERNIA REPAIR      inguinal hernia repair      Open heart surgery for pericardiectomy      for calcific constrictive pericarditis    UMBILICAL HERNIA REPAIR      Yag      Left Eye       Clinical Decision Making:     History  Co-morbidities and personal factors that may impact the plan of care Examination  Body Structures and Functions, activity limitations and participation restrictions that may impact the plan of care Clinical Presentation   Decision Making/ Complexity Score   Co-morbidities:   [] Time since onset of injury / illness / exacerbation  [] Status of current condition  []Patient's cognitive status and safety concerns    [x] Multiple Medical Problems (see med hx)  Personal Factors:   [x] Patient's age  [x] Prior Level of function   [] Patient's home situation (environment and family support)  [] Patient's level of motivation  [] Expected progression of patient      HISTORY:(criteria)    [] 59614 - no personal factors/history    [x] 02843 - has 1-2 personal factor/comorbidity     [] 44356 - has >3 personal factor/comorbidity     Body Regions:  [] Objective examination findings  [] Head     []  Neck  [] Trunk   [] Upper Extremity  [x] Lower Extremity    Body Systems:  [] For communication ability, affect, cognition, language, and learning style: the assessment of the ability to make needs known, consciousness, orientation (person, place, and time), expected emotional /behavioral responses, and learning preferences (eg, learning barriers, education  needs)  [x] For the  neuromuscular system: a general assessment of gross coordinated movement (eg, balance, gait, locomotion, transfers, and transitions) and motor function  (motor control and motor learning)  [x] For the musculoskeletal system: the assessment of gross symmetry, gross range of motion, gross strength, height, and weight  [] For the integumentary system: the assessment of pliability(texture), presence of scar formation, skin color, and skin integrity  [x] For cardiovascular/pulmonary system: the assessment of heart rate, respiratory rate, blood pressure, and edema     Activity limitations:    [] Patient's cognitive status and saf ety concerns          [] Status of current condition      [] Weight bearing restriction  [] Cardiopulmunary Restriction    Participation Restrictions:   [] Goals and goal agreement with the patient     [] Rehab potential (prognosis) and probable outcome      Examination of Body System: (criteria)    [] 00497 - addressing 1-2 elements    [x] 95450 - addressing a total of 3 or more elements     [] 85379 -  Addressing a total of 4 or more elements         Clinical Presentation: (criteria)  Stable - 80657     On examination of body system using standardized tests and measures patient presents with 3 or more elements from any of the following: body structures and functions, activity limitations, and/or participation restrictions.  Leading to a clinical presentation that is considered stable and/or uncomplicated                              Clinical Decision Making  (Eval Complexity):  Low- 50985     Time Tracking:     PT Received On: 04/06/18  PT Start Time: 1145     PT Stop Time: 1208  PT Total Time (min): 23 min     Billable Minutes: Evaluation 10 and Therapeutic Activity 13      Kade Celaya, PT  04/06/2018

## 2018-04-06 NOTE — PLAN OF CARE
Dr. Emmanuel in for extraction of patient's left great toenail. Time out performed per protocol, pt tolerated px. Denies any pain or discomfort, dressing to left foot per Md.

## 2018-04-06 NOTE — PT/OT/SLP EVAL
Occupational Therapy   Evaluation    Name: Zeyad Woodard  MRN: [id number]  Admitting Diagnosis:  HCAP (healthcare-associated pneumonia)      Recommendations:     Discharge Recommendations: nursing facility, skilled  Discharge Equipment Recommendations:   (defer to SNF)  Barriers to discharge:       History:     Occupational Profile:  Living Environment: group home  Previous level of function: S/U for self feed & G/H; A for all other mobility & fxnll tasks  Roles and Routines: .  Equipment Owned:  wheelchair  Assistance upon Discharge: 24 hr S/A    Past Medical History:   Diagnosis Date    Anemia     Atrial fibrillation     BPH (benign prostatic hypertrophy)     Cancer     Cardiac pacemaker in situ 7/2/2015    CHF (congestive heart failure)     Coronary artery disease     Encounter for blood transfusion     GI bleed     cecum angiectasia s/p cautery    Hypertension     Hypothyroidism     Polyneuropathy     Posture imbalance     due to neuropathy    Right posterior capsular opacification 1/18/2017    SSS (sick sinus syndrome) 2015    s/p pacemaker    Stroke 1/11/2018       Past Surgical History:   Procedure Laterality Date    CARDIAC SURGERY      CATARACT EXTRACTION W/  INTRAOCULAR LENS IMPLANT Right 05/17/2010        CATARACT EXTRACTION W/  INTRAOCULAR LENS IMPLANT Left 02/16/2004        cataract surgery      COLONOSCOPY  6/30/04    COLONOSCOPY N/A 2/15/2016    Procedure: COLONOSCOPY;  Surgeon: Stanton Kirk MD;  Location: [address]);  Service: Endoscopy;  Laterality: N/A;    EYE SURGERY      HERNIA REPAIR      inguinal hernia repair      Open heart surgery for pericardiectomy      for calcific constrictive pericarditis    UMBILICAL HERNIA REPAIR      Yag      Left Eye       Subjective     Chief Complaint: SOB  Patient/Family stated goals: to return to PLOF  Communicated with: nsg prior to session.  Pain/Comfort:  · Pain Rating 1: 0/10  · Pain Rating  Post-Intervention 1: 0/10    Patients cultural, spiritual, Zoroastrian conflicts given the current situation:      Objective:     Patient found with: telemetry, oxygen, SCD    General Precautions: Standard, fall, aspiration, NPO   Orthopedic Precautions:N/A   Braces: N/A     Occupational Performance:    Bed Mobility:    · Patient completed Supine to Sit with moderate assistance and with side rail    Functional Mobility/Transfers:  · Patient completed Bed <> Chair Transfer using Stand Pivot technique with moderate assistance and maximal assistance with no assistive device  · Functional Mobility:     Activities of Daily Living:  · Grooming: stand by assistance face washing at EOB  · LB Dressing: dependence don socks    Cognitive/Visual Perceptual:  Grossly intact    Physical Exam:  Static sit: SBA>Min A  B UEs WFL at 4-/5    Patient left up in chair with all lines intact, call button in reach, nsg notified and CM present    [facility] 6 Click:  [facility] Total Score: 15    Treatment & Education:  Pt agreeable to OT eval this date. Reports living in a group home; dep for WC propulsion; Min-SBA for bed mob; MI after S/U w/ self feed & G/H at WC/sitting level & Min A for SPT w/o AE<-->WC & BSC over toilet & urinal use. Otherwise, req's Mod-max A w/ all other ADLs. Currently pt at 1LNC & sat 97% at rest in supine. Perf sup-->EOB w/ Mod A, Min-mod A for scooting to EOB, total A to don socks; S/U for face washing at EOb. Christopher'd sitting EOB x ~15min w/ SB>Min A for balance. Perf SPT EOB-->b/s chair w/ Mod-Max A w/o AE. Pt unable to achieve fully erect standing posture demo'ing signif trunk flex,  inability to achieve full knee ext & difficulty unloading for LE WS. Noted SOB after t/f (max exertion task), but no change in O2sats from rest. Pt left up in b/s chair per nsg approval & edu re: use of call button. Pt verbalized understanding.          Education:    Assessment:   Pt presents w/ decreased overall endurance/conditioning,  "balance/mobility & coordination w/ subsequent decline in (I)/safety w/ BADLs, fxnl mobility & fxnl t/f's. OT 5x/wk to increase phys/fxnl status & maximize potential to achieve established goals for d/c-->SNF w/ DME deferred.    Zeyad Woodard is a 89 y.o. male with a medical diagnosis of HCAP (healthcare-associated pneumonia).  He presents with ..  Performance deficits affecting function are weakness, impaired endurance, impaired self care skills, impaired functional mobilty, impaired balance, decreased coordination, decreased lower extremity function, impaired cardiopulmonary response to activity.      Rehab Prognosis:  good; patient would benefit from acute skilled OT services to address these deficits and reach maximum level of function.         Clinical Decision Makin.  OT Low:  "Pt evaluation falls under low complexity for evaluation coding due to performance deficits noted in 1-3 areas as stated above and 0 co-morbities affecting current functional status. Data obtained from problem focused assessments. No modifications or assistance was required for completion of evaluation. Only brief occupational profile and history review completed."     Plan:     Patient to be seen 5 x/week to address the above listed problems via self-care/home management, therapeutic exercises, therapeutic activities  · Plan of Care Expires: 18  · Plan of Care Reviewed with: patient    This Plan of care has been discussed with the patient who was involved in its development and understands and is in agreement with the identified goals and treatment plan    GOALS:    Occupational Therapy Goals        Problem: Occupational Therapy Goal    Goal Priority Disciplines Outcome Interventions   Occupational Therapy Goal     OT, PT/OT Ongoing (interventions implemented as appropriate)    Description:  Goals to be met by: 18     Patient will increase functional independence with ADLs by performing:    Grooming while seated " with Set-up Assistance.  Toileting from bedside commode with Moderate Assistance for hygiene and clothing management.   Supine to sit with Stand-by Assistance and use of bedrail as needed.  Toilet transfer to bedside commode with Contact Guard Assistance.  Increased functional strength to WFL for ADLs.                      Time Tracking:     OT Date of Treatment: 04/06/18  OT Start Time: 0847  OT Stop Time: 0920  OT Total Time (min): 33 min    Billable Minutes:Evaluation 15  Therapeutic Activity 18  Total Time 33    DELON Walsh  4/6/2018

## 2018-04-06 NOTE — PLAN OF CARE
Problem: Patient Care Overview  Goal: Plan of Care Review  Outcome: Ongoing (interventions implemented as appropriate)  Plan of care reviewed with patient. Patient disoriented to time. Patient denies pain/discomfort. Incontinent of bowel and urine. In and out cath to be performed to collect urinalysis, urine culture. On admission, bilateral knee skin tears present. Wounds cleaned with wound cleanser, covered with foam dressings. SI PU noted on bilateral buttocks on admission. Barrier cream applied. NS infusing @ 75 cc/hour. Patient a fib, paced rhythm on telemetry monitoring, HR 60's-70's, with no true red alarms noted. Bed is low and locked, bed alarm is activated, call light is within reach. Will continue to monitor.

## 2018-04-06 NOTE — ED NOTES
Patient identifiers verified and correct for Zeyad Woodard.    LOC: The patient is awake, alert and aware of environment with an appropriate affect, the patient is oriented x 3 and speaking appropriately.  APPEARANCE: Patient in acute distress, patient is clean and well groomed, patient's clothing is properly fastened.  SKIN: The skin is hot and dry, color consistent with ethnicity, patient d mucus membranes, skin intact, no breakdown or bruising noted.  MUSCULOSKELETAL: Patient has generalized weakness. Left toe swollen, red, and tender.  Multiple skin tears to left inner and right inner knees.  Multiple generalized bruising.  RESPIRATORY: Airway is open and patent, respirations are spontaneous, patient has a normal effort and rate,  accessory muscle use noted, bilateral breath sounds crackles lower posterior and wheezing throughout.  Coughing with yellow-green sputum.  CARDIAC: Patient has an irregular rate, no periphreal edema noted, capillary refill < 3 seconds.  ABDOMEN: Soft and non tender to palpation, no distention noted, normoactive bowel sounds present in all four quadrants.  NEUROLOGIC: PERRL,  4 mm bilaterally, eyes open spontaneously, behavior appropriate to situation, follows commands, facial expression symmetrical, bilateral hand grasp equal and even, purposeful motor response noted, normal sensation in all extremities when touched with a finger.

## 2018-04-06 NOTE — PROGRESS NOTES
Pharmacist Renal Dose Adjustment Note    Zeyad Woodard is a 89 y.o. male being treated with the medication Cefepime    Patient Data:    Vital Signs (Most Recent):  Temp: 99.8 °F (37.7 °C) (04/05/18 1730)  Pulse: 84 (04/05/18 1900)  Resp: 19 (04/05/18 1730)  BP: (!) 114/40 (04/05/18 1900)  SpO2: 99 % (04/05/18 1900)   Vital Signs (72h Range):  Temp:  [99.8 °F (37.7 °C)-103.7 °F (39.8 °C)]   Pulse:  [61-91]   Resp:  [19-28]   BP: (104-137)/(38-91)   SpO2:  [95 %-100 %]        Recent Labs     Lab 04/05/18  1622   CREATININE 1.5*     Serum creatinine: 1.5 mg/dL (H) 04/05/18 1622  Estimated creatinine clearance: 31.7 mL/min (A)    Medication: Cefepime 2 gm IV q8h will be changed to Cefepime 2 gm IV q12h    Pharmacist's Name: Chapincito Lui  Pharmacist's Extension: [phone]

## 2018-04-06 NOTE — HPI
"Pt is a 89 y/o male  has a past medical history of Anemia; Atrial fibrillation; BPH (benign prostatic hypertrophy); Cancer; Cardiac pacemaker in situ; CHF (congestive heart failure); Coronary artery disease; Encounter for blood transfusion; GI bleed; Hypertension; Hypothyroidism; Polyneuropathy; Posture imbalance; Right posterior capsular opacification; SSS (sick sinus syndrome); and Stroke. Admitted for pneumonia and consulted to podiatry for "toe osteomyelitis". Pt seen at bedside today, relates his toe nail of his left foot is coming off. States he has had a wound to the dorsal surface of the big toe for "about a week now". No other pedal complaints at this time.   "

## 2018-04-06 NOTE — PROCEDURES
Modified Barium Swallow    Patient Name:  Zeyad Woodard   MRN:  [id number]      Recommendations:     Recommendations:                General Recommendations:  Dysphagia therapy  Diet recommendations:  Puree, Honey Thick   Aspiration Precautions: 1 bite/sip at a time, Alternating bites/sips, Assistance with thickening liquids, Avoid talking while eating, Chin tuck, Double swallow with each bite/sip, Feed only when awake/alert, Frequent oral care, HOB to 90 degrees, Meds crushed in puree, No straws, Remain upright 30 minutes post meal, Small bites/sips and Strict aspiration precautions   General Precautions: Standard, fall, aspiration, pureed diet, honey thick  Communication strategies:  none    Referral     Reason for Referral  Patient was referred for a Modified Barium Swallow Study to assess the efficiency of his/her swallow function, rule out aspiration and make recommendations regarding safe dietary consistencies, effective compensatory strategies, and safe eating environment.     Diagnosis: HCAP (healthcare-associated pneumonia)       History:     Past Medical History:   Diagnosis Date    Anemia     Atrial fibrillation     BPH (benign prostatic hypertrophy)     Cancer     Cardiac pacemaker in situ 7/2/2015    CHF (congestive heart failure)     Coronary artery disease     Encounter for blood transfusion     GI bleed     cecum angiectasia s/p cautery    Hypertension     Hypothyroidism     Polyneuropathy     Posture imbalance     due to neuropathy    Right posterior capsular opacification 1/18/2017    SSS (sick sinus syndrome) 2015    s/p pacemaker    Stroke 1/11/2018       Objective:     Current Respiratory Status: 04/06/18    Alert: inconsistent    Cooperative: inconsistent    Follows Directions: inconsistent    Visualization  · Patient was seen in the lateral view  · Supplement O2 in place via nasal cannula    Oral Peripheral Examination  · Oral Musculature: general weakness  · Dentition: present  and adequate  · Secretion Management: coughing on secretions  · Mucosal Quality: adequate  · Mandibular Strength and Mobility:  (reduced strength)  · Oral Labial Strength and Mobility: WFL  · Lingual Strength and Mobility:  (reduced strength)  · Buccal Strength and Mobility:  (general weakness)  · Volitional Swallow:  (delayed upon palpation)  · Voice Prior to PO Intake:  (raspy vocal quality)    Consistencies Assessed  THIN: 5cc x3 of thin liquids barium via cup  NECTAR: 5cc x3, 10cc x1 of nectar thick liquid barium via cup  PUREE: tsp amount of barium pudding x1   DENTAL SOFT: tsp amount of diced peaches (coated in barium powder) x1      Oral Preparation/Oral Phase  · Poor bolus formation and control-- for thin and nectar thick liquids  · Decreased base of tongue mobility--for thin and nectar thick liquids   · Premature spillage-- for thin and nectar thick liquids    Pharyngeal Phase   -Pt presents with moderate-severe oropharyngeal dysphagia c/b reduced laryngeal elevation/excursion and severely delayed trigger of swallow (swallow triggered at level of vallecula for all consistencies). Pt observed with moderate pooling of materials in vallecula before the swallow for thin liquids, nectar thick liquids, puree and dental soft textures    -Pt demonstrated reduced epiglottic inversion, decreased airway protection, and reduced true vocal cord closure, as pt was observed with overt gross aspiration into trachea during the swallow of thin liquids (5cc x1) and nectar thick liquids (5cc x1) without compensatory strategies. Pt also observed with gross aspiration after the swallow for thin liquids (5cc x2) and nectar thick liquids (10cc x1) with chin tuck technique. Pt observed with a slightly productive coughing in attempts to eject materials from airway. However, pt with limited success as trace residuals were still observed in pt's airway above level of vocal cords.    -Pt observed with flash penetration (above the level  of the vocal cords) for nectar thick liquids 5cc x2 with chin tuck technique during the swallow.     - Pt also demonstrated reduced tongue base retraction c/b dcr'd pharyngeal clearance and dc'rd pharyngeal sensation, as pt was observed with moderate vallecular residuals and min-mod residuals in pyriform sinuses s/p swallow thin and nectar thick liquids. SLP cued pt to perform dry, hard swallow technique to clear residuals from pharyngeal cavity. However, pt was not successful, as pt demonstrated difficulty initiating  dry, hard swallows.  Pt also demonstrated aspiration of vallecular residuals, from thin (5cc x2) and nectar thick liquids (5cc x1), which were observed to trickle along pt's airway and enter after the swallow.     Pt demonstrated no penetration and/or aspiration under fluoro for puree or dental soft textures. Pt is not safe for thin liquids at this time. Pt is at moderate risk of aspiraiton with nectar thick liquids 2/2 pt's dcr'd sensation and DANGELO, which impacts pt's safety of swallowing.      Cervical Esophageal Phase  · UES appeared to accommodate all bolus types without stasis or retrograde movement observed     Assessment:     Impressions  · Pt presents with moderate-severe oropharyngeal dysphagia c/b above mentioned deficits, specifically pt's reduced epiglottic inversion, decreased airway protection, and reduced true vocal cord closure, as pt was observed with overt gross aspiration into trachea during the swallow of thin liquids and nectar thick liquids without compensatory strategies. Pt also observed with gross aspiration after the swallow for trials of thin liquids and nectar thick liquids with chin tuck technique. Pt also demonstrated aspiration of vallecular residuals, from thin liquids and nectar thick liquids, which were observed to trickle along pt's airway and enter after the swallow 2/2 pt's dcr'd pharyngeal clearance/sensation  · Pt is not safe for thin liquids at this time. Pt is  at moderate risk of aspiraiton with nectar thick liquids 2/2 pt's dcr'd sensation and DANGELO, which impacts pt's safety of swallowing.    NOTE: Pt was observed to be lethargic during PM session, as compared to AM session. Pt was more lucid, alert and able to follow commands during AM session. Pt demonstrated difficulty performing simple compensatory techniques during MBS this PM, which impacts safety swallowing. SLP notified MD Barnes and DINA Bonner of this event, as well as results/recs.    Prognosis: Fair given pt's diagnoses and related co-morbidities    Barriers:  · Fatigue   · Ability to follow directions     Plan  -SLP recs: Honey thick liquids/Puree textures with chin tuck technique during the swallow for liquids, SMALL, controled sips/bites, meds crushed and buried in puree textures, and all other universal swallow precautions (upright at 90 degrees, alternate sips/bites, 1 bite/sip at a time, remain upright for 30 mins after meals, etc.)  -Pt will also participate in indirect dysphagia tx and advanced diet trials of nectar thick liquids/dental soft textures, as tolerated by pt. Pt should receive post acute care ST services for indirect and direct dysphagia tx.    Education  SLP educated pt MBSS results, diet recs/swallow precautions and POC. SLP provided skilled education to pt, via visual demonstration, on how to appropriately thicken liquids to honey thick consistency. SLP also provided pt with thickener packets to ensure compliance with rec'd diet. SLP posted swallow precautions above HOB in pt's room. Pt acknowledged and confirmed understanding of education. However, pt will require assistance and reinforcement of swallow precautions.  SLP also notified DINA Bonner and MD team [facility]     Goals:    SLP Goals        Problem: SLP Goal    Goal Priority Disciplines Outcome   SLP Goal     SLP Ongoing (interventions implemented as appropriate)   Description:  Short Term Goals:  1. Pt will  participate in BSS to determine least restrictive diet.- MET 4/6  2. Pt will successfully participate in Modified Barium Swallow study to objectively rule out aspiration and determine safest/least restrictive diet.- MET 4/6  3. Pt will tolerate honey thick liquids/puree textures po diet with chin tuck without overt s/s of aspiration  4. Pt will tolerate advanced diet trials of nectar thick liquids and dental soft textures without overt s/s of aspiration.  5. Pt will participate in indirect dysphagia exercises to strengthen musculature for swallowing mechanism with mod-max effort.                         Plan:   · Patient to be seen:  Therapy Frequency: 3 x/week   · Plan of Care expires:  05/06/18  · Plan of Care reviewed with:  patient (DINA Bonner and MD Barnes)        Discharge recommendations:   (TBD )   Barriers to Discharge:  None    Time Tracking:   SLP Treatment Date:   04/06/18  Speech Start Time:  1402  Speech Stop Time:  1420     Self-care/Education: 10 mins  Speech Total Time (min):  28 min    SANDRINE Herzog., CF-SLP  Speech-Language Pathologist   4/6/2018

## 2018-04-06 NOTE — PLAN OF CARE
Problem: Patient Care Overview  Goal: Plan of Care Review  Outcome: Ongoing (interventions implemented as appropriate)   04/06/18 1330   Coping/Psychosocial   Plan Of Care Reviewed With patient;family   Care plan reviewed with Pt verbalized plan of care. AAOx4, family at bedside, no respiratory distress noted, O2 at 1 liter via n/c. Afib, Paced 70's per cardiac monitor, no red alarm noted. Denies any pain of discomfort, education provided on medication effect and side effect, voices understanding. Safety measures maintained, call light within his reach, no apparent distress noted, bed in low position, bed alarm on, educated on the importance of calling as needed, voices understanding, stable at this time.

## 2018-04-06 NOTE — ASSESSMENT & PLAN NOTE
-Pt with wound and subungual hematoma noted to hallux, with erythema noted.  -Nail removed, see procedure note below.   -Upon removal, nail bed without wound or laceration noted, no evidence of OM clinically.   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

## 2018-04-07 PROBLEM — L03.032 CELLULITIS OF GREAT TOE, LEFT: Status: ACTIVE | Noted: 2018-04-07

## 2018-04-07 LAB
ALBUMIN SERPL BCP-MCNC: 2.6 G/DL
ALP SERPL-CCNC: 111 U/L
ALT SERPL W/O P-5'-P-CCNC: 32 U/L
ANION GAP SERPL CALC-SCNC: 9 MMOL/L
AORTIC VALVE REGURGITATION: ABNORMAL
AORTIC VALVE STENOSIS: ABNORMAL
AST SERPL-CCNC: 41 U/L
BACTERIA UR CULT: NO GROWTH
BASOPHILS # BLD AUTO: 0.02 K/UL
BASOPHILS NFR BLD: 0.1 %
BILIRUB SERPL-MCNC: 0.8 MG/DL
BUN SERPL-MCNC: 42 MG/DL
CALCIUM SERPL-MCNC: 8.4 MG/DL
CHLORIDE SERPL-SCNC: 106 MMOL/L
CO2 SERPL-SCNC: 21 MMOL/L
CREAT SERPL-MCNC: 1.6 MG/DL
DIASTOLIC DYSFUNCTION: NO
DIFFERENTIAL METHOD: ABNORMAL
EOSINOPHIL # BLD AUTO: 0 K/UL
EOSINOPHIL NFR BLD: 0.3 %
ERYTHROCYTE [DISTWIDTH] IN BLOOD BY AUTOMATED COUNT: 15.9 %
EST. GFR  (AFRICAN AMERICAN): 43 ML/MIN/1.73 M^2
EST. GFR  (NON AFRICAN AMERICAN): 37 ML/MIN/1.73 M^2
ESTIMATED PA SYSTOLIC PRESSURE: 33.15
GLUCOSE SERPL-MCNC: 83 MG/DL
HCT VFR BLD AUTO: 25.7 %
HGB BLD-MCNC: 7.9 G/DL
LACTATE SERPL-SCNC: 1.5 MMOL/L
LYMPHOCYTES # BLD AUTO: 6.1 K/UL
LYMPHOCYTES NFR BLD: 39.9 %
MCH RBC QN AUTO: 29.2 PG
MCHC RBC AUTO-ENTMCNC: 30.7 G/DL
MCV RBC AUTO: 95 FL
MITRAL VALVE REGURGITATION: ABNORMAL
MONOCYTES # BLD AUTO: 1 K/UL
MONOCYTES NFR BLD: 6.4 %
NEUTROPHILS # BLD AUTO: 8.2 K/UL
NEUTROPHILS NFR BLD: 53.3 %
PLATELET # BLD AUTO: 67 K/UL
PMV BLD AUTO: 10.9 FL
POTASSIUM SERPL-SCNC: 5.1 MMOL/L
PROT SERPL-MCNC: 5.9 G/DL
RBC # BLD AUTO: 2.71 M/UL
RETIRED EF AND QEF - SEE NOTES: 55 (ref 55–65)
SODIUM SERPL-SCNC: 136 MMOL/L
VANCOMYCIN TROUGH SERPL-MCNC: 13.6 UG/ML
WBC # BLD AUTO: 15.4 K/UL

## 2018-04-07 PROCEDURE — 85025 COMPLETE CBC W/AUTO DIFF WBC: CPT

## 2018-04-07 PROCEDURE — 63600175 PHARM REV CODE 636 W HCPCS: Performed by: INTERNAL MEDICINE

## 2018-04-07 PROCEDURE — A4216 STERILE WATER/SALINE, 10 ML: HCPCS | Performed by: STUDENT IN AN ORGANIZED HEALTH CARE EDUCATION/TRAINING PROGRAM

## 2018-04-07 PROCEDURE — 87040 BLOOD CULTURE FOR BACTERIA: CPT | Mod: 59

## 2018-04-07 PROCEDURE — 25000003 PHARM REV CODE 250: Performed by: STUDENT IN AN ORGANIZED HEALTH CARE EDUCATION/TRAINING PROGRAM

## 2018-04-07 PROCEDURE — 25000242 PHARM REV CODE 250 ALT 637 W/ HCPCS: Performed by: STUDENT IN AN ORGANIZED HEALTH CARE EDUCATION/TRAINING PROGRAM

## 2018-04-07 PROCEDURE — 11000001 HC ACUTE MED/SURG PRIVATE ROOM

## 2018-04-07 PROCEDURE — 36415 COLL VENOUS BLD VENIPUNCTURE: CPT

## 2018-04-07 PROCEDURE — 94761 N-INVAS EAR/PLS OXIMETRY MLT: CPT

## 2018-04-07 PROCEDURE — 94640 AIRWAY INHALATION TREATMENT: CPT

## 2018-04-07 PROCEDURE — 80053 COMPREHEN METABOLIC PANEL: CPT

## 2018-04-07 PROCEDURE — 83605 ASSAY OF LACTIC ACID: CPT

## 2018-04-07 PROCEDURE — 93306 TTE W/DOPPLER COMPLETE: CPT

## 2018-04-07 PROCEDURE — S0077 INJECTION, CLINDAMYCIN PHOSP: HCPCS | Performed by: STUDENT IN AN ORGANIZED HEALTH CARE EDUCATION/TRAINING PROGRAM

## 2018-04-07 PROCEDURE — 94664 DEMO&/EVAL PT USE INHALER: CPT

## 2018-04-07 PROCEDURE — 27000221 HC OXYGEN, UP TO 24 HOURS

## 2018-04-07 PROCEDURE — 80202 ASSAY OF VANCOMYCIN: CPT

## 2018-04-07 PROCEDURE — 63600175 PHARM REV CODE 636 W HCPCS: Performed by: STUDENT IN AN ORGANIZED HEALTH CARE EDUCATION/TRAINING PROGRAM

## 2018-04-07 RX ADMIN — IPRATROPIUM BROMIDE AND ALBUTEROL SULFATE 3 ML: .5; 3 SOLUTION RESPIRATORY (INHALATION) at 11:04

## 2018-04-07 RX ADMIN — CIPROFLOXACIN 400 MG: 2 INJECTION, SOLUTION INTRAVENOUS at 05:04

## 2018-04-07 RX ADMIN — IPRATROPIUM BROMIDE AND ALBUTEROL SULFATE 3 ML: .5; 3 SOLUTION RESPIRATORY (INHALATION) at 07:04

## 2018-04-07 RX ADMIN — CEFEPIME 2 G: 2 INJECTION, POWDER, FOR SOLUTION INTRAMUSCULAR; INTRAVENOUS at 09:04

## 2018-04-07 RX ADMIN — MIRTAZAPINE 7.5 MG: 7.5 TABLET ORAL at 09:04

## 2018-04-07 RX ADMIN — FINASTERIDE 5 MG: 5 TABLET, FILM COATED ORAL at 09:04

## 2018-04-07 RX ADMIN — TAMSULOSIN HYDROCHLORIDE 0.4 MG: 0.4 CAPSULE ORAL at 09:04

## 2018-04-07 RX ADMIN — SODIUM CHLORIDE, PRESERVATIVE FREE 3 ML: 5 INJECTION INTRAVENOUS at 02:04

## 2018-04-07 RX ADMIN — IPRATROPIUM BROMIDE AND ALBUTEROL SULFATE 3 ML: .5; 3 SOLUTION RESPIRATORY (INHALATION) at 05:04

## 2018-04-07 RX ADMIN — SODIUM CHLORIDE, PRESERVATIVE FREE 3 ML: 5 INJECTION INTRAVENOUS at 05:04

## 2018-04-07 RX ADMIN — VANCOMYCIN HYDROCHLORIDE 1000 MG: 1 INJECTION, POWDER, LYOPHILIZED, FOR SOLUTION INTRAVENOUS at 10:04

## 2018-04-07 RX ADMIN — CLINDAMYCIN IN 5 PERCENT DEXTROSE 900 MG: 18 INJECTION, SOLUTION INTRAVENOUS at 05:04

## 2018-04-07 RX ADMIN — LEVOTHYROXINE SODIUM 100 MCG: 100 TABLET ORAL at 05:04

## 2018-04-07 RX ADMIN — CLINDAMYCIN IN 5 PERCENT DEXTROSE 900 MG: 18 INJECTION, SOLUTION INTRAVENOUS at 03:04

## 2018-04-07 RX ADMIN — CLINDAMYCIN IN 5 PERCENT DEXTROSE 900 MG: 18 INJECTION, SOLUTION INTRAVENOUS at 09:04

## 2018-04-07 RX ADMIN — IPRATROPIUM BROMIDE AND ALBUTEROL SULFATE 3 ML: .5; 3 SOLUTION RESPIRATORY (INHALATION) at 04:04

## 2018-04-07 RX ADMIN — DONEPEZIL HYDROCHLORIDE 10 MG: 5 TABLET, FILM COATED ORAL at 09:04

## 2018-04-07 RX ADMIN — MUPIROCIN: 20 OINTMENT TOPICAL at 09:04

## 2018-04-07 NOTE — PLAN OF CARE
Problem: Patient Care Overview  Goal: Plan of Care Review  Outcome: Ongoing (interventions implemented as appropriate)  Pt received on nasal cannula at  2  lpm.  SPO2   94%.  Pt in no apparent respiratory distress.  Will continue to monitor.

## 2018-04-07 NOTE — PROGRESS NOTES
[facility] IM Resident Progress Note    Subjective:      Patient coughing throughout interview. States he feels tired this AM and that he did not sleep last night due to cough. Productive of yellow sputum. Started breathing treatment     Objective:   Last 24 Hour Vital Signs:  BP  Min: 116/56  Max: 144/65  Temp  Av.3 °F (36.8 °C)  Min: 97.5 °F (36.4 °C)  Max: 99.1 °F (37.3 °C)  Pulse  Av.3  Min: 65  Max: 108  Resp  Av.8  Min: 17  Max: 22  SpO2  Av.7 %  Min: 94 %  Max: 99 %  I/O last 3 completed shifts:  In: 3558.5 [I.V.:607.5; IV Piggyback:2951]  Out: 875 [Urine:875]    Physical Examination:  General:           Alert and awake in NAD  Head:               Normocephalic and atraumatic  Eyes:               anicteric sclera and clear conjunctivae  Mouth:             Oropharynx clear and without exudate; moist mucous membranes  Cardio:             Irregular rhythm; with normal S1 and S2; no murmurs or rubs  Resp:               Bibasilar crackles; diffuse rhonchi/wheezing  Abdom:            Soft, NTND with normoactive bowel sounds  Extrem:            Left 1st toe with erythema, bruising, swelling; toenail removed; Trace b/l edema; WWP with no cyanosis  Skin:                Left knee abrasion; diffuse countusions and scatter purpura on extremities and chest  Pulses:            2+ and symmetric distally    Laboratory:  Laboratory Data Reviewed: yes  Pertinent Findings:    Recent Labs  Lab 18  1622 18  0637   WBC 11.88 18.79*   HGB 9.4* 7.9*   HCT 29.2* 24.9*   PLT 44* 58*   MCV 92 92   RDW 15.1* 15.7*    137   K 5.3* 5.1    104   CO2 26 24   BUN 34* 38*   CREATININE 1.5* 1.7*    111*   PROT 6.7 5.2*   ALBUMIN 3.2* 2.5*   BILITOT 0.8 0.8   AST 61* 39   ALKPHOS 164* 112   ALT 50* 36     Trop 0.047->0.133->0.125  Lactate2.5->1.8  Procal 21    Microbiology Data Reviewed: yes  Pertinent Findings:  Blood cx G+ cocci likely staph  Urine cx pending  Respiratory cx: stain-G+ cocci, cx  pending    Other Results:  EKG (my interpretation):none new    Radiology Data Reviewed: yes  Pertinent Findings:  Xray, toe:    Erosive changes involving the left 1st metacarpal head and base of the 1st proximal phalanx.  The findings suggestive of early osteomyelitis.  Contrast enhanced MRI may be obtained for further evaluation.    Subtle erosive changes involving the head of the left 5th metacarpal.  This can also be assessed on the MRI examination.    Current Medications:     Infusions:   sodium chloride 0.9% 75 mL/hr at 04/06/18 1328        Scheduled:   albuterol-ipratropium 2.5mg-0.5mg/3mL  3 mL Nebulization Q4H    ceFEPime (MAXIPIME) IVPB  2 g Intravenous Q12H    ciprofloxacin  400 mg Intravenous Q8H    clindamycin (CLEOCIN) IVPB  900 mg Intravenous Q8H    donepezil  10 mg Oral QHS    finasteride  5 mg Oral Daily    levothyroxine  100 mcg Oral Before breakfast    mirtazapine  7.5 mg Oral QHS    mupirocin   Topical (Top) Daily    sodium chloride 0.9%  3 mL Intravenous Q8H    tamsulosin  0.4 mg Oral Daily    vancomycin (VANCOCIN) IVPB  1,000 mg Intravenous Q24H        PRN:  albuterol-ipratropium 2.5mg-0.5mg/3mL    Antibiotics and Day Number of Therapy:  Vanc, cefepime, cipro--4/5-present    Assessment:     Zeyad Woodard is a 89 y.o.male with     Plan:     Acute hypoxic respiratory failure and severe sepsis 2/2 community acquired pneumonia  -1 week progressive productive cough, fever, SOB  On admit, satting well on 5L O2 via NC, temp 103, CXR with bibasilar consolidations, lactate 2.5  BP stable  Modified barium swallow study 1/18 w/o aspiration  -In ED, given 2L NS, started vanc, cipro, cefepime (4/5)--penicillin allergy was remote and only hives  -Lactate normalized, procal 21  Duonebs, CPT   - G+ cocci in resp and blood cx, sensitivities pending     Left 1st toe osteomyelitis  -Bruising, erythema, swelling 2/2 recent trauma  H/o neuropathy, follows with podiatry  Xray suggestive of  early osteo  -Continue vanc, cefepime, cipro (4/5)  Consult general surgery vs podiatry     ELIZABET on CKD 3A  -On admit, Cr 1.5, GFR 40  Baseline Cr 1.2, GFR 60  -Likely prerenal in setting of sepsis  Cr trended up to 1.7--continue to hydrate and monitor  Urine electrolytes pending     Elevated troponin  -On admit, trop 0.047  Chronically elevated trop to 0.03-0.06. Also, possibly 2/2 demand in setting of sepsis and CKD  EKG ventricularly paced  -Trending troponins--peaked 0.133->0.125     Chronic thrombocytopenia  -Baseline platelets ~ 90  Follows with Dr. Jeff as outpt  -On admit, platelets 44-likely exacerbated by sepsis  Scatter petechiae, contusions in setting of recent fall but no active bleeding  -Consider consulting Dr. Jeff or platelet transfusion if worsens     Chronic HFpEF  -TTE 12/17-EF 55%, grade 2 diastolic dysfunction, PA pressure 35  -On admit, Trop 0.047,   CXR with b/l consolidations 2/2 PNA rather than volume overload--given fluids per sepsis protocol  Monitor closely for volume overload     A fib and SSS s/p pacemaker  -On admit, in ventricularly paced rhythm  Not on home anticoagulation 2/2 h/o GI bleeds. Also falls frequently  -No acute issues     Anemia of chronic disease, h/o CHRISTIANO and B12 deficiency  -On admit, Hbg 9.4--at baseline  1/18--w/u c/w AoCD--ferritin, B12 wnl  -Follows with Dr. Jeff--continue home iron, B12     Transaminitis   -On admit, , AST 61, ALT 50  Consistent with baseline  -Negative Hep C Ab 2012--defer repeating as patient would be poor treatment candidate given age/comorbidities     HTN  -On admit, BP low/normal  Held home coreg in setting of sepsis     Hypothyroidism  -No acute issues  Continue home synthroid     BPH  -No acute issues  Continue home flomax, finasteride     Frequent mechanical falls  -Per chart review  Fell 3 days PTA  -Per family, previously had HH PT/OT that has been discontinued/completed  Only mobile via  wheelchair  -Consulted PT/OT     Dementia  -Baseline--Depending for ADLs, lives at [facility] group home and has supervision via aide and nurse  -A&O to person, place, date; not situation  Continue home remeron, aricept     Code Status:      Full     Dispo: pending clinical improvement of pneumonia     Fluids: s/p 2L NS bolus, 75 cc/h NS  Diet: NPO pending SLP  Ppx: SCDs; holding chemical ppx in setting of thrombocytopenia      Clifton Carty  hospitals Internal Medicine HO-I  hospitals IM Service Team A    hospitals Medicine Hospitalist Pager numbers:   hospitals Hospitalist Medicine Team A (Kinsey/Yumiko): [phone]  hospitals Hospitalist Medicine Team B (Tyrese/Bibi):  [phone]

## 2018-04-07 NOTE — PLAN OF CARE
Problem: Patient Care Overview  Goal: Plan of Care Review  Outcome: Ongoing (interventions implemented as appropriate)   04/07/18 4001   Coping/Psychosocial   Plan Of Care Reviewed With patient;daughter   Care plan reviewed with Pt verbalized plan of care. AAOx3, family at bedside, O2 at 2 liters via n/c, O2 sat 97%. Afib 70's per cardiac monitor, no red alarm noted. Left great toe with dressing clean and dry, no odor, no drainage present. Denies any pain of discomfort, education provided on medication effect and side effect, including IV antibiotic voices understanding. Aspiration and safety precautions maintained, call light within his reach, no apparent distress noted, bed in low position, bed alarm on, educated on the importance of calling as needed, voices understanding, stable at this time.

## 2018-04-08 LAB
ALBUMIN SERPL BCP-MCNC: 2.7 G/DL
ALP SERPL-CCNC: 139 U/L
ALT SERPL W/O P-5'-P-CCNC: 41 U/L
ANION GAP SERPL CALC-SCNC: 9 MMOL/L
ANISOCYTOSIS BLD QL SMEAR: SLIGHT
AST SERPL-CCNC: 55 U/L
BASOPHILS # BLD AUTO: ABNORMAL K/UL
BASOPHILS NFR BLD: 0 %
BILIRUB SERPL-MCNC: 0.9 MG/DL
BUN SERPL-MCNC: 38 MG/DL
CALCIUM SERPL-MCNC: 8.6 MG/DL
CHLORIDE SERPL-SCNC: 107 MMOL/L
CO2 SERPL-SCNC: 23 MMOL/L
CREAT SERPL-MCNC: 1.5 MG/DL
DIFFERENTIAL METHOD: ABNORMAL
EOSINOPHIL # BLD AUTO: ABNORMAL K/UL
EOSINOPHIL NFR BLD: 0 %
ERYTHROCYTE [DISTWIDTH] IN BLOOD BY AUTOMATED COUNT: 15.6 %
EST. GFR  (AFRICAN AMERICAN): 47 ML/MIN/1.73 M^2
EST. GFR  (NON AFRICAN AMERICAN): 40 ML/MIN/1.73 M^2
GLUCOSE SERPL-MCNC: 105 MG/DL
HCT VFR BLD AUTO: 24.9 %
HGB BLD-MCNC: 7.9 G/DL
HYPOCHROMIA BLD QL SMEAR: ABNORMAL
LYMPHOCYTES # BLD AUTO: ABNORMAL K/UL
LYMPHOCYTES NFR BLD: 26 %
MCH RBC QN AUTO: 28.9 PG
MCHC RBC AUTO-ENTMCNC: 31.7 G/DL
MCV RBC AUTO: 91 FL
MONOCYTES # BLD AUTO: ABNORMAL K/UL
MONOCYTES NFR BLD: 3 %
NEUTROPHILS # BLD AUTO: ABNORMAL K/UL
NEUTROPHILS NFR BLD: 67 %
NEUTS BAND NFR BLD MANUAL: 4 %
PLATELET # BLD AUTO: 46 K/UL
PLATELET BLD QL SMEAR: ABNORMAL
PMV BLD AUTO: 10.6 FL
POTASSIUM SERPL-SCNC: 4.5 MMOL/L
PROT SERPL-MCNC: 6.1 G/DL
RBC # BLD AUTO: 2.73 M/UL
SODIUM SERPL-SCNC: 139 MMOL/L
WBC # BLD AUTO: 15.67 K/UL

## 2018-04-08 PROCEDURE — A4216 STERILE WATER/SALINE, 10 ML: HCPCS | Performed by: STUDENT IN AN ORGANIZED HEALTH CARE EDUCATION/TRAINING PROGRAM

## 2018-04-08 PROCEDURE — 25000242 PHARM REV CODE 250 ALT 637 W/ HCPCS: Performed by: STUDENT IN AN ORGANIZED HEALTH CARE EDUCATION/TRAINING PROGRAM

## 2018-04-08 PROCEDURE — 36415 COLL VENOUS BLD VENIPUNCTURE: CPT

## 2018-04-08 PROCEDURE — 63600175 PHARM REV CODE 636 W HCPCS: Performed by: INTERNAL MEDICINE

## 2018-04-08 PROCEDURE — 94664 DEMO&/EVAL PT USE INHALER: CPT

## 2018-04-08 PROCEDURE — 25000003 PHARM REV CODE 250: Performed by: INTERNAL MEDICINE

## 2018-04-08 PROCEDURE — 85007 BL SMEAR W/DIFF WBC COUNT: CPT

## 2018-04-08 PROCEDURE — 63600175 PHARM REV CODE 636 W HCPCS: Performed by: STUDENT IN AN ORGANIZED HEALTH CARE EDUCATION/TRAINING PROGRAM

## 2018-04-08 PROCEDURE — S0077 INJECTION, CLINDAMYCIN PHOSP: HCPCS | Performed by: STUDENT IN AN ORGANIZED HEALTH CARE EDUCATION/TRAINING PROGRAM

## 2018-04-08 PROCEDURE — 97530 THERAPEUTIC ACTIVITIES: CPT

## 2018-04-08 PROCEDURE — 25000003 PHARM REV CODE 250: Performed by: STUDENT IN AN ORGANIZED HEALTH CARE EDUCATION/TRAINING PROGRAM

## 2018-04-08 PROCEDURE — 94761 N-INVAS EAR/PLS OXIMETRY MLT: CPT

## 2018-04-08 PROCEDURE — 94640 AIRWAY INHALATION TREATMENT: CPT

## 2018-04-08 PROCEDURE — 11000001 HC ACUTE MED/SURG PRIVATE ROOM

## 2018-04-08 PROCEDURE — 80053 COMPREHEN METABOLIC PANEL: CPT

## 2018-04-08 PROCEDURE — 27000221 HC OXYGEN, UP TO 24 HOURS

## 2018-04-08 PROCEDURE — 85027 COMPLETE CBC AUTOMATED: CPT

## 2018-04-08 RX ORDER — CARVEDILOL 3.12 MG/1
3.12 TABLET ORAL 2 TIMES DAILY WITH MEALS
Status: DISCONTINUED | OUTPATIENT
Start: 2018-04-08 | End: 2018-04-10

## 2018-04-08 RX ADMIN — SODIUM CHLORIDE, PRESERVATIVE FREE 3 ML: 5 INJECTION INTRAVENOUS at 06:04

## 2018-04-08 RX ADMIN — IPRATROPIUM BROMIDE AND ALBUTEROL SULFATE 3 ML: .5; 3 SOLUTION RESPIRATORY (INHALATION) at 12:04

## 2018-04-08 RX ADMIN — IPRATROPIUM BROMIDE AND ALBUTEROL SULFATE 3 ML: .5; 3 SOLUTION RESPIRATORY (INHALATION) at 03:04

## 2018-04-08 RX ADMIN — CEFEPIME 2 G: 2 INJECTION, POWDER, FOR SOLUTION INTRAMUSCULAR; INTRAVENOUS at 08:04

## 2018-04-08 RX ADMIN — CEFEPIME 2 G: 2 INJECTION, POWDER, FOR SOLUTION INTRAMUSCULAR; INTRAVENOUS at 09:04

## 2018-04-08 RX ADMIN — IPRATROPIUM BROMIDE AND ALBUTEROL SULFATE 3 ML: .5; 3 SOLUTION RESPIRATORY (INHALATION) at 04:04

## 2018-04-08 RX ADMIN — SODIUM CHLORIDE, PRESERVATIVE FREE 3 ML: 5 INJECTION INTRAVENOUS at 12:04

## 2018-04-08 RX ADMIN — VANCOMYCIN HYDROCHLORIDE 1000 MG: 1 INJECTION, POWDER, LYOPHILIZED, FOR SOLUTION INTRAVENOUS at 10:04

## 2018-04-08 RX ADMIN — LEVOTHYROXINE SODIUM 100 MCG: 100 TABLET ORAL at 06:04

## 2018-04-08 RX ADMIN — MIRTAZAPINE 7.5 MG: 7.5 TABLET ORAL at 08:04

## 2018-04-08 RX ADMIN — CLINDAMYCIN IN 5 PERCENT DEXTROSE 900 MG: 18 INJECTION, SOLUTION INTRAVENOUS at 01:04

## 2018-04-08 RX ADMIN — SODIUM CHLORIDE, PRESERVATIVE FREE 3 ML: 5 INJECTION INTRAVENOUS at 10:04

## 2018-04-08 RX ADMIN — IPRATROPIUM BROMIDE AND ALBUTEROL SULFATE 3 ML: .5; 3 SOLUTION RESPIRATORY (INHALATION) at 07:04

## 2018-04-08 RX ADMIN — CARVEDILOL 3.12 MG: 3.12 TABLET, FILM COATED ORAL at 05:04

## 2018-04-08 RX ADMIN — CLINDAMYCIN IN 5 PERCENT DEXTROSE 900 MG: 18 INJECTION, SOLUTION INTRAVENOUS at 08:04

## 2018-04-08 RX ADMIN — TAMSULOSIN HYDROCHLORIDE 0.4 MG: 0.4 CAPSULE ORAL at 09:04

## 2018-04-08 RX ADMIN — IPRATROPIUM BROMIDE AND ALBUTEROL SULFATE 3 ML: .5; 3 SOLUTION RESPIRATORY (INHALATION) at 11:04

## 2018-04-08 RX ADMIN — DONEPEZIL HYDROCHLORIDE 10 MG: 5 TABLET, FILM COATED ORAL at 08:04

## 2018-04-08 RX ADMIN — IPRATROPIUM BROMIDE AND ALBUTEROL SULFATE 3 ML: .5; 3 SOLUTION RESPIRATORY (INHALATION) at 08:04

## 2018-04-08 RX ADMIN — FINASTERIDE 5 MG: 5 TABLET, FILM COATED ORAL at 09:04

## 2018-04-08 RX ADMIN — CLINDAMYCIN IN 5 PERCENT DEXTROSE 900 MG: 18 INJECTION, SOLUTION INTRAVENOUS at 04:04

## 2018-04-08 RX ADMIN — CARVEDILOL 3.12 MG: 3.12 TABLET, FILM COATED ORAL at 09:04

## 2018-04-08 RX ADMIN — SODIUM CHLORIDE, PRESERVATIVE FREE 3 ML: 5 INJECTION INTRAVENOUS at 01:04

## 2018-04-08 NOTE — PROGRESS NOTES
[facility] IM Resident Progress Note    Subjective:      Afebrile overnight. Patient resting comfortably this AM. States breathing has improved since yesterday. Denies febrile symptoms such as chills or sweats. Has no other complaints.     Objective:   Last 24 Hour Vital Signs:  BP  Min: 122/56  Max: 178/77  Temp  Av.6 °F (37 °C)  Min: 97.7 °F (36.5 °C)  Max: 99.2 °F (37.3 °C)  Pulse  Av.7  Min: 64  Max: 100  Resp  Av.6  Min: 16  Max: 22  SpO2  Av.2 %  Min: 94 %  Max: 100 %  I/O last 3 completed shifts:  In: 210 [P.O.:210]  Out: 713 [Urine:713]    Physical Examination:  General:           Alert and awake in NAD  Head:               Normocephalic and atraumatic  Eyes:               anicteric sclera and clear conjunctivae  Mouth:             Oropharynx clear and without exudate; moist mucous membranes  Cardio:             Irregular rhythm; with normal S1 and S2; no murmurs or rubs  Resp:               Bibasilar crackles; diffuse rhonchi/wheezing  Abdom:            Soft, NTND with normoactive bowel sounds  Extrem:            Left 1st toe with erythema, bruising, swelling; toenail removed; Trace b/l edema; WWP with no cyanosis  Skin:                Left knee abrasion; diffuse countusions and scatter purpura on extremities and chest  Pulses:            2+ and symmetric distally    Laboratory:  Laboratory Data Reviewed: yes  Pertinent Findings:    Recent Labs  Lab 18  0637 18  0404 18  0433   WBC 18.79* 15.40* 15.67*   HGB 7.9* 7.9* 7.9*   HCT 24.9* 25.7* 24.9*   PLT 58* 67* 46*   MCV 92 95 91   RDW 15.7* 15.9* 15.6*    136 139   K 5.1 5.1 4.5    106 107   CO2 24 21* 23   BUN 38* 42* 38*   CREATININE 1.7* 1.6* 1.5*   * 83 105   PROT 5.2* 5.9* 6.1   ALBUMIN 2.5* 2.6* 2.7*   BILITOT 0.8 0.8 0.9   AST 39 41* 55*   ALKPHOS 112 111 139*   ALT 36 32 41       Microbiology Data Reviewed: yes  Pertinent Findings:  Blood cx G+ cocci likely staph  Urine cx pending  Respiratory cx:  stain-G+ cocci, cx pending    Other Results:  EKG (my interpretation):none new    Radiology Data Reviewed: yes  Pertinent Findings:    Imaging Results          X-Ray Toe 2 or More Views Left (Final result)  Result time 04/05/18 19:14:09    Final result by Jurgen Gaston MD (04/05/18 19:14:09)                 Impression:      Erosive changes involving the left 1st metacarpal head and base of the 1st proximal phalanx.  The findings suggestive of early osteomyelitis.  Contrast enhanced MRI may be obtained for further evaluation.    Subtle erosive changes involving the head of the left 5th metacarpal.  This can also be assessed on the MRI examination.      Electronically signed by: Jurgen Gaston MD  Date:    04/05/2018  Time:    19:14             Narrative:    EXAMINATION:  XR TOE 2 OR MORE VIEWS LEFT    CLINICAL HISTORY:  swelling;    TECHNIQUE:  Three views of the left toes were performed    COMPARISON:  None.    FINDINGS:  There is diffuse demineralization of the osseous structures.  There are erosive changes involving the head of the 1st metatarsal and the base of the 1st proximal phalanx.  Subtle erosive changes also noted involving the head of the left 5th metacarpal.  There is no evidence of a fracture.  There is joint space narrowing throughout the visualized left forefoot.  There is no evidence of a dislocation.  There are extensive vascular calcifications.                               X-Ray Chest AP Portable (Final result)  Result time 04/05/18 16:33:19    Final result by Mis Mccray MD (04/05/18 16:33:19)                 Impression:      Bibasilar patchy areas of increased attenuation concerning for subsegmental areas of airspace disease considering pneumonia or aspiration.    Cardiomegaly.      Electronically signed by: Mis Mccray MD  Date:    04/05/2018  Time:    16:33             Narrative:    EXAMINATION:  XR CHEST AP PORTABLE    CLINICAL HISTORY:  Sepsis;    TECHNIQUE:  Single frontal  view of the chest was performed.    COMPARISON:  01/11/2018    FINDINGS:  There is a cardio stimulator device in the left upper chest with a single lead the.  Sternotomy wires.  The cardiac silhouette is enlarged.  Patchy areas of increased attenuation at the bilateral lung bases concerning for areas of possible pneumonia or aspiration, it is new compared to the prior exam.  No pleural effusion.  No pneumothorax.  Atherosclerotic changes of the aorta.                                Current Medications:     Infusions:       Scheduled:   albuterol-ipratropium 2.5mg-0.5mg/3mL  3 mL Nebulization Q4H    ceFEPime (MAXIPIME) IVPB  2 g Intravenous Q12H    clindamycin (CLEOCIN) IVPB  900 mg Intravenous Q8H    donepezil  10 mg Oral QHS    finasteride  5 mg Oral Daily    levothyroxine  100 mcg Oral Before breakfast    mirtazapine  7.5 mg Oral QHS    mupirocin   Topical (Top) Daily    sodium chloride 0.9%  3 mL Intravenous Q8H    tamsulosin  0.4 mg Oral Daily    vancomycin (VANCOCIN) IVPB  1,000 mg Intravenous Q24H        PRN:  albuterol-ipratropium 2.5mg-0.5mg/3mL    Antibiotics and Day Number of Therapy:  Vanc, cefepime, cipro--4/5-present    Assessment:     Zeyad Woodard is a 89 y.o.male with     Plan:     Acute hypoxic respiratory failure and severe sepsis 2/2 community acquired pneumonia  -1 week progressive productive cough, fever, SOB  On admit, satting well on 5L O2 via NC, temp 103, CXR with bibasilar consolidations, lactate 2.5  BP stable  Modified barium swallow study 1/18 w/o aspiration  -In ED, given 2L NS, started vanc, cipro, cefepime (4/5)--penicillin allergy was remote and only hives  -Lactate normalized, procal 21  Duonebs, CPT   -continue vanc, clinda, cefepime  - G+ cocci in resp and blood cx, sensitivities pending     Left 1st toe erosion  -Bruising, erythema, swelling 2/2 recent trauma  H/o neuropathy, follows with podiatry  Xray suggestive of early osteo  -Continue vanc, cefepime,  cipro (4/5)    -podiatry following, do not believe it is osteo. Removed nail, routine wound care.     ELIZABET on CKD 3A  -On admit, Cr 1.5, GFR 40  Baseline Cr 1.2, GFR 60  -Likely prerenal in setting of sepsis  1.5 this AM      Elevated troponin  -On admit, trop 0.047  Chronically elevated trop to 0.03-0.06. Also, possibly 2/2 demand in setting of sepsis and CKD  EKG ventricularly paced  -Trended troponins--peaked 0.133->0.125     Chronic thrombocytopenia  -Baseline platelets ~ 90  Follows with Dr. Jeff as outpt  -On admit, platelets 44-likely exacerbated by sepsis  Scatter petechiae, contusions in setting of recent fall but no active bleeding  -PLT stable     Chronic HFpEF  -TTE 12/17-EF 55%, grade 2 diastolic dysfunction, PA pressure 35  -On admit, Trop 0.047,   CXR with b/l consolidations 2/2 PNA rather than volume overload--given fluids per sepsis protocol  Monitor closely for volume overload     A fib and SSS s/p pacemaker  -On admit, in ventricularly paced rhythm  Not on home anticoagulation 2/2 h/o GI bleeds. Also falls frequently  -No acute issues     Anemia of chronic disease, h/o CHRISTIANO and B12 deficiency  -On admit, Hbg 9.4--at baseline  1/18--w/u c/w AoCD--ferritin, B12 wnl  -Follows with Dr. Jeff--continue home iron, B12     Transaminitis   -On admit, , AST 61, ALT 50  Consistent with baseline  -Negative Hep C Ab 2012--defer repeating as patient would be poor treatment candidate given age/comorbidities     HTN  -On admit, BP low/normal  Held home coreg in setting of sepsis  -Will resume home coreg given HTN     Hypothyroidism  -No acute issues  Continue home synthroid     BPH  -No acute issues  Continue home flomax, finasteride     Frequent mechanical falls  -Per chart review  Fell 3 days PTA  -Per family, previously had HH PT/OT that has been discontinued/completed  Only mobile via wheelchair  -Consulted PT/OT     Dementia  -Baseline--Depending for ADLs, lives at [facility]  group home and has supervision via aide and nurse  -A&O to person, place, date; not situation  Continue home remeron, aricept     Code Status:      Full     Dispo: pending culture speciation and sensitivities     Fluids: None    Diet: cardiac pureed, honey thick    Ppx: SCDs; holding chemical ppx in setting of thrombocytopenia      Lavell Daniels MD  [facility] Internal Medicine HO-III  [facility] IM Service Team A    [facility] Medicine Hospitalist Pager numbers:   [facility] Hospitalist Medicine Team A (Kinsey/Yumiko): [phone]  [facility] Hospitalist Medicine Team B (Tyrese/Bibi):  [phone]

## 2018-04-08 NOTE — PT/OT/SLP PROGRESS
Physical Therapy Treatment    Patient Name:  Zeyad Woodard   MRN:  [id number]    Recommendations:     Discharge Recommendations:   (grp home with PT vs SNF)   Discharge Equipment Recommendations: none   Barriers to discharge: Decreased caregiver support    Assessment:     Zeyad Woodard is a 89 y.o. male admitted with a medical diagnosis of HCAP (healthcare-associated pneumonia).  He presents with the following impairments/functional limitations:  weakness, impaired endurance, impaired functional mobilty, decreased lower extremity function, decreased upper extremity function, pain, decreased ROM, impaired coordination, impaired cardiopulmonary response to activity pt with increased fatigue and decreased ability to participate in rx,pt will benefit from from continued PT services.    Rehab Prognosis:  Fair; patient would benefit from acute skilled PT services to address these deficits and reach maximum level of function.      Recent Surgery: * No surgery found *      Plan:     During this hospitalization, patient to be seen 6 x/week to address the above listed problems via gait training, therapeutic activities, therapeutic exercises  · Plan of Care Expires:  05/06/18   Plan of Care Reviewed with: patient    Subjective     Communicated with nsg prior to session.  Patient found supine upon PT entry to room, agreeable to treatment.      Chief Complaint: R abdominal pain  Patient comments/goals: pt states he is eating at times  Pain/Comfort:  · Location - Side 1: Right  · Location - Orientation 1: lateral  · Location 1: abdomen  · Pain Addressed 1: Nurse notified    Patients cultural, spiritual, Mormon conflicts given the current situation:      Objective:     Patient found with: oxygen, telemetry     General Precautions: Standard, aspiration, fall, pureed diet, honey thick   Orthopedic Precautions:N/A   Braces: N/A     Functional Mobility:  · Gait: n/a      AM-PAC 6 CLICK MOBILITY  Turning over in bed (including  adjusting bedclothes, sheets and blankets)?: 2  Sitting down on and standing up from a chair with arms (e.g., wheelchair, bedside commode, etc.): 2  Moving from lying on back to sitting on the side of the bed?: 2  Moving to and from a bed to a chair (including a wheelchair)?: 2  Need to walk in hospital room?: 1  Climbing 3-5 steps with a railing?: 1  Total Score: 10       Therapeutic Activities and Exercises: le AA supine ex's X 10-12 reps with rest periods,pt is Tot A X 2 as per previous notes       Patient left supine with all lines intact, call button in reach, bed alarm on and nsg notified..    GOALS: see general POC   Physical Therapy Goals        Problem: Physical Therapy Goal    Goal Priority Disciplines Outcome Goal Variances Interventions   Physical Therapy Goal     PT/OT, PT Ongoing (interventions implemented as appropriate)     Description:  Goals to be met by: 2018     Patient will increase functional independence with mobility by performin. Supine to sit with Stand-by Assistance  2. Sit to stand transfer with Stand-by Assistance  3. Bed to chair transfer with Stand-by Assistance using Rolling Walker  4. Gait  x 20 feet with Minimal Assistance using Rolling Walker.                       Time Tracking:     PT Received On: 18  PT Start Time: 951     PT Stop Time: 1003  PT Total Time (min): 12 min     Billable Minutes: Therapeutic Activity 12    Treatment Type: Treatment  PT/PTA: PTA     PTA Visit Number: 1     Tashi Small, PTA  2018

## 2018-04-08 NOTE — PLAN OF CARE
Problem: Physical Therapy Goal  Goal: Physical Therapy Goal  Goals to be met by: 2018     Patient will increase functional independence with mobility by performin. Supine to sit with Stand-by Assistance  2. Sit to stand transfer with Stand-by Assistance  3. Bed to chair transfer with Stand-by Assistance using Rolling Walker  4. Gait  x 20 feet with Minimal Assistance using Rolling Walker.      Outcome: Ongoing (interventions implemented as appropriate)  Goals ongoing

## 2018-04-08 NOTE — PLAN OF CARE
Problem: Pressure Ulcer Risk (Chucho Scale) (Adult,Obstetrics,Pediatric)  Goal: Identify Related Risk Factors and Signs and Symptoms  Related risk factors and signs and symptoms are identified upon initiation of Human Response Clinical Practice Guideline (CPG)   Outcome: Ongoing (interventions implemented as appropriate)  Patient is nursing home resident with mild dementia. Cognitive deficit. Patient bed bound while in hospital. PCT's instructed to turn patient every two hrs.

## 2018-04-08 NOTE — PLAN OF CARE
Problem: Patient Care Overview  Goal: Plan of Care Review  Outcome: Ongoing (interventions implemented as appropriate)  Pt's SpO2 100% on 2 lpm NC. No adverse reactions to aerosol tx. No respiratory distress noted. Continue to monitor SpO2.

## 2018-04-09 LAB
ALBUMIN SERPL BCP-MCNC: 2.6 G/DL
ALP SERPL-CCNC: 146 U/L
ALT SERPL W/O P-5'-P-CCNC: 44 U/L
ANION GAP SERPL CALC-SCNC: 10 MMOL/L
AST SERPL-CCNC: 55 U/L
BACTERIA BLD CULT: NORMAL
BACTERIA SPEC AEROBE CULT: NO GROWTH
BACTERIA SPEC AEROBE CULT: NORMAL
BASOPHILS # BLD AUTO: 0.03 K/UL
BASOPHILS NFR BLD: 0.2 %
BILIRUB SERPL-MCNC: 1 MG/DL
BUN SERPL-MCNC: 33 MG/DL
CALCIUM SERPL-MCNC: 8.5 MG/DL
CHLORIDE SERPL-SCNC: 107 MMOL/L
CO2 SERPL-SCNC: 21 MMOL/L
CREAT SERPL-MCNC: 1.3 MG/DL
DELSYS: ABNORMAL
DIFFERENTIAL METHOD: ABNORMAL
EOSINOPHIL # BLD AUTO: 0.1 K/UL
EOSINOPHIL NFR BLD: 0.3 %
ERYTHROCYTE [DISTWIDTH] IN BLOOD BY AUTOMATED COUNT: 15.3 %
EST. GFR  (AFRICAN AMERICAN): 56 ML/MIN/1.73 M^2
EST. GFR  (NON AFRICAN AMERICAN): 48 ML/MIN/1.73 M^2
GLUCOSE SERPL-MCNC: 90 MG/DL
GRAM STN SPEC: NORMAL
HCO3 UR-SCNC: 23.9 MMOL/L (ref 24–28)
HCT VFR BLD AUTO: 25.3 %
HGB BLD-MCNC: 8.1 G/DL
LYMPHOCYTES # BLD AUTO: 7.2 K/UL
LYMPHOCYTES NFR BLD: 46.3 %
MCH RBC QN AUTO: 28.9 PG
MCHC RBC AUTO-ENTMCNC: 32 G/DL
MCV RBC AUTO: 90 FL
MONOCYTES # BLD AUTO: 0.8 K/UL
MONOCYTES NFR BLD: 5.2 %
NEUTROPHILS # BLD AUTO: 7.4 K/UL
NEUTROPHILS NFR BLD: 48 %
PCO2 BLDA: 37.7 MMHG (ref 35–45)
PH SMN: 7.41 [PH] (ref 7.35–7.45)
PLATELET # BLD AUTO: 57 K/UL
PLATELET BLD QL SMEAR: ABNORMAL
PMV BLD AUTO: 10.8 FL
PO2 BLDA: 89 MMHG (ref 80–100)
POC BE: -1 MMOL/L
POC SATURATED O2: 97 % (ref 95–100)
POC TCO2: 25 MMOL/L (ref 23–27)
POTASSIUM SERPL-SCNC: 4.3 MMOL/L
PROT SERPL-MCNC: 6.1 G/DL
RBC # BLD AUTO: 2.8 M/UL
SAMPLE: ABNORMAL
SITE: ABNORMAL
SODIUM SERPL-SCNC: 138 MMOL/L
TB INDURATION 48 - 72 HR READ: 0 MM
VANCOMYCIN TROUGH SERPL-MCNC: 16.4 UG/ML
WBC # BLD AUTO: 15.65 K/UL

## 2018-04-09 PROCEDURE — 25000003 PHARM REV CODE 250: Performed by: INTERNAL MEDICINE

## 2018-04-09 PROCEDURE — 94668 MNPJ CHEST WALL SBSQ: CPT

## 2018-04-09 PROCEDURE — 36415 COLL VENOUS BLD VENIPUNCTURE: CPT

## 2018-04-09 PROCEDURE — 63600175 PHARM REV CODE 636 W HCPCS: Performed by: INTERNAL MEDICINE

## 2018-04-09 PROCEDURE — 27000221 HC OXYGEN, UP TO 24 HOURS

## 2018-04-09 PROCEDURE — 92610 EVALUATE SWALLOWING FUNCTION: CPT

## 2018-04-09 PROCEDURE — S0077 INJECTION, CLINDAMYCIN PHOSP: HCPCS | Performed by: STUDENT IN AN ORGANIZED HEALTH CARE EDUCATION/TRAINING PROGRAM

## 2018-04-09 PROCEDURE — 80202 ASSAY OF VANCOMYCIN: CPT

## 2018-04-09 PROCEDURE — 25000003 PHARM REV CODE 250: Performed by: STUDENT IN AN ORGANIZED HEALTH CARE EDUCATION/TRAINING PROGRAM

## 2018-04-09 PROCEDURE — 11000001 HC ACUTE MED/SURG PRIVATE ROOM

## 2018-04-09 PROCEDURE — 94667 MNPJ CHEST WALL 1ST: CPT

## 2018-04-09 PROCEDURE — 82803 BLOOD GASES ANY COMBINATION: CPT

## 2018-04-09 PROCEDURE — 94640 AIRWAY INHALATION TREATMENT: CPT

## 2018-04-09 PROCEDURE — 25000242 PHARM REV CODE 250 ALT 637 W/ HCPCS: Performed by: STUDENT IN AN ORGANIZED HEALTH CARE EDUCATION/TRAINING PROGRAM

## 2018-04-09 PROCEDURE — 63600175 PHARM REV CODE 636 W HCPCS: Performed by: STUDENT IN AN ORGANIZED HEALTH CARE EDUCATION/TRAINING PROGRAM

## 2018-04-09 PROCEDURE — 94761 N-INVAS EAR/PLS OXIMETRY MLT: CPT

## 2018-04-09 PROCEDURE — 80053 COMPREHEN METABOLIC PANEL: CPT

## 2018-04-09 PROCEDURE — A4216 STERILE WATER/SALINE, 10 ML: HCPCS | Performed by: STUDENT IN AN ORGANIZED HEALTH CARE EDUCATION/TRAINING PROGRAM

## 2018-04-09 PROCEDURE — 36600 WITHDRAWAL OF ARTERIAL BLOOD: CPT

## 2018-04-09 PROCEDURE — 85025 COMPLETE CBC W/AUTO DIFF WBC: CPT

## 2018-04-09 RX ORDER — TRAMADOL HYDROCHLORIDE 50 MG/1
50 TABLET ORAL EVERY 6 HOURS PRN
Status: DISCONTINUED | OUTPATIENT
Start: 2018-04-09 | End: 2018-04-11

## 2018-04-09 RX ADMIN — SODIUM CHLORIDE, PRESERVATIVE FREE 3 ML: 5 INJECTION INTRAVENOUS at 09:04

## 2018-04-09 RX ADMIN — IPRATROPIUM BROMIDE AND ALBUTEROL SULFATE 3 ML: .5; 3 SOLUTION RESPIRATORY (INHALATION) at 12:04

## 2018-04-09 RX ADMIN — IPRATROPIUM BROMIDE AND ALBUTEROL SULFATE 3 ML: .5; 3 SOLUTION RESPIRATORY (INHALATION) at 03:04

## 2018-04-09 RX ADMIN — CLINDAMYCIN IN 5 PERCENT DEXTROSE 900 MG: 18 INJECTION, SOLUTION INTRAVENOUS at 09:04

## 2018-04-09 RX ADMIN — CLINDAMYCIN IN 5 PERCENT DEXTROSE 900 MG: 18 INJECTION, SOLUTION INTRAVENOUS at 04:04

## 2018-04-09 RX ADMIN — CARVEDILOL 3.12 MG: 3.12 TABLET, FILM COATED ORAL at 05:04

## 2018-04-09 RX ADMIN — SODIUM CHLORIDE, PRESERVATIVE FREE 3 ML: 5 INJECTION INTRAVENOUS at 05:04

## 2018-04-09 RX ADMIN — CEFEPIME 2 G: 2 INJECTION, POWDER, FOR SOLUTION INTRAMUSCULAR; INTRAVENOUS at 09:04

## 2018-04-09 RX ADMIN — CLINDAMYCIN IN 5 PERCENT DEXTROSE 900 MG: 18 INJECTION, SOLUTION INTRAVENOUS at 01:04

## 2018-04-09 RX ADMIN — IPRATROPIUM BROMIDE AND ALBUTEROL SULFATE 3 ML: .5; 3 SOLUTION RESPIRATORY (INHALATION) at 08:04

## 2018-04-09 RX ADMIN — SODIUM CHLORIDE, PRESERVATIVE FREE 3 ML: 5 INJECTION INTRAVENOUS at 01:04

## 2018-04-09 RX ADMIN — TAMSULOSIN HYDROCHLORIDE 0.4 MG: 0.4 CAPSULE ORAL at 09:04

## 2018-04-09 RX ADMIN — MUPIROCIN: 20 OINTMENT TOPICAL at 09:04

## 2018-04-09 RX ADMIN — LEVOTHYROXINE SODIUM 100 MCG: 100 TABLET ORAL at 05:04

## 2018-04-09 RX ADMIN — VANCOMYCIN HYDROCHLORIDE 1000 MG: 1 INJECTION, POWDER, LYOPHILIZED, FOR SOLUTION INTRAVENOUS at 11:04

## 2018-04-09 RX ADMIN — CARVEDILOL 3.12 MG: 3.12 TABLET, FILM COATED ORAL at 09:04

## 2018-04-09 RX ADMIN — TRAMADOL HYDROCHLORIDE 50 MG: 50 TABLET, COATED ORAL at 05:04

## 2018-04-09 RX ADMIN — IPRATROPIUM BROMIDE AND ALBUTEROL SULFATE 3 ML: .5; 3 SOLUTION RESPIRATORY (INHALATION) at 07:04

## 2018-04-09 RX ADMIN — FINASTERIDE 5 MG: 5 TABLET, FILM COATED ORAL at 09:04

## 2018-04-09 NOTE — PLAN OF CARE
TN rounded, patient not ready for DC. Accompanied by two family members Asia and Heather, explained we are looking for SNf to accept but patient has low level of therapy participation at this time.       04/09/18 1124   Discharge Reassessment   Assessment Type Discharge Planning Reassessment   Discharge Plan A Home Health;Group Home   Discharge Plan B Skilled Nursing Facility

## 2018-04-09 NOTE — PLAN OF CARE
Changed dressings to bilateral knees, cleaned with wound . Applied a sacral dressing to sacrum. Removed two IV from left arm, concern of infiltration. Turned patient  To right side, placed wedge

## 2018-04-09 NOTE — PLAN OF CARE
Problem: Patient Care Overview  Goal: Plan of Care Review  Outcome: Ongoing (interventions implemented as appropriate)  Pt's SpO2 99% on 2 lpm NC. No adverse reactions to aerosol tx. No respiratory distress noted. Continue to monitor SpO2.

## 2018-04-09 NOTE — PROGRESS NOTES
The Sw spoke to Dianne at [facility] who states she reached out to the pt's dtr to sign the admit papers but she informed her the pt's not ready for d/c until the end of the week and told her to get the snf auth from [facility] and then she will come to sign the admit papers. The Sw informed Dianne the pt's not ready for d/c today.

## 2018-04-09 NOTE — PROGRESS NOTES
The Sw faxed updated notes to [facility] via [employer]. The [facility] and she states the pt needs to go to a long term facility and they're declining him. The Sw informed Cole of this info.

## 2018-04-09 NOTE — PROGRESS NOTES
"[facility] IM Resident Progress Note    Subjective:      Lethargic this AM. Responsive but says "yes" to everything asked so cannot assess new complaints.    Objective:   Last 24 Hour Vital Signs:  BP  Min: 122/58  Max: 184/86  Temp  Av.5 °F (36.9 °C)  Min: 96.3 °F (35.7 °C)  Max: 99.4 °F (37.4 °C)  Pulse  Av.5  Min: 64  Max: 93  Resp  Av.8  Min: 16  Max: 21  SpO2  Av.8 %  Min: 96 %  Max: 100 %  I/O last 3 completed shifts:  In: 125 [P.O.:125]  Out: 1166 [Urine:1166]    Physical Examination:  General:           Alert and awake, lethargic  Head:               Normocephalic and atraumatic  Eyes:               anicteric sclera and clear conjunctivae  Mouth:             Oropharynx clear and without exudate; moist mucous membranes  Cardio:             Irregular rhythm; with normal S1 and S2; no murmurs or rubs  Resp:               Mild rhonchi/wheezing--improved from admit  Abdom:            Soft, NTND with normoactive bowel sounds  Extrem:            Left 1st toe with erythema, bruising, swelling; toenail removed; Trace b/l edema; WWP with no cyanosis  Skin:                Left knee abrasion; diffuse countusions and scatter purpura on extremities and chest  Pulses:            2+ and symmetric distally    Laboratory:  Laboratory Data Reviewed: yes  Pertinent Findings:    Recent Labs  Lab 18  0404 18  0433 18  0534   WBC 15.40* 15.67* 15.65*   HGB 7.9* 7.9* 8.1*   HCT 25.7* 24.9* 25.3*   PLT 67* 46* 57*   MCV 95 91 90   RDW 15.9* 15.6* 15.3*    139 138   K 5.1 4.5 4.3    107 107   CO2 21* 23 21*   BUN 42* 38* 33*   CREATININE 1.6* 1.5* 1.3   GLU 83 105 90   PROT 5.9* 6.1 6.1   ALBUMIN 2.6* 2.7* 2.6*   BILITOT 0.8 0.9 1.0   AST 41* 55* 55*   ALKPHOS 111 139* 146*   ALT 32 41 44       Microbiology Data Reviewed: yes  Pertinent Findings:  Blood cx, : coag negative staph, 2/4 bottles  Urine cx negative  Respiratory cx: stain-G+ cocci, cx negative    Blood cx: : NGTD  Wound cx: " NGTD    Other Results:  EKG (my interpretation):none new    Radiology Data Reviewed: yes  Pertinent Findings:    Imaging Results          X-Ray Toe 2 or More Views Left (Final result)  Result time 04/05/18 19:14:09    Final result by Jurgen Gaston MD (04/05/18 19:14:09)                 Impression:      Erosive changes involving the left 1st metacarpal head and base of the 1st proximal phalanx.  The findings suggestive of early osteomyelitis.  Contrast enhanced MRI may be obtained for further evaluation.    Subtle erosive changes involving the head of the left 5th metacarpal.  This can also be assessed on the MRI examination.      Electronically signed by: Jurgen Gaston MD  Date:    04/05/2018  Time:    19:14             Narrative:    EXAMINATION:  XR TOE 2 OR MORE VIEWS LEFT    CLINICAL HISTORY:  swelling;    TECHNIQUE:  Three views of the left toes were performed    COMPARISON:  None.    FINDINGS:  There is diffuse demineralization of the osseous structures.  There are erosive changes involving the head of the 1st metatarsal and the base of the 1st proximal phalanx.  Subtle erosive changes also noted involving the head of the left 5th metacarpal.  There is no evidence of a fracture.  There is joint space narrowing throughout the visualized left forefoot.  There is no evidence of a dislocation.  There are extensive vascular calcifications.                               X-Ray Chest AP Portable (Final result)  Result time 04/05/18 16:33:19    Final result by Mis Mccray MD (04/05/18 16:33:19)                 Impression:      Bibasilar patchy areas of increased attenuation concerning for subsegmental areas of airspace disease considering pneumonia or aspiration.    Cardiomegaly.      Electronically signed by: Mis Mccray MD  Date:    04/05/2018  Time:    16:33             Narrative:    EXAMINATION:  XR CHEST AP PORTABLE    CLINICAL HISTORY:  Sepsis;    TECHNIQUE:  Single frontal view of the chest was  performed.    COMPARISON:  01/11/2018    FINDINGS:  There is a cardio stimulator device in the left upper chest with a single lead the.  Sternotomy wires.  The cardiac silhouette is enlarged.  Patchy areas of increased attenuation at the bilateral lung bases concerning for areas of possible pneumonia or aspiration, it is new compared to the prior exam.  No pleural effusion.  No pneumothorax.  Atherosclerotic changes of the aorta.                                Current Medications:     Infusions:       Scheduled:   albuterol-ipratropium 2.5mg-0.5mg/3mL  3 mL Nebulization Q4H    carvedilol  3.125 mg Oral BID WM    ceFEPime (MAXIPIME) IVPB  2 g Intravenous Q12H    clindamycin (CLEOCIN) IVPB  900 mg Intravenous Q8H    donepezil  10 mg Oral QHS    finasteride  5 mg Oral Daily    levothyroxine  100 mcg Oral Before breakfast    mirtazapine  7.5 mg Oral QHS    mupirocin   Topical (Top) Daily    sodium chloride 0.9%  3 mL Intravenous Q8H    tamsulosin  0.4 mg Oral Daily    vancomycin (VANCOCIN) IVPB  1,000 mg Intravenous Q24H        PRN:  albuterol-ipratropium 2.5mg-0.5mg/3mL    Antibiotics and Day Number of Therapy:  Vanc, cefepime--4/5-present  Clinda--4/6-present  Cipro--4/5-4/6    Assessment:     Zeyad Woodard is a 89 y.o.male with     Plan:     Acute hypoxic respiratory failure and severe sepsis 2/2 community acquired pneumonia  -1 week progressive productive cough, fever, SOB  On admit, satting well on 5L O2 via NC, temp 103, CXR with bibasilar consolidations, lactate 2.5  BP stable   - Modified barium swallow study 1/18 w/o aspiration--re-evaluated by speech-not safe for thin liquids-honey thick  -In ED, given 2L NS, started vanc, cipro, cefepime (4/5)--penicillin allergy was remote and only hives  -Lactate normalized, procal 21  Blood cx with coag negative staph 2/4 bottles--likely contaminant  Duonebs, CPT   -continue vanc, clinda, cefepime     Left 1st toe erosion  -Bruising, erythema, swelling  2/2 recent trauma  H/o neuropathy, follows with podiatry  Xray suggestive of early osteo  -Continue vanc, cefepime, cipro (4/5)  Wound cx NGTD  -podiatry following, do not believe it is osteo. Removed nail, routine wound care.     ELIZABET on CKD 3A, resolved  -On admit, Cr 1.5, GFR 40  Baseline Cr 1.2, GFR 60  -Likely prerenal in setting of sepsis  1.3 this AM      Elevated troponin, resolved  -On admit, trop 0.047  Chronically elevated trop to 0.03-0.06. Also, possibly 2/2 demand in setting of sepsis and CKD  EKG ventricularly paced  -Trended troponins--peaked 0.133->0.125     Chronic thrombocytopenia  -Baseline platelets ~ 90  Follows with Dr. Jeff as outpt  -On admit, platelets 44-likely exacerbated by sepsis  Scatter petechiae, contusions in setting of recent fall but no active bleeding  -PLT stable     Chronic HFpEF  -TTE 12/17-EF 55%, grade 2 diastolic dysfunction, PA pressure 35  -On admit, Trop 0.047,   CXR with b/l consolidations 2/2 PNA rather than volume overload--given fluids per sepsis protocol  Monitor closely for volume overload  -Repeat TTE--LV EF 55%, no diastolic d/f, PA pressure 33     A fib and SSS s/p pacemaker  -On admit, in ventricularly paced rhythm  Not on home anticoagulation 2/2 h/o GI bleeds. Also falls frequently  -No acute issues     Anemia of chronic disease, h/o CHRISTIANO and B12 deficiency  -On admit, Hbg 9.4--at baseline  1/18--w/u c/w AoCD--ferritin, B12 wnl  -Follows with Dr. Jeff--continue home iron, B12     Transaminitis   -On admit, , AST 61, ALT 50  Consistent with baseline  -Negative Hep C Ab 2012--defer repeating as patient would be poor treatment candidate given age/comorbidities     HTN  -On admit, BP low/normal  Held home coreg in setting of sepsis--resumed 2/2 HTN     Hypothyroidism  -No acute issues  Continue home synthroid     BPH  -No acute issues  Continue home flomax, finasteride     Frequent mechanical falls  -Per chart review  Fell 3 days  PTA  -Per family, previously had HH PT/OT that has been discontinued/completed  Only mobile via wheelchair  -Consulted PT/OT--recommended SNF vs return to group home with PT     Dementia  -Baseline--Depending for ADLs, lives at [facility] group home and has supervision via aide and nurse  -A&O to person, place, date; not situation  Continue home remeron, aricept     Code Status:      Full     Fluids: None    Diet: cardiac pureed, honey thick    Ppx: SCDs; holding chemical ppx in setting of thrombocytopenia      Chuckie Isaacs MD  [facility] Internal Medicine HO-I  [facility] IM Service Team A    [facility] Medicine Hospitalist Pager numbers:   [facility] Hospitalist Medicine Team A (Kinsey/Yumiko): [phone]  [facility] Hospitalist Medicine Team B (Tyrese/Bibi):  [phone]

## 2018-04-09 NOTE — PT/OT/SLP PROGRESS
"Speech Language Pathology Treatment    Patient Name:  Zeyad Woodard   MRN:  [id number]  Admitting Diagnosis: HCAP (healthcare-associated pneumonia)    Recommendations:                 General Recommendations:  Dysphagia therapy  Diet recommendations:  Puree, Liquid Diet Level: Honey Thick   Aspiration Precautions: 1 bite/sip at a time, Alternating bites/sips, Assistance with meals and Assistance with thickening liquids, Check for pocketing/oral residue, Chin tuck, Double swallow with each bite/sip, Feed only when awake/alert, Frequent oral care, HOB to 90 degrees, Meds crushed in puree, Remain upright 30 minutes post meal, Small bites/sips and Wear oxygen during intake   General Precautions: Standard, fall, aspiration, pureed diet, honey thick  Communication strategies:  none    Subjective     Pt found in bed with pt's daughters at bedside. Pt was awake but observed to be lethargic. Pt also complained on lingual pain.    Patient goals: "Ouch" per pt     Pain/Comfort:  · Pain Rating 1:  (Lingual pain, but did not rate pain)  · Pain Addressed 1: Nurse notified    Objective:     Has the patient been evaluated by SLP for swallowing?   Yes  Keep patient NPO? No   Current Respiratory Status: nasal cannula      Pt seen this AM for dysphagia tx. Pt with limited participation 2/2 pt observed to be lethargic and reported lingual pain. Pt deferred indirect dysphagia exercises, as well as any PO trials for direct dysphagia tx. However, SLP did provide pt with oral care/hygiene with moistened toothettes and oral rinse with toothettes, as pt was observed with harden mucous along tongue. Pt also observed to cough up a fair amount of yellow sputum during oral care, which SLP suctioned from oral cavity. DINA Arechiga reported pt with coughing s/p swallow thickened liquids this AM. Pt is still at moderate risk of aspiration.     SLP also educated pt's daughters on results of pt's MBSS and diet recs/swallow precautions and importance of " following recommendations to reduce risk of aspiration.  Pt's daughter reported to SLP that pt was only participating about 40-60% during meal times (not baseline for pt); and pt's daughters are concerned about pt meeting nutritional needs 2/2 pt not wanting to eat due to lingual pain. SLP educated pt's daughters on purpose/importance of performing oral care hygiene to reduce mucous from hardening on tongue and to risk of infection. Pt's daughters acknowledged and confirmed understanding of all education provided.     MD team observed to enter room during session to discuss pt's POC with pt and pt's daughters. SLP notified MD team of concerns about pt potentially still aspirating even with thickened liquids/puree textures, per report from DINA Arechiga this AM; as well as concern for pt's dcr'd participation during meals.       Assessment:     Zeyad Woodard is a 89 y.o. male with an SLP diagnosis of moderate-severe oropharyngeal dysphagia (see MBSS from 4/6/18).  He presents with dcr'd participation in po intake, per pt's daughters as well as limited participation during direct and indirect dysphagia exercises during today's session. Pt observed to be lethargic, not as lucid/alert, as compared to initial session.   SLP recs: con't Honey thick liquids/puree textures, monitor for s/s of aspiration, pt should perform chin tuck during swallow of liquids, SMALL, controled sips/bites, meds crushed and buried in puree textures, and all other universal swallow precautions (upright at 90 degrees, alternate sips/bites, 1 bite/sip at a time, remain upright for 30 mins after meals, etc). SLP notified MD Team and DINA Arechiga of recs/results. ST will continue to follow.      Goals:    SLP Goals        Problem: SLP Goal    Goal Priority Disciplines Outcome   SLP Goal     SLP Ongoing (interventions implemented as appropriate)   Description:  Short Term Goals:  1. Pt will participate in BSS to determine least restrictive diet.-  MET 4/6  2. Pt will successfully participate in Modified Barium Swallow study to objectively rule out aspiration and determine safest/least restrictive diet.- MET 4/6  3. Pt will tolerate honey thick liquids/puree textures po diet with chin tuck without overt s/s of aspiration  4. Pt will tolerate advanced diet trials of nectar thick liquids and dental soft textures without overt s/s of aspiration.  5. Pt will participate in indirect dysphagia exercises to strengthen musculature for swallowing mechanism with mod-max effort.                         Plan:     · Patient to be seen:  3 x/week   · Plan of Care expires:  05/06/18  · Plan of Care reviewed with:  patient, daughter (DINA Arechiga and MD team)   · SLP Follow-Up:  Yes       Discharge recommendations:   (group home vs SNF, pending PT/OT recs)   Barriers to Discharge:  None    Time Tracking:     SLP Treatment Date:   04/09/18  Speech Start Time:  1120  Speech Stop Time:  1142     Speech Total Time (min):  22 min    Billable Minutes: Treatment Swallowing Dysfunction 22    SARAH BETH Herzog, CF-SLP  Speech-Language Pathologist   4/9/2018

## 2018-04-09 NOTE — PLAN OF CARE
Problem: SLP Goal  Goal: SLP Goal  Short Term Goals:  1. Pt will participate in BSS to determine least restrictive diet.- MET 4/6  2. Pt will successfully participate in Modified Barium Swallow study to objectively rule out aspiration and determine safest/least restrictive diet.- MET 4/6  3. Pt will tolerate honey thick liquids/puree textures po diet with chin tuck without overt s/s of aspiration  4. Pt will tolerate advanced diet trials of nectar thick liquids and dental soft textures without overt s/s of aspiration.  5. Pt will participate in indirect dysphagia exercises to strengthen musculature for swallowing mechanism with mod-max effort.        Outcome: Ongoing (interventions implemented as appropriate)  4/9/18: Pt seen this AM for dysphagia tx. Pt with limited participation 2/2 pt observed to be lethargic and reported lingual pain. Pt deferred indirect dysphagia exercises, as well as any PO trials for direct dysphagia tx. However, SLP did provide pt with oral care/hygiene with moistened toothettes and oral rinse, as pt was observed with harden mucous along tongue. Pt also observed to cough up a fair amount of yellow sputum during oral care, which SLP suctioned from oral cavity. DINA Arechiga reported pt with coughing s/p swallow thickened liquids this AM. Pt is still at moderate risk of aspiration.   SLP recs: con't Honey thick liquids/puree textures, monitor for s/s of aspiration, pt should perform chin tuck during swallow of liquids, SMALL, controled sips/bites, meds crushed and buried in puree textures, and all other universal swallow precautions (upright at 90 degrees, alternate sips/bites, 1 bite/sip at a time, remain upright for 30 mins after meals, etc). ST will continue to follow.  SANDRINE Herzog., CF-SLP  Speech-Language Pathologist

## 2018-04-09 NOTE — PROGRESS NOTES
The Sw called [facility]([phone])spoke to Dianne who states the pt has been medically accepted and she will initiate the snf auth with [facility] after 2pm b/c she's currently with 2 other families. Dianne states she will not be able to admit the pt until tomorrow but she must receive the snf auth from [facility]. The Sw encouraged her to submit to [facility] ASAP b/c they're very busy currently.

## 2018-04-09 NOTE — PT/OT/SLP PROGRESS
Occupational Therapy      Patient Name:  Zeyad Woodard   MRN:  [id number]    Patient not seen today secondary to on hold awaiting US to rule out LUE/LLE US @12;28. Will follow-up at later time.    MADDY Hutchins  4/9/2018

## 2018-04-09 NOTE — PT/OT/SLP PROGRESS
Physical Therapy      Patient Name:  Zeyad Woodard   MRN:  [id number]    Patient not seen today secondary to  (pt awaiting US to r/o possible ue/le DVT''s). Will follow-up tomorrow.    Tashi Small, PTA

## 2018-04-10 PROBLEM — R13.10 DYSPHAGIA: Status: ACTIVE | Noted: 2018-04-10

## 2018-04-10 LAB
ALBUMIN SERPL BCP-MCNC: 2.4 G/DL
ALP SERPL-CCNC: 125 U/L
ALT SERPL W/O P-5'-P-CCNC: 41 U/L
ANION GAP SERPL CALC-SCNC: 7 MMOL/L
ANISOCYTOSIS BLD QL SMEAR: SLIGHT
AST SERPL-CCNC: 52 U/L
BACTERIA BLD CULT: NORMAL
BASOPHILS # BLD AUTO: ABNORMAL K/UL
BASOPHILS NFR BLD: 0 %
BILIRUB SERPL-MCNC: 0.7 MG/DL
BUN SERPL-MCNC: 32 MG/DL
CALCIUM SERPL-MCNC: 8.4 MG/DL
CHLORIDE SERPL-SCNC: 110 MMOL/L
CO2 SERPL-SCNC: 23 MMOL/L
CREAT SERPL-MCNC: 1.2 MG/DL
DIFFERENTIAL METHOD: ABNORMAL
EOSINOPHIL # BLD AUTO: ABNORMAL K/UL
EOSINOPHIL NFR BLD: 2 %
ERYTHROCYTE [DISTWIDTH] IN BLOOD BY AUTOMATED COUNT: 15.2 %
EST. GFR  (AFRICAN AMERICAN): >60 ML/MIN/1.73 M^2
EST. GFR  (NON AFRICAN AMERICAN): 53 ML/MIN/1.73 M^2
GLUCOSE SERPL-MCNC: 88 MG/DL
HCT VFR BLD AUTO: 24.3 %
HGB BLD-MCNC: 7.9 G/DL
HYPOCHROMIA BLD QL SMEAR: ABNORMAL
LYMPHOCYTES # BLD AUTO: ABNORMAL K/UL
LYMPHOCYTES NFR BLD: 42 %
MCH RBC QN AUTO: 29.5 PG
MCHC RBC AUTO-ENTMCNC: 32.5 G/DL
MCV RBC AUTO: 91 FL
MONOCYTES # BLD AUTO: ABNORMAL K/UL
MONOCYTES NFR BLD: 10 %
NEUTROPHILS # BLD AUTO: ABNORMAL K/UL
NEUTROPHILS NFR BLD: 46 %
OVALOCYTES BLD QL SMEAR: ABNORMAL
PLATELET # BLD AUTO: 150 K/UL
PLATELET BLD QL SMEAR: ABNORMAL
PMV BLD AUTO: 10.1 FL
POIKILOCYTOSIS BLD QL SMEAR: SLIGHT
POTASSIUM SERPL-SCNC: 4 MMOL/L
PROT SERPL-MCNC: 5.5 G/DL
RBC # BLD AUTO: 2.68 M/UL
SODIUM SERPL-SCNC: 140 MMOL/L
WBC # BLD AUTO: 13.89 K/UL

## 2018-04-10 PROCEDURE — 27000221 HC OXYGEN, UP TO 24 HOURS

## 2018-04-10 PROCEDURE — S0077 INJECTION, CLINDAMYCIN PHOSP: HCPCS | Performed by: STUDENT IN AN ORGANIZED HEALTH CARE EDUCATION/TRAINING PROGRAM

## 2018-04-10 PROCEDURE — 25000003 PHARM REV CODE 250: Performed by: STUDENT IN AN ORGANIZED HEALTH CARE EDUCATION/TRAINING PROGRAM

## 2018-04-10 PROCEDURE — 94668 MNPJ CHEST WALL SBSQ: CPT

## 2018-04-10 PROCEDURE — 63600175 PHARM REV CODE 636 W HCPCS: Performed by: STUDENT IN AN ORGANIZED HEALTH CARE EDUCATION/TRAINING PROGRAM

## 2018-04-10 PROCEDURE — 63600175 PHARM REV CODE 636 W HCPCS: Performed by: INTERNAL MEDICINE

## 2018-04-10 PROCEDURE — A4216 STERILE WATER/SALINE, 10 ML: HCPCS | Performed by: STUDENT IN AN ORGANIZED HEALTH CARE EDUCATION/TRAINING PROGRAM

## 2018-04-10 PROCEDURE — 25000242 PHARM REV CODE 250 ALT 637 W/ HCPCS: Performed by: STUDENT IN AN ORGANIZED HEALTH CARE EDUCATION/TRAINING PROGRAM

## 2018-04-10 PROCEDURE — 97530 THERAPEUTIC ACTIVITIES: CPT

## 2018-04-10 PROCEDURE — 11000001 HC ACUTE MED/SURG PRIVATE ROOM

## 2018-04-10 PROCEDURE — 36415 COLL VENOUS BLD VENIPUNCTURE: CPT

## 2018-04-10 PROCEDURE — 85027 COMPLETE CBC AUTOMATED: CPT

## 2018-04-10 PROCEDURE — 92526 ORAL FUNCTION THERAPY: CPT

## 2018-04-10 PROCEDURE — 94640 AIRWAY INHALATION TREATMENT: CPT

## 2018-04-10 PROCEDURE — 94761 N-INVAS EAR/PLS OXIMETRY MLT: CPT

## 2018-04-10 PROCEDURE — 85007 BL SMEAR W/DIFF WBC COUNT: CPT

## 2018-04-10 PROCEDURE — 97535 SELF CARE MNGMENT TRAINING: CPT

## 2018-04-10 PROCEDURE — 80053 COMPREHEN METABOLIC PANEL: CPT

## 2018-04-10 RX ORDER — LEVOTHYROXINE SODIUM ANHYDROUS 100 UG/5ML
50 INJECTION, POWDER, LYOPHILIZED, FOR SOLUTION INTRAVENOUS DAILY
Status: DISCONTINUED | OUTPATIENT
Start: 2018-04-10 | End: 2018-04-19

## 2018-04-10 RX ORDER — FUROSEMIDE 10 MG/ML
40 INJECTION INTRAMUSCULAR; INTRAVENOUS ONCE
Status: COMPLETED | OUTPATIENT
Start: 2018-04-10 | End: 2018-04-10

## 2018-04-10 RX ORDER — LABETALOL HYDROCHLORIDE 5 MG/ML
10 INJECTION, SOLUTION INTRAVENOUS EVERY 6 HOURS PRN
Status: DISCONTINUED | OUTPATIENT
Start: 2018-04-10 | End: 2018-04-11

## 2018-04-10 RX ADMIN — SODIUM CHLORIDE, PRESERVATIVE FREE 3 ML: 5 INJECTION INTRAVENOUS at 05:04

## 2018-04-10 RX ADMIN — IPRATROPIUM BROMIDE AND ALBUTEROL SULFATE 3 ML: .5; 3 SOLUTION RESPIRATORY (INHALATION) at 01:04

## 2018-04-10 RX ADMIN — IPRATROPIUM BROMIDE AND ALBUTEROL SULFATE 3 ML: .5; 3 SOLUTION RESPIRATORY (INHALATION) at 05:04

## 2018-04-10 RX ADMIN — IPRATROPIUM BROMIDE AND ALBUTEROL SULFATE 3 ML: .5; 3 SOLUTION RESPIRATORY (INHALATION) at 04:04

## 2018-04-10 RX ADMIN — CLINDAMYCIN IN 5 PERCENT DEXTROSE 900 MG: 18 INJECTION, SOLUTION INTRAVENOUS at 01:04

## 2018-04-10 RX ADMIN — IPRATROPIUM BROMIDE AND ALBUTEROL SULFATE 3 ML: .5; 3 SOLUTION RESPIRATORY (INHALATION) at 08:04

## 2018-04-10 RX ADMIN — VANCOMYCIN HYDROCHLORIDE 1000 MG: 1 INJECTION, POWDER, LYOPHILIZED, FOR SOLUTION INTRAVENOUS at 11:04

## 2018-04-10 RX ADMIN — LEVOTHYROXINE SODIUM ANHYDROUS 50 MCG: 100 INJECTION, POWDER, LYOPHILIZED, FOR SOLUTION INTRAVENOUS at 01:04

## 2018-04-10 RX ADMIN — IPRATROPIUM BROMIDE AND ALBUTEROL SULFATE 3 ML: .5; 3 SOLUTION RESPIRATORY (INHALATION) at 12:04

## 2018-04-10 RX ADMIN — CEFEPIME 2 G: 2 INJECTION, POWDER, FOR SOLUTION INTRAMUSCULAR; INTRAVENOUS at 08:04

## 2018-04-10 RX ADMIN — LABETALOL HYDROCHLORIDE 10 MG: 5 INJECTION, SOLUTION INTRAVENOUS at 04:04

## 2018-04-10 RX ADMIN — SODIUM CHLORIDE, PRESERVATIVE FREE 3 ML: 5 INJECTION INTRAVENOUS at 09:04

## 2018-04-10 RX ADMIN — SODIUM CHLORIDE, PRESERVATIVE FREE 3 ML: 5 INJECTION INTRAVENOUS at 01:04

## 2018-04-10 RX ADMIN — CLINDAMYCIN IN 5 PERCENT DEXTROSE 900 MG: 18 INJECTION, SOLUTION INTRAVENOUS at 05:04

## 2018-04-10 RX ADMIN — FUROSEMIDE 40 MG: 10 INJECTION, SOLUTION INTRAMUSCULAR; INTRAVENOUS at 01:04

## 2018-04-10 RX ADMIN — IPRATROPIUM BROMIDE AND ALBUTEROL SULFATE 3 ML: .5; 3 SOLUTION RESPIRATORY (INHALATION) at 07:04

## 2018-04-10 RX ADMIN — CEFEPIME 2 G: 2 INJECTION, POWDER, FOR SOLUTION INTRAMUSCULAR; INTRAVENOUS at 09:04

## 2018-04-10 NOTE — PROGRESS NOTES
The Sw met with the pt and his daughter Ghazala at bedside to discuss the d/cplan. Ghazala states [facility] signs all the paperwork. The Sw informed her the pt's been medically accepted to [facility] but [facility] before the pt can d/c. She states Dianne([facility]) called [place] yesterday to sign admit papers yesterday but the she refused stating they went through all this wasting of trees last time and [employer] denied. She wants to see if Lorelei will approve snf before she signs the paperwork again. The Sw spoke to Mayda at [facility] who states she received the snf request from [facility] but the pt's not medically stable for transport. She will hold off on giving the auth and will review the pt again tomorrow. The Sw spoke to Dianne and she states she understands and informed Asia she can't promise they will have a bed at the end of the week. The Sw faxed updated info to Dianne via [facility]. The Sw spoke to the pt's other daughter Asia and informed he of the above mentioned info and she's in agreement and understands Dianne can't hold a bed for the pt. [facility] also [facility] The Sw informed the team of the above mentioned info. The speech therapists was in the room working with th ept and he wouldn't swallow and the pt may possibly be a candidate for a peg tube but further discussions must be made with the family. The Sw and Cole conferred about this case.

## 2018-04-10 NOTE — PROGRESS NOTES
"[facility] IM Resident Progress Note    Subjective:      Lethargic this AM. Continued sputum production requiring suction. Nurse reports coughing with PO intake, so holding PO meds 2/2 concern for aspiration. Difficult to clarify complaints because patient answers inconsistently but states he feels "bad".    Objective:   Last 24 Hour Vital Signs:  BP  Min: 149/84  Max: 176/81  Temp  Av.2 °F (36.8 °C)  Min: 96.6 °F (35.9 °C)  Max: 99.4 °F (37.4 °C)  Pulse  Av.7  Min: 67  Max: 110  Resp  Av.8  Min: 16  Max: 20  SpO2  Av.7 %  Min: 96 %  Max: 99 %  I/O last 3 completed shifts:  In: 237 [P.O.:237]  Out: 792 [Urine:792]    Physical Examination:  General:           Alert and awake, lethargic  Head:               Normocephalic and atraumatic  Eyes:               anicteric sclera and clear conjunctivae  Mouth:             Oropharynx clear and without exudate; moist mucous membranes  Cardio:            Systolic murmur at base; Irregular rhythm; with normal S1 and S2; rubs  Resp:               Mild rhonchi/wheezing--improved from admit  Abdom:            Soft, NTND with normoactive bowel sounds  Extrem:            Left 1st toe with erythema, bruising, swelling; toenail removed; Trace b/l edema; WWP with no cyanosis  Skin:                Left knee abrasion; diffuse countusions and scatter purpura on extremities and chest  Pulses:            2+ and symmetric distally    Laboratory:  Laboratory Data Reviewed: yes  Pertinent Findings:    Recent Labs  Lab 18  0433 18  0534 04/10/18  0325   WBC 15.67* 15.65* 13.89*   HGB 7.9* 8.1* 7.9*   HCT 24.9* 25.3* 24.3*   PLT 46* 57* 150   MCV 91 90 91   RDW 15.6* 15.3* 15.2*    138 140   K 4.5 4.3 4.0    107 110   CO2 23 21* 23   BUN 38* 33* 32*   CREATININE 1.5* 1.3 1.2    90 88   PROT 6.1 6.1 5.5*   ALBUMIN 2.7* 2.6* 2.4*   BILITOT 0.9 1.0 0.7   AST 55* 55* 52*   ALKPHOS 139* 146* 125   ALT 41 44 41       Microbiology Data Reviewed: yes  Pertinent " Findings:  Blood cx, 4/5: coag negative staph, 2/4 bottles  Urine cx negative  Respiratory cx: stain-G+ cocci, cx negative    Blood cx: 4/7: NGTD  Wound cx: NGTD    Other Results:  EKG (my interpretation):none new    Radiology Data Reviewed: yes  Pertinent Findings:    Imaging Results          X-Ray Toe 2 or More Views Left (Final result)  Result time 04/05/18 19:14:09    Final result by Jurgen Gaston MD (04/05/18 19:14:09)                 Impression:      Erosive changes involving the left 1st metacarpal head and base of the 1st proximal phalanx.  The findings suggestive of early osteomyelitis.  Contrast enhanced MRI may be obtained for further evaluation.    Subtle erosive changes involving the head of the left 5th metacarpal.  This can also be assessed on the MRI examination.      Electronically signed by: Jurgen Gaston MD  Date:    04/05/2018  Time:    19:14             Narrative:    EXAMINATION:  XR TOE 2 OR MORE VIEWS LEFT    CLINICAL HISTORY:  swelling;    TECHNIQUE:  Three views of the left toes were performed    COMPARISON:  None.    FINDINGS:  There is diffuse demineralization of the osseous structures.  There are erosive changes involving the head of the 1st metatarsal and the base of the 1st proximal phalanx.  Subtle erosive changes also noted involving the head of the left 5th metacarpal.  There is no evidence of a fracture.  There is joint space narrowing throughout the visualized left forefoot.  There is no evidence of a dislocation.  There are extensive vascular calcifications.                               X-Ray Chest AP Portable (Final result)  Result time 04/05/18 16:33:19    Final result by Mis Mccray MD (04/05/18 16:33:19)                 Impression:      Bibasilar patchy areas of increased attenuation concerning for subsegmental areas of airspace disease considering pneumonia or aspiration.    Cardiomegaly.      Electronically signed by: Mis Mccray  MD  Date:    04/05/2018  Time:    16:33             Narrative:    EXAMINATION:  XR CHEST AP PORTABLE    CLINICAL HISTORY:  Sepsis;    TECHNIQUE:  Single frontal view of the chest was performed.    COMPARISON:  01/11/2018    FINDINGS:  There is a cardio stimulator device in the left upper chest with a single lead the.  Sternotomy wires.  The cardiac silhouette is enlarged.  Patchy areas of increased attenuation at the bilateral lung bases concerning for areas of possible pneumonia or aspiration, it is new compared to the prior exam.  No pleural effusion.  No pneumothorax.  Atherosclerotic changes of the aorta.                                Current Medications:     Infusions:       Scheduled:   albuterol-ipratropium 2.5mg-0.5mg/3mL  3 mL Nebulization Q4H    carvedilol  3.125 mg Oral BID WM    ceFEPime (MAXIPIME) IVPB  2 g Intravenous Q12H    clindamycin (CLEOCIN) IVPB  900 mg Intravenous Q8H    donepezil  10 mg Oral QHS    finasteride  5 mg Oral Daily    levothyroxine  100 mcg Oral Before breakfast    mirtazapine  7.5 mg Oral QHS    mupirocin   Topical (Top) Daily    sodium chloride 0.9%  3 mL Intravenous Q8H    tamsulosin  0.4 mg Oral Daily    vancomycin (VANCOCIN) IVPB  1,000 mg Intravenous Q24H        PRN:  albuterol-ipratropium 2.5mg-0.5mg/3mL, traMADol    Antibiotics and Day Number of Therapy:  Vanc, cefepime--4/5-present  Clinda--4/6-present  Cipro--4/5-4/6    Assessment:     Zeyad Woodard is a 89 y.o.male with     Plan:     Acute hypoxic respiratory failure and severe sepsis 2/2 community acquired pneumonia  -1 week progressive productive cough, fever, SOB  On admit, satting well on 5L O2 via NC, temp 103, CXR with bibasilar consolidations, lactate 2.5  BP stable   - Modified barium swallow study 1/18 w/o aspiration--re-evaluated by speech-not safe for thin liquids-honey thick  -In ED, given 2L NS, started vanc, cipro, cefepime (4/5)--penicillin allergy was remote and only hives  -Lactate  normalized, procal 21  Blood cx with coag negative staph 2/4 bottles--likely contaminant  Duonebs, CPT   -continue vanc, clinda, cefepime     Left 1st toe erosion  -Bruising, erythema, swelling 2/2 recent trauma  H/o neuropathy, follows with podiatry  Xray suggestive of early osteo  -Continue vanc, cefepime, cipro (4/5)  Wound cx NGTD  -podiatry following, do not believe it is osteo. Removed nail, routine wound care.     ELIZABET on CKD 3A, resolved  -On admit, Cr 1.5, GFR 40  Baseline Cr 1.2, GFR 60  -Likely prerenal in setting of sepsis  Returned to baseline     Elevated troponin, resolved  -On admit, trop 0.047  Chronically elevated trop to 0.03-0.06. Also, possibly 2/2 demand in setting of sepsis and CKD  EKG ventricularly paced  -Trended troponins--peaked 0.133->0.125     Chronic thrombocytopenia  -Baseline platelets ~ 90  Follows with Dr. Jeff as outpt  -On admit, platelets 44-likely exacerbated by sepsis  Scatter petechiae, contusions in setting of recent fall but no active bleeding  -PLT stable     Chronic HFpEF  -TTE 12/17-EF 55%, grade 2 diastolic dysfunction, PA pressure 35  -On admit, Trop 0.047,   CXR with b/l consolidations 2/2 PNA rather than volume overload--given fluids per sepsis protocol  Monitor closely for volume overload  -Repeat TTE--LV EF 55%, no diastolic d/f, PA pressure 33     A fib and SSS s/p pacemaker  -On admit, in ventricularly paced rhythm  Not on home anticoagulation 2/2 h/o GI bleeds. Also falls frequently  -No acute issues     Anemia of chronic disease, h/o CHRISTIANO and B12 deficiency  -On admit, Hbg 9.4--at baseline  1/18--w/u c/w AoCD--ferritin, B12 wnl  -Follows with Dr. Jeff--continue home iron, B12     Transaminitis   -On admit, , AST 61, ALT 50  Consistent with baseline  -Negative Hep C Ab 2012--defer repeating as patient would be poor treatment candidate given age/comorbidities     HTN  -On admit, BP low/normal  Held home coreg in setting of  sepsis--resumed 2/2 HTN     Hypothyroidism  -No acute issues  Continue home synthroid     BPH  -No acute issues  Continue home flomax, finasteride     Frequent mechanical falls  -Per chart review  Fell 3 days PTA  -Per family, previously had HH PT/OT that has been discontinued/completed  Only mobile via wheelchair  -Consulted PT/OT--recommended SNF vs return to group home with PT     Dementia  -Baseline--Depending for ADLs, lives at [facility] group home and has supervision via aide and nurse  -A&O to person, place, date; not situation  Continue home remeron, aricept     Code Status:      Full     Fluids: None    Diet: cardiac pureed, honey thick    Ppx: SCDs; holding chemical ppx in setting of thrombocytopenia    Dispo: pending clinical improvement; likely back to group home with HH PT/OT    Chuckie Isaacs MD  [facility] Internal Medicine -I  [facility] IM Service Team A    [facility] Medicine Hospitalist Pager numbers:   [facility] Hospitalist Medicine Team A (Kinsey/Yumiko): [phone]  [facility] Hospitalist Medicine Team B (Tyrese/Bibi):  [phone]

## 2018-04-10 NOTE — PT/OT/SLP PROGRESS
Occupational Therapy   Treatment    Name: Zeyad Woodard  MRN: [id number]  Admitting Diagnosis:  HCAP (healthcare-associated pneumonia)       Recommendations:     Discharge Recommendations:  (TBD)  Discharge Equipment Recommendations:   (TBD)  Barriers to discharge:  None    Subjective     Communicated with: RN prior to session.  Pain/Comfort:  · Pain Rating 1:  (Pt did not rate but moans during fx bed mobility. )    Patients cultural, spiritual, Confucianism conflicts given the current situation:      Objective:     Patient found with: bed alarm, telemetry, oxygen    General Precautions: Standard, aspiration, fall, pureed diet, honey thick   Orthopedic Precautions:N/A   Braces: N/A     Occupational Performance:    Bed Mobility:    · Patient completed Rolling/Turning to Left with  total assistance  · Patient completed Scooting/Bridging with total assistance  · Patient completed Supine to Sit with total assistance and 2 persons  · Patient completed Sit to Supine with total assistance and 2 persons       Patient left HOB elevated with all lines intact, call button in reach, bed alarm on and daughters and MD team present    [facility] 6 Click:  [facility] Total Score: 9    Treatment & Education:  Pt alert with eyes closing on and off throughout tx.  O x person only.  Pt sat  EOB x 15 min with CGA-SBA. Pt with jerky/shakey UE/LE's.    He was coughing and drooling throughout tx. He denied pain other than with transitional mvmts.   Education:    Assessment:     Zeyad Woodard is a 89 y.o. male with a medical diagnosis of HCAP (healthcare-associated pneumonia).  He presents with decreased cognition and decreased overall strength, endurance, balance and Jack and safety with ADL's and fx mobility.  Performance deficits affecting function are weakness, impaired endurance, impaired self care skills, impaired functional mobilty, gait instability, impaired balance, impaired cognition, decreased coordination, decreased upper  extremity function, decreased lower extremity function, decreased safety awareness, pain, decreased ROM, impaired coordination, impaired fine motor, impaired skin, impaired cardiopulmonary response to activity.  Pt requires Tot A with bed mobility. Pt tolerated EOB x 15 min with CGA-SBA. He was O x person only and coughing and drooling throughout tx. He denied pain other than with transitional mvmts.  Fair tolerance of tx. Continue OT services to address functional goals, progressing as able.      Rehab Prognosis:  good; patient would benefit from acute skilled OT services to address these deficits and reach maximum level of function.       Plan:     Patient to be seen 5 x/week to address the above listed problems via self-care/home management, therapeutic activities, therapeutic exercises  · Plan of Care Expires: 05/06/18  · Plan of Care Reviewed with: patient, daughter    This Plan of care has been discussed with the patient who was involved in its development and understands and is in agreement with the identified goals and treatment plan    GOALS:    Occupational Therapy Goals        Problem: Occupational Therapy Goal    Goal Priority Disciplines Outcome Interventions   Occupational Therapy Goal     OT, PT/OT Ongoing (interventions implemented as appropriate)    Description:  Goals to be met by: 05/06/18     Patient will increase functional independence with ADLs by performing:    Grooming while seated with Set-up Assistance.  Toileting from bedside commode with Moderate Assistance for hygiene and clothing management.   Supine to sit with Stand-by Assistance and use of bedrail as needed.  Toilet transfer to bedside commode with Contact Guard Assistance.  Increased functional strength to WFL for ADLs.                      Time Tracking:     OT Date of Treatment: 04/10/18  OT Start Time: 1108  OT Stop Time: 1133  OT Total Time (min): 25 min    Billable Minutes:Therapeutic Activity 15   *cotx with PTA.    Tiesha  ALLY [place]/PER  4/10/2018

## 2018-04-10 NOTE — PT/OT/SLP PROGRESS
Physical Therapy Treatment    Patient Name:  Zeyad Woodard   MRN:  [id number]    Recommendations:     Discharge Recommendations:   (TBD)   Discharge Equipment Recommendations:  (TBD)   Barriers to discharge: pt's decreased function and requires Tot A X 2 for bed mobility    Assessment:     Zeyad Woodard is a 89 y.o. male admitted with a medical diagnosis of HCAP (healthcare-associated pneumonia).  He presents with the following impairments/functional limitations:  weakness, impaired endurance, impaired functional mobilty, impaired cognition, decreased upper extremity function, decreased lower extremity function, decreased ROM, impaired coordination, impaired fine motor, impaired cardiopulmonary response to activity pt requires increased verbal and tactile cues throughout therapy,MD team and speech therapy present discussing pt's POC with daughters.    Rehab Prognosis:  Fair; patient would benefit from acute skilled PT services to address these deficits and reach maximum level of function.      Recent Surgery: * No surgery found *      Plan:     During this hospitalization, patient to be seen 6 x/week to address the above listed problems via therapeutic activities, therapeutic exercises, neuromuscular re-education  · Plan of Care Expires:  05/06/18   Plan of Care Reviewed with: patient, family    Subjective     Communicated with nsg prior to session.  Patient found supine upon PT entry to room, agreeable to treatment.      Chief Complaint: n/a  Patient comments/goals: decreased speech at this time  Pain/Comfort:  · Pain Rating 1:  (no c/o's)    Patients cultural, spiritual, Religion conflicts given the current situation:      Objective:     Patient found with: bed alarm, oxygen, telemetry     General Precautions: Standard, aspiration, fall, pureed diet, honey thick   Orthopedic Precautions:N/A   Braces: N/A     Functional Mobility:  · Bed Mobility:     · Supine to Sit: total assistance and of 2 persons  · Sit  to Supine: total assistance and of 2 persons  · Balance: fair sitting balance      AM-PAC 6 CLICK MOBILITY  Turning over in bed (including adjusting bedclothes, sheets and blankets)?: 2  Sitting down on and standing up from a chair with arms (e.g., wheelchair, bedside commode, etc.): 2  Moving from lying on back to sitting on the side of the bed?: 2  Moving to and from a bed to a chair (including a wheelchair)?: 2  Need to walk in hospital room?: 1  Climbing 3-5 steps with a railing?: 1  Total Score: 10       Therapeutic Activities and Exercises: le seated laq's X 10 reps with Max A,pt sat EOB X 15 mins with SBA/CGA with shaking/coughing during rx,spoke with nsg prior to rx and pt cleared for rx with negative US results for possible DVT's,MD and team along with speech therapy in room       Patient left supine with all lines intact, call button in reach, bed alarm on and daughters and medical team with speech present..    GOALS: see general POC   Physical Therapy Goals        Problem: Physical Therapy Goal    Goal Priority Disciplines Outcome Goal Variances Interventions   Physical Therapy Goal     PT/OT, PT Ongoing (interventions implemented as appropriate)     Description:  Goals to be met by: 2018     Patient will increase functional independence with mobility by performin. Supine to sit with Stand-by Assistance  2. Sit to stand transfer with Stand-by Assistance  3. Bed to chair transfer with Stand-by Assistance using Rolling Walker  4. Gait  x 20 feet with Minimal Assistance using Rolling Walker.                       Time Tracking:     PT Received On: 04/10/18  PT Start Time: 1108     PT Stop Time: 1133  PT Total Time (min): 25 min     Billable Minutes: Therapeutic Activity 12,tot time-25 mins    Treatment Type: Treatment  PT/PTA: PTA     PTA Visit Number: 2     Tashi Small, PTA  04/10/2018

## 2018-04-10 NOTE — PT/OT/SLP PROGRESS
"Speech Language Pathology Treatment    Patient Name:  Zeyad Woodard   MRN:  [id number]  Admitting Diagnosis: HCAP (healthcare-associated pneumonia)    Recommendations:                 General Recommendations:  Dysphagia therapy  Diet recommendations:  Puree, Liquid Diet Level: Honey Thick   Aspiration Precautions: MD stated essential meds will be given through IV, 1 bite/sip at a time, Alternating bites/sips, Alternate means of nutrition/hydration, Assistance with meals, Check for pocketing/oral residue, Chin tuck, Feed only when awake/alert, Frequent oral care, HOB to 90 degrees, Remain upright 30 minutes post meal and Small bites/sips   General Precautions: Standard, fall, aspiration, pureed diet, honey thick  Communication strategies:  provide increased time to answer    Subjective     Pt seen this AM for direct and indirect dysphagia tx. Pt continues to present with dcr'd DANGELO and limited participation during session. Pt required max cuing from SLP and pt's daughter throughout session. Pt observed with eyes closed throughout session and with perseverative phrases, which were not appropriate during conversaiton.     Patient goals: "oh no, oh no, oh no"  per pt      Objective:     Has the patient been evaluated by SLP for swallowing?   Yes  Keep patient NPO? No   Current Respiratory Status: nasal cannula      Prior to session, DINA Arechiga stated pt did not participate in breakfast meal tray and did not each much of dinner meal tray last night. RN also reported she did not feel pt was safe to PO meds, therefore she has been holding them 2/2 concerns for continued aspiration.       SLP provided pt oral care/hygiene via moistened toothettes as pt continues to present with dry, harden mucous along tongue and inside of lips, which indicates pt with poor secretion management.   SLP presented pt with 3 trials of honey thick liquids via tsp. Pt was observed with oral holding with open mouth posture during each trial, " despite max cuing from SLP and pt's daughter to trigger swallow. SLP suctioned honey thick liquids from pt's mouth each time.    Pt deferred indirect dysphagia exercises.     Education/Self-care:   -SLP initially eduated pt's daughterGhazala on pt's POC and SLP concerns for pt meeting nutrition/hydration needs via an oral diet. SLP called back to room by ALLY, while MD team was present, to educate both of the pt's daughters (Ghazala and Jaciel), as well as MD team on pt's long term goals of care.    -SLP called back to room while MD team was present to educate pt's daughters on concerns for pt meeting nutrition/hydration needs via an oral diet, as pt continues to demonstrate limited participation in po intake. MD stated they will start interim nutrition via PPN.  -SLP reported to pt's daughters on SLP's concern that pt may still be aspirating. SLP reassured pt's daughters that pt will continue to receive ST services for remediation of dysphagia.   -SLP educated pt's daughters on SLP recommendations for consideration of long term means of nutrition/hydration (i.e. PEG tube), should pt continue to demonstrate poor po intake and demonstrate s/s of aspiration.   Pt's daughters acknowledged and confirmed understanding of all education.     Assessment:     Zeyad Woodard is a 89 y.o. male with an SLP diagnosis of moderate-severe oropharyngeal dysphagia (see MBSS from 4/6/18).  He continues to present with impaired cognition, dcr'd DANGELO, and limited participation in po intake since Friday (4/6/18), which impacts pt's overall safety of swallowing.    RECS:Honey thick liquids/Puree textures with either SLP or RN with chin tuck during the swallow 2/2 concerns for continued aspiration. MD team present in room during self-care/education and aware of concerns for pt meeting nutritional needs via oral diet. MD Euceda stated team would start pt on interim nutrition, via PPN at this time to aid in pt meeting nutritional needs. SLP  will continue to follow pt for indirect and direct dysphagia tx. Depending on pt's progress in dysphagia tx and PO participation, pt and pt's family should consider alternative means of nutrition (i.e. PEG tube). SLP notified DINA Arechiga and MD team or results/recs.    Goals:    SLP Goals        Problem: SLP Goal    Goal Priority Disciplines Outcome   SLP Goal     SLP Ongoing (interventions implemented as appropriate)   Description:  Short Term Goals:  1. Pt will participate in BSS to determine least restrictive diet.- MET 4/6  2. Pt will successfully participate in Modified Barium Swallow study to objectively rule out aspiration and determine safest/least restrictive diet.- MET 4/6  3. Pt will tolerate honey thick liquids/puree textures po diet with chin tuck without overt s/s of aspiration  4. Pt will tolerate advanced diet trials of nectar thick liquids and dental soft textures without overt s/s of aspiration.  5. Pt will participate in indirect dysphagia exercises to strengthen musculature for swallowing mechanism with mod-max effort.                         Plan:     · Patient to be seen:  3 x/week   · Plan of Care expires:  05/06/18  · Plan of Care reviewed with:  patient, daughter (DINA Arechiga and MD team)   · SLP Follow-Up:  Yes       Discharge recommendations:   (TBD pending pt's progress)   Barriers to Discharge:  None    Time Tracking:     SLP Treatment Date:   04/10/18  Speech Start Time:  1030  Speech Stop Time:  1100     SLP re-entered room between: 11:25-11:35 (self care/education)   Speech Total Time (min):  40 min    Billable Minutes: Treatment Swallowing Dysfunction 20 mins and Seld Care/Home Management Training 20    SARAH BETH Herzog, CF-SLP  Speech-Language Pathologist   4/10/2018

## 2018-04-10 NOTE — PLAN OF CARE
Problem: Patient Care Overview  Goal: Plan of Care Review  Outcome: Ongoing (interventions implemented as appropriate)  Patient received IV antibiotics. Turned Q2 hrs, dressing to wounds clean dry and intact. Nurse held PO medications due to aspiration risk. Patient uanble to swallow saliva appropiately, not keenly alert and consistently coughed. Nurse notified  Kinsey's team on  Condition. Patient is on continuous heart monitoring, NSR, HR in 70s , no true red alarms . Fall precautions maintained.  Bed locked and low, side rails X3, call bell within reach. Nurse asked patient to call before ambulating, patient verbalized understanding. Will continue monitoring.

## 2018-04-10 NOTE — CONSULTS
"  [facility]  Adult Nutrition  Consult Note    SUMMARY     Recommendations    Recommendation/Intervention:   1. With functional GI tract rec NGT for TF to maintain GI integrity:       -TF of Isossource 1.5 initiated @ 10 ml/hr and advanced 10 ml q 4 hrs to goal rate of 50 ml/hr.      - Fluid flushes for normal fluid intake: 150 ml q 4 hrs or per MD. Hold TF x 4 hrs for residuals >250 ml (consider prokinetic) or sx/o n/v/abd discomfort; HOB >30.       -TF to provide: 1800 kcal, 82 g pro, 917 ml fluid.   2. For short term PPN: rec Clinimex 4.25/10 @ 85 ml/hr + IVFE Daily       - To provide: 1540 kcal, 87 g pro, 2040 ml fluid; GIR: 1.67       -Check baseline TG and monitor weekly with lipid infusions  3. Will f/u with family goals of care; plan for PEG    Goals: Meet 85% EEN  Nutrition Goal Status: new  Communication of RD Recs: reviewed with RN (plan of care)    D/C planning: Too soon to determine    Reason for Assessment    Reason for Assessment: consult  Diagnosis:  (HCAP)  Relevant Medical History: CHF, HTN, CAD, Stroke    General Information Comments: Consulted for PPN recs. Patient with functional GI tract but issues with dysphagia.    Nutrition Risk Screen    Nutrition Risk Screen: dysphagia or difficulty swallowing    Nutrition/Diet History    Do you have any cultural, spiritual, Baptism conflicts, given your current situation?: ARABELLA cultural, Baptism food preferences.     Anthropometrics    Temp: 98.1 °F (36.7 °C)  Height Method: Stated  Height: 5' 8" (172.7 cm)  Height (inches): 68 in  Weight Method: Bed Scale  Weight: 84.7 kg (186 lb 11.7 oz)  Weight (lb): 186.73 lb  Ideal Body Weight (IBW), Male: 154 lb  % Ideal Body Weight, Male (lb): 121.25 lb  BMI (Calculated): 28.5  BMI Grade: 25 - 29.9 - overweight       Lab/Procedures/Meds    Pertinent Labs Reviewed: reviewed  Pertinent Medications Reviewed: reviewed    Physical Findings/Assessment    Overall Physical Appearance: advanced age, " loss of muscle mass  Oral/Mouth Cavity: tooth/teeth missing  Skin: pressure ulcer(s), skin tear (Stage I buttocks; neuropathic toe ulcer)    Estimated/Assessed Needs    Weight Used For Calorie Calculations: 84.7 kg (186 lb 11.7 oz)  Energy Calorie Requirements (kcal): 1750 kcal  Energy Need Method: [place]-St Jeor  Protein Requirements: 70-85 g  Weight Used For Protein Calculations: 84.7 kg (186 lb 11.7 oz)     Fluid Need Method: RDA Method  RDA Method (mL): 1750         Nutrition Prescription Ordered    Current Diet Order: Pureed, honey thick    Evaluation of Received Nutrient/Fluid Intake    I/O: 237/371  Energy Calories Required: not meeting needs  Protein Required: not meeting needs  Fluid Required: not meeting needs  Comments: LBM: 4/9  % Intake of Estimated Energy Needs: 0-25%  % Meal Intake: 0-25%    Nutrition Risk    Level of Risk/Frequency of Follow-up:  (2 x week)     Assessment and Plan    Nutrition Dx: Inadequate Energy Intake r/t dysphagia as evidenced by 0-25% po intake with meals  Nutrition Dx Status: Continues       Monitor and Evaluation    Food and Nutrient Intake: energy intake, food and beverage intake  Food and Nutrient Adminstration: diet order, enteral and parenteral nutrition administration  Knowledge/Beliefs/Attitudes: food and nutrition knowledge/skill  Physical Activity and Function: nutrition-related ADLs and IADLs  Anthropometric Measurements: weight, weight change  Biochemical Data, Medical Tests and Procedures: electrolyte and renal panel  Nutrition-Focused Physical Findings: overall appearance     Nutrition Follow-Up    RD Follow-up?: Yes

## 2018-04-10 NOTE — PLAN OF CARE
Problem: SLP Goal  Goal: SLP Goal  Short Term Goals:  1. Pt will participate in BSS to determine least restrictive diet.- MET 4/6  2. Pt will successfully participate in Modified Barium Swallow study to objectively rule out aspiration and determine safest/least restrictive diet.- MET 4/6  3. Pt will tolerate honey thick liquids/puree textures po diet with chin tuck without overt s/s of aspiration  4. Pt will tolerate advanced diet trials of nectar thick liquids and dental soft textures without overt s/s of aspiration.  5. Pt will participate in indirect dysphagia exercises to strengthen musculature for swallowing mechanism with mod-max effort.        Outcome: Ongoing (interventions implemented as appropriate)  4/10: Pt seen this AM for direct and indirect dysphagia tx. Pt continues to present with dcr'd DANGELO and limited participation during session. SLP and pt's family concerned with pt's meeting nutritional needs 2/2 dcr'd PO intake since Friday (4/6/18). See note for full details of session and education with MD team and pt's family.   RECS: Honey thick liquids/Puree textures with either SLP or RN with chin tuck during the swallow 2/2 concerns for continued aspiration. MD team present in room during self-care/education and aware of concerns for pt meeting nutritional needs via oral diet. MD Euceda stated team would start pt on interim nutrition, via PPN at this time to aid in pt meeting nutritional needs. SLP will continue to follow pt for indirect and direct dysphagia tx.   SANDRINE Herzog., CF-SLP  Speech-Language Pathologist   4/10/2018

## 2018-04-10 NOTE — PROGRESS NOTES
" [facility]  Adult Nutrition  Progress Note    SUMMARY       Recommendations    Recommendation/Intervention:   1. Consider boost plus with thickener   2. Concern for patients ability to meet LT needs- RD to f/u with goals of care/PEG tube plans  3. RD to monitor    Goals: Meet 85% EEN  Nutrition Goal Status: new  Communication of RD Recs: reviewed with RN (plan of care)    D/c planning: Too soon to determine    Reason for Assessment    Reason for Assessment: identified at risk by screening criteria  Diagnosis:  (HCAP)  Relevant Medical History: CHF, HTN, CAD, Stroke  General Information Comments: SLP: Pureed, honey thick; assistance with meals. Patient with moderate risk of dysphagia even with diet consistency. PO meds being held; patient not swallowing saliva at this time. Patient asleep at time of visit- noted lethargy and decreased alertness. Moderate loss of lean body mass evident in temporal, clavicle. Fat mass adequate.  Team to meet with family to decide goals of care; PEG tube decisions.     Nutrition Risk Screen    Nutrition Risk Screen: dysphagia or difficulty swallowing    Nutrition/Diet History    Do you have any cultural, spiritual, Lutheran conflicts, given your current situation?: ARABELLA cultural, Lutheran food preferences.     Anthropometrics    Temp: 98.1 °F (36.7 °C)  Height Method: Stated  Height: 5' 8" (172.7 cm)  Height (inches): 68 in  Weight Method: Bed Scale  Weight: 84.7 kg (186 lb 11.7 oz)  Weight (lb): 186.73 lb  Ideal Body Weight (IBW), Male: 154 lb  % Ideal Body Weight, Male (lb): 121.25 lb  BMI (Calculated): 28.5  BMI Grade: 25 - 29.9 - overweight       Lab/Procedures/Meds    Pertinent Labs Reviewed: reviewed  Pertinent Medications Reviewed: reviewed    Physical Findings/Assessment    Overall Physical Appearance: advanced age, loss of muscle mass  Oral/Mouth Cavity: tooth/teeth missing  Skin: pressure ulcer(s), skin tear (Stage I buttocks; neuropathic toe " ulcer)    Estimated/Assessed Needs    Weight Used For Calorie Calculations: 84.7 kg (186 lb 11.7 oz)  Energy Calorie Requirements (kcal): 1750 kcal  Energy Need Method: [place]-St Jeor  Protein Requirements: 70-85 g  Weight Used For Protein Calculations: 84.7 kg (186 lb 11.7 oz)     Fluid Need Method: RDA Method  RDA Method (mL): 1750         Nutrition Prescription Ordered    Current Diet Order: Pureed, honey thick    Evaluation of Received Nutrient/Fluid Intake    I/O: 237/371  Energy Calories Required: not meeting needs  Protein Required: not meeting needs  Fluid Required: not meeting needs  Comments: LBM: 4/9  % Intake of Estimated Energy Needs: 0-25%  % Meal Intake: 0-25%    Nutrition Risk    Level of Risk/Frequency of Follow-up:  (2 x week)     Assessment and Plan    Nutrition Dx: Inadequate Energy Intake r/t dysphagia as evidenced by 25% po intake of pureed/honey thick diet  Nutrition Dx Status: New       Monitor and Evaluation    Food and Nutrient Intake: energy intake, food and beverage intake  Food and Nutrient Adminstration: diet order, enteral and parenteral nutrition administration  Knowledge/Beliefs/Attitudes: food and nutrition knowledge/skill  Physical Activity and Function: nutrition-related ADLs and IADLs  Anthropometric Measurements: weight, weight change  Biochemical Data, Medical Tests and Procedures: electrolyte and renal panel  Nutrition-Focused Physical Findings: overall appearance     Nutrition Follow-Up    RD Follow-up?: Yes

## 2018-04-10 NOTE — PLAN OF CARE
Problem: Patient Care Overview  Goal: Plan of Care Review  Outcome: Ongoing (interventions implemented as appropriate)  The proper method of use, as well as anticipated side effects, of this aerosol treatment are discussed and demonstrated to the patient. O2 saturation 99% on nasal cannula 2 lpm. Will continue to monitor.

## 2018-04-10 NOTE — PLAN OF CARE
Problem: Occupational Therapy Goal  Goal: Occupational Therapy Goal  Goals to be met by: 05/06/18     Patient will increase functional independence with ADLs by performing:    Grooming while seated with Set-up Assistance.  Toileting from bedside commode with Moderate Assistance for hygiene and clothing management.   Supine to sit with Stand-by Assistance and use of bedrail as needed.  Toilet transfer to bedside commode with Contact Guard Assistance.  Increased functional strength to WFL for ADLs.     Outcome: Ongoing (interventions implemented as appropriate)  Zeyad Woodard is a 89 y.o. male with a medical diagnosis of HCAP (healthcare-associated pneumonia).  He presents with decreased cognition and decreased overall strength, endurance, balance and [place] and safety with ADL's and fx mobility.  Performance deficits affecting function are weakness, impaired endurance, impaired self care skills, impaired functional mobilty, gait instability, impaired balance, impaired cognition, decreased coordination, decreased upper extremity function, decreased lower extremity function, decreased safety awareness, pain, decreased ROM, impaired coordination, impaired fine motor, impaired skin, impaired cardiopulmonary response to activity.  Pt requires Tot A with bed mobility. Pt tolerated EOB x 15 min with CGA-SBA. He was O x person only and coughing and drooling throughout tx. He denied pain other than with transitional mvmts.  Fair tolerance of tx. Continue OT services to address functional goals, progressing as able.    ALLY Hutchins/L

## 2018-04-10 NOTE — PLAN OF CARE
Case Management recommends Palliative Care Consult.    Accompanied by Heather, patient is still pending clinical improvement, conferred with Leander who has been working on placement, Ashleykrishna is not yet prepared to give auth, however has been accepted by [facility]. Ultimately may return to his group home with home health.       04/10/18 1103   Discharge Reassessment   Assessment Type Discharge Planning Reassessment   Discharge Plan A Skilled Nursing Facility   Discharge Plan B Home with family;Home Health

## 2018-04-10 NOTE — PLAN OF CARE
Recommendations     Recommendation/Intervention:   1. Consider boost plus with thickener   2. Concern for patients ability to meet LT needs- RD to f/u with goals of care/PEG tube plans  3. RD to monitor     Goals: Meet 85% EEN  Nutrition Goal Status: new  Communication of RD Recs: reviewed with RN (plan of care)     D/c planning: Too soon to determine

## 2018-04-10 NOTE — PROGRESS NOTES
The Amanda,Cole and the team met with the pt and his 2 dtr's Ghazala and Asia to discuss the d/c plan. The Sw gave the them a [employer] snf list b/c [place] threw the other one away. The Sw encouraged them to consider more snf's in case [facility] snf doesn't have a bed when the pt's ready for d/c. The pt's not ready for d/c yet per the team but the Sw will continue to f/u with the case. The Sw left her contact info with the pt's dtr's and encouraged them to call should they have any questions or concerns.

## 2018-04-11 LAB
ALBUMIN SERPL BCP-MCNC: 2.7 G/DL
ALP SERPL-CCNC: 130 U/L
ALT SERPL W/O P-5'-P-CCNC: 37 U/L
ANION GAP SERPL CALC-SCNC: 10 MMOL/L
ANISOCYTOSIS BLD QL SMEAR: SLIGHT
AST SERPL-CCNC: 42 U/L
BASOPHILS NFR BLD: 0 %
BILIRUB SERPL-MCNC: 0.6 MG/DL
BUN SERPL-MCNC: 31 MG/DL
CALCIUM SERPL-MCNC: 8.9 MG/DL
CHLORIDE SERPL-SCNC: 105 MMOL/L
CHOLEST SERPL-MCNC: 68 MG/DL
CHOLEST/HDLC SERPL: 3.8 {RATIO}
CO2 SERPL-SCNC: 25 MMOL/L
CREAT SERPL-MCNC: 1.4 MG/DL
DIFFERENTIAL METHOD: ABNORMAL
EOSINOPHIL NFR BLD: 1 %
ERYTHROCYTE [DISTWIDTH] IN BLOOD BY AUTOMATED COUNT: 14.9 %
EST. GFR  (AFRICAN AMERICAN): 51 ML/MIN/1.73 M^2
EST. GFR  (NON AFRICAN AMERICAN): 44 ML/MIN/1.73 M^2
GLUCOSE SERPL-MCNC: 91 MG/DL
HCT VFR BLD AUTO: 26.4 %
HDLC SERPL-MCNC: 18 MG/DL
HDLC SERPL: 26.5 %
HGB BLD-MCNC: 8.5 G/DL
LDLC SERPL CALC-MCNC: 35 MG/DL
LYMPHOCYTES NFR BLD: 54 %
MCH RBC QN AUTO: 28.7 PG
MCHC RBC AUTO-ENTMCNC: 32.2 G/DL
MCV RBC AUTO: 89 FL
MONOCYTES NFR BLD: 4 %
NEUTROPHILS NFR BLD: 41 %
NONHDLC SERPL-MCNC: 50 MG/DL
PLATELET # BLD AUTO: ABNORMAL K/UL
PLATELET BLD QL SMEAR: ABNORMAL
PMV BLD AUTO: 11 FL
POIKILOCYTOSIS BLD QL SMEAR: SLIGHT
POTASSIUM SERPL-SCNC: 3.7 MMOL/L
PROT SERPL-MCNC: 6.2 G/DL
RBC # BLD AUTO: 2.96 M/UL
SODIUM SERPL-SCNC: 140 MMOL/L
TRIGL SERPL-MCNC: 75 MG/DL
WBC # BLD AUTO: 17.01 K/UL

## 2018-04-11 PROCEDURE — 97530 THERAPEUTIC ACTIVITIES: CPT

## 2018-04-11 PROCEDURE — 94640 AIRWAY INHALATION TREATMENT: CPT

## 2018-04-11 PROCEDURE — 85007 BL SMEAR W/DIFF WBC COUNT: CPT

## 2018-04-11 PROCEDURE — 94668 MNPJ CHEST WALL SBSQ: CPT

## 2018-04-11 PROCEDURE — 36415 COLL VENOUS BLD VENIPUNCTURE: CPT

## 2018-04-11 PROCEDURE — 27000221 HC OXYGEN, UP TO 24 HOURS

## 2018-04-11 PROCEDURE — 93010 ELECTROCARDIOGRAM REPORT: CPT | Mod: ,,, | Performed by: INTERNAL MEDICINE

## 2018-04-11 PROCEDURE — 80053 COMPREHEN METABOLIC PANEL: CPT

## 2018-04-11 PROCEDURE — 63600175 PHARM REV CODE 636 W HCPCS: Performed by: INTERNAL MEDICINE

## 2018-04-11 PROCEDURE — 63600175 PHARM REV CODE 636 W HCPCS: Performed by: STUDENT IN AN ORGANIZED HEALTH CARE EDUCATION/TRAINING PROGRAM

## 2018-04-11 PROCEDURE — A4216 STERILE WATER/SALINE, 10 ML: HCPCS | Performed by: STUDENT IN AN ORGANIZED HEALTH CARE EDUCATION/TRAINING PROGRAM

## 2018-04-11 PROCEDURE — 25000003 PHARM REV CODE 250: Performed by: STUDENT IN AN ORGANIZED HEALTH CARE EDUCATION/TRAINING PROGRAM

## 2018-04-11 PROCEDURE — 93005 ELECTROCARDIOGRAM TRACING: CPT

## 2018-04-11 PROCEDURE — 97535 SELF CARE MNGMENT TRAINING: CPT

## 2018-04-11 PROCEDURE — 25000003 PHARM REV CODE 250: Performed by: INTERNAL MEDICINE

## 2018-04-11 PROCEDURE — 11000001 HC ACUTE MED/SURG PRIVATE ROOM

## 2018-04-11 PROCEDURE — 85027 COMPLETE CBC AUTOMATED: CPT

## 2018-04-11 PROCEDURE — 80061 LIPID PANEL: CPT

## 2018-04-11 PROCEDURE — 25000242 PHARM REV CODE 250 ALT 637 W/ HCPCS: Performed by: STUDENT IN AN ORGANIZED HEALTH CARE EDUCATION/TRAINING PROGRAM

## 2018-04-11 PROCEDURE — S0077 INJECTION, CLINDAMYCIN PHOSP: HCPCS | Performed by: STUDENT IN AN ORGANIZED HEALTH CARE EDUCATION/TRAINING PROGRAM

## 2018-04-11 PROCEDURE — 94761 N-INVAS EAR/PLS OXIMETRY MLT: CPT

## 2018-04-11 RX ORDER — LABETALOL HYDROCHLORIDE 5 MG/ML
10 INJECTION, SOLUTION INTRAVENOUS EVERY 6 HOURS PRN
Status: DISCONTINUED | OUTPATIENT
Start: 2018-04-11 | End: 2018-04-11

## 2018-04-11 RX ORDER — MORPHINE SULFATE 4 MG/ML
1 INJECTION, SOLUTION INTRAMUSCULAR; INTRAVENOUS EVERY 8 HOURS PRN
Status: DISCONTINUED | OUTPATIENT
Start: 2018-04-11 | End: 2018-04-16

## 2018-04-11 RX ORDER — NYSTATIN 100000 [USP'U]/ML
500000 SUSPENSION ORAL 4 TIMES DAILY
Status: DISCONTINUED | OUTPATIENT
Start: 2018-04-11 | End: 2018-04-14

## 2018-04-11 RX ORDER — METOPROLOL TARTRATE 1 MG/ML
5 INJECTION, SOLUTION INTRAVENOUS EVERY 8 HOURS
Status: DISCONTINUED | OUTPATIENT
Start: 2018-04-11 | End: 2018-04-11

## 2018-04-11 RX ORDER — MORPHINE SULFATE 4 MG/ML
1 INJECTION, SOLUTION INTRAMUSCULAR; INTRAVENOUS EVERY 6 HOURS PRN
Status: DISCONTINUED | OUTPATIENT
Start: 2018-04-11 | End: 2018-04-11

## 2018-04-11 RX ORDER — HYDRALAZINE HYDROCHLORIDE 20 MG/ML
10 INJECTION INTRAMUSCULAR; INTRAVENOUS EVERY 6 HOURS PRN
Status: DISCONTINUED | OUTPATIENT
Start: 2018-04-11 | End: 2018-04-20 | Stop reason: HOSPADM

## 2018-04-11 RX ORDER — METOPROLOL TARTRATE 1 MG/ML
5 INJECTION, SOLUTION INTRAVENOUS EVERY 12 HOURS
Status: DISCONTINUED | OUTPATIENT
Start: 2018-04-11 | End: 2018-04-12

## 2018-04-11 RX ADMIN — IPRATROPIUM BROMIDE AND ALBUTEROL SULFATE 3 ML: .5; 3 SOLUTION RESPIRATORY (INHALATION) at 08:04

## 2018-04-11 RX ADMIN — LEVOTHYROXINE SODIUM ANHYDROUS 50 MCG: 100 INJECTION, POWDER, LYOPHILIZED, FOR SOLUTION INTRAVENOUS at 08:04

## 2018-04-11 RX ADMIN — MORPHINE SULFATE 1 MG: 4 INJECTION INTRAVENOUS at 09:04

## 2018-04-11 RX ADMIN — CLINDAMYCIN IN 5 PERCENT DEXTROSE 900 MG: 18 INJECTION, SOLUTION INTRAVENOUS at 06:04

## 2018-04-11 RX ADMIN — NYSTATIN 500000 UNITS: 500000 SUSPENSION ORAL at 02:04

## 2018-04-11 RX ADMIN — CLINDAMYCIN IN 5 PERCENT DEXTROSE 900 MG: 18 INJECTION, SOLUTION INTRAVENOUS at 12:04

## 2018-04-11 RX ADMIN — CLINDAMYCIN IN 5 PERCENT DEXTROSE 900 MG: 18 INJECTION, SOLUTION INTRAVENOUS at 08:04

## 2018-04-11 RX ADMIN — IPRATROPIUM BROMIDE AND ALBUTEROL SULFATE 3 ML: .5; 3 SOLUTION RESPIRATORY (INHALATION) at 11:04

## 2018-04-11 RX ADMIN — SODIUM CHLORIDE, PRESERVATIVE FREE 3 ML: 5 INJECTION INTRAVENOUS at 02:04

## 2018-04-11 RX ADMIN — LABETALOL HYDROCHLORIDE 10 MG: 5 INJECTION, SOLUTION INTRAVENOUS at 08:04

## 2018-04-11 RX ADMIN — ASCORBIC ACID, VITAMIN A PALMITATE, CHOLECALCIFEROL, THIAMINE HYDROCHLORIDE, RIBOFLAVIN-5 PHOSPHATE SODIUM, PYRIDOXINE HYDROCHLORIDE, NIACINAMIDE, DEXPANTHENOL, ALPHA-TOCOPHEROL ACETATE, VITAMIN K1, FOLIC ACID, BIOTIN, CYANOCOBALAMIN: 200; 3300; 200; 6; 3.6; 6; 40; 15; 10; 150; 600; 60; 5 INJECTION, SOLUTION INTRAVENOUS at 02:04

## 2018-04-11 RX ADMIN — SODIUM CHLORIDE, PRESERVATIVE FREE 3 ML: 5 INJECTION INTRAVENOUS at 09:04

## 2018-04-11 RX ADMIN — IPRATROPIUM BROMIDE AND ALBUTEROL SULFATE 3 ML: .5; 3 SOLUTION RESPIRATORY (INHALATION) at 12:04

## 2018-04-11 RX ADMIN — SODIUM CHLORIDE, PRESERVATIVE FREE 3 ML: 5 INJECTION INTRAVENOUS at 06:04

## 2018-04-11 RX ADMIN — MUPIROCIN: 20 OINTMENT TOPICAL at 08:04

## 2018-04-11 RX ADMIN — METOPROLOL TARTRATE 5 MG: 5 INJECTION INTRAVENOUS at 09:04

## 2018-04-11 RX ADMIN — LABETALOL HYDROCHLORIDE 10 MG: 5 INJECTION, SOLUTION INTRAVENOUS at 12:04

## 2018-04-11 RX ADMIN — IPRATROPIUM BROMIDE AND ALBUTEROL SULFATE 3 ML: .5; 3 SOLUTION RESPIRATORY (INHALATION) at 04:04

## 2018-04-11 RX ADMIN — CEFEPIME 2 G: 2 INJECTION, POWDER, FOR SOLUTION INTRAMUSCULAR; INTRAVENOUS at 08:04

## 2018-04-11 NOTE — PT/OT/SLP PROGRESS
Occupational Therapy   Treatment    Name: Zeyad Woodard  MRN: [id number]  Admitting Diagnosis:  HCAP (healthcare-associated pneumonia)       Recommendations:     Discharge Recommendations:  (TBD)  Discharge Equipment Recommendations:   (TBD)  Barriers to discharge:  None    Subjective     Communicated with: nurseDiana prior to session.  Pain/Comfort:  · Pain Rating 1: 0/10  · Pain Rating Post-Intervention 1: 0/10    Patients cultural, spiritual, Rastafari conflicts given the current situation:  (none reported)    Objective:     Patient found with: bed alarm, oxygen, telemetry    General Precautions: Standard, aspiration, fall, pureed diet, honey thick   Orthopedic Precautions:N/A   Braces: N/A :    Bed Mobility:    · Patient completed Rolling/Turning to Right with total assistance and 2 persons  · Patient completed Scooting/Bridging with total assistance and 2 persons  · Patient completed Supine to Sit with total assistance and 2 persons  · Patient completed Sit to Supine with total assistance and 2 persons     Functional Mobility/Transfers:  · N/A    Activities of Daily Living:  · Grooming: maximal assistance to wash face    Patient left HOB elevated with all lines intact, call button in reach, bed alarm on, RN notified and daughters present    [facility] 6 Click:  [facility] Total Score: 9    Treatment & Education:  Patient with bed mob as noted above. Sat EOB x 16 mins with CGA initially, but patient became increasingly lethargic and required min-mod (A) to maintain upright sitting. Patient completed minimal AAROM of BLEs (see PT note). Patient returned to supine position and brought to HOB in trendelenburg. Patient's B heels floated.   Education:    Assessment:   Patient extremely lethargic and difficulty maintaining alertness/arousal. Patient required total (A) for all bed mobility. Patient will benefit from continued skilled OT to address functional deficits.     Zeyad Woodard is a 89 y.o. male with a medical  Sent to dr sandoval     diagnosis of HCAP (healthcare-associated pneumonia). Performance deficits affecting function are weakness, impaired endurance, impaired self care skills, impaired functional mobilty, gait instability, impaired balance, impaired sensation, impaired cognition, decreased lower extremity function, decreased upper extremity function, decreased safety awareness, impaired fine motor, impaired coordination, impaired cardiopulmonary response to activity.      Rehab Prognosis:  Fair-; patient would benefit from acute skilled OT services to address these deficits and reach maximum level of function.       Plan:     Patient to be seen 5 x/week to address the above listed problems via self-care/home management, therapeutic activities, therapeutic exercises  · Plan of Care Expires: 05/06/18  · Plan of Care Reviewed with: patient, family    This Plan of care has been discussed with the patient who was involved in its development and understands and is in agreement with the identified goals and treatment plan    GOALS:    Occupational Therapy Goals        Problem: Occupational Therapy Goal    Goal Priority Disciplines Outcome Interventions   Occupational Therapy Goal     OT, PT/OT Ongoing (interventions implemented as appropriate)    Description:  Goals to be met by: 05/06/18     Patient will increase functional independence with ADLs by performing:    Grooming while seated with Set-up Assistance.  Toileting from bedside commode with Moderate Assistance for hygiene and clothing management.   Supine to sit with Stand-by Assistance and use of bedrail as needed.  Toilet transfer to bedside commode with Contact Guard Assistance.  Increased functional strength to WFL for ADLs.                      Time Tracking:     OT Date of Treatment: 04/11/18  OT Start Time: 1044  OT Stop Time: 1122  OT Total Time (min): 38 mins with PTA    Billable Minutes:Self Care/Home Management 10  Therapeutic Activity 13    Oralia Sotomayor,  CANALES/L  4/11/2018

## 2018-04-11 NOTE — PLAN OF CARE
Problem: Patient Care Overview  Goal: Plan of Care Review  Outcome: Ongoing (interventions implemented as appropriate)  Pt on continuous O2 sats 100%. No Distress noted.

## 2018-04-11 NOTE — PROGRESS NOTES
"[facility] IM Resident Progress Note    Subjective:      Patient slightly more interactive. Says he feels "okay" but still answers inconsistently. Oriented to person and "[facility]".    Objective:   Last 24 Hour Vital Signs:  BP  Min: 161/74  Max: 189/87  Temp  Av °F (36.7 °C)  Min: 96.4 °F (35.8 °C)  Max: 99 °F (37.2 °C)  Pulse  Av.2  Min: 69  Max: 127  Resp  Av.5  Min: 16  Max: 22  SpO2  Av.6 %  Min: 98 %  Max: 100 %  Height  Av' 8" (172.7 cm)  Min: 5' 8" (172.7 cm)  Max: 5' 8" (172.7 cm)  Weight  Av.7 kg (186 lb 11.7 oz)  Min: 84.7 kg (186 lb 11.7 oz)  Max: 84.7 kg (186 lb 11.7 oz)  I/O last 3 completed shifts:  In: 0   Out: 2202 [Urine:2202]    Physical Examination:  General:           Alert and awake  Head:               Normocephalic and atraumatic  Eyes:               anicteric sclera and clear conjunctivae  Mouth:             Oropharynx clear and without exudate; moist mucous membranes  Cardio:            Systolic murmur at base; Irregular rhythm; with normal S1 and S2; rubs  Resp:               Mild rhonchi--improved from admit  Abdom:            Soft, NTND with normoactive bowel sounds  Extrem:            Left 1st toe wrapped  Skin:                Left knee abrasion; diffuse countusions and scatter purpura on extremities and chest  Pulses:            2+ and symmetric distally    Laboratory:  Laboratory Data Reviewed: yes  Pertinent Findings:    Recent Labs  Lab 18  0534 04/10/18  0325 18  0444   WBC 15.65* 13.89* 17.01*   HGB 8.1* 7.9* 8.5*   HCT 25.3* 24.3* 26.4*   PLT 57* 150 SEE COMMENT   MCV 90 91 89   RDW 15.3* 15.2* 14.9*    140 140   K 4.3 4.0 3.7    110 105   CO2 21* 23 25   BUN 33* 32* 31*   CREATININE 1.3 1.2 1.4   GLU 90 88 91   PROT 6.1 5.5* 6.2   ALBUMIN 2.6* 2.4* 2.7*   BILITOT 1.0 0.7 0.6   AST 55* 52* 42*   ALKPHOS 146* 125 130   ALT 44 41 37       Microbiology Data Reviewed: yes  Pertinent Findings:  Blood cx, : coag negative stap,  " bottles  Urine cx negative  Respiratory cx: stain-G+ cocci, cx negative    Blood cx: 4/7: NGTD  Wound cx: NGTD    Other Results:  EKG (my interpretation):none new    Radiology Data Reviewed: yes  Pertinent Findings:    Imaging Results          X-Ray Toe 2 or More Views Left (Final result)  Result time 04/05/18 19:14:09    Final result by Jurgen Gaston MD (04/05/18 19:14:09)                 Impression:      Erosive changes involving the left 1st metacarpal head and base of the 1st proximal phalanx.  The findings suggestive of early osteomyelitis.  Contrast enhanced MRI may be obtained for further evaluation.    Subtle erosive changes involving the head of the left 5th metacarpal.  This can also be assessed on the MRI examination.      Electronically signed by: Jurgen Gaston MD  Date:    04/05/2018  Time:    19:14             Narrative:    EXAMINATION:  XR TOE 2 OR MORE VIEWS LEFT    CLINICAL HISTORY:  swelling;    TECHNIQUE:  Three views of the left toes were performed    COMPARISON:  None.    FINDINGS:  There is diffuse demineralization of the osseous structures.  There are erosive changes involving the head of the 1st metatarsal and the base of the 1st proximal phalanx.  Subtle erosive changes also noted involving the head of the left 5th metacarpal.  There is no evidence of a fracture.  There is joint space narrowing throughout the visualized left forefoot.  There is no evidence of a dislocation.  There are extensive vascular calcifications.                               X-Ray Chest AP Portable (Final result)  Result time 04/05/18 16:33:19    Final result by Mis Mccray MD (04/05/18 16:33:19)                 Impression:      Bibasilar patchy areas of increased attenuation concerning for subsegmental areas of airspace disease considering pneumonia or aspiration.    Cardiomegaly.      Electronically signed by: Mis Mccray MD  Date:    04/05/2018  Time:    16:33             Narrative:     EXAMINATION:  XR CHEST AP PORTABLE    CLINICAL HISTORY:  Sepsis;    TECHNIQUE:  Single frontal view of the chest was performed.    COMPARISON:  01/11/2018    FINDINGS:  There is a cardio stimulator device in the left upper chest with a single lead the.  Sternotomy wires.  The cardiac silhouette is enlarged.  Patchy areas of increased attenuation at the bilateral lung bases concerning for areas of possible pneumonia or aspiration, it is new compared to the prior exam.  No pleural effusion.  No pneumothorax.  Atherosclerotic changes of the aorta.                                Current Medications:     Infusions:       Scheduled:   albuterol-ipratropium 2.5mg-0.5mg/3mL  3 mL Nebulization Q4H    ceFEPime (MAXIPIME) IVPB  2 g Intravenous Q12H    clindamycin (CLEOCIN) IVPB  900 mg Intravenous Q8H    levothyroxine  50 mcg Intravenous Daily    mupirocin   Topical (Top) Daily    sodium chloride 0.9%  3 mL Intravenous Q8H    vancomycin (VANCOCIN) IVPB  1,000 mg Intravenous Q24H        PRN:  albuterol-ipratropium 2.5mg-0.5mg/3mL, labetalol, traMADol    Antibiotics and Day Number of Therapy:  Vanc, cefepime--4/5-present  Clinda--4/6-present  Cipro--4/5-4/6    Assessment:     Zeyad Woodard is a 89 y.o.male with     Plan:     Acute encephalopathy  -Began 4/9, became more lethargic and confused, also with visual halluciations  No focal deficits and CT head without acute finding  Likely 2/2 delirium in setting of sepsis--monitoring    Dysphagia  -SLP following--currently aspiration concerns, pureed diet with honey thick liquids  Started PPN 4/11  SLP will continue to assess swallowing as mental status improves    Acute hypoxic respiratory failure and severe sepsis 2/2 community acquired pneumonia  -1 week progressive productive cough, fever, SOB  On admit, satting well on 5L O2 via NC, temp 103, CXR with bibasilar consolidations, lactate 2.5  BP stable   - Modified barium swallow study 1/18 w/o  aspiration--re-evaluated by speech-not safe for thin liquids-honey thick  -In ED, given 2L NS, started vanc, cipro, cefepime (4/5)--penicillin allergy was remote and only hives  -Lactate normalized, procal 21  Blood cx with coag negative staph 2/4 bottles--likely contaminant  Duonebs, CPT  -continue vanc, clinda, cefepime     Left 1st toe erosion  -Bruising, erythema, swelling 2/2 recent trauma  H/o neuropathy, follows with podiatry  Xray suggestive of early osteo  -Continue vanc, cefepime, cipro (4/5)  Wound cx NGTD  -podiatry following, do not believe it is osteo. Removed nail, routine wound care.     ELIZABET on CKD 3A, resolved  -On admit, Cr 1.5, GFR 40  Baseline Cr 1.2, GFR 60  -Likely prerenal in setting of sepsis  Returned to baseline     Elevated troponin, resolved  -On admit, trop 0.047  Chronically elevated trop to 0.03-0.06. Also, possibly 2/2 demand in setting of sepsis and CKD  EKG ventricularly paced  -Trended troponins--peaked 0.133->0.125     Chronic thrombocytopenia  -Baseline platelets ~ 90  Follows with Dr. Jeff as outpt  -On admit, platelets 44-likely exacerbated by sepsis  Scatter petechiae, contusions in setting of recent fall but no active bleeding  -PLT stable     Chronic HFpEF  -TTE 12/17-EF 55%, grade 2 diastolic dysfunction, PA pressure 35  -On admit, Trop 0.047,   CXR with b/l consolidations 2/2 PNA rather than volume overload--given fluids per sepsis protocol  Monitor closely for volume overload  -Repeat TTE--LV EF 55%, no diastolic d/f, PA pressure 33     A fib and SSS s/p pacemaker  -On admit, in ventricularly paced rhythm  Not on home anticoagulation 2/2 h/o GI bleeds. Also falls frequently  -No acute issues     Anemia of chronic disease, h/o CHRISTIANO and B12 deficiency  -On admit, Hbg 9.4--at baseline  1/18--w/u c/w AoCD--ferritin, B12 wnl  -Follows with Dr. Jeff--continue home iron, B12     Transaminitis   -On admit, , AST 61, ALT 50  Consistent with  baseline  -Negative Hep C Ab 2012--defer repeating as patient would be poor treatment candidate given age/comorbidities     HTN  -On admit, BP low/normal  Held home coreg in setting of sepsis--resumed 2/2 HTN  -Holding PO meds until swallowing improves     Hypothyroidism  -No acute issues  Continue home synthroid     BPH  -No acute issues  Continue home flomax, finasteride  -Holding PO meds until swallowing improves     Frequent mechanical falls  -Per chart review  Fell 3 days PTA  -Per family, previously had HH PT/OT that has been discontinued/completed  Only mobile via wheelchair  -Consulted PT/OT--recommended SNF vs return to group home with PT     Dementia  -Baseline--Depending for ADLs, lives at [facility] and has supervision via aide and nurse  -A&O to person, place, date; not situation  Continue home remeron, aricept  -Holding PO meds until swallowing improves     Code Status:      Full     Fluids: None    Diet: cardiac pureed, honey thick    Ppx: SCDs; holding chemical ppx in setting of thrombocytopenia    Dispo: pending clinical improvement and long term plan for nutrition    Chuckie Isaacs MD  [facility] Internal Medicine HO-I  [facility] IM Service Team A    [facility] Medicine Hospitalist Pager numbers:   [facility] Hospitalist Medicine Team A (Kinsey/Yumiko): [phone]  [facility] Hospitalist Medicine Team B (Tyrese/Bibi):  [phone]

## 2018-04-11 NOTE — PLAN OF CARE
Problem: Occupational Therapy Goal  Goal: Occupational Therapy Goal  Goals to be met by: 05/06/18     Patient will increase functional independence with ADLs by performing:    Grooming while seated with Set-up Assistance.  Toileting from bedside commode with Moderate Assistance for hygiene and clothing management.   Supine to sit with Stand-by Assistance and use of bedrail as needed.  Toilet transfer to bedside commode with Contact Guard Assistance.  Increased functional strength to WFL for ADLs.     Outcome: Ongoing (interventions implemented as appropriate)  Patient extremely lethargic and difficulty maintaining alertness/arousal. Patient required total (A) for all bed mobility. Patient will benefit from continued skilled OT to address functional deficits.

## 2018-04-11 NOTE — PT/OT/SLP PROGRESS
Physical Therapy Treatment    Patient Name:  Zeyad Woodard   MRN:  [id number]    Recommendations:     Discharge Recommendations:   (TBD)   Discharge Equipment Recommendations:  (TBD)   Barriers to discharge: decreased functional mobility,pt requires Tot A X 2 at this time with bed mobility    Assessment:     Zeyad Woodard is a 89 y.o. male admitted with a medical diagnosis of HCAP (healthcare-associated pneumonia).  He presents with the following impairments/functional limitations:  weakness, impaired endurance, impaired functional mobilty, impaired balance, impaired cognition, decreased upper extremity function, decreased lower extremity function, decreased ROM, impaired coordination, impaired skin, edema, impaired cardiopulmonary response to activity pt willing to try in therapy requiring Tot A X 2 with bed mobility,next level of care TBD.    Rehab Prognosis:  Fair; patient would benefit from acute skilled PT services to address these deficits and reach maximum level of function.      Recent Surgery: * No surgery found *      Plan:     During this hospitalization, patient to be seen 6 x/week to address the above listed problems via therapeutic activities, therapeutic exercises, neuromuscular re-education  · Plan of Care Expires:  05/06/18   Plan of Care Reviewed with: patient, family    Subjective     Communicated with nsg prior to session.  Patient found supine upon PT entry to room, agreeable to treatment.      Chief Complaint: n/a  Patient comments/goals: pt with limited speech  Pain/Comfort:  · Pain Rating 1:  (no verbal c/o's)    Patients cultural, spiritual, Temple conflicts given the current situation:      Objective:     Patient found with: bed alarm, oxygen, telemetry     General Precautions: Standard, aspiration, fall, pureed diet, honey thick   Orthopedic Precautions:N/A   Braces: N/A     Functional Mobility:  · Bed Mobility:     · Supine to Sit: total assistance and of 2 persons  · Sit to  Supine: total assistance and of 2 persons  · Transfers:     · Sit to Stand:  n/a with n/a  · Balance: poor sitting balance      AM-PAC 6 CLICK MOBILITY  Sitting down on and standing up from a chair with arms (e.g., wheelchair, bedside commode, etc.): 2  Moving from lying on back to sitting on the side of the bed?: 2  Moving to and from a bed to a chair (including a wheelchair)?: 2  Need to walk in hospital room?: 1  Climbing 3-5 steps with a railing?: 1       Therapeutic Activities and Exercises: seated le AA laq's with Max A,pt sat EOB ~16 mins initially with CGA then Min/Mod A,increased posterior lean         Patient left supine with all lines intact, call button in reach, bed alarm on, daughters present and pt's le's positioned for comfort and skin safety..    GOALS: see general POC   Physical Therapy Goals        Problem: Physical Therapy Goal    Goal Priority Disciplines Outcome Goal Variances Interventions   Physical Therapy Goal     PT/OT, PT Ongoing (interventions implemented as appropriate)     Description:  Goals to be met by: 2018     Patient will increase functional independence with mobility by performin. Supine to sit with Stand-by Assistance  2. Sit to stand transfer with Stand-by Assistance  3. Bed to chair transfer with Stand-by Assistance using Rolling Walker  4. Gait  x 20 feet with Minimal Assistance using Rolling Walker.                       Time Tracking:     PT Received On: 18  PT Start Time: 1044     PT Stop Time: 1128  PT Total Time (min): 44 min     Billable Minutes: Therapeutic Activity 15 and Total Time 38    Treatment Type: Treatment (co-rx with OT)  PT/PTA: PTA     PTA Visit Number: 3     Tashi Small PTA  2018

## 2018-04-11 NOTE — PLAN OF CARE
Problem: Patient Care Overview  Goal: Plan of Care Review  Outcome: Ongoing (interventions implemented as appropriate)  Patient  is oriented and overall more alert than previous shift. He received IV antibiotics.  He still have a persistent productive cough, aspiration precautions maintained.  Frequent weight shift encouraged.On continuous heart monitoring, A.fib , HR in 70s , no true red alarms . Fall precautions maintained.  Bed locked and low, side rails X3, call bell within reach. Nurse asked patient to call before ambulating, patient verbalized understanding. Will continue monitoring.

## 2018-04-11 NOTE — PROGRESS NOTES
The Sw received a call from Mayda at [facility] inquiring since the pt's not medically stable she will withdraw the snf request and get [facility] to resubmit once he's medically stable. The Sw spoke to Dianne at [facility] and informed her of the above mentioned info. Dianne asked the Sw to reach out to her when the pt's medically stable and she will be able to let the Sw know at that time if she has an available bed b/c they do not save beds. The Sw informed the pt's dtr [place] of the above mentioned info and she's in agreement with this d/c plan. The Sw encouraged her to call should she have any questions or concerns and she stated she will and thanked the Sw for her assistance with this case.

## 2018-04-11 NOTE — PT/OT/SLP PROGRESS
Speech Language Pathology  Attempted Visit    Zeyad Woodard  MRN: [id number]    Patient not seen today secondary to Patient unwilling to participate. SLP attempted to see pt for indirect dysphagia exercises this PM. Pt appeared to be asleep upon SLP entry. Pt required max verbal and kinesthetic-tactile cuing (damp rag to face and deep sternal rubs) to wake. However, pt did not open eyes and was observed to moan while SLP provided pt with max cuing. Pt continues to present with dcr'd DANGELO and impaired cognition at this time. SLP present in pt's room for 5-7 mins and instructed pt to perform indirect dysphagia exercises. However, pt with limited active participation in therapy, as pt continued to keep eyes closed and moan.  SLP will continue to follow. SLP notified DINA Arechiga, JUAN Ortiz, and MD team of results/recs.    SANDRINE Herzog., CF-SLP  Speech-Language Pathologist   4/11/2018

## 2018-04-12 PROBLEM — Z51.5 PALLIATIVE CARE ENCOUNTER: Status: ACTIVE | Noted: 2018-04-12

## 2018-04-12 PROBLEM — Z71.89 COUNSELING REGARDING ADVANCED CARE PLANNING AND GOALS OF CARE: Status: ACTIVE | Noted: 2018-04-12

## 2018-04-12 PROBLEM — Z71.89 GOALS OF CARE, COUNSELING/DISCUSSION: Status: ACTIVE | Noted: 2018-04-12

## 2018-04-12 LAB
ALBUMIN SERPL BCP-MCNC: 2.7 G/DL
ALP SERPL-CCNC: 122 U/L
ALT SERPL W/O P-5'-P-CCNC: 32 U/L
ANION GAP SERPL CALC-SCNC: 7 MMOL/L
ANISOCYTOSIS BLD QL SMEAR: SLIGHT
AST SERPL-CCNC: 32 U/L
BACTERIA BLD CULT: NORMAL
BACTERIA BLD CULT: NORMAL
BASOPHILS # BLD AUTO: 0.02 K/UL
BASOPHILS NFR BLD: 0.1 %
BILIRUB SERPL-MCNC: 0.6 MG/DL
BUN SERPL-MCNC: 32 MG/DL
CALCIUM SERPL-MCNC: 9.1 MG/DL
CHLORIDE SERPL-SCNC: 104 MMOL/L
CO2 SERPL-SCNC: 29 MMOL/L
CREAT SERPL-MCNC: 1.3 MG/DL
DIFFERENTIAL METHOD: ABNORMAL
EOSINOPHIL # BLD AUTO: 0.2 K/UL
EOSINOPHIL NFR BLD: 1.2 %
ERYTHROCYTE [DISTWIDTH] IN BLOOD BY AUTOMATED COUNT: 15 %
EST. GFR  (AFRICAN AMERICAN): 56 ML/MIN/1.73 M^2
EST. GFR  (NON AFRICAN AMERICAN): 48 ML/MIN/1.73 M^2
GLUCOSE SERPL-MCNC: 178 MG/DL
HCT VFR BLD AUTO: 26.4 %
HGB BLD-MCNC: 8.4 G/DL
HYPOCHROMIA BLD QL SMEAR: ABNORMAL
LYMPHOCYTES # BLD AUTO: 8.4 K/UL
LYMPHOCYTES NFR BLD: 48.7 %
MAGNESIUM SERPL-MCNC: 2 MG/DL
MCH RBC QN AUTO: 28.1 PG
MCHC RBC AUTO-ENTMCNC: 31.8 G/DL
MCV RBC AUTO: 88 FL
MONOCYTES # BLD AUTO: 1.2 K/UL
MONOCYTES NFR BLD: 6.8 %
NEUTROPHILS # BLD AUTO: 7.3 K/UL
NEUTROPHILS NFR BLD: 43.2 %
OVALOCYTES BLD QL SMEAR: ABNORMAL
PHOSPHATE SERPL-MCNC: 3.1 MG/DL
PLATELET # BLD AUTO: 66 K/UL
PLATELET BLD QL SMEAR: ABNORMAL
PMV BLD AUTO: 10.9 FL
POIKILOCYTOSIS BLD QL SMEAR: SLIGHT
POTASSIUM SERPL-SCNC: 3.5 MMOL/L
PROT SERPL-MCNC: 6.2 G/DL
RBC # BLD AUTO: 2.99 M/UL
SODIUM SERPL-SCNC: 140 MMOL/L
WBC # BLD AUTO: 17.24 K/UL

## 2018-04-12 PROCEDURE — 84100 ASSAY OF PHOSPHORUS: CPT

## 2018-04-12 PROCEDURE — 11000001 HC ACUTE MED/SURG PRIVATE ROOM

## 2018-04-12 PROCEDURE — 63600175 PHARM REV CODE 636 W HCPCS: Performed by: STUDENT IN AN ORGANIZED HEALTH CARE EDUCATION/TRAINING PROGRAM

## 2018-04-12 PROCEDURE — 97803 MED NUTRITION INDIV SUBSEQ: CPT

## 2018-04-12 PROCEDURE — 25000003 PHARM REV CODE 250: Performed by: INTERNAL MEDICINE

## 2018-04-12 PROCEDURE — 36415 COLL VENOUS BLD VENIPUNCTURE: CPT

## 2018-04-12 PROCEDURE — S0077 INJECTION, CLINDAMYCIN PHOSP: HCPCS | Performed by: STUDENT IN AN ORGANIZED HEALTH CARE EDUCATION/TRAINING PROGRAM

## 2018-04-12 PROCEDURE — A4216 STERILE WATER/SALINE, 10 ML: HCPCS | Performed by: STUDENT IN AN ORGANIZED HEALTH CARE EDUCATION/TRAINING PROGRAM

## 2018-04-12 PROCEDURE — 85027 COMPLETE CBC AUTOMATED: CPT

## 2018-04-12 PROCEDURE — 97530 THERAPEUTIC ACTIVITIES: CPT

## 2018-04-12 PROCEDURE — 80053 COMPREHEN METABOLIC PANEL: CPT

## 2018-04-12 PROCEDURE — 94668 MNPJ CHEST WALL SBSQ: CPT

## 2018-04-12 PROCEDURE — 83735 ASSAY OF MAGNESIUM: CPT

## 2018-04-12 PROCEDURE — 25000003 PHARM REV CODE 250: Performed by: STUDENT IN AN ORGANIZED HEALTH CARE EDUCATION/TRAINING PROGRAM

## 2018-04-12 PROCEDURE — 25000242 PHARM REV CODE 250 ALT 637 W/ HCPCS: Performed by: STUDENT IN AN ORGANIZED HEALTH CARE EDUCATION/TRAINING PROGRAM

## 2018-04-12 PROCEDURE — 99223 1ST HOSP IP/OBS HIGH 75: CPT | Mod: ,,, | Performed by: NURSE PRACTITIONER

## 2018-04-12 PROCEDURE — 85007 BL SMEAR W/DIFF WBC COUNT: CPT

## 2018-04-12 PROCEDURE — 92526 ORAL FUNCTION THERAPY: CPT

## 2018-04-12 PROCEDURE — 94640 AIRWAY INHALATION TREATMENT: CPT

## 2018-04-12 PROCEDURE — 27000221 HC OXYGEN, UP TO 24 HOURS

## 2018-04-12 PROCEDURE — 94761 N-INVAS EAR/PLS OXIMETRY MLT: CPT

## 2018-04-12 RX ORDER — METOPROLOL TARTRATE 1 MG/ML
5 INJECTION, SOLUTION INTRAVENOUS EVERY 8 HOURS
Status: DISCONTINUED | OUTPATIENT
Start: 2018-04-12 | End: 2018-04-12

## 2018-04-12 RX ORDER — METOPROLOL TARTRATE 1 MG/ML
5 INJECTION, SOLUTION INTRAVENOUS EVERY 12 HOURS
Status: DISCONTINUED | OUTPATIENT
Start: 2018-04-12 | End: 2018-04-19

## 2018-04-12 RX ADMIN — IPRATROPIUM BROMIDE AND ALBUTEROL SULFATE 3 ML: .5; 3 SOLUTION RESPIRATORY (INHALATION) at 11:04

## 2018-04-12 RX ADMIN — HYDRALAZINE HYDROCHLORIDE 10 MG: 20 INJECTION INTRAMUSCULAR; INTRAVENOUS at 12:04

## 2018-04-12 RX ADMIN — IPRATROPIUM BROMIDE AND ALBUTEROL SULFATE 3 ML: .5; 3 SOLUTION RESPIRATORY (INHALATION) at 02:04

## 2018-04-12 RX ADMIN — CLINDAMYCIN IN 5 PERCENT DEXTROSE 900 MG: 18 INJECTION, SOLUTION INTRAVENOUS at 12:04

## 2018-04-12 RX ADMIN — CLINDAMYCIN IN 5 PERCENT DEXTROSE 900 MG: 18 INJECTION, SOLUTION INTRAVENOUS at 09:04

## 2018-04-12 RX ADMIN — IPRATROPIUM BROMIDE AND ALBUTEROL SULFATE 3 ML: .5; 3 SOLUTION RESPIRATORY (INHALATION) at 03:04

## 2018-04-12 RX ADMIN — IPRATROPIUM BROMIDE AND ALBUTEROL SULFATE 3 ML: .5; 3 SOLUTION RESPIRATORY (INHALATION) at 07:04

## 2018-04-12 RX ADMIN — METOPROLOL TARTRATE 5 MG: 5 INJECTION, SOLUTION INTRAVENOUS at 08:04

## 2018-04-12 RX ADMIN — SODIUM CHLORIDE, PRESERVATIVE FREE 3 ML: 5 INJECTION INTRAVENOUS at 06:04

## 2018-04-12 RX ADMIN — LEVOTHYROXINE SODIUM ANHYDROUS 50 MCG: 100 INJECTION, POWDER, LYOPHILIZED, FOR SOLUTION INTRAVENOUS at 09:04

## 2018-04-12 RX ADMIN — SODIUM CHLORIDE, PRESERVATIVE FREE 3 ML: 5 INJECTION INTRAVENOUS at 09:04

## 2018-04-12 RX ADMIN — MORPHINE SULFATE 1 MG: 4 INJECTION INTRAVENOUS at 10:04

## 2018-04-12 RX ADMIN — METOPROLOL TARTRATE 5 MG: 5 INJECTION INTRAVENOUS at 09:04

## 2018-04-12 RX ADMIN — SODIUM CHLORIDE, PRESERVATIVE FREE 3 ML: 5 INJECTION INTRAVENOUS at 02:04

## 2018-04-12 RX ADMIN — IPRATROPIUM BROMIDE AND ALBUTEROL SULFATE 3 ML: .5; 3 SOLUTION RESPIRATORY (INHALATION) at 08:04

## 2018-04-12 RX ADMIN — CLINDAMYCIN IN 5 PERCENT DEXTROSE 900 MG: 18 INJECTION, SOLUTION INTRAVENOUS at 05:04

## 2018-04-12 RX ADMIN — LEUCINE, PHENYLALANINE, LYSINE, METHIONINE, ISOLEUCINE, VALINE, HISTIDINE, THREONINE, TRYPTOPHAN, ALANINE, GLYCINE, ARGININE, PROLINE, SERINE, TYROSINE, SODIUM ACETATE, DIBASIC POTASSIUM PHOSPHATE, MAGNESIUM CHLORIDE, SODIUM CHLORIDE, CALCIUM CHLORIDE, DEXTROSE
311; 238; 247; 170; 255; 247; 204; 179; 77; 880; 438; 489; 289; 213; 17; 297; 261; 51; 77; 33; 10 INJECTION INTRAVENOUS at 02:04

## 2018-04-12 NOTE — PT/OT/SLP PROGRESS
Occupational Therapy      Patient Name:  Zeyad Woodard   MRN:  [id number]    Patient not seen today secondary to  (bedrest orders on chart.  left note for MD to discontinue bedrest orders if pt to participate in OT.). Will follow-up later date.    Glendy Ortega, OT  4/12/2018

## 2018-04-12 NOTE — PLAN OF CARE
Problem: SLP Goal  Goal: SLP Goal  Short Term Goals:  1. Pt will participate in BSS to determine least restrictive diet.- MET 4/6  2. Pt will successfully participate in Modified Barium Swallow study to objectively rule out aspiration and determine safest/least restrictive diet.- MET 4/6  3. Pt will tolerate honey thick liquids/puree textures po diet with chin tuck without overt s/s of aspiration-discontinued 4/11  4. Pt will tolerate advanced diet trials of nectar thick liquids and dental soft textures without overt s/s of aspiration.-discontinued 4/10  5. Pt will participate in indirect dysphagia exercises to strengthen musculature for swallowing mechanism with mod-max effort.- ongoing        Outcome: Ongoing (interventions implemented as appropriate)  4/12/18: Pt seen this PM for indirect dysphagia tx. Pt continues to demonstrated limited active participation. PTA and SLP repositioned pt to EOB. SLP swab pt['s oral cavity with lemon swabs, as well as water moistened toothettes to stimulate pt to increase participation. SLP instructed pt to perform indirect dysphagia exercises to target oral and pharyngeal musculature weakness. However, pt continues to present with dcr'd DANGELO and impaired cognition at this time, which negatively impacts pt's participation/progress. SLP provided pt with oral care/hygiene with toothettes moistened with oral rinse to reduce risk of infection/ further complications. SLP will continue to follow pt.  SANDRINE Herzog., CF-SLP  Speech-Language Pathologist

## 2018-04-12 NOTE — ASSESSMENT & PLAN NOTE
Contributing Nutrition Diagnosis  Inadequate energy intake    Related to (etiology):   dysphagia    Signs and Symptoms (as evidenced by):   NPO/PPN    Interventions/Recommendations (treatment strategy):  See recs    Nutrition Diagnosis Status:   Continues

## 2018-04-12 NOTE — PROGRESS NOTES
[facility] IM Resident Progress Note    Subjective:      No events overnight. Afebrile. Vitals stable.     Objective:   Last 24 Hour Vital Signs:  BP  Min: 167/79  Max: 191/88  Temp  Av.4 °F (36.9 °C)  Min: 97.1 °F (36.2 °C)  Max: 99.6 °F (37.6 °C)  Pulse  Av.1  Min: 68  Max: 145  Resp  Av.6  Min: 18  Max: 20  SpO2  Av.7 %  Min: 95 %  Max: 100 %  I/O last 3 completed shifts:  In: 0   Out: 2650 [Urine:2650]    Physical Examination:  General:           Alert and awake  Head:               Normocephalic and atraumatic  Eyes:               anicteric sclera and clear conjunctivae  Mouth:             Oropharynx clear and without exudate; moist mucous membranes  Cardio:            RRR, Grade 2/6 holosystolic murmur loudest at LUSB  Resp:               Lungs CTAB, intermittent dry cough  Abdom:            Soft, NTND with normoactive bowel sounds  Extrem:            Left 1st toe wrapped with clean bandages  Skin:                Left knee abrasion-healing well since admit; diffuse countusions and scatter   purpura on extremities and chest-stable since admit  Pulses:            2+ and symmetric distally    Laboratory:  Laboratory Data Reviewed: yes  Pertinent Findings:    Recent Labs  Lab 04/10/18  0325 18  0444 18  0434   WBC 13.89* 17.01* 17.24*   HGB 7.9* 8.5* 8.4*   HCT 24.3* 26.4* 26.4*    SEE COMMENT 66*   MCV 91 89 88   RDW 15.2* 14.9* 15.0*    140 140   K 4.0 3.7 3.5    105 104   CO2 23 25 29   BUN 32* 31* 32*   CREATININE 1.2 1.4 1.3   GLU 88 91 178*   PROT 5.5* 6.2 6.2   ALBUMIN 2.4* 2.7* 2.7*   BILITOT 0.7 0.6 0.6   AST 52* 42* 32   ALKPHOS 125 130 122   ALT 41 37 32       Microbiology Data Reviewed: yes  Pertinent Findings:  Blood cx (18): 1/8 positive for coag negative staph  Blood cx (18) x6 -  all negative  Urine cx (): negative  Respiratory cx: stain-G+ cocci, cx negative  Wound cx: NGTD    Other Results:  EKG (my interpretation):none new    Radiology Data  Reviewed: yes  Pertinent Findings:    Imaging Results          X-Ray Toe 2 or More Views Left (Final result)  Result time 04/05/18 19:14:09    Final result by Jurgen Gaston MD (04/05/18 19:14:09)                 Impression:      Erosive changes involving the left 1st metacarpal head and base of the 1st proximal phalanx.  The findings suggestive of early osteomyelitis.  Contrast enhanced MRI may be obtained for further evaluation.    Subtle erosive changes involving the head of the left 5th metacarpal.  This can also be assessed on the MRI examination.      Electronically signed by: Jurgen Gaston MD  Date:    04/05/2018  Time:    19:14             Narrative:    EXAMINATION:  XR TOE 2 OR MORE VIEWS LEFT    CLINICAL HISTORY:  swelling;    TECHNIQUE:  Three views of the left toes were performed    COMPARISON:  None.    FINDINGS:  There is diffuse demineralization of the osseous structures.  There are erosive changes involving the head of the 1st metatarsal and the base of the 1st proximal phalanx.  Subtle erosive changes also noted involving the head of the left 5th metacarpal.  There is no evidence of a fracture.  There is joint space narrowing throughout the visualized left forefoot.  There is no evidence of a dislocation.  There are extensive vascular calcifications.                               X-Ray Chest AP Portable (Final result)  Result time 04/05/18 16:33:19    Final result by Mis Mccray MD (04/05/18 16:33:19)                 Impression:      Bibasilar patchy areas of increased attenuation concerning for subsegmental areas of airspace disease considering pneumonia or aspiration.    Cardiomegaly.      Electronically signed by: Mis Mccray MD  Date:    04/05/2018  Time:    16:33             Narrative:    EXAMINATION:  XR CHEST AP PORTABLE    CLINICAL HISTORY:  Sepsis;    TECHNIQUE:  Single frontal view of the chest was performed.    COMPARISON:  01/11/2018    FINDINGS:  There is a cardio  stimulator device in the left upper chest with a single lead the.  Sternotomy wires.  The cardiac silhouette is enlarged.  Patchy areas of increased attenuation at the bilateral lung bases concerning for areas of possible pneumonia or aspiration, it is new compared to the prior exam.  No pleural effusion.  No pneumothorax.  Atherosclerotic changes of the aorta.                                Current Medications:     Infusions:   Amino acid 4.25% - dextrose 10% (CLINIMIX-E) solution with additives (1L provides 42.5 gm AA, 100 gm CHO (340 kcal/L dextrose), Na 35, K 30, Mg 5, Ca 4.5, Acetate 70, Cl 39, Phos 15) 85 mL/hr at 04/11/18 1437        Scheduled:   albuterol-ipratropium 2.5mg-0.5mg/3mL  3 mL Nebulization Q4H    clindamycin (CLEOCIN) IVPB  900 mg Intravenous Q8H    levothyroxine  50 mcg Intravenous Daily    metoprolol  5 mg Intravenous Q12H    mupirocin   Topical (Top) Daily    nystatin  500,000 Units Oral QID    sodium chloride 0.9%  3 mL Intravenous Q8H        PRN:  albuterol-ipratropium 2.5mg-0.5mg/3mL, hydrALAZINE, morphine    Antibiotics and Day Number of Therapy:  S/p 3days cipro  S/p vanc and cefepime: 4/5 to 4/11  Clindamycin: 4/6 to present      Assessment:     Zeyad Woodard is a 89 y.o.male with multiple comorbidities admitted with HCAP and thrombocytopenia     Plan:     Acute encephalopathy  - Began 4/9, became more lethargic and confused, also with visual halluciations  No focal deficits and CT head on 4/10  without acute findings  Likely 2/2 delirium in setting of sepsis--monitoring    Dysphagia  -SLP following--currently aspiration concerns, pureed diet with honey thick liquids  Started PPN 4/11  SLP will continue to assess swallowing as mental status improves    Acute hypoxic respiratory failure and severe sepsis 2/2 HCAP  -1 week progressive productive cough, fever, SOB  On admit, satting well on 5L O2 via NC, temp 103, CXR with bibasilar consolidations, lactate 2.5  BP stable    - Modified barium swallow study 1/18 w/o aspiration--re-evaluated by speech-not safe for thin liquids-honey thick  -In ED, given 2.3L NS bolus, started vanc, cipro, cefepime (4/5)--penicillin allergy was remote and only hives  -Lactate normalized, procal 21  Blood cx with coag negative staph 2/4 bottles--likely contaminant  Duonebs, CPT  - s/p cipro, vanc, and cefepime, on clinda day 7     Left 1st toe erosion  -Bruising, erythema, swelling 2/2 recent trauma  H/o neuropathy, follows with podiatry  Xray on admission suggestive of early osteo, Podiatry was consulted, removed toe nail, thinks no osteo  - s/p vanc, cefepime, cipro (4/5)  Wound cx NGTD  - continue routine wound care.     ELIZABET on CKD 3A, resolved  -On admit, Cr 1.5, GFR 40  Baseline Cr 1.2, GFR 60  -Likely prerenal in setting of sepsis  Returned to baseline     Elevated troponin, resolved  -On admit, trop 0.047  Chronically elevated trop to 0.03-0.06. Also, possibly 2/2 demand in setting of sepsis and CKD  EKG ventricularly paced  -Trended troponins--peaked 0.133->0.125     Chronic thrombocytopenia, improving  -Baseline platelets ~ 90  Follows with Dr. Jeff as outpt  -On admit, platelets 44-likely exacerbated by sepsis  Scatter petechiae, contusions in setting of recent fall but no active bleeding  -PLT improving daily, holding ASA and pharmocologic DVT ppx     Chronic HFpEF  -TTE 12/17-EF 55%, grade 2 diastolic dysfunction, PA pressure 35  -On admit, Trop 0.047,   CXR with b/l consolidations 2/2 PNA rather than volume overload--given fluids per sepsis protocol  Monitor closely for volume overload  -Repeat TTE--LV EF 55%, no diastolic d/f, PA pressure 33     A fib and SSS s/p pacemaker  -On admit, in ventricularly paced rhythm  Not on home anticoagulation 2/2 h/o GI bleeds. Also falls frequently  -No acute issues     Anemia of chronic disease, h/o CHRISTIANO and B12 deficiency  -On admit, Hbg 9.4--at baseline  1/18--w/u c/w  AoCD--ferritin, B12 wnl  -Follows with Dr. Jeff--continue home iron, B12     Mild transaminitis, resolved  -On admit, , AST 61, ALT 50  Consistent with baseline  -Negative Hep C Ab 2012--defer repeating as patient would be poor treatment candidate given age/comorbidities     HTN  -On admit, BP low/normal  Held home coreg in setting of sepsis--resumed 2/2 HTN  -Holding PO meds until swallowing improves, on scheduled and prn IV antihypertensives     Hypothyroidism  -No acute issues  Continue home synthroid     BPH  -No acute issues  Continue home flomax, finasteride  - Holding PO meds until swallowing improves     Frequent mechanical falls  -Per chart review  Fell 3 days PTA  -Per family, previously had HH PT/OT that has been discontinued/completed  Only mobile via wheelchair  -Consulted PT/OT--recommended SNF vs return to group home with PT     Dementia  -Baseline--Depending for ADLs, lives at [facility] and has supervision via aide and nurse  -A&O to person, place, date; not situation  Continue home remeron, aricept  -Holding PO meds until swallowing improves     Code Status:      Full     Fluids: None    Diet: cardiac pureed, honey thick as tolerated with PPN day#2    Ppx: SCDs; holding chemical ppx in setting of thrombocytopenia    Dispo: pending clinical improvement in mentation    Martina Herron MD  [facility] Internal Medicine HO-I  [facility] IM Service Team A    [facility] Medicine Hospitalist Pager numbers:   [facility] Hospitalist Medicine Team A (Kinsey/Yumiko): [phone]  [facility] Hospitalist Medicine Team B (Tyrese/Bibi):  [phone]

## 2018-04-12 NOTE — PLAN OF CARE
Problem: Patient Care Overview  Goal: Plan of Care Review  Outcome: Ongoing (interventions implemented as appropriate)  Patient was restless throughout night, nurse repositoned frequently.  Clinimix infusing at 85 cc/hr.  He remains NPO, aspiration precautions maintained.  Frequent weight shift encouraged.On continuous heart monitoring, A.fib , HR in 70s - 80s , no true red alarms . Fall precautions maintained.  Bed locked and low, side rails X3, call bell within reach. Nurse asked patient to call before ambulating, patient verbalized understanding. Will continue monitoring.

## 2018-04-12 NOTE — PLAN OF CARE
Problem: Patient Care Overview  Goal: Plan of Care Review  Outcome: Ongoing (interventions implemented as appropriate)  Pt received on nasal cannula at  2  lpm.  SPO2   97%.  Pt in no apparent respiratory distress.  Will continue to monitor.

## 2018-04-12 NOTE — PLAN OF CARE
Problem: Nutrition, Parenteral (Adult)  Goal: Signs and Symptoms of Listed Potential Problems Will be Absent, Minimized or Managed (Nutrition, Parenteral)  Signs and symptoms of listed potential problems will be absent, minimized or managed by discharge/transition of care (reference Nutrition, Parenteral (Adult) CPG).  Outcome: Ongoing (interventions implemented as appropriate)  Recommendation/Intervention:   1. If pt to remain NPO, consider PEG placement. Initiate TF of Isosource 1.5 at 10ml/hr and advance as tolerated to goal rate of 50ml/hr to provide 1800 kcal, 81g protein, & 934ml free water. Add and additonal 200ml free water flush qid.   2. Continue TPN until po diet or enteral feeds are started.    Goals:   Meet 85% EEN  Nutrition Goal Status: progressing towards goal  Communication of RD Recs: reviewed with RN Andriy)

## 2018-04-12 NOTE — PT/OT/SLP PROGRESS
Speech Language Pathology Treatment    Patient Name:  Zeyad Woodard   MRN:  [id number]  Admitting Diagnosis: HCAP (healthcare-associated pneumonia)    Recommendations:                 General Recommendations:  Dysphagia therapy  Diet recommendations:  NPO, Liquid Diet Level: NPO   Aspiration Precautions: Strict aspiration precautions   General Precautions: Standard, fall, aspiration, NPO  Communication strategies:  provide increased time to answer    Subjective     Pt seen this PM for indirect dysphagia exercises. SLP and PTA confirmed with RN prior to entry. SLP and PTA repositioned pt to EOB. Pt continues to demonstrated dcr'd DANGELO and impaired cognition. Pt required max cuing from SLP and PTA to participate in session.     Patient goals: None stated by pt     Objective:     Has the patient been evaluated by SLP for swallowing?   Yes  Keep patient NPO? No   Current Respiratory Status: nasal cannula      Pt seen this PM for indirect dysphagia tx. Pt continues to demonstrated limited active participation, despite max cuing from therapy throughout session.  PTA and SLP repositioned pt to EOB. SLP swabbed pt's oral cavity with lemon swabs, as well as water moistened toothettes to stimulate pt to increase participation.   SLP instructed pt to perform indirect dysphagia exercises to target oral and pharyngeal musculature weakness. However, pt continues to present with dcr'd DANGELO and impaired cognition at this time, which negatively impacts pt's participation/progress. SLP provided pt with oral care/hygiene with toothettes moistened with oral rinse to reduce risk of infection/ further complications.     Assessment:     Zeyad Woodard is a 89 y.o. male with an SLP diagnosis of moderate-severe oropharyngeal Dysphagia.  He continues to present with dcr'd DANGELO and impaired cognition at this time, which negatively impacts pt's participation/progress. SLP will continue to follow    Goals:    SLP Goals        Problem: SLP Goal     Goal Priority Disciplines Outcome   SLP Goal     SLP Ongoing (interventions implemented as appropriate)   Description:  Short Term Goals:  1. Pt will participate in BSS to determine least restrictive diet.- MET 4/6  2. Pt will successfully participate in Modified Barium Swallow study to objectively rule out aspiration and determine safest/least restrictive diet.- MET 4/6  3. Pt will tolerate honey thick liquids/puree textures po diet with chin tuck without overt s/s of aspiration-discontinued 4/11  4. Pt will tolerate advanced diet trials of nectar thick liquids and dental soft textures without overt s/s of aspiration.-discontinued 4/10  5. Pt will participate in indirect dysphagia exercises to strengthen musculature for swallowing mechanism with mod-max effort.- ongoing                          Plan:     · Patient to be seen:  3 x/week   · Plan of Care expires:  05/06/18  · Plan of Care reviewed with:  patient   · SLP Follow-Up:  Yes       Discharge recommendations:   (TBD)   Barriers to Discharge:  None    Time Tracking:     SLP Treatment Date:   04/12/18  Speech Start Time:  1428  Speech Stop Time:  1438     Speech Total Time (min):  10 min    Billable Minutes: Treatment Swallowing Dysfunction 10    SARAH BETH Herzog, CF-SLP  Speech-Language Pathologist   4/12/2018

## 2018-04-12 NOTE — PT/OT/SLP PROGRESS
Physical Therapy Treatment    Patient Name:  Zeyad Woodard   MRN:  [id number]    Recommendations:     Discharge Recommendations:   (TBD)   Discharge Equipment Recommendations:  (TBD)   Barriers to discharge: decreased alertness,function and strength    Assessment:     Zeyad Woodard is a 89 y.o. male admitted with a medical diagnosis of HCAP (healthcare-associated pneumonia).  He presents with the following impairments/functional limitations:  weakness, impaired endurance, impaired functional mobilty, decreased upper extremity function, decreased lower extremity function, decreased ROM, impaired coordination, impaired cardiopulmonary response to activity pt continues to require Tot A X 2 with bed mobility,POC to still be determined.    Rehab Prognosis:  Fair; patient would benefit from acute skilled PT services to address these deficits and reach maximum level of function.      Recent Surgery: * No surgery found *      Plan:     During this hospitalization, patient to be seen 6 x/week to address the above listed problems via therapeutic activities, therapeutic exercises, neuromuscular re-education  · Plan of Care Expires:  05/06/18   Plan of Care Reviewed with: patient    Subjective     Communicated with nsg prior to session.  Patient found supine upon PT entry to room, agreeable to treatment.      Chief Complaint: n/a  Patient comments/goals: n/a  Pain/Comfort:  · Pain Rating 1:  (non verbal,no visual signs)    Patients cultural, spiritual, Christian conflicts given the current situation:      Objective:     Patient found with: bed alarm, oxygen, telemetry, peripheral IV     General Precautions: Standard, aspiration, NPO, fall   Orthopedic Precautions:N/A   Braces: N/A     Functional Mobility:  · Bed Mobility:     · Supine to Sit: total assistance and of 2 persons  · Sit to Supine: total assistance and of 2 persons  · Balance: poor sitting balance      AM-PAC 6 CLICK MOBILITY  Turning over in bed (including  adjusting bedclothes, sheets and blankets)?: 2  Sitting down on and standing up from a chair with arms (e.g., wheelchair, bedside commode, etc.): 2  Moving from lying on back to sitting on the side of the bed?: 2  Moving to and from a bed to a chair (including a wheelchair)?: 2  Need to walk in hospital room?: 1  Climbing 3-5 steps with a railing?: 1  Total Score: 10       Therapeutic Activities and Exercises: pt sat EOB with Mod/ Max A and speech attempted to perform techniques with pt as well as oral care       Patient left supine with all lines intact, call button in reach, bed alarm on and nsg notified..    GOALS: See general POC   Physical Therapy Goals        Problem: Physical Therapy Goal    Goal Priority Disciplines Outcome Goal Variances Interventions   Physical Therapy Goal     PT/OT, PT Ongoing (interventions implemented as appropriate)     Description:  Goals to be met by: 2018     Patient will increase functional independence with mobility by performin. Supine to sit with Stand-by Assistance  2. Sit to stand transfer with Stand-by Assistance  3. Bed to chair transfer with Stand-by Assistance using Rolling Walker  4. Gait  x 20 feet with Minimal Assistance using Rolling Walker.                       Time Tracking:     PT Received On: 18  PT Start Time: 1425     PT Stop Time: 1448  PT Total Time (min): 23 min     Billable Minutes: Therapeutic Activity 23    Treatment Type: Treatment (co-rx with OT)  PT/PTA: PTA     PTA Visit Number: 4     Tashi Small, SOTO  2018

## 2018-04-12 NOTE — PROGRESS NOTES
" [facility]  Adult Nutrition  Progress Note    SUMMARY       Recommendations    Recommendation/Intervention:   1. If pt to remain NPO, consider PEG placement. Initiate TF of Isosource 1.5 at 10ml/hr and advance as tolerated to goal rate of 50ml/hr to provide 1800 kcal, 81g protein, & 934ml free water. Add and additonal 200ml free water flush qid.   2. Continue TPN until po diet or enteral feeds are started.    Goals:   Meet 85% EEN  Nutrition Goal Status: progressing towards goal  Communication of RD Recs: reviewed with RN Andriy)    Reason for Assessment  Reason for Assessment: RD follow-up  Diagnosis:  (HCAP)  Relevant Medical History: CHF, HTN, CAD, Stroke  General Information Comments: Pt NPO per ST recs. Pt receiving Clinimix 4.25/10 at 85ml/hr.     Nutrition Risk Screen  Nutrition Risk Screen: dysphagia or difficulty swallowing    Nutrition/Diet History  Food Preferences: no Moravian or cultural food prefs identified  Do you have any cultural, spiritual, Moravian conflicts, given your current situation?:  (none reported)  Factors Affecting Nutritional Intake: difficulty/impaired swallowing    Anthropometrics  Temp: 99.6 °F (37.6 °C)  Height Method: Stated  Height: 5' 8" (172.7 cm)  Height (inches): 68 in  Weight Method: Bed Scale  Weight: 84.7 kg (186 lb 11.7 oz)  Weight (lb): 186.73 lb  Ideal Body Weight (IBW), Male: 154 lb  % Ideal Body Weight, Male (lb): 121.25 lb  BMI (Calculated): 28.5  BMI Grade: 25 - 29.9 - overweight     Lab/Procedures/Meds  Pertinent Labs Reviewed: reviewed  Pertinent Labs Comments: BUN 32H, Ca 8.4L, Alb 2.4L  Pertinent Medications Reviewed: reviewed    Physical Findings/Assessment  Overall Physical Appearance: advanced age, loss of muscle mass  Oral/Mouth Cavity: tooth/teeth missing  Skin: pressure ulcer(s), skin tear (Stage I buttocks; neuropathic toe ulcer)    Estimated/Assessed Needs  Weight Used For Calorie Calculations: 84.7 kg (186 lb 11.7 oz)  Energy " Calorie Requirements (kcal): 1750 kcal  Energy Need Method: [place]  Protein Requirements: 70-85 g  Weight Used For Protein Calculations: 84.7 kg (186 lb 11.7 oz)  Fluid Need Method: RDA Method  RDA Method (mL): 1750     Nutrition Prescription Ordered  Current Diet Order: NPO  Current Nutrition Support Formula Ordered: Clinimix 4.25/10  Current Nutrition Support Rate Ordered: 85 (ml)  Current Nutrition Support Frequency Ordered: ml/hr    Evaluation of Received Nutrient/Fluid Intake  Parenteral Calories (kcal): 1540  Parenteral Protein (gm): 87  Parenteral Fluid (mL): 2040  GIR (Glucose Infusion Rate) (mg/kg/min): 1.67 mg/kg/min  % Kcal Needs: 88  % Protein Needs: 102-124  I/O: 237/371  Energy Calories Required: not meeting needs  Protein Required: meeting needs  Fluid Required: meeting needs  Comments: LBM: 4/9  % Intake of Estimated Energy Needs: 75 - 100 %  % Meal Intake: NPO    Nutrition Risk  Level of Risk/Frequency of Follow-up:  (2 x week)     Assessment and Plan    Dysphagia    Contributing Nutrition Diagnosis  Inadequate energy intake    Related to (etiology):   dysphagia    Signs and Symptoms (as evidenced by):   NPO/PPN    Interventions/Recommendations (treatment strategy):  See recs    Nutrition Diagnosis Status:   New             Monitor and Evaluation  Food and Nutrient Intake: energy intake, food and beverage intake  Food and Nutrient Adminstration: diet order, enteral and parenteral nutrition administration  Knowledge/Beliefs/Attitudes: food and nutrition knowledge/skill  Physical Activity and Function: nutrition-related ADLs and IADLs  Anthropometric Measurements: weight, weight change  Biochemical Data, Medical Tests and Procedures: electrolyte and renal panel  Nutrition-Focused Physical Findings: overall appearance     Nutrition Follow-Up  RD Follow-up?: Yes

## 2018-04-12 NOTE — CONSULTS
"Consult Note  Palliative Care      Consult Requested By: Fer Euceda MD  Reason for Consult: End of Life/Hospice    Thank you for the consult the opportunity to participate in Mr. Woodard's  care         SUBJECTIVE:     History of Present Illness:  Disease Process:Acute hypoxic respiratory failure and severe sepsis 2/2 community acquired pneumonia,  Advanced Dementia    Mr. Woodard 89y/o male admitted to [facility] on 4/5/2018 with Shortness of Breath x 3 days. He had worsening cough and fevers, his PCP prescribed doxycycline. He had worsening symptoms of SOB, fatigue, weakness and productive cough with yellow/green sputum. Patient was dx with Acute hypoxic respiratory failure and severe sepsis 2/2 community acquired pneumonia.   PMHx HFpEF,  afib and SSS s/p pacer, HTN, hypothyroidism, CKD3A, BPH,and  Dementia. Two recent admissions 1/18 for C-diff and Influenza B.    Palliative care in to establish rapport with patient and family. Patient lethargic and confused. No c/o pain or distress noted at this time. Daughters Gabe at bedside.     Palliative care had meeting with Daughters Asia and Ghazala. Assessed awareness of father's condition. They were to verbalized understanding of pneumonia and dementia. Discussed  Palliative care  services and how it helped with patients care. Voiced understanding of services.     Palliative care discussed PEG tube option vs non PEG with comfort care.  Palliative made family aware that GI would have to assess patient and make sure he is a candidate that would tolerate the surgery and anesthesia. Daughters concerned if no PEG tube that father would starve. Palliative care addressed that concern. Palliative care asked about what would be patient's wishes concerning PEG tube and stated, "We never talked about that issue only the DNR. Daddy was always an eater." Daughters were made aware of patient's advance dementia and eating would decline. Voiced " understanding. Also if patient eats the risk of aspiration pneumonia because of his advance age and dementia.     Hospice care (inpatient vs home) discussed with daughters. They said they would have to find out if his group home will be able to take him back in the condition he is in. Also if they decided on PEG tube if the home could do it. Palliative care informed them that case management could assist them with finding the services they need to go home. Also case management could provide hospice companies for them to choose if they decide to go with comfort care. Daughters said they needed to speak with their other sibling to make the final decision. But their desire is for the patient to return to his group home at his baseline. Daughter Ghazala very tearful. Emotional support provided.     Palliative care gave numbers for family to call should they have any more questions or concerns.         Past Medical History:   Diagnosis Date    Anemia     Atrial fibrillation     BPH (benign prostatic hypertrophy)     Cancer     Cardiac pacemaker in situ 7/2/2015    CHF (congestive heart failure)     Coronary artery disease     Encounter for blood transfusion     GI bleed     cecum angiectasia s/p cautery    Hypertension     Hypothyroidism     Polyneuropathy     Posture imbalance     due to neuropathy    Right posterior capsular opacification 1/18/2017    SSS (sick sinus syndrome) 2015    s/p pacemaker    Stroke 1/11/2018     Past Surgical History:   Procedure Laterality Date    CARDIAC SURGERY      CATARACT EXTRACTION W/  INTRAOCULAR LENS IMPLANT Right 05/17/2010        CATARACT EXTRACTION W/  INTRAOCULAR LENS IMPLANT Left 02/16/2004        cataract surgery      COLONOSCOPY  6/30/04    COLONOSCOPY N/A 2/15/2016    Procedure: COLONOSCOPY;  Surgeon: Stanton Kirk MD;  Location: [address]);  Service: Endoscopy;  Laterality: N/A;    EYE SURGERY      HERNIA REPAIR       inguinal hernia repair      Open heart surgery for pericardiectomy      for calcific constrictive pericarditis    UMBILICAL HERNIA REPAIR      Yag      Left Eye     Family History   Problem Relation Age of Onset    Stroke Mother          Cancer Father      lung;     Diabetes Sister     Coronary artery disease Sister     Peripheral vascular disease Sister      Social History   Substance Use Topics    Smoking status: Passive Smoke Exposure - Never Smoker    Smokeless tobacco: Never Used    Alcohol use No       Mental Status: Dementia     ECOG Performance Status Grade: 4 - Completely disabled      OBJECTIVE:     Pain Assessment: No pain reported at this time    Decision-Making Capacity: Patient unable to communicate due to disease severity/cognitive impairment    Advanced Directives:  Living Will: No  Do Not Resuscitate Status: Yes  Medical Power of : Yes. Asia Woodard (daughter) -[phone]    Registered Organ Donor: No    Living Arrangements: Group Home        ASSESSMENT/PLAN:     Recommendation  Continue Current medical treatment  Code status: DNR    Family will let team know on if they have decided on PEG or to go with comfort care/hospice.         Palliative Care Team will continue to follow patient to assist family with goals of care.         Thank you for the consult the opportunity to participate in Mr. Woodard's  care        Time Spent:> 50% of 90 min. in visit spent in chart review, phone discussion of goals of care with family, symptom assessment, coordination of care and emotional support           Catalina Irvin, MSN, APRN, NP-C  Palliative  Medicine  [phone]

## 2018-04-12 NOTE — PHYSICIAN QUERY
PT Name: Zeyad Woodard  MR #: [id number]    Physician Query Form - Neurological Condition Clarification       CDS/: Chani Baig RN, CCDS             Contact information: [email or web address]    This form is a permanent document in the medical record.     Query Date: April 12, 2018    By submitting this query, we are merely seeking further clarification of documentation. Please utilize your independent clinical judgment when addressing the question(s) below.    The Medical record contains the following:   Indicators   Supporting Clinical Findings Location in Medical Record   X AMS, Confusion, LOC, etc. lethargic and confused 4/12 pal care note   X Acute / Chronic Illness Acute hypoxic respiratory failure and severe sepsis 2/2 community acquired pneumonia  Acute Encephalopathy- Likely related to repeated aspiration. 4/5 h/p    4/10-4/12 prog notes   X Radiology Findings CT head negative for intracranial process.  4/10-4/12 prog notes    Electrolyte Imbalance      Medication      Treatment      Other       Provider, please specify the diagnosis or diagnoses associated with above clinical findings.      [x  ] Metabolic Encephalopathy    [  ] Other Encephalopathy    [  ] Unspecified Encephalopathy    [  ] Other (please specify): _____________________________________    [  ] Clinically Undetermined      Please document in your progress notes daily for the duration of treatment until resolved, and  include in your discharge summary.

## 2018-04-12 NOTE — PLAN OF CARE
Family is pending input from another family member regarding possible consult for hospice care, TN has call in to [facility] to determine if they can service PEG tube feeds and or hospice care, TN left VM.       04/12/18 2011   Discharge Reassessment   Assessment Type Discharge Planning Reassessment   Discharge Plan A Home Health   Discharge Plan B Hospice/home;Inpatient Hospice

## 2018-04-13 PROBLEM — R13.12 OROPHARYNGEAL DYSPHAGIA: Status: ACTIVE | Noted: 2018-04-10

## 2018-04-13 LAB
ALBUMIN SERPL BCP-MCNC: 2.8 G/DL
ALP SERPL-CCNC: 121 U/L
ALT SERPL W/O P-5'-P-CCNC: 28 U/L
ANION GAP SERPL CALC-SCNC: 7 MMOL/L
AST SERPL-CCNC: 32 U/L
BASOPHILS # BLD AUTO: ABNORMAL K/UL
BASOPHILS NFR BLD: 0 %
BILIRUB SERPL-MCNC: 0.6 MG/DL
BUN SERPL-MCNC: 32 MG/DL
CALCIUM SERPL-MCNC: 9.1 MG/DL
CHLORIDE SERPL-SCNC: 103 MMOL/L
CO2 SERPL-SCNC: 30 MMOL/L
CREAT SERPL-MCNC: 1.2 MG/DL
DIFFERENTIAL METHOD: ABNORMAL
EOSINOPHIL # BLD AUTO: ABNORMAL K/UL
EOSINOPHIL NFR BLD: 3 %
ERYTHROCYTE [DISTWIDTH] IN BLOOD BY AUTOMATED COUNT: 15.1 %
EST. GFR  (AFRICAN AMERICAN): >60 ML/MIN/1.73 M^2
EST. GFR  (NON AFRICAN AMERICAN): 53 ML/MIN/1.73 M^2
GLUCOSE SERPL-MCNC: 142 MG/DL
HCT VFR BLD AUTO: 25.7 %
HGB BLD-MCNC: 8.5 G/DL
LYMPHOCYTES # BLD AUTO: ABNORMAL K/UL
LYMPHOCYTES NFR BLD: 50 %
MAGNESIUM SERPL-MCNC: 2 MG/DL
MCH RBC QN AUTO: 28.8 PG
MCHC RBC AUTO-ENTMCNC: 33.1 G/DL
MCV RBC AUTO: 87 FL
MONOCYTES # BLD AUTO: ABNORMAL K/UL
MONOCYTES NFR BLD: 5 %
MYELOCYTES NFR BLD MANUAL: 1 %
NEUTROPHILS # BLD AUTO: ABNORMAL K/UL
NEUTROPHILS NFR BLD: 41 %
PHOSPHATE SERPL-MCNC: 3.3 MG/DL
PLATELET # BLD AUTO: 111 K/UL
PLATELET BLD QL SMEAR: ABNORMAL
PMV BLD AUTO: 10.6 FL
POTASSIUM SERPL-SCNC: 3.7 MMOL/L
PROT SERPL-MCNC: 6.4 G/DL
RBC # BLD AUTO: 2.95 M/UL
SODIUM SERPL-SCNC: 140 MMOL/L
WBC # BLD AUTO: 20.33 K/UL

## 2018-04-13 PROCEDURE — 63600175 PHARM REV CODE 636 W HCPCS: Performed by: STUDENT IN AN ORGANIZED HEALTH CARE EDUCATION/TRAINING PROGRAM

## 2018-04-13 PROCEDURE — 92526 ORAL FUNCTION THERAPY: CPT

## 2018-04-13 PROCEDURE — 99222 1ST HOSP IP/OBS MODERATE 55: CPT | Mod: ,,, | Performed by: INTERNAL MEDICINE

## 2018-04-13 PROCEDURE — 97530 THERAPEUTIC ACTIVITIES: CPT

## 2018-04-13 PROCEDURE — 25000003 PHARM REV CODE 250: Performed by: INTERNAL MEDICINE

## 2018-04-13 PROCEDURE — 83735 ASSAY OF MAGNESIUM: CPT

## 2018-04-13 PROCEDURE — 99233 SBSQ HOSP IP/OBS HIGH 50: CPT | Mod: ,,, | Performed by: NURSE PRACTITIONER

## 2018-04-13 PROCEDURE — A4216 STERILE WATER/SALINE, 10 ML: HCPCS | Performed by: STUDENT IN AN ORGANIZED HEALTH CARE EDUCATION/TRAINING PROGRAM

## 2018-04-13 PROCEDURE — 36415 COLL VENOUS BLD VENIPUNCTURE: CPT

## 2018-04-13 PROCEDURE — 80053 COMPREHEN METABOLIC PANEL: CPT

## 2018-04-13 PROCEDURE — 94761 N-INVAS EAR/PLS OXIMETRY MLT: CPT

## 2018-04-13 PROCEDURE — 25000003 PHARM REV CODE 250: Performed by: STUDENT IN AN ORGANIZED HEALTH CARE EDUCATION/TRAINING PROGRAM

## 2018-04-13 PROCEDURE — 84100 ASSAY OF PHOSPHORUS: CPT

## 2018-04-13 PROCEDURE — 25000242 PHARM REV CODE 250 ALT 637 W/ HCPCS: Performed by: STUDENT IN AN ORGANIZED HEALTH CARE EDUCATION/TRAINING PROGRAM

## 2018-04-13 PROCEDURE — 85007 BL SMEAR W/DIFF WBC COUNT: CPT

## 2018-04-13 PROCEDURE — 11000001 HC ACUTE MED/SURG PRIVATE ROOM

## 2018-04-13 PROCEDURE — 94668 MNPJ CHEST WALL SBSQ: CPT

## 2018-04-13 PROCEDURE — S0077 INJECTION, CLINDAMYCIN PHOSP: HCPCS | Performed by: STUDENT IN AN ORGANIZED HEALTH CARE EDUCATION/TRAINING PROGRAM

## 2018-04-13 PROCEDURE — 94640 AIRWAY INHALATION TREATMENT: CPT

## 2018-04-13 PROCEDURE — 85027 COMPLETE CBC AUTOMATED: CPT

## 2018-04-13 RX ADMIN — CLINDAMYCIN IN 5 PERCENT DEXTROSE 900 MG: 18 INJECTION, SOLUTION INTRAVENOUS at 09:04

## 2018-04-13 RX ADMIN — IPRATROPIUM BROMIDE AND ALBUTEROL SULFATE 3 ML: .5; 3 SOLUTION RESPIRATORY (INHALATION) at 07:04

## 2018-04-13 RX ADMIN — HYDRALAZINE HYDROCHLORIDE 10 MG: 20 INJECTION INTRAMUSCULAR; INTRAVENOUS at 05:04

## 2018-04-13 RX ADMIN — IPRATROPIUM BROMIDE AND ALBUTEROL SULFATE 3 ML: .5; 3 SOLUTION RESPIRATORY (INHALATION) at 11:04

## 2018-04-13 RX ADMIN — SODIUM CHLORIDE, PRESERVATIVE FREE 3 ML: 5 INJECTION INTRAVENOUS at 09:04

## 2018-04-13 RX ADMIN — IPRATROPIUM BROMIDE AND ALBUTEROL SULFATE 3 ML: .5; 3 SOLUTION RESPIRATORY (INHALATION) at 12:04

## 2018-04-13 RX ADMIN — SODIUM CHLORIDE, PRESERVATIVE FREE 3 ML: 5 INJECTION INTRAVENOUS at 01:04

## 2018-04-13 RX ADMIN — CLINDAMYCIN IN 5 PERCENT DEXTROSE 900 MG: 18 INJECTION, SOLUTION INTRAVENOUS at 12:04

## 2018-04-13 RX ADMIN — LEVOTHYROXINE SODIUM ANHYDROUS 50 MCG: 100 INJECTION, POWDER, LYOPHILIZED, FOR SOLUTION INTRAVENOUS at 09:04

## 2018-04-13 RX ADMIN — IPRATROPIUM BROMIDE AND ALBUTEROL SULFATE 3 ML: .5; 3 SOLUTION RESPIRATORY (INHALATION) at 04:04

## 2018-04-13 RX ADMIN — METOPROLOL TARTRATE 5 MG: 5 INJECTION, SOLUTION INTRAVENOUS at 09:04

## 2018-04-13 RX ADMIN — HYDRALAZINE HYDROCHLORIDE 10 MG: 20 INJECTION INTRAMUSCULAR; INTRAVENOUS at 06:04

## 2018-04-13 RX ADMIN — MORPHINE SULFATE 1 MG: 4 INJECTION INTRAVENOUS at 01:04

## 2018-04-13 RX ADMIN — SODIUM CHLORIDE, PRESERVATIVE FREE 3 ML: 5 INJECTION INTRAVENOUS at 06:04

## 2018-04-13 RX ADMIN — ASCORBIC ACID, VITAMIN A PALMITATE, CHOLECALCIFEROL, THIAMINE HYDROCHLORIDE, RIBOFLAVIN-5 PHOSPHATE SODIUM, PYRIDOXINE HYDROCHLORIDE, NIACINAMIDE, DEXPANTHENOL, ALPHA-TOCOPHEROL ACETATE, VITAMIN K1, FOLIC ACID, BIOTIN, CYANOCOBALAMIN: 200; 3300; 200; 6; 3.6; 6; 40; 15; 10; 150; 600; 60; 5 INJECTION, SOLUTION INTRAVENOUS at 04:04

## 2018-04-13 RX ADMIN — IPRATROPIUM BROMIDE AND ALBUTEROL SULFATE 3 ML: .5; 3 SOLUTION RESPIRATORY (INHALATION) at 03:04

## 2018-04-13 RX ADMIN — MUPIROCIN: 20 OINTMENT TOPICAL at 09:04

## 2018-04-13 RX ADMIN — MORPHINE SULFATE 1 MG: 4 INJECTION INTRAVENOUS at 08:04

## 2018-04-13 RX ADMIN — CLINDAMYCIN IN 5 PERCENT DEXTROSE 900 MG: 18 INJECTION, SOLUTION INTRAVENOUS at 05:04

## 2018-04-13 NOTE — PT/OT/SLP PROGRESS
Physical Therapy Treatment    Patient Name:  Zeyad Woodard   MRN:  [id number]    Recommendations:     Discharge Recommendations:  other (see comments) (TBD)   Discharge Equipment Recommendations: none   Barriers to discharge: None    Assessment:     Zeyad Woodard is a 89 y.o. male admitted with a medical diagnosis of HCAP (healthcare-associated pneumonia).  He presents with the following impairments/functional limitations:  weakness, gait instability, decreased upper extremity function, decreased lower extremity function, impaired balance, impaired endurance, impaired self care skills, impaired functional mobilty, impaired cardiopulmonary response to activity, impaired cognition Patient with on going decline in functional mobility. Able to answer question and follow commands at times at 100%.    Rehab Prognosis:  Poor; patient would benefit from acute skilled PT services to address these deficits and reach maximum level of function.      Recent Surgery: * No surgery found *      Plan:     During this hospitalization, patient to be seen 6 x/week to address the above listed problems via therapeutic activities, therapeutic exercises, gait training  · Plan of Care Expires:  05/06/18   Plan of Care Reviewed with: patient, daughter    Subjective     Communicated with primary nurse prior to session.  Patient found supine with HOB rasied upon PT entry to room, agreeable to treatment.      Chief Complaint: none voiced  Patient comments/goals: none voiced  Pain/Comfort:  · Pain Rating 1: 0/10  · Pain Rating Post-Intervention 1: 0/10    Patients cultural, spiritual, Catholic conflicts given the current situation:      Objective:     Patient found with: telemetry, oxygen     General Precautions: Standard, fall, NPO   Orthopedic Precautions:N/A   Braces: N/A     Functional Mobility:  · Bed Mobility:     · Supine to Sit: total assistance  · Sit to Supine: total assistance  · Transfers:     · Sit to Stand:  na with  na      AM-PAC 6 CLICK MOBILITY  Turning over in bed (including adjusting bedclothes, sheets and blankets)?: 2  Sitting down on and standing up from a chair with arms (e.g., wheelchair, bedside commode, etc.): 1  Moving from lying on back to sitting on the side of the bed?: 2  Moving to and from a bed to a chair (including a wheelchair)?: 2  Need to walk in hospital room?: 1  Climbing 3-5 steps with a railing?: 1  Total Score: 9       Therapeutic Activities and Exercises:   Worked in sitting position for reaching and trunk mobs with rotation of upper on lower    Patient left HOB elevated with all lines intact, call button in reach and bed alarm on..    GOALS:    Physical Therapy Goals        Problem: Physical Therapy Goal    Goal Priority Disciplines Outcome Goal Variances Interventions   Physical Therapy Goal     PT/OT, PT Ongoing (interventions implemented as appropriate)     Description:  Goals to be met by: 2018     Patient will increase functional independence with mobility by performin. Supine to sit with Stand-by Assistance  2. Sit to stand transfer with Stand-by Assistance  3. Bed to chair transfer with Stand-by Assistance using Rolling Walker  4. Gait  x 20 feet with Minimal Assistance using Rolling Walker.                       Time Tracking:     PT Received On: 18  PT Start Time: 1200     PT Stop Time: 1219  PT Total Time (min): 19 min     Billable Minutes: Therapeutic Activity 19    Treatment Type: Treatment, 6th Visit  PT/PTA: PT     PTA Visit Number: 4     Kade Celaya, PT  2018

## 2018-04-13 NOTE — PROGRESS NOTES
"[facility] IM Resident Progress Note    Subjective:      No events overnight. Afebrile. Vitals stable. Continues to complain of nondescript pain.    Objective:   Last 24 Hour Vital Signs:  BP  Min: 140/70  Max: 194/84  Temp  Av.2 °F (36.8 °C)  Min: 96.3 °F (35.7 °C)  Max: 99.4 °F (37.4 °C)  Pulse  Av.7  Min: 69  Max: 93  Resp  Av  Min: 16  Max: 20  SpO2  Av.3 %  Min: 96 %  Max: 98 %  Height  Av' 8" (172.7 cm)  Min: 5' 8" (172.7 cm)  Max: 5' 8" (172.7 cm)  Weight  Av.7 kg (186 lb 11.7 oz)  Min: 84.7 kg (186 lb 11.7 oz)  Max: 84.7 kg (186 lb 11.7 oz)  I/O last 3 completed shifts:  In: 2150.3 [IV Piggyback:150]  Out: 1297 [Urine:1297]    Physical Examination:  General:           Alert and awake  Head:               Normocephalic and atraumatic  Eyes:               anicteric sclera and clear conjunctivae  Mouth:             Oropharynx clear and without exudate; moist mucous membranes  Cardio:            RRR, Grade 2/6 holosystolic murmur loudest at LUSB  Resp:               Scattered rhonchi, intermittent dry cough  Abdom:            Soft, NTND with normoactive bowel sounds  Extrem:            Left 1st toe wrapped with clean bandages  Skin:                Left knee abrasion-healing well since admit; diffuse countusions and scatter   purpura on extremities and chest-stable since admit  Pulses:            2+ and symmetric distally    Laboratory:  Laboratory Data Reviewed: yes  Pertinent Findings:    Recent Labs  Lab 04/10/18  0325 18  0444 18  0434 18  0736   WBC 13.89* 17.01* 17.24* 20.33*   HGB 7.9* 8.5* 8.4* 8.5*   HCT 24.3* 26.4* 26.4* 25.7*    SEE COMMENT 66* 111*   MCV 91 89 88 87   RDW 15.2* 14.9* 15.0* 15.1*    140 140  --    K 4.0 3.7 3.5  --     105 104  --    CO2 23 25 29  --    BUN 32* 31* 32*  --    CREATININE 1.2 1.4 1.3  --    GLU 88 91 178*  --    PROT 5.5* 6.2 6.2  --    ALBUMIN 2.4* 2.7* 2.7*  --    BILITOT 0.7 0.6 0.6  --    AST 52* 42* 32  --  "   ALKPHOS 125 130 122  --    ALT 41 37 32  --        Microbiology Data Reviewed: yes  Pertinent Findings:  Blood cx (4/5/18): 1/8 positive for coag negative staph  Blood cx (4/7/18) x6 -  all negative  Urine cx (4/7): negative  Respiratory cx: stain-G+ cocci, cx negative  Wound cx: NGTD    Other Results:  EKG (my interpretation):none new    Radiology Data Reviewed: yes  Pertinent Findings:    Imaging Results          X-Ray Toe 2 or More Views Left (Final result)  Result time 04/05/18 19:14:09    Final result by Jurgen Gaston MD (04/05/18 19:14:09)                 Impression:      Erosive changes involving the left 1st metacarpal head and base of the 1st proximal phalanx.  The findings suggestive of early osteomyelitis.  Contrast enhanced MRI may be obtained for further evaluation.    Subtle erosive changes involving the head of the left 5th metacarpal.  This can also be assessed on the MRI examination.      Electronically signed by: Jurgen Gaston MD  Date:    04/05/2018  Time:    19:14             Narrative:    EXAMINATION:  XR TOE 2 OR MORE VIEWS LEFT    CLINICAL HISTORY:  swelling;    TECHNIQUE:  Three views of the left toes were performed    COMPARISON:  None.    FINDINGS:  There is diffuse demineralization of the osseous structures.  There are erosive changes involving the head of the 1st metatarsal and the base of the 1st proximal phalanx.  Subtle erosive changes also noted involving the head of the left 5th metacarpal.  There is no evidence of a fracture.  There is joint space narrowing throughout the visualized left forefoot.  There is no evidence of a dislocation.  There are extensive vascular calcifications.                               X-Ray Chest AP Portable (Final result)  Result time 04/05/18 16:33:19    Final result by Mis Mccray MD (04/05/18 16:33:19)                 Impression:      Bibasilar patchy areas of increased attenuation concerning for subsegmental areas of airspace disease  considering pneumonia or aspiration.    Cardiomegaly.      Electronically signed by: Mis Mccray MD  Date:    04/05/2018  Time:    16:33             Narrative:    EXAMINATION:  XR CHEST AP PORTABLE    CLINICAL HISTORY:  Sepsis;    TECHNIQUE:  Single frontal view of the chest was performed.    COMPARISON:  01/11/2018    FINDINGS:  There is a cardio stimulator device in the left upper chest with a single lead the.  Sternotomy wires.  The cardiac silhouette is enlarged.  Patchy areas of increased attenuation at the bilateral lung bases concerning for areas of possible pneumonia or aspiration, it is new compared to the prior exam.  No pleural effusion.  No pneumothorax.  Atherosclerotic changes of the aorta.                                Current Medications:     Infusions:   Amino acid 4.25% - dextrose 10% (CLINIMIX-E) solution with additives (1L provides 42.5 gm AA, 100 gm CHO (340 kcal/L dextrose), Na 35, K 30, Mg 5, Ca 4.5, Acetate 70, Cl 39, Phos 15) 85 mL/hr at 04/12/18 1930        Scheduled:   albuterol-ipratropium 2.5mg-0.5mg/3mL  3 mL Nebulization Q4H    clindamycin (CLEOCIN) IVPB  900 mg Intravenous Q8H    levothyroxine  50 mcg Intravenous Daily    metoprolol  5 mg Intravenous Q12H    mupirocin   Topical (Top) Daily    nystatin  500,000 Units Oral QID    sodium chloride 0.9%  3 mL Intravenous Q8H        PRN:  albuterol-ipratropium 2.5mg-0.5mg/3mL, hydrALAZINE, morphine    Antibiotics and Day Number of Therapy:  S/p 3days cipro  S/p vanc and cefepime: 4/5 to 4/11  Clindamycin: 4/6 to present      Assessment:     Zeyad Woodard is a 89 y.o.male with multiple comorbidities admitted with HCAP and thrombocytopenia     Plan:     Acute encephalopathy  - Began 4/9, became more lethargic and confused, also with visual halluciations  No focal deficits and CT head on 4/10  without acute findings  Likely 2/2 delirium in setting of sepsis--monitoring    Dysphagia  -2/2 AMS rather than functional  limitation  SLP following--currently aspiration concerns--initially pureed diet with honey thick liquids, now NPO 2/2 AMS   -Started PPN 4/11  SLP will continue to assess swallowing as mental status improves    Acute hypoxic respiratory failure and severe sepsis 2/2 HCAP  -1 week progressive productive cough, fever, SOB  On admit, satting well on 5L O2 via NC, temp 103, CXR with bibasilar consolidations, lactate 2.5  BP stable   - Modified barium swallow study 1/18 w/o aspiration--re-evaluated by speech-not safe for thin liquids-honey thick  -In ED, given 2.3L NS bolus, started vanc, cipro, cefepime--penicillin allergy was remote and only hives  -Lactate normalized, procal 21  Blood cx with coag negative staph 2/4 bottles--likely contaminant  Duonebs, CPT  - s/p cipro, vanc, and cefepime (4/5-4/10), clinda 4/6-present--expected 14 day total course     Left 1st toe erosion  -Bruising, erythema, swelling 2/2 recent trauma  H/o neuropathy, follows with podiatry  Xray on admission suggestive of early osteo, Podiatry was consulted, removed toe nail, thinks no osteo  - s/p vanc, cefepime, cipro (4/5-4/10); clinda 4/6-present  Wound cx NGTD  - continue routine wound care.     ELIZABET on CKD 3A, resolved  -On admit, Cr 1.5, GFR 40  Baseline Cr 1.2, GFR 60  -Likely prerenal in setting of sepsis  Returned to baseline     Elevated troponin, resolved  -On admit, trop 0.047  Chronically elevated trop to 0.03-0.06. Also, possibly 2/2 demand in setting of sepsis and CKD  EKG ventricularly paced  -Trended troponins--peaked 0.133->0.125     Chronic thrombocytopenia, improving  -Baseline platelets ~ 90  Follows with Dr. Jeff as outpt  -On admit, platelets 44-likely exacerbated by sepsis  Scatter petechiae, contusions in setting of recent fall but no active bleeding  -PLT improving daily, holding ASA and pharmocologic DVT ppx     Chronic HFpEF  -TTE 12/17-EF 55%, grade 2 diastolic dysfunction, PA pressure 35  -On admit,  Trop 0.047,   CXR with b/l consolidations 2/2 PNA rather than volume overload--given fluids per sepsis protocol  Monitor closely for volume overload  -Repeat TTE--LV EF 55%, no diastolic d/f, PA pressure 33     A fib and SSS s/p pacemaker  -On admit, in ventricularly paced rhythm  Not on home anticoagulation 2/2 h/o GI bleeds. Also falls frequently  -Converted to afib with RVR 4/11--responded to IV metoprolol  Holding PO meds 2/2 dysphagia, continue IV metoprolol 5 BID     Anemia of chronic disease, h/o CHRISTIANO and B12 deficiency  -On admit, Hbg 9.4--at baseline  1/18--w/u c/w AoCD--ferritin, B12 wnl  -Follows with Dr. Jeff--continue home iron, B12     Mild transaminitis, resolved  -On admit, , AST 61, ALT 50  Consistent with baseline  -Negative Hep C Ab 2012--defer repeating as patient would be poor treatment candidate given age/comorbidities     HTN  -On admit, BP low/normal  Held home coreg in setting of sepsis--resumed 2/2 HTN  -Holding PO meds until swallowing improves, on scheduled and prn IV antihypertensives     Hypothyroidism  -No acute issues  Continue home synthroid     BPH  -No acute issues  Continue home flomax, finasteride  - Holding PO meds until swallowing improves     Frequent mechanical falls  -Per chart review  Fell 3 days PTA  -Per family, previously had HH PT/OT that has been discontinued/completed  Only mobile via wheelchair  -Consulted PT/OT--recommended SNF vs return to group home with PT     Dementia  -Baseline--Depending for ADLs, lives at [facility] and has supervision via aide and nurse  -A&O to person, place, date; not situation  Continue home remeron, aricept  -Holding PO meds until swallowing improves     Code Status:      Full     Fluids: None    Diet: NPO, PPN since 4/11    Ppx: SCDs; holding chemical ppx in setting of thrombocytopenia    Dispo: pending clinical improvement in mentation    Chuckie Isaacs MD  [facility] HO-I  [facility] IM  Service Team A    [facility] Medicine Hospitalist Pager numbers:   [facility] Hospitalist Medicine Team A (Kinsey/Yumiko): [phone]  [facility] Hospitalist Medicine Team B (Tyrese/Bibi):  [phone]

## 2018-04-13 NOTE — PLAN OF CARE
Problem: Occupational Therapy Goal  Goal: Occupational Therapy Goal  Goals to be met by: 05/06/18     Patient will increase functional independence with ADLs by performing:    Grooming while seated with Set-up Assistance.  Toileting from bedside commode with Moderate Assistance for hygiene and clothing management.   Supine to sit with Stand-by Assistance and use of bedrail as needed.  Toilet transfer to bedside commode with Contact Guard Assistance.  Increased functional strength to WFL for ADLs.     Outcome: Ongoing (interventions implemented as appropriate)  Sit balance improved today, continues to require total care    Cont POC

## 2018-04-13 NOTE — HPI
This is a 89yo male with dementia, CKD here with HCAP. He is noted to have decreased oral intake and dysphagia. He is unable to provide history and family is not available at the bedside during my visit. He has advanced dementia. The dysphagia is moderate in severity, oropharyngeal. No vomiting has been noted. No other exacerbating or relieving factors or other associated symptoms. Swallow study reviewed.     The following portions of the patient's history were reviewed and updated as appropriate: allergies, current medications, past family history, past medical history, past social history, past surgical history and problem list.

## 2018-04-13 NOTE — PLAN OF CARE
Problem: SLP Goal  Goal: SLP Goal  Short Term Goals:  1. Pt will participate in BSS to determine least restrictive diet.- MET 4/6  2. Pt will successfully participate in Modified Barium Swallow study to objectively rule out aspiration and determine safest/least restrictive diet.- MET 4/6  3. Pt will tolerate honey thick liquids/puree textures po diet with chin tuck without overt s/s of aspiration-discontinued 4/11  4. Pt will tolerate advanced diet trials of nectar thick liquids and dental soft textures without overt s/s of aspiration.-discontinued 4/10  5. Pt will participate in indirect dysphagia exercises to strengthen musculature for swallowing mechanism with mod-max effort.- ongoing         Outcome: Ongoing (interventions implemented as appropriate)  4/13/18: Pt seen this PM for indirect dysphagia exercises. SLP cued pt to perform indirect dysphagia exercises. However, pt continues to demonstrate limited participation in indirect dysphagia exercises, despite max verbal and tactile-kinesthetic cuing from SLP. SLP applied water moistened toothettes and lemon oral swabs along pt's oral cavity to trigger pt's swallowing reflex. However, pt did not trigger swallow reflex throughout session. Pt performed tongue lateralizations x3 with minimal effort.  See note for details. SLP recs: Continue consideration for PEG tube placement, if pt is an appropriate candidate since pt is not appropriate for an oral diet. SLP notified MD Euceda of recs/results.  SANDRINE Herzog., CF-SLP  Speech-Language Pathologist

## 2018-04-13 NOTE — PLAN OF CARE
Problem: Physical Therapy Goal  Goal: Physical Therapy Goal  Goals to be met by: 2018     Patient will increase functional independence with mobility by performin. Supine to sit with Stand-by Assistance  2. Sit to stand transfer with Stand-by Assistance  3. Bed to chair transfer with Stand-by Assistance using Rolling Walker  4. Gait  x 20 feet with Minimal Assistance using Rolling Walker.      Outcome: Ongoing (interventions implemented as appropriate)  Recs TBD by family Hospice vs Group Home with HH  Sat EOB with CG to min assist intermittently to correct balance  Lethargic open eyes to commands

## 2018-04-13 NOTE — PT/OT/SLP PROGRESS
"Occupational Therapy   Treatment    Name: Zeyad Woodard  MRN: [id number]  Admitting Diagnosis:  HCAP (healthcare-associated pneumonia)       Recommendations:     Discharge Recommendations:    Discharge Equipment Recommendations:  none  Barriers to discharge:  None    Subjective     Communicated with: nurse prior to session.  Pain/Comfort:  · Pain Rating 1:  (did not rate)  · Location - Orientation 1: generalized  · Location 1: back  · Pain Addressed 1: Nurse notified, Distraction, Reposition  · Pain Rating Post-Intervention 1: 0/10    Patients cultural, spiritual, Religion conflicts given the current situation:  (none reported)    Objective:     Patient found with: telemetry, oxygen, peripheral IV    General Precautions: Standard, fall, NPO   Orthopedic Precautions:N/A   Braces:       Occupational Performance:    Bed Mobility:    · Patient completed Rolling/Turning to Left with  maximal assistance and total assistance  · Patient completed Scooting/Bridging with maximal assistance and total assistance  · Patient completed Supine to Sit with maximal assistance and total assistance  · Patient completed Sit to Supine with maximal assistance and total assistance     Functional Mobility/Transfers:  ·   · Functional Mobility:     Activities of Daily Living:  · Dep oral care    Patient left HOB elevated with all lines intact, call button in reach, bed alarm on, nurse notified and family present    [facility] 6 Click:  [facility] Total Score: 8    Treatment & Education:  Pt awake, alert, moved to sit EOB as above.   Sat EOB ~12 minutes SBA-CGA.  Performed dynamic sitting-trunk rotation, and reaching w/assist.  Pt states "I want to relax"  Education:    Assessment:     Zeyad Woodard is a 89 y.o. male with a medical diagnosis of HCAP (healthcare-associated pneumonia).  He presents with performance deficits affecting function are weakness, impaired endurance, impaired self care skills, impaired functional mobilty, gait instability, " impaired balance, impaired cognition, decreased coordination, decreased upper extremity function, decreased lower extremity function, decreased ROM, impaired skin, pain, decreased safety awareness, impaired cardiopulmonary response to activity.      Rehab Prognosis:  poor; patient would benefit from acute skilled OT services to address these deficits and reach maximum level of function.       Plan:     Patient to be seen 5 x/week to address the above listed problems via self-care/home management, therapeutic activities, therapeutic exercises  · Plan of Care Expires: 05/06/18  · Plan of Care Reviewed with: patient, daughter    This Plan of care has been discussed with the patient who was involved in its development and understands and is in agreement with the identified goals and treatment plan    GOALS:    Occupational Therapy Goals        Problem: Occupational Therapy Goal    Goal Priority Disciplines Outcome Interventions   Occupational Therapy Goal     OT, PT/OT Ongoing (interventions implemented as appropriate)    Description:  Goals to be met by: 05/06/18     Patient will increase functional independence with ADLs by performing:    Grooming while seated with Set-up Assistance.  Toileting from bedside commode with Moderate Assistance for hygiene and clothing management.   Supine to sit with Stand-by Assistance and use of bedrail as needed.  Toilet transfer to bedside commode with Contact Guard Assistance.  Increased functional strength to WFL for ADLs.                      Time Tracking:     OT Date of Treatment: 04/13/18  OT Start Time: 1154  OT Stop Time: 1219  OT Total Time (min): 25 min w/PT    Billable Minutes:Therapeutic Activity 12    Evaristo Russell OT  4/13/2018

## 2018-04-13 NOTE — PLAN OF CARE
Problem: Patient Care Overview  Goal: Plan of Care Review  Outcome: Ongoing (interventions implemented as appropriate)  Pt received on nasal cannula at   1 lpm.  SPO2   98%.  Pt in no apparent respiratory distress.  Will continue to monitor.

## 2018-04-13 NOTE — PLAN OF CARE
Problem: Patient Care Overview  Goal: Plan of Care Review  Patient is awake, alert and oriented to self, place, and year. He is on oxygen at 1lpm/nasal cannula with frequent coughs, suctioning as needed. On heart monitoring running Atrial Fibrillation at 70s. Sacral with Stage 2 ulcer, Mepilex on dry and intact. Bilateral knees with Mepilex. Left Medial ankle with dry  wound open to air. Left great toe with rolled gauze, dry and intact. Turn q2. Bed with overlay mattress, bed in lowest position, bed alarms on, call light within reach. Will continue to monitor.

## 2018-04-13 NOTE — CONSULTS
[facility]  Gastroenterology  Consult Note    Patient Name: Zeyad Woodard  MRN: [id number]  Admission Date: 4/5/2018  Hospital Length of Stay: 8 days  Code Status: DNR   Attending Provider:Willy Euceda MD   Consulting Provider: Hannah Garg MD  Primary Care Physician: Booker Ricci MD  Principal Problem:HCAP (healthcare-associated pneumonia)    Inpatient consult to [facility]  Consult performed by: HANNAH GARG  Consult ordered by: SARAH GERARDO  Reason for consult: Dysphagia        Subjective:     HPI:  This is a 89yo male with dementia, CKD here with HCAP. He is noted to have decreased oral intake and dysphagia. He is unable to provide history and family is not available at the bedside during my visit. He has advanced dementia. The dysphagia is moderate in severity, oropharyngeal. No vomiting has been noted. No other exacerbating or relieving factors or other associated symptoms. Swallow study reviewed.     The following portions of the patient's history were reviewed and updated as appropriate: allergies, current medications, past family history, past medical history, past social history, past surgical history and problem list.      Past Medical History:   Diagnosis Date    Anemia     Atrial fibrillation     BPH (benign prostatic hypertrophy)     Cancer     Cardiac pacemaker in situ 7/2/2015    CHF (congestive heart failure)     Coronary artery disease     Encounter for blood transfusion     GI bleed     cecum angiectasia s/p cautery    Hypertension     Hypothyroidism     Polyneuropathy     Posture imbalance     due to neuropathy    Right posterior capsular opacification 1/18/2017    SSS (sick sinus syndrome) 2015    s/p pacemaker    Stroke 1/11/2018       Past Surgical History:   Procedure Laterality Date    CARDIAC SURGERY      CATARACT EXTRACTION W/  INTRAOCULAR LENS IMPLANT Right 05/17/2010        CATARACT EXTRACTION  W/  INTRAOCULAR LENS IMPLANT Left 02/16/2004        cataract surgery      COLONOSCOPY  6/30/04    COLONOSCOPY N/A 2/15/2016    Procedure: COLONOSCOPY;  Surgeon: Stanton Kirk MD;  Location: [facility] ([address] [place] [address]);  Service: Endoscopy;  Laterality: N/A;    EYE SURGERY      HERNIA REPAIR      inguinal hernia repair      Open heart surgery for pericardiectomy      for calcific constrictive pericarditis    UMBILICAL HERNIA REPAIR      Yag      Left Eye       Review of patient's allergies indicates:   Allergen Reactions    Penicillins Hives and Other (See Comments)     Fever     Family History     Problem Relation (Age of Onset)    Cancer Father    Coronary artery disease Sister    Diabetes Sister    Peripheral vascular disease Sister    Stroke Mother        Social History Main Topics    Smoking status: Passive Smoke Exposure - Never Smoker    Smokeless tobacco: Never Used    Alcohol use No    Drug use: No    Sexual activity: No     Review of Systems   Unable to perform ROS: Dementia     Objective:     Vital Signs (Most Recent):  Temp: 99.3 °F (37.4 °C) (04/13/18 1645)  Pulse: 85 (04/13/18 1645)  Resp: 20 (04/13/18 1645)  BP: (!) 184/86 (04/13/18 1645)  SpO2: (!) 93 % (04/13/18 1613) Vital Signs (24h Range):  Temp:  [96.3 °F (35.7 °C)-99.3 °F (37.4 °C)] 99.3 °F (37.4 °C)  Pulse:  [71-93] 85  Resp:  [16-22] 20  SpO2:  [93 %-98 %] 93 %  BP: (140-190)/(70-90) 184/86     Weight: 84.7 kg (186 lb 11.7 oz) (04/12/18 1100)  Body mass index is 28.39 kg/m².      Intake/Output Summary (Last 24 hours) at 04/13/18 1756  Last data filed at 04/13/18 0632   Gross per 24 hour   Intake          2050.33 ml   Output              556 ml   Net          1494.33 ml       Lines/Drains/Airways     Pressure Ulcer                 Pressure Injury 04/05/18 2254 transverse Buttocks Stage 1 7 days          Peripheral Intravenous Line                 Peripheral IV - Single Lumen 04/11/18 1653 Left Forearm 2 days          Peripheral IV - Single Lumen 04/13/18 0243 Posterior;Right;Lateral Forearm less than 1 day                Physical Exam   Constitutional: He appears well-developed. No distress.   HENT:   Head: Normocephalic and atraumatic.   Eyes: Conjunctivae are normal. No scleral icterus.   Neck: No tracheal deviation present. No thyromegaly present.   Cardiovascular: Normal rate, regular rhythm and normal heart sounds.  Exam reveals no gallop and no friction rub.    Pulmonary/Chest: Effort normal. No respiratory distress.   Abdominal: Soft. Bowel sounds are normal. He exhibits no distension. There is no tenderness. There is no rebound and no guarding.   Musculoskeletal: He exhibits no edema or tenderness.   Neurological:   Unable to fully assess given current status; somnolent   Skin: He is not diaphoretic.   Psychiatric:   Unable to fully assess given current status   Nursing note and vitals reviewed.      Significant Labs:  CBC:   Recent Labs  Lab 04/12/18  0434 04/13/18  0736   WBC 17.24* 20.33*   HGB 8.4* 8.5*   HCT 26.4* 25.7*   PLT 66* 111*       Significant Imaging:  Imaging results within the past 24 hours have been reviewed.    Assessment/Plan:     Oropharyngeal dysphagia    - called family and left message for daughter  - will discuss egd with peg placement  - if decision is for hospice I favor foregoing this procedure  - would be high risk with pneumonia, advanced age, ckd  - agree with preference for enteral nutrition            Thank you for your consult. I will follow-up with patient. Please contact us if you have any additional questions.    Hannah Garg MD  Gastroenterology  [facility]

## 2018-04-13 NOTE — PT/OT/SLP PROGRESS
"Speech Language Pathology Treatment    Patient Name:  Zeyad Woodard   MRN:  [id number]  Admitting Diagnosis: HCAP (healthcare-associated pneumonia)    Recommendations:                 General Recommendations:  GI evaluation and Dysphagia therapy  Diet recommendations:  NPO, Liquid Diet Level: NPO   Aspiration Precautions: Strict aspiration precautions   General Precautions: Standard, fall, NPO, aspiration  Communication strategies:  provide increased time to answer    Subjective     Pt found in bed with pt's daughters at bedside this AM. Pt was awake, but eyes closed and observed to be moaning upon SLP entry. Pt's daughters stated pt had vomited twice prior to SLP entry. Pt's mentation observed with slight improvement, as pt intermittently answered questions throughout session appropriately, but with a delay in response.     Patient goals: "I can't, I can't, I can't" per pt       Objective:     Has the patient been evaluated by SLP for swallowing?   Yes  Keep patient NPO? Yes   Current Respiratory Status: nasal cannula      Pt seen this PM for indirect dysphagia exercises. OT and PT present in room and repositioned pt to EOB. SLP cued pt to perform indirect dysphagia exercises. However, pt continues to demonstrate limited participation in indirect dysphagia exercises.   SLP applied water moistened toothettes x4 and lemon oral swabs x3  along pt's oral cavity to trigger pt's swallowing reflex. However, pt did not trigger swallow reflex throughout session, despite max verbal and tactile-kinesthetic cuing from SLP. Pt performed tongue lateralizations x3 with min effort, with max cuing/repetition from SLP. Pt's continued limited participation negatively impacts pt's progress and prognosis.   SLP also provided pt with oral care/hygiene with toothettes moistened with oral rinse to reduce risk of infection/ further complications.   Pt also observed with consistent coughing throughout session and required SLP suctioning " oral secretions 2/2 pt's poor management of secretions. SLP suspects pt may be aspirating on pt's salvia.     SLP and Palliative Care NP Albaro discussed pt's long term goals of care/nutrition with pt's daughters. Pt's daughters stated they will discuss PEG tube placement and come to a decision after speaking with GI to determine if pt would be appropriate to participate in procedure.       SLP also discussed pt's long term goals of care and long term goals of nutrition. MD Euceda. MD Euceda reported he will contact GI to setup meeting with pt's family to discuss PEG tube placement.  SLP continues to recommend PEG tube placement to ensure pt will meet nutritional needs, pending MD approval.         Assessment:     Zeyad Woodard is a 89 y.o. male with an SLP diagnosis of moderate-severe oropharyngeal Dysphagia .  He continues to present with limited participation in indirect dysphagia exercises, which negatively impacts pt's progress/prognosis. SLP recs: Continue consideration for PEG tube placement, if pt is an appropriate candidate since pt is not appropriate for an oral diet. SLP notified MD Euceda of recs/results and discussed pt's long term goals of care and long term goals of nutrition. SLP will continue to follow.    Goals:    SLP Goals        Problem: SLP Goal    Goal Priority Disciplines Outcome   SLP Goal     SLP Ongoing (interventions implemented as appropriate)   Description:  Short Term Goals:  1. Pt will participate in BSS to determine least restrictive diet.- MET 4/6  2. Pt will successfully participate in Modified Barium Swallow study to objectively rule out aspiration and determine safest/least restrictive diet.- MET 4/6  3. Pt will tolerate honey thick liquids/puree textures po diet with chin tuck without overt s/s of aspiration-discontinued 4/11  4. Pt will tolerate advanced diet trials of nectar thick liquids and dental soft textures without overt s/s of aspiration.-discontinued 4/10  5. Pt  will participate in indirect dysphagia exercises to strengthen musculature for swallowing mechanism with mod-max effort.- ongoing                          Plan:     · Patient to be seen:  3 x/week   · Plan of Care expires:  05/06/18  · Plan of Care reviewed with:  patient, daughter (MD Euceda )   · SLP Follow-Up:  Yes       Discharge recommendations:   (TBD)   Barriers to Discharge:  None    Time Tracking:     SLP Treatment Date:   04/13/18  Speech Start Time:  1149  Speech Stop Time:  1210     Speech Total Time (min):  21 min    Billable Minutes: Treatment Swallowing Dysfunction 21    SARAH BETH Herzog, CF-SLP  Speech-Language Pathologist   4/13/2018

## 2018-04-13 NOTE — ASSESSMENT & PLAN NOTE
- called family and left message for daughter  - will discuss egd with peg placement  - if decision is for hospice I favor foregoing this procedure  - would be high risk with pneumonia, advanced age, ckd  - agree with preference for enteral nutrition

## 2018-04-13 NOTE — PLAN OF CARE
Patient is accompanied by two daughters, they have decided they would like for him to have a PEG tube (confirmed by Catalina TRIANA with Palliative Care), however patient still needs a GI consult to confirm if patient is a good candidate at this time.  Will continue to follow for needs, patient continues with liza SELF of [facility].       04/13/18 1434   Discharge Reassessment   Assessment Type Discharge Planning Reassessment   Discharge Plan A Home Health;Home   Discharge Plan B Other

## 2018-04-13 NOTE — SUBJECTIVE & OBJECTIVE
Past Medical History:   Diagnosis Date    Anemia     Atrial fibrillation     BPH (benign prostatic hypertrophy)     Cancer     Cardiac pacemaker in situ 7/2/2015    CHF (congestive heart failure)     Coronary artery disease     Encounter for blood transfusion     GI bleed     cecum angiectasia s/p cautery    Hypertension     Hypothyroidism     Polyneuropathy     Posture imbalance     due to neuropathy    Right posterior capsular opacification 1/18/2017    SSS (sick sinus syndrome) 2015    s/p pacemaker    Stroke 1/11/2018       Past Surgical History:   Procedure Laterality Date    CARDIAC SURGERY      CATARACT EXTRACTION W/  INTRAOCULAR LENS IMPLANT Right 05/17/2010        CATARACT EXTRACTION W/  INTRAOCULAR LENS IMPLANT Left 02/16/2004        cataract surgery      COLONOSCOPY  6/30/04    COLONOSCOPY N/A 2/15/2016    Procedure: COLONOSCOPY;  Surgeon: Stanton Kirk MD;  Location: [facility] ([address] [place] [address]);  Service: Endoscopy;  Laterality: N/A;    EYE SURGERY      HERNIA REPAIR      inguinal hernia repair      Open heart surgery for pericardiectomy      for calcific constrictive pericarditis    UMBILICAL HERNIA REPAIR      Yag      Left Eye       Review of patient's allergies indicates:   Allergen Reactions    Penicillins Hives and Other (See Comments)     Fever     Family History     Problem Relation (Age of Onset)    Cancer Father    Coronary artery disease Sister    Diabetes Sister    Peripheral vascular disease Sister    Stroke Mother        Social History Main Topics    Smoking status: Passive Smoke Exposure - Never Smoker    Smokeless tobacco: Never Used    Alcohol use No    Drug use: No    Sexual activity: No     Review of Systems   Unable to perform ROS: Dementia     Objective:     Vital Signs (Most Recent):  Temp: 99.3 °F (37.4 °C) (04/13/18 1645)  Pulse: 85 (04/13/18 1645)  Resp: 20 (04/13/18 1645)  BP: (!) 184/86 (04/13/18 1645)  SpO2: (!) 93 % (04/13/18  1613) Vital Signs (24h Range):  Temp:  [96.3 °F (35.7 °C)-99.3 °F (37.4 °C)] 99.3 °F (37.4 °C)  Pulse:  [71-93] 85  Resp:  [16-22] 20  SpO2:  [93 %-98 %] 93 %  BP: (140-190)/(70-90) 184/86     Weight: 84.7 kg (186 lb 11.7 oz) (04/12/18 1100)  Body mass index is 28.39 kg/m².      Intake/Output Summary (Last 24 hours) at 04/13/18 1756  Last data filed at 04/13/18 0632   Gross per 24 hour   Intake          2050.33 ml   Output              556 ml   Net          1494.33 ml       Lines/Drains/Airways     Pressure Ulcer                 Pressure Injury 04/05/18 2254 transverse Buttocks Stage 1 7 days          Peripheral Intravenous Line                 Peripheral IV - Single Lumen 04/11/18 1653 Left Forearm 2 days         Peripheral IV - Single Lumen 04/13/18 0243 Posterior;Right;Lateral Forearm less than 1 day                Physical Exam   Constitutional: He appears well-developed. No distress.   HENT:   Head: Normocephalic and atraumatic.   Eyes: Conjunctivae are normal. No scleral icterus.   Neck: No tracheal deviation present. No thyromegaly present.   Cardiovascular: Normal rate, regular rhythm and normal heart sounds.  Exam reveals no gallop and no friction rub.    Pulmonary/Chest: Effort normal. No respiratory distress.   Abdominal: Soft. Bowel sounds are normal. He exhibits no distension. There is no tenderness. There is no rebound and no guarding.   Musculoskeletal: He exhibits no edema or tenderness.   Neurological:   Unable to fully assess given current status; somnolent   Skin: He is not diaphoretic.   Psychiatric:   Unable to fully assess given current status   Nursing note and vitals reviewed.      Significant Labs:  CBC:   Recent Labs  Lab 04/12/18  0434 04/13/18  0736   WBC 17.24* 20.33*   HGB 8.4* 8.5*   HCT 26.4* 25.7*   PLT 66* 111*       Significant Imaging:  Imaging results within the past 24 hours have been reviewed.

## 2018-04-14 LAB
ALBUMIN SERPL BCP-MCNC: 2.7 G/DL
ALP SERPL-CCNC: 111 U/L
ALT SERPL W/O P-5'-P-CCNC: 25 U/L
ANION GAP SERPL CALC-SCNC: 6 MMOL/L
ANISOCYTOSIS BLD QL SMEAR: SLIGHT
AST SERPL-CCNC: 29 U/L
BASOPHILS # BLD AUTO: ABNORMAL K/UL
BASOPHILS NFR BLD: 0 %
BILIRUB SERPL-MCNC: 0.6 MG/DL
BUN SERPL-MCNC: 34 MG/DL
CALCIUM SERPL-MCNC: 9 MG/DL
CHLORIDE SERPL-SCNC: 103 MMOL/L
CO2 SERPL-SCNC: 29 MMOL/L
CREAT SERPL-MCNC: 1.3 MG/DL
DIFFERENTIAL METHOD: ABNORMAL
EOSINOPHIL # BLD AUTO: ABNORMAL K/UL
EOSINOPHIL NFR BLD: 1 %
ERYTHROCYTE [DISTWIDTH] IN BLOOD BY AUTOMATED COUNT: 15.4 %
EST. GFR  (AFRICAN AMERICAN): 56 ML/MIN/1.73 M^2
EST. GFR  (NON AFRICAN AMERICAN): 48 ML/MIN/1.73 M^2
GLUCOSE SERPL-MCNC: 157 MG/DL
HCT VFR BLD AUTO: 24.5 %
HGB BLD-MCNC: 8 G/DL
LYMPHOCYTES # BLD AUTO: ABNORMAL K/UL
LYMPHOCYTES NFR BLD: 58 %
MAGNESIUM SERPL-MCNC: 1.9 MG/DL
MCH RBC QN AUTO: 28.9 PG
MCHC RBC AUTO-ENTMCNC: 32.7 G/DL
MCV RBC AUTO: 88 FL
MONOCYTES # BLD AUTO: ABNORMAL K/UL
MONOCYTES NFR BLD: 3 %
NEUTROPHILS # BLD AUTO: ABNORMAL K/UL
NEUTROPHILS NFR BLD: 37 %
NEUTS BAND NFR BLD MANUAL: 1 %
OVALOCYTES BLD QL SMEAR: ABNORMAL
PHOSPHATE SERPL-MCNC: 3.6 MG/DL
PLATELET # BLD AUTO: 94 K/UL
PLATELET BLD QL SMEAR: ABNORMAL
PMV BLD AUTO: 10.5 FL
POIKILOCYTOSIS BLD QL SMEAR: SLIGHT
POTASSIUM SERPL-SCNC: 3.8 MMOL/L
PROT SERPL-MCNC: 6.2 G/DL
RBC # BLD AUTO: 2.77 M/UL
SODIUM SERPL-SCNC: 138 MMOL/L
WBC # BLD AUTO: 18.12 K/UL

## 2018-04-14 PROCEDURE — 25000003 PHARM REV CODE 250: Performed by: STUDENT IN AN ORGANIZED HEALTH CARE EDUCATION/TRAINING PROGRAM

## 2018-04-14 PROCEDURE — 94668 MNPJ CHEST WALL SBSQ: CPT

## 2018-04-14 PROCEDURE — 94640 AIRWAY INHALATION TREATMENT: CPT

## 2018-04-14 PROCEDURE — 25000242 PHARM REV CODE 250 ALT 637 W/ HCPCS: Performed by: STUDENT IN AN ORGANIZED HEALTH CARE EDUCATION/TRAINING PROGRAM

## 2018-04-14 PROCEDURE — 36415 COLL VENOUS BLD VENIPUNCTURE: CPT

## 2018-04-14 PROCEDURE — 63600175 PHARM REV CODE 636 W HCPCS: Performed by: STUDENT IN AN ORGANIZED HEALTH CARE EDUCATION/TRAINING PROGRAM

## 2018-04-14 PROCEDURE — 80053 COMPREHEN METABOLIC PANEL: CPT

## 2018-04-14 PROCEDURE — A4216 STERILE WATER/SALINE, 10 ML: HCPCS | Performed by: STUDENT IN AN ORGANIZED HEALTH CARE EDUCATION/TRAINING PROGRAM

## 2018-04-14 PROCEDURE — S0077 INJECTION, CLINDAMYCIN PHOSP: HCPCS | Performed by: STUDENT IN AN ORGANIZED HEALTH CARE EDUCATION/TRAINING PROGRAM

## 2018-04-14 PROCEDURE — 85027 COMPLETE CBC AUTOMATED: CPT

## 2018-04-14 PROCEDURE — 85007 BL SMEAR W/DIFF WBC COUNT: CPT

## 2018-04-14 PROCEDURE — 92526 ORAL FUNCTION THERAPY: CPT

## 2018-04-14 PROCEDURE — 11000001 HC ACUTE MED/SURG PRIVATE ROOM

## 2018-04-14 PROCEDURE — 25000003 PHARM REV CODE 250: Performed by: INTERNAL MEDICINE

## 2018-04-14 PROCEDURE — 84100 ASSAY OF PHOSPHORUS: CPT

## 2018-04-14 PROCEDURE — 94761 N-INVAS EAR/PLS OXIMETRY MLT: CPT

## 2018-04-14 PROCEDURE — 97530 THERAPEUTIC ACTIVITIES: CPT

## 2018-04-14 PROCEDURE — 83735 ASSAY OF MAGNESIUM: CPT

## 2018-04-14 RX ADMIN — SODIUM CHLORIDE, PRESERVATIVE FREE 3 ML: 5 INJECTION INTRAVENOUS at 02:04

## 2018-04-14 RX ADMIN — METOPROLOL TARTRATE 5 MG: 5 INJECTION, SOLUTION INTRAVENOUS at 09:04

## 2018-04-14 RX ADMIN — IPRATROPIUM BROMIDE AND ALBUTEROL SULFATE 3 ML: .5; 3 SOLUTION RESPIRATORY (INHALATION) at 11:04

## 2018-04-14 RX ADMIN — IPRATROPIUM BROMIDE AND ALBUTEROL SULFATE 3 ML: .5; 3 SOLUTION RESPIRATORY (INHALATION) at 07:04

## 2018-04-14 RX ADMIN — MUPIROCIN: 20 OINTMENT TOPICAL at 09:04

## 2018-04-14 RX ADMIN — LEUCINE, PHENYLALANINE, LYSINE, METHIONINE, ISOLEUCINE, VALINE, HISTIDINE, THREONINE, TRYPTOPHAN, ALANINE, GLYCINE, ARGININE, PROLINE, SERINE, TYROSINE, SODIUM ACETATE, DIBASIC POTASSIUM PHOSPHATE, MAGNESIUM CHLORIDE, SODIUM CHLORIDE, CALCIUM CHLORIDE, DEXTROSE
311; 238; 247; 170; 255; 247; 204; 179; 77; 880; 438; 489; 289; 213; 17; 297; 261; 51; 77; 33; 10 INJECTION INTRAVENOUS at 04:04

## 2018-04-14 RX ADMIN — CLINDAMYCIN IN 5 PERCENT DEXTROSE 900 MG: 18 INJECTION, SOLUTION INTRAVENOUS at 09:04

## 2018-04-14 RX ADMIN — LEVOTHYROXINE SODIUM ANHYDROUS 50 MCG: 100 INJECTION, POWDER, LYOPHILIZED, FOR SOLUTION INTRAVENOUS at 09:04

## 2018-04-14 RX ADMIN — SODIUM CHLORIDE, PRESERVATIVE FREE 3 ML: 5 INJECTION INTRAVENOUS at 10:04

## 2018-04-14 RX ADMIN — IPRATROPIUM BROMIDE AND ALBUTEROL SULFATE 3 ML: .5; 3 SOLUTION RESPIRATORY (INHALATION) at 03:04

## 2018-04-14 RX ADMIN — MORPHINE SULFATE 1 MG: 4 INJECTION INTRAVENOUS at 03:04

## 2018-04-14 RX ADMIN — CLINDAMYCIN IN 5 PERCENT DEXTROSE 900 MG: 18 INJECTION, SOLUTION INTRAVENOUS at 12:04

## 2018-04-14 RX ADMIN — HYDRALAZINE HYDROCHLORIDE 10 MG: 20 INJECTION INTRAMUSCULAR; INTRAVENOUS at 01:04

## 2018-04-14 RX ADMIN — CLINDAMYCIN IN 5 PERCENT DEXTROSE 900 MG: 18 INJECTION, SOLUTION INTRAVENOUS at 05:04

## 2018-04-14 RX ADMIN — SODIUM CHLORIDE, PRESERVATIVE FREE 3 ML: 5 INJECTION INTRAVENOUS at 06:04

## 2018-04-14 RX ADMIN — MORPHINE SULFATE 1 MG: 4 INJECTION INTRAVENOUS at 08:04

## 2018-04-14 RX ADMIN — MORPHINE SULFATE 1 MG: 4 INJECTION INTRAVENOUS at 01:04

## 2018-04-14 NOTE — PT/OT/SLP PROGRESS
"Speech Language Pathology Treatment    Patient Name:  Zeyad Woodard   MRN:  [id number]  Admitting Diagnosis: HCAP (healthcare-associated pneumonia)    Recommendations:                 General Recommendations:  Dysphagia therapy  Diet recommendations:  NPO, Liquid Diet Level: NPO   Aspiration Precautions: Strict aspiration precautions   General Precautions: Standard, NPO, aphasia, fall  Communication strategies:  provide increased time to answer    Subjective     Pt found in bed upon SLP entry. Pt was awake and more alert today as compared to previous sessions. Pt with appropriate and correct responses with SLP throughout session.    Patient goals: "I had myself worried for a bit too" per pt       Objective:     Has the patient been evaluated by SLP for swallowing?   Yes  Keep patient NPO? Yes   Current Respiratory Status: nasal cannula      Pt seen this AM for indirect dysphagia exercises this AM. Pt with improved DANGELO and mentation during today's session. Pt demonstrated min-mod effort during all indirect dysphagia exercises, which is an improvement compared to previous sessions.   Pt completed lingual strengthening and resistance exercise (tongue pushing against spoon for 3s 10x) to improve base of tongue musculature. Pt completed laryngeal lift exercises (ooo-eee) to improve laryngeal elevation 10x.     Pt's daughter, Asia, entered room prior to end of session. SLP reported to pt's daughter on pt's improved mentation and participation during session today. SLP educated pt and Asia on importance of performing indirect dysphagia exercises at least 2-3x a time. SLP also educated Asia, via visual demonstration, on how to perform above mentioned exercises to ensure compliance with recommendations. Both pt and pt's daughter acknowledged and verbally confirmed understanding of education provided.    Assessment:     Zeyad Woodard is a 89 y.o. male with an SLP diagnosis of moderate-severe oropharyngeal " Dysphagia.  He presents with improved DANGELO and improved participation during indirect dysphagia exercises, as compared to previous session.   SLP recs: Con't NPO, with consideration for alternative means of nutrition/hydration as pt is still not appropriate for an oral diet at this time. SLP will continue follow. SLP notified DINA Joseph and MD Isaacs of results/recs.     Goals:    SLP Goals        Problem: SLP Goal    Goal Priority Disciplines Outcome   SLP Goal     SLP Ongoing (interventions implemented as appropriate)   Description:  Short Term Goals:  1. Pt will participate in BSS to determine least restrictive diet.- MET 4/6  2. Pt will successfully participate in Modified Barium Swallow study to objectively rule out aspiration and determine safest/least restrictive diet.- MET 4/6  3. Pt will tolerate honey thick liquids/puree textures po diet with chin tuck without overt s/s of aspiration-discontinued 4/11  4. Pt will tolerate advanced diet trials of nectar thick liquids and dental soft textures without overt s/s of aspiration.-discontinued 4/10  5. Pt will participate in indirect dysphagia exercises to strengthen musculature for swallowing mechanism with mod-max effort.- ongoing                          Plan:     · Patient to be seen:  3 x/week   · Plan of Care expires:  05/06/18  · Plan of Care reviewed with:  patient, daughter (DINA Joseph and MD Isaacs)   · SLP Follow-Up:  Yes       Discharge recommendations:   (TBD)   Barriers to Discharge:  None    Time Tracking:     SLP Treatment Date:   04/14/18  Speech Start Time:  1100  Speech Stop Time:  1115     Speech Total Time (min):  15 min    Billable Minutes: Treatment Swallowing Dysfunction 15    SARAH BETH Herzog, CF-SLP  Speech-Language Pathologist   4/14/2018

## 2018-04-14 NOTE — PLAN OF CARE
Problem: SLP Goal  Goal: SLP Goal  Short Term Goals:  1. Pt will participate in BSS to determine least restrictive diet.- MET 4/6  2. Pt will successfully participate in Modified Barium Swallow study to objectively rule out aspiration and determine safest/least restrictive diet.- MET 4/6  3. Pt will tolerate honey thick liquids/puree textures po diet with chin tuck without overt s/s of aspiration-discontinued 4/11  4. Pt will tolerate advanced diet trials of nectar thick liquids and dental soft textures without overt s/s of aspiration.-discontinued 4/10  5. Pt will participate in indirect dysphagia exercises to strengthen musculature for swallowing mechanism with mod-max effort.- ongoing         Outcome: Ongoing (interventions implemented as appropriate)  4/14/18: Pt seen this AM for indirect dysphagia exercises this AM. Pt with improved DANGELO and mentation during today's session. Pt demonstrated min-mod effort during all indirect dysphagia exercises, which is an improvement compared to previous sessions. See note for details. SLP recs: Con't NPO, with consideration for alternative means of nutrition/hydration as pt is still not appropriate for an oral diet at this time.  SANDRINE Herzog., CF-SLP  Speech-Language Pathologist

## 2018-04-14 NOTE — PROGRESS NOTES
[facility] IM Resident Progress Note    Subjective:      No events overnight. Afebrile. Vitals stable. Continues to complain of nondescript pain. More interactive today.    Objective:   Last 24 Hour Vital Signs:  BP  Min: 134/91  Max: 190/81  Temp  Av.8 °F (37.1 °C)  Min: 98.2 °F (36.8 °C)  Max: 99.3 °F (37.4 °C)  Pulse  Av.3  Min: 75  Max: 94  Resp  Av.3  Min: 18  Max: 22  SpO2  Av.9 %  Min: 93 %  Max: 98 %  I/O last 3 completed shifts:  In: 1030.3 [IV Piggyback:50]  Out: 1571 [Urine:1571]    Physical Examination:  General:           Alert and awake  Head:               Normocephalic and atraumatic  Eyes:               anicteric sclera and clear conjunctivae  Mouth:             Oropharynx clear and without exudate; moist mucous membranes  Cardio:            RRR, Grade 2/6 holosystolic murmur loudest at LUSB  Resp:               Scattered rhonchi, intermittent dry cough  Abdom:            Soft, NTND with normoactive bowel sounds  Extrem:            Left 1st toe wrapped with clean bandages  Skin:                Left knee abrasion-healing well since admit; diffuse countusions and scatter   purpura on extremities and chest-stable since admit  Pulses:            2+ and symmetric distally  Neuro:  A&O to person, place, not year; no focal deficits    Laboratory:  Laboratory Data Reviewed: yes  Pertinent Findings:    Recent Labs  Lab 18  0434 18  0736 18  0646   WBC 17.24* 20.33* 18.12*   HGB 8.4* 8.5* 8.0*   HCT 26.4* 25.7* 24.5*   PLT 66* 111* 94*   MCV 88 87 88   RDW 15.0* 15.1* 15.4*    140 138   K 3.5 3.7 3.8    103 103   CO2 29 30* 29   BUN 32* 32* 34*   CREATININE 1.3 1.2 1.3   * 142* 157*   PROT 6.2 6.4 6.2   ALBUMIN 2.7* 2.8* 2.7*   BILITOT 0.6 0.6 0.6   AST 32 32 29   ALKPHOS 122 121 111   ALT 32 28 25       Microbiology Data Reviewed: yes  Pertinent Findings:  Blood cx (18): 1/8 positive for coag negative staph  Blood cx (18) x6 -  all negative  Urine cx  (4/7): negative  Respiratory cx: stain-G+ cocci, cx negative  Wound cx: NGTD    Other Results:  EKG (my interpretation):none new    Radiology Data Reviewed: yes  Pertinent Findings:    Imaging Results          X-Ray Toe 2 or More Views Left (Final result)  Result time 04/05/18 19:14:09    Final result by Jurgen Gaston MD (04/05/18 19:14:09)                 Impression:      Erosive changes involving the left 1st metacarpal head and base of the 1st proximal phalanx.  The findings suggestive of early osteomyelitis.  Contrast enhanced MRI may be obtained for further evaluation.    Subtle erosive changes involving the head of the left 5th metacarpal.  This can also be assessed on the MRI examination.      Electronically signed by: Jurgen Gaston MD  Date:    04/05/2018  Time:    19:14             Narrative:    EXAMINATION:  XR TOE 2 OR MORE VIEWS LEFT    CLINICAL HISTORY:  swelling;    TECHNIQUE:  Three views of the left toes were performed    COMPARISON:  None.    FINDINGS:  There is diffuse demineralization of the osseous structures.  There are erosive changes involving the head of the 1st metatarsal and the base of the 1st proximal phalanx.  Subtle erosive changes also noted involving the head of the left 5th metacarpal.  There is no evidence of a fracture.  There is joint space narrowing throughout the visualized left forefoot.  There is no evidence of a dislocation.  There are extensive vascular calcifications.                               X-Ray Chest AP Portable (Final result)  Result time 04/05/18 16:33:19    Final result by Mis Mccray MD (04/05/18 16:33:19)                 Impression:      Bibasilar patchy areas of increased attenuation concerning for subsegmental areas of airspace disease considering pneumonia or aspiration.    Cardiomegaly.      Electronically signed by: Mis Mccray MD  Date:    04/05/2018  Time:    16:33             Narrative:    EXAMINATION:  XR CHEST AP PORTABLE    CLINICAL  HISTORY:  Sepsis;    TECHNIQUE:  Single frontal view of the chest was performed.    COMPARISON:  01/11/2018    FINDINGS:  There is a cardio stimulator device in the left upper chest with a single lead the.  Sternotomy wires.  The cardiac silhouette is enlarged.  Patchy areas of increased attenuation at the bilateral lung bases concerning for areas of possible pneumonia or aspiration, it is new compared to the prior exam.  No pleural effusion.  No pneumothorax.  Atherosclerotic changes of the aorta.                                Current Medications:     Infusions:   Amino acid 4.25% - dextrose 10% (CLINIMIX-E) solution with additives (1L provides 42.5 gm AA, 100 gm CHO (340 kcal/L dextrose), Na 35, K 30, Mg 5, Ca 4.5, Acetate 70, Cl 39, Phos 15) 85 mL/hr at 04/13/18 1603        Scheduled:   albuterol-ipratropium 2.5mg-0.5mg/3mL  3 mL Nebulization Q4H    clindamycin (CLEOCIN) IVPB  900 mg Intravenous Q8H    levothyroxine  50 mcg Intravenous Daily    metoprolol  5 mg Intravenous Q12H    mupirocin   Topical (Top) Daily    sodium chloride 0.9%  3 mL Intravenous Q8H        PRN:  albuterol-ipratropium 2.5mg-0.5mg/3mL, hydrALAZINE, morphine    Antibiotics and Day Number of Therapy:  S/p 3days cipro  S/p vanc and cefepime: 4/5 to 4/11  Clindamycin: 4/6 to present      Assessment:     Zeyad Woodard is a 89 y.o.male with multiple comorbidities admitted with HCAP and thrombocytopenia     Plan:     Acute encephalopathy  - Began 4/9, became more lethargic and confused, also with visual halluciations  No focal deficits and CT head on 4/10  without acute findings  Likely 2/2 delirium in setting of sepsis--monitoring    Dysphagia  -2/2 AMS rather than functional limitation  SLP following--currently aspiration concerns--initially pureed diet with honey thick liquids, now NPO 2/2 AMS   -Started PPN 4/11  SLP will continue to assess swallowing as mental status improves    Acute hypoxic respiratory failure and severe  sepsis 2/2 HCAP  -1 week progressive productive cough, fever, SOB  On admit, satting well on 5L O2 via NC, temp 103, CXR with bibasilar consolidations, lactate 2.5  BP stable   - Modified barium swallow study 1/18 w/o aspiration--re-evaluated by speech-not safe for thin liquids-honey thick  -In ED, given 2.3L NS bolus, started vanc, cipro, cefepime--penicillin allergy was remote and only hives  -Lactate normalized, procal 21  Blood cx with coag negative staph 2/4 bottles--likely contaminant  Duonebs, CPT  - s/p cipro, vanc, and cefepime (4/5-4/10), clinda 4/6-present--expected 14 day total course     Left 1st toe erosion  -Bruising, erythema, swelling 2/2 recent trauma  H/o neuropathy, follows with podiatry  Xray on admission suggestive of early osteo, Podiatry was consulted, removed toe nail, thinks no osteo  - s/p vanc, cefepime, cipro (4/5-4/10); clinda 4/6-present  Wound cx NGTD  - continue routine wound care.     ELIZABET on CKD 3A, resolved  -On admit, Cr 1.5, GFR 40  Baseline Cr 1.2, GFR 60  -Likely prerenal in setting of sepsis  Returned to baseline     Elevated troponin, resolved  -On admit, trop 0.047  Chronically elevated trop to 0.03-0.06. Also, possibly 2/2 demand in setting of sepsis and CKD  EKG ventricularly paced  -Trended troponins--peaked 0.133->0.125     Chronic thrombocytopenia, stable  -Baseline platelets ~ 90  Follows with Dr. Jeff as outpt  -On admit, platelets 44-likely exacerbated by sepsis  Scatter petechiae, contusions in setting of recent fall but no active bleeding  -PLT improving daily, holding ASA and pharmocologic DVT ppx     Chronic HFpEF  -TTE 12/17-EF 55%, grade 2 diastolic dysfunction, PA pressure 35  -On admit, Trop 0.047,   CXR with b/l consolidations 2/2 PNA rather than volume overload--given fluids per sepsis protocol  Monitor closely for volume overload  -Repeat TTE--LV EF 55%, no diastolic d/f, PA pressure 33     A fib and SSS s/p pacemaker  -On admit, in  ventricularly paced rhythm  Not on home anticoagulation 2/2 h/o GI bleeds. Also falls frequently  -Converted to afib with RVR 4/11--responded to IV metoprolol  Holding PO meds 2/2 dysphagia, continue IV metoprolol 5 BID     Anemia of chronic disease, h/o CHRISTIANO and B12 deficiency  -On admit, Hbg 9.4--at baseline  1/18--w/u c/w AoCD--ferritin, B12 wnl  -Follows with Dr. Jeff--continue home iron, B12     Mild transaminitis, resolved  -On admit, , AST 61, ALT 50  Consistent with baseline  -Negative Hep C Ab 2012--defer repeating as patient would be poor treatment candidate given age/comorbidities     HTN  -On admit, BP low/normal  Held home coreg in setting of sepsis--resumed 2/2 HTN  -Holding PO meds until swallowing improves, on scheduled and prn IV antihypertensives     Hypothyroidism  -No acute issues  Continue home synthroid     BPH  -No acute issues  Continue home flomax, finasteride  - Holding PO meds until swallowing improves     Frequent mechanical falls  -Per chart review  Fell 3 days PTA  -Per family, previously had HH PT/OT that has been discontinued/completed  Only mobile via wheelchair  -Consulted PT/OT--recommended SNF vs return to group home with PT     Dementia  -Baseline--Depending for ADLs, lives at [facility] group home and has supervision via aide and nurse  -A&O to person, place, date; not situation  Continue home remeron, aricept  -Holding PO meds until swallowing improves     Code Status:      Full     Fluids: None    Diet: NPO, PPN since 4/11    Ppx: SCDs; holding chemical ppx in setting of thrombocytopenia    Dispo: pending clinical improvement in mentation vs goals of care decision. If mentation/swallowing fail to improve, will likely need PEG vs hospice. Discussions ongoing with palliative care.    Chuckie Isaacs MD  [facility] Internal Medicine HO-I  U IM Service Team A    [facility] Medicine Hospitalist Pager numbers:   [facility] Hospitalist Medicine Team A (Kinsey/Yumiko): [phone]  [facility]  Hospitalist Medicine Team B (Tyrese/Bibi):  [phone]

## 2018-04-14 NOTE — PLAN OF CARE
Problem: Patient Care Overview  Goal: Plan of Care Review  Outcome: Ongoing (interventions implemented as appropriate)  Pt on RA spo2 98%.  No respiratory distress.  Will continue to monitor.

## 2018-04-14 NOTE — PLAN OF CARE
Problem: Patient Care Overview  Goal: Plan of Care Review  Outcome: Ongoing (interventions implemented as appropriate)  Patient is awake, alert and oriented to self and place. On heart monitoring running Atrial Fibrillation at 90s. Less coughing than last night. Mepilex on sacrum (stage1) dry and intact. Bilateral knees with Mepilex. Left Medial ankle with dry  wound open to air. Left great toe with rolled gauze, dry and intact. Turn q2. Bed with overlay mattress, bed in lowest position, bed alarms on, and call light within reach. 2014H Patient is moaning, when asked if in pain he verbalized that he is hurting all over. Morphine given, verbalized relief of pain after few hours.  Will continue to monitor.

## 2018-04-14 NOTE — PT/OT/SLP PROGRESS
Physical Therapy Treatment    Patient Name:  Zeyad Woodard   MRN:  [id number]    Recommendations:     Discharge Recommendations:   (TBD)   Discharge Equipment Recommendations: none   Barriers to discharge: None    Assessment:     Zeyad Woodard is a 89 y.o. male admitted with a medical diagnosis of HCAP (healthcare-associated pneumonia).  He presents with the following impairments/functional limitations:  weakness, impaired endurance, impaired self care skills, impaired functional mobilty, gait instability, impaired balance, impaired cognition, decreased coordination, decreased upper extremity function, decreased lower extremity function, decreased safety awareness, decreased ROM, pain, impaired skin, impaired cardiopulmonary response to activity. Pt still with some confusion this treatment. Refused to eat breakfast and on 1st attempt by nurse he also refused medication. Eventually agreed and took medications when administered by nurse. Only received PROM/AAROM secondary to pt going in/out of confused state. Will continue PT to address all impairments listed above.    Rehab Prognosis:  poor; patient would benefit from acute skilled PT services to address these deficits and reach maximum level of function.      Recent Surgery: * No surgery found *      Plan:     During this hospitalization, patient to be seen 6 x/week to address the above listed problems via gait training, therapeutic activities, therapeutic exercises  · Plan of Care Expires:  05/06/18   Plan of Care Reviewed with: patient    Subjective     Communicated with nurse prior to session.  Patient found supine with pt attempting to get out of bed with BLE's hanging of the EOB upon PT entry to room, agreeable to treatment.      Chief Complaint: None expressed  Patient comments/goals: None expressed  Pain/Comfort:  · Pain Rating 1:  (Did not express any pain prior to treatment beginning)    Patients cultural, spiritual, Mosque conflicts given the  current situation: None stated to PTA    Objective:     Patient found with: oxygen, peripheral IV, telemetry     General Precautions: Standard, aphasia, NPO   Orthopedic Precautions:N/A   Braces: N/A     Functional Mobility:  · Bed Mobility:     · Rolling Left:  minimum assistance, moderate assistance and with constant verbal cueing for instructions  · Rolling Right: minimum assistance, moderate assistance and  with constant verbal cueing for instructions      AM-PAC 6 CLICK MOBILITY  Turning over in bed (including adjusting bedclothes, sheets and blankets)?: 2  Sitting down on and standing up from a chair with arms (e.g., wheelchair, bedside commode, etc.): 1  Moving from lying on back to sitting on the side of the bed?: 2  Moving to and from a bed to a chair (including a wheelchair)?: 2  Need to walk in hospital room?: 1  Climbing 3-5 steps with a railing?: 1  Total Score: 9       Therapeutic Activities and Exercises:   Pt received PROM/AAROM to BLE's in all available planes of motion 1 x 10 reps. AROM BUE's 1 x 10 reps consisting of shoulder flexion, shoulder abduction/adduction, and elbow flexion(bicep curls). Received gentle manual stretching to bilateral gastoc/soleus complex 1 x 5 reps with an approximate 5-10 second hold.    Patient left supine with all lines intact, call button in reach, bed alarm on and nursing notified..    GOALS:    Physical Therapy Goals        Problem: Physical Therapy Goal    Goal Priority Disciplines Outcome Goal Variances Interventions   Physical Therapy Goal     PT/OT, PT Ongoing (interventions implemented as appropriate)     Description:  Goals to be met by: 2018     Patient will increase functional independence with mobility by performin. Supine to sit with Stand-by Assistance  2. Sit to stand transfer with Stand-by Assistance  3. Bed to chair transfer with Stand-by Assistance using Rolling Walker  4. Gait  x 20 feet with Minimal Assistance using Rolling Walker.                        Time Tracking:     PT Received On: 04/14/18  PT Start Time: 0930     PT Stop Time: 0950  PT Total Time (min): 20 min     Billable Minutes: Therapeutic Activity   20    Treatment Type: Treatment  PT/PTA: PTA     PTA Visit Number: 1     Tahira Guzman, SOTO  04/14/2018

## 2018-04-14 NOTE — PLAN OF CARE
Problem: Patient Care Overview  Goal: Plan of Care Review  Outcome: Ongoing (interventions implemented as appropriate)  Patient on RA, no respiratory distress noted. The proper method of use, as well as anticipated side effects, of this aerosol treatment are discussed and demonstrated to the patient. CPT is performed by hand. Will continue to monitor.

## 2018-04-15 LAB
ALBUMIN SERPL BCP-MCNC: 2.9 G/DL
ALP SERPL-CCNC: 108 U/L
ALT SERPL W/O P-5'-P-CCNC: 25 U/L
ANION GAP SERPL CALC-SCNC: 10 MMOL/L
AST SERPL-CCNC: 28 U/L
BASOPHILS # BLD AUTO: 0.03 K/UL
BASOPHILS NFR BLD: 0.1 %
BILIRUB SERPL-MCNC: 0.7 MG/DL
BUN SERPL-MCNC: 37 MG/DL
CALCIUM SERPL-MCNC: 9.1 MG/DL
CHLORIDE SERPL-SCNC: 102 MMOL/L
CO2 SERPL-SCNC: 27 MMOL/L
CREAT SERPL-MCNC: 1.3 MG/DL
DIFFERENTIAL METHOD: ABNORMAL
EOSINOPHIL # BLD AUTO: 0.3 K/UL
EOSINOPHIL NFR BLD: 1.3 %
ERYTHROCYTE [DISTWIDTH] IN BLOOD BY AUTOMATED COUNT: 15.6 %
EST. GFR  (AFRICAN AMERICAN): 56 ML/MIN/1.73 M^2
EST. GFR  (NON AFRICAN AMERICAN): 48 ML/MIN/1.73 M^2
GLUCOSE SERPL-MCNC: 152 MG/DL
HCT VFR BLD AUTO: 25.7 %
HGB BLD-MCNC: 8.3 G/DL
LYMPHOCYTES # BLD AUTO: 11.8 K/UL
LYMPHOCYTES NFR BLD: 57.9 %
MAGNESIUM SERPL-MCNC: 2.1 MG/DL
MCH RBC QN AUTO: 28.7 PG
MCHC RBC AUTO-ENTMCNC: 32.3 G/DL
MCV RBC AUTO: 89 FL
MONOCYTES # BLD AUTO: 0.8 K/UL
MONOCYTES NFR BLD: 3.8 %
NEUTROPHILS # BLD AUTO: 7.4 K/UL
NEUTROPHILS NFR BLD: 36.9 %
PHOSPHATE SERPL-MCNC: 4 MG/DL
PLATELET # BLD AUTO: 82 K/UL
PLATELET BLD QL SMEAR: ABNORMAL
PMV BLD AUTO: 10.6 FL
POTASSIUM SERPL-SCNC: 4 MMOL/L
PROT SERPL-MCNC: 6.5 G/DL
RBC # BLD AUTO: 2.89 M/UL
SODIUM SERPL-SCNC: 139 MMOL/L
WBC # BLD AUTO: 20.37 K/UL

## 2018-04-15 PROCEDURE — 84100 ASSAY OF PHOSPHORUS: CPT

## 2018-04-15 PROCEDURE — 25000003 PHARM REV CODE 250: Performed by: INTERNAL MEDICINE

## 2018-04-15 PROCEDURE — 63600175 PHARM REV CODE 636 W HCPCS: Performed by: STUDENT IN AN ORGANIZED HEALTH CARE EDUCATION/TRAINING PROGRAM

## 2018-04-15 PROCEDURE — A4216 STERILE WATER/SALINE, 10 ML: HCPCS | Performed by: STUDENT IN AN ORGANIZED HEALTH CARE EDUCATION/TRAINING PROGRAM

## 2018-04-15 PROCEDURE — 85027 COMPLETE CBC AUTOMATED: CPT

## 2018-04-15 PROCEDURE — 25000003 PHARM REV CODE 250: Performed by: STUDENT IN AN ORGANIZED HEALTH CARE EDUCATION/TRAINING PROGRAM

## 2018-04-15 PROCEDURE — 80053 COMPREHEN METABOLIC PANEL: CPT

## 2018-04-15 PROCEDURE — 25000242 PHARM REV CODE 250 ALT 637 W/ HCPCS: Performed by: STUDENT IN AN ORGANIZED HEALTH CARE EDUCATION/TRAINING PROGRAM

## 2018-04-15 PROCEDURE — 94640 AIRWAY INHALATION TREATMENT: CPT

## 2018-04-15 PROCEDURE — 94668 MNPJ CHEST WALL SBSQ: CPT

## 2018-04-15 PROCEDURE — 11000001 HC ACUTE MED/SURG PRIVATE ROOM

## 2018-04-15 PROCEDURE — S0077 INJECTION, CLINDAMYCIN PHOSP: HCPCS | Performed by: STUDENT IN AN ORGANIZED HEALTH CARE EDUCATION/TRAINING PROGRAM

## 2018-04-15 PROCEDURE — 83735 ASSAY OF MAGNESIUM: CPT

## 2018-04-15 PROCEDURE — 25000003 PHARM REV CODE 250: Performed by: RADIOLOGY

## 2018-04-15 PROCEDURE — 36415 COLL VENOUS BLD VENIPUNCTURE: CPT

## 2018-04-15 PROCEDURE — 94761 N-INVAS EAR/PLS OXIMETRY MLT: CPT

## 2018-04-15 PROCEDURE — 85007 BL SMEAR W/DIFF WBC COUNT: CPT

## 2018-04-15 RX ORDER — ONDANSETRON 2 MG/ML
4 INJECTION INTRAMUSCULAR; INTRAVENOUS EVERY 6 HOURS PRN
Status: DISCONTINUED | OUTPATIENT
Start: 2018-04-15 | End: 2018-04-20 | Stop reason: HOSPADM

## 2018-04-15 RX ADMIN — IPRATROPIUM BROMIDE AND ALBUTEROL SULFATE 3 ML: .5; 3 SOLUTION RESPIRATORY (INHALATION) at 04:04

## 2018-04-15 RX ADMIN — METOPROLOL TARTRATE 5 MG: 5 INJECTION, SOLUTION INTRAVENOUS at 09:04

## 2018-04-15 RX ADMIN — IPRATROPIUM BROMIDE AND ALBUTEROL SULFATE 3 ML: .5; 3 SOLUTION RESPIRATORY (INHALATION) at 12:04

## 2018-04-15 RX ADMIN — CLINDAMYCIN IN 5 PERCENT DEXTROSE 900 MG: 18 INJECTION, SOLUTION INTRAVENOUS at 01:04

## 2018-04-15 RX ADMIN — SODIUM CHLORIDE, PRESERVATIVE FREE 3 ML: 5 INJECTION INTRAVENOUS at 09:04

## 2018-04-15 RX ADMIN — LEUCINE, PHENYLALANINE, LYSINE, METHIONINE, ISOLEUCINE, VALINE, HISTIDINE, THREONINE, TRYPTOPHAN, ALANINE, GLYCINE, ARGININE, PROLINE, SERINE, TYROSINE, SODIUM ACETATE, DIBASIC POTASSIUM PHOSPHATE, MAGNESIUM CHLORIDE, SODIUM CHLORIDE, CALCIUM CHLORIDE, DEXTROSE
311; 238; 247; 170; 255; 247; 204; 179; 77; 880; 438; 489; 289; 213; 17; 297; 261; 51; 77; 33; 10 INJECTION INTRAVENOUS at 05:04

## 2018-04-15 RX ADMIN — MORPHINE SULFATE 1 MG: 4 INJECTION INTRAVENOUS at 04:04

## 2018-04-15 RX ADMIN — CLINDAMYCIN IN 5 PERCENT DEXTROSE 900 MG: 18 INJECTION, SOLUTION INTRAVENOUS at 05:04

## 2018-04-15 RX ADMIN — LEVOTHYROXINE SODIUM ANHYDROUS 50 MCG: 100 INJECTION, POWDER, LYOPHILIZED, FOR SOLUTION INTRAVENOUS at 09:04

## 2018-04-15 RX ADMIN — MORPHINE SULFATE 1 MG: 4 INJECTION INTRAVENOUS at 05:04

## 2018-04-15 RX ADMIN — SODIUM CHLORIDE, PRESERVATIVE FREE 3 ML: 5 INJECTION INTRAVENOUS at 02:04

## 2018-04-15 RX ADMIN — SODIUM CHLORIDE, PRESERVATIVE FREE 3 ML: 5 INJECTION INTRAVENOUS at 06:04

## 2018-04-15 RX ADMIN — IPRATROPIUM BROMIDE AND ALBUTEROL SULFATE 3 ML: .5; 3 SOLUTION RESPIRATORY (INHALATION) at 08:04

## 2018-04-15 RX ADMIN — CLINDAMYCIN IN 5 PERCENT DEXTROSE 900 MG: 18 INJECTION, SOLUTION INTRAVENOUS at 09:04

## 2018-04-15 RX ADMIN — IPRATROPIUM BROMIDE AND ALBUTEROL SULFATE 3 ML: .5; 3 SOLUTION RESPIRATORY (INHALATION) at 07:04

## 2018-04-15 RX ADMIN — MUPIROCIN: 20 OINTMENT TOPICAL at 09:04

## 2018-04-15 NOTE — PROGRESS NOTES
[facility] IM Resident Progress Note    Subjective:      Patient interactive and alert today. Answering all questions and still c/o frequent cough. Is not oriented to year.    Objective:   Last 24 Hour Vital Signs:  BP  Min: 139/65  Max: 191/81  Temp  Av.3 °F (36.3 °C)  Min: 96.6 °F (35.9 °C)  Max: 99 °F (37.2 °C)  Pulse  Av  Min: 73  Max: 94  Resp  Av.5  Min: 14  Max: 18  SpO2  Av.9 %  Min: 95 %  Max: 97 %  I/O last 3 completed shifts:  In: 1070 [IV Piggyback:150]  Out: 598 [Urine:598]    Physical Examination:  General:           Alert and awake  Head:               Normocephalic and atraumatic  Eyes:               anicteric sclera and clear conjunctivae  Mouth:             Oropharynx clear and without exudate; moist mucous membranes  Cardio:            RRR, Grade 2/6 holosystolic murmur loudest at LUSB  Resp:               Scattered rhonchi, intermittent dry cough  Abdom:            Soft, NTND with normoactive bowel sounds  Extrem:            Left 1st toe wrapped with clean bandages  Skin:                Left knee abrasion-healing well since admit; diffuse countusions and scatter   purpura on extremities and chest-stable since admit  Pulses:            2+ and symmetric distally  Neuro:  A&O to person, place, not year; no focal deficits    Laboratory:  Laboratory Data Reviewed: yes  Pertinent Findings:    Recent Labs  Lab 18  0736 18  0646 04/15/18  0522 04/15/18  0523   WBC 20.33* 18.12*  --  20.37*   HGB 8.5* 8.0*  --  8.3*   HCT 25.7* 24.5*  --  25.7*   * 94*  --  82*   MCV 87 88  --  89   RDW 15.1* 15.4*  --  15.6*    138 139  --    K 3.7 3.8 4.0  --     103 102  --    CO2 30* 29 27  --    BUN 32* 34* 37*  --    CREATININE 1.2 1.3 1.3  --    * 157* 152*  --    PROT 6.4 6.2 6.5  --    ALBUMIN 2.8* 2.7* 2.9*  --    BILITOT 0.6 0.6 0.7  --    AST 32 29 28  --    ALKPHOS 121 111 108  --    ALT 28 25 25  --        Microbiology Data Reviewed: yes  Pertinent  Findings:  Blood cx (4/5/18): 1/8 positive for coag negative staph  Blood cx (4/7/18) x6 -  all negative  Urine cx (4/7): negative  Respiratory cx: stain-G+ cocci, cx negative  Wound cx: NGTD    Other Results:  EKG (my interpretation):none new    Radiology Data Reviewed: yes  Pertinent Findings:    Imaging Results          X-Ray Toe 2 or More Views Left (Final result)  Result time 04/05/18 19:14:09    Final result by Jurgen Gaston MD (04/05/18 19:14:09)                 Impression:      Erosive changes involving the left 1st metacarpal head and base of the 1st proximal phalanx.  The findings suggestive of early osteomyelitis.  Contrast enhanced MRI may be obtained for further evaluation.    Subtle erosive changes involving the head of the left 5th metacarpal.  This can also be assessed on the MRI examination.      Electronically signed by: Jurgen Gaston MD  Date:    04/05/2018  Time:    19:14             Narrative:    EXAMINATION:  XR TOE 2 OR MORE VIEWS LEFT    CLINICAL HISTORY:  swelling;    TECHNIQUE:  Three views of the left toes were performed    COMPARISON:  None.    FINDINGS:  There is diffuse demineralization of the osseous structures.  There are erosive changes involving the head of the 1st metatarsal and the base of the 1st proximal phalanx.  Subtle erosive changes also noted involving the head of the left 5th metacarpal.  There is no evidence of a fracture.  There is joint space narrowing throughout the visualized left forefoot.  There is no evidence of a dislocation.  There are extensive vascular calcifications.                               X-Ray Chest AP Portable (Final result)  Result time 04/05/18 16:33:19    Final result by Mis Mccray MD (04/05/18 16:33:19)                 Impression:      Bibasilar patchy areas of increased attenuation concerning for subsegmental areas of airspace disease considering pneumonia or aspiration.    Cardiomegaly.      Electronically signed by: Mis  MD Shazia  Date:    04/05/2018  Time:    16:33             Narrative:    EXAMINATION:  XR CHEST AP PORTABLE    CLINICAL HISTORY:  Sepsis;    TECHNIQUE:  Single frontal view of the chest was performed.    COMPARISON:  01/11/2018    FINDINGS:  There is a cardio stimulator device in the left upper chest with a single lead the.  Sternotomy wires.  The cardiac silhouette is enlarged.  Patchy areas of increased attenuation at the bilateral lung bases concerning for areas of possible pneumonia or aspiration, it is new compared to the prior exam.  No pleural effusion.  No pneumothorax.  Atherosclerotic changes of the aorta.                                Current Medications:     Infusions:   Amino acid 4.25% - dextrose 10% (CLINIMIX-E) solution with additives (1L provides 42.5 gm AA, 100 gm CHO (340 kcal/L dextrose), Na 35, K 30, Mg 5, Ca 4.5, Acetate 70, Cl 39, Phos 15) 85 mL/hr at 04/14/18 1649        Scheduled:   albuterol-ipratropium 2.5mg-0.5mg/3mL  3 mL Nebulization Q4H    clindamycin (CLEOCIN) IVPB  900 mg Intravenous Q8H    levothyroxine  50 mcg Intravenous Daily    metoprolol  5 mg Intravenous Q12H    mupirocin   Topical (Top) Daily    sodium chloride 0.9%  3 mL Intravenous Q8H        PRN:  albuterol-ipratropium 2.5mg-0.5mg/3mL, hydrALAZINE, morphine    Antibiotics and Day Number of Therapy:  S/p 3days cipro  S/p vanc and cefepime: 4/5 to 4/11  Clindamycin: 4/6 to present      Assessment:     Zeyad Woodard is a 89 y.o.male with multiple comorbidities admitted with HCAP and thrombocytopenia     Plan:     Acute encephalopathy  - Began 4/9, became more lethargic and confused, also with visual halluciations  No focal deficits and CT head on 4/10  without acute findings  Likely 2/2 delirium in setting of sepsis--monitoring    Dysphagia  -2/2 AMS rather than functional limitation  SLP following--currently aspiration concerns--initially pureed diet with honey thick liquids, now NPO 2/2 AMS   -Started  PPN 4/11  SLP will continue to assess swallowing as mental status improves    Acute hypoxic respiratory failure and severe sepsis 2/2 HCAP  -1 week progressive productive cough, fever, SOB  On admit, satting well on 5L O2 via NC, temp 103, CXR with bibasilar consolidations, lactate 2.5  BP stable   - Modified barium swallow study 1/18 w/o aspiration--re-evaluated by speech-not safe for thin liquids-honey thick  -In ED, given 2.3L NS bolus, started vanc, cipro, cefepime--penicillin allergy was remote and only hives  -Lactate normalized, procal 21  Blood cx with coag negative staph 2/4 bottles--likely contaminant  Duonebs, CPT  - s/p cipro, vanc, and cefepime (4/5-4/10), clinda 4/6-present--expected 14 day total course     Left 1st toe erosion  -Bruising, erythema, swelling 2/2 recent trauma  H/o neuropathy, follows with podiatry  Xray on admission suggestive of early osteo, Podiatry was consulted, removed toe nail, thinks no osteo  - s/p vanc, cefepime, cipro (4/5-4/10); clinda 4/6-present  Wound cx NGTD  - continue routine wound care.     ELIZABET on CKD 3A, resolved  -On admit, Cr 1.5, GFR 40  Baseline Cr 1.2, GFR 60  -Likely prerenal in setting of sepsis  Returned to baseline     Elevated troponin, resolved  -On admit, trop 0.047  Chronically elevated trop to 0.03-0.06. Also, possibly 2/2 demand in setting of sepsis and CKD  EKG ventricularly paced  -Trended troponins--peaked 0.133->0.125     Chronic thrombocytopenia, stable  -Baseline platelets ~ 90  Follows with Dr. Jeff as outpt  -On admit, platelets 44-likely exacerbated by sepsis  Scatter petechiae, contusions in setting of recent fall but no active bleeding  -PLT improving daily, holding ASA and pharmocologic DVT ppx     Chronic HFpEF  -TTE 12/17-EF 55%, grade 2 diastolic dysfunction, PA pressure 35  -On admit, Trop 0.047,   CXR with b/l consolidations 2/2 PNA rather than volume overload--given fluids per sepsis protocol  Monitor closely  for volume overload  -Repeat TTE--LV EF 55%, no diastolic d/f, PA pressure 33     A fib and SSS s/p pacemaker  -On admit, in ventricularly paced rhythm  Not on home anticoagulation 2/2 h/o GI bleeds. Also falls frequently  -Converted to afib with RVR 4/11--responded to IV metoprolol  Holding PO meds 2/2 dysphagia, continue IV metoprolol 5 BID     Anemia of chronic disease, h/o CHRISTIANO and B12 deficiency  -On admit, Hbg 9.4--at baseline  1/18--w/u c/w AoCD--ferritin, B12 wnl  -Follows with Dr. Jeff--continue home iron, B12     Mild transaminitis, resolved  -On admit, , AST 61, ALT 50  Consistent with baseline  -Negative Hep C Ab 2012--defer repeating as patient would be poor treatment candidate given age/comorbidities     HTN  -On admit, BP low/normal  Held home coreg in setting of sepsis--resumed 2/2 HTN  -Holding PO meds until swallowing improves, on scheduled and prn IV antihypertensives     Hypothyroidism  -No acute issues  Continue home synthroid     BPH  -No acute issues  Continue home flomax, finasteride  - Holding PO meds until swallowing improves     Frequent mechanical falls  -Per chart review  Fell 3 days PTA  -Per family, previously had HH PT/OT that has been discontinued/completed  Only mobile via wheelchair  -Consulted PT/OT--recommended SNF vs return to group home with PT     Dementia  -Baseline--Depending for ADLs, lives at [facility] group home and has supervision via aide and nurse  -A&O to person, place, date; not situation  Continue home remeron, aricept  -Holding PO meds until swallowing improves     Code Status:      Full     Fluids: None    Diet: NPO, PPN since 4/11    Ppx: SCDs; holding chemical ppx in setting of thrombocytopenia    Dispo: pending clinical improvement in mentation vs goals of care decision. If mentation/swallowing fail to improve, will likely need PEG vs hospice. Discussions ongoing with palliative care.    Clifton Carty MD  [facility] Internal Medicine  HO-I  U IM Service Team A    [facility] Medicine Hospitalist Pager numbers:   [facility] Hospitalist Medicine Team A (Kinsey/Yumiko): [phone]  [facility] Hospitalist Medicine Team B (Tyrese/Bibi):  [phone]

## 2018-04-15 NOTE — PLAN OF CARE
Problem: Patient Care Overview  Goal: Plan of Care Review  Outcome: Ongoing (interventions implemented as appropriate)  Patient is awake, alert and oriented to self and place. On heart monitoring running Atrial Fibrillation. Mepilex on sacrum (stage1) dry and intact. Bilateral knees with Mepilex. Left Medial ankle with dry  wound open to air. Left great toe with Mepilex, dry and intact. Turn q2. Bed with overlay mattress, bed in lowest position, bed alarms on, and call light within reach. Will continue to monitor.

## 2018-04-15 NOTE — PLAN OF CARE
Problem: Patient Care Overview  Goal: Plan of Care Review  Outcome: Ongoing (interventions implemented as appropriate)  No distress noted. Will continue to monitor  Patient tolerated nebulizer treatment well.

## 2018-04-16 LAB
ALBUMIN SERPL BCP-MCNC: 2.7 G/DL
ALP SERPL-CCNC: 103 U/L
ALT SERPL W/O P-5'-P-CCNC: 27 U/L
ANION GAP SERPL CALC-SCNC: 7 MMOL/L
AST SERPL-CCNC: 34 U/L
BACTERIA SPEC ANAEROBE CULT: NORMAL
BASOPHILS # BLD AUTO: ABNORMAL K/UL
BASOPHILS NFR BLD: 0 %
BILIRUB SERPL-MCNC: 0.6 MG/DL
BUN SERPL-MCNC: 40 MG/DL
CALCIUM SERPL-MCNC: 8.7 MG/DL
CHLORIDE SERPL-SCNC: 103 MMOL/L
CO2 SERPL-SCNC: 27 MMOL/L
CREAT SERPL-MCNC: 1.3 MG/DL
DIFFERENTIAL METHOD: ABNORMAL
EOSINOPHIL # BLD AUTO: ABNORMAL K/UL
EOSINOPHIL NFR BLD: 1 %
ERYTHROCYTE [DISTWIDTH] IN BLOOD BY AUTOMATED COUNT: 15.7 %
EST. GFR  (AFRICAN AMERICAN): 56 ML/MIN/1.73 M^2
EST. GFR  (NON AFRICAN AMERICAN): 48 ML/MIN/1.73 M^2
GLUCOSE SERPL-MCNC: 146 MG/DL
HCT VFR BLD AUTO: 24.2 %
HGB BLD-MCNC: 7.8 G/DL
LYMPHOCYTES # BLD AUTO: ABNORMAL K/UL
LYMPHOCYTES NFR BLD: 60 %
MAGNESIUM SERPL-MCNC: 1.9 MG/DL
MCH RBC QN AUTO: 28.8 PG
MCHC RBC AUTO-ENTMCNC: 32.2 G/DL
MCV RBC AUTO: 89 FL
MONOCYTES # BLD AUTO: ABNORMAL K/UL
MONOCYTES NFR BLD: 1 %
MYELOCYTES NFR BLD MANUAL: 1 %
NEUTROPHILS NFR BLD: 37 %
PHOSPHATE SERPL-MCNC: 4.3 MG/DL
PLATELET # BLD AUTO: 82 K/UL
PLATELET BLD QL SMEAR: ABNORMAL
PMV BLD AUTO: 10.4 FL
POTASSIUM SERPL-SCNC: 4.4 MMOL/L
PROT SERPL-MCNC: 6.1 G/DL
RBC # BLD AUTO: 2.71 M/UL
SODIUM SERPL-SCNC: 137 MMOL/L
WBC # BLD AUTO: 17.96 K/UL

## 2018-04-16 PROCEDURE — 94668 MNPJ CHEST WALL SBSQ: CPT

## 2018-04-16 PROCEDURE — 25000003 PHARM REV CODE 250: Performed by: INTERNAL MEDICINE

## 2018-04-16 PROCEDURE — 80053 COMPREHEN METABOLIC PANEL: CPT

## 2018-04-16 PROCEDURE — 97530 THERAPEUTIC ACTIVITIES: CPT

## 2018-04-16 PROCEDURE — 25000003 PHARM REV CODE 250: Performed by: STUDENT IN AN ORGANIZED HEALTH CARE EDUCATION/TRAINING PROGRAM

## 2018-04-16 PROCEDURE — 94761 N-INVAS EAR/PLS OXIMETRY MLT: CPT

## 2018-04-16 PROCEDURE — 99232 SBSQ HOSP IP/OBS MODERATE 35: CPT | Mod: ,,, | Performed by: NURSE PRACTITIONER

## 2018-04-16 PROCEDURE — 36415 COLL VENOUS BLD VENIPUNCTURE: CPT

## 2018-04-16 PROCEDURE — 85027 COMPLETE CBC AUTOMATED: CPT

## 2018-04-16 PROCEDURE — 99232 SBSQ HOSP IP/OBS MODERATE 35: CPT | Mod: ,,, | Performed by: INTERNAL MEDICINE

## 2018-04-16 PROCEDURE — 63600175 PHARM REV CODE 636 W HCPCS: Performed by: STUDENT IN AN ORGANIZED HEALTH CARE EDUCATION/TRAINING PROGRAM

## 2018-04-16 PROCEDURE — S0077 INJECTION, CLINDAMYCIN PHOSP: HCPCS | Performed by: STUDENT IN AN ORGANIZED HEALTH CARE EDUCATION/TRAINING PROGRAM

## 2018-04-16 PROCEDURE — 25000242 PHARM REV CODE 250 ALT 637 W/ HCPCS: Performed by: STUDENT IN AN ORGANIZED HEALTH CARE EDUCATION/TRAINING PROGRAM

## 2018-04-16 PROCEDURE — 94640 AIRWAY INHALATION TREATMENT: CPT

## 2018-04-16 PROCEDURE — 97535 SELF CARE MNGMENT TRAINING: CPT

## 2018-04-16 PROCEDURE — A4216 STERILE WATER/SALINE, 10 ML: HCPCS | Performed by: STUDENT IN AN ORGANIZED HEALTH CARE EDUCATION/TRAINING PROGRAM

## 2018-04-16 PROCEDURE — 97803 MED NUTRITION INDIV SUBSEQ: CPT

## 2018-04-16 PROCEDURE — 92526 ORAL FUNCTION THERAPY: CPT

## 2018-04-16 PROCEDURE — 83735 ASSAY OF MAGNESIUM: CPT

## 2018-04-16 PROCEDURE — 85007 BL SMEAR W/DIFF WBC COUNT: CPT

## 2018-04-16 PROCEDURE — 84100 ASSAY OF PHOSPHORUS: CPT

## 2018-04-16 PROCEDURE — 11000001 HC ACUTE MED/SURG PRIVATE ROOM

## 2018-04-16 RX ORDER — CLINDAMYCIN PHOSPHATE 900 MG/50ML
900 INJECTION, SOLUTION INTRAVENOUS
Status: COMPLETED | OUTPATIENT
Start: 2018-04-16 | End: 2018-04-19

## 2018-04-16 RX ORDER — MORPHINE SULFATE 4 MG/ML
1 INJECTION, SOLUTION INTRAMUSCULAR; INTRAVENOUS EVERY 4 HOURS PRN
Status: DISCONTINUED | OUTPATIENT
Start: 2018-04-16 | End: 2018-04-19

## 2018-04-16 RX ORDER — MORPHINE SULFATE 4 MG/ML
1 INJECTION, SOLUTION INTRAMUSCULAR; INTRAVENOUS EVERY 6 HOURS PRN
Status: DISCONTINUED | OUTPATIENT
Start: 2018-04-16 | End: 2018-04-16

## 2018-04-16 RX ADMIN — IPRATROPIUM BROMIDE AND ALBUTEROL SULFATE 3 ML: .5; 3 SOLUTION RESPIRATORY (INHALATION) at 04:04

## 2018-04-16 RX ADMIN — CLINDAMYCIN IN 5 PERCENT DEXTROSE 900 MG: 18 INJECTION, SOLUTION INTRAVENOUS at 04:04

## 2018-04-16 RX ADMIN — IPRATROPIUM BROMIDE AND ALBUTEROL SULFATE 3 ML: .5; 3 SOLUTION RESPIRATORY (INHALATION) at 07:04

## 2018-04-16 RX ADMIN — HYDRALAZINE HYDROCHLORIDE 10 MG: 20 INJECTION INTRAMUSCULAR; INTRAVENOUS at 05:04

## 2018-04-16 RX ADMIN — ASCORBIC ACID, VITAMIN A PALMITATE, CHOLECALCIFEROL, THIAMINE HYDROCHLORIDE, RIBOFLAVIN-5 PHOSPHATE SODIUM, PYRIDOXINE HYDROCHLORIDE, NIACINAMIDE, DEXPANTHENOL, ALPHA-TOCOPHEROL ACETATE, VITAMIN K1, FOLIC ACID, BIOTIN, CYANOCOBALAMIN: 200; 3300; 200; 6; 3.6; 6; 40; 15; 10; 150; 600; 60; 5 INJECTION, SOLUTION INTRAVENOUS at 04:04

## 2018-04-16 RX ADMIN — IPRATROPIUM BROMIDE AND ALBUTEROL SULFATE 3 ML: .5; 3 SOLUTION RESPIRATORY (INHALATION) at 12:04

## 2018-04-16 RX ADMIN — CLINDAMYCIN IN 5 PERCENT DEXTROSE 900 MG: 18 INJECTION, SOLUTION INTRAVENOUS at 08:04

## 2018-04-16 RX ADMIN — SODIUM CHLORIDE, PRESERVATIVE FREE 3 ML: 5 INJECTION INTRAVENOUS at 02:04

## 2018-04-16 RX ADMIN — METOPROLOL TARTRATE 5 MG: 5 INJECTION, SOLUTION INTRAVENOUS at 09:04

## 2018-04-16 RX ADMIN — MORPHINE SULFATE 1 MG: 4 INJECTION INTRAVENOUS at 12:04

## 2018-04-16 RX ADMIN — METOPROLOL TARTRATE 5 MG: 5 INJECTION, SOLUTION INTRAVENOUS at 08:04

## 2018-04-16 RX ADMIN — MUPIROCIN: 20 OINTMENT TOPICAL at 08:04

## 2018-04-16 RX ADMIN — MORPHINE SULFATE 1 MG: 4 INJECTION INTRAVENOUS at 01:04

## 2018-04-16 RX ADMIN — MORPHINE SULFATE 1 MG: 4 INJECTION INTRAVENOUS at 05:04

## 2018-04-16 RX ADMIN — IPRATROPIUM BROMIDE AND ALBUTEROL SULFATE 3 ML: .5; 3 SOLUTION RESPIRATORY (INHALATION) at 08:04

## 2018-04-16 RX ADMIN — SODIUM CHLORIDE, PRESERVATIVE FREE 3 ML: 5 INJECTION INTRAVENOUS at 09:04

## 2018-04-16 RX ADMIN — CLINDAMYCIN IN 5 PERCENT DEXTROSE 900 MG: 18 INJECTION, SOLUTION INTRAVENOUS at 12:04

## 2018-04-16 RX ADMIN — SODIUM CHLORIDE, PRESERVATIVE FREE 3 ML: 5 INJECTION INTRAVENOUS at 05:04

## 2018-04-16 RX ADMIN — IPRATROPIUM BROMIDE AND ALBUTEROL SULFATE 3 ML: .5; 3 SOLUTION RESPIRATORY (INHALATION) at 11:04

## 2018-04-16 RX ADMIN — LEVOTHYROXINE SODIUM ANHYDROUS 50 MCG: 100 INJECTION, POWDER, LYOPHILIZED, FOR SOLUTION INTRAVENOUS at 08:04

## 2018-04-16 NOTE — PT/OT/SLP PROGRESS
Occupational Therapy   Treatment    Name: Zeyad Woodard  MRN: [id number]  Admitting Diagnosis:  HCAP (healthcare-associated pneumonia)       Recommendations:     Discharge Recommendations:  (TBD; patient improving)  Discharge Equipment Recommendations:  none  Barriers to discharge:  None    Subjective     Communicated with: nurseDunia prior to session.  Pain/Comfort:  · Pain Rating 1: 7/10 (davila-baker faces scale)  · Location - Side 1:  (back and RUE)  · Pain Rating Post-Intervention 1: 0/10    Patients cultural, spiritual, Taoist conflicts given the current situation:  (none reported)    Objective:     Patient found with: telemetry    General Precautions: Standard, aphasia, NPO   Orthopedic Precautions:N/A   Braces: N/A     Bed Mobility:    · Patient completed Rolling/Turning to Left with  moderate assistance and with side rail  · Patient completed Scooting/Bridging with minimum assistance, total assistance and 2 persons  · Patient completed Supine to Sit with maximal assistance and 2 persons  · Patient completed Sit to Supine with maximal assistance and 2 persons     Functional Mobility/Transfers:  · Patient completed Sit <> Stand Transfer with maximal assistance and of 2 persons  with  HHA     Activities of Daily Living:  · Grooming: stand by assistance    · LB Dressing: total assistance    · Toileting: total assistance  incontinent    Patient left HOB elevated with all lines intact, call button in reach, bed alarm on, RN notified and family members present    [facility] 6 Click:  [facility] Total Score: 9    Treatment & Education:  Patient alert and with improved affect/conversation. Patient with bed mob as noted above. Sat EOB x 14 mins with SBA-CGA. See PT note for BLE therex. Patient wiped face with set-up. Oriented to self, place, year. Patient instructed in sit <> stand x 2 using Max HHA of 2 - patient able to achieve partial squat stand, with WBing to BLEs.   Education:    Assessment:   Patient with improved  alertness and participation this date. Will benefit from continued skilled OT to address functional deficits.     Zeyad Woodard is a 89 y.o. male with a medical diagnosis of HCAP (healthcare-associated pneumonia). Performance deficits affecting function are weakness, impaired endurance, impaired self care skills, impaired functional mobilty, gait instability, impaired balance, impaired cognition, decreased coordination, pain, impaired skin, edema, decreased safety awareness, impaired fine motor, decreased lower extremity function, decreased upper extremity function, impaired coordination, decreased ROM.      Rehab Prognosis:  Fair; patient would benefit from acute skilled OT services to address these deficits and reach maximum level of function.       Plan:     Patient to be seen 5 x/week to address the above listed problems via self-care/home management, therapeutic activities, therapeutic exercises  · Plan of Care Expires: 05/06/18  · Plan of Care Reviewed with: patient, family    This Plan of care has been discussed with the patient who was involved in its development and understands and is in agreement with the identified goals and treatment plan    GOALS:    Occupational Therapy Goals        Problem: Occupational Therapy Goal    Goal Priority Disciplines Outcome Interventions   Occupational Therapy Goal     OT, PT/OT Ongoing (interventions implemented as appropriate)    Description:  Goals to be met by: 05/06/18     Patient will increase functional independence with ADLs by performing:    Grooming while seated with Set-up Assistance.  Toileting from bedside commode with Moderate Assistance for hygiene and clothing management.   Supine to sit with Stand-by Assistance and use of bedrail as needed.  Toilet transfer to bedside commode with Contact Guard Assistance.  Increased functional strength to WFL for ADLs.                      Time Tracking:     OT Date of Treatment: 04/16/18  OT Start Time: 1011  OT Stop  Time: 1053  OT Total Time (min): 42 mins with PTA    Billable Minutes:Self Care/Home Management 10  Therapeutic Activity 17    ALLY Mendoza/PER  4/16/2018

## 2018-04-16 NOTE — PROGRESS NOTES
Palliative Care Daily Progress Note     Follow up visit with patient on today. He is awake and alert. No apparent distress at this time.   No family currently at bedside.      Patient seen by GI and is awaiting to speak with daughters about EGD with PEG Placement.  -if decision for hospice GI favors to foregoing PEG placement   -Patient would be high risk with pneumonia, advance age, ckd  - GI agrees with preference for enteral nutrition.    Recommendation  Continue Current medical treatment  Code status: DNR    Family will let team know on if they have decided on PEG or to go with comfort care/hospice.         Palliative Care Team will continue to follow patient to assist family with goals of care.         Thank you for the consult the opportunity to participate in Mr. Woodard's  care        Time Spent:> 50% of 60 min. in visit spent in chart review, phone discussion of goals of care with family, symptom assessment, coordination of care and emotional support           Catalina Irvin, MSN, APRN, NP-C  Palliative  Medicine  [phone]

## 2018-04-16 NOTE — PLAN OF CARE
Problem: Occupational Therapy Goal  Goal: Occupational Therapy Goal  Goals to be met by: 05/06/18     Patient will increase functional independence with ADLs by performing:    Grooming while seated with Set-up Assistance.  Toileting from bedside commode with Moderate Assistance for hygiene and clothing management.   Supine to sit with Stand-by Assistance and use of bedrail as needed.  Toilet transfer to bedside commode with Contact Guard Assistance.  Increased functional strength to WFL for ADLs.     Outcome: Ongoing (interventions implemented as appropriate)  Patient with improved alertness and participation this date. Will benefit from continued skilled OT to address functional deficits.

## 2018-04-16 NOTE — PROGRESS NOTES
[facility]  Gastroenterology  Progress Note    Patient Name: Zeyad Woodard  MRN: [id number]  Admission Date: 4/5/2018  Hospital Length of Stay: 11 days  Code Status: DNR   Attending Provider: Fer Euceda MD  Consulting Provider: Hannah Garg MD  Primary Care Physician: Booker Ricci MD  Principal Problem: HCAP (healthcare-associated pneumonia)      Subjective:     Interval History: Alertness waxes and wanes. Awake and conversive initially, sleeping soon thereafter. Family at bedside.     Review of Systems   Unable to perform ROS: Acuity of condition   Endocrine: Negative.      Objective:     Vital Signs (Most Recent):  Temp: 98.7 °F (37.1 °C) (04/16/18 1159)  Pulse: 72 (04/16/18 1228)  Resp: 14 (04/16/18 1228)  BP: (!) 169/78 (04/16/18 1159)  SpO2: 97 % (04/16/18 1228) Vital Signs (24h Range):  Temp:  [97.5 °F (36.4 °C)-99.2 °F (37.3 °C)] 98.7 °F (37.1 °C)  Pulse:  [71-90] 72  Resp:  [12-20] 14  SpO2:  [92 %-97 %] 97 %  BP: (150-190)/(67-84) 169/78     Weight: 84.7 kg (186 lb 11.7 oz) (04/12/18 1100)  Body mass index is 28.39 kg/m².      Intake/Output Summary (Last 24 hours) at 04/16/18 1535  Last data filed at 04/15/18 2230   Gross per 24 hour   Intake           125.08 ml   Output              200 ml   Net           -74.92 ml       Lines/Drains/Airways     Pressure Ulcer                 Pressure Injury 04/05/18 2254 transverse Buttocks Stage 1 10 days          Peripheral Intravenous Line                 Peripheral IV - Single Lumen 04/11/18 1653 Left Forearm 4 days         Peripheral IV - Single Lumen 04/15/18 0158 Left Antecubital 1 day                Physical Exam   Constitutional: He appears well-developed and well-nourished. No distress.   HENT:   Head: Normocephalic and atraumatic.   Eyes: Conjunctivae are normal. No scleral icterus.   Cardiovascular: Normal rate and regular rhythm.  Exam reveals no friction rub.    Pulmonary/Chest: Effort normal. No respiratory distress.    Abdominal: Soft. Bowel sounds are normal. There is no tenderness.   Nursing note and vitals reviewed.        Assessment/Plan:     Oropharyngeal dysphagia    - d/w family again  - f/u speech path, d/w today  - egd/peg tomorrow if pt/family amenable and wish to proceed            Thank you for your consult. I will follow-up with patient. Please contact us if you have any additional questions.    Hannah Garg MD  Gastroenterology  [facility]

## 2018-04-16 NOTE — ASSESSMENT & PLAN NOTE
- d/w family again  - f/u speech path, d/w today  - egd/peg tomorrow if pt/family amenable and wish to proceed

## 2018-04-16 NOTE — SUBJECTIVE & OBJECTIVE
Subjective:     Interval History: Alertness waxes and wanes. Awake and conversive initially, sleeping soon thereafter. Family at bedside.     Review of Systems   Unable to perform ROS: Acuity of condition   Endocrine: Negative.      Objective:     Vital Signs (Most Recent):  Temp: 98.7 °F (37.1 °C) (04/16/18 1159)  Pulse: 72 (04/16/18 1228)  Resp: 14 (04/16/18 1228)  BP: (!) 169/78 (04/16/18 1159)  SpO2: 97 % (04/16/18 1228) Vital Signs (24h Range):  Temp:  [97.5 °F (36.4 °C)-99.2 °F (37.3 °C)] 98.7 °F (37.1 °C)  Pulse:  [71-90] 72  Resp:  [12-20] 14  SpO2:  [92 %-97 %] 97 %  BP: (150-190)/(67-84) 169/78     Weight: 84.7 kg (186 lb 11.7 oz) (04/12/18 1100)  Body mass index is 28.39 kg/m².      Intake/Output Summary (Last 24 hours) at 04/16/18 1535  Last data filed at 04/15/18 2230   Gross per 24 hour   Intake           125.08 ml   Output              200 ml   Net           -74.92 ml       Lines/Drains/Airways     Pressure Ulcer                 Pressure Injury 04/05/18 2254 transverse Buttocks Stage 1 10 days          Peripheral Intravenous Line                 Peripheral IV - Single Lumen 04/11/18 1653 Left Forearm 4 days         Peripheral IV - Single Lumen 04/15/18 0158 Left Antecubital 1 day                Physical Exam   Constitutional: He appears well-developed and well-nourished. No distress.   HENT:   Head: Normocephalic and atraumatic.   Eyes: Conjunctivae are normal. No scleral icterus.   Cardiovascular: Normal rate and regular rhythm.  Exam reveals no friction rub.    Pulmonary/Chest: Effort normal. No respiratory distress.   Abdominal: Soft. Bowel sounds are normal. There is no tenderness.   Nursing note and vitals reviewed.

## 2018-04-16 NOTE — PLAN OF CARE
Problem: SLP Goal  Goal: SLP Goal  Short Term Goals:  1. Pt will participate in BSS to determine least restrictive diet.- MET 4/6  2. Pt will successfully participate in Modified Barium Swallow study to objectively rule out aspiration and determine safest/least restrictive diet.- MET 4/6  3. Pt will tolerate honey thick liquids/puree textures po diet with chin tuck without overt s/s of aspiration-discontinued 4/11  4. Pt will tolerate advanced diet trials of nectar thick liquids and dental soft textures without overt s/s of aspiration.-discontinued 4/10  5. Pt will participate in indirect dysphagia exercises to strengthen musculature for swallowing mechanism with mod-max effort.- ongoing         Outcome: Ongoing (interventions implemented as appropriate)  4/16: Pt seen this AM for dysphagia tx. Pt with limited participation in direct dysphagia PO trials. Pt observed with waxing and waning DANGELO and required moderate-max cuing from SLP and pt's daughters to participate in PO trials. Pt demonstrated inconsistent coughing/choking s/p swallow thin liquids, nectar thick liquids and puree textures. SLP re-educated pt and pt's daughter's on continued rec for PEG placement, as pt is not safe for an oral diet at this time. SLP notified MD Isaacs (Primary team), MD Garg (GI) and DINA Duque on results/recs. SLP will continue to follow. See note for details.  SANDRINE Herzog., CF-SLP  Speech-Language Pathologist

## 2018-04-16 NOTE — PT/OT/SLP PROGRESS
Physical Therapy Treatment    Patient Name:  Zeyad Woodard   MRN:  [id number]    Recommendations:     Discharge Recommendations:   (TBD)   Discharge Equipment Recommendations: none   Barriers to discharge: decreased functional mobility and strength    Assessment:     Zeyad Woodard is a 89 y.o. male admitted with a medical diagnosis of HCAP (healthcare-associated pneumonia).  He presents with the following impairments/functional limitations:  weakness, impaired endurance, impaired functional mobilty, impaired balance, decreased upper extremity function, decreased lower extremity function, impaired coordination, decreased ROM pt with increased alertness and ability to participate today,will attempt to progress as able.    Rehab Prognosis:  Fair; patient would benefit from acute skilled PT services to address these deficits and reach maximum level of function.      Recent Surgery: * No surgery found *      Plan:     During this hospitalization, patient to be seen 6 x/week to address the above listed problems via gait training, therapeutic activities, therapeutic exercises  · Plan of Care Expires:  05/06/18   Plan of Care Reviewed with: patient, family    Subjective     Communicated with nsg prior to session.  Patient found supine upon PT entry to room, agreeable to treatment.      Chief Complaint: some R arm and back pain  Patient comments/goals: pt with some comments throughout rx  Pain/Comfort:  · Pain Rating 1: 7/10 (as per De La Fuente-Baker scale given pt's facial expression)  · Location - Side 1: Right  · Location - Orientation 1: generalized  · Location 1: arm (and back)  · Pain Addressed 1: Reposition, Distraction    Patients cultural, spiritual, Holiness conflicts given the current situation: None stated to PTA    Objective:     Patient found with: telemetry     General Precautions: Standard, aphasia, NPO   Orthopedic Precautions:N/A   Braces: N/A     Functional Mobility:  · Bed Mobility:     · Rolling Left:   moderate assistance and with handrail  · Supine to Sit: maximal assistance and of 2 persons  · Sit to Supine: maximal assistance, total assistance and of 2 persons  · Transfers:     · Sit to Stand:  maximal assistance, of 2 persons and partial stand with HHA X 2  · Balance: fair sitting balance      AM-PAC 6 CLICK MOBILITY  Turning over in bed (including adjusting bedclothes, sheets and blankets)?: 2  Sitting down on and standing up from a chair with arms (e.g., wheelchair, bedside commode, etc.): 2  Moving from lying on back to sitting on the side of the bed?: 2  Moving to and from a bed to a chair (including a wheelchair)?: 2  Need to walk in hospital room?: 1  Climbing 3-5 steps with a railing?: 1  Total Score: 10       Therapeutic Activities and Exercises: le seated laq and hip flex X 10 reps with CGA,pt sat EOB ~14 mins with SBA/CGA       Patient left supine with all lines intact, call button in reach, bed alarm on, nsg notified and daughters present..    GOALS: see general POC   Physical Therapy Goals        Problem: Physical Therapy Goal    Goal Priority Disciplines Outcome Goal Variances Interventions   Physical Therapy Goal     PT/OT, PT Ongoing (interventions implemented as appropriate)     Description:  Goals to be met by: 2018     Patient will increase functional independence with mobility by performin. Supine to sit with Stand-by Assistance  2. Sit to stand transfer with Stand-by Assistance  3. Bed to chair transfer with Stand-by Assistance using Rolling Walker  4. Gait  x 20 feet with Minimal Assistance using Rolling Walker.                       Time Tracking:     PT Received On: 18  PT Start Time: 1011     PT Stop Time: 1053  PT Total Time (min): 42 min     Billable Minutes: Therapeutic Activity 15 and Total Time 42    Treatment Type: Treatment (co-rx with OT)  PT/PTA: PTA     PTA Visit Number: 2     Tashi Small, SOTO  2018

## 2018-04-16 NOTE — PT/OT/SLP PROGRESS
"Speech Language Pathology Treatment    Patient Name:  Zeyad Woodard   MRN:  [id number]  Admitting Diagnosis: HCAP (healthcare-associated pneumonia)    Recommendations:                 General Recommendations:  Dysphagia therapy  Diet recommendations:  NPO, Liquid Diet Level: NPO   Aspiration Precautions: Strict aspiration precautions   General Precautions: Standard, fall, aspiration, NPO  Communication strategies:  provide increased time to answer    Subjective     Pt found in bed with pt's daughter's at bedside. Pt was observed with eyes closed, but awake upon SLP entry. Pt demonstrated waxing and waning DANGELO, as pt required mod-max cuing from SLP and pt's daughter's throughout session.       Patient goals: "I'm okay I reckon" per pt       Objective:     Has the patient been evaluated by SLP for swallowing?   Yes  Keep patient NPO? Yes   Current Respiratory Status: nasal cannula      -Pt seen this AM for dysphagia tx. Pt with limited participation in direct dysphagia PO trials. Pt observed with waxing and waning DANGELO and required moderate-max cuing from SLP and pt's daughters to participate in PO trials. Pt was observed with eyes closed throughout session despite cuing from SLP to open eyes. Pt stated "My lids won't open"    -Pt demonstrated inconsistent coughing/choking s/p swallow thin liquids from tsp 1/3times, nectar thick liquids via cup sips 3 of 3 x and puree textures 2 of 2x (with delayed coughing s/p swallow).        -SLP re-educated pt and pt's daughter's on continued rec for PEG placement, as pt is not safe for an oral diet at this time. SLP re-educated pt on PEG placement and purpose since pt stated he did not know what a PEG was/it's purpose. Pt acknowledged and confirmed understanding. However, later in session ptdemonstrated he will require frequent repetitions/re-education duet to waxing and waning DANGELO.       Assessment:     Zeyad Woodard is a 89 y.o. male admitted with HCAP (healthcare-associated " pneumonia).  He continues to presents with overt s/s of aspiration, as pt demonstrated inconsistent coughing/choking s/p swallow across all consistencies assessed during today's session. Pt also presents with waxing and waning DANGELO, which impacts pt safety of swallowing.     SLP recs: Pt continues to demonstrate that he is not safe for an oral diet 2/2 overt s/s if aspiration and dcr'd, waxing and waning DANGELO, which negatively impacts pt's swallowing safety. SLP recs pt and pt's family to continue to consider alt means of nutrition/hydration via PEG tube placement for pt to meet nutritional needs. SLP notified MD Isaacs (Primary team), MD Garg (GI) and DINA Duque on results/recs. SLP will continue to follow. .      Goals:    SLP Goals        Problem: SLP Goal    Goal Priority Disciplines Outcome   SLP Goal     SLP Ongoing (interventions implemented as appropriate)   Description:  Short Term Goals:  1. Pt will participate in BSS to determine least restrictive diet.- MET 4/6  2. Pt will successfully participate in Modified Barium Swallow study to objectively rule out aspiration and determine safest/least restrictive diet.- MET 4/6  3. Pt will tolerate honey thick liquids/puree textures po diet with chin tuck without overt s/s of aspiration-discontinued 4/11  4. Pt will tolerate advanced diet trials of nectar thick liquids and dental soft textures without overt s/s of aspiration.-discontinued 4/10  5. Pt will participate in indirect dysphagia exercises to strengthen musculature for swallowing mechanism with mod-max effort.- ongoing                          Plan:     · Patient to be seen:  3 x/week   · Plan of Care expires:  05/06/18  · Plan of Care reviewed with:  patient, family (MD Isaacs, MD Garg and DINA Duque)   · SLP Follow-Up:  Yes       Discharge recommendations:   (TBD)   Barriers to Discharge:  None    Time Tracking:     SLP Treatment Date:   04/16/18  Speech Start Time:  1135  Speech Stop Time:  1155      Speech Total Time (min):  20 min    Billable Minutes: Treatment Swallowing Dysfunction 20    SARAH BETH Herzog, CF-SLP  Speech-Language Pathologist   4/16/2018

## 2018-04-16 NOTE — PROGRESS NOTES
[facility] IM Resident Progress Note    Subjective:      Patient interactive and alert today. Answering all questions and still c/o frequent cough. Is not oriented to year.    Objective:   Last 24 Hour Vital Signs:  BP  Min: 150/70  Max: 190/82  Temp  Av.6 °F (37 °C)  Min: 97.5 °F (36.4 °C)  Max: 99.2 °F (37.3 °C)  Pulse  Av.8  Min: 71  Max: 90  Resp  Av.7  Min: 12  Max: 20  SpO2  Av.3 %  Min: 92 %  Max: 97 %  I/O last 3 completed shifts:  In: 1025.1 [IV Piggyback:150]  Out: 583 [Urine:583]    Physical Examination:  General:           Alert and awake  Head:               Normocephalic and atraumatic  Eyes:               anicteric sclera and clear conjunctivae  Mouth:             Oropharynx clear and without exudate; moist mucous membranes  Cardio:            RRR, Grade 2/6 holosystolic murmur loudest at LUSB  Resp:               Scattered rhonchi, intermittent dry cough  Abdom:            Soft, NTND with normoactive bowel sounds  Extrem:            Left 1st toe wrapped with clean bandages  Skin:                Left knee abrasion-healing well since admit; diffuse countusions and scatter   purpura on extremities and chest-stable since admit  Pulses:            2+ and symmetric distally  Neuro:  A&O to person, place, not year; no focal deficits    Laboratory:  Laboratory Data Reviewed: yes  Pertinent Findings:    Recent Labs  Lab 18  0646 04/15/18  0522 04/15/18  0523 18  0435   WBC 18.12*  --  20.37* 17.96*   HGB 8.0*  --  8.3* 7.8*   HCT 24.5*  --  25.7* 24.2*   PLT 94*  --  82* 82*   MCV 88  --  89 89   RDW 15.4*  --  15.6* 15.7*    139  --  137   K 3.8 4.0  --  4.4    102  --  103   CO2 29 27  --  27   BUN 34* 37*  --  40*   CREATININE 1.3 1.3  --  1.3   * 152*  --  146*   PROT 6.2 6.5  --  6.1   ALBUMIN 2.7* 2.9*  --  2.7*   BILITOT 0.6 0.7  --  0.6   AST 29 28  --  34   ALKPHOS 111 108  --  103   ALT 25 25  --  27       Microbiology Data Reviewed: yes  Pertinent  Findings:  Blood cx (4/5/18): 1/8 positive for coag negative staph  Blood cx (4/7/18) x6 -  all negative  Urine cx (4/7): negative  Respiratory cx: stain-G+ cocci, cx negative  Wound cx: NGTD    Other Results:  EKG (my interpretation):none new    Radiology Data Reviewed: yes  Pertinent Findings:    Imaging Results          X-Ray Toe 2 or More Views Left (Final result)  Result time 04/05/18 19:14:09    Final result by Jurgen Gaston MD (04/05/18 19:14:09)                 Impression:      Erosive changes involving the left 1st metacarpal head and base of the 1st proximal phalanx.  The findings suggestive of early osteomyelitis.  Contrast enhanced MRI may be obtained for further evaluation.    Subtle erosive changes involving the head of the left 5th metacarpal.  This can also be assessed on the MRI examination.      Electronically signed by: Jurgen Gaston MD  Date:    04/05/2018  Time:    19:14             Narrative:    EXAMINATION:  XR TOE 2 OR MORE VIEWS LEFT    CLINICAL HISTORY:  swelling;    TECHNIQUE:  Three views of the left toes were performed    COMPARISON:  None.    FINDINGS:  There is diffuse demineralization of the osseous structures.  There are erosive changes involving the head of the 1st metatarsal and the base of the 1st proximal phalanx.  Subtle erosive changes also noted involving the head of the left 5th metacarpal.  There is no evidence of a fracture.  There is joint space narrowing throughout the visualized left forefoot.  There is no evidence of a dislocation.  There are extensive vascular calcifications.                               X-Ray Chest AP Portable (Final result)  Result time 04/05/18 16:33:19    Final result by Mis Mccray MD (04/05/18 16:33:19)                 Impression:      Bibasilar patchy areas of increased attenuation concerning for subsegmental areas of airspace disease considering pneumonia or aspiration.    Cardiomegaly.      Electronically signed by: Mis  MD Shazia  Date:    04/05/2018  Time:    16:33             Narrative:    EXAMINATION:  XR CHEST AP PORTABLE    CLINICAL HISTORY:  Sepsis;    TECHNIQUE:  Single frontal view of the chest was performed.    COMPARISON:  01/11/2018    FINDINGS:  There is a cardio stimulator device in the left upper chest with a single lead the.  Sternotomy wires.  The cardiac silhouette is enlarged.  Patchy areas of increased attenuation at the bilateral lung bases concerning for areas of possible pneumonia or aspiration, it is new compared to the prior exam.  No pleural effusion.  No pneumothorax.  Atherosclerotic changes of the aorta.                                Current Medications:     Infusions:   Amino acid 4.25% - dextrose 10% (CLINIMIX-E) solution with additives (1L provides 42.5 gm AA, 100 gm CHO (340 kcal/L dextrose), Na 35, K 30, Mg 5, Ca 4.5, Acetate 70, Cl 39, Phos 15) 85 mL/hr at 04/15/18 1707        Scheduled:   albuterol-ipratropium 2.5mg-0.5mg/3mL  3 mL Nebulization Q4H    clindamycin (CLEOCIN) IVPB  900 mg Intravenous Q8H    levothyroxine  50 mcg Intravenous Daily    metoprolol  5 mg Intravenous Q12H    mupirocin   Topical (Top) Daily    sodium chloride 0.9%  3 mL Intravenous Q8H        PRN:  albuterol-ipratropium 2.5mg-0.5mg/3mL, hydrALAZINE, morphine, ondansetron    Antibiotics and Day Number of Therapy:  S/p 3days cipro  S/p vanc and cefepime: 4/5 to 4/11  Clindamycin: 4/6 to present      Assessment:     Zeyad Woodard is a 89 y.o.male with multiple comorbidities admitted with HCAP and thrombocytopenia     Plan:     Acute encephalopathy, improving  - Began 4/9, became more lethargic and confused, also with visual halluciations (now resolved)  No focal deficits and CT head on 4/10  without acute findings  Likely 2/2 delirium in setting of sepsis--improving    Dysphagia  -presumed to be 2/2 AMS rather than functional limitation  SLP following--currently aspiration concerns--initially pureed diet  with honey thick liquids, now NPO 2/2 AMS   -Started PPN 4/11  SLP will continue to assess swallowing as mental status improves  May require PEG if patient is agreeable--discussions ongoing    Acute hypoxic respiratory failure and severe sepsis 2/2 HCAP vs aspiration PNA  -1 week progressive productive cough, fever, SOB  On admit, satting well on 5L O2 via NC, temp 103, CXR with bibasilar consolidations, lactate 2.5  BP stable   - Modified barium swallow study 1/18 w/o aspiration--re-evaluated by speech-not safe for thin liquids-honey thick  -In ED, given 2.3L NS bolus, started vanc, cipro, cefepime--penicillin allergy was remote and only hives  -Lactate normalized, procal 21  Blood cx with coag negative staph 2/4 bottles--likely contaminant  Duonebs, CPT  - s/p cipro, vanc, and cefepime (4/5-4/10), clinda 4/6-present--expected 14 day total course     Left 1st toe erosion  -Bruising, erythema, swelling 2/2 recent trauma  H/o neuropathy, follows with podiatry  Xray on admission suggestive of early osteo, Podiatry was consulted, removed toe nail, thinks no osteo  - s/p vanc, cefepime, cipro (4/5-4/10); clinda 4/6-present  Wound cx NGTD  - continue routine wound care.     ELIZABET on CKD 3A, resolved  -On admit, Cr 1.5, GFR 40  Baseline Cr 1.2, GFR 60  -Likely prerenal in setting of sepsis  Returned to baseline     Elevated troponin, resolved  -On admit, trop 0.047  Chronically elevated trop to 0.03-0.06. Also, possibly 2/2 demand in setting of sepsis and CKD  EKG ventricularly paced  -Trended troponins--peaked 0.133->0.125     Chronic thrombocytopenia, stable  -Baseline platelets ~ 90  Follows with Dr. Jeff as outpt  -On admit, platelets 44-likely exacerbated by sepsis  Scatter petechiae, contusions in setting of recent fall but no active bleeding  -PLT improving daily, holding ASA and pharmocologic DVT ppx     Chronic HFpEF  -TTE 12/17-EF 55%, grade 2 diastolic dysfunction, PA pressure 35  -On admit, Trop  0.047,   CXR with b/l consolidations 2/2 PNA rather than volume overload--given fluids per sepsis protocol  Monitor closely for volume overload  -Repeat TTE--LV EF 55%, no diastolic d/f, PA pressure 33     A fib and SSS s/p pacemaker  -On admit, in ventricularly paced rhythm  Not on home anticoagulation 2/2 h/o GI bleeds. Also falls frequently  -Converted to afib with RVR 4/11--responded to IV metoprolol  Holding PO meds 2/2 dysphagia, continue IV metoprolol 5 BID     Anemia of chronic disease, h/o CHRISTIANO and B12 deficiency  -On admit, Hbg 9.4--at baseline  1/18--w/u c/w AoCD--ferritin, B12 wnl  -Follows with Dr. Jeff--continue home iron, B12     Mild transaminitis, resolved  -On admit, , AST 61, ALT 50  Consistent with baseline  -Negative Hep C Ab 2012--defer repeating as patient would be poor treatment candidate given age/comorbidities     HTN  -On admit, BP low/normal  Held home coreg in setting of sepsis--resumed 2/2 HTN  -Holding PO meds until swallowing improves, on scheduled and prn IV antihypertensives     Hypothyroidism  -No acute issues  Continue home synthroid     BPH  -No acute issues  Continue home flomax, finasteride  - Holding PO meds until swallowing improves     Frequent mechanical falls  -Per chart review  Fell 3 days PTA  -Per family, previously had HH PT/OT that has been discontinued/completed  Only mobile via wheelchair  -Consulted PT/OT--recommended SNF vs return to group home with PT     Dementia  -Baseline--Depending for ADLs, lives at [facility] and has supervision via aide and nurse  -A&O to person, place, date; not situation  Continue home remeron, aricept  -Holding PO meds until swallowing improves     Code Status:      Full     Fluids: None    Diet: NPO, PPN since 4/11    Ppx: SCDs; holding chemical ppx in setting of thrombocytopenia    Dispo: pending clinical improvement in mentation vs goals of care decision. If mentation/swallowing fail to improve, will  likely need PEG vs hospice. Discussions ongoing with palliative care.    Chuckie Isaacs MD  [facility] Internal Medicine HO-I  U IM Service Team A    [facility] Medicine Hospitalist Pager numbers:   [facility] Hospitalist Medicine Team A (Kinsey/Yumiko): [phone]  [facility] Hospitalist Medicine Team B (Tyrese/Bibi):  [phone]

## 2018-04-17 ENCOUNTER — ANESTHESIA EVENT (OUTPATIENT)
Dept: ENDOSCOPY | Facility: HOSPITAL | Age: 83
DRG: 871 | End: 2018-04-17
Payer: MEDICARE

## 2018-04-17 ENCOUNTER — ANESTHESIA (OUTPATIENT)
Dept: ENDOSCOPY | Facility: HOSPITAL | Age: 83
DRG: 871 | End: 2018-04-17
Payer: MEDICARE

## 2018-04-17 LAB
ALBUMIN SERPL BCP-MCNC: 2.8 G/DL
ALP SERPL-CCNC: 105 U/L
ALT SERPL W/O P-5'-P-CCNC: 28 U/L
ANION GAP SERPL CALC-SCNC: 8 MMOL/L
ANISOCYTOSIS BLD QL SMEAR: SLIGHT
AST SERPL-CCNC: 34 U/L
BASOPHILS # BLD AUTO: ABNORMAL K/UL
BASOPHILS NFR BLD: 0 %
BILIRUB SERPL-MCNC: 0.7 MG/DL
BUN SERPL-MCNC: 45 MG/DL
CALCIUM SERPL-MCNC: 8.7 MG/DL
CHLORIDE SERPL-SCNC: 102 MMOL/L
CO2 SERPL-SCNC: 27 MMOL/L
CREAT SERPL-MCNC: 1.4 MG/DL
DIFFERENTIAL METHOD: ABNORMAL
EOSINOPHIL # BLD AUTO: ABNORMAL K/UL
EOSINOPHIL NFR BLD: 2 %
ERYTHROCYTE [DISTWIDTH] IN BLOOD BY AUTOMATED COUNT: 15.6 %
EST. GFR  (AFRICAN AMERICAN): 51 ML/MIN/1.73 M^2
EST. GFR  (NON AFRICAN AMERICAN): 44 ML/MIN/1.73 M^2
GLUCOSE SERPL-MCNC: 140 MG/DL
HCT VFR BLD AUTO: 23.4 %
HGB BLD-MCNC: 7.6 G/DL
HYPOCHROMIA BLD QL SMEAR: ABNORMAL
LYMPHOCYTES # BLD AUTO: ABNORMAL K/UL
LYMPHOCYTES NFR BLD: 61 %
MAGNESIUM SERPL-MCNC: 2.1 MG/DL
MCH RBC QN AUTO: 28.8 PG
MCHC RBC AUTO-ENTMCNC: 32.5 G/DL
MCV RBC AUTO: 89 FL
MONOCYTES # BLD AUTO: ABNORMAL K/UL
MONOCYTES NFR BLD: 2 %
NEUTROPHILS # BLD AUTO: ABNORMAL K/UL
NEUTROPHILS NFR BLD: 35 %
OVALOCYTES BLD QL SMEAR: ABNORMAL
PHOSPHATE SERPL-MCNC: 4.3 MG/DL
PLATELET # BLD AUTO: 120 K/UL
PLATELET BLD QL SMEAR: ABNORMAL
PMV BLD AUTO: 10.3 FL
POIKILOCYTOSIS BLD QL SMEAR: SLIGHT
POLYCHROMASIA BLD QL SMEAR: ABNORMAL
POTASSIUM SERPL-SCNC: 4.7 MMOL/L
PROT SERPL-MCNC: 6.2 G/DL
RBC # BLD AUTO: 2.64 M/UL
SODIUM SERPL-SCNC: 137 MMOL/L
WBC # BLD AUTO: 18.31 K/UL

## 2018-04-17 PROCEDURE — 84100 ASSAY OF PHOSPHORUS: CPT

## 2018-04-17 PROCEDURE — 97535 SELF CARE MNGMENT TRAINING: CPT

## 2018-04-17 PROCEDURE — 94668 MNPJ CHEST WALL SBSQ: CPT

## 2018-04-17 PROCEDURE — 25000003 PHARM REV CODE 250: Performed by: NURSE ANESTHETIST, CERTIFIED REGISTERED

## 2018-04-17 PROCEDURE — 97530 THERAPEUTIC ACTIVITIES: CPT

## 2018-04-17 PROCEDURE — 99900035 HC TECH TIME PER 15 MIN (STAT)

## 2018-04-17 PROCEDURE — 37000008 HC ANESTHESIA 1ST 15 MINUTES: Performed by: INTERNAL MEDICINE

## 2018-04-17 PROCEDURE — 63600175 PHARM REV CODE 636 W HCPCS: Performed by: STUDENT IN AN ORGANIZED HEALTH CARE EDUCATION/TRAINING PROGRAM

## 2018-04-17 PROCEDURE — 36415 COLL VENOUS BLD VENIPUNCTURE: CPT

## 2018-04-17 PROCEDURE — 83735 ASSAY OF MAGNESIUM: CPT

## 2018-04-17 PROCEDURE — 0DH63UZ INSERTION OF FEEDING DEVICE INTO STOMACH, PERCUTANEOUS APPROACH: ICD-10-PCS | Performed by: INTERNAL MEDICINE

## 2018-04-17 PROCEDURE — 80053 COMPREHEN METABOLIC PANEL: CPT

## 2018-04-17 PROCEDURE — 11000001 HC ACUTE MED/SURG PRIVATE ROOM

## 2018-04-17 PROCEDURE — 25000003 PHARM REV CODE 250: Performed by: STUDENT IN AN ORGANIZED HEALTH CARE EDUCATION/TRAINING PROGRAM

## 2018-04-17 PROCEDURE — 25000242 PHARM REV CODE 250 ALT 637 W/ HCPCS: Performed by: STUDENT IN AN ORGANIZED HEALTH CARE EDUCATION/TRAINING PROGRAM

## 2018-04-17 PROCEDURE — 85027 COMPLETE CBC AUTOMATED: CPT

## 2018-04-17 PROCEDURE — 37000009 HC ANESTHESIA EA ADD 15 MINS: Performed by: INTERNAL MEDICINE

## 2018-04-17 PROCEDURE — 43246 EGD PLACE GASTROSTOMY TUBE: CPT | Performed by: INTERNAL MEDICINE

## 2018-04-17 PROCEDURE — 25000003 PHARM REV CODE 250: Performed by: INTERNAL MEDICINE

## 2018-04-17 PROCEDURE — 94640 AIRWAY INHALATION TREATMENT: CPT

## 2018-04-17 PROCEDURE — S0030 INJECTION, METRONIDAZOLE: HCPCS | Performed by: NURSE ANESTHETIST, CERTIFIED REGISTERED

## 2018-04-17 PROCEDURE — 85007 BL SMEAR W/DIFF WBC COUNT: CPT

## 2018-04-17 PROCEDURE — 92526 ORAL FUNCTION THERAPY: CPT

## 2018-04-17 PROCEDURE — 63600175 PHARM REV CODE 636 W HCPCS: Performed by: NURSE ANESTHETIST, CERTIFIED REGISTERED

## 2018-04-17 PROCEDURE — 43246 EGD PLACE GASTROSTOMY TUBE: CPT | Mod: ,,, | Performed by: INTERNAL MEDICINE

## 2018-04-17 PROCEDURE — S0077 INJECTION, CLINDAMYCIN PHOSP: HCPCS | Performed by: STUDENT IN AN ORGANIZED HEALTH CARE EDUCATION/TRAINING PROGRAM

## 2018-04-17 PROCEDURE — 27201018 HC KIT, PEG (ANY): Performed by: INTERNAL MEDICINE

## 2018-04-17 PROCEDURE — 94761 N-INVAS EAR/PLS OXIMETRY MLT: CPT

## 2018-04-17 PROCEDURE — A4216 STERILE WATER/SALINE, 10 ML: HCPCS | Performed by: STUDENT IN AN ORGANIZED HEALTH CARE EDUCATION/TRAINING PROGRAM

## 2018-04-17 DEVICE — PERCUTANEOUS ENDOSCOPIC GASTROSTOMY KIT
Type: IMPLANTABLE DEVICE | Site: ABDOMEN | Status: FUNCTIONAL
Brand: ENDOVIVE STANDARD PEG KIT

## 2018-04-17 RX ORDER — PROPOFOL 10 MG/ML
VIAL (ML) INTRAVENOUS
Status: DISCONTINUED | OUTPATIENT
Start: 2018-04-17 | End: 2018-04-17

## 2018-04-17 RX ORDER — LIDOCAINE HCL/PF 100 MG/5ML
SYRINGE (ML) INTRAVENOUS
Status: DISCONTINUED | OUTPATIENT
Start: 2018-04-17 | End: 2018-04-17

## 2018-04-17 RX ORDER — METRONIDAZOLE 500 MG/100ML
500 INJECTION, SOLUTION INTRAVENOUS
Status: DISCONTINUED | OUTPATIENT
Start: 2018-04-17 | End: 2018-04-17 | Stop reason: HOSPADM

## 2018-04-17 RX ORDER — SODIUM CHLORIDE 9 MG/ML
INJECTION, SOLUTION INTRAVENOUS CONTINUOUS PRN
Status: DISCONTINUED | OUTPATIENT
Start: 2018-04-17 | End: 2018-04-17

## 2018-04-17 RX ADMIN — PROPOFOL 20 MG: 10 INJECTION, EMULSION INTRAVENOUS at 03:04

## 2018-04-17 RX ADMIN — MORPHINE SULFATE 1 MG: 4 INJECTION INTRAVENOUS at 07:04

## 2018-04-17 RX ADMIN — MORPHINE SULFATE 1 MG: 4 INJECTION INTRAVENOUS at 09:04

## 2018-04-17 RX ADMIN — CLINDAMYCIN IN 5 PERCENT DEXTROSE 900 MG: 18 INJECTION, SOLUTION INTRAVENOUS at 01:04

## 2018-04-17 RX ADMIN — SODIUM CHLORIDE: 0.9 INJECTION, SOLUTION INTRAVENOUS at 03:04

## 2018-04-17 RX ADMIN — METRONIDAZOLE 500 MG: 500 SOLUTION INTRAVENOUS at 03:04

## 2018-04-17 RX ADMIN — LEVOTHYROXINE SODIUM ANHYDROUS 50 MCG: 100 INJECTION, POWDER, LYOPHILIZED, FOR SOLUTION INTRAVENOUS at 09:04

## 2018-04-17 RX ADMIN — SODIUM CHLORIDE, PRESERVATIVE FREE 3 ML: 5 INJECTION INTRAVENOUS at 10:04

## 2018-04-17 RX ADMIN — CLINDAMYCIN IN 5 PERCENT DEXTROSE 900 MG: 18 INJECTION, SOLUTION INTRAVENOUS at 09:04

## 2018-04-17 RX ADMIN — PROPOFOL 30 MG: 10 INJECTION, EMULSION INTRAVENOUS at 03:04

## 2018-04-17 RX ADMIN — PROPOFOL 10 MG: 10 INJECTION, EMULSION INTRAVENOUS at 03:04

## 2018-04-17 RX ADMIN — METOPROLOL TARTRATE 5 MG: 5 INJECTION, SOLUTION INTRAVENOUS at 09:04

## 2018-04-17 RX ADMIN — METOPROLOL TARTRATE 5 MG: 5 INJECTION, SOLUTION INTRAVENOUS at 08:04

## 2018-04-17 RX ADMIN — IPRATROPIUM BROMIDE AND ALBUTEROL SULFATE 3 ML: .5; 3 SOLUTION RESPIRATORY (INHALATION) at 08:04

## 2018-04-17 RX ADMIN — MORPHINE SULFATE 1 MG: 4 INJECTION INTRAVENOUS at 02:04

## 2018-04-17 RX ADMIN — IPRATROPIUM BROMIDE AND ALBUTEROL SULFATE 3 ML: .5; 3 SOLUTION RESPIRATORY (INHALATION) at 07:04

## 2018-04-17 RX ADMIN — IPRATROPIUM BROMIDE AND ALBUTEROL SULFATE 3 ML: .5; 3 SOLUTION RESPIRATORY (INHALATION) at 04:04

## 2018-04-17 RX ADMIN — LIDOCAINE HYDROCHLORIDE 75 MG: 20 INJECTION, SOLUTION INTRAVENOUS at 03:04

## 2018-04-17 RX ADMIN — MUPIROCIN: 20 OINTMENT TOPICAL at 09:04

## 2018-04-17 RX ADMIN — CLINDAMYCIN IN 5 PERCENT DEXTROSE 900 MG: 18 INJECTION, SOLUTION INTRAVENOUS at 06:04

## 2018-04-17 RX ADMIN — IPRATROPIUM BROMIDE AND ALBUTEROL SULFATE 3 ML: .5; 3 SOLUTION RESPIRATORY (INHALATION) at 11:04

## 2018-04-17 NOTE — ANESTHESIA POSTPROCEDURE EVALUATION
"Anesthesia Post Evaluation    Patient: Zeyad Woodard    Procedure(s) Performed: Procedure(s) (LRB):  ESOPHAGOGASTRODUODENOSCOPY (EGD) (N/A)    Final Anesthesia Type: MAC  Patient location during evaluation: GI PACU  Patient participation: Yes- Able to Participate  Level of consciousness: awake and alert  Post-procedure vital signs: reviewed and stable  Pain management: adequate  Airway patency: patent  PONV status at discharge: No PONV  Anesthetic complications: no      Cardiovascular status: hemodynamically stable  Respiratory status: unassisted, spontaneous ventilation and room air  Hydration status: euvolemic  Follow-up not needed.        Visit Vitals  BP (!) 160/77   Pulse 80   Temp 37.3 °C (99.2 °F) (Oral)   Resp 20   Ht 5' 8" (1.727 m)   Wt 84.7 kg (186 lb 11.7 oz)   SpO2 100%   BMI 28.39 kg/m²       Pain/Jayden Score: Pain Assessment Performed: Yes (4/17/2018  7:16 AM)  Presence of Pain: complains of pain/discomfort (4/17/2018  7:16 AM)  Pain Rating Prior to Med Admin: 7 (4/17/2018  9:08 AM)  Pain Rating Post Med Admin: 0 (4/17/2018  3:17 AM)      "

## 2018-04-17 NOTE — PT/OT/SLP PROGRESS
"Speech Language Pathology Treatment    Patient Name:  Zeyad oWodard   MRN:  [id number]  Admitting Diagnosis: HCAP (healthcare-associated pneumonia)    Recommendations:                 General Recommendations:  Dysphagia therapy  Diet recommendations:  NPO, Liquid Diet Level: NPO   Aspiration Precautions: Alternate means of nutrition/hydration and Strict aspiration precautions   General Precautions: Standard, fall, aspiration, NPO  Communication strategies:  pt with waxing and waning DANGELO    Subjective     Pt found in bed this AM with pt's daughters present at bedside upon SLP and MD team's entry into room. Pt continues to demonstrate  with waxing and waning DANGELO and lethargy, as pt kept eyes closed and demonstrated limited participation throughout session and required max cuing from SLP to participate.     Patient goals: "They worked me out too much" per pt       Objective:     Has the patient been evaluated by SLP for swallowing?   Yes  Keep patient NPO? Yes   Current Respiratory Status: nasal cannula      Pt seen this AM for indirect dysphagia tx and discussion of pt's long term goals, regarding nutrition/hydration.  MD Euceda and team entered room with SLP for discussion. SLP reported to pt's daughters that SLP continues to rec PEG placement to meet nutritional needs, as pt is not safe for an oral diet due to oral and pharyngeal weakness and waxing and waning DANGELO, which negatively impacts safety of swallowing.  Pt's daughter's agreed to PEG placement for pt 2/2 pt's waxing and waning mentation.     Pt with limited participation in indirect dysphagia tx, as pt stated "they worked me out too much." However, pt did complete lingual resistance exercises (tongue to spoon with resistance applied for 3s 3x) and laryngeal lift exercises (ooo-eee) to improve laryngeal elevation x3 (out of 7 x requested). Both exercises were completed with min effort with max cuing from SLP.         Assessment:     Zeyad Woodard is a 89 " y.o. male with an SLP diagnosis of moderate- severe pharyngeal Dysphagia.  He presents with limited participation and waxing and waning DANGELO, which negatively impacts pt's safety of swallowing.  RECS: alternative means of nutrition/hydration via PEG placement, as pt is not safe for an oral diet a this time. Pt will continue to participate in indirect dysphagia tx to improve musculature for swallowing mechanism. MD team in agreement with SLP recs.      Goals:    SLP Goals        Problem: SLP Goal    Goal Priority Disciplines Outcome   SLP Goal     SLP Ongoing (interventions implemented as appropriate)   Description:  Short Term Goals:  1. Pt will participate in BSS to determine least restrictive diet.- MET 4/6  2. Pt will successfully participate in Modified Barium Swallow study to objectively rule out aspiration and determine safest/least restrictive diet.- MET 4/6  3. Pt will tolerate honey thick liquids/puree textures po diet with chin tuck without overt s/s of aspiration-discontinued 4/11  4. Pt will tolerate advanced diet trials of nectar thick liquids and dental soft textures without overt s/s of aspiration.-discontinued 4/10  5. Pt will participate in indirect dysphagia exercises to strengthen musculature for swallowing mechanism with mod-max effort.- ongoing                          Plan:     · Patient to be seen:  3 x/week   · Plan of Care expires:  05/06/18  · Plan of Care reviewed with:  patient, family (DINA Duque and MD team)   · SLP Follow-Up:  Yes       Discharge recommendations:   (TBD)   Barriers to Discharge:  None    Time Tracking:     SLP Treatment Date:   04/17/18  Speech Start Time:  1110  Speech Stop Time:  1130     Speech Total Time (min):  20 min    Billable Minutes: Treatment Swallowing Dysfunction 10 and Seld Care/Home Management Training 10    SARAH BETH Herzog, CF-SLP  Speech-Language Pathologist   4/17/2018

## 2018-04-17 NOTE — PLAN OF CARE
Problem: Nutrition, Parenteral (Adult)  Goal: Signs and Symptoms of Listed Potential Problems Will be Absent, Minimized or Managed (Nutrition, Parenteral)  Signs and symptoms of listed potential problems will be absent, minimized or managed by discharge/transition of care (reference Nutrition, Parenteral (Adult) CPG).  Outcome: Ongoing (interventions implemented as appropriate)  Recommendation/Intervention:   1. If PEG placed, initiate TF of Isosource 1.5 at 10ml/hr and advance as tolerated to goal rate of 50ml/hr toproivde 1800 kcal, 81g protein, & 934ml free water. Add an additional 300ml free water flush qid.   2. Continue TPN until Diet or TF started.    Goals:   Meet 85% EEN  Nutrition Goal Status: progressing towards goal  Communication of RD Recs: reviewed with RN Andriy)

## 2018-04-17 NOTE — PROGRESS NOTES
The Sw was approached by the resident and she stated she spoke to the pt's family and GI and the pt will be getting his peg placed today per Dr. Garg(GI).

## 2018-04-17 NOTE — PROGRESS NOTES
[facility] IM Resident Progress Note    Subjective:      Patient interactive and alert today. Answering all questions and still c/o frequent cough. Able to explain that current issue is his swallowing and that we are considering putting a tube into his stomach. He states he is not excited about it, but that he would like a family discussion to make the decision.    Objective:   Last 24 Hour Vital Signs:  BP  Min: 104/51  Max: 179/77  Temp  Av.1 °F (37.3 °C)  Min: 98.3 °F (36.8 °C)  Max: 99.8 °F (37.7 °C)  Pulse  Av.5  Min: 70  Max: 104  Resp  Av.5  Min: 13  Max: 20  SpO2  Av %  Min: 95 %  Max: 97 %  I/O last 3 completed shifts:  In: 1164.5 [P.O.:120; IV Piggyback:50]  Out: 975 [Urine:975]    Physical Examination:  General:           Alert and awake  Head:               Normocephalic and atraumatic  Eyes:               anicteric sclera and clear conjunctivae  Mouth:             Oropharynx clear and without exudate; moist mucous membranes  Cardio:            RRR, Grade 2/6 holosystolic murmur loudest at LUSB  Resp:               Scattered rhonchi, intermittent dry cough  Abdom:            Soft, NTND with normoactive bowel sounds  Extrem:            Left 1st toe wrapped with clean bandages  Skin:                Left knee abrasion-healing well since admit; diffuse countusions and scatter   purpura on extremities and chest-stable since admit  Pulses:            2+ and symmetric distally  Neuro:  A&O to person, place, not year; no focal deficits    Laboratory:  Laboratory Data Reviewed: yes  Pertinent Findings:    Recent Labs  Lab 04/15/18  0522 04/15/18  0523 18  0435 18  0550   WBC  --  20.37* 17.96* 18.31*   HGB  --  8.3* 7.8* 7.6*   HCT  --  25.7* 24.2* 23.4*   PLT  --  82* 82* 120*   MCV  --  89 89 89   RDW  --  15.6* 15.7* 15.6*     --  137 137   K 4.0  --  4.4 4.7     --  103 102   CO2 27  --  27 27   BUN 37*  --  40* 45*   CREATININE 1.3  --  1.3 1.4   *  --  146* 140*    PROT 6.5  --  6.1 6.2   ALBUMIN 2.9*  --  2.7* 2.8*   BILITOT 0.7  --  0.6 0.7   AST 28  --  34 34   ALKPHOS 108  --  103 105   ALT 25  --  27 28       Microbiology Data Reviewed: yes  Pertinent Findings:  Blood cx (4/5/18): 1/8 positive for coag negative staph  Blood cx (4/7/18) x6 -  all negative  Urine cx (4/7): negative  Respiratory cx: stain-G+ cocci, cx negative  Wound cx: NGTD    Other Results:  EKG (my interpretation):none new    Radiology Data Reviewed: yes  Pertinent Findings:    Imaging Results          X-Ray Toe 2 or More Views Left (Final result)  Result time 04/05/18 19:14:09    Final result by Jurgen Gaston MD (04/05/18 19:14:09)                 Impression:      Erosive changes involving the left 1st metacarpal head and base of the 1st proximal phalanx.  The findings suggestive of early osteomyelitis.  Contrast enhanced MRI may be obtained for further evaluation.    Subtle erosive changes involving the head of the left 5th metacarpal.  This can also be assessed on the MRI examination.      Electronically signed by: Jurgen Gaston MD  Date:    04/05/2018  Time:    19:14             Narrative:    EXAMINATION:  XR TOE 2 OR MORE VIEWS LEFT    CLINICAL HISTORY:  swelling;    TECHNIQUE:  Three views of the left toes were performed    COMPARISON:  None.    FINDINGS:  There is diffuse demineralization of the osseous structures.  There are erosive changes involving the head of the 1st metatarsal and the base of the 1st proximal phalanx.  Subtle erosive changes also noted involving the head of the left 5th metacarpal.  There is no evidence of a fracture.  There is joint space narrowing throughout the visualized left forefoot.  There is no evidence of a dislocation.  There are extensive vascular calcifications.                               X-Ray Chest AP Portable (Final result)  Result time 04/05/18 16:33:19    Final result by Mis Mccray MD (04/05/18 16:33:19)                 Impression:      Bibasilar  patchy areas of increased attenuation concerning for subsegmental areas of airspace disease considering pneumonia or aspiration.    Cardiomegaly.      Electronically signed by: Mis Mccray MD  Date:    04/05/2018  Time:    16:33             Narrative:    EXAMINATION:  XR CHEST AP PORTABLE    CLINICAL HISTORY:  Sepsis;    TECHNIQUE:  Single frontal view of the chest was performed.    COMPARISON:  01/11/2018    FINDINGS:  There is a cardio stimulator device in the left upper chest with a single lead the.  Sternotomy wires.  The cardiac silhouette is enlarged.  Patchy areas of increased attenuation at the bilateral lung bases concerning for areas of possible pneumonia or aspiration, it is new compared to the prior exam.  No pleural effusion.  No pneumothorax.  Atherosclerotic changes of the aorta.                                Current Medications:     Infusions:   Amino acid 4.25% - dextrose 10% (CLINIMIX-E) solution with additives (1L provides 42.5 gm AA, 100 gm CHO (340 kcal/L dextrose), Na 35, K 30, Mg 5, Ca 4.5, Acetate 70, Cl 39, Phos 15) 85 mL/hr at 04/16/18 1635        Scheduled:   albuterol-ipratropium 2.5mg-0.5mg/3mL  3 mL Nebulization Q4H    clindamycin (CLEOCIN) IVPB  900 mg Intravenous Q8H    levothyroxine  50 mcg Intravenous Daily    metoprolol  5 mg Intravenous Q12H    mupirocin   Topical (Top) Daily    sodium chloride 0.9%  3 mL Intravenous Q8H        PRN:  albuterol-ipratropium 2.5mg-0.5mg/3mL, hydrALAZINE, morphine, ondansetron    Antibiotics and Day Number of Therapy:  S/p 3days cipro  S/p vanc and cefepime: 4/5 to 4/11  Clindamycin: 4/6 to present      Assessment:     Zeyad Woodard is a 89 y.o.male with multiple comorbidities admitted with HCAP and thrombocytopenia     Plan:     Acute encephalopathy, improving  - Began 4/9, became more lethargic and confused, also with visual halluciations (now resolved)  No focal deficits and CT head on 4/10  without acute findings  Likely 2/2  delirium in setting of sepsis and dementia--improving    Dysphagia  -presumed to be 2/2 AMS rather than functional limitation  SLP following--currently aspiration concerns--initially pureed diet with honey thick liquids, now NPO 2/2 AMS   -Started PPN 4/11  SLP feels this will be longterm issue and are recommending PEG--discussion with pt/family ongoing    Acute hypoxic respiratory failure and severe sepsis 2/2 HCAP vs aspiration PNA  -1 week progressive productive cough, fever, SOB  On admit, satting well on 5L O2 via NC, temp 103, CXR with bibasilar consolidations, lactate 2.5  BP stable   - Modified barium swallow study 1/18 w/o aspiration--re-evaluated by speech-not safe for thin liquids-honey thick  -In ED, given 2.3L NS bolus, started vanc, cipro, cefepime--penicillin allergy was remote and only hives  -Lactate normalized, procal 21  Blood cx with coag negative staph 2/4 bottles--likely contaminant  Duonebs, CPT  - s/p cipro, vanc, and cefepime (4/5-4/10), clinda 4/6-present--expected 14 day total course     Left 1st toe erosion  -Bruising, erythema, swelling 2/2 recent trauma  H/o neuropathy, follows with podiatry  Xray on admission suggestive of early osteo, Podiatry was consulted, removed toe nail, thinks no osteo  - s/p vanc, cefepime, cipro (4/5-4/10); clinda 4/6-present  Wound cx NGTD  - continue routine wound care.     ELIZABET on CKD 3A, resolved  -On admit, Cr 1.5, GFR 40  Baseline Cr 1.2, GFR 60  -Likely prerenal in setting of sepsis  Returned to baseline     Elevated troponin, resolved  -On admit, trop 0.047  Chronically elevated trop to 0.03-0.06. Also, possibly 2/2 demand in setting of sepsis and CKD  EKG ventricularly paced  -Trended troponins--peaked 0.133->0.125     Chronic thrombocytopenia, stable  -Baseline platelets ~ 90  Follows with Dr. Jeff as outpt  -On admit, platelets 44-likely exacerbated by sepsis  Scatter petechiae, contusions in setting of recent fall but no active  bleeding  -PLT improving daily, holding ASA and pharmocologic DVT ppx     Chronic HFpEF  -TTE 12/17-EF 55%, grade 2 diastolic dysfunction, PA pressure 35  -On admit, Trop 0.047,   CXR with b/l consolidations 2/2 PNA rather than volume overload--given fluids per sepsis protocol  Monitor closely for volume overload  -Repeat TTE--LV EF 55%, no diastolic d/f, PA pressure 33     A fib and SSS s/p pacemaker  -On admit, in ventricularly paced rhythm  Not on home anticoagulation 2/2 h/o GI bleeds. Also falls frequently  -Converted to afib with RVR 4/11--responded to IV metoprolol  Holding PO meds 2/2 dysphagia, continue IV metoprolol 5 BID     Anemia of chronic disease, h/o CHRISTIANO and B12 deficiency  -On admit, Hbg 9.4--at baseline  1/18--w/u c/w AoCD--ferritin, B12 wnl  -Follows with Dr. Jeff--continue home iron, B12     Mild transaminitis, resolved  -On admit, , AST 61, ALT 50  Consistent with baseline  -Negative Hep C Ab 2012--defer repeating as patient would be poor treatment candidate given age/comorbidities     HTN  -On admit, BP low/normal  Held home coreg in setting of sepsis--resumed 2/2 HTN  -Holding PO meds until swallowing improves, on scheduled and prn IV antihypertensives     Hypothyroidism  -No acute issues  Continue home synthroid     BPH  -No acute issues  Continue home flomax, finasteride  - Holding PO meds until swallowing improves     Frequent mechanical falls  -Per chart review  Fell 3 days PTA  -Per family, previously had HH PT/OT that has been discontinued/completed  Only mobile via wheelchair  -Consulted PT/OT--recommended SNF vs return to group home with PT     Dementia  -Baseline--Depending for ADLs, lives at [facility] [place] and has supervision via aide and nurse  -A&O to person, place, date; not situation  Continue home remeron, aricept  -Holding PO meds until swallowing improves     Code Status:      Full     Fluids: None    Diet: NPO, PPN since 4/11    Ppx: SCDs;  holding chemical ppx in setting of thrombocytopenia    Dispo: pending clinical improvement in mentation vs goals of care decision. If mentation/swallowing fail to improve, will likely need PEG vs hospice. Discussions ongoing with palliative care.    Chuckie Isaacs MD  [facility] Internal Medicine HO-I  [facility] IM Service Team A    [facility] Medicine Hospitalist Pager numbers:   [facility] Hospitalist Medicine Team A (Kinsey/Yumiko): [phone]  [facility] Hospitalist Medicine Team B (Tyrese/Bibi):  [phone]

## 2018-04-17 NOTE — PLAN OF CARE
Problem: Occupational Therapy Goal  Goal: Occupational Therapy Goal  Goals to be met by: 05/06/18     Patient will increase functional independence with ADLs by performing:    Grooming while seated with Set-up Assistance.  Toileting from bedside commode with Moderate Assistance for hygiene and clothing management.   Supine to sit with Stand-by Assistance and use of bedrail as needed.  Toilet transfer to bedside commode with Contact Guard Assistance.  Increased functional strength to WFL for ADLs.     Outcome: Ongoing (interventions implemented as appropriate)  Patient with good EOB tolerance and alertness this date. Will benefit from continued skilled OT to address functional deficits.

## 2018-04-17 NOTE — ANESTHESIA PREPROCEDURE EVALUATION
04/17/2018     Zeyad Woodard is a 89 y.o., male here for EGD and PEG placement for difficult swallowing and EGD for anemia    Hx of Afib and pacemaker. EF 50-60%    Pacemaker for SSS    DEVICES:  PACEMAKER  [employer] ADSR01 PACE ADAPTA ADSR01 S/N:[id number]    Single lead ventricle   Magnet will put pacemaker into asynchronous 85. Pacemaker is for SSS  No ICD capabilities      Past Medical History:   Diagnosis Date    Anemia     Atrial fibrillation     BPH (benign prostatic hypertrophy)     Cancer     Cardiac pacemaker in situ 7/2/2015    CHF (congestive heart failure)     Coronary artery disease     Encounter for blood transfusion     GI bleed     cecum angiectasia s/p cautery    Hypertension     Hypothyroidism     Polyneuropathy     Posture imbalance     due to neuropathy    Right posterior capsular opacification 1/18/2017    SSS (sick sinus syndrome) 2015    s/p pacemaker    Stroke 1/11/2018     Past Surgical History:   Procedure Laterality Date    CARDIAC SURGERY      CATARACT EXTRACTION W/  INTRAOCULAR LENS IMPLANT Right 05/17/2010        CATARACT EXTRACTION W/  INTRAOCULAR LENS IMPLANT Left 02/16/2004        cataract surgery      COLONOSCOPY  6/30/04    COLONOSCOPY N/A 2/15/2016    Procedure: COLONOSCOPY;  Surgeon: Stanton Kirk MD;  Location: [facility] ([address] [place] [address]);  Service: Endoscopy;  Laterality: N/A;    EYE SURGERY      HERNIA REPAIR      inguinal hernia repair      Open heart surgery for pericardiectomy      for calcific constrictive pericarditis    UMBILICAL HERNIA REPAIR      Yag      Left Eye       Recent Labs  Lab 04/17/18  0550   WBC 18.31*   RBC 2.64*   HGB 7.6*   HCT 23.4*   *   MCV 89   MCH 28.8   MCHC 32.5       Recent Labs  Lab 04/17/18  0550   CALCIUM 8.7   PROT 6.2      K 4.7   CO2 27      BUN 45*   CREATININE  1.4   ALKPHOS 105   ALT 28   AST 34   BILITOT 0.7     Echo 4/7/18  CONCLUSIONS     1 - Normal left ventricular systolic function (EF 55-60%).     2 - Biatrial enlargement.     3 - The estimated PA systolic pressure is 33 mmHg.     4 - Increased central venous pressure.     5 - Mild aortic stenosis.     Anesthesia Evaluation    I have reviewed the Patient Summary Reports.    I have reviewed the Nursing Notes.   I have reviewed the Medications.     Review of Systems  Anesthesia Hx:  History of prior surgery of interest to airway management or planning:   Hematology/Oncology:         -- Anemia: Hematology Comments: Plt 120   EENT/Dental:EENT/Dental Normal   Cardiovascular:   Hypertension CAD   CHF (EF )    Pulmonary:   Pneumonia Shortness of breath On breathing treatments PRN   Renal/:   Chronic Renal Disease, ARF    Hepatic/GI:   Difficulty swallowing   Musculoskeletal:  Musculoskeletal Normal    Neurological:   CVA    Endocrine:   Hypothyroidism        Physical Exam  General:  Well nourished            Mental Status:  Mental Status Findings: Normal        Anesthesia Plan  Type of Anesthesia, risks & benefits discussed:  Anesthesia Type:  MAC, general  Patient's Preference:   Intra-op Monitoring Plan:   Intra-op Monitoring Plan Comments:   Post Op Pain Control Plan:   Post Op Pain Control Plan Comments:   Induction:    Beta Blocker:  Patient is on a Beta-Blocker and has received one dose within the past 24 hours (No further documentation required).       Informed Consent: Patient understands risks and agrees with Anesthesia plan.  Questions answered. Anesthesia consent signed with patient.  ASA Score: 4  emergent   Day of Surgery Review of History & Physical:        Anesthesia Plan Notes: Patient is increased risk for aspiration.    Spoke with daughter who consented to procedure. [phone]        Ready For Surgery From Anesthesia Perspective.

## 2018-04-17 NOTE — OR NURSING
Patient transferred to endo dept for PEG tube placement. Vital signs recorded. Alert and talkative. IV not patent,resited. Telephone consent for procedure completed. Anesthesia consent verified. Comfort measures provided.

## 2018-04-17 NOTE — PLAN OF CARE
"Problem: SLP Goal  Goal: SLP Goal  Short Term Goals:  1. Pt will participate in BSS to determine least restrictive diet.- MET 4/6  2. Pt will successfully participate in Modified Barium Swallow study to objectively rule out aspiration and determine safest/least restrictive diet.- MET 4/6  3. Pt will tolerate honey thick liquids/puree textures po diet with chin tuck without overt s/s of aspiration-discontinued 4/11  4. Pt will tolerate advanced diet trials of nectar thick liquids and dental soft textures without overt s/s of aspiration.-discontinued 4/10  5. Pt will participate in indirect dysphagia exercises to strengthen musculature for swallowing mechanism with mod-max effort.- ongoing         Outcome: Ongoing (interventions implemented as appropriate)  4/17: Pt seen this AM for indirect dysphagia tx and discussion of pt's long term goals, regarding nutrition/hydration.  MD Euceda and team entered room with SLP for discussion. SLP reported to pt's daughters that SLP continues to rec PEG placement as, pt is not safe for an oral diet due to oral and pharyngeal weakness and waxing and waning DANGELO, which negatively impacts safety of swallowing.  Pt's daughter's agreed to PEG placement for pt 2/2 pt's waxing and waning mentation. Pt with limited participation in indirect dysphagia tx, as pt stated "they worked me out too much."RECS: Pt will continue to participate in indirect dysphagia tx to improve musculature for swallowing mechanism. MD team in agreement with SLP recs.  SANDRINE Herzog., CF-SLP  Speech-Language Pathologist         "

## 2018-04-17 NOTE — PROGRESS NOTES
TN received message from Kaya Mixon that she had been contacted by Dr. Euceda to give patient information, GI note indicates possible PEG 4/17/18, TN will f/u with Kaya on 4/17/18.

## 2018-04-17 NOTE — PT/OT/SLP PROGRESS
Physical Therapy Treatment    Patient Name:  Zeyad Woodard   MRN:  [id number]    Recommendations:     Discharge Recommendations:   (TBD)   Discharge Equipment Recommendations: none   Barriers to discharge: decreased mobility,function and strength    Assessment:     Zeyad Woodard is a 89 y.o. male admitted with a medical diagnosis of HCAP (healthcare-associated pneumonia).  He presents with the following impairments/functional limitations:  weakness, impaired endurance, impaired functional mobilty, impaired balance, impaired cognition, decreased lower extremity function, decreased upper extremity function, pain, decreased ROM, impaired coordination, impaired skin, edema pt with good participation and continues to require Max A X 2 for bed mobility and attempted standing,pt will benefit from continued PT services upon discharge.    Rehab Prognosis:  Fair; patient would benefit from acute skilled PT services to address these deficits and reach maximum level of function.      Recent Surgery: * No surgery found *      Plan:     During this hospitalization, patient to be seen 6 x/week to address the above listed problems via therapeutic activities, therapeutic exercises, neuromuscular re-education  · Plan of Care Expires:  05/06/18   Plan of Care Reviewed with: patient    Subjective     Communicated with nsg prior to session.  Patient found supine upon PT entry to room, agreeable to treatment.      Chief Complaint: R hip pain with movement  Patient comments/goals: pt agreeable to EOB sitting  Pain/Comfort:  · Pain Rating 1:  (no rating)  · Location - Side 1: Right  · Location - Orientation 1: generalized  · Location 1: hip  · Pain Addressed 1: Distraction, Reposition    Patients cultural, spiritual, Tenriism conflicts given the current situation: None stated to PTA    Objective:     Patient found with: telemetry     General Precautions: Standard, aspiration, fall, NPO   Orthopedic Precautions:N/A   Braces: N/A      Functional Mobility:  · Bed Mobility:     · Supine to Sit: total assistance and of 2 persons  · Sit to Supine: total assistance and of 2 persons  · Transfers:     · Sit to Stand:  maximal assistance and of 2 persons with HHA  · Balance: fair sitting balance      AM-PAC 6 CLICK MOBILITY  Turning over in bed (including adjusting bedclothes, sheets and blankets)?: 2  Sitting down on and standing up from a chair with arms (e.g., wheelchair, bedside commode, etc.): 2  Moving from lying on back to sitting on the side of the bed?: 2  Moving to and from a bed to a chair (including a wheelchair)?: 2  Need to walk in hospital room?: 1  Climbing 3-5 steps with a railing?: 1  Total Score: 10       Therapeutic Activities and Exercises: pt performing le laq's and hip flexion during sitting,pt sat EOB ~15 mins with SBA/CGA,sit to partial stand with Max A X 2 ~20 seconds while pad was changed out       Patient left supine with all lines intact, call button in reach, bed alarm on, nsg notified and family present..    GOALS: see general POC   Physical Therapy Goals        Problem: Physical Therapy Goal    Goal Priority Disciplines Outcome Goal Variances Interventions   Physical Therapy Goal     PT/OT, PT Ongoing (interventions implemented as appropriate)     Description:  Goals to be met by: 2018     Patient will increase functional independence with mobility by performin. Supine to sit with Stand-by Assistance  2. Sit to stand transfer with Stand-by Assistance  3. Bed to chair transfer with Stand-by Assistance using Rolling Walker  4. Gait  x 20 feet with Minimal Assistance using Rolling Walker.                       Time Tracking:     PT Received On: 18  PT Start Time: 1024     PT Stop Time: 1050  PT Total Time (min): 26 min     Billable Minutes: Therapeutic Activity 13 and Total Time 26    Treatment Type: Treatment (co-rx with OT)  PT/PTA: PTA     PTA Visit Number: 3     Tashi Small PTA  2018

## 2018-04-17 NOTE — PROVATION PATIENT INSTRUCTIONS
Discharge Summary/Instructions after an Endoscopic Procedure  Patient Name: Zeyad Woodard  Patient MRN: [id number]  Patient YOB: 1927 Tuesday, April 17, 2018  Hannah Garg MD  RESTRICTIONS:  During your procedure today, you received medications for sedation.  These   medications may affect your judgment, balance and coordination.  Therefore,   for 24 hours, you have the following restrictions:   - DO NOT drive a car, operate machinery, make legal/financial decisions,   sign important papers or drink alcohol.    ACTIVITY:  The following day: return to full activity including work, except no heavy   lifting, straining or running for 3 days if polyps were removed.  DIET:  Eat and drink normally unless instructed otherwise.     TREATMENT FOR COMMON SIDE EFFECTS:  - Mild abdominal pain, nausea, belching, bloating or excessive gas:  rest,   eat lightly and use a heating pad.  - Sore Throat: treat with throat lozenges and/or gargle with warm salt   water.  - Because air was used during the procedure, expelling large amounts of air   from your rectum or belching is normal.  - If a bowel prep was taken, you may not have a bowel movement for 1-3 days.    This is normal.  SYMPTOMS TO WATCH FOR AND REPORT TO YOUR PHYSICIAN:  1. Abdominal pain or bloating, other than gas cramps.  2. Chest pain.  3. Back pain.  4. Signs of infection such as: chills or fever occurring within 24 hours   after the procedure.  5. Rectal bleeding, which would show as bright red, maroon, or black stools.   (A tablespoon of blood from the rectum is not serious, especially if   hemorrhoids are present.)  6. Vomiting.  7. Weakness or dizziness.  GO DIRECTLY TO THE NEAREST EMERGENCY ROOM IF YOU HAVE ANY OF THE FOLLOWING:      Difficulty breathing              Chills and/or fever over 101 F   Persistent vomiting and/or vomiting blood   Severe abdominal pain   Severe chest pain   Black, tarry stools   Bleeding- more than one tablespoon   Any  other symptom or condition that you feel may need urgent attention  Your doctor recommends these additional instructions:  If any biopsies were taken, your doctors clinic will contact you in 1 to 2   weeks with any results.  - Return patient to hospital [facility] for ongoing care.   - Diet per speech therapy.   - Continue present medications.   - Routine peg tube care.  For questions, problems or results please call your physician - Hannah Garg MD at Work:  [phone].  EMERGENCY PHONE NUMBER: [phone],  LAB RESULTS: [phone]  IF A COMPLICATION OR EMERGENCY SITUATION ARISES AND YOU ARE UNABLE TO REACH   YOUR PHYSICIAN - GO DIRECTLY TO THE EMERGENCY ROOM.  Hannah Garg MD  4/17/2018 3:54:59 PM  This report has been verified and signed electronically.

## 2018-04-17 NOTE — TRANSFER OF CARE
"Anesthesia Transfer of Care Note    Patient: Zeyad Woodard    Procedure(s) Performed: Procedure(s) (LRB):  ESOPHAGOGASTRODUODENOSCOPY (EGD) (N/A)    Patient location: GI    Anesthesia Type: MAC    Transport from OR: Transported from OR on room air with adequate spontaneous ventilation    Post pain: adequate analgesia    Post assessment: no apparent anesthetic complications    Post vital signs: stable    Level of consciousness: responds to stimulation    Nausea/Vomiting: no nausea/vomiting    Complications: none    Transfer of care protocol was followed      Last vitals:   Visit Vitals  BP (!) 160/77   Pulse 80   Temp 37.3 °C (99.2 °F) (Oral)   Resp 20   Ht 5' 8" (1.727 m)   Wt 84.7 kg (186 lb 11.7 oz)   SpO2 100%   BMI 28.39 kg/m²     "

## 2018-04-17 NOTE — PLAN OF CARE
Update 11:20 - Dr. Euceda's team is rounding, family has decided to have PEG placed, expecting procedure today, feeding to start tonight.    Patient is accompanied by all 3 daughters at this time, they are here to discuss with attending MDs as to if they are going to proceed with feeding tube placement, Palliative Care is consulted on this case and they have had contact with Kaya Mixon from [facility], family unit states they were told by GI that the patient would get PEG placement this evening if there is agreement from the patient/family.       04/17/18 1006   Discharge Reassessment   Assessment Type Discharge Planning Reassessment   Discharge Plan A Home;Home Health   Discharge Plan B Other

## 2018-04-17 NOTE — PLAN OF CARE
Patient resting quietly in bed with no distress noted. Patient informed of NPO status starting at midnight, patient verbalized understanding. Safety measures maintained.

## 2018-04-17 NOTE — OR NURSING
Site cleaned and dressed with triple antibiotic int and 2x2 guaze per dr. Dillon. Secured with tape.

## 2018-04-17 NOTE — PROGRESS NOTES
" [facility]  Adult Nutrition  Progress Note    SUMMARY       Recommendations    Recommendation/Intervention:   1. If PEG placed, initiate TF of Isosource 1.5 at 10ml/hr and advance as tolerated to goal rate of 50ml/hr toproivde 1800 kcal, 81g protein, & 934ml free water. Add an additional 300ml free water flush qid.   2. Continue TPN until Diet or TF started.    Goals:   Meet 85% EEN  Nutrition Goal Status: progressing towards goal  Communication of RD Recs: reviewed with RN (Madelin)    Reason for Assessment  Reason for Assessment: RD follow-up  Diagnosis:  (HCAP)  Relevant Medical History: CHF, HTN, CAD, Stroke  General Information Comments: Pt remains NPO per ST recs. Awaiting family decision on PEG tube. Receiving Clinimix 4.25/10 at 85ml/hr.    Nutrition Risk Screen  Nutrition Risk Screen: dysphagia or difficulty swallowing    Nutrition/Diet History  Food Preferences: no Taoist or cultural food prefs identified  Do you have any cultural, spiritual, Taoist conflicts, given your current situation?:  (none)  Factors Affecting Nutritional Intake: difficulty/impaired swallowing    Anthropometrics  Temp: 99 °F (37.2 °C)  Height Method: Stated  Height: 5' 8" (172.7 cm)  Height (inches): 68 in  Weight Method: Bed Scale  Weight: 84.7 kg (186 lb 11.7 oz)  Weight (lb): 186.73 lb  Ideal Body Weight (IBW), Male: 154 lb  % Ideal Body Weight, Male (lb): 121.25 lb  BMI (Calculated): 28.5  BMI Grade: 25 - 29.9 - overweight     Lab/Procedures/Meds  Pertinent Labs Reviewed: reviewed  Pertinent Labs Comments: BUN 40H, Glu 146H, Alb 2.7L  Pertinent Medications Reviewed: reviewed    Physical Findings/Assessment  Overall Physical Appearance: advanced age, loss of muscle mass  Oral/Mouth Cavity: tooth/teeth missing  Skin: pressure ulcer(s), skin tear (Stage I buttocks; neuropathic toe ulcer)    Estimated/Assessed Needs  Weight Used For Calorie Calculations: 84.7 kg (186 lb 11.7 oz)  Energy Calorie Requirements " (kcal): 1750 kcal  Energy Need Method: [place]  Protein Requirements: 70-85 g  Weight Used For Protein Calculations: 84.7 kg (186 lb 11.7 oz)  Fluid Need Method: RDA Method  RDA Method (mL): 1750     Nutrition Prescription Ordered  Current Diet Order: NPO  Current Nutrition Support Formula Ordered: Clinimix 4.25/10  Current Nutrition Support Rate Ordered: 85 (ml)  Current Nutrition Support Frequency Ordered: ml/hr    Evaluation of Received Nutrient/Fluid Intake  Parenteral Calories (kcal): 1540  Parenteral Protein (gm): 87  Parenteral Fluid (mL): 2040  GIR (Glucose Infusion Rate) (mg/kg/min): 1.67 mg/kg/min  % Kcal Needs: 88  % Protein Needs: 102-124  I/O: 237/371  Energy Calories Required: not meeting needs  Protein Required: meeting needs  Fluid Required: meeting needs  Comments: LBM 4/13  % Intake of Estimated Energy Needs: 75 - 100 %  % Meal Intake: NPO    Nutrition Risk  Level of Risk/Frequency of Follow-up:  (2 x week)     Assessment and Plan    Oropharyngeal dysphagia    Contributing Nutrition Diagnosis  Inadequate energy intake    Related to (etiology):   dysphagia    Signs and Symptoms (as evidenced by):   NPO/PPN    Interventions/Recommendations (treatment strategy):  See recs    Nutrition Diagnosis Status:   Continues             Monitor and Evaluation  Food and Nutrient Intake: energy intake, food and beverage intake  Food and Nutrient Adminstration: diet order, enteral and parenteral nutrition administration  Knowledge/Beliefs/Attitudes: food and nutrition knowledge/skill  Physical Activity and Function: nutrition-related ADLs and IADLs  Anthropometric Measurements: weight, weight change  Biochemical Data, Medical Tests and Procedures: electrolyte and renal panel  Nutrition-Focused Physical Findings: overall appearance     Nutrition Follow-Up  RD Follow-up?: Yes

## 2018-04-18 LAB
ALBUMIN SERPL BCP-MCNC: 2.8 G/DL
ALP SERPL-CCNC: 119 U/L
ALT SERPL W/O P-5'-P-CCNC: 33 U/L
ANION GAP SERPL CALC-SCNC: 7 MMOL/L
AST SERPL-CCNC: 42 U/L
BASOPHILS # BLD AUTO: ABNORMAL K/UL
BASOPHILS NFR BLD: 0 %
BILIRUB SERPL-MCNC: 0.9 MG/DL
BUN SERPL-MCNC: 44 MG/DL
CALCIUM SERPL-MCNC: 8.6 MG/DL
CHLORIDE SERPL-SCNC: 102 MMOL/L
CO2 SERPL-SCNC: 27 MMOL/L
CREAT SERPL-MCNC: 1.3 MG/DL
DIFFERENTIAL METHOD: ABNORMAL
EOSINOPHIL # BLD AUTO: ABNORMAL K/UL
EOSINOPHIL NFR BLD: 1 %
ERYTHROCYTE [DISTWIDTH] IN BLOOD BY AUTOMATED COUNT: 15.6 %
EST. GFR  (AFRICAN AMERICAN): 56 ML/MIN/1.73 M^2
EST. GFR  (NON AFRICAN AMERICAN): 48 ML/MIN/1.73 M^2
GLUCOSE SERPL-MCNC: 114 MG/DL
HCT VFR BLD AUTO: 24.1 %
HGB BLD-MCNC: 7.7 G/DL
LYMPHOCYTES # BLD AUTO: ABNORMAL K/UL
LYMPHOCYTES NFR BLD: 53 %
MAGNESIUM SERPL-MCNC: 2 MG/DL
MCH RBC QN AUTO: 28.5 PG
MCHC RBC AUTO-ENTMCNC: 32 G/DL
MCV RBC AUTO: 89 FL
MONOCYTES # BLD AUTO: ABNORMAL K/UL
MONOCYTES NFR BLD: 1 %
NEUTROPHILS # BLD AUTO: ABNORMAL K/UL
NEUTROPHILS NFR BLD: 45 %
PHOSPHATE SERPL-MCNC: 3.8 MG/DL
PLATELET # BLD AUTO: ABNORMAL K/UL
PLATELET BLD QL SMEAR: ABNORMAL
PMV BLD AUTO: 10.5 FL
POTASSIUM SERPL-SCNC: 4.9 MMOL/L
PROT SERPL-MCNC: 6.3 G/DL
RBC # BLD AUTO: 2.7 M/UL
SODIUM SERPL-SCNC: 136 MMOL/L
WBC # BLD AUTO: 16.65 K/UL

## 2018-04-18 PROCEDURE — 25000003 PHARM REV CODE 250: Performed by: INTERNAL MEDICINE

## 2018-04-18 PROCEDURE — 83735 ASSAY OF MAGNESIUM: CPT

## 2018-04-18 PROCEDURE — 99900035 HC TECH TIME PER 15 MIN (STAT)

## 2018-04-18 PROCEDURE — 94668 MNPJ CHEST WALL SBSQ: CPT

## 2018-04-18 PROCEDURE — 97530 THERAPEUTIC ACTIVITIES: CPT

## 2018-04-18 PROCEDURE — 94640 AIRWAY INHALATION TREATMENT: CPT

## 2018-04-18 PROCEDURE — 25000003 PHARM REV CODE 250: Performed by: STUDENT IN AN ORGANIZED HEALTH CARE EDUCATION/TRAINING PROGRAM

## 2018-04-18 PROCEDURE — 94761 N-INVAS EAR/PLS OXIMETRY MLT: CPT

## 2018-04-18 PROCEDURE — 99232 SBSQ HOSP IP/OBS MODERATE 35: CPT | Mod: ,,, | Performed by: NURSE PRACTITIONER

## 2018-04-18 PROCEDURE — 80053 COMPREHEN METABOLIC PANEL: CPT

## 2018-04-18 PROCEDURE — 36415 COLL VENOUS BLD VENIPUNCTURE: CPT

## 2018-04-18 PROCEDURE — 25000242 PHARM REV CODE 250 ALT 637 W/ HCPCS: Performed by: STUDENT IN AN ORGANIZED HEALTH CARE EDUCATION/TRAINING PROGRAM

## 2018-04-18 PROCEDURE — 94664 DEMO&/EVAL PT USE INHALER: CPT

## 2018-04-18 PROCEDURE — 84100 ASSAY OF PHOSPHORUS: CPT

## 2018-04-18 PROCEDURE — 85027 COMPLETE CBC AUTOMATED: CPT

## 2018-04-18 PROCEDURE — 85007 BL SMEAR W/DIFF WBC COUNT: CPT

## 2018-04-18 PROCEDURE — S0077 INJECTION, CLINDAMYCIN PHOSP: HCPCS | Performed by: STUDENT IN AN ORGANIZED HEALTH CARE EDUCATION/TRAINING PROGRAM

## 2018-04-18 PROCEDURE — 11000001 HC ACUTE MED/SURG PRIVATE ROOM

## 2018-04-18 PROCEDURE — A4216 STERILE WATER/SALINE, 10 ML: HCPCS | Performed by: STUDENT IN AN ORGANIZED HEALTH CARE EDUCATION/TRAINING PROGRAM

## 2018-04-18 PROCEDURE — 99232 SBSQ HOSP IP/OBS MODERATE 35: CPT | Mod: ,,, | Performed by: INTERNAL MEDICINE

## 2018-04-18 PROCEDURE — 63600175 PHARM REV CODE 636 W HCPCS: Performed by: STUDENT IN AN ORGANIZED HEALTH CARE EDUCATION/TRAINING PROGRAM

## 2018-04-18 RX ADMIN — MORPHINE SULFATE 1 MG: 4 INJECTION INTRAVENOUS at 06:04

## 2018-04-18 RX ADMIN — IPRATROPIUM BROMIDE AND ALBUTEROL SULFATE 3 ML: .5; 3 SOLUTION RESPIRATORY (INHALATION) at 11:04

## 2018-04-18 RX ADMIN — IPRATROPIUM BROMIDE AND ALBUTEROL SULFATE 3 ML: .5; 3 SOLUTION RESPIRATORY (INHALATION) at 04:04

## 2018-04-18 RX ADMIN — MUPIROCIN: 20 OINTMENT TOPICAL at 09:04

## 2018-04-18 RX ADMIN — CLINDAMYCIN IN 5 PERCENT DEXTROSE 900 MG: 18 INJECTION, SOLUTION INTRAVENOUS at 05:04

## 2018-04-18 RX ADMIN — MORPHINE SULFATE 1 MG: 4 INJECTION INTRAVENOUS at 01:04

## 2018-04-18 RX ADMIN — METOPROLOL TARTRATE 5 MG: 5 INJECTION, SOLUTION INTRAVENOUS at 09:04

## 2018-04-18 RX ADMIN — IPRATROPIUM BROMIDE AND ALBUTEROL SULFATE 3 ML: .5; 3 SOLUTION RESPIRATORY (INHALATION) at 07:04

## 2018-04-18 RX ADMIN — CLINDAMYCIN IN 5 PERCENT DEXTROSE 900 MG: 18 INJECTION, SOLUTION INTRAVENOUS at 01:04

## 2018-04-18 RX ADMIN — LEVOTHYROXINE SODIUM ANHYDROUS 50 MCG: 100 INJECTION, POWDER, LYOPHILIZED, FOR SOLUTION INTRAVENOUS at 09:04

## 2018-04-18 RX ADMIN — MORPHINE SULFATE 1 MG: 4 INJECTION INTRAVENOUS at 11:04

## 2018-04-18 RX ADMIN — IPRATROPIUM BROMIDE AND ALBUTEROL SULFATE 3 ML: .5; 3 SOLUTION RESPIRATORY (INHALATION) at 12:04

## 2018-04-18 RX ADMIN — MORPHINE SULFATE 1 MG: 4 INJECTION INTRAVENOUS at 12:04

## 2018-04-18 RX ADMIN — CLINDAMYCIN IN 5 PERCENT DEXTROSE 900 MG: 18 INJECTION, SOLUTION INTRAVENOUS at 09:04

## 2018-04-18 RX ADMIN — NAPHAZOLINE HYDROCHLORIDE AND PHENIRAMINE MALEATE 1 DROP: .25; 3 SOLUTION/ DROPS OPHTHALMIC at 08:04

## 2018-04-18 RX ADMIN — SODIUM CHLORIDE, PRESERVATIVE FREE 3 ML: 5 INJECTION INTRAVENOUS at 02:04

## 2018-04-18 NOTE — PROGRESS NOTES
Palliative Care Daily Progress Note     Palliative Care f/u visit s/p PEG tube placement on 4/17/2018 delilah patient and family. Patient lying in bed with daughters Asia and Ghazala at bedside. Patient tolerating tube feeding.  Palliative care addressed all questions and concerns. Emotional support provided.   Family discharge option return to group home with PEG feeding.             Recommendation  Continue Current medical treatment  Code status: DNR    Family discharge option -Return group home with PEG feeding.      Palliative Care Team will continue to follow patient to assist family with goals of care.         Thank you for the consult the opportunity to participate in Mr. Woodard's  care        Time Spent:> 50% of 60  min. in visit spent in chart review, phone discussion of goals of care with family, symptom assessment, coordination of care and emotional support           Catalina Irvin, MSN, APRN, NP-C  Palliative  Medicine  [phone]

## 2018-04-18 NOTE — PLAN OF CARE
Patient resting comfortably in bed after receiving prn morphine for back pain. Patient repositioned. Peg tube remains secured to abdomen with dried drainage to dressing, tube feedings continued at 10cc/hr with no residuals noted. VSS. afib on tele

## 2018-04-18 NOTE — PROGRESS NOTES
[facility] IM Resident Progress Note    Subjective:      Awoken from sleep. Had no complaints. Tolerating tube feeds well.    Objective:   Last 24 Hour Vital Signs:  BP  Min: 130/60  Max: 178/80  Temp  Av °F (37.2 °C)  Min: 98.1 °F (36.7 °C)  Max: 100.3 °F (37.9 °C)  Pulse  Av.2  Min: 68  Max: 143  Resp  Av  Min: 16  Max: 20  SpO2  Av.8 %  Min: 94 %  Max: 100 %  I/O last 3 completed shifts:  In: 1245.5 [P.O.:240; I.V.:25; NG/GT:90; IV Piggyback:100]  Out:  [Urine:]    Physical Examination:  General:           Alert and awake  Head:               Normocephalic and atraumatic  Eyes:               anicteric sclera and clear conjunctivae  Mouth:             Oropharynx clear and without exudate; moist mucous membranes  Cardio:            RRR, Grade 2/6 holosystolic murmur loudest at LUSB  Resp:               Scattered rhonchi, intermittent dry cough  Abdom:            G-tube in place; Soft, NTND with normoactive bowel sounds  Extrem:            Left 1st toe wrapped with clean bandages  Skin:                Left knee abrasion-healing well since admit; diffuse countusions and scatter   purpura on extremities and chest-stable since admit  Pulses:            2+ and symmetric distally  Neuro:  A&O to person, place, not year; no focal deficits    Laboratory:  Laboratory Data Reviewed: yes  Pertinent Findings:    Recent Labs  Lab 18  0435 18  0550 18  0435   WBC 17.96* 18.31* 16.65*   HGB 7.8* 7.6* 7.7*   HCT 24.2* 23.4* 24.1*   PLT 82* 120* SEE COMMENT   MCV 89 89 89   RDW 15.7* 15.6* 15.6*    137 136   K 4.4 4.7 4.9    102 102   CO2 27 27 27   BUN 40* 45* 44*   CREATININE 1.3 1.4 1.3   * 140* 114*   PROT 6.1 6.2 6.3   ALBUMIN 2.7* 2.8* 2.8*   BILITOT 0.6 0.7 0.9   AST 34 34 42*   ALKPHOS 103 105 119   ALT 27 28 33       Microbiology Data Reviewed: yes  Pertinent Findings:  Blood cx (18): 1/ positive for coag negative staph  Blood cx (18) x6 -  all  negative  Urine cx (4/7): negative  Respiratory cx: stain-G+ cocci, cx negative  Wound cx: NGTD    Other Results:  EKG (my interpretation):none new    Radiology Data Reviewed: yes  Pertinent Findings:    Imaging Results          X-Ray Toe 2 or More Views Left (Final result)  Result time 04/05/18 19:14:09    Final result by Jurgen Gaston MD (04/05/18 19:14:09)                 Impression:      Erosive changes involving the left 1st metacarpal head and base of the 1st proximal phalanx.  The findings suggestive of early osteomyelitis.  Contrast enhanced MRI may be obtained for further evaluation.    Subtle erosive changes involving the head of the left 5th metacarpal.  This can also be assessed on the MRI examination.      Electronically signed by: Jurgen Gaston MD  Date:    04/05/2018  Time:    19:14             Narrative:    EXAMINATION:  XR TOE 2 OR MORE VIEWS LEFT    CLINICAL HISTORY:  swelling;    TECHNIQUE:  Three views of the left toes were performed    COMPARISON:  None.    FINDINGS:  There is diffuse demineralization of the osseous structures.  There are erosive changes involving the head of the 1st metatarsal and the base of the 1st proximal phalanx.  Subtle erosive changes also noted involving the head of the left 5th metacarpal.  There is no evidence of a fracture.  There is joint space narrowing throughout the visualized left forefoot.  There is no evidence of a dislocation.  There are extensive vascular calcifications.                               X-Ray Chest AP Portable (Final result)  Result time 04/05/18 16:33:19    Final result by Mis Mccray MD (04/05/18 16:33:19)                 Impression:      Bibasilar patchy areas of increased attenuation concerning for subsegmental areas of airspace disease considering pneumonia or aspiration.    Cardiomegaly.      Electronically signed by: Mis Mccray MD  Date:    04/05/2018  Time:    16:33             Narrative:    EXAMINATION:  XR CHEST AP  PORTABLE    CLINICAL HISTORY:  Sepsis;    TECHNIQUE:  Single frontal view of the chest was performed.    COMPARISON:  01/11/2018    FINDINGS:  There is a cardio stimulator device in the left upper chest with a single lead the.  Sternotomy wires.  The cardiac silhouette is enlarged.  Patchy areas of increased attenuation at the bilateral lung bases concerning for areas of possible pneumonia or aspiration, it is new compared to the prior exam.  No pleural effusion.  No pneumothorax.  Atherosclerotic changes of the aorta.                                Current Medications:     Infusions:       Scheduled:   albuterol-ipratropium 2.5mg-0.5mg/3mL  3 mL Nebulization Q4H    clindamycin (CLEOCIN) IVPB  900 mg Intravenous Q8H    levothyroxine  50 mcg Intravenous Daily    metoprolol  5 mg Intravenous Q12H    mupirocin   Topical (Top) Daily    sodium chloride 0.9%  3 mL Intravenous Q8H        PRN:  albuterol-ipratropium 2.5mg-0.5mg/3mL, hydrALAZINE, morphine, ondansetron    Antibiotics and Day Number of Therapy:  S/p 3days cipro  S/p vanc and cefepime: 4/5 to 4/11  Clindamycin: 4/6 to present      Assessment:     Zeyad Woodard is a 89 y.o.male with multiple comorbidities admitted with HCAP and thrombocytopenia     Plan:     Acute encephalopathy, improving  - Began 4/9, became more lethargic and confused, also with visual halluciations (now resolved)  No focal deficits and CT head on 4/10  without acute findings  Likely 2/2 delirium in setting of sepsis and dementia--improving  A&O to person and place, appropriate    Dysphagia  -Evaluated extensively by speech--unsure how much dysphagia is 2/2 AMS vs functional limitations  -Required PPN 4/11-4/17  SLP feels this will be longterm issue and recommended PEG, which was placed 4/17  -Titrating up G-tube feeds--tolerating well  Remains NPO 2/2 aspiration risk    Acute hypoxic respiratory failure and severe sepsis 2/2 HCAP vs aspiration PNA  -1 week progressive  productive cough, fever, SOB  On admit, satting well on 5L O2 via NC, temp 103, CXR with bibasilar consolidations, lactate 2.5  BP stable   - Modified barium swallow study 1/18 w/o aspiration--re-evaluated by speech-not safe for thin liquids-honey thick  -In ED, given 2.3L NS bolus, started vanc, cipro, cefepime--penicillin allergy was remote and only hives  -Lactate normalized, procal 21  Blood cx with coag negative staph 2/4 bottles--likely contaminant  Duonebs, CPT  - s/p cipro, vanc, and cefepime (4/5-4/10), clinda 4/6-present--expected 14 day total course     Left 1st toe erosion  -Bruising, erythema, swelling 2/2 recent trauma  H/o neuropathy, follows with podiatry  Xray on admission suggestive of early osteo, Podiatry was consulted, removed toe nail, thinks no osteo  - s/p vanc, cefepime, cipro (4/5-4/10); clinda 4/6-present  Wound cx NGTD  - continue routine wound care.     ELIZABET on CKD 3A, resolved  -On admit, Cr 1.5, GFR 40  Baseline Cr 1.2, GFR 60  -Likely prerenal in setting of sepsis  Returned to baseline     Elevated troponin, resolved  -On admit, trop 0.047  Chronically elevated trop to 0.03-0.06. Also, possibly 2/2 demand in setting of sepsis and CKD  EKG ventricularly paced  -Trended troponins--peaked 0.133->0.125     Chronic thrombocytopenia, stable  -Baseline platelets ~ 90  Follows with Dr. Jeff as outpt  -On admit, platelets 44-likely exacerbated by sepsis  Scatter petechiae, contusions in setting of recent fall but no active bleeding  -PLT improving daily, holding ASA and pharmocologic DVT ppx     Chronic HFpEF  -TTE 12/17-EF 55%, grade 2 diastolic dysfunction, PA pressure 35  -On admit, Trop 0.047,   CXR with b/l consolidations 2/2 PNA rather than volume overload--given fluids per sepsis protocol  Monitor closely for volume overload  -Repeat TTE--LV EF 55%, no diastolic d/f, PA pressure 33     A fib and SSS s/p pacemaker  -On admit, in ventricularly paced rhythm  Not on  home anticoagulation 2/2 h/o GI bleeds. Also falls frequently  -Converted to afib with RVR 4/11--responded to IV metoprolol  Holding PO meds 2/2 dysphagia, continue IV metoprolol 5 BID     Anemia of chronic disease, h/o CHRISTIANO and B12 deficiency  -On admit, Hbg 9.4--at baseline  1/18--w/u c/w AoCD--ferritin, B12 wnl  -Follows with Dr. Jeff--continue home iron, B12     Mild transaminitis, resolved  -On admit, , AST 61, ALT 50  Consistent with baseline  -Negative Hep C Ab 2012--defer repeating as patient would be poor treatment candidate given age/comorbidities     HTN  -On admit, BP low/normal  Held home coreg in setting of sepsis--resumed 2/2 HTN  -Holding PO meds until swallowing improves, on scheduled and prn IV antihypertensives     Hypothyroidism  -No acute issues  Continue home synthroid     BPH  -No acute issues  Continue home flomax, finasteride  - Holding PO meds until swallowing improves     Frequent mechanical falls  -Per chart review  Fell 3 days PTA  -Per family, previously had HH PT/OT that has been discontinued/completed  Only mobile via wheelchair  -Consulted PT/OT--recommended SNF vs return to group home with PT     Dementia  -Baseline--Depending for ADLs, lives at [facility] group [place] and has supervision via aide and nurse  -A&O to person, place, date; not situation  Continue home remeron, aricept  -Holding PO meds until swallowing improves     Code Status:      Full     Fluids: None    Diet: NPO, G-tube feeds    Ppx: SCDs; holding chemical ppx in setting of thrombocytopenia    Dispo: Titrating G-tube feeds to goal. Likely discharge today vs tomorrow pending goals of care discussion.    Chuckie Isaacs MD  [facility] Internal Medicine HO-I  U IM Service Team A    [facility] Medicine Hospitalist Pager numbers:   [facility] Hospitalist Medicine Team A (Kinsey/Yumiko): [phone]  [facility] Hospitalist Medicine Team B (Tyrese/Bibi):  [phone]

## 2018-04-18 NOTE — PLAN OF CARE
TN spoke with the  for the group home where patient stays:  Cira Paredes Other     [phone] [facility] employee     Cira is going to double check with Shayna Novoa to determine if patient is able top return with PEG/feedings once he is ready for DC.  TN asked Cira to make sure that any answer to this question is communicated directly with the family and with [facility], she stated she would.    Update: Cira stated that we should contact Shayna Novoa once patient is ready for DC or once teaching will be needed for PEG/feedings, and that they would be able to accommodate feedings, she also stated that HH and hospice are welcome if needed as well at [facility].  Shayna Novoa       [phone] runs [facility]   TN sent referral to [employer] for home peg feedings.           04/18/18 0909   Discharge Reassessment   Assessment Type Discharge Planning Reassessment   Discharge Plan A Group Home;Home Health   Discharge Plan B Other

## 2018-04-18 NOTE — PT/OT/SLP PROGRESS
Physical Therapy Treatment    Patient Name:  Zeyad Woodard   MRN:  [id number]    Recommendations:     Discharge Recommendations:   (TBD)   Discharge Equipment Recommendations: none   Barriers to discharge: decreased strength and mobility with limited function    Assessment:     Zeyad Woodard is a 89 y.o. male admitted with a medical diagnosis of HCAP (healthcare-associated pneumonia).  He presents with the following impairments/functional limitations:  weakness, impaired endurance, impaired functional mobilty, decreased upper extremity function, decreased lower extremity function, decreased ROM, impaired coordination pt lethargic this PM requiring increased v/c's to try and stay alert,pt will benefit from continued PT upon discharge.    Rehab Prognosis:  Fair/Good; patient would benefit from acute skilled PT services to address these deficits and reach maximum level of function.      Recent Surgery: Procedure(s) (LRB):  ESOPHAGOGASTRODUODENOSCOPY (EGD) (N/A) 1 Day Post-Op    Plan:     During this hospitalization, patient to be seen 6 x/week to address the above listed problems via therapeutic activities, therapeutic exercises, neuromuscular re-education  · Plan of Care Expires:  05/06/18   Plan of Care Reviewed with: patient    Subjective     Communicated with nsg prior to session.  Patient found supine upon PT entry to room, agreeable to treatment.      Chief Complaint: n/a  Patient comments/goals: n/a  Pain/Comfort:  · Pain Rating 1:  (no c/o's)    Patients cultural, spiritual, Taoism conflicts given the current situation: None stated to PTA    Objective:     Patient found with: peripheral IV, telemetry, PEG Tube     General Precautions: Standard, aspiration, fall   Orthopedic Precautions:N/A   Braces: N/A     Functional Mobility:  · Bed Mobility:     · Rolling Left:  n/a      AM-PAC 6 CLICK MOBILITY  Turning over in bed (including adjusting bedclothes, sheets and blankets)?: 2  Sitting down on and  standing up from a chair with arms (e.g., wheelchair, bedside commode, etc.): 2  Moving from lying on back to sitting on the side of the bed?: 2  Moving to and from a bed to a chair (including a wheelchair)?: 2  Need to walk in hospital room?: 1  Climbing 3-5 steps with a railing?: 1  Total Score: 10       Therapeutic Activities and Exercises: pt received le PROM in available planes X 15 reps with increased verbal/tactile cues       Patient left supine with all lines intact, call button in reach and bed alarm on..    GOALS: see general POC   Physical Therapy Goals        Problem: Physical Therapy Goal    Goal Priority Disciplines Outcome Goal Variances Interventions   Physical Therapy Goal     PT/OT, PT Ongoing (interventions implemented as appropriate)     Description:  Goals to be met by: 2018     Patient will increase functional independence with mobility by performin. Supine to sit with Stand-by Assistance  2. Sit to stand transfer with Stand-by Assistance  3. Bed to chair transfer with Stand-by Assistance using Rolling Walker  4. Gait  x 20 feet with Minimal Assistance using Rolling Walker.                       Time Tracking:     PT Received On: 18  PT Start Time: 1240     PT Stop Time: 1253  PT Total Time (min): 13 min     Billable Minutes: Therapeutic Activity 13    Treatment Type: Treatment  PT/PTA: PTA     PTA Visit Number: 4     Tashi Small, SOTO  2018

## 2018-04-18 NOTE — ASSESSMENT & PLAN NOTE
- s/p egd/peg  - tolerated well  - ext bumper loosened 2cm to 5cm  - no s/s of complications  - routine peg care  - keep site clean, wash bid  - monitor tolerance to TF

## 2018-04-18 NOTE — SUBJECTIVE & OBJECTIVE
Subjective:     Interval History: Doing ok, family at bedside. Afebrile overnight. No vomiting, sulaiman TFs    Review of Systems   Unable to perform ROS: Acuity of condition     Objective:     Vital Signs (Most Recent):  Temp: 100 °F (37.8 °C) (04/18/18 1144)  Pulse: 69 (04/18/18 1200)  Resp: 18 (04/18/18 1144)  BP: (!) 131/51 (04/18/18 1144)  SpO2: 98 % (04/18/18 1140) Vital Signs (24h Range):  Temp:  [98.1 °F (36.7 °C)-100.3 °F (37.9 °C)] 100 °F (37.8 °C)  Pulse:  [] 69  Resp:  [16-20] 18  SpO2:  [94 %-100 %] 98 %  BP: (130-163)/(51-95) 131/51     Weight: 84.7 kg (186 lb 11.7 oz) (04/12/18 1100)  Body mass index is 28.39 kg/m².      Intake/Output Summary (Last 24 hours) at 04/18/18 1237  Last data filed at 04/18/18 1000   Gross per 24 hour   Intake              285 ml   Output             1471 ml   Net            -1186 ml       Lines/Drains/Airways     Drain                 Gastrostomy/Enterostomy 04/17/18 1550 Percutaneous endoscopic gastrostomy (PEG) midline less than 1 day          Pressure Ulcer                 Pressure Injury 04/05/18 2254 transverse Buttocks Stage 1 12 days          Peripheral Intravenous Line                 Peripheral IV - Single Lumen 04/17/18 1910 Right Wrist less than 1 day                Physical Exam   Constitutional: He appears well-developed. No distress.   Eyes: Conjunctivae are normal. No scleral icterus.   Neck: Normal range of motion. Neck supple.   Pulmonary/Chest: Effort normal. No respiratory distress.   Abdominal: Soft. Bowel sounds are normal.   Peg site clean/dry, no fluctuance, purulence or bleeding   Neurological: He is alert.   Skin: Skin is warm and dry. No rash noted.   Nursing note and vitals reviewed.      Significant Labs:  CBC:   Recent Labs  Lab 04/17/18  0550 04/18/18  0435   WBC 18.31* 16.65*   HGB 7.6* 7.7*   HCT 23.4* 24.1*   * SEE COMMENT         Significant Imaging:  Imaging results within the past 24 hours have been reviewed.

## 2018-04-18 NOTE — PLAN OF CARE
Seen b y Dr Euceda's service. Tolerating tube feedings thus far. Small amount of residual .Hob elevated . Multiple scabs painted by betadine . Bactroban applied to left great toe,.Barrier cream applied to jose maria dermatitis. Wound care saw patient as well. Teds in place. Remains npo. On air mattress. Continued on telemetry and fall risk monitoring. No true red alarm ectopy

## 2018-04-18 NOTE — PROGRESS NOTES
Recommend betadine applied to scab areas on both knees daily.  Recommend Coloplast moisture barrier ointment to buttocks daily and as need for protection from urinary incontinence.

## 2018-04-18 NOTE — PLAN OF CARE
Problem: Occupational Therapy Goal  Goal: Occupational Therapy Goal  Goals to be met by: 05/06/18     Patient will increase functional independence with ADLs by performing:    Grooming while seated with Set-up Assistance.  Toileting from bedside commode with Moderate Assistance for hygiene and clothing management.   Supine to sit with Stand-by Assistance and use of bedrail as needed.  Toilet transfer to bedside commode with Contact Guard Assistance.  Increased functional strength to WFL for ADLs.     Outcome: Ongoing (interventions implemented as appropriate)  POC frequency decreased to 3x/wk    Cont POC

## 2018-04-18 NOTE — PLAN OF CARE
Right eye reddened ,.both eyes draining yellow drainage. Dr Renee service notifiedWill order gtts for patient

## 2018-04-18 NOTE — PLAN OF CARE
Tube feedings started at 10cc/hr. Tube flushed with 30cc water prior to feeding, flushed without difficulty.  No complaints of abdominal discomfort. Dressing with dry old drainage. Will continue to monitor

## 2018-04-18 NOTE — PROGRESS NOTES
The Sw met with the pt's dtr's and gave them contact info on the various w/c van companies b/c they states they are willing to pay for a w/c van b/c an ambulance will be too expensive. The Sw informed her the pt must be able to transport safely via w/c if not they will refuse the pt and an ambulance will have to be called and if the pt meets criteria and can't transport safely it may be approved by insurance and they may have a copay. The pt's dtr states she feels the pt will be able to transport via w/c van b/c he's sitting on the EOB.

## 2018-04-18 NOTE — CONSULTS
Pt seen regarding small wounds to bilateral knee area. Small dry scabs noted to right and left knees, no jose maria wound redness, no odor or drainage noted. Betadine applied to each scab.   Buttocks skin peeling and pink, no open skin seen. Coloplast zinc barrier cream applied for protection from urine incontinence.   Redness and warmth noted to left forearm, with complaints of sensitivity. Patient's nurse states that doctor is aware and area shows improvement.

## 2018-04-18 NOTE — PROGRESS NOTES
[facility]  Gastroenterology  Progress Note    Patient Name: Zeyad Woodard  MRN: [id number]  Admission Date: 4/5/2018  Hospital Length of Stay: 13 days  Code Status: DNR   Attending Provider: Fer Euceda MD  Consulting Provider: Hannah Garg MD  Primary Care Physician: Booker Ricci MD  Principal Problem: HCAP (healthcare-associated pneumonia)      Subjective:     Interval History: Doing ok, family at bedside. Afebrile overnight. No vomiting, sulaiman TFs    Review of Systems   Unable to perform ROS: Acuity of condition     Objective:     Vital Signs (Most Recent):  Temp: 100 °F (37.8 °C) (04/18/18 1144)  Pulse: 69 (04/18/18 1200)  Resp: 18 (04/18/18 1144)  BP: (!) 131/51 (04/18/18 1144)  SpO2: 98 % (04/18/18 1140) Vital Signs (24h Range):  Temp:  [98.1 °F (36.7 °C)-100.3 °F (37.9 °C)] 100 °F (37.8 °C)  Pulse:  [] 69  Resp:  [16-20] 18  SpO2:  [94 %-100 %] 98 %  BP: (130-163)/(51-95) 131/51     Weight: 84.7 kg (186 lb 11.7 oz) (04/12/18 1100)  Body mass index is 28.39 kg/m².      Intake/Output Summary (Last 24 hours) at 04/18/18 1237  Last data filed at 04/18/18 1000   Gross per 24 hour   Intake              285 ml   Output             1471 ml   Net            -1186 ml       Lines/Drains/Airways     Drain                 Gastrostomy/Enterostomy 04/17/18 1550 Percutaneous endoscopic gastrostomy (PEG) midline less than 1 day          Pressure Ulcer                 Pressure Injury 04/05/18 2254 transverse Buttocks Stage 1 12 days          Peripheral Intravenous Line                 Peripheral IV - Single Lumen 04/17/18 1910 Right Wrist less than 1 day                Physical Exam   Constitutional: He appears well-developed. No distress.   Eyes: Conjunctivae are normal. No scleral icterus.   Neck: Normal range of motion. Neck supple.   Pulmonary/Chest: Effort normal. No respiratory distress.   Abdominal: Soft. Bowel sounds are normal.   Peg site clean/dry, no fluctuance, purulence or  bleeding   Neurological: He is alert.   Skin: Skin is warm and dry. No rash noted.   Nursing note and vitals reviewed.      Significant Labs:  CBC:   Recent Labs  Lab 04/17/18  0550 04/18/18  0435   WBC 18.31* 16.65*   HGB 7.6* 7.7*   HCT 23.4* 24.1*   * SEE COMMENT         Significant Imaging:  Imaging results within the past 24 hours have been reviewed.    Assessment/Plan:     Oropharyngeal dysphagia    - s/p egd/peg  - tolerated well  - ext bumper loosened 2cm to 5cm  - no s/s of complications  - routine peg care  - keep site clean, wash bid  - monitor tolerance to TF            Thank you for your consult. I will follow-up with patient. Please contact us if you have any additional questions.    Hannah Garg MD  Gastroenterology  [facility]

## 2018-04-19 ENCOUNTER — OUTPATIENT CASE MANAGEMENT (OUTPATIENT)
Dept: ADMINISTRATIVE | Facility: OTHER | Age: 83
End: 2018-04-19

## 2018-04-19 PROCEDURE — 94664 DEMO&/EVAL PT USE INHALER: CPT

## 2018-04-19 PROCEDURE — 94640 AIRWAY INHALATION TREATMENT: CPT

## 2018-04-19 PROCEDURE — 11000001 HC ACUTE MED/SURG PRIVATE ROOM

## 2018-04-19 PROCEDURE — 25000003 PHARM REV CODE 250: Performed by: RADIOLOGY

## 2018-04-19 PROCEDURE — 99231 SBSQ HOSP IP/OBS SF/LOW 25: CPT | Mod: ,,, | Performed by: INTERNAL MEDICINE

## 2018-04-19 PROCEDURE — 25000242 PHARM REV CODE 250 ALT 637 W/ HCPCS: Performed by: STUDENT IN AN ORGANIZED HEALTH CARE EDUCATION/TRAINING PROGRAM

## 2018-04-19 PROCEDURE — A4216 STERILE WATER/SALINE, 10 ML: HCPCS | Performed by: STUDENT IN AN ORGANIZED HEALTH CARE EDUCATION/TRAINING PROGRAM

## 2018-04-19 PROCEDURE — 25000003 PHARM REV CODE 250: Performed by: STUDENT IN AN ORGANIZED HEALTH CARE EDUCATION/TRAINING PROGRAM

## 2018-04-19 PROCEDURE — 99900035 HC TECH TIME PER 15 MIN (STAT)

## 2018-04-19 PROCEDURE — 97803 MED NUTRITION INDIV SUBSEQ: CPT

## 2018-04-19 PROCEDURE — 27000646 HC AEROBIKA DEVICE

## 2018-04-19 PROCEDURE — S0077 INJECTION, CLINDAMYCIN PHOSP: HCPCS | Performed by: STUDENT IN AN ORGANIZED HEALTH CARE EDUCATION/TRAINING PROGRAM

## 2018-04-19 PROCEDURE — 94761 N-INVAS EAR/PLS OXIMETRY MLT: CPT

## 2018-04-19 RX ORDER — CARVEDILOL 3.12 MG/1
3.12 TABLET ORAL 2 TIMES DAILY WITH MEALS
Status: DISCONTINUED | OUTPATIENT
Start: 2018-04-19 | End: 2018-04-20

## 2018-04-19 RX ORDER — ACETAMINOPHEN 325 MG/1
650 TABLET ORAL EVERY 6 HOURS PRN
Refills: 0 | COMMUNITY
Start: 2018-04-19

## 2018-04-19 RX ORDER — LEVOTHYROXINE SODIUM 100 UG/1
100 TABLET ORAL
Status: DISCONTINUED | OUTPATIENT
Start: 2018-04-19 | End: 2018-04-20 | Stop reason: HOSPADM

## 2018-04-19 RX ORDER — TAMSULOSIN HYDROCHLORIDE 0.4 MG/1
0.4 CAPSULE ORAL DAILY
Status: DISCONTINUED | OUTPATIENT
Start: 2018-04-19 | End: 2018-04-20 | Stop reason: HOSPADM

## 2018-04-19 RX ORDER — GABAPENTIN 100 MG/1
100 CAPSULE ORAL 3 TIMES DAILY
Qty: 360 CAPSULE | Refills: 3 | Status: SHIPPED | OUTPATIENT
Start: 2018-04-19

## 2018-04-19 RX ORDER — DONEPEZIL HYDROCHLORIDE 10 MG/1
10 TABLET, FILM COATED ORAL NIGHTLY
Qty: 30 TABLET | Refills: 3 | Status: SHIPPED | OUTPATIENT
Start: 2018-04-19 | End: 2019-04-19

## 2018-04-19 RX ORDER — HYDROCODONE BITARTRATE AND ACETAMINOPHEN 5; 325 MG/1; MG/1
1 TABLET ORAL EVERY 6 HOURS PRN
Status: DISCONTINUED | OUTPATIENT
Start: 2018-04-19 | End: 2018-04-20

## 2018-04-19 RX ORDER — IPRATROPIUM BROMIDE AND ALBUTEROL SULFATE 2.5; .5 MG/3ML; MG/3ML
3 SOLUTION RESPIRATORY (INHALATION) EVERY 4 HOURS PRN
Qty: 1 BOX | Refills: 3 | Status: SHIPPED | OUTPATIENT
Start: 2018-04-19 | End: 2019-04-19

## 2018-04-19 RX ORDER — AMOXICILLIN 250 MG
1 CAPSULE ORAL 2 TIMES DAILY
COMMUNITY
Start: 2018-04-19

## 2018-04-19 RX ORDER — ATORVASTATIN CALCIUM 20 MG/1
20 TABLET, FILM COATED ORAL NIGHTLY
Qty: 90 TABLET | Refills: 3 | Status: SHIPPED | OUTPATIENT
Start: 2018-04-19 | End: 2019-04-19

## 2018-04-19 RX ORDER — TAMSULOSIN HYDROCHLORIDE 0.4 MG/1
0.4 CAPSULE ORAL DAILY
Qty: 30 CAPSULE | Refills: 11 | Status: SHIPPED | OUTPATIENT
Start: 2018-04-20 | End: 2019-04-20

## 2018-04-19 RX ORDER — DONEPEZIL HYDROCHLORIDE 5 MG/1
10 TABLET, FILM COATED ORAL NIGHTLY
Status: DISCONTINUED | OUTPATIENT
Start: 2018-04-19 | End: 2018-04-20 | Stop reason: HOSPADM

## 2018-04-19 RX ORDER — FINASTERIDE 5 MG/1
5 TABLET, FILM COATED ORAL DAILY
Status: DISCONTINUED | OUTPATIENT
Start: 2018-04-19 | End: 2018-04-20 | Stop reason: HOSPADM

## 2018-04-19 RX ORDER — BENZONATATE 100 MG/1
100 CAPSULE ORAL 3 TIMES DAILY PRN
Qty: 30 CAPSULE | Refills: 3 | Status: SHIPPED | OUTPATIENT
Start: 2018-04-19 | End: 2018-05-19

## 2018-04-19 RX ORDER — LANOLIN ALCOHOL/MO/W.PET/CERES
500 CREAM (GRAM) TOPICAL DAILY
COMMUNITY
Start: 2018-04-19

## 2018-04-19 RX ORDER — MIRTAZAPINE 7.5 MG/1
7.5 TABLET, FILM COATED ORAL NIGHTLY
Qty: 90 TABLET | Refills: 3 | Status: SHIPPED | OUTPATIENT
Start: 2018-04-19 | End: 2018-04-20 | Stop reason: HOSPADM

## 2018-04-19 RX ORDER — LEVOTHYROXINE SODIUM 100 UG/1
100 TABLET ORAL
Qty: 30 TABLET | Refills: 3 | Status: SHIPPED | OUTPATIENT
Start: 2018-04-20 | End: 2019-04-20

## 2018-04-19 RX ORDER — CARVEDILOL 6.25 MG/1
6.25 TABLET ORAL 2 TIMES DAILY WITH MEALS
Qty: 60 TABLET | Refills: 3 | Status: SHIPPED | OUTPATIENT
Start: 2018-04-19 | End: 2019-04-19

## 2018-04-19 RX ORDER — FINASTERIDE 5 MG/1
5 TABLET, FILM COATED ORAL DAILY
Qty: 30 TABLET | Refills: 3 | Status: SHIPPED | OUTPATIENT
Start: 2018-04-20 | End: 2019-04-20

## 2018-04-19 RX ADMIN — LEVOTHYROXINE SODIUM 100 MCG: 100 TABLET ORAL at 09:04

## 2018-04-19 RX ADMIN — IPRATROPIUM BROMIDE AND ALBUTEROL SULFATE 3 ML: .5; 3 SOLUTION RESPIRATORY (INHALATION) at 11:04

## 2018-04-19 RX ADMIN — NAPHAZOLINE HYDROCHLORIDE AND PHENIRAMINE MALEATE 1 DROP: .25; 3 SOLUTION/ DROPS OPHTHALMIC at 09:04

## 2018-04-19 RX ADMIN — IPRATROPIUM BROMIDE AND ALBUTEROL SULFATE 3 ML: .5; 3 SOLUTION RESPIRATORY (INHALATION) at 03:04

## 2018-04-19 RX ADMIN — CARVEDILOL 3.12 MG: 3.12 TABLET, FILM COATED ORAL at 06:04

## 2018-04-19 RX ADMIN — DONEPEZIL HYDROCHLORIDE 10 MG: 5 TABLET, FILM COATED ORAL at 08:04

## 2018-04-19 RX ADMIN — CLINDAMYCIN IN 5 PERCENT DEXTROSE 900 MG: 18 INJECTION, SOLUTION INTRAVENOUS at 09:04

## 2018-04-19 RX ADMIN — IPRATROPIUM BROMIDE AND ALBUTEROL SULFATE 3 ML: .5; 3 SOLUTION RESPIRATORY (INHALATION) at 07:04

## 2018-04-19 RX ADMIN — FINASTERIDE 5 MG: 5 TABLET, FILM COATED ORAL at 09:04

## 2018-04-19 RX ADMIN — HYDROCODONE BITARTRATE AND ACETAMINOPHEN 1 TABLET: 5; 325 TABLET ORAL at 07:04

## 2018-04-19 RX ADMIN — SODIUM CHLORIDE, PRESERVATIVE FREE 3 ML: 5 INJECTION INTRAVENOUS at 02:04

## 2018-04-19 RX ADMIN — CLINDAMYCIN IN 5 PERCENT DEXTROSE 900 MG: 18 INJECTION, SOLUTION INTRAVENOUS at 01:04

## 2018-04-19 RX ADMIN — MUPIROCIN: 20 OINTMENT TOPICAL at 09:04

## 2018-04-19 RX ADMIN — IPRATROPIUM BROMIDE AND ALBUTEROL SULFATE 3 ML: .5; 3 SOLUTION RESPIRATORY (INHALATION) at 04:04

## 2018-04-19 RX ADMIN — TAMSULOSIN HYDROCHLORIDE 0.4 MG: 0.4 CAPSULE ORAL at 09:04

## 2018-04-19 RX ADMIN — NAPHAZOLINE HYDROCHLORIDE AND PHENIRAMINE MALEATE 1 DROP: .25; 3 SOLUTION/ DROPS OPHTHALMIC at 08:04

## 2018-04-19 RX ADMIN — CARVEDILOL 3.12 MG: 3.12 TABLET, FILM COATED ORAL at 09:04

## 2018-04-19 NOTE — SUBJECTIVE & OBJECTIVE
Subjective:     Interval History: Doing ok, no vomiting. Less discomfort around incision site, afebrile    Review of Systems   Cardiovascular: Negative for chest pain and leg swelling.   Gastrointestinal: Negative for abdominal distention and abdominal pain.     Objective:     Vital Signs (Most Recent):  Temp: 99.6 °F (37.6 °C) (04/19/18 0757)  Pulse: 76 (04/19/18 1200)  Resp: 18 (04/19/18 1147)  BP: 132/63 (04/19/18 0757)  SpO2: (!) 92 % (04/19/18 1147) Vital Signs (24h Range):  Temp:  [96.7 °F (35.9 °C)-99.6 °F (37.6 °C)] 99.6 °F (37.6 °C)  Pulse:  [] 76  Resp:  [16-22] 18  SpO2:  [92 %-97 %] 92 %  BP: (118-170)/(63-79) 132/63     Weight: 84.7 kg (186 lb 11.7 oz) (04/12/18 1100)  Body mass index is 28.39 kg/m².      Intake/Output Summary (Last 24 hours) at 04/19/18 1318  Last data filed at 04/19/18 1030   Gross per 24 hour   Intake             1860 ml   Output             1214 ml   Net              646 ml       Lines/Drains/Airways     Drain                 Gastrostomy/Enterostomy 04/17/18 1550 Percutaneous endoscopic gastrostomy (PEG) midline 1 day          Pressure Ulcer                 Pressure Injury 04/05/18 2254 transverse Buttocks Stage 1 13 days          Peripheral Intravenous Line                 Peripheral IV - Single Lumen 04/17/18 1910 Right Wrist 1 day                Physical Exam   Constitutional: He appears well-developed. No distress.   Eyes: Conjunctivae are normal. No scleral icterus.   Neck: Normal range of motion. Neck supple.   Pulmonary/Chest: Effort normal. No respiratory distress.   Abdominal: Soft. Bowel sounds are normal.   Peg site clean/dry, no fluctuance, purulence or bleeding   Neurological: He is alert.   Skin: Skin is warm and dry. No rash noted.   Nursing note and vitals reviewed.      Significant Labs:  CBC:   Recent Labs  Lab 04/18/18  0435   WBC 16.65*   HGB 7.7*   HCT 24.1*   PLT SEE COMMENT

## 2018-04-19 NOTE — PROGRESS NOTES
The Sw reached out to the pt's dtr Asia and she stated she has everything she needs for the pt and is in agreement with the pt retuning to [facility] today. She thanked the Sw and Cole for all their assistance with the case. The Sw encouraged her to call should she have any questions or concerns.

## 2018-04-19 NOTE — PLAN OF CARE
Problem: Patient Care Overview  Goal: Plan of Care Review  Outcome: Ongoing (interventions implemented as appropriate)  Patient atrial rhythm paced on continuous cardiac monitoring, no true red alarms noted. Tube feeding is going at 50 mL/hr continuous, no residual, no discomfort noted. Patient given PRN pain medication. Patient turned Q2H, and readjusted independently in bed. Fall precautions explained and maintained. Patient advised to use call light for assistance. Will continue to monitor.

## 2018-04-19 NOTE — DISCHARGE SUMMARY
[facility] Discharge Summary    Primary Team: [facility] IM Team A  Attending Physician: Fer Euceda MD  Resident: Martina Herron  Intern: Chuckie Isaacs    Date of Admit: 4/5/2018  Date of Discharge: 4/19/2018    Discharge to: [facility] with [facility] health  Condition: stable    Discharge Diagnoses     Patient Active Problem List   Diagnosis    Renovascular hypertension    Chronic atrial fibrillation    Benign prostatic hyperplasia    Chronic diastolic congestive heart failure    Polyneuropathy    Hypothyroidism    Orthostatic hypotension    Iron deficiency anemia due to chronic blood loss    CKD (chronic kidney disease), stage III    Chronic back pain    Lumbar radiculopathy    Sick sinus syndrome    Bradycardia    Frequent falls    Venous insufficiency of both lower extremities    Anemia of chronic renal failure, stage 3 (moderate)    Right hip pain    Pseudophakia    Essential hypertension    Right posterior capsular opacification    Refractive error    Dementia without behavioral disturbance    Chronic bilateral low back pain with bilateral sciatica    Abnormal blood smear    HLD (hyperlipidemia)    Cardiac pacemaker in situ    Pericardial calcification    Mitral regurgitation    Takes dietary supplements    Late onset Alzheimer's disease with behavioral disturbance    Stroke    C. difficile colitis    Flu    Acute encephalopathy    Bronchitis    HCAP (healthcare-associated pneumonia)    Chronic idiopathic thrombocytopenia    Physical deconditioning    Osteomyelitis of toe    Sepsis    Shortness of breath    Subungual hematoma of toe    Cellulitis of great toe, left    Oropharyngeal dysphagia    Palliative care encounter    Goals of care, counseling/discussion    Counseling regarding advanced care planning and goals of care       Consultants and Procedures     Consultants:  GI, PT/OT/SLP, palliative care    Procedures:   Peg tube placement    Brief History of  Present Illness      Zeyad Woodard is a 89 y.o. male who has PMHx HFpEF, afib and SSS s/p pacer, HTN, hypothyroidism, CKD3A, BPH, dementia. The patient presented to [facility] on 4/5/2018 with a primary complaint of SOB.     He was in his USOH, which is dependent for ADLS, ambulatory with wheelchair, until 1 week ago when he developed worsening of chronic dry cough. 3 days ago, he devleoped fevers and saw PCP, who prescribed doxycycline. At that time, he began having progressive SOB, fatigue, weakness and his cough became productive of yellow/green sputum. He denies chest pain, orthopnea. Most history is provided by his daughters.     Per EMS, he was found to be satting 75% at group home. They placed him on CPAP. He was weaned to NC in ED.     The patient has two recent hospitalizations 1/18 for c.diff and influenza B.    For the full HPI please refer to the History & Physical from this admission.    Hospital Course By Problem with Pertinent Findings     Acute encephalopathy, improving  - Began 4/9, became more lethargic and confused, also with visual halluciations (now resolved)  No focal deficits and CT head on 4/10  without acute findings  Likely 2/2 delirium in setting of sepsis and dementia--improving  A&O to person and place, appropriate  -Continue frequent reorientation, appropriate day/night cycle, avoid sedating medications     Dysphagia  -Evaluated extensively by speech--unsure how much dysphagia is 2/2 AMS vs functional limitations  -Required PPN 4/11-4/17  SLP feels this will be longterm issue and recommended PEG, which was placed 4/17  -G-tube feeds titrated to goal at 50cc/hr with water boluses on 4/18  Remains NPO 2/2 aspiration risk  -Aspiration precautions--1 bite/sip at a time, Alternating bites/sips, Alternate means of nutrition/hydration,Check for pocketing/oral residue, Chin tuck, Feed only when awake/alert, HOB to 90 degrees when feeding, Remain upright 30 minutes  post meal and Small bites/sips. HOD 30-45 degrees when resting/sleeping.  -Continue speech therapy at home    Acute hypoxic respiratory failure and severe sepsis 2/2 HCAP vs aspiration PNA  -1 week progressive productive cough, fever, SOB  On admit, satting well on 5L O2 via NC, temp 103, CXR with bibasilar consolidations, lactate 2.5  BP stable   - Modified barium swallow study 1/18 w/o aspiration--re-evaluated by speech-not safe for thin liquids-honey thick  -In ED, given 2.3L NS bolus, started vanc, cipro, cefepime--penicillin allergy was remote and only hives  -Lactate normalized, procal 21  Blood cx with coag negative staph 2/4 bottles--likely contaminant  Duonebs, CPT  - s/p cipro, vanc, and cefepime (4/5-4/10), clinda 4/6-4/19--completed 14 day total course  - Possible that patient will continue to chronically aspirate even in setting of PEG/NPO  Family counseled, not interested in hospice, but would like palliative care referral on discharge--order placed     Left 1st toe erosion  -Bruising, erythema, swelling 2/2 recent trauma  H/o neuropathy, follows with podiatry  Xray on admission suggestive of early osteo, Podiatry was consulted, removed toe nail, thinks no osteo  - s/p vanc, cefepime, cipro (4/5-4/10); clinda 4/6-4/19  Wound cx NGTD     ELIZABET on CKD 3A, resolved  -On admit, Cr 1.5, GFR 40  Baseline Cr 1.2, GFR 60  -Likely prerenal in setting of sepsis  Returned to baseline     Elevated troponin, resolved  -On admit, trop 0.047  Chronically elevated trop to 0.03-0.06. Also, possibly 2/2 demand in setting of sepsis and CKD  EKG ventricularly paced  -Trended troponins--peaked 0.133->0.125     Chronic thrombocytopenia, stable  -Baseline platelets ~ 90  Follows with Dr. Jeff as outpt  -On admit, platelets 44-likely exacerbated by sepsis  Scatter petechiae, contusions in setting of recent fall but no active bleeding  -PLT stable near 100, stopped ASA and held pharmocologic DVT ppx     Chronic  HFpEF  -TTE 12/17-EF 55%, grade 2 diastolic dysfunction, PA pressure 35  -On admit, Trop 0.047,   CXR with b/l consolidations 2/2 PNA rather than volume overload--given fluids per sepsis protocol  Monitor closely for volume overload  -Repeat TTE--LV EF 55%, no diastolic d/f, PA pressure 33     A fib and SSS s/p pacemaker  -On admit, in ventricularly paced rhythm  Not on home anticoagulation 2/2 h/o GI bleeds. Also falls frequently  -Converted to afib with RVR 4/11--responded to IV metoprolol  Held PO meds 2/2 dysphagia, rate controlled with IV metoprolol 5 BID   -Resumed home coreg per PEG on d/c. Increased to 6.25     Anemia of chronic disease, h/o CHRISTIANO and B12 deficiency  -On admit, Hbg 9.4--at baseline  1/18--w/u c/w AoCD--ferritin, B12 wnl  -Follows with Dr. Jeff--continue home iron, B12  F/u as outpatient     Mild transaminitis, resolved  -On admit, , AST 61, ALT 50  Consistent with baseline  -Negative Hep C Ab 2012--defer repeating as patient would be poor treatment candidate given age/comorbidities     HTN  -On admit, BP low/normal  Held home coreg in setting of sepsis--resumed 2/2 HTN  -Held PO meds 2/2 aspiration risk, started scheduled and prn IV antihypertensives  Resumed coreg per G tube on discharge     Hypothyroidism  -No acute issues  Continue home synthroid     BPH  -No acute issues  Continue home flomax, finasteride     Frequent mechanical falls  -Per chart review  Fell 3 days PTA  -Per family, previously had HH PT/OT that has been discontinued/completed  Only mobile via wheelchair  -Consulted PT/OT--recommended home health PT/OT--order placed     Dementia  -Baseline--Depending for ADLs, lives at [facility] and has supervision via aide and nurse  -A&O to person, place, date; not situation  Continue home remeron, aricept  -Resume meds per PEG on discharge    Discharge Medications        Medication List      START taking these medications    albuterol-ipratropium  2.5mg-0.5mg/3mL 0.5 mg-3 mg(2.5 mg base)/3 mL nebulizer solution  Commonly known as:  DUO-NEB  Take 3 mLs by nebulization every 4 (four) hours as needed for Wheezing. Rescue     lactose-reduced food with fibr 0.07 gram-1.5 kcal/mL Liqd  Commonly known as:  ISOSOURCE 1.5 RAUQEL  50 mL/hr by FEEDING TUBE route continuous. goal rate of 50ml/hr of isosource 1.5 or equivalent formula to proivde 1800 kcal, 81g protein, & 934ml free water. Add an additional 300ml free water flush qid. Equivalent formula acceptable.     naphazoline-pheniramine 0.025-0.3% 0.025-0.3 % ophthalmic solution  Commonly known as:  NAPHCON-A  Place 1 drop into both eyes 4 (four) times daily as needed (eye redness or itching).        CHANGE how you take these medications    acetaminophen 325 MG tablet  Commonly known as:  TYLENOL  2 tablets (650 mg total) by Per G Tube route every 6 (six) hours as needed for Pain.  What changed:  · how to take this  · reasons to take this     atorvastatin 20 MG tablet  Commonly known as:  LIPITOR  1 tablet (20 mg total) by Per G Tube route nightly.  What changed:  how to take this     benzonatate 100 MG capsule  Commonly known as:  TESSALON  1 capsule (100 mg total) by Per G Tube route 3 (three) times daily as needed for Cough.  What changed:  how to take this     calcium 500 mg Tab  1 tablet by Per G Tube route once daily.  What changed:  · how to take this  · when to take this     carvedilol 6.25 MG tablet  Commonly known as:  COREG  1 tablet (6.25 mg total) by Per G Tube route 2 (two) times daily with meals.  What changed:  · medication strength  · how much to take  · how to take this     clotrimazole-betamethasone lotion  Commonly known as:  LOTRISONE  Apply topically 2 (two) times daily.  What changed:  · when to take this  · reasons to take this     cyanocobalamin 1000 MCG tablet  Commonly known as:  VITAMIN B-12  0.5 tablets (500 mcg total) by Per G Tube route once daily.  What changed:  · how much to take  · how  to take this     donepezil 10 MG tablet  Commonly known as:  ARICEPT  1 tablet (10 mg total) by Per G Tube route every evening.  What changed:  how to take this     finasteride 5 mg tablet  Commonly known as:  PROSCAR  1 tablet (5 mg total) by Per G Tube route once daily.  Start taking on:  4/20/2018  What changed:  how to take this     gabapentin 100 MG capsule  Commonly known as:  NEURONTIN  1 capsule (100 mg total) by Per G Tube route 3 (three) times daily. Take 100mg in Morning and take 300mg nightly  What changed:  · how much to take  · how to take this  · when to take this     levothyroxine 100 MCG tablet  Commonly known as:  SYNTHROID  1 tablet (100 mcg total) by Per G Tube route before breakfast.  Start taking on:  4/20/2018  What changed:  · how to take this  · when to take this     mirtazapine 7.5 MG Tab  Commonly known as:  REMERON  1 tablet (7.5 mg total) by Per G Tube route every evening.  What changed:  how to take this     multivit-min-folic-vit K-lycop 400- mcg Tab  Commonly known as:  ONE-A-DAY MEN'S MULTIVITAMIN  1 tablet by Per G Tube route once daily.  What changed:  · medication strength  · how to take this     senna-docusate 8.6-50 mg 8.6-50 mg per tablet  Commonly known as:  PERICOLACE  1 tablet by Per G Tube route 2 (two) times daily.  What changed:  how to take this     tamsulosin 0.4 mg Cp24  Commonly known as:  FLOMAX  1 capsule (0.4 mg total) by Per G Tube route once daily.  Start taking on:  4/20/2018  What changed:  how to take this        CONTINUE taking these medications    neomycin-bacitracin-polymyxin ointment  Commonly known as:  NEOSPORIN  Apply topically Daily.        STOP taking these medications    aspirin 81 MG EC tablet  Commonly known as:  ECOTRIN     doxycycline 100 MG capsule  Commonly known as:  MONODOX     ferrous sulfate 325 mg (65 mg iron) Tab tablet     furosemide 20 MG tablet  Commonly known as:  LASIX     VITAMIN C ORAL           Where to Get Your Medications       These medications were sent to [facility] - [place], [place] - [address]  [address], [place] [address]    Phone:  [phone]   · tamsulosin 0.4 mg Cp24     You can get these medications from any pharmacy    Bring a paper prescription for each of these medications  · albuterol-ipratropium 2.5mg-0.5mg/3mL 0.5 mg-3 mg(2.5 mg base)/3 mL nebulizer solution  · atorvastatin 20 MG tablet  · benzonatate 100 MG capsule  · carvedilol 6.25 MG tablet  · donepezil 10 MG tablet  · finasteride 5 mg tablet  · gabapentin 100 MG capsule  · lactose-reduced food with fibr 0.07 gram-1.5 kcal/mL Liqd  · levothyroxine 100 MCG tablet  · mirtazapine 7.5 MG Tab  · naphazoline-pheniramine 0.025-0.3% 0.025-0.3 % ophthalmic solution  You don't need a prescription for these medications  · acetaminophen 325 MG tablet  · calcium 500 mg Tab  · cyanocobalamin 1000 MCG tablet  · multivit-min-folic-vit K-lycop 400- mcg Tab  · senna-docusate 8.6-50 mg 8.6-50 mg per tablet         Discharge Information:   Diet:  NPO  Aspiration precautions:  1 bite/sip at a time, Alternating bites/sips, Alternate means of nutrition/hydration,Check for pocketing/oral residue, Chin tuck, Feed only when awake/alert, HOB to 90 degrees when feeding, Remain upright 30 minutes post meal and Small bites/sips. HOD 30-45 degrees when resting/sleeping.    Physical Activity:  As tolerated    Instructions:  1. Take all medications as prescribed  2. Keep all follow-up appointments    Follow-Up Appointments:  PCP  Tomer  Ambulatory referral to palliative care    Chuckie Isaacs  [facility], [facility]

## 2018-04-19 NOTE — PROGRESS NOTES
[facility]  Gastroenterology  Progress Note    Patient Name: Zeyad Woodard  MRN: [id number]  Admission Date: 4/5/2018  Hospital Length of Stay: 14 days  Code Status: DNR   Attending Provider: Fer Euceda MD  Consulting Provider: Hannah Garg MD  Primary Care Physician: Booker Ricci MD  Principal Problem: HCAP (healthcare-associated pneumonia)      Subjective:     Interval History: Doing ok, no vomiting. Less discomfort around incision site, afebrile    Review of Systems   Cardiovascular: Negative for chest pain and leg swelling.   Gastrointestinal: Negative for abdominal distention and abdominal pain.     Objective:     Vital Signs (Most Recent):  Temp: 99.6 °F (37.6 °C) (04/19/18 0757)  Pulse: 76 (04/19/18 1200)  Resp: 18 (04/19/18 1147)  BP: 132/63 (04/19/18 0757)  SpO2: (!) 92 % (04/19/18 1147) Vital Signs (24h Range):  Temp:  [96.7 °F (35.9 °C)-99.6 °F (37.6 °C)] 99.6 °F (37.6 °C)  Pulse:  [] 76  Resp:  [16-22] 18  SpO2:  [92 %-97 %] 92 %  BP: (118-170)/(63-79) 132/63     Weight: 84.7 kg (186 lb 11.7 oz) (04/12/18 1100)  Body mass index is 28.39 kg/m².      Intake/Output Summary (Last 24 hours) at 04/19/18 1318  Last data filed at 04/19/18 1030   Gross per 24 hour   Intake             1860 ml   Output             1214 ml   Net              646 ml       Lines/Drains/Airways     Drain                 Gastrostomy/Enterostomy 04/17/18 1550 Percutaneous endoscopic gastrostomy (PEG) midline 1 day          Pressure Ulcer                 Pressure Injury 04/05/18 2254 transverse Buttocks Stage 1 13 days          Peripheral Intravenous Line                 Peripheral IV - Single Lumen 04/17/18 1910 Right Wrist 1 day                Physical Exam   Constitutional: He appears well-developed. No distress.   Eyes: Conjunctivae are normal. No scleral icterus.   Neck: Normal range of motion. Neck supple.   Pulmonary/Chest: Effort normal. No respiratory distress.   Abdominal: Soft. Bowel  sounds are normal.   Peg site clean/dry, no fluctuance, purulence or bleeding   Neurological: He is alert.   Skin: Skin is warm and dry. No rash noted.   Nursing note and vitals reviewed.      Significant Labs:  CBC:   Recent Labs  Lab 04/18/18  0435   WBC 16.65*   HGB 7.7*   HCT 24.1*   PLT SEE COMMENT             Assessment/Plan:     Oropharyngeal dysphagia    - s/p egd/peg  - PEG site without s/s of complications  - monitor tolerance to TF  - will follow from a distance            Thank you for your consult. I will follow-up with patient. Please contact us if you have any additional questions.    Hannah Garg MD  Gastroenterology  [facility]

## 2018-04-19 NOTE — PLAN OF CARE
Pt condition has detoriated. He has become hard to arouse. According to his daughters he was like this last week. His IV, and telemetry box was removed on his anticipated discharge, which has been cancelled. MD said that it was ok to leave both of them off until further notice. Pt's family is at his bedside. Called Cole, , and contacted [facility] to inform them of the change. Will continue to monitor.

## 2018-04-19 NOTE — PROGRESS NOTES
[facility] IM Resident Progress Note    Subjective:      Doing well. Alert and appropriate. Tolerating feeds at 50 cc without residuals.    Objective:   Last 24 Hour Vital Signs:  BP  Min: 118/79  Max: 170/77  Temp  Av.3 °F (36.8 °C)  Min: 96.7 °F (35.9 °C)  Max: 100 °F (37.8 °C)  Pulse  Av.5  Min: 63  Max: 111  Resp  Av.4  Min: 16  Max: 22  SpO2  Av.3 %  Min: 95 %  Max: 98 %  I/O last 3 completed shifts:  In: 2470 [P.O.:120; NG/GT:2150; IV Piggyback:200]  Out: 2571 [Urine:2571]    Physical Examination:  General:           Alert and awake  Head:               Normocephalic and atraumatic  Eyes:               anicteric sclera and clear conjunctivae  Mouth:             Oropharynx clear and without exudate; moist mucous membranes  Cardio:            RRR, Grade 2/6 holosystolic murmur loudest at LUSB  Resp:               Scattered rhonchi, intermittent dry cough  Abdom:            G-tube in place; Soft, NTND with normoactive bowel sounds  Extrem:            Left 1st toe wrapped with clean bandages  Skin:                Left knee abrasion-healing well since admit; diffuse countusions and scatter   purpura on extremities and chest-stable since admit  Pulses:            2+ and symmetric distally  Neuro:  A&O to person, place, not year; no focal deficits    Laboratory:  Laboratory Data Reviewed: yes  Pertinent Findings:    Recent Labs  Lab 18  0435 18  0550 18  0435   WBC 17.96* 18.31* 16.65*   HGB 7.8* 7.6* 7.7*   HCT 24.2* 23.4* 24.1*   PLT 82* 120* SEE COMMENT   MCV 89 89 89   RDW 15.7* 15.6* 15.6*    137 136   K 4.4 4.7 4.9    102 102   CO2 27 27 27   BUN 40* 45* 44*   CREATININE 1.3 1.4 1.3   * 140* 114*   PROT 6.1 6.2 6.3   ALBUMIN 2.7* 2.8* 2.8*   BILITOT 0.6 0.7 0.9   AST 34 34 42*   ALKPHOS 103 105 119   ALT 27 28 33       Microbiology Data Reviewed: yes  Pertinent Findings:  Blood cx (18): 1/ positive for coag negative staph  Blood cx (18) x6 -  all  negative  Urine cx (4/7): negative  Respiratory cx: stain-G+ cocci, cx negative  Wound cx: NGTD    Other Results:  EKG (my interpretation):none new    Radiology Data Reviewed: yes  Pertinent Findings:    Imaging Results          X-Ray Toe 2 or More Views Left (Final result)  Result time 04/05/18 19:14:09    Final result by Jurgen Gaston MD (04/05/18 19:14:09)                 Impression:      Erosive changes involving the left 1st metacarpal head and base of the 1st proximal phalanx.  The findings suggestive of early osteomyelitis.  Contrast enhanced MRI may be obtained for further evaluation.    Subtle erosive changes involving the head of the left 5th metacarpal.  This can also be assessed on the MRI examination.      Electronically signed by: Jurgen Gaston MD  Date:    04/05/2018  Time:    19:14             Narrative:    EXAMINATION:  XR TOE 2 OR MORE VIEWS LEFT    CLINICAL HISTORY:  swelling;    TECHNIQUE:  Three views of the left toes were performed    COMPARISON:  None.    FINDINGS:  There is diffuse demineralization of the osseous structures.  There are erosive changes involving the head of the 1st metatarsal and the base of the 1st proximal phalanx.  Subtle erosive changes also noted involving the head of the left 5th metacarpal.  There is no evidence of a fracture.  There is joint space narrowing throughout the visualized left forefoot.  There is no evidence of a dislocation.  There are extensive vascular calcifications.                               X-Ray Chest AP Portable (Final result)  Result time 04/05/18 16:33:19    Final result by Mis Mccray MD (04/05/18 16:33:19)                 Impression:      Bibasilar patchy areas of increased attenuation concerning for subsegmental areas of airspace disease considering pneumonia or aspiration.    Cardiomegaly.      Electronically signed by: Mis Mccray MD  Date:    04/05/2018  Time:    16:33             Narrative:    EXAMINATION:  XR CHEST AP  PORTABLE    CLINICAL HISTORY:  Sepsis;    TECHNIQUE:  Single frontal view of the chest was performed.    COMPARISON:  01/11/2018    FINDINGS:  There is a cardio stimulator device in the left upper chest with a single lead the.  Sternotomy wires.  The cardiac silhouette is enlarged.  Patchy areas of increased attenuation at the bilateral lung bases concerning for areas of possible pneumonia or aspiration, it is new compared to the prior exam.  No pleural effusion.  No pneumothorax.  Atherosclerotic changes of the aorta.                                Current Medications:     Infusions:       Scheduled:   albuterol-ipratropium 2.5mg-0.5mg/3mL  3 mL Nebulization Q4H    carvedilol  3.125 mg Per G Tube BID WM    clindamycin (CLEOCIN) IVPB  900 mg Intravenous Q8H    donepezil  10 mg Per G Tube QHS    finasteride  5 mg Per G Tube Daily    levothyroxine  100 mcg Per G Tube Before breakfast    mupirocin   Topical (Top) Daily    naphazoline-pheniramine 0.025-0.3%  1 drop Both Eyes QID    sodium chloride 0.9%  3 mL Intravenous Q8H    tamsulosin  0.4 mg Per G Tube Daily        PRN:  albuterol-ipratropium 2.5mg-0.5mg/3mL, hydrALAZINE, morphine, ondansetron    Antibiotics and Day Number of Therapy:  S/p 3days cipro  S/p vanc and cefepime: 4/5 to 4/11  Clindamycin: 4/6 to present      Assessment:     Zeyad Woodard is a 89 y.o.male with multiple comorbidities admitted with HCAP and thrombocytopenia     Plan:     Acute encephalopathy, improving  - Began 4/9, became more lethargic and confused, also with visual halluciations (now resolved)  No focal deficits and CT head on 4/10  without acute findings  Likely 2/2 delirium in setting of sepsis and dementia--improving  A&O to person and place, appropriate    Dysphagia  -Evaluated extensively by speech--unsure how much dysphagia is 2/2 AMS vs functional limitations  -Required PPN 4/11-4/17  SLP feels this will be longterm issue and recommended PEG, which was placed  4/17  -G-tube feeds titrated to goal at 50cc/hr with water boluses  Remains NPO 2/2 aspiration risk    Acute hypoxic respiratory failure and severe sepsis 2/2 HCAP vs aspiration PNA  -1 week progressive productive cough, fever, SOB  On admit, satting well on 5L O2 via NC, temp 103, CXR with bibasilar consolidations, lactate 2.5  BP stable   - Modified barium swallow study 1/18 w/o aspiration--re-evaluated by speech-not safe for thin liquids-honey thick  -In ED, given 2.3L NS bolus, started vanc, cipro, cefepime--penicillin allergy was remote and only hives  -Lactate normalized, procal 21  Blood cx with coag negative staph 2/4 bottles--likely contaminant  Duonebs, CPT  - s/p cipro, vanc, and cefepime (4/5-4/10), clinda 4/6-present--expected 14 day total course     Left 1st toe erosion  -Bruising, erythema, swelling 2/2 recent trauma  H/o neuropathy, follows with podiatry  Xray on admission suggestive of early osteo, Podiatry was consulted, removed toe nail, thinks no osteo  - s/p vanc, cefepime, cipro (4/5-4/10); clinda 4/6-present  Wound cx NGTD  - continue routine wound care.     ELIZABET on CKD 3A, resolved  -On admit, Cr 1.5, GFR 40  Baseline Cr 1.2, GFR 60  -Likely prerenal in setting of sepsis  Returned to baseline     Elevated troponin, resolved  -On admit, trop 0.047  Chronically elevated trop to 0.03-0.06. Also, possibly 2/2 demand in setting of sepsis and CKD  EKG ventricularly paced  -Trended troponins--peaked 0.133->0.125     Chronic thrombocytopenia, stable  -Baseline platelets ~ 90  Follows with Dr. Jeff as outpt  -On admit, platelets 44-likely exacerbated by sepsis  Scatter petechiae, contusions in setting of recent fall but no active bleeding  -PLT improving daily, holding ASA and pharmocologic DVT ppx     Chronic HFpEF  -TTE 12/17-EF 55%, grade 2 diastolic dysfunction, PA pressure 35  -On admit, Trop 0.047,   CXR with b/l consolidations 2/2 PNA rather than volume overload--given  fluids per sepsis protocol  Monitor closely for volume overload  -Repeat TTE--LV EF 55%, no diastolic d/f, PA pressure 33     A fib and SSS s/p pacemaker  -On admit, in ventricularly paced rhythm  Not on home anticoagulation 2/2 h/o GI bleeds. Also falls frequently  -Converted to afib with RVR 4/11--responded to IV metoprolol  Holding PO meds 2/2 dysphagia, continue IV metoprolol 5 BID     Anemia of chronic disease, h/o CHRISTIANO and B12 deficiency  -On admit, Hbg 9.4--at baseline  1/18--w/u c/w AoCD--ferritin, B12 wnl  -Follows with Dr. Jeff--continue home iron, B12     Mild transaminitis, resolved  -On admit, , AST 61, ALT 50  Consistent with baseline  -Negative Hep C Ab 2012--defer repeating as patient would be poor treatment candidate given age/comorbidities     HTN  -On admit, BP low/normal  Held home coreg in setting of sepsis--resumed 2/2 HTN  -Holding PO meds until swallowing improves, on scheduled and prn IV antihypertensives     Hypothyroidism  -No acute issues  Continue home synthroid     BPH  -No acute issues  Continue home flomax, finasteride  - Holding PO meds until swallowing improves     Frequent mechanical falls  -Per chart review  Fell 3 days PTA  -Per family, previously had HH PT/OT that has been discontinued/completed  Only mobile via wheelchair  -Consulted PT/OT--recommended SNF vs return to group home with PT     Dementia  -Baseline--Depending for ADLs, lives at [facility] [place] and has supervision via aide and nurse  -A&O to person, place, date; not situation  Continue home remeron, aricept  -Holding PO meds until swallowing improves     Code Status:      Full     Fluids: None    Diet: NPO, G-tube feeds    Ppx: SCDs; holding chemical ppx in setting of thrombocytopenia    Dispo: G-tubes at goal. Likely discharge today with home health.    Chuckie Isaacs MD  [facility] Internal Medicine HO-I  [facility] IM Service Team A    [facility] Medicine Hospitalist Pager numbers:   [facility] Hospitalist Medicine  Team A (Kinsey/Yumiko): [phone]  [facility] (Tyrese/Bibi):  [phone]

## 2018-04-19 NOTE — PLAN OF CARE
[facility] set to cover Peg feeds, ok given by Rashmi Ruiz to set up [facility] transportation at DC, expecting delivery on home meds and nebulizer to bedside, confirmed by amanda ARROYO, Will need report called to [facility] RN:  Georgia RN @ [employer]  [phone]  TN established [facility] transport for 4:30pm  TN notified Asia (daughter) of the expected DC time, TN noted meds and nebulizer at bedside.    Follow-up With  Details  Why  Contact Info   [facility]  [address]  [address]  [place] [address]  [phone]   Tawana Trujillo MD  Go on 5/3/2018  @ 11am  [address]  [address]  [place] [address]  [phone]   Justin Jeff MD  Go on 5/3/2018  @ 2:40pm  [address]  [place] [address]  [phone]   Kade Callejas MD  Go on 4/27/2018  @ 9:20am (labs and ekg at 7:45am  [address] [facility] [place] [address]  [phone]   [facility] [place]    PEG Feedings  [address]  [place] [address]  [phone]          04/19/18 1247   Final Note   Assessment Type Final Discharge Note   Discharge Disposition Home   What phone number can be called within the next 1-3 days to see how you are doing after discharge? [phone]   Hospital Follow Up  Appt(s) scheduled? Yes   Discharge plans and expectations educations in teach back method with documentation complete? Yes   Right Care Referral Info   Post Acute Recommendation No Care

## 2018-04-19 NOTE — PROGRESS NOTES
Nutr F/u eval due today.    Discharge orders noted for pt to return to group home.  Pt has been sulaiman'ing TF @ goal rate via new PEG.    If bolus recs needed, 300 mls of Isosource 1.5 QID would be sufficient to meet pt's EEN/EPN.  Free water flushes of 100 mls before and after each feeding plus an add'l 200 mls q am & q HS would be adequate to meet pt's total free fl needs.    RD to cont to monitor should pt remain hospitalized.

## 2018-04-19 NOTE — ASSESSMENT & PLAN NOTE
- s/p egd/peg  - PEG site without s/s of complications  - monitor tolerance to TF  - will follow from a distance

## 2018-04-19 NOTE — PLAN OF CARE
Problem: Patient Care Overview  Goal: Plan of Care Review  Sats 95%RA; tolerating well; aerosol tx completed without incident. Aerobika therapy attempted--pt unable to perform, very weak. Manual CPT contraindicated at this time as pt just had PEG tube placed today. Will continue to monitor.

## 2018-04-20 VITALS
HEART RATE: 70 BPM | OXYGEN SATURATION: 100 % | SYSTOLIC BLOOD PRESSURE: 181 MMHG | WEIGHT: 186.75 LBS | TEMPERATURE: 99 F | BODY MASS INDEX: 28.3 KG/M2 | HEIGHT: 68 IN | RESPIRATION RATE: 17 BRPM | DIASTOLIC BLOOD PRESSURE: 74 MMHG

## 2018-04-20 LAB
ALBUMIN SERPL BCP-MCNC: 2.9 G/DL
ALP SERPL-CCNC: 128 U/L
ALT SERPL W/O P-5'-P-CCNC: 33 U/L
ANION GAP SERPL CALC-SCNC: 9 MMOL/L
ANISOCYTOSIS BLD QL SMEAR: SLIGHT
AST SERPL-CCNC: 39 U/L
BASOPHILS # BLD AUTO: ABNORMAL K/UL
BASOPHILS NFR BLD: 0 %
BILIRUB SERPL-MCNC: 1.1 MG/DL
BUN SERPL-MCNC: 41 MG/DL
CALCIUM SERPL-MCNC: 9.1 MG/DL
CHLORIDE SERPL-SCNC: 106 MMOL/L
CO2 SERPL-SCNC: 24 MMOL/L
CREAT SERPL-MCNC: 1.5 MG/DL
DIFFERENTIAL METHOD: ABNORMAL
EOSINOPHIL # BLD AUTO: ABNORMAL K/UL
EOSINOPHIL NFR BLD: 0 %
ERYTHROCYTE [DISTWIDTH] IN BLOOD BY AUTOMATED COUNT: 15.5 %
EST. GFR  (AFRICAN AMERICAN): 47 ML/MIN/1.73 M^2
EST. GFR  (NON AFRICAN AMERICAN): 40 ML/MIN/1.73 M^2
GLUCOSE SERPL-MCNC: 106 MG/DL
HCT VFR BLD AUTO: 24.8 %
HGB BLD-MCNC: 8.2 G/DL
HYPOCHROMIA BLD QL SMEAR: ABNORMAL
LYMPHOCYTES # BLD AUTO: ABNORMAL K/UL
LYMPHOCYTES NFR BLD: 55 %
MCH RBC QN AUTO: 29.6 PG
MCHC RBC AUTO-ENTMCNC: 33.1 G/DL
MCV RBC AUTO: 90 FL
MONOCYTES # BLD AUTO: ABNORMAL K/UL
MONOCYTES NFR BLD: 2 %
NEUTROPHILS # BLD AUTO: ABNORMAL K/UL
NEUTROPHILS NFR BLD: 41 %
NEUTS BAND NFR BLD MANUAL: 2 %
OVALOCYTES BLD QL SMEAR: ABNORMAL
PLATELET # BLD AUTO: 146 K/UL
PLATELET BLD QL SMEAR: ABNORMAL
PMV BLD AUTO: 10.8 FL
POIKILOCYTOSIS BLD QL SMEAR: SLIGHT
POLYCHROMASIA BLD QL SMEAR: ABNORMAL
POTASSIUM SERPL-SCNC: 5 MMOL/L
PROT SERPL-MCNC: 6.6 G/DL
RBC # BLD AUTO: 2.77 M/UL
SODIUM SERPL-SCNC: 139 MMOL/L
WBC # BLD AUTO: 15.36 K/UL

## 2018-04-20 PROCEDURE — 94640 AIRWAY INHALATION TREATMENT: CPT

## 2018-04-20 PROCEDURE — 85007 BL SMEAR W/DIFF WBC COUNT: CPT

## 2018-04-20 PROCEDURE — 94761 N-INVAS EAR/PLS OXIMETRY MLT: CPT

## 2018-04-20 PROCEDURE — 36415 COLL VENOUS BLD VENIPUNCTURE: CPT

## 2018-04-20 PROCEDURE — 80053 COMPREHEN METABOLIC PANEL: CPT

## 2018-04-20 PROCEDURE — 25000003 PHARM REV CODE 250: Performed by: STUDENT IN AN ORGANIZED HEALTH CARE EDUCATION/TRAINING PROGRAM

## 2018-04-20 PROCEDURE — 25000242 PHARM REV CODE 250 ALT 637 W/ HCPCS: Performed by: STUDENT IN AN ORGANIZED HEALTH CARE EDUCATION/TRAINING PROGRAM

## 2018-04-20 PROCEDURE — 94668 MNPJ CHEST WALL SBSQ: CPT

## 2018-04-20 PROCEDURE — 97535 SELF CARE MNGMENT TRAINING: CPT

## 2018-04-20 PROCEDURE — 85027 COMPLETE CBC AUTOMATED: CPT

## 2018-04-20 RX ORDER — CARVEDILOL 6.25 MG/1
6.25 TABLET ORAL 2 TIMES DAILY WITH MEALS
Status: DISCONTINUED | OUTPATIENT
Start: 2018-04-20 | End: 2018-04-20 | Stop reason: HOSPADM

## 2018-04-20 RX ADMIN — NAPHAZOLINE HYDROCHLORIDE AND PHENIRAMINE MALEATE 1 DROP: .25; 3 SOLUTION/ DROPS OPHTHALMIC at 08:04

## 2018-04-20 RX ADMIN — FINASTERIDE 5 MG: 5 TABLET, FILM COATED ORAL at 08:04

## 2018-04-20 RX ADMIN — LEVOTHYROXINE SODIUM 100 MCG: 100 TABLET ORAL at 05:04

## 2018-04-20 RX ADMIN — CARVEDILOL 3.12 MG: 3.12 TABLET, FILM COATED ORAL at 08:04

## 2018-04-20 RX ADMIN — IPRATROPIUM BROMIDE AND ALBUTEROL SULFATE 3 ML: .5; 3 SOLUTION RESPIRATORY (INHALATION) at 04:04

## 2018-04-20 RX ADMIN — IPRATROPIUM BROMIDE AND ALBUTEROL SULFATE 3 ML: .5; 3 SOLUTION RESPIRATORY (INHALATION) at 07:04

## 2018-04-20 RX ADMIN — TAMSULOSIN HYDROCHLORIDE 0.4 MG: 0.4 CAPSULE ORAL at 08:04

## 2018-04-20 RX ADMIN — IPRATROPIUM BROMIDE AND ALBUTEROL SULFATE 3 ML: .5; 3 SOLUTION RESPIRATORY (INHALATION) at 11:04

## 2018-04-20 RX ADMIN — MUPIROCIN: 20 OINTMENT TOPICAL at 08:04

## 2018-04-20 NOTE — PLAN OF CARE
04/20/18 1300   Transport Information   Transport Diagnosis Aspiration Pneumonia   Transportation Date 04/20/18   Transported From [facility]   Transported To Home   Supporting Clinical Information   Unable to sit or travel in a seated position because Postural Instability   Select all that would support previous selection Dementia;Severe Altered Mental Status;Decreased Level of Consciousness;Frail, debilitated, or extreme muscle atrophy;Risk of falling out of wheelchair while in motion    Approval ( or above) Rashmi Ruiz

## 2018-04-20 NOTE — PROGRESS NOTES
Please note that this patient was not enrolled in Outpatient Case Management at this time due to being transferred to a Long-term care facility.     Please contact [facility] at Ext. [phone] with questions.    Thank you,    Hetal Beaulieu, [facility]  Outpatient Complex Care Mgmt  Ext [phone]

## 2018-04-20 NOTE — PT/OT/SLP DISCHARGE
Occupational Therapy Discharge Summary    Zeyad Woodard  MRN: [id number]   Principal Problem: HCAP (healthcare-associated pneumonia)      Patient Discharged from acute Occupational Therapy on 4/20.  Please refer to prior OT note dated 4/20 for functional status.    Assessment:      Patient appropriate for care in another setting.    Objective:     GOALS:    Occupational Therapy Goals        Problem: Occupational Therapy Goal    Goal Priority Disciplines Outcome Interventions   Occupational Therapy Goal     OT, PT/OT Ongoing (interventions implemented as appropriate)    Description:  Goals to be met by: 05/06/18     Patient will increase functional independence with ADLs by performing:    Simple G/H in supine w/ Min A  Rolling Hawkins w/ Min A for toileting tasks                     Reasons for Discontinuation of Therapy Services  Transfer to alternate level of care.      Plan:     Patient Discharged to: Palliative Care/Hospice    Evaristo Russell OT  4/20/2018

## 2018-04-20 NOTE — PLAN OF CARE
Problem: Occupational Therapy Goal  Goal: Occupational Therapy Goal  Goals to be met by: 05/06/18     Patient will increase functional independence with ADLs by performing:    Grooming while seated with Set-up Assistance.  Toileting from bedside commode with Moderate Assistance for hygiene and clothing management.   Supine to sit with Stand-by Assistance and use of bedrail as needed.  Toilet transfer to bedside commode with Contact Guard Assistance.  Increased functional strength to WFL for ADLs.     Outcome: Ongoing (interventions implemented as appropriate)  Pt alert, but w/o verbal responses for OT svcs this date. Pt w/ noted L UE tremors as he took lemon swab upon command, however intentionally dropped swab as a refusal to place in mouth. Pt pursed lips tightly when OT attempted & verbally requested to swab mouth. Provided warm towel for face washing, but pt waved it off & looked away. OT washed pt's face. Upon asking pt if he would like to perf UE TE, pt raised his L UE, flexed/ext elbow & opened/closed hand as if to show that he could move his arm. Pt then looked away as if declining to continue. Pt edu of use of call button. No evidence of comprehension.    Pt cont w/ decreased overall endurance/conditioning, balance/mobility & coordination/cognition w/ subsequent decline in (I)/safety w/ BADLs & fxnl mobility.  Cont w/ OT per POC for d/c-->NH w/ part B OT svcs.

## 2018-04-20 NOTE — PLAN OF CARE
Problem: Patient Care Overview  Goal: Plan of Care Review  Outcome: Ongoing (interventions implemented as appropriate)  Pt's SpO2 99% on room air. No adverse reactions to aerosol tx. No respiratory distress noted. Continue to monitor SpO2.

## 2018-04-20 NOTE — PLAN OF CARE
Problem: Patient Care Overview  Goal: Plan of Care Review  Outcome: Ongoing (interventions implemented as appropriate)  PT on RA, no respiratory distress noted. Will continue to monitor.

## 2018-04-20 NOTE — PROGRESS NOTES
[facility] set to cover Peg feeds, ok given by Rashmi Ruiz to set up [facility] transportation at [place], consult received from attending providers for this as safest mode of travel to home at this time, medications to be re-filled by [facility] as patient's family stated that he only needed the Tessalon Pearls, the eye drops, the duo nebs and nebulizer machine.  TN notified Danika Wylie RN took the meds from yesterday down to pharmacy to have them taken care of with patient's daughter Asia.    TN notified Porter with [facility] and Kaya with hospice [facility] that patient is set for DC, Kaya is servicing ambulatory palliative care consult on this case, Porter has already delivered the home feeding formula to the patient's group home.  Nahomi to call report.    TN notified daughter Asia by phone that patient is set for ambulance  for 2:30pm today.    Georgia RN @ [facility]  [phone]  TN established [facility] for 2:30pm today.  TN notified Asia (daughter) of the expected DC time, TN noted meds and nebulizer at bedside.     Follow-up With  Details  Why  Contact Info   [facility]  [address]  [address]  [place] [address]  [phone]   Tawana Trujillo MD  Go on 5/3/2018  @ 11am  [address]  [address]  [place] [address]  [phone]   Justin Jeff MD  Go on 5/3/2018  @ 2:40pm  [address]  [place] [address]  [phone]   Kade Callejas MD  Go on 4/27/2018  @ 9:20am (labs and ekg at 7:45am  [address]  [place] [address]  [phone]   [facility] [place]    PEG Feedings  [address]  [place] [address]  [phone]   [facility] [place]    for Palliative Care.  [address]  [address]  [place] [address]  [phone]            04/19/18 1247   Final Note   Assessment Type Final Discharge Note   Discharge Disposition Home (Group Home)   What phone number can be  called within the next 1-3 days to see how you are doing after discharge? [phone]   Hospital Follow Up  Appt(s) scheduled? Yes   Discharge plans and expectations educations in teach back method with documentation complete? Yes   Right Care Referral Info   Post Acute Recommendation No Care

## 2018-04-20 NOTE — PT/OT/SLP PROGRESS
Speech Language Pathology  Education Note    Zeyad Woodard  MRN: [id number]    Patient not seen today secondary to limited active participation. Pt was hard to arouse and was observed to moan to all SLP cuing (verbal and deep sternal rubs). Pt opened eyes briefly. SLP continued to redirect pt to orient to name/ and to  participate in indirect dysphagia exercises. However, pt was observed to shut eyes and began to moan again with open mouth posture.  Pt continues to demonstrate inconsistent, waxing and waning DANGELO. During hospital course, pt participated with SLP with max coaxing in 7 out of 9 sessions. Given, pt's fluctuating participation SLP will discharge from SLP acute services. Pt and family have been provided with exercises (pina exercises) to complete on their own. Pt's family (daughters Asia and Ghazala) have verbalized understanding of all education. Pt to be discharged from acute care ST services. If pt and family should desire further trials of ST interventions, they may request per MD orders.      SANDRINE Herzog., CF-SLP  Speech-Language Pathologist   2018     N/C -- Under 8 mins in room with pt

## 2018-04-20 NOTE — PROGRESS NOTES
[facility] Resident Progress Note    Subjective:      More lethargic today. Moaning in response to questions. Tolerating feeds at 50 cc without residuals.    Objective:   Last 24 Hour Vital Signs:  BP  Min: 126/69  Max: 175/72  Temp  Av.2 °F (36.8 °C)  Min: 96.3 °F (35.7 °C)  Max: 99.8 °F (37.7 °C)  Pulse  Av.2  Min: 64  Max: 122  Resp  Av.8  Min: 16  Max: 20  SpO2  Av.5 %  Min: 92 %  Max: 99 %  I/O last 3 completed shifts:  In: 1130 [NG/GT:1080; IV Piggyback:50]  Out: 1549 [Urine:1549]    Physical Examination:  Head:               Normocephalic and atraumatic  Eyes:               anicteric sclera and clear conjunctivae  Mouth:             Oropharynx clear and without exudate; moist mucous membranes  Cardio:            RRR, Grade 2/6 holosystolic murmur loudest at LUSB  Resp:               Scattered rhonchi, intermittent dry cough  Abdom:            G-tube in place; Soft, NTND with normoactive bowel sounds  Extrem:            Left 1st toe wrapped with clean bandages  Skin:                Left knee abrasion-healing well since admit; diffuse countusions and scatter   purpura on extremities and chest-stable since admit  Pulses:            2+ and symmetric distally  Neuro:  Only moaning in response to questions, moving left arm and leg. Minimal movement right leg. Not moving right arm. PERRLA    Laboratory:  Laboratory Data Reviewed: yes  Pertinent Findings:    Recent Labs  Lab 18  0435 18  0550 18  0435   WBC 17.96* 18.31* 16.65*   HGB 7.8* 7.6* 7.7*   HCT 24.2* 23.4* 24.1*   PLT 82* 120* SEE COMMENT   MCV 89 89 89   RDW 15.7* 15.6* 15.6*    137 136   K 4.4 4.7 4.9    102 102   CO2 27 27 27   BUN 40* 45* 44*   CREATININE 1.3 1.4 1.3   * 140* 114*   PROT 6.1 6.2 6.3   ALBUMIN 2.7* 2.8* 2.8*   BILITOT 0.6 0.7 0.9   AST 34 34 42*   ALKPHOS 103 105 119   ALT 27 28 33       Microbiology Data Reviewed: yes  Pertinent Findings:  Blood cx (18):  positive for coag  negative staph  Blood cx (4/7/18) x6 -  all negative  Urine cx (4/7): negative  Respiratory cx: stain-G+ cocci, cx negative  Wound cx: NGTD    Other Results:  EKG (my interpretation):none new    Radiology Data Reviewed: yes  Pertinent Findings:    Imaging Results          X-Ray Toe 2 or More Views Left (Final result)  Result time 04/05/18 19:14:09    Final result by Jurgen Gaston MD (04/05/18 19:14:09)                 Impression:      Erosive changes involving the left 1st metacarpal head and base of the 1st proximal phalanx.  The findings suggestive of early osteomyelitis.  Contrast enhanced MRI may be obtained for further evaluation.    Subtle erosive changes involving the head of the left 5th metacarpal.  This can also be assessed on the MRI examination.      Electronically signed by: Jurgen Gaston MD  Date:    04/05/2018  Time:    19:14             Narrative:    EXAMINATION:  XR TOE 2 OR MORE VIEWS LEFT    CLINICAL HISTORY:  swelling;    TECHNIQUE:  Three views of the left toes were performed    COMPARISON:  None.    FINDINGS:  There is diffuse demineralization of the osseous structures.  There are erosive changes involving the head of the 1st metatarsal and the base of the 1st proximal phalanx.  Subtle erosive changes also noted involving the head of the left 5th metacarpal.  There is no evidence of a fracture.  There is joint space narrowing throughout the visualized left forefoot.  There is no evidence of a dislocation.  There are extensive vascular calcifications.                               X-Ray Chest AP Portable (Final result)  Result time 04/05/18 16:33:19    Final result by Mis Mccray MD (04/05/18 16:33:19)                 Impression:      Bibasilar patchy areas of increased attenuation concerning for subsegmental areas of airspace disease considering pneumonia or aspiration.    Cardiomegaly.      Electronically signed by: Mis Mccray MD  Date:    04/05/2018  Time:    16:33              Narrative:    EXAMINATION:  XR CHEST AP PORTABLE    CLINICAL HISTORY:  Sepsis;    TECHNIQUE:  Single frontal view of the chest was performed.    COMPARISON:  01/11/2018    FINDINGS:  There is a cardio stimulator device in the left upper chest with a single lead the.  Sternotomy wires.  The cardiac silhouette is enlarged.  Patchy areas of increased attenuation at the bilateral lung bases concerning for areas of possible pneumonia or aspiration, it is new compared to the prior exam.  No pleural effusion.  No pneumothorax.  Atherosclerotic changes of the aorta.                                Current Medications:     Infusions:       Scheduled:   albuterol-ipratropium 2.5mg-0.5mg/3mL  3 mL Nebulization Q4H    carvedilol  6.25 mg Per G Tube BID WM    donepezil  10 mg Per G Tube QHS    finasteride  5 mg Per G Tube Daily    levothyroxine  100 mcg Per G Tube Before breakfast    mupirocin   Topical (Top) Daily    naphazoline-pheniramine 0.025-0.3%  1 drop Both Eyes QID    sodium chloride 0.9%  3 mL Intravenous Q8H    tamsulosin  0.4 mg Per G Tube Daily        PRN:  albuterol-ipratropium 2.5mg-0.5mg/3mL, hydrALAZINE, hydrocodone-acetaminophen 5-325mg, ondansetron    Antibiotics and Day Number of Therapy:  S/p 3days cipro  S/p vanc and cefepime: 4/5 to 4/11  Clindamycin: 4/6 to present      Assessment:     Zeyad Woodard is a 89 y.o.male with multiple comorbidities admitted with HCAP and thrombocytopenia     Plan:     Acute encephalopathy, improving  - Began 4/9, became more lethargic and confused, also with visual halluciations (now resolved)  No focal deficits and CT head on 4/10  without acute findings  Likely 2/2 delirium in setting of sepsis and dementia  -4/19 with decline in mental status and decreased mvmt right side. CT head negative    Dysphagia  -Evaluated extensively by speech--unsure how much dysphagia is 2/2 AMS vs functional limitations  -Required PPN 4/11-4/17  SLP feels this will be longterm  issue and recommended PEG, which was placed 4/17  -G-tube feeds titrated to goal at 50cc/hr with water boluses  Remains NPO 2/2 aspiration risk    Acute hypoxic respiratory failure and severe sepsis 2/2 HCAP vs aspiration PNA  -1 week progressive productive cough, fever, SOB  On admit, satting well on 5L O2 via NC, temp 103, CXR with bibasilar consolidations, lactate 2.5  BP stable   - Modified barium swallow study 1/18 w/o aspiration--re-evaluated by speech-not safe for thin liquids-honey thick  -In ED, given 2.3L NS bolus, started vanc, cipro, cefepime--penicillin allergy was remote and only hives  -Lactate normalized, procal 21  Blood cx with coag negative staph 2/4 bottles--likely contaminant  Duonebs, CPT  - s/p cipro, vanc, and cefepime (4/5-4/10), clinda 4/6-present--expected 14 day total course     Left 1st toe erosion  -Bruising, erythema, swelling 2/2 recent trauma  H/o neuropathy, follows with podiatry  Xray on admission suggestive of early osteo, Podiatry was consulted, removed toe nail, thinks no osteo  - s/p vanc, cefepime, cipro (4/5-4/10); clinda 4/6-present  Wound cx NGTD  - continue routine wound care.     ELIZABET on CKD 3A, resolved  -On admit, Cr 1.5, GFR 40  Baseline Cr 1.2, GFR 60  -Likely prerenal in setting of sepsis  Returned to baseline     Elevated troponin, resolved  -On admit, trop 0.047  Chronically elevated trop to 0.03-0.06. Also, possibly 2/2 demand in setting of sepsis and CKD  EKG ventricularly paced  -Trended troponins--peaked 0.133->0.125     Chronic thrombocytopenia, stable  -Baseline platelets ~ 90  Follows with Dr. Jeff as outpt  -On admit, platelets 44-likely exacerbated by sepsis  Scatter petechiae, contusions in setting of recent fall but no active bleeding  -PLT improving daily, holding ASA and pharmocologic DVT ppx     Chronic HFpEF  -TTE 12/17-EF 55%, grade 2 diastolic dysfunction, PA pressure 35  -On admit, Trop 0.047,   CXR with b/l consolidations  2/2 PNA rather than volume overload--given fluids per sepsis protocol  Monitor closely for volume overload  -Repeat TTE--LV EF 55%, no diastolic d/f, PA pressure 33     A fib and SSS s/p pacemaker  -On admit, in ventricularly paced rhythm  Not on home anticoagulation 2/2 h/o GI bleeds. Also falls frequently  -Converted to afib with RVR 4/11--responded to IV metoprolol  Holding PO meds 2/2 dysphagia, continue IV metoprolol 5 BID     Anemia of chronic disease, h/o CHRISTIANO and B12 deficiency  -On admit, Hbg 9.4--at baseline  1/18--w/u c/w AoCD--ferritin, B12 wnl  -Follows with Dr. Jeff--continue home iron, B12     Mild transaminitis, resolved  -On admit, , AST 61, ALT 50  Consistent with baseline  -Negative Hep C Ab 2012--defer repeating as patient would be poor treatment candidate given age/comorbidities     HTN  -On admit, BP low/normal  Held home coreg in setting of sepsis--resumed 2/2 HTN  -Holding PO meds until swallowing improves, on scheduled and prn IV antihypertensives     Hypothyroidism  -No acute issues  Continue home synthroid     BPH  -No acute issues  Continue home flomax, finasteride  - Holding PO meds until swallowing improves     Frequent mechanical falls  -Per chart review  Fell 3 days PTA  -Per family, previously had HH PT/OT that has been discontinued/completed  Only mobile via wheelchair  -Consulted PT/OT--recommended SNF vs return to group home with PT     Dementia  -Baseline--Depending for ADLs, lives at [facility] group home and has supervision via aide and nurse  -A&O to person, place, date; not situation  Continue home remeron, aricept  -Holding PO meds until swallowing improves     Code Status:      DNR     Fluids: None    Diet: NPO, G-tube feeds    Ppx: SCDs; holding chemical ppx in setting of thrombocytopenia    Dispo: G-tubes at goal. Repeat goals of care discussion with family today.    Chuckie Isaacs MD  [facility]  [facility] IM Service Team A    [facility] Medicine  Hospitalist Pager numbers:   [facility] Hospitalist Medicine Team A (Kinsey/Yumiko): [phone]  [facility] Hospitalist Medicine Team B (Tyrese/Bibi):  [phone]

## 2018-04-20 NOTE — DISCHARGE SUMMARY
[facility] Discharge Summary    Primary Team: [facility] IM Team A  Attending Physician: Yumiko  Resident: Martina Herron  Intern: Chuckie Isaacs    Date of Admit: 4/5/2018  Date of Discharge: 4/20/2018    Discharge to: [facility] with home health  Condition: stable    Discharge Diagnoses     Patient Active Problem List   Diagnosis    Renovascular hypertension    Chronic atrial fibrillation    Benign prostatic hyperplasia    Chronic diastolic congestive heart failure    Polyneuropathy    Hypothyroidism    Orthostatic hypotension    Iron deficiency anemia due to chronic blood loss    CKD (chronic kidney disease), stage III    Chronic back pain    Lumbar radiculopathy    Sick sinus syndrome    Bradycardia    Frequent falls    Venous insufficiency of both lower extremities    Anemia of chronic renal failure, stage 3 (moderate)    Right hip pain    Pseudophakia    Essential hypertension    Right posterior capsular opacification    Refractive error    Dementia without behavioral disturbance    Chronic bilateral low back pain with bilateral sciatica    Abnormal blood smear    HLD (hyperlipidemia)    Cardiac pacemaker in situ    Pericardial calcification    Mitral regurgitation    Takes dietary supplements    Late onset Alzheimer's disease with behavioral disturbance    Stroke    C. difficile colitis    Flu    Acute encephalopathy    Bronchitis    HCAP (healthcare-associated pneumonia)    Chronic idiopathic thrombocytopenia    Physical deconditioning    Osteomyelitis of toe    Sepsis    Shortness of breath    Subungual hematoma of toe    Cellulitis of great toe, left    Oropharyngeal dysphagia    Palliative care encounter    Goals of care, counseling/discussion    Counseling regarding advanced care planning and goals of care    S/P percutaneous endoscopic gastrostomy (PEG) tube placement       Consultants and Procedures     Consultants:  GI, PT/OT/SLP, palliative  care     Procedures:   Peg tube placement     Brief History of Present Illness      Zeyad Woodard is a 89 y.o. male who has PMHx HFpEF, afib and SSS s/p pacer, HTN, hypothyroidism, CKD3A, BPH, dementia. The patient presented to [facility] on 4/5/2018 with a primary complaint of SOB.     He was in his USOH, which is dependent for ADLS, ambulatory with wheelchair, until 1 week ago when he developed worsening of chronic dry cough. 3 days ago, he devleoped fevers and saw PCP, who prescribed doxycycline. At that time, he began having progressive SOB, fatigue, weakness and his cough became productive of yellow/green sputum. He denies chest pain, orthopnea. Most history is provided by his daughters.     Per EMS, he was found to be satting 75% at group home. They placed him on CPAP. He was weaned to NC in ED.     The patient has two recent hospitalizations 1/18 for c.diff and influenza B.     For the full HPI please refer to the History & Physical from this admission.     Hospital Course By Problem with Pertinent Findings      Acute encephalopathy  - Began 4/9, became more lethargic and confused, also with visual halluciations (now resolved)  No focal deficits and CT head on 4/10  without acute findings  Likely 2/2 delirium in setting of sepsis and dementia    -4/19 with decline in mental status and decreased mvmt right side. CT head negative--likely 2/2 delirium  -Patient has waxing/waning mentation--at best, A&O to person, place. At worse, minimally verbal  -Continue frequent reorientation, appropriate day/night cycle, avoid sedating medications     Dysphagia  -Evaluated extensively by speech--unsure how much dysphagia is 2/2 AMS vs functional limitations  -Required PPN 4/11-4/17  SLP feels this will be longterm issue and recommended PEG, which was placed 4/17  -G-tube feeds titrated to goal at 50cc/hr with water boluses on 4/18  Remains NPO 2/2 aspiration risk  -Aspiration precautions--1  bite/sip at a time, Alternating bites/sips, Alternate means of nutrition/hydration,Check for pocketing/oral residue, Chin tuck, Feed only when awake/alert, HOB to 90 degrees when feeding, Remain upright 30 minutes post meal and Small bites/sips. HOD 30-45 degrees when resting/sleeping.  -Family not interested in hospice at this time. Ambulatory referral to palliative care placed. Continue speech therapy at home     Acute hypoxic respiratory failure and severe sepsis 2/2 HCAP vs aspiration PNA  -1 week progressive productive cough, fever, SOB  On admit, satting well on 5L O2 via NC, temp 103, CXR with bibasilar consolidations, lactate 2.5  BP stable   - Modified barium swallow study 1/18 w/o aspiration--re-evaluated by speech-not safe for thin liquids-honey thick  -In ED, given 2.3L NS bolus, started vanc, cipro, cefepime--penicillin allergy was remote and only hives  -Lactate normalized, procal 21  Blood cx with coag negative staph 2/4 bottles--likely contaminant  Duonebs, CPT  - s/p cipro, vanc, and cefepime (4/5-4/10), clinda 4/6-4/19--completed 14 day total course  - Possible that patient will continue to chronically aspirate even in setting of PEG/NPO  Family counseled, not interested in hospice, but would like palliative care referral on discharge--order placed     Left 1st toe erosion  -Bruising, erythema, swelling 2/2 recent trauma  H/o neuropathy, follows with podiatry  Xray on admission suggestive of early osteo, Podiatry was consulted, removed toe nail, thinks no osteo  - s/p vanc, cefepime, cipro (4/5-4/10); clinda 4/6-4/19  Wound cx NGTD     ELIZABET on CKD 3A, resolved  -On admit, Cr 1.5, GFR 40  Baseline Cr 1.2, GFR 60  -Likely prerenal in setting of sepsis  Returned to baseline     Elevated troponin, resolved  -On admit, trop 0.047  Chronically elevated trop to 0.03-0.06. Also, possibly 2/2 demand in setting of sepsis and CKD  EKG ventricularly paced  -Trended troponins--peaked  0.133->0.125     Chronic thrombocytopenia, stable  -Baseline platelets ~ 90  Follows with Dr. Jeff as outpt  -On admit, platelets 44-likely exacerbated by sepsis  Scatter petechiae, contusions in setting of recent fall but no active bleeding  -PLT stable near 100, stopped ASA and held pharmocologic DVT ppx     Chronic HFpEF  -TTE 12/17-EF 55%, grade 2 diastolic dysfunction, PA pressure 35  -On admit, Trop 0.047,   CXR with b/l consolidations 2/2 PNA rather than volume overload--given fluids per sepsis protocol  Monitor closely for volume overload  -Repeat TTE--LV EF 55%, no diastolic d/f, PA pressure 33     A fib and SSS s/p pacemaker  -On admit, in ventricularly paced rhythm  Not on home anticoagulation 2/2 h/o GI bleeds. Also falls frequently  -Converted to afib with RVR 4/11--responded to IV metoprolol  Held PO meds 2/2 dysphagia, rate controlled with IV metoprolol 5 BID   -Resumed home coreg per PEG on d/c. Increased to 6.25     Anemia of chronic disease, h/o CHRISTIANO and B12 deficiency  -On admit, Hbg 9.4--at baseline  1/18--w/u c/w AoCD--ferritin, B12 wnl  -Follows with Dr. Jeff--continue home iron, B12  F/u as outpatient     Mild transaminitis, resolved  -On admit, , AST 61, ALT 50  Consistent with baseline  -Negative Hep C Ab 2012--defer repeating as patient would be poor treatment candidate given age/comorbidities     HTN  -On admit, BP low/normal  Held home coreg in setting of sepsis--resumed 2/2 HTN  -Held PO meds 2/2 aspiration risk, started scheduled and prn IV antihypertensives  Resumed coreg per G tube on discharge     Hypothyroidism  -No acute issues  Continue home synthroid     BPH  -No acute issues  Continue home flomax, finasteride     Frequent mechanical falls  -Per chart review  Fell 3 days PTA  -Per family, previously had HH PT/OT that has been discontinued/completed  Only mobile via wheelchair  -Consulted PT/OT--recommended home health PT/OT--order  placed     Dementia  -Baseline--Depending for ADLs, lives at [facility] and has supervision via aide and nurse  -A&O to person, place, date; not situation  Continue home remeron, aricept  -Resume meds per PEG on discharge      Discharge Medications        Medication List      START taking these medications    albuterol-ipratropium 2.5mg-0.5mg/3mL 0.5 mg-3 mg(2.5 mg base)/3 mL nebulizer solution  Commonly known as:  DUO-NEB  Take 3 mLs by nebulization every 4 (four) hours as needed for Wheezing. Rescue     lactose-reduced food with fibr 0.07 gram-1.5 kcal/mL Liqd  Commonly known as:  ISOSOURCE 1.5 RAQUEL  50 mL/hr by FEEDING TUBE route continuous. goal rate of 50ml/hr of isosource 1.5 or equivalent formula to proivde 1800 kcal, 81g protein, & 934ml free water. Add an additional 300ml free water flush qid. Equivalent formula acceptable.     naphazoline-pheniramine 0.025-0.3% 0.025-0.3 % ophthalmic solution  Commonly known as:  NAPHCON-A  Place 1 drop into both eyes 4 (four) times daily as needed (eye redness or itching).        CHANGE how you take these medications    acetaminophen 325 MG tablet  Commonly known as:  TYLENOL  2 tablets (650 mg total) by Per G Tube route every 6 (six) hours as needed for Pain.  What changed:  · how to take this  · reasons to take this     atorvastatin 20 MG tablet  Commonly known as:  LIPITOR  1 tablet (20 mg total) by Per G Tube route nightly.  What changed:  how to take this     benzonatate 100 MG capsule  Commonly known as:  TESSALON  1 capsule (100 mg total) by Per G Tube route 3 (three) times daily as needed for Cough.  What changed:  how to take this     calcium 500 mg Tab  1 tablet by Per G Tube route once daily.  What changed:  · how to take this  · when to take this     carvedilol 6.25 MG tablet  Commonly known as:  COREG  1 tablet (6.25 mg total) by Per G Tube route 2 (two) times daily with meals.  What changed:  · medication strength  · how much to take  · how to take  this     clotrimazole-betamethasone lotion  Commonly known as:  LOTRISONE  Apply topically 2 (two) times daily.  What changed:  · when to take this  · reasons to take this     cyanocobalamin 1000 MCG tablet  Commonly known as:  VITAMIN B-12  0.5 tablets (500 mcg total) by Per G Tube route once daily.  What changed:  · how much to take  · how to take this     donepezil 10 MG tablet  Commonly known as:  ARICEPT  1 tablet (10 mg total) by Per G Tube route every evening.  What changed:  how to take this     finasteride 5 mg tablet  Commonly known as:  PROSCAR  1 tablet (5 mg total) by Per G Tube route once daily.  What changed:  how to take this     gabapentin 100 MG capsule  Commonly known as:  NEURONTIN  1 capsule (100 mg total) by Per G Tube route 3 (three) times daily. Take 100mg in Morning and take 300mg nightly  What changed:  · how much to take  · how to take this  · when to take this     levothyroxine 100 MCG tablet  Commonly known as:  SYNTHROID  1 tablet (100 mcg total) by Per G Tube route before breakfast.  What changed:  · how to take this  · when to take this     multivit-min-folic-vit K-lycop 400- mcg Tab  Commonly known as:  ONE-A-DAY MEN'S MULTIVITAMIN  1 tablet by Per G Tube route once daily.  What changed:  · medication strength  · how to take this     senna-docusate 8.6-50 mg 8.6-50 mg per tablet  Commonly known as:  PERICOLACE  1 tablet by Per G Tube route 2 (two) times daily.  What changed:  how to take this     tamsulosin 0.4 mg Cp24  Commonly known as:  FLOMAX  1 capsule (0.4 mg total) by Per G Tube route once daily.  What changed:  how to take this        CONTINUE taking these medications    neomycin-bacitracin-polymyxin ointment  Commonly known as:  NEOSPORIN  Apply topically Daily.        STOP taking these medications    aspirin 81 MG EC tablet  Commonly known as:  ECOTRIN     doxycycline 100 MG capsule  Commonly known as:  MONODOX     ferrous sulfate 325 mg (65 mg iron) Tab tablet      furosemide 20 MG tablet  Commonly known as:  LASIX     mirtazapine 7.5 MG Tab  Commonly known as:  REMERON     VITAMIN C ORAL           Where to Get Your Medications      [facility] [place] [facility] [address]  [address] [facility] [facility] [address]    Phone:  [phone]   · tamsulosin 0.4 mg Cp24     You can get these medications from any pharmacy    Bring a paper prescription for each of these medications  · albuterol-ipratropium 2.5mg-0.5mg/3mL 0.5 mg-3 mg(2.5 mg base)/3 mL nebulizer solution  · atorvastatin 20 MG tablet  · benzonatate 100 MG capsule  · carvedilol 6.25 MG tablet  · donepezil 10 MG tablet  · finasteride 5 mg tablet  · gabapentin 100 MG capsule  · lactose-reduced food with fibr 0.07 gram-1.5 kcal/mL Liqd  · levothyroxine 100 MCG tablet  · naphazoline-pheniramine 0.025-0.3% 0.025-0.3 % ophthalmic solution  You don't need a prescription for these medications  · acetaminophen 325 MG tablet  · calcium 500 mg Tab  · cyanocobalamin 1000 MCG tablet  · multivit-min-folic-vit K-lycop 400- mcg Tab  · senna-docusate 8.6-50 mg 8.6-50 mg per tablet         Discharge Information:   Diet:  NPO  Aspiration precautions:  1 bite/sip at a time, Alternating bites/sips, Alternate means of nutrition/hydration,Check for pocketing/oral residue, Chin tuck, Feed only when awake/alert, HOB to 90 degrees when feeding, Remain upright 30 minutes post meal and Small bites/sips. HOD 30-45 degrees when resting/sleeping.     Physical Activity:  As tolerated     Instructions:  1. Take all medications as prescribed  2. Keep all follow-up appointments     Follow-Up Appointments:  PCP  T[place]  Ambulatory referral to palliative care     Chuckie Isaacs  [facility] Internal Medicine, -I

## 2018-04-20 NOTE — PT/OT/SLP PROGRESS
Occupational Therapy   Treatment    Name: Zeyad Woodard  MRN: [id number]  Admitting Diagnosis:  HCAP (healthcare-associated pneumonia)  3 Days Post-Op    Recommendations:     Discharge Recommendations: nursing facility, basic  Discharge Equipment Recommendations:  none  Barriers to discharge:  None    Subjective     Communicated with: nsg prior to session.  Pain/Comfort:  · Pain Rating 1:  (none reported)  · Pain Rating Post-Intervention 1:  (none reported)    Patients cultural, spiritual, Mandaeism conflicts given the current situation:  (none reported)    Objective:     Patient found with: peripheral IV, telemetry, PEG Tube    General Precautions: Standard, aspiration, fall   Orthopedic Precautions:N/A   Braces:       Occupational Performance:    Bed Mobility:    ·      Functional Mobility/Transfers:    · Functional Mobility:     Activities of Daily Living:  · Grooming: total assistance face washing & attempted mouth swabbing    Patient left HOB elevated with all lines intact, call button in reach, bed alarm on and PCA present    AMPA 6 Click:  [facility] Total Score: 6    Treatment & Education:  Pt alert, but w/o verbal responses for OT svcs this date. Pt w/ noted L UE tremors as he took lemon swab upon command, however intentionally dropped swab as a refusal to place in mouth. Pt pursed lips tightly when OT attempted & verbally requested to swab mouth. Provided warm towel for face washing, but pt waved it off & looked away. OT washed pt's face. Upon asking pt if he would like to perf UE TE, pt raised his L UE, flexed/ext elbow & opened/closed hand as if to show that he could move his arm. Pt then looked away as if declining to continue. Pt edu of use of call button. No evidence of comprehension.      Assessment:   Pt cont w/ decreased overall endurance/conditioning, balance/mobility & coordination/cognition w/ subsequent decline in (I)/safety w/ BADLs & fxnl mobility.  Cont w/ OT per POC for d/c-->NH w/ part B OT  [facility].    Zeyad Woodard is a 89 y.o. male with a medical diagnosis of HCAP (healthcare-associated pneumonia).  He presents with ..  Performance deficits affecting function are weakness, impaired endurance, impaired functional mobilty, impaired self care skills, impaired cognition, decreased upper extremity function, decreased coordination, decreased safety awareness.      Rehab Prognosis:  fair; patient would benefit from acute skilled OT services to address these deficits and reach maximum level of function.       Plan:     Patient to be seen 3 x/week to address the above listed problems via self-care/home management, therapeutic activities, therapeutic exercises  · Plan of Care Expires: 05/06/18  · Plan of Care Reviewed with: patient    This Plan of care has been discussed with the patient who was involved in its development and understands and is in agreement with the identified goals and treatment plan    GOALS:    Occupational Therapy Goals        Problem: Occupational Therapy Goal    Goal Priority Disciplines Outcome Interventions   Occupational Therapy Goal     OT, PT/OT Ongoing (interventions implemented as appropriate)    Description:  Goals to be met by: 05/06/18     Patient will increase functional independence with ADLs by performing:    Simple G/H in supine w/ Min A  Rolling [place] w/ Min A for toileting tasks                     Time Tracking:     OT Date of Treatment: 04/20/18  OT Start Time: 0852  OT Stop Time: 0910  OT Total Time (min): 18 min    Billable Minutes:Self Care/Home Management 18    DELON Walsh  4/20/2018

## 2018-04-20 NOTE — PLAN OF CARE
Problem: Patient Care Overview  Goal: Plan of Care Review  Outcome: Revised  Pt rested throughout the night. No signs of distress noted. Pain level assessed. Pain med administered as needed. Peg tube CDI. Fall precautions maintained.

## 2018-04-25 ENCOUNTER — TELEPHONE (OUTPATIENT)
Dept: FAMILY MEDICINE | Facility: CLINIC | Age: 83
End: 2018-04-25

## 2018-04-25 ENCOUNTER — PES CALL (OUTPATIENT)
Dept: ADMINISTRATIVE | Facility: CLINIC | Age: 83
End: 2018-04-25

## 2018-04-25 RX ORDER — ASPIRIN 81 MG/1
81 TABLET ORAL DAILY
COMMUNITY

## 2018-04-30 ENCOUNTER — TELEPHONE (OUTPATIENT)
Dept: FAMILY MEDICINE | Facility: CLINIC | Age: 83
End: 2018-04-30

## 2018-05-01 ENCOUNTER — TELEPHONE (OUTPATIENT)
Dept: FAMILY MEDICINE | Facility: CLINIC | Age: 83
End: 2018-05-01

## 2018-05-01 DIAGNOSIS — R53.81 DEBILITY: ICD-10-CM

## 2018-05-01 DIAGNOSIS — N18.30 CKD (CHRONIC KIDNEY DISEASE), STAGE III: Primary | ICD-10-CM

## 2018-05-01 DIAGNOSIS — F03.90 DEMENTIA WITHOUT BEHAVIORAL DISTURBANCE, UNSPECIFIED DEMENTIA TYPE: ICD-10-CM

## 2018-05-04 ENCOUNTER — TELEPHONE (OUTPATIENT)
Dept: ADMINISTRATIVE | Facility: CLINIC | Age: 83
End: 2018-05-04

## 2018-05-04 NOTE — PATIENT INSTRUCTIONS
Oralia Cronin     Agency: [facility]    Reason for call: OCCUPATIONAL THERAPY EVALUATION RESCHEDULED FOR 04/23/18    Level of urgency: LOW  Contact info: [phone] or [email or web address]   Whom to call back: clinician

## 2018-05-07 ENCOUNTER — TELEPHONE (OUTPATIENT)
Dept: ADMINISTRATIVE | Facility: CLINIC | Age: 83
End: 2018-05-07

## 2018-05-07 NOTE — PATIENT INSTRUCTIONS
Oralia Cronin    Agency: [facility]    Reason for call: OCCUPATIONAL THERAPY EVALUATION RESCHEDULED FOR 04/26/18 PER CAREGIVER REQUEST  Level of urgency: LOW  Contact info: [phone] or [email or web address] [id number]  Whom to call back: clinician          Thanks

## 2018-05-21 ENCOUNTER — TELEPHONE (OUTPATIENT)
Dept: FAMILY MEDICINE | Facility: CLINIC | Age: 83
End: 2018-05-21

## 2018-05-21 ENCOUNTER — TELEPHONE (OUTPATIENT)
Dept: ELECTROPHYSIOLOGY | Facility: CLINIC | Age: 83
End: 2018-05-21

## 2018-05-21 NOTE — PT/OT/SLP PROGRESS
Physical Therapy  Treatment    Zeyad Woodard   MRN: [id number]   Admitting Diagnosis: MRSA bacteremia    PT Received On: 11/15/17  PT Start Time: 1025     PT Stop Time: 1105    PT Total Time (min): 40 min       Billable Minutes:  Gait [id number], Therapeutic Activity 13 and Therapeutic Exercise 11    Treatment Type: Treatment  PT/PTA: PTA     PTA Visit Number: 3       General Precautions: Standard, contact, fall  Orthopedic Precautions: N/A   Braces: N/A    Do you have any cultural, spiritual, Baptism conflicts, given your current situation?: none reported to PT    Subjective:  Communicated with nsg prior to session.      Pain/Comfort  Pain Rating 1:  (no c/o's)    Objective:   Patient found with: bed alarm, telemetry    Functional Mobility:  Bed Mobility:   Supine to Sit: Minimum Assistance, With side rail  Sit to Supine: Contact Guard Assistance    Transfers:  Sit <> Stand Assistance: Minimum Assistance  Sit <> Stand Assistive Device: Rolling Walker  Toilet Transfer Technique:  (ambulation)  Toilet Transfer Assistance: Minimum Assistance  Toilet Transfer Assistive Device: Rolling Walker    Gait:   Gait Distance: ~15' X 2  Assistance 1: Contact Guard Assistance, Minimum assistance  Gait Assistive Device: Rolling walker  Gait Pattern: reciprocal  Gait Deviation(s): decreased roberto, decreased velocity of limb motion, decreased step length, decreased stride length, decreased toe-to-floor clearance    Stairs:      Balance:   Static Sit: FAIR+: Able to take MINIMAL challenges from all directions  Dynamic Sit: FAIR+: Maintains balance through MINIMAL excursions of active trunk motion  Static Stand: POOR+: Needs MINIMAL assist to maintain  Dynamic stand: 0: N/A     Therapeutic Activities and Exercises: le supine ex's X 15 reps inc: ap,qs,hs,abd/add,slr,pt stood inside RW to use urinal and also used toilet,assisted pt with cleaning       AM-PAC 6 CLICK MOBILITY  How much help from another person does this patient  TRANSFER - OUT REPORT:    Verbal report given to Tessa Glasgow RN  on Rose Hidalgo  being transferred to 6th Floor for routine post - op       Report consisted of patients Situation, Background, Assessment and   Recommendations(SBAR). Information from the following report(s) SBAR was reviewed with the receiving nurse. Lines:   Peripheral IV 05/19/18 Right Antecubital (Active)   Site Assessment Clean, dry, & intact 5/21/2018  3:48 AM   Phlebitis Assessment 0 5/21/2018  3:48 AM   Infiltration Assessment 0 5/21/2018  3:48 AM   Dressing Status Clean, dry, & intact 5/21/2018  3:48 AM   Dressing Type Tape;Transparent 5/21/2018  3:48 AM   Hub Color/Line Status Infusing 5/21/2018  3:48 AM   Alcohol Cap Used No 5/21/2018  3:48 AM        Opportunity for questions and clarification was provided. currently need?   1 = Unable, Total/Dependent Assistance  2 = A lot, Maximum/Moderate Assistance  3 = A little, Minimum/Contact Guard/Supervision  4 = None, Modified [place]/Independent    Turning over in bed (including adjusting bedclothes, sheets and blankets)?: 3  Sitting down on and standing up from a chair with arms (e.g., wheelchair, bedside commode, etc.): 3  Moving from lying on back to sitting on the side of the bed?: 3  Moving to and from a bed to a chair (including a wheelchair)?: 3  Need to walk in hospital room?: 3  Climbing 3-5 steps with a railing?: 2  Total Score: 17    AM-PAC Raw Score CMS G-Code Modifier Level of Impairment Assistance   6 % Total / Unable   7 - 9 CM 80 - 100% Maximal Assist   10 - 14 CL 60 - 80% Moderate Assist   15 - 19 CK 40 - 60% Moderate Assist   20 - 22 CJ 20 - 40% Minimal Assist   23 CI 1-20% SBA / CGA   24 CH 0% Independent/ Mod I     Patient left supine with all lines intact, call button in reach, bed alarm on and nsg present.    Assessment: pt tolerated w/o c/o's,requires assistance for safety and will benefit from SNF  Zeyad Woodard is a 89 y.o. male with a medical diagnosis of MRSA bacteremia and presents with .    Rehab identified problem list/impairments: Rehab identified problem list/impairments: weakness, impaired endurance, impaired functional mobilty, gait instability, impaired balance, decreased lower extremity function, decreased ROM, impaired coordination    Rehab potential is good.    Activity tolerance: Fair    Discharge recommendations: Discharge Facility/Level Of Care Needs: nursing facility, skilled     Barriers to discharge: Barriers to Discharge: Decreased caregiver support    Equipment recommendations: Equipment Needed After Discharge:  (defer to SNF)     GOALS: see general POC   Physical Therapy Goals        Problem: Physical Therapy Goal    Goal Priority Disciplines Outcome Goal Variances Interventions   Physical Therapy Goal      PT/OT, PT Ongoing (interventions implemented as appropriate)     Description:  Goals to be met by: 17     Patient will increase functional independence with mobility by performin. Sit to stand transfer with Stand-by Assistance  2. Bed to chair transfer with Stand-by Assistance using Rolling Walker  3. Gait  x 50 feet with Stand-by Assistance using Rolling Walker.   4. Stand for 5 minutes with Stand-by Assistance using Rolling Walker MET 17  5. Lower extremity exercise program x 15 reps per handout, with supervision                       PLAN:    Patient to be seen 6 x/week  to address the above listed problems via gait training, therapeutic activities, therapeutic exercises  Plan of Care expires: 17  Plan of Care reviewed with: patient         Tashi G Geovanny, PTA  11/15/2017

## 2018-05-21 NOTE — TELEPHONE ENCOUNTER
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  Informed him a call will be placed to [employer] to have them send out a return kit for the [employer] Care link remote monitor.  Confirmed the mailing address is the same address listed in the patient's chart,  on [address] [place], [place]. Patient's son verbalized understanding.

## 2018-05-21 NOTE — TELEPHONE ENCOUNTER
----- Message from Mel Francis sent at 5/21/2018 12:16 PM CDT -----  Contact: [phone]/Daughter/Heather  Daughter is requesting a call back regarding her father passed away on 05/08/18

## 2019-08-19 NOTE — ASSESSMENT & PLAN NOTE
Rate controlled  Warfarin discontinue due to GI bleed with angioectasia  Continue coreg and ASA  therapy  Patient with consistent rate in 60's so likely paced   No

## 2023-04-27 NOTE — PATIENT INSTRUCTIONS
Oralia Cronin    Agency: [facility]    Reason for call: PATIENT DISCHARGED POST EVALUATION FROM OCCUPATIONAL THERAPY     Level of urgency: LOW  Contact info: [phone] or [email or web address]  Whom to call back: clinician    
No PMH, NKDA. Diarrhea everyday since april 8th per mom and pt. Returned from [place] april 21st. Able to PO. <3 urinations. No fevers. Pt awake, alert, interacting appropriately. Pt coloring appropriate, brisk capillary refill noted, easy WOB noted.

## 2023-09-13 NOTE — TELEPHONE ENCOUNTER
Radha Betancourt to speak c .     ----- Message from Edith Mata sent at 10/10/2017 12:07 PM CDT -----  Contact: [phone]'s daughter Asia  Patient's daughter  called in returning your call. Please advise.       Detail Level: Zone Continue Regimen: Ketoconazole shampoo Continue Regimen: Clobetasol

## 2024-01-24 NOTE — TELEPHONE ENCOUNTER
If not improvement patient can come to urgent care visit or contact cardiology for possible medication adjustment.  
Left message for patient to call office back to set an appointment with Urgent Care if Symptoms persist.  
Notify pt. Kidney fxn and potassium went up; repeat BMP in 1 week for verification; avoid NSAIDs such as naproxyn and ibuprofen; please verify how pt. Is taking lasix dose and let me know  
Received call from Omid with [facility] that patient fell. Instructed to find out how pt is taking Lasix and call office back.  
Spoke with patient's daughter Mrs Betancourt about patient not taking any NSAID only Tylenol , Dr Rahman (Cardio) put him back in [facility] since Wed 12/20/17 and that her BP is low 112/52 , patient have been feeling lethargy and fatigue.  Please advise.   
Spoke with patient's daughter Mrs Betancourt to take patient to near ER as per Dr Diaz,  Mrs Betancourt verbalized understanding.    
Strong peripheral pulses

## (undated) DEVICE — GAUZE SPONGE 4X4 12PLY

## (undated) DEVICE — SEE MEDLINE ITEM 156955

## (undated) DEVICE — SEE MEDLINE ITEM 157148

## (undated) DEVICE — SEE MEDLINE ITEM 146345

## (undated) DEVICE — SEE MEDLINE ITEM 157117

## (undated) DEVICE — COVER OVERHEAD SURG LT BLUE

## (undated) DEVICE — SEE MEDLINE ITEM 154981

## (undated) DEVICE — DRESSING XEROFORM GAUZE 5X9

## (undated) DEVICE — GLOVE BIOGEL ECLIPSE SZ 7.5

## (undated) DEVICE — SEE MEDLINE ITEM 152622

## (undated) DEVICE — PACK BASIC

## (undated) DEVICE — TAPE MEDIPORE 4IN X 2YDS

## (undated) DEVICE — STRIP WOUND PK BIGUANIDE 36X.5

## (undated) DEVICE — SUT 4-0 ETHILON 18 PS-2

## (undated) DEVICE — NDL HYPO A BEVEL 22X1 1/2

## (undated) DEVICE — SEE MEDLINE ITEM 157116

## (undated) DEVICE — PAD ABDOMINAL 5X9 STERILE

## (undated) DEVICE — PAD PREP 50/CA

## (undated) DEVICE — SYR B-D DISP CONTROL 10CC100/C

## (undated) DEVICE — KIT COLLECTION E SWAB REGULAR

## (undated) DEVICE — DRESSING LEUKOPLAST FLEX 1X3IN

## (undated) DEVICE — SUT VICRYL 3-0 27 SH

## (undated) DEVICE — SWAB CULTURETTE SINGLE